# Patient Record
Sex: MALE | Race: WHITE | NOT HISPANIC OR LATINO | Employment: OTHER | URBAN - METROPOLITAN AREA
[De-identification: names, ages, dates, MRNs, and addresses within clinical notes are randomized per-mention and may not be internally consistent; named-entity substitution may affect disease eponyms.]

---

## 2017-01-03 ENCOUNTER — INFUSION (OUTPATIENT)
Dept: INFUSION THERAPY | Facility: HOSPITAL | Age: 72
End: 2017-01-03
Attending: INTERNAL MEDICINE
Payer: MEDICARE

## 2017-01-03 VITALS — DIASTOLIC BLOOD PRESSURE: 98 MMHG | HEART RATE: 84 BPM | RESPIRATION RATE: 18 BRPM | SYSTOLIC BLOOD PRESSURE: 135 MMHG

## 2017-01-03 DIAGNOSIS — D46.9 MDS (MYELODYSPLASTIC SYNDROME): Primary | ICD-10-CM

## 2017-01-03 PROCEDURE — 96401 CHEMO ANTI-NEOPL SQ/IM: CPT

## 2017-01-03 PROCEDURE — 63600175 PHARM REV CODE 636 W HCPCS: Performed by: INTERNAL MEDICINE

## 2017-01-03 RX ORDER — AZACITIDINE 100 MG/1
75 INJECTION, POWDER, LYOPHILIZED, FOR SOLUTION INTRAVENOUS; SUBCUTANEOUS
Status: COMPLETED | OUTPATIENT
Start: 2017-01-03 | End: 2017-01-03

## 2017-01-03 RX ADMIN — AZACITIDINE 160 MG: 100 INJECTION, POWDER, LYOPHILIZED, FOR SOLUTION INTRAVENOUS; SUBCUTANEOUS at 11:01

## 2017-01-03 NOTE — NURSING
MD Bishnu Naik RN        Cc: Julius Barry NP                     I added neulasta for the 3rd     Please get him in     5 doses are fine            Previous Messages       ----- Message -----      From: Bishnu Moon RN      Sent: 12/28/2016   3:59 PM        To: Laura Rader MD     Question 1:  We spoke yesterday about his Gran # 0.2 and you said for him to get neulasta next week when he finishes Vidaza.  I do not see an order and wanted to check to see if you still wanted him to get neulasta, if so please put the order in so we can get auth for the neulasta.     Question 2:  Your orders are for VIdaza for 7 doses, but he was only scheduled for 5 doses.  If you do want him to get 7 doses, I will let the  know to schedule the additional 2 doses. Do you him to ge vidaza 7 doses?     Thanks,  Bishnu

## 2017-01-06 ENCOUNTER — INFUSION (OUTPATIENT)
Dept: INFUSION THERAPY | Facility: HOSPITAL | Age: 72
End: 2017-01-06
Attending: INTERNAL MEDICINE
Payer: MEDICARE

## 2017-01-06 ENCOUNTER — OFFICE VISIT (OUTPATIENT)
Dept: INTERNAL MEDICINE | Facility: CLINIC | Age: 72
End: 2017-01-06
Payer: MEDICARE

## 2017-01-06 VITALS
TEMPERATURE: 98 F | HEIGHT: 71 IN | HEART RATE: 94 BPM | DIASTOLIC BLOOD PRESSURE: 82 MMHG | SYSTOLIC BLOOD PRESSURE: 128 MMHG | WEIGHT: 205.94 LBS | BODY MASS INDEX: 28.83 KG/M2 | RESPIRATION RATE: 18 BRPM

## 2017-01-06 DIAGNOSIS — I35.0 AORTIC VALVE STENOSIS, MILD: Chronic | ICD-10-CM

## 2017-01-06 DIAGNOSIS — I10 HTN (HYPERTENSION), BENIGN: Primary | ICD-10-CM

## 2017-01-06 DIAGNOSIS — Z98.890 H/O CAROTID ENDARTERECTOMY: ICD-10-CM

## 2017-01-06 DIAGNOSIS — I48.20 CHRONIC ATRIAL FIBRILLATION: ICD-10-CM

## 2017-01-06 DIAGNOSIS — F32.A DEPRESSION, UNSPECIFIED DEPRESSION TYPE: ICD-10-CM

## 2017-01-06 DIAGNOSIS — D46.9 MDS (MYELODYSPLASTIC SYNDROME): Primary | ICD-10-CM

## 2017-01-06 DIAGNOSIS — E78.5 DYSLIPIDEMIA: ICD-10-CM

## 2017-01-06 PROCEDURE — 1157F ADVNC CARE PLAN IN RCRD: CPT | Mod: S$GLB,,, | Performed by: INTERNAL MEDICINE

## 2017-01-06 PROCEDURE — 3079F DIAST BP 80-89 MM HG: CPT | Mod: S$GLB,,, | Performed by: INTERNAL MEDICINE

## 2017-01-06 PROCEDURE — 1160F RVW MEDS BY RX/DR IN RCRD: CPT | Mod: S$GLB,,, | Performed by: INTERNAL MEDICINE

## 2017-01-06 PROCEDURE — 1126F AMNT PAIN NOTED NONE PRSNT: CPT | Mod: S$GLB,,, | Performed by: INTERNAL MEDICINE

## 2017-01-06 PROCEDURE — 99999 PR PBB SHADOW E&M-EST. PATIENT-LVL III: CPT | Mod: PBBFAC,,, | Performed by: INTERNAL MEDICINE

## 2017-01-06 PROCEDURE — 1159F MED LIST DOCD IN RCRD: CPT | Mod: S$GLB,,, | Performed by: INTERNAL MEDICINE

## 2017-01-06 PROCEDURE — 63600175 PHARM REV CODE 636 W HCPCS: Performed by: INTERNAL MEDICINE

## 2017-01-06 PROCEDURE — 99499 UNLISTED E&M SERVICE: CPT | Mod: S$PBB,,, | Performed by: INTERNAL MEDICINE

## 2017-01-06 PROCEDURE — 96372 THER/PROPH/DIAG INJ SC/IM: CPT

## 2017-01-06 PROCEDURE — 99214 OFFICE O/P EST MOD 30 MIN: CPT | Mod: S$GLB,,, | Performed by: INTERNAL MEDICINE

## 2017-01-06 PROCEDURE — 3074F SYST BP LT 130 MM HG: CPT | Mod: S$GLB,,, | Performed by: INTERNAL MEDICINE

## 2017-01-06 RX ORDER — PRAVASTATIN SODIUM 20 MG/1
20 TABLET ORAL EVERY OTHER DAY
Qty: 45 TABLET | Refills: 3 | Status: SHIPPED | OUTPATIENT
Start: 2017-01-06 | End: 2017-03-22

## 2017-01-06 RX ADMIN — PEGFILGRASTIM 6 MG: 6 INJECTION SUBCUTANEOUS at 03:01

## 2017-01-06 NOTE — PROGRESS NOTES
This office note has been dictated.  HISTORY OF PRESENT ILLNESS:  This is a 71-year-old gentleman with several   chronic medical conditions including hypertension, dyslipidemia, atrial   fibrillation, aortic stenosis and recently being treated for myelodysplastic   syndrome.  The patient reports overall he is doing generally well.  Currently,   undergoing chemotherapy with Hematology/Oncology.  He is not describing any   chest pain, palpitations or syncope.  He states his blood pressure readings at   home are always normal, though he reports that when he comes in to the office,   particular with the Oncology Department, his readings can be elevated.    CURRENT MEDICATIONS:  All noted and reviewed in the electronic medical record   medication list.    REVIEW OF SYSTEMS:  CONSTITUTIONAL:  No fever, chills or generalized body aches.  CARDIOVASCULAR:  No chest pain, palpitations or syncope.  RESPIRATORY:  No cough or shortness of breath.  GASTROINTESTINAL:  No nausea, vomiting, abdominal pain or diarrhea or change in   bowel habits.    PAST MEDICAL HISTORY, PAST SURGICAL HISTORY, FAMILY AND SOCIAL HISTORY:  All   noted and reviewed in the electronic medical record history sections and an   updated.    PHYSICAL EXAMINATION:  GENERAL:  Pleasant, alert, appropriately groomed male in no acute distress.  VITAL SIGNS:  Blood pressure taken manually by this examiner is 128/82.  Pulse   90s and irregularly irregular.  HEENT:  Normocephalic.  NECK:  Supple, no masses, no thyromegaly.  LUNGS:  Clear to auscultation.  CARDIOVASCULAR:  Regular rate and rhythm.  There is a 1-2/6 systolic murmur.    Carotids are full bilaterally.  No significant peripheral extremity edema.  MENTAL STATUS:  Alert and oriented.  Affect and mood all appropriate.    IMPRESSION:  1.  Hypertension, probably reasonably controlled.  2.  Aortic stenosis.  3.  History of carotid atherosclerotic disease, status post endarterectomy.  4.  Dyslipidemia.  5.   Atrial fibrillation, chronically anticoagulated.  6.  Myelodysplastic syndrome being actively followed by Hematology/Oncology.  7.  Depression, stable.    PLAN:  1.  Update a carotid Doppler study.  2.  Refill pravastatin.  3.  Return to clinic in six months.      PB/IN  dd: 01/06/2017 10:04:00 (CST)  td: 01/07/2017 00:31:10 (CST)  Doc ID   #[id number]  Job ID #[id number]    CC:

## 2017-01-11 ENCOUNTER — OFFICE VISIT (OUTPATIENT)
Dept: HEMATOLOGY/ONCOLOGY | Facility: CLINIC | Age: 72
End: 2017-01-11
Payer: MEDICARE

## 2017-01-11 VITALS
WEIGHT: 202.63 LBS | HEART RATE: 89 BPM | SYSTOLIC BLOOD PRESSURE: 167 MMHG | HEIGHT: 71 IN | TEMPERATURE: 99 F | BODY MASS INDEX: 28.37 KG/M2 | DIASTOLIC BLOOD PRESSURE: 86 MMHG | RESPIRATION RATE: 16 BRPM

## 2017-01-11 DIAGNOSIS — I27.20 PULMONARY HYPERTENSION: ICD-10-CM

## 2017-01-11 DIAGNOSIS — D61.818 OTHER PANCYTOPENIA: ICD-10-CM

## 2017-01-11 DIAGNOSIS — E78.5 DYSLIPIDEMIA: ICD-10-CM

## 2017-01-11 DIAGNOSIS — I10 HTN (HYPERTENSION), BENIGN: ICD-10-CM

## 2017-01-11 DIAGNOSIS — Z98.890 H/O CAROTID ENDARTERECTOMY: ICD-10-CM

## 2017-01-11 DIAGNOSIS — I35.0 AORTIC VALVE STENOSIS, MILD: Chronic | ICD-10-CM

## 2017-01-11 DIAGNOSIS — I48.91 ATRIAL FIBRILLATION WITH RVR: ICD-10-CM

## 2017-01-11 DIAGNOSIS — D46.9 MDS (MYELODYSPLASTIC SYNDROME): Primary | ICD-10-CM

## 2017-01-11 DIAGNOSIS — D61.818 PANCYTOPENIA: ICD-10-CM

## 2017-01-11 PROCEDURE — 3079F DIAST BP 80-89 MM HG: CPT | Mod: S$GLB,,, | Performed by: INTERNAL MEDICINE

## 2017-01-11 PROCEDURE — 1160F RVW MEDS BY RX/DR IN RCRD: CPT | Mod: S$GLB,,, | Performed by: INTERNAL MEDICINE

## 2017-01-11 PROCEDURE — 3077F SYST BP >= 140 MM HG: CPT | Mod: S$GLB,,, | Performed by: INTERNAL MEDICINE

## 2017-01-11 PROCEDURE — 99214 OFFICE O/P EST MOD 30 MIN: CPT | Mod: S$GLB,,, | Performed by: INTERNAL MEDICINE

## 2017-01-11 PROCEDURE — 1159F MED LIST DOCD IN RCRD: CPT | Mod: S$GLB,,, | Performed by: INTERNAL MEDICINE

## 2017-01-11 PROCEDURE — 1157F ADVNC CARE PLAN IN RCRD: CPT | Mod: S$GLB,,, | Performed by: INTERNAL MEDICINE

## 2017-01-11 PROCEDURE — 1125F AMNT PAIN NOTED PAIN PRSNT: CPT | Mod: S$GLB,,, | Performed by: INTERNAL MEDICINE

## 2017-01-11 PROCEDURE — 99999 PR PBB SHADOW E&M-EST. PATIENT-LVL III: CPT | Mod: PBBFAC,,, | Performed by: INTERNAL MEDICINE

## 2017-01-11 RX ORDER — AZACITIDINE 100 MG/1
75 INJECTION, POWDER, LYOPHILIZED, FOR SOLUTION INTRAVENOUS; SUBCUTANEOUS
Status: CANCELLED | OUTPATIENT
Start: 2017-02-03

## 2017-01-11 RX ORDER — AZACITIDINE 100 MG/1
75 INJECTION, POWDER, LYOPHILIZED, FOR SOLUTION INTRAVENOUS; SUBCUTANEOUS
Status: CANCELLED | OUTPATIENT
Start: 2017-01-25

## 2017-01-11 RX ORDER — AZACITIDINE 100 MG/1
75 INJECTION, POWDER, LYOPHILIZED, FOR SOLUTION INTRAVENOUS; SUBCUTANEOUS
Status: CANCELLED | OUTPATIENT
Start: 2017-01-26

## 2017-01-11 RX ORDER — AZACITIDINE 100 MG/1
75 INJECTION, POWDER, LYOPHILIZED, FOR SOLUTION INTRAVENOUS; SUBCUTANEOUS
Status: CANCELLED | OUTPATIENT
Start: 2017-01-27

## 2017-01-11 RX ORDER — AZACITIDINE 100 MG/1
75 INJECTION, POWDER, LYOPHILIZED, FOR SOLUTION INTRAVENOUS; SUBCUTANEOUS
Status: CANCELLED | OUTPATIENT
Start: 2017-01-31

## 2017-01-11 RX ORDER — AZACITIDINE 100 MG/1
75 INJECTION, POWDER, LYOPHILIZED, FOR SOLUTION INTRAVENOUS; SUBCUTANEOUS
Status: CANCELLED | OUTPATIENT
Start: 2017-01-28

## 2017-01-11 RX ORDER — AZACITIDINE 100 MG/1
75 INJECTION, POWDER, LYOPHILIZED, FOR SOLUTION INTRAVENOUS; SUBCUTANEOUS
Status: CANCELLED | OUTPATIENT
Start: 2017-02-04

## 2017-01-11 NOTE — Clinical Note
Continue all meds  Hold coumadin 4 days before Marrow  Get Marrow done when scheduled--Schedule in 2 weeks  Continue Vidaza, Aranesp and Neulasta as scheduled  See me in 4 weeks

## 2017-01-11 NOTE — MR AVS SNAPSHOT
[facility]  [address]  [facility] [address]  Phone: [phone]                  Wil Kwon Jr.   2017 3:00 PM   Office Visit    Description:  Male : 1945   Provider:  Laura Rader MD   Department:  [facility]           Diagnoses this Visit        Comments    MDS (myelodysplastic syndrome)    -  Primary     Other pancytopenia         Pancytopenia         Atrial fibrillation with RVR         HTN (hypertension), benign         Aortic valve stenosis, mild         Pulmonary hypertension         Dyslipidemia         H/O carotid endarterectomy                To Do List           Future Appointments        Provider Department Dept Phone    2017 8:00 AM VASCULAR, [facility] - Vascular Cardiology [phone]    2017 1:10 PM Lore Delgado MD [facility] - Dermatology [phone]      Goals (5 Years of Data)     None      Follow-Up and Disposition     Return in about 4 weeks (around 2017).      [facility] On Call     [facility] On Call Nurse Care Line -  Assistance  Registered nurses in the [facility] On Call Center provide clinical advisement, health education, appointment booking, and other advisory services.  Call for this free service at [phone].             Medications           Message regarding Medications     Verify the changes and/or additions to your medication regime listed below are the same as discussed with your clinician today.  If any of these changes or additions are incorrect, please notify your healthcare provider.             Verify that the below list of medications is an accurate representation of the medications you are currently taking.  If none reported, the list may be blank. If incorrect, please contact your healthcare provider. Carry this list with you in case of emergency.           Current Medications     acetaminophen (TYLENOL) 325 MG tablet Take 325 mg by mouth as needed for Pain.     "citalopram (CELEXA) 40 MG tablet TAKE 1 TABLET BY MOUTH DAILY    diltiazem (CARDIZEM CD) 300 MG 24 hr capsule TAKE ONE CAPSULE BY MOUTH EVERY DAY    losartan (COZAAR) 50 MG tablet Take 50 mg by mouth once daily.    metoprolol succinate (TOPROL-XL) 25 MG 24 hr tablet Take 1 tablet (25 mg total) by mouth once daily.    multivitamin capsule Take 1 capsule by mouth every morning.     nystatin (MYCOSTATIN) ointment Apply topically 3 (three) times daily.    omega-3 fatty acids-vitamin E (FISH OIL) 1,000 mg Cap Take 3 capsules by mouth once daily.     pravastatin (PRAVACHOL) 20 MG tablet Take 1 tablet (20 mg total) by mouth every other day.    warfarin (COUMADIN) 3 MG tablet Take 1 tablet (3 mg total) by mouth Daily.           Clinical Reference Information           Vital Signs - Last Recorded  Most recent update: 1/11/2017  3:40 PM by Shanta Busby MA    BP Pulse Temp Resp Ht Wt    (!) 167/86 89 98.6 °F (37 °C) 16 5' 11" (1.803 m) 91.9 kg (202 lb 9.6 oz)    BMI                28.26 kg/m2          Blood Pressure          Most Recent Value    BP  (!)  167/86      Allergies as of 1/11/2017     Lipitor [Atorvastatin]    Lisinopril      Immunizations Administered on Date of Encounter - 1/11/2017     None      Orders Placed During Today's Visit      Normal Orders This Visit    Bone Marrow Prep and Stain     Bone marrow     Chromosome Analysis, Bone Marrow     Leukemia/Lymphoma Screen     Future Labs/Procedures Expected by Expires    CBC Oncology  As directed 1/11/2018    Comprehensive metabolic panel  As directed 1/11/2018      Instructions    Continue all meds    Hold coumadin 4 days before Marrow    Get Marrow done when scheduled    Continue Vidaza, Aranesp and Neulasta as scheduled    See me in 4 weeks       "

## 2017-01-11 NOTE — PATIENT INSTRUCTIONS
Continue all meds    Hold coumadin 4 days before Marrow    Get Marrow done when scheduled    Continue Vidaza, Aranesp and Neulasta as scheduled    See me in 4 weeks

## 2017-01-12 ENCOUNTER — LAB VISIT (OUTPATIENT)
Dept: LAB | Facility: HOSPITAL | Age: 72
End: 2017-01-12
Attending: INTERNAL MEDICINE
Payer: MEDICARE

## 2017-01-12 ENCOUNTER — TELEPHONE (OUTPATIENT)
Dept: HEMATOLOGY/ONCOLOGY | Facility: CLINIC | Age: 72
End: 2017-01-12

## 2017-01-12 DIAGNOSIS — D46.9 MDS (MYELODYSPLASTIC SYNDROME): ICD-10-CM

## 2017-01-12 DIAGNOSIS — D46.9 MDS (MYELODYSPLASTIC SYNDROME): Primary | ICD-10-CM

## 2017-01-12 LAB
ALBUMIN SERPL BCP-MCNC: 3.7 G/DL
ALP SERPL-CCNC: 135 U/L
ALT SERPL W/O P-5'-P-CCNC: 33 U/L
ANION GAP SERPL CALC-SCNC: 7 MMOL/L
AST SERPL-CCNC: 144 U/L
BILIRUB SERPL-MCNC: 0.5 MG/DL
BUN SERPL-MCNC: 17 MG/DL
CALCIUM SERPL-MCNC: 9.4 MG/DL
CHLORIDE SERPL-SCNC: 105 MMOL/L
CO2 SERPL-SCNC: 26 MMOL/L
CREAT SERPL-MCNC: 1.3 MG/DL
ERYTHROCYTE [DISTWIDTH] IN BLOOD BY AUTOMATED COUNT: 17.8 %
EST. GFR  (AFRICAN AMERICAN): >60 ML/MIN/1.73 M^2
EST. GFR  (NON AFRICAN AMERICAN): 54.9 ML/MIN/1.73 M^2
GLUCOSE SERPL-MCNC: 89 MG/DL
HCT VFR BLD AUTO: 32.7 %
HGB BLD-MCNC: 10.4 G/DL
MCH RBC QN AUTO: 28.4 PG
MCHC RBC AUTO-ENTMCNC: 31.8 %
MCV RBC AUTO: 89 FL
NEUTROPHILS # BLD AUTO: ABNORMAL K/UL
PLATELET # BLD AUTO: 82 K/UL
PMV BLD AUTO: ABNORMAL FL
POTASSIUM SERPL-SCNC: 4.6 MMOL/L
PROT SERPL-MCNC: 8.1 G/DL
RBC # BLD AUTO: 3.66 M/UL
SODIUM SERPL-SCNC: 138 MMOL/L
WBC # BLD AUTO: 40.86 K/UL

## 2017-01-12 PROCEDURE — 85027 COMPLETE CBC AUTOMATED: CPT

## 2017-01-12 PROCEDURE — 36415 COLL VENOUS BLD VENIPUNCTURE: CPT

## 2017-01-12 PROCEDURE — 80053 COMPREHEN METABOLIC PANEL: CPT

## 2017-01-12 NOTE — TELEPHONE ENCOUNTER
----- Message from Amy Rodriguez sent at 1/12/2017  9:32 AM CST -----  Good morning,  Please add more days to patient supportive plan for aranesp.Thanks

## 2017-01-16 ENCOUNTER — CLINICAL SUPPORT (OUTPATIENT)
Dept: CARDIOLOGY | Facility: CLINIC | Age: 72
End: 2017-01-16
Payer: MEDICARE

## 2017-01-16 DIAGNOSIS — Z98.890 H/O CAROTID ENDARTERECTOMY: ICD-10-CM

## 2017-01-16 LAB — INTERNAL CAROTID STENOSIS: NORMAL

## 2017-01-16 PROCEDURE — 93880 EXTRACRANIAL BILAT STUDY: CPT | Mod: S$GLB,,, | Performed by: INTERNAL MEDICINE

## 2017-01-16 RX ORDER — DILTIAZEM HYDROCHLORIDE 300 MG/1
CAPSULE, COATED, EXTENDED RELEASE ORAL
Qty: 90 CAPSULE | Refills: 1 | Status: SHIPPED | OUTPATIENT
Start: 2017-01-16 | End: 2017-07-09 | Stop reason: SDUPTHER

## 2017-01-16 NOTE — PROGRESS NOTES
"PATIENT: Wil Kwon Jr.  MRN: [id number]  DATE: 1/16/2017    Subjective:   MDS    Oncologic History:  Mr. Kwon 71-year-old gentleman with several chronic medical conditions living healthy lifestyle.  He has hypertension and reports home readings are all normal.  He did recently develop angioedema from ACE inhibitor and was switched to an ARB.  His dyslipidemia is treated with pharmacologic therapy.  He has history of carotid atherosclerotic disease status post left carotid endarterectomy and up-to-date with surveillance carotid Doppler study.  He has a history of PAF followed by our electrophysiology cardiology department and is chronically anticoagulated.  He has moderate aortic stenosis. He has DJD of the right knee which has become more symptomatic in recent months.  He was noted to be progressively anemic over the last 1 year.  He has hence been referred to see us.  His hematology workup to date reveals a normal B12 and folate.  His iron stores are normal.  His reticulocyte count is slightly elevated at 4.2.  His white count has remained low and progressively dropping over the last 3 years with monocytosis.  He also has developed mild progressive thrombocytopenia.  He is moderately symptomatic with the fatigue.  He has no history of extrinsic GI bleeding.  He also has no history of previous transfusions.he underwenta marrow. 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 Of 20 metaphases, 2 metaphases were normal and 18 metaphases had a 2;21  translocation. 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 RUNX1 rearrangements are  associated with de alfredo AML and therapeutic-related AML and MDS. Clinical and pathologic correlation is  recommended."  FINAL PATHOLOGIC DIAGNOSIS  CBC DATA 8/24/16:  RBC: 3.45 M/UL, H/H : 9.9/32.1, MCV : " 93 FL, WBC: 3.1 K/UL, Gran 1.2 k/ul %, Platelet: 200 K/UL.  No peripheral blood smear was submitted for evaluation.  BONE MARROW ASPIRATE, BONE MARROW CLOT, AND BONE MARROW CORE BIOPSY WITH:  CELLULARITY= 95%, TRILINEAGE HEMATOPOIETIC ACTIVITY (M/E= 2:1) AND INCREASED IMMATURE  CELLS (9%). SEE COMMENT.  LEFT SHIFTED MATURATION OF GRANULOPOIESIS.  ADEQUATE STORAGE IRON.  ADEQUATE NUMBER OF MEGAKARYOCYTES.  COMMENT: Flow cytometry analysis of bone marrow aspirate shows lymph gate (4.2 %) containing T and B cells.  No B cell clonality or T-cell aberrancy is evident. Blast gate is increased (7.9%) with cells expression of CD13 and  CD34. Immunohistochemical studies were performed on the clot and core biopsy for further architecture evaluation with  adequate positive and negative controls. Scattered mixed predominantly T cells (CD3 positive) and B cells (CD20  positive) are evident. About 6-7 % plasma cells ( positive) and 9% CD34 positive cells are noted. Findings  are nondiagnostic for lymphoma or plasma cell dyscrasia.  Microscopic Examination  BONE MARROW ASPIRATE DIFFERENTIAL:  Blasts----------------------------------------------5%  Promyelocytes---------------------------------2%  Myelocytes--------------------------------------11%  Metamyelocytes------------------------------ 19%  Bands----------------------------------------------5%  PMN Neutrophils------------------------------15%  Eosinophils------------------------------------------2%  Basophils---------------------------------------0%  Monocytes------------------------------------2%  Lymphocytes-------------------------------------6%  Plasma cells------------------------------------------2%  Erythroid precursors--------------------------31%  BONE MARROW ASPIRATE:  Myeloid and erythroid maturation shows M:E ratio at 2:1. No significant dysplasia is evident. Left shifted maturation of  granulopoiesis is noted. Stainable iron is present without increased  ringed sideroblasts. Megakaryocytes are seen.  BONE MARROW CLOT:  Cellularity is 95 % with trilineage hematopoiesis. No abnormal infiltrates are seen. Megakaryocytes are adequate in  number. Stainable iron is present.  BONE MARROW CORE BIOPSY:  Cellularity is 95 %. No abnormal infiltrates are seen. Stainable iron is not identified. Megakaryocytes are adequate in   No significantly increased reticular fibrosis is evident by special stain (reticulin) with adequate positive and  negative controls.    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 I will R/O CMPD as the primary disease leading to MDS. He does not meet criteria for AML.  I will plan to start him on HMA,s and monitor for response. Based on IPSS score we will evaluate for transplant. Cem DW him and wife.   He was also started on Aranesp.    He has started Vidaza and currently on his 3rd cycle. He is also receiving Aranesp.    Interval History: Mr. Kwon returns for follow up.  He has some bruising at the site of his Vidaza injection.  Otherwise he has had no external bleeding.  He has not had any transfusions.  He has not been febrile.  He has not been admitted to the hospital and has been doing well.    Past Medical History   Diagnosis Date    Aortic valve stenosis, mild      secondary to bicuspid aortic alve    Atrial fibrillation     Carotid artery disease      Left CEA 4/9/13    Depression     DJD (degenerative joint disease)     H/O echocardiogram 04/13/2016     EF 55-60%. Diastolic dysfunction. PASP 39. mod AS with JEFF 1.18    History of psychiatric hospitalization      [facility] 2014, [facility] 1993    Hx of psychiatric care     Hyperlipidemia     Hypertension     MDS (myelodysplastic syndrome)     Pancytopenia     Psychiatric problem     Therapy      [facility]        Past Surgical History   Procedure Laterality Date    Knee surgery       Right x2    Neck fusion      Inguinal hernia       Left     Tonsillectomy      Carotid endarterectomy Left     Bone marrow biopsy  08/29/2016       Family History   Problem Relation Age of Onset    Hyperlipidemia Brother         Social History:  reports that he has never smoked. He has never used smokeless tobacco. He reports that he does not drink alcohol or use illicit drugs.    Allergies:  Review of patient's allergies indicates:   Allergen Reactions    Lipitor [atorvastatin]      Muscle pain    Lisinopril Edema     Cheek swelling       Medications:  Current Outpatient Prescriptions   Medication Sig Dispense Refill    acetaminophen (TYLENOL) 325 MG tablet Take 325 mg by mouth as needed for Pain.      citalopram (CELEXA) 40 MG tablet TAKE 1 TABLET BY MOUTH DAILY (Patient taking differently: TAKE 1 TABLET BY MOUTH DAILY (am)) 90 tablet 0    diltiaZEM (CARDIZEM CD) 300 MG 24 hr capsule TAKE ONE CAPSULE BY MOUTH EVERY DAY 90 capsule 1    losartan (COZAAR) 50 MG tablet Take 50 mg by mouth once daily.  3    metoprolol succinate (TOPROL-XL) 25 MG 24 hr tablet Take 1 tablet (25 mg total) by mouth once daily. 90 tablet 3    multivitamin capsule Take 1 capsule by mouth every morning.       nystatin (MYCOSTATIN) ointment Apply topically 3 (three) times daily. 1 Tube 1    omega-3 fatty acids-vitamin E (FISH OIL) 1,000 mg Cap Take 3 capsules by mouth once daily.       pravastatin (PRAVACHOL) 20 MG tablet Take 1 tablet (20 mg total) by mouth every other day. 45 tablet 3    warfarin (COUMADIN) 3 MG tablet Take 1 tablet (3 mg total) by mouth Daily. 30 tablet 11     No current facility-administered medications for this visit.          Review of Systems   HENT: Negative.    Eyes: Negative.    Respiratory: Negative.    Cardiovascular: Negative.    Gastrointestinal: Negative.    Endocrine: Negative.    Genitourinary: Negative.    Musculoskeletal: Negative.    Skin: Negative.    Neurological: Negative.    Hematological: Negative.    Psychiatric/Behavioral: Negative.        ECOG  "Performance Status: 0  Objective:      Vitals:   Vitals:    01/11/17 1539   BP: (!) 167/86   Pulse: 89   Resp: 16   Temp: 98.6 °F (37 °C)   Weight: 91.9 kg (202 lb 9.6 oz)   Height: 5' 11" (1.803 m)     BMI: Body mass index is 28.26 kg/(m^2).      Physical Exam   Constitutional: he is oriented to person, place, and time. He appears well-developed and well-nourished.   HENT: NC AT   Head: Normocephalic.   Right Ear: External ear normal.   Left Ear: External ear normal.   Nose: Nose normal.   Mouth/Throat: Oropharynx is clear and moist.   Eyes: Conjunctivae and EOM are normal. Pupils are equal, round, and reactive to light.   Neck: Normal range of motion. Neck supple.   Cardiovascular: Normal rate, regular rhythm, normal heart sounds and intact distal pulses.    Pulmonary/Chest: Effort normal and breath sounds normal.   Abdominal: Soft. Bowel sounds are normal.   Musculoskeletal: Normal range of motion.   Neurological: he is alert and oriented to person, place, and time. He has normal reflexes.   Skin: Skin is warm and dry.   Psychiatric: he has a normal mood and affect. His behavior is normal. Judgment and thought content normal.       Laboratory Data:  No visits with results within 2 Week(s) from this visit.  Latest known visit with results is:    Infusion on 12/21/2016   Component Date Value Ref Range Status    WBC 12/21/2016 2.21* 3.90 - 12.70 K/uL Final    RBC 12/21/2016 3.65* 4.60 - 6.20 M/uL Final    Hemoglobin 12/21/2016 10.3* 14.0 - 18.0 g/dL Final    Hematocrit 12/21/2016 32.5* 40.0 - 54.0 % Final    MCV 12/21/2016 89  82 - 98 fL Final    MCH 12/21/2016 28.2  27.0 - 31.0 pg Final    MCHC 12/21/2016 31.7* 32.0 - 36.0 % Final    RDW 12/21/2016 17.3* 11.5 - 14.5 % Final    Platelets 12/21/2016 114* 150 - 350 K/uL Final    MPV 12/21/2016 SEE COMMENT  9.2 - 12.9 fL Final    Result not available.    Gran # 12/21/2016 0.2* 1.8 - 7.7 K/uL Final    Lymph # 12/21/2016 1.9  1.0 - 4.8 K/uL Final    Mono # " 12/21/2016 0.0* 0.3 - 1.0 K/uL Final    Eos # 12/21/2016 0.0  0.0 - 0.5 K/uL Final    Baso # 12/21/2016 0.00  0.00 - 0.20 K/uL Final    Gran% 12/21/2016 10.8* 38.0 - 73.0 % Final    Lymph% 12/21/2016 87.3* 18.0 - 48.0 % Final    Mono% 12/21/2016 1.4* 4.0 - 15.0 % Final    Eosinophil% 12/21/2016 0.5  0.0 - 8.0 % Final    Basophil% 12/21/2016 0.0  0.0 - 1.9 % Final    Platelet Estimate 12/21/2016 Decreased*  Final    Aniso 12/21/2016 Slight   Final    Poik 12/21/2016 Slight   Final    Poly 12/21/2016 Occasional   Final    Hypo 12/21/2016 Occasional   Final    Ovalocytes 12/21/2016 Occasional   Final    Differential Method 12/21/2016 Automated   Final       Assessment/Plan:     1. MDS (myelodysplastic syndrome)    2. Other pancytopenia    3. Pancytopenia    4. Atrial fibrillation with RVR    5. HTN (hypertension), benign    6. Aortic valve stenosis, mild    7. Pulmonary hypertension- April 2016- The estimated PA systolic pressure is 39 mmHg    8. Dyslipidemia    9. H/O carotid endarterectomy      He is tolerating therapy without any major problems except for some bruising.  If this persists we may consider changing his Vidaza to IV as he has a Port-A-Cath I plan to restage him with a bone marrow biopsy after this cycle of chemotherapy.  I discussed allogeneic transplantation with him and his wife again.  His wife becomes emotional and concerned about this.  Hence I have told them that they need to think about this at home in and discuss this with me further when they return to see me next visit.    I will monitor his pancytopenia with intermittent CBCs.    His atrial fibrillation, hypertension, aortic stenosis, pulmonary hypertension and hyperlipidemia are stable.    He will stop his Plavix for a few days before his bone marrow biopsy and then restart.    Med and Order  Orders Placed This Encounter    Bone marrow    CBC Oncology    Comprehensive metabolic panel    Bone Marrow Prep and Stain     Chromosome Analysis, Bone Marrow    Leukemia/Lymphoma Screen       Follow Up  Return in about 4 weeks (around 2/8/2017).

## 2017-01-17 ENCOUNTER — INFUSION (OUTPATIENT)
Dept: INFUSION THERAPY | Facility: HOSPITAL | Age: 72
End: 2017-01-17
Attending: INTERNAL MEDICINE
Payer: MEDICARE

## 2017-01-17 VITALS — SYSTOLIC BLOOD PRESSURE: 132 MMHG | RESPIRATION RATE: 18 BRPM | HEART RATE: 96 BPM | DIASTOLIC BLOOD PRESSURE: 72 MMHG

## 2017-01-17 DIAGNOSIS — D46.9 MDS (MYELODYSPLASTIC SYNDROME): Primary | ICD-10-CM

## 2017-01-17 LAB
ALBUMIN SERPL BCP-MCNC: 3.5 G/DL
ALP SERPL-CCNC: 97 U/L
ALT SERPL W/O P-5'-P-CCNC: 19 U/L
ANION GAP SERPL CALC-SCNC: 6 MMOL/L
ANISOCYTOSIS BLD QL SMEAR: SLIGHT
AST SERPL-CCNC: 34 U/L
BASOPHILS NFR BLD: 1 %
BILIRUB SERPL-MCNC: 0.6 MG/DL
BUN SERPL-MCNC: 29 MG/DL
CALCIUM SERPL-MCNC: 8.7 MG/DL
CHLORIDE SERPL-SCNC: 106 MMOL/L
CO2 SERPL-SCNC: 24 MMOL/L
CREAT SERPL-MCNC: 1.3 MG/DL
DIFFERENTIAL METHOD: ABNORMAL
EOSINOPHIL NFR BLD: 0 %
ERYTHROCYTE [DISTWIDTH] IN BLOOD BY AUTOMATED COUNT: 17.2 %
EST. GFR  (AFRICAN AMERICAN): >60 ML/MIN/1.73 M^2
EST. GFR  (NON AFRICAN AMERICAN): 54.9 ML/MIN/1.73 M^2
GLUCOSE SERPL-MCNC: 111 MG/DL
HCT VFR BLD AUTO: 28.1 %
HGB BLD-MCNC: 9.1 G/DL
HYPOCHROMIA BLD QL SMEAR: ABNORMAL
LYMPHOCYTES NFR BLD: 29 %
MCH RBC QN AUTO: 28.7 PG
MCHC RBC AUTO-ENTMCNC: 32.4 %
MCV RBC AUTO: 89 FL
METAMYELOCYTES NFR BLD MANUAL: 1 %
MONOCYTES NFR BLD: 5 %
NEUTROPHILS NFR BLD: 63 %
NEUTS BAND NFR BLD MANUAL: 1 %
NRBC BLD-RTO: ABNORMAL /100 WBC
OVALOCYTES BLD QL SMEAR: ABNORMAL
PLATELET # BLD AUTO: 30 K/UL
PLATELET BLD QL SMEAR: ABNORMAL
PMV BLD AUTO: ABNORMAL FL
POIKILOCYTOSIS BLD QL SMEAR: SLIGHT
POLYCHROMASIA BLD QL SMEAR: ABNORMAL
POTASSIUM SERPL-SCNC: 4.5 MMOL/L
PROT SERPL-MCNC: 7.4 G/DL
RBC # BLD AUTO: 3.17 M/UL
SODIUM SERPL-SCNC: 136 MMOL/L
WBC # BLD AUTO: 6.72 K/UL

## 2017-01-17 PROCEDURE — 80053 COMPREHEN METABOLIC PANEL: CPT

## 2017-01-17 PROCEDURE — 36591 DRAW BLOOD OFF VENOUS DEVICE: CPT

## 2017-01-17 PROCEDURE — 81373 HLA I TYPING 1 LOCUS LR: CPT | Mod: PO

## 2017-01-17 PROCEDURE — 81376 HLA II TYPING 1 LOCUS LR: CPT | Mod: 91,PO

## 2017-01-17 PROCEDURE — 96372 THER/PROPH/DIAG INJ SC/IM: CPT

## 2017-01-17 PROCEDURE — 25000003 PHARM REV CODE 250: Performed by: INTERNAL MEDICINE

## 2017-01-17 PROCEDURE — 63600175 PHARM REV CODE 636 W HCPCS: Performed by: INTERNAL MEDICINE

## 2017-01-17 PROCEDURE — 85027 COMPLETE CBC AUTOMATED: CPT

## 2017-01-17 PROCEDURE — 81376 HLA II TYPING 1 LOCUS LR: CPT | Mod: PO

## 2017-01-17 PROCEDURE — 85007 BL SMEAR W/DIFF WBC COUNT: CPT

## 2017-01-17 PROCEDURE — 81373 HLA I TYPING 1 LOCUS LR: CPT | Mod: 91,PO

## 2017-01-17 RX ORDER — SODIUM CHLORIDE 0.9 % (FLUSH) 0.9 %
10 SYRINGE (ML) INJECTION
Status: DISCONTINUED | OUTPATIENT
Start: 2017-01-17 | End: 2017-01-17 | Stop reason: HOSPADM

## 2017-01-17 RX ORDER — SODIUM CHLORIDE 0.9 % (FLUSH) 0.9 %
10 SYRINGE (ML) INJECTION
Status: CANCELLED
Start: 2017-01-17

## 2017-01-17 RX ORDER — HEPARIN 100 UNIT/ML
500 SYRINGE INTRAVENOUS
Status: CANCELLED
Start: 2017-01-17

## 2017-01-17 RX ORDER — HEPARIN 100 UNIT/ML
500 SYRINGE INTRAVENOUS
Status: DISCONTINUED | OUTPATIENT
Start: 2017-01-17 | End: 2017-01-17 | Stop reason: HOSPADM

## 2017-01-17 RX ADMIN — SODIUM CHLORIDE, PRESERVATIVE FREE 10 ML: 5 INJECTION INTRAVENOUS at 12:01

## 2017-01-17 RX ADMIN — HEPARIN 500 UNITS: 100 SYRINGE at 12:01

## 2017-01-17 RX ADMIN — DARBEPOETIN ALFA 200 MCG: 200 INJECTION, SOLUTION INTRAVENOUS; SUBCUTANEOUS at 02:01

## 2017-01-17 NOTE — NURSING
Pt arrived for labs from [facility].  Port flushes easily with good blood return, labs sent.  Port deaccesed.  Pt now waiting on lab results.

## 2017-01-20 LAB — HLATY INTERPRETATION: NORMAL

## 2017-01-24 ENCOUNTER — INFUSION (OUTPATIENT)
Dept: INFUSION THERAPY | Facility: HOSPITAL | Age: 72
End: 2017-01-24
Attending: INTERNAL MEDICINE
Payer: MEDICARE

## 2017-01-24 DIAGNOSIS — D46.9 MDS (MYELODYSPLASTIC SYNDROME): Primary | ICD-10-CM

## 2017-01-24 LAB
ALBUMIN SERPL BCP-MCNC: 3.8 G/DL
ALP SERPL-CCNC: 82 U/L
ALT SERPL W/O P-5'-P-CCNC: 16 U/L
ANION GAP SERPL CALC-SCNC: 6 MMOL/L
AST SERPL-CCNC: 26 U/L
BILIRUB SERPL-MCNC: 0.7 MG/DL
BUN SERPL-MCNC: 21 MG/DL
CALCIUM SERPL-MCNC: 9.2 MG/DL
CHLORIDE SERPL-SCNC: 105 MMOL/L
CO2 SERPL-SCNC: 26 MMOL/L
CREAT SERPL-MCNC: 1.2 MG/DL
ERYTHROCYTE [DISTWIDTH] IN BLOOD BY AUTOMATED COUNT: 20 %
EST. GFR  (AFRICAN AMERICAN): >60 ML/MIN/1.73 M^2
EST. GFR  (NON AFRICAN AMERICAN): >60 ML/MIN/1.73 M^2
GLUCOSE SERPL-MCNC: 74 MG/DL
HCT VFR BLD AUTO: 26.7 %
HGB BLD-MCNC: 8.5 G/DL
MCH RBC QN AUTO: 28.4 PG
MCHC RBC AUTO-ENTMCNC: 31.8 %
MCV RBC AUTO: 89 FL
NEUTROPHILS # BLD AUTO: 0.5 K/UL
PLATELET # BLD AUTO: 98 K/UL
PMV BLD AUTO: ABNORMAL FL
POTASSIUM SERPL-SCNC: 4.1 MMOL/L
PROT SERPL-MCNC: 7.7 G/DL
RBC # BLD AUTO: 2.99 M/UL
SODIUM SERPL-SCNC: 137 MMOL/L
WBC # BLD AUTO: 2.85 K/UL

## 2017-01-24 PROCEDURE — 36591 DRAW BLOOD OFF VENOUS DEVICE: CPT

## 2017-01-24 PROCEDURE — 85027 COMPLETE CBC AUTOMATED: CPT

## 2017-01-24 PROCEDURE — 81380 HLA I TYPING 1 LOCUS HR: CPT | Mod: PO

## 2017-01-24 PROCEDURE — 81382 HLA II TYPING 1 LOC HR: CPT | Mod: 91,PO

## 2017-01-24 PROCEDURE — 81380 HLA I TYPING 1 LOCUS HR: CPT | Mod: 91,PO

## 2017-01-24 PROCEDURE — 81382 HLA II TYPING 1 LOC HR: CPT | Mod: 59,PO

## 2017-01-24 PROCEDURE — 25000003 PHARM REV CODE 250: Performed by: INTERNAL MEDICINE

## 2017-01-24 PROCEDURE — 80053 COMPREHEN METABOLIC PANEL: CPT

## 2017-01-24 RX ORDER — HEPARIN 100 UNIT/ML
500 SYRINGE INTRAVENOUS
Status: DISCONTINUED | OUTPATIENT
Start: 2017-01-24 | End: 2017-01-24 | Stop reason: HOSPADM

## 2017-01-24 RX ORDER — HEPARIN 100 UNIT/ML
500 SYRINGE INTRAVENOUS
Status: CANCELLED
Start: 2017-01-24

## 2017-01-24 RX ORDER — SODIUM CHLORIDE 0.9 % (FLUSH) 0.9 %
10 SYRINGE (ML) INJECTION
Status: CANCELLED
Start: 2017-01-24

## 2017-01-24 RX ORDER — SODIUM CHLORIDE 0.9 % (FLUSH) 0.9 %
10 SYRINGE (ML) INJECTION
Status: DISCONTINUED | OUTPATIENT
Start: 2017-01-24 | End: 2017-01-24 | Stop reason: HOSPADM

## 2017-01-24 RX ADMIN — SODIUM CHLORIDE, PRESERVATIVE FREE 10 ML: 5 INJECTION INTRAVENOUS at 12:01

## 2017-01-24 RX ADMIN — HEPARIN 500 UNITS: 100 SYRINGE at 12:01

## 2017-01-24 NOTE — NURSING
Patient here for blood draw from right chest port-port accesses easily with good blood return-blood to lab-line flushed-needle removed-patient tolerated well.

## 2017-01-25 ENCOUNTER — INFUSION (OUTPATIENT)
Dept: INFUSION THERAPY | Facility: HOSPITAL | Age: 72
End: 2017-01-25
Attending: INTERNAL MEDICINE
Payer: MEDICARE

## 2017-01-25 VITALS — HEART RATE: 82 BPM | DIASTOLIC BLOOD PRESSURE: 67 MMHG | SYSTOLIC BLOOD PRESSURE: 150 MMHG

## 2017-01-25 DIAGNOSIS — D46.9 MDS (MYELODYSPLASTIC SYNDROME): Primary | ICD-10-CM

## 2017-01-25 PROCEDURE — 96401 CHEMO ANTI-NEOPL SQ/IM: CPT

## 2017-01-25 PROCEDURE — 63600175 PHARM REV CODE 636 W HCPCS: Performed by: INTERNAL MEDICINE

## 2017-01-25 RX ORDER — AZACITIDINE 100 MG/1
75 INJECTION, POWDER, LYOPHILIZED, FOR SOLUTION INTRAVENOUS; SUBCUTANEOUS
Status: COMPLETED | OUTPATIENT
Start: 2017-01-25 | End: 2017-01-25

## 2017-01-25 RX ADMIN — AZACITIDINE 160 MG: 100 INJECTION, POWDER, LYOPHILIZED, FOR SOLUTION INTRAVENOUS; SUBCUTANEOUS at 10:01

## 2017-01-25 NOTE — MR AVS SNAPSHOT
Patient Information     Patient Name Sex Wil Pierce Jr. Male 1945      Visit Information        Provider Department Dep Phone Center    2017 10:00 AM INJECTION, [facility] [phone] Maurilio Montemayor      Patient Instructions     None      Your Current Medications Are     acetaminophen (TYLENOL) 325 MG tablet    citalopram (CELEXA) 40 MG tablet    diltiaZEM (CARDIZEM CD) 300 MG 24 hr capsule    losartan (COZAAR) 50 MG tablet    metoprolol succinate (TOPROL-XL) 25 MG 24 hr tablet    multivitamin capsule    nystatin (MYCOSTATIN) ointment    omega-3 fatty acids-vitamin E (FISH OIL) 1,000 mg Cap    pravastatin (PRAVACHOL) 20 MG tablet    warfarin (COUMADIN) 3 MG tablet      Facility-Administered Medications     azacitidine injection 160 mg    heparin, porcine (PF) 100 unit/mL injection flush 500 Units (Discontinued)    sodium chloride 0.9% flush 10 mL (Discontinued)      Appointments for Next Year     2017 10:00 AM INJECTION (15 min.) [facility] INJECTION, [facility]    Arrive at check-in approximately 15 minutes before your scheduled appointment time. Bring all outside medical records and imaging, along with a list of your current medications and insurance card.    [facility], 5th Floor    2017 10:00 AM INJECTION (15 min.) [facility] INJECTION, [facility]    Arrive at check-in approximately 15 minutes before your scheduled appointment time. Bring all outside medical records and imaging, along with a list of your current medications and insurance card.    [facility], 5th Floor    2017 10:00 AM INJECTION (15 min.) [facility] INJECTION, [facility]    Arrive at check-in approximately 15 minutes before your scheduled appointment time. Bring all outside medical records and imaging, along with a list of your current medications and insurance card.    [facility], 5th Floor    1/31/2017 10:00 AM INJECTION (15 min.) [facility], [facility]    Arrive at check-in approximately 15 minutes before your scheduled appointment time. Bring all outside medical records and imaging, along with a list of your current medications and insurance card.    [facility], 5th Floor    2/1/2017 10:00 AM INJECTION (15 min.) [facility] IN[facility]CTION, [facility]    Arrive at check-in approximately 15 minutes before your scheduled appointment time. Bring all outside medical records and imaging, along with a list of your current medications and insurance card.    [facility], [facility] Floor    2/2/2017 10:00 AM INJECTION (15 min.) [facility] IN[facility], [facility]    Arrive at check-in approximately 15 minutes before your scheduled appointment time. Bring all outside medical records and imaging, along with a list of your current medications and insurance card.    [facility], [facility] Floor    2/8/2017  9:00 AM ESTABLISHED PATIENT (15 min.) [facility] Laura Rader MD    Arrive at check-in approximately 15 minutes before your scheduled appointment time. Bring all outside medical records and imaging, along with a list of your current medications and insurance card.    [facility]         Default Flowsheet Data (last 24 hours)      Amb Complex Vitals [place]        01/25/17 1046 01/25/17 1037 01/24/17 1456          Measurements    BP  (!)  150/67       Pulse  82       Pain Assessment    Pain Score Zero  Zero              Allergies     Lipitor [Atorvastatin]     Muscle pain    Lisinopril Edema    Cheek swelling      Medications You Received from 01/24/2017 1047 to 01/25/2017 1047        Date/Time Order Dose Route Action     01/25/2017 1039 azacitidine injection 160 mg 160 mg Subcutaneous Given      Current Discharge Medication List     Cannot display discharge medications since this is  not an admission.

## 2017-01-25 NOTE — NURSING
Patient here for injections-vidaza given in 3 divided doses-patient without complaints-tolerated injections well.

## 2017-01-26 ENCOUNTER — INFUSION (OUTPATIENT)
Dept: INFUSION THERAPY | Facility: HOSPITAL | Age: 72
End: 2017-01-26
Attending: INTERNAL MEDICINE
Payer: MEDICARE

## 2017-01-26 VITALS — DIASTOLIC BLOOD PRESSURE: 65 MMHG | SYSTOLIC BLOOD PRESSURE: 132 MMHG | HEART RATE: 107 BPM | RESPIRATION RATE: 18 BRPM

## 2017-01-26 DIAGNOSIS — D46.9 MDS (MYELODYSPLASTIC SYNDROME): Primary | ICD-10-CM

## 2017-01-26 LAB
HLA DRB4 1: NORMAL
HLA-A 1 SERO. EQUIV: 1
HLA-A 1: NORMAL
HLA-A 2 SERO. EQUIV: NORMAL
HLA-A 2: NORMAL
HLA-B 1 SERO. EQUIV: 8
HLA-B 1: NORMAL
HLA-B 2 SERO. EQUIV: 51
HLA-B 2: NORMAL
HLA-BW 1 SERO. EQUIV: 6
HLA-BW 2 SERO. EQUIV: 4
HLA-C 1: NORMAL
HLA-C 2: NORMAL
HLA-CW 1 SERO. EQUIV: 7
HLA-CW 2 SERO. EQUIV: 15
HLA-DQ 1 SERO. EQUIV: 2
HLA-DQ 2 SERO. EQUIV: 7
HLA-DQB1 1: NORMAL
HLA-DQB1 2: NORMAL
HLA-DRB1 1 SERO. EQUIV: 17
HLA-DRB1 1: NORMAL
HLA-DRB1 2 SERO. EQUIV: 4
HLA-DRB1 2: NORMAL
HLA-DRB3 1: NORMAL
HLA-DRB3 2: NORMAL
HLA-DRB345 1 SERO. EQUIV: 52
HLA-DRB345 2 SERO. EQUIV: 53
HLA-DRB4 2: NORMAL
HLA-DRB5 1: NORMAL
HLA-DRB5 2: NORMAL
SSDQB TESTING DATE: NORMAL
SSDRB TESTING DATE: NORMAL
SSOA TESTING DATE: NORMAL
SSOB TESTING DATE: NORMAL
SSOC TESTING DATE: NORMAL
SSODR TESTING DATE: NORMAL
TYSSO TESTING DATE: NORMAL

## 2017-01-26 PROCEDURE — 96401 CHEMO ANTI-NEOPL SQ/IM: CPT

## 2017-01-26 PROCEDURE — 63600175 PHARM REV CODE 636 W HCPCS: Performed by: INTERNAL MEDICINE

## 2017-01-26 RX ORDER — AZACITIDINE 100 MG/1
75 INJECTION, POWDER, LYOPHILIZED, FOR SOLUTION INTRAVENOUS; SUBCUTANEOUS
Status: COMPLETED | OUTPATIENT
Start: 2017-01-26 | End: 2017-01-26

## 2017-01-26 RX ADMIN — AZACITIDINE 160 MG: 100 INJECTION, POWDER, LYOPHILIZED, FOR SOLUTION INTRAVENOUS; SUBCUTANEOUS at 11:01

## 2017-01-27 ENCOUNTER — INFUSION (OUTPATIENT)
Dept: INFUSION THERAPY | Facility: HOSPITAL | Age: 72
End: 2017-01-27
Attending: INTERNAL MEDICINE
Payer: MEDICARE

## 2017-01-27 VITALS
RESPIRATION RATE: 18 BRPM | HEART RATE: 100 BPM | DIASTOLIC BLOOD PRESSURE: 78 MMHG | SYSTOLIC BLOOD PRESSURE: 138 MMHG | TEMPERATURE: 98 F

## 2017-01-27 DIAGNOSIS — D46.9 MDS (MYELODYSPLASTIC SYNDROME): Primary | ICD-10-CM

## 2017-01-27 PROCEDURE — 96401 CHEMO ANTI-NEOPL SQ/IM: CPT

## 2017-01-27 PROCEDURE — 63600175 PHARM REV CODE 636 W HCPCS: Performed by: INTERNAL MEDICINE

## 2017-01-27 RX ORDER — AZACITIDINE 100 MG/1
75 INJECTION, POWDER, LYOPHILIZED, FOR SOLUTION INTRAVENOUS; SUBCUTANEOUS
Status: COMPLETED | OUTPATIENT
Start: 2017-01-27 | End: 2017-01-27

## 2017-01-27 RX ADMIN — AZACITIDINE 160 MG: 100 INJECTION, POWDER, LYOPHILIZED, FOR SOLUTION INTRAVENOUS; SUBCUTANEOUS at 12:01

## 2017-01-27 NOTE — NURSING
Pt arrived for Vidaza D3.  Pt tolerated injections x3 to abd.  Discharged to home and will RTC on Monday.

## 2017-01-30 ENCOUNTER — INFUSION (OUTPATIENT)
Dept: INFUSION THERAPY | Facility: HOSPITAL | Age: 72
End: 2017-01-30
Attending: INTERNAL MEDICINE
Payer: MEDICARE

## 2017-01-30 VITALS — HEART RATE: 101 BPM | DIASTOLIC BLOOD PRESSURE: 80 MMHG | SYSTOLIC BLOOD PRESSURE: 139 MMHG | RESPIRATION RATE: 18 BRPM

## 2017-01-30 DIAGNOSIS — D46.9 MDS (MYELODYSPLASTIC SYNDROME): Primary | ICD-10-CM

## 2017-01-30 PROCEDURE — 96401 CHEMO ANTI-NEOPL SQ/IM: CPT

## 2017-01-30 PROCEDURE — 63600175 PHARM REV CODE 636 W HCPCS: Performed by: INTERNAL MEDICINE

## 2017-01-30 RX ORDER — AZACITIDINE 100 MG/1
75 INJECTION, POWDER, LYOPHILIZED, FOR SOLUTION INTRAVENOUS; SUBCUTANEOUS
Status: COMPLETED | OUTPATIENT
Start: 2017-01-30 | End: 2017-01-30

## 2017-01-30 RX ADMIN — AZACITIDINE 160 MG: 100 INJECTION, POWDER, LYOPHILIZED, FOR SOLUTION INTRAVENOUS; SUBCUTANEOUS at 10:01

## 2017-01-30 NOTE — NURSING
1046 -- Patient received Vidaza in ABD.  Tolerated well.  RTC tomorrow.  Ambulated off unit with steady gait.

## 2017-01-31 ENCOUNTER — INFUSION (OUTPATIENT)
Dept: INFUSION THERAPY | Facility: HOSPITAL | Age: 72
End: 2017-01-31
Attending: INTERNAL MEDICINE
Payer: MEDICARE

## 2017-01-31 VITALS
DIASTOLIC BLOOD PRESSURE: 65 MMHG | RESPIRATION RATE: 18 BRPM | WEIGHT: 202 LBS | HEART RATE: 72 BPM | SYSTOLIC BLOOD PRESSURE: 143 MMHG | BODY MASS INDEX: 28.17 KG/M2

## 2017-01-31 DIAGNOSIS — D46.9 MDS (MYELODYSPLASTIC SYNDROME): Primary | ICD-10-CM

## 2017-01-31 PROCEDURE — 96401 CHEMO ANTI-NEOPL SQ/IM: CPT

## 2017-01-31 PROCEDURE — 63600175 PHARM REV CODE 636 W HCPCS: Performed by: INTERNAL MEDICINE

## 2017-01-31 PROCEDURE — 96372 THER/PROPH/DIAG INJ SC/IM: CPT

## 2017-01-31 RX ORDER — AZACITIDINE 100 MG/1
75 INJECTION, POWDER, LYOPHILIZED, FOR SOLUTION INTRAVENOUS; SUBCUTANEOUS
Status: COMPLETED | OUTPATIENT
Start: 2017-01-31 | End: 2017-01-31

## 2017-01-31 RX ORDER — HEPARIN 100 UNIT/ML
500 SYRINGE INTRAVENOUS
Status: CANCELLED
Start: 2017-01-31

## 2017-01-31 RX ORDER — SODIUM CHLORIDE 0.9 % (FLUSH) 0.9 %
10 SYRINGE (ML) INJECTION
Status: CANCELLED
Start: 2017-01-31

## 2017-01-31 RX ADMIN — AZACITIDINE 160 MG: 100 INJECTION, POWDER, LYOPHILIZED, FOR SOLUTION INTRAVENOUS; SUBCUTANEOUS at 03:01

## 2017-01-31 RX ADMIN — DARBEPOETIN ALFA 200 MCG: 200 INJECTION, SOLUTION INTRAVENOUS; SUBCUTANEOUS at 03:01

## 2017-02-01 ENCOUNTER — INFUSION (OUTPATIENT)
Dept: INFUSION THERAPY | Facility: HOSPITAL | Age: 72
End: 2017-02-01
Attending: INTERNAL MEDICINE
Payer: MEDICARE

## 2017-02-01 VITALS — RESPIRATION RATE: 18 BRPM | SYSTOLIC BLOOD PRESSURE: 151 MMHG | HEART RATE: 78 BPM | DIASTOLIC BLOOD PRESSURE: 69 MMHG

## 2017-02-01 DIAGNOSIS — D46.9 MDS (MYELODYSPLASTIC SYNDROME): Primary | ICD-10-CM

## 2017-02-01 PROCEDURE — 63600175 PHARM REV CODE 636 W HCPCS: Performed by: INTERNAL MEDICINE

## 2017-02-01 PROCEDURE — 96401 CHEMO ANTI-NEOPL SQ/IM: CPT

## 2017-02-01 RX ORDER — AZACITIDINE 100 MG/1
75 INJECTION, POWDER, LYOPHILIZED, FOR SOLUTION INTRAVENOUS; SUBCUTANEOUS
Status: COMPLETED | OUTPATIENT
Start: 2017-02-01 | End: 2017-02-01

## 2017-02-01 RX ADMIN — AZACITIDINE 160 MG: 100 INJECTION, POWDER, LYOPHILIZED, FOR SOLUTION INTRAVENOUS; SUBCUTANEOUS at 02:02

## 2017-02-02 ENCOUNTER — INFUSION (OUTPATIENT)
Dept: INFUSION THERAPY | Facility: HOSPITAL | Age: 72
End: 2017-02-02
Attending: INTERNAL MEDICINE
Payer: MEDICARE

## 2017-02-02 ENCOUNTER — ANESTHESIA (OUTPATIENT)
Dept: SURGERY | Facility: HOSPITAL | Age: 72
End: 2017-02-02
Payer: MEDICARE

## 2017-02-02 ENCOUNTER — ANESTHESIA EVENT (OUTPATIENT)
Dept: SURGERY | Facility: HOSPITAL | Age: 72
End: 2017-02-02
Payer: MEDICARE

## 2017-02-02 ENCOUNTER — HOSPITAL ENCOUNTER (OUTPATIENT)
Facility: HOSPITAL | Age: 72
Discharge: HOME | End: 2017-02-02
Attending: INTERNAL MEDICINE | Admitting: INTERNAL MEDICINE
Payer: MEDICARE

## 2017-02-02 VITALS
WEIGHT: 190 LBS | HEART RATE: 103 BPM | DIASTOLIC BLOOD PRESSURE: 59 MMHG | OXYGEN SATURATION: 99 % | TEMPERATURE: 98 F | HEIGHT: 71 IN | BODY MASS INDEX: 26.6 KG/M2 | SYSTOLIC BLOOD PRESSURE: 135 MMHG | RESPIRATION RATE: 17 BRPM

## 2017-02-02 DIAGNOSIS — D46.9 MDS (MYELODYSPLASTIC SYNDROME): Primary | ICD-10-CM

## 2017-02-02 PROCEDURE — 88342 IMHCHEM/IMCYTCHM 1ST ANTB: CPT | Mod: 26,59,, | Performed by: PATHOLOGY

## 2017-02-02 PROCEDURE — 88185 FLOWCYTOMETRY/TC ADD-ON: CPT | Mod: 59 | Performed by: PATHOLOGY

## 2017-02-02 PROCEDURE — 88313 SPECIAL STAINS GROUP 2: CPT | Mod: 26,,, | Performed by: PATHOLOGY

## 2017-02-02 PROCEDURE — 36000704 HC OR TIME LEV I 1ST 15 MIN: Performed by: INTERNAL MEDICINE

## 2017-02-02 PROCEDURE — 63600175 PHARM REV CODE 636 W HCPCS: Performed by: INTERNAL MEDICINE

## 2017-02-02 PROCEDURE — 38221 DX BONE MARROW BIOPSIES: CPT | Mod: LT,,, | Performed by: NURSE PRACTITIONER

## 2017-02-02 PROCEDURE — 63600175 PHARM REV CODE 636 W HCPCS: Performed by: NURSE ANESTHETIST, CERTIFIED REGISTERED

## 2017-02-02 PROCEDURE — 88264 CHROMOSOME ANALYSIS 20-25: CPT

## 2017-02-02 PROCEDURE — 37000008 HC ANESTHESIA 1ST 15 MINUTES: Performed by: INTERNAL MEDICINE

## 2017-02-02 PROCEDURE — 96401 CHEMO ANTI-NEOPL SQ/IM: CPT

## 2017-02-02 PROCEDURE — 88305 TISSUE EXAM BY PATHOLOGIST: CPT | Mod: 26,,, | Performed by: PATHOLOGY

## 2017-02-02 PROCEDURE — 25000003 PHARM REV CODE 250: Performed by: ANESTHESIOLOGY

## 2017-02-02 PROCEDURE — 88237 TISSUE CULTURE BONE MARROW: CPT

## 2017-02-02 PROCEDURE — 88189 FLOWCYTOMETRY/READ 16 & >: CPT | Mod: ,,, | Performed by: PATHOLOGY

## 2017-02-02 PROCEDURE — 88305 TISSUE EXAM BY PATHOLOGIST: CPT | Performed by: PATHOLOGY

## 2017-02-02 PROCEDURE — 85097 BONE MARROW INTERPRETATION: CPT | Mod: ,,, | Performed by: PATHOLOGY

## 2017-02-02 PROCEDURE — 88184 FLOWCYTOMETRY/ TC 1 MARKER: CPT | Performed by: PATHOLOGY

## 2017-02-02 PROCEDURE — D9220A PRA ANESTHESIA: Mod: ANES,,, | Performed by: ANESTHESIOLOGY

## 2017-02-02 PROCEDURE — 71000044 HC DOSC ROUTINE RECOVERY FIRST HOUR: Performed by: INTERNAL MEDICINE

## 2017-02-02 PROCEDURE — D9220A PRA ANESTHESIA: Mod: CRNA,,, | Performed by: NURSE ANESTHETIST, CERTIFIED REGISTERED

## 2017-02-02 PROCEDURE — 25000003 PHARM REV CODE 250: Performed by: NURSE ANESTHETIST, CERTIFIED REGISTERED

## 2017-02-02 PROCEDURE — 71000015 HC POSTOP RECOV 1ST HR: Performed by: INTERNAL MEDICINE

## 2017-02-02 PROCEDURE — 88313 SPECIAL STAINS GROUP 2: CPT

## 2017-02-02 PROCEDURE — 88311 DECALCIFY TISSUE: CPT | Mod: 26,,, | Performed by: PATHOLOGY

## 2017-02-02 PROCEDURE — 88360 TUMOR IMMUNOHISTOCHEM/MANUAL: CPT | Mod: 26,,, | Performed by: PATHOLOGY

## 2017-02-02 RX ORDER — LIDOCAINE HYDROCHLORIDE 10 MG/ML
1 INJECTION, SOLUTION EPIDURAL; INFILTRATION; INTRACAUDAL; PERINEURAL ONCE
Status: COMPLETED | OUTPATIENT
Start: 2017-02-02 | End: 2017-02-02

## 2017-02-02 RX ORDER — PROPOFOL 10 MG/ML
VIAL (ML) INTRAVENOUS
Status: DISCONTINUED | OUTPATIENT
Start: 2017-02-02 | End: 2017-02-02

## 2017-02-02 RX ORDER — AZACITIDINE 100 MG/1
75 INJECTION, POWDER, LYOPHILIZED, FOR SOLUTION INTRAVENOUS; SUBCUTANEOUS
Status: COMPLETED | OUTPATIENT
Start: 2017-02-02 | End: 2017-02-02

## 2017-02-02 RX ORDER — LIDOCAINE HCL/PF 100 MG/5ML
SYRINGE (ML) INTRAVENOUS
Status: DISCONTINUED | OUTPATIENT
Start: 2017-02-02 | End: 2017-02-02

## 2017-02-02 RX ORDER — PROPOFOL 10 MG/ML
VIAL (ML) INTRAVENOUS CONTINUOUS PRN
Status: DISCONTINUED | OUTPATIENT
Start: 2017-02-02 | End: 2017-02-02

## 2017-02-02 RX ORDER — SODIUM CHLORIDE 9 MG/ML
INJECTION, SOLUTION INTRAVENOUS CONTINUOUS
Status: DISCONTINUED | OUTPATIENT
Start: 2017-02-02 | End: 2017-02-02 | Stop reason: HOSPADM

## 2017-02-02 RX ADMIN — LIDOCAINE HYDROCHLORIDE 50 MG: 20 INJECTION, SOLUTION INTRAVENOUS at 10:02

## 2017-02-02 RX ADMIN — SODIUM CHLORIDE: 0.9 INJECTION, SOLUTION INTRAVENOUS at 09:02

## 2017-02-02 RX ADMIN — AZACITIDINE 160 MG: 100 INJECTION, POWDER, LYOPHILIZED, FOR SOLUTION INTRAVENOUS; SUBCUTANEOUS at 12:02

## 2017-02-02 RX ADMIN — PROPOFOL 60 MG: 10 INJECTION, EMULSION INTRAVENOUS at 10:02

## 2017-02-02 RX ADMIN — LIDOCAINE HYDROCHLORIDE 0.2 MG: 10 INJECTION, SOLUTION EPIDURAL; INFILTRATION; INTRACAUDAL; PERINEURAL at 09:02

## 2017-02-02 RX ADMIN — PROPOFOL 150 MCG/KG/MIN: 10 INJECTION, EMULSION INTRAVENOUS at 10:02

## 2017-02-02 NOTE — H&P (VIEW-ONLY)
"PATIENT: Wil Kwon Jr.  MRN: [id number]  DATE: 1/16/2017    Subjective:   MDS    Oncologic History:  Mr. Kwon 71-year-old gentleman with several chronic medical conditions living healthy lifestyle.  He has hypertension and reports home readings are all normal.  He did recently develop angioedema from ACE inhibitor and was switched to an ARB.  His dyslipidemia is treated with pharmacologic therapy.  He has history of carotid atherosclerotic disease status post left carotid endarterectomy and up-to-date with surveillance carotid Doppler study.  He has a history of PAF followed by our electrophysiology cardiology department and is chronically anticoagulated.  He has moderate aortic stenosis. He has DJD of the right knee which has become more symptomatic in recent months.  He was noted to be progressively anemic over the last 1 year.  He has hence been referred to see us.  His hematology workup to date reveals a normal B12 and folate.  His iron stores are normal.  His reticulocyte count is slightly elevated at 4.2.  His white count has remained low and progressively dropping over the last 3 years with monocytosis.  He also has developed mild progressive thrombocytopenia.  He is moderately symptomatic with the fatigue.  He has no history of extrinsic GI bleeding.  He also has no history of previous transfusions.he underwenta marrow. 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 Of 20 metaphases, 2 metaphases were normal and 18 metaphases had a 2;21  translocation. 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 RUNX1 rearrangements are  associated with de alfredo AML and therapeutic-related AML and MDS. Clinical and pathologic correlation is  recommended."  FINAL PATHOLOGIC DIAGNOSIS  CBC DATA 8/24/16:  RBC: 3.45 M/UL, H/H : 9.9/32.1, MCV : " 93 FL, WBC: 3.1 K/UL, Gran 1.2 k/ul %, Platelet: 200 K/UL.  No peripheral blood smear was submitted for evaluation.  BONE MARROW ASPIRATE, BONE MARROW CLOT, AND BONE MARROW CORE BIOPSY WITH:  CELLULARITY= 95%, TRILINEAGE HEMATOPOIETIC ACTIVITY (M/E= 2:1) AND INCREASED IMMATURE  CELLS (9%). SEE COMMENT.  LEFT SHIFTED MATURATION OF GRANULOPOIESIS.  ADEQUATE STORAGE IRON.  ADEQUATE NUMBER OF MEGAKARYOCYTES.  COMMENT: Flow cytometry analysis of bone marrow aspirate shows lymph gate (4.2 %) containing T and B cells.  No B cell clonality or T-cell aberrancy is evident. Blast gate is increased (7.9%) with cells expression of CD13 and  CD34. Immunohistochemical studies were performed on the clot and core biopsy for further architecture evaluation with  adequate positive and negative controls. Scattered mixed predominantly T cells (CD3 positive) and B cells (CD20  positive) are evident. About 6-7 % plasma cells ( positive) and 9% CD34 positive cells are noted. Findings  are nondiagnostic for lymphoma or plasma cell dyscrasia.  Microscopic Examination  BONE MARROW ASPIRATE DIFFERENTIAL:  Blasts----------------------------------------------5%  Promyelocytes---------------------------------2%  Myelocytes--------------------------------------11%  Metamyelocytes------------------------------ 19%  Bands----------------------------------------------5%  PMN Neutrophils------------------------------15%  Eosinophils------------------------------------------2%  Basophils---------------------------------------0%  Monocytes------------------------------------2%  Lymphocytes-------------------------------------6%  Plasma cells------------------------------------------2%  Erythroid precursors--------------------------31%  BONE MARROW ASPIRATE:  Myeloid and erythroid maturation shows M:E ratio at 2:1. No significant dysplasia is evident. Left shifted maturation of  granulopoiesis is noted. Stainable iron is present without increased  ringed sideroblasts. Megakaryocytes are seen.  BONE MARROW CLOT:  Cellularity is 95 % with trilineage hematopoiesis. No abnormal infiltrates are seen. Megakaryocytes are adequate in  number. Stainable iron is present.  BONE MARROW CORE BIOPSY:  Cellularity is 95 %. No abnormal infiltrates are seen. Stainable iron is not identified. Megakaryocytes are adequate in   No significantly increased reticular fibrosis is evident by special stain (reticulin) with adequate positive and  negative controls.    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 I will R/O CMPD as the primary disease leading to MDS. He does not meet criteria for AML.  I will plan to start him on HMA,s and monitor for response. Based on IPSS score we will evaluate for transplant. Cem DW him and wife.   He was also started on Aranesp.    He has started Vidaza and currently on his 3rd cycle. He is also receiving Aranesp.    Interval History: Mr. Kwon returns for follow up.  He has some bruising at the site of his Vidaza injection.  Otherwise he has had no external bleeding.  He has not had any transfusions.  He has not been febrile.  He has not been admitted to the hospital and has been doing well.    Past Medical History   Diagnosis Date    Aortic valve stenosis, mild      secondary to bicuspid aortic alve    Atrial fibrillation     Carotid artery disease      Left CEA 4/9/13    Depression     DJD (degenerative joint disease)     H/O echocardiogram 04/13/2016     EF 55-60%. Diastolic dysfunction. PASP 39. mod AS with JEFF 1.18    History of psychiatric hospitalization      [facility] access hospital [facility], [facility]    Hx of psychiatric care     Hyperlipidemia     Hypertension     MDS (myelodysplastic syndrome)     Pancytopenia     Psychiatric problem     Therapy      [facility]        Past Surgical History   Procedure Laterality Date    Knee surgery       Right x2    Neck fusion      Inguinal hernia       Left     Tonsillectomy      Carotid endarterectomy Left     Bone marrow biopsy  08/29/2016       Family History   Problem Relation Age of Onset    Hyperlipidemia Brother         Social History:  reports that he has never smoked. He has never used smokeless tobacco. He reports that he does not drink alcohol or use illicit drugs.    Allergies:  Review of patient's allergies indicates:   Allergen Reactions    Lipitor [atorvastatin]      Muscle pain    Lisinopril Edema     Cheek swelling       Medications:  Current Outpatient Prescriptions   Medication Sig Dispense Refill    acetaminophen (TYLENOL) 325 MG tablet Take 325 mg by mouth as needed for Pain.      citalopram (CELEXA) 40 MG tablet TAKE 1 TABLET BY MOUTH DAILY (Patient taking differently: TAKE 1 TABLET BY MOUTH DAILY (am)) 90 tablet 0    diltiaZEM (CARDIZEM CD) 300 MG 24 hr capsule TAKE ONE CAPSULE BY MOUTH EVERY DAY 90 capsule 1    losartan (COZAAR) 50 MG tablet Take 50 mg by mouth once daily.  3    metoprolol succinate (TOPROL-XL) 25 MG 24 hr tablet Take 1 tablet (25 mg total) by mouth once daily. 90 tablet 3    multivitamin capsule Take 1 capsule by mouth every morning.       nystatin (MYCOSTATIN) ointment Apply topically 3 (three) times daily. 1 Tube 1    omega-3 fatty acids-vitamin E (FISH OIL) 1,000 mg Cap Take 3 capsules by mouth once daily.       pravastatin (PRAVACHOL) 20 MG tablet Take 1 tablet (20 mg total) by mouth every other day. 45 tablet 3    warfarin (COUMADIN) 3 MG tablet Take 1 tablet (3 mg total) by mouth Daily. 30 tablet 11     No current facility-administered medications for this visit.          Review of Systems   HENT: Negative.    Eyes: Negative.    Respiratory: Negative.    Cardiovascular: Negative.    Gastrointestinal: Negative.    Endocrine: Negative.    Genitourinary: Negative.    Musculoskeletal: Negative.    Skin: Negative.    Neurological: Negative.    Hematological: Negative.    Psychiatric/Behavioral: Negative.        ECOG  "Performance Status: 0  Objective:      Vitals:   Vitals:    01/11/17 1539   BP: (!) 167/86   Pulse: 89   Resp: 16   Temp: 98.6 °F (37 °C)   Weight: 91.9 kg (202 lb 9.6 oz)   Height: 5' 11" (1.803 m)     BMI: Body mass index is 28.26 kg/(m^2).      Physical Exam   Constitutional: he is oriented to person, place, and time. He appears well-developed and well-nourished.   HENT: NC AT   Head: Normocephalic.   Right Ear: External ear normal.   Left Ear: External ear normal.   Nose: Nose normal.   Mouth/Throat: Oropharynx is clear and moist.   Eyes: Conjunctivae and EOM are normal. Pupils are equal, round, and reactive to light.   Neck: Normal range of motion. Neck supple.   Cardiovascular: Normal rate, regular rhythm, normal heart sounds and intact distal pulses.    Pulmonary/Chest: Effort normal and breath sounds normal.   Abdominal: Soft. Bowel sounds are normal.   Musculoskeletal: Normal range of motion.   Neurological: he is alert and oriented to person, place, and time. He has normal reflexes.   Skin: Skin is warm and dry.   Psychiatric: he has a normal mood and affect. His behavior is normal. Judgment and thought content normal.       Laboratory Data:  No visits with results within 2 Week(s) from this visit.  Latest known visit with results is:    Infusion on 12/21/2016   Component Date Value Ref Range Status    WBC 12/21/2016 2.21* 3.90 - 12.70 K/uL Final    RBC 12/21/2016 3.65* 4.60 - 6.20 M/uL Final    Hemoglobin 12/21/2016 10.3* 14.0 - 18.0 g/dL Final    Hematocrit 12/21/2016 32.5* 40.0 - 54.0 % Final    MCV 12/21/2016 89  82 - 98 fL Final    MCH 12/21/2016 28.2  27.0 - 31.0 pg Final    MCHC 12/21/2016 31.7* 32.0 - 36.0 % Final    RDW 12/21/2016 17.3* 11.5 - 14.5 % Final    Platelets 12/21/2016 114* 150 - 350 K/uL Final    MPV 12/21/2016 SEE COMMENT  9.2 - 12.9 fL Final    Result not available.    Gran # 12/21/2016 0.2* 1.8 - 7.7 K/uL Final    Lymph # 12/21/2016 1.9  1.0 - 4.8 K/uL Final    Mono # " 12/21/2016 0.0* 0.3 - 1.0 K/uL Final    Eos # 12/21/2016 0.0  0.0 - 0.5 K/uL Final    Baso # 12/21/2016 0.00  0.00 - 0.20 K/uL Final    Gran% 12/21/2016 10.8* 38.0 - 73.0 % Final    Lymph% 12/21/2016 87.3* 18.0 - 48.0 % Final    Mono% 12/21/2016 1.4* 4.0 - 15.0 % Final    Eosinophil% 12/21/2016 0.5  0.0 - 8.0 % Final    Basophil% 12/21/2016 0.0  0.0 - 1.9 % Final    Platelet Estimate 12/21/2016 Decreased*  Final    Aniso 12/21/2016 Slight   Final    Poik 12/21/2016 Slight   Final    Poly 12/21/2016 Occasional   Final    Hypo 12/21/2016 Occasional   Final    Ovalocytes 12/21/2016 Occasional   Final    Differential Method 12/21/2016 Automated   Final       Assessment/Plan:     1. MDS (myelodysplastic syndrome)    2. Other pancytopenia    3. Pancytopenia    4. Atrial fibrillation with RVR    5. HTN (hypertension), benign    6. Aortic valve stenosis, mild    7. Pulmonary hypertension- April 2016- The estimated PA systolic pressure is 39 mmHg    8. Dyslipidemia    9. H/O carotid endarterectomy      He is tolerating therapy without any major problems except for some bruising.  If this persists we may consider changing his Vidaza to IV as he has a Port-A-Cath I plan to restage him with a bone marrow biopsy after this cycle of chemotherapy.  I discussed allogeneic transplantation with him and his wife again.  His wife becomes emotional and concerned about this.  Hence I have told them that they need to think about this at home in and discuss this with me further when they return to see me next visit.    I will monitor his pancytopenia with intermittent CBCs.    His atrial fibrillation, hypertension, aortic stenosis, pulmonary hypertension and hyperlipidemia are stable.    He will stop his Plavix for a few days before his bone marrow biopsy and then restart.    Med and Order  Orders Placed This Encounter    Bone marrow    CBC Oncology    Comprehensive metabolic panel    Bone Marrow Prep and Stain     Chromosome Analysis, Bone Marrow    Leukemia/Lymphoma Screen       Follow Up  Return in about 4 weeks (around 2/8/2017).

## 2017-02-02 NOTE — TRANSFER OF CARE
"Anesthesia Transfer of Care Note    Patient: Wil Kwon Jr.    Procedure(s) Performed: Procedure(s) (LRB):  BIOPSY-BONE MARROW (N/A)    Patient location: [facility]    Anesthesia Type: general    Transport from OR: Transported from OR on 6-10 L/min O2 by face mask with adequate spontaneous ventilation    Post pain: adequate analgesia    Post assessment: no apparent anesthetic complications and tolerated procedure well    Post vital signs: stable    Level of consciousness: awake, alert and oriented    Nausea/Vomiting: no nausea/vomiting    Complications: none          Last vitals:   Visit Vitals    /56    Pulse 98    Temp 36.9 °C (98.4 °F) (Oral)    Resp 16    Ht 5' 11" (1.803 m)    Wt 86.2 kg (190 lb)    SpO2 100%    BMI 26.5 kg/m2     "

## 2017-02-02 NOTE — ANESTHESIA POSTPROCEDURE EVALUATION
"Anesthesia Post Evaluation    Patient: Wil Kwon Jr.    Procedure(s) Performed: Procedure(s) (LRB):  BIOPSY-BONE MARROW (N/A)    Final Anesthesia Type: general  Patient location during evaluation: PACU  Patient participation: Yes- Able to Participate  Level of consciousness: awake and alert  Post-procedure vital signs: reviewed and stable  Pain management: adequate  Airway patency: patent  PONV status at discharge: No PONV  Anesthetic complications: no      Cardiovascular status: blood pressure returned to baseline  Respiratory status: unassisted  Hydration status: euvolemic  Follow-up not needed.        Visit Vitals    BP (!) 113/55 (BP Location: Left arm, Patient Position: Lying, BP Method: Automatic)    Pulse 102    Temp 36.5 °C (97.7 °F) (Axillary)    Resp 16    Ht 5' 11" (1.803 m)    Wt 86.2 kg (190 lb)    SpO2 100%    BMI 26.5 kg/m2       Pain/Pablo Score: Pain Assessment Performed: Yes (2/2/2017 10:26 AM)  Presence of Pain: denies (2/2/2017 10:26 AM)  Pablo Score: 10 (2/2/2017 10:26 AM)      "

## 2017-02-02 NOTE — PLAN OF CARE
Pt awake, alert, oriented, tolerating oral intake, denies pain/nausea at this time. No signs/symptoms of distress noted. Discharge instructions reviewed with patient and wife, with good verbal feedback received. Extra dressing materials given to pt per NP, explained to patient and wife. No further questions/concerns verbalized at this time.

## 2017-02-02 NOTE — IP AVS SNAPSHOT
[facility]  [address]  [facility] [address]  Phone: [phone]           Patient Discharge Instructions     Our goal is to set you up for success. This packet includes information on your condition, medications, and your home care. It will help you to care for yourself so you don't get sicker and need to go back to the hospital.     Please ask your nurse if you have any questions.        There are many details to remember when preparing to leave the hospital. Here is what you will need to do:    1. Take your medicine. If you are prescribed medications, review your Medication List in the following pages. You may have new medications to  at the pharmacy and others that you'll need to stop taking. Review the instructions for how and when to take your medications. Talk with your doctor or nurses if you are unsure of what to do.     2. Go to your follow-up appointments. Specific follow-up information is listed in the following pages. Your may be contacted by a transition nurse or clinical provider about future appointments. Be sure we have all of the phone numbers to reach you, if needed. Please contact your provider's office if you are unable to make an appointment.     3. Watch for warning signs. Your doctor or nurse will give you detailed warning signs to watch for and when to call for assistance. These instructions may also include educational information about your condition. If you experience any of warning signs to your health, call your doctor.               [facility]  Unless otherwise directed by your provider, please contact [facility], our nurse care line that is available for 24/7 assistance.     [phone] (toll-free)    Registered nurses in the [facility] provide clinical advisement, health education, appointment booking, and other advisory services.                    ** Verify the list of medication(s) below is accurate and up  to date. Carry this with you in case of emergency. If your medications have changed, please notify your healthcare provider.             Medication List      ASK your doctor about these medications        Additional Info                      acetaminophen 325 MG tablet   Commonly known as:  TYLENOL   Refills:  0   Dose:  325 mg    Instructions:  Take 325 mg by mouth as needed for Pain.     Begin Date    AM    Noon    PM    Bedtime       citalopram 40 MG tablet   Commonly known as:  CELEXA   Quantity:  90 tablet   Refills:  0    Instructions:  TAKE 1 TABLET BY MOUTH DAILY     Begin Date    AM    Noon    PM    Bedtime       diltiaZEM 300 MG 24 hr capsule   Commonly known as:  CARDIZEM CD   Quantity:  90 capsule   Refills:  1    Instructions:  TAKE ONE CAPSULE BY MOUTH EVERY DAY     Begin Date    AM    Noon    PM    Bedtime       FISH OIL 1,000 mg Cap   Refills:  0   Dose:  3 capsule   Generic drug:  omega-3 fatty acids-vitamin E    Instructions:  Take 3 capsules by mouth once daily.     Begin Date    AM    Noon    PM    Bedtime       losartan 50 MG tablet   Commonly known as:  COZAAR   Refills:  3   Dose:  50 mg    Instructions:  Take 50 mg by mouth once daily.     Begin Date    AM    Noon    PM    Bedtime       metoprolol succinate 25 MG 24 hr tablet   Commonly known as:  TOPROL-XL   Quantity:  90 tablet   Refills:  3   Dose:  25 mg   Comments:  **Patient requests 90 days supply**    Instructions:  Take 1 tablet (25 mg total) by mouth once daily.     Begin Date    AM    Noon    PM    Bedtime       multivitamin capsule   Refills:  0   Dose:  1 capsule    Instructions:  Take 1 capsule by mouth every morning.     Begin Date    AM    Noon    PM    Bedtime       nystatin ointment   Commonly known as:  MYCOSTATIN   Quantity:  1 Tube   Refills:  1    Instructions:  Apply topically 3 (three) times daily.     Begin Date    AM    Noon    PM    Bedtime       pravastatin 20 MG tablet   Commonly known as:  PRAVACHOL   Quantity:   45 tablet   Refills:  3   Dose:  20 mg    Instructions:  Take 1 tablet (20 mg total) by mouth every other day.     Begin Date    AM    Noon    PM    Bedtime       warfarin 3 MG tablet   Commonly known as:  COUMADIN   Quantity:  30 tablet   Refills:  11   Dose:  3 mg    Instructions:  Take 1 tablet (3 mg total) by mouth Daily.     Begin Date    AM    Noon    PM    Bedtime                  Please bring to all follow up appointments:    1. A copy of your discharge instructions.  2. All medicines you are currently taking in their original bottles.  3. Identification and insurance card.    Please arrive 15 minutes ahead of scheduled appointment time.    Please call 24 hours in advance if you must reschedule your appointment and/or time.        Your Scheduled Appointments     Feb 03, 2017 10:00 AM CST   Injection with INJECTION, [facility] INFUSION   [facility]    [address] [facility] [address]   [phone]            Feb 08, 2017  9:00 AM CST   Established Patient Visit with MD Maurilio Naik-Bone Marrow Transplant ([facility])    [address]  [place] [address]   [phone]                Discharge Instructions     Future Orders    Activity as tolerated     Call MD for:  difficulty breathing or increased cough     Call MD for:  increased confusion or weakness     Call MD for:  persistent dizziness, light-headedness, or visual disturbances     Call MD for:  persistent nausea and vomiting or diarrhea     Call MD for:  redness, tenderness, or signs of infection (pain, swelling, redness, odor or green/yellow discharge around incision site)     Call MD for:  severe persistent headache     Call MD for:  severe uncontrolled pain     Call MD for:  temperature >100.4     Call MD for:  worsening rash     Diet general     Questions:    Total calories:      Fat restriction, if any:      Protein restriction, if any:      Na restriction, if any:    "   Fluid restriction:      Additional restrictions:          Discharge Instructions       Discharge instructions for having a Bone Marrow Aspiration / Biopsy    Keep Bandage in place for 24 hours.  - Do not shower or take a tub bath during this time. (you may sponge bathe).  - Call the nurse or physician for excessive bleeding or pain at biopsy site.  - You may take Tylenol as needed for pain.    You have received medication to sedate you.  - Do not drive a car or operate heavy machinery for the rest of the day.  - You may resume other activities as tolerated.    You can call [phone] for any problems during the hours of 8:00 AM-5:00PM.    For an emergency after 5:00 PM you can call [phone] and have the  page the Hematologist / Oncologist on call.       Admission Information     Date & Time Provider Department CSN    2/2/2017  8:35 AM Laura Rader MD [facility] [id number]      Care Providers     Provider Role Specialty Primary office phone    Laura Rader MD Attending Provider Hematology and Oncology [phone]    Laura Rader MD Surgeon  Hematology and Oncology [phone]      Your Vitals Were     BP Pulse Temp Resp Height Weight    113/55 (BP Location: Left arm, Patient Position: Lying, BP Method: Automatic) 102 97.7 °F (36.5 °C) (Axillary) 16 5' 11" (1.803 m) 86.2 kg (190 lb)    SpO2 BMI             100% 26.5 kg/m2         Recent Lab Values     No lab values to display.      Pending Labs     Order Current Status    Tissue Specimen to Pathology, Bone Marrow Aspiration/Biopsy Procedure Collected (02/02/17 0902)    Bone Marrow Prep and Stain In process    Chromosome Analysis, Bone Marrow In process    Iron Stain, Bone Marrow In process    Leukemia/Lymphoma Screen - Bone Marrow Left Posterior Iliac Crest In process      Allergies as of 2/2/2017        Reactions    Lipitor [Atorvastatin]     Muscle pain    Lisinopril Edema    Cheek swelling      Advance Directives     An " advance directive is a document which, in the event you are no longer able to make decisions for yourself, tells your healthcare team what kind of treatment you do or do not want to receive, or who you would like to make those decisions for you.  If you do not currently have an advance directive, [facility] encourages you to create one.  For more information call:  [phone]), [phone] ([phone]),  or log on to [email or web address].        Language Assistance Services     ATTENTION: Language assistance services are available, free of charge. Please call [phone].      ATENCIÓN: Si habla español, tiene a agnnon disposición servicios gratuitos de asistencia lingüística. Llame al [phone].     CHÚ Ý: N?u b?n nói Ti?ng Vi?t, có các d?ch v? h? tr? ngôn ng? mi?n phí dành cho b?n. G?i s? [phone].        Stroke Education              Coumadin Discharge Instructions                          [facility] complies with applicable Federal civil rights laws and does not discriminate on the basis of race, color, national origin, age, disability, or sex.

## 2017-02-02 NOTE — NURSING
Pt arrived for vidaza #7.  Pt tolerated injection x3 to left side of abd.  Discharged to home with wife and will RTC tomorrow for neulasta.

## 2017-02-02 NOTE — DISCHARGE SUMMARY
[facility]  Discharge Summary  Hematology/Oncology      Admit Date: 2/2/2017    Discharge Date and Time: 2/2/2017 8:54 AM    Discharge Attending Physician: Laura Rader MD     Discharge Provider: Renetta Maldonado    Reason for Admission: BMBx    Procedures Performed: Procedure(s) (LRB):  BIOPSY-BONE MARROW (N/A)    Hospital Course (synopsis of major diagnoses, care, treatment, and services provided during the course of the hospital stay): Patient admitted to pre op today for a bone marrow biopsy. Confirmed that consent was done for a bone marrow biopsy. Patient was sedated per anesthesia and a bone marrow biopsy and aspiration was performed in the OR. Patient was then transferred to post op and discharged home when appropriate per anesthesia.      Final Diagnoses:    Principal Problem: MDS   Secondary Diagnoses: There are no hospital problems to display for this patient.      Discharged Condition: stable    Disposition: Home or Self Care    Follow Up/Ptient Instructions:     Medications:  Reconciled Home Medications:   Current Discharge Medication List      CONTINUE these medications which have NOT CHANGED    Details   acetaminophen (TYLENOL) 325 MG tablet Take 325 mg by mouth as needed for Pain.      citalopram (CELEXA) 40 MG tablet TAKE 1 TABLET BY MOUTH DAILY  Qty: 90 tablet, Refills: 0      diltiaZEM (CARDIZEM CD) 300 MG 24 hr capsule TAKE ONE CAPSULE BY MOUTH EVERY DAY  Qty: 90 capsule, Refills: 1      losartan (COZAAR) 50 MG tablet Take 50 mg by mouth once daily.  Refills: 3      metoprolol succinate (TOPROL-XL) 25 MG 24 hr tablet Take 1 tablet (25 mg total) by mouth once daily.  Qty: 90 tablet, Refills: 3    Comments: **Patient requests 90 days supply**      multivitamin capsule Take 1 capsule by mouth every morning.       nystatin (MYCOSTATIN) ointment Apply topically 3 (three) times daily.  Qty: 1 Tube, Refills: 1    Associated Diagnoses: Tinea      omega-3 fatty acids-vitamin E (FISH  OIL) 1,000 mg Cap Take 3 capsules by mouth once daily.       pravastatin (PRAVACHOL) 20 MG tablet Take 1 tablet (20 mg total) by mouth every other day.  Qty: 45 tablet, Refills: 3    Associated Diagnoses: Dyslipidemia; H/O carotid endarterectomy; HTN (hypertension), benign; Aortic valve stenosis, mild      warfarin (COUMADIN) 3 MG tablet Take 1 tablet (3 mg total) by mouth Daily.  Qty: 30 tablet, Refills: 11    Associated Diagnoses: MDS (myelodysplastic syndrome); Atrial fibrillation with RVR; Bilateral carotid artery stenosis; HTN (hypertension), benign; Pulmonary hypertension; Other depression; Dyslipidemia               Discharge Procedure Orders  Diet general     Activity as tolerated     Remove dressing in 24 hours     Call MD for:  increased confusion or weakness     Call MD for:  persistent dizziness, light-headedness, or visual disturbances     Call MD for:  worsening rash     Call MD for:  severe persistent headache     Call MD for:  difficulty breathing or increased cough     Call MD for:  redness, tenderness, or signs of infection (pain, swelling, redness, odor or green/yellow discharge around incision site)     Call MD for:  severe uncontrolled pain     Call MD for:  persistent nausea and vomiting or diarrhea     Call MD for:  temperature >100.4         Follow up with Dr. Rader in BMT Clinic.

## 2017-02-02 NOTE — PROCEDURES
PROCEDURE NOTE:  Bone Marrow Biopsy  2/2/2017  Indication: MDS  Consent: Informed consent was obtained from patient.  Timeout: Done and documented.  Site: Left posterior illiac crest.  Prep: Betadine.  Needle used: 11 gauge Jamshidi needle.  Anesthetic: 1% lidocaine 5 cc.  Biopsy: The biopsy needle was introduced into the marrow cavity and an aspirate was obtained without complications. Core biopsy obtained and sent for routine histologic examination and cytogenetics.  Complications: None.  Disposition: The patient was discharged home when appropriate per anesthesia.    Minimal blood loss  E Joel NP

## 2017-02-02 NOTE — ANESTHESIA PREPROCEDURE EVALUATION
02/02/2017  Wil Kwon Jr. is a 71 y.o., male.    Patient Active Problem List   Diagnosis    HTN (hypertension), benign    Dyslipidemia    Occlusion and stenosis of carotid artery without mention of cerebral infarction    Aortic valve stenosis, mild    Carotid stenosis    H/O carotid endarterectomy    History of atrial fibrillation    Paroxysmal atrial fibrillation    Atrial fibrillation with RVR    Anemia, chronic disease    Thrombocytopenia    Aortic stenosis, moderate    Hyponatremia    Diastolic dysfunction    Depression    Cardiac murmur    Bilateral carotid bruits    Edema    Pulmonary hypertension- April 2016- The estimated PA systolic pressure is 39 mmHg    Pancytopenia    Anemia    MDS (myelodysplastic syndrome)    Rash    Arthralgia       [facility] Anesthesia Evaluation    I have reviewed the Patient Summary Reports.    I have reviewed the Nursing Notes.   I have reviewed the Medications.     Review of Systems  Anesthesia Hx:  No problems with previous Anesthesia  History of prior surgery of interest to airway management or planning: Denies Family Hx of Anesthesia complications.    Social:  No Alcohol Use, Non-Smoker    Hematology/Oncology:        Hematology Comments: Myelodysplastic sydrome   Cardiovascular:   Hypertension Dysrhythmias atrial fibrillation Aortic stenosis, carotid stenosis s/p left CEA   Pulmonary:  Pulmonary Normal    Renal/:  Renal/ Normal     Psych:   depression          Physical Exam  General:  Well nourished    Airway/Jaw/Neck:  Airway Findings: Mouth Opening: Normal Tongue: Normal  General Airway Assessment: Adult  Mallampati: I  TM Distance: Normal, at least 6 cm      Dental:  Dental Findings: In tact   Chest/Lungs:  Chest/Lungs Findings: Clear to auscultation, Normal Respiratory Rate     Heart/Vascular:  Heart Findings: Rate: Normal  Rhythm:  Irregularly Irregular        Mental Status:  Mental Status Findings:  Cooperative, Alert and Oriented         Anesthesia Plan  Type of Anesthesia, risks & benefits discussed:  Anesthesia Type:  general  Patient's Preference:   Intra-op Monitoring Plan:   Intra-op Monitoring Plan Comments:   Post Op Pain Control Plan:   Post Op Pain Control Plan Comments:   Induction:   IV  Beta Blocker:  Patient is not currently on a Beta-Blocker (No further documentation required).       Informed Consent: Patient understands risks and agrees with Anesthesia plan.  Questions answered. Anesthesia consent signed with patient.  ASA Score: 3     Day of Surgery Review of History & Physical:    H&P update referred to the surgeon.         Ready For Surgery From Anesthesia Perspective.

## 2017-02-02 NOTE — DISCHARGE INSTRUCTIONS
Discharge instructions for having a Bone Marrow Aspiration / Biopsy    Keep Bandage in place for 24 hours.  - Do not shower or take a tub bath during this time. (you may sponge bathe).  - Call the nurse or physician for excessive bleeding or pain at biopsy site.  - You may take Tylenol as needed for pain.    You have received medication to sedate you.  - Do not drive a car or operate heavy machinery for the rest of the day.  - You may resume other activities as tolerated.    You can call [phone] for any problems during the hours of 8:00 AM-5:00PM.    For an emergency after 5:00 PM you can call [phone] and have the  page the Hematologist / Oncologist on call.

## 2017-02-02 NOTE — ANESTHESIA RELEASE NOTE
"Anesthesia Release from PACU Note    Patient: Wil Kwon Jr.    Procedure(s) Performed: Procedure(s) (LRB):  BIOPSY-BONE MARROW (N/A)    Anesthesia type: general    Post pain: Adequate analgesia    Post assessment: no apparent anesthetic complications, tolerated procedure well and no evidence of recall    Last Vitals:   Visit Vitals    BP (!) 113/55 (BP Location: Left arm, Patient Position: Lying, BP Method: Automatic)    Pulse 102    Temp 36.5 °C (97.7 °F) (Axillary)    Resp 16    Ht 5' 11" (1.803 m)    Wt 86.2 kg (190 lb)    SpO2 100%    BMI 26.5 kg/m2       Post vital signs: stable    Level of consciousness: awake, alert  and oriented    Nausea/Vomiting: no nausea/no vomiting    Complications: none    Airway Patency: patent    Respiratory: unassisted    Cardiovascular: stable and blood pressure at baseline    Hydration: euvolemic  "

## 2017-02-02 NOTE — INTERVAL H&P NOTE
The patient has been examined and the H&P has been reviewed:    I concur with the findings and no changes have occurred since H&P was written. Okay to proceed with BMBx    Anesthesia/Surgery risks, benefits and alternative options discussed and understood by patient/family.          There are no hospital problems to display for this patient.

## 2017-02-03 ENCOUNTER — INFUSION (OUTPATIENT)
Dept: INFUSION THERAPY | Facility: HOSPITAL | Age: 72
End: 2017-02-03
Attending: INTERNAL MEDICINE
Payer: MEDICARE

## 2017-02-03 DIAGNOSIS — D46.9 MDS (MYELODYSPLASTIC SYNDROME): Primary | ICD-10-CM

## 2017-02-03 LAB
BODY SITE - BONE MARROW: NORMAL
BONE MARROW IRON STAIN COMMENT: NORMAL
BONE MARROW WRIGHT STAIN COMMENT: NORMAL
CLINICAL DIAGNOSIS - BONE MARROW: NORMAL
FLOW CYTOMETRY ANTIBODIES ANALYZED - BONE MARROW: NORMAL
FLOW CYTOMETRY COMMENT - BONE MARROW: NORMAL
FLOW CYTOMETRY INTERPRETATION - BONE MARROW: NORMAL

## 2017-02-03 PROCEDURE — 63600175 PHARM REV CODE 636 W HCPCS: Performed by: INTERNAL MEDICINE

## 2017-02-03 PROCEDURE — 96372 THER/PROPH/DIAG INJ SC/IM: CPT

## 2017-02-03 RX ADMIN — PEGFILGRASTIM 6 MG: 6 INJECTION SUBCUTANEOUS at 02:02

## 2017-02-05 ENCOUNTER — PATIENT MESSAGE (OUTPATIENT)
Dept: HEMATOLOGY/ONCOLOGY | Facility: CLINIC | Age: 72
End: 2017-02-05

## 2017-02-06 LAB
HLA HI RES SEQUNCE BASED TYPING TEST DATE: NORMAL
HLA-A1 HI RES: NORMAL
HLA-A2 HI RES: NORMAL
HLA-B1 HI RES: NORMAL
HLA-B2 HI RES: NORMAL
HLA-C1 HI RES: NORMAL
HLA-C2 HI RES: NORMAL
HLA-DQB1 1 HI RES: NORMAL
HLA-DQB1 2 HI RES: NORMAL
HLA-DRB1 1 HI RES: NORMAL
HLA-DRB1 2 HI RES: NORMAL
HLA-DRB3 1 HI RES: NORMAL
HLA-DRB3 2 HI RES: NORMAL
HLA-DRB4 1 HI RES: NORMAL
HLA-DRB4 2 HI RES: NORMAL
HLA-DRB5 1 HI RES: NORMAL
HLA-DRB5 2 HI RES: NORMAL

## 2017-02-08 ENCOUNTER — LAB VISIT (OUTPATIENT)
Dept: LAB | Facility: HOSPITAL | Age: 72
End: 2017-02-08
Attending: INTERNAL MEDICINE
Payer: MEDICARE

## 2017-02-08 ENCOUNTER — OFFICE VISIT (OUTPATIENT)
Dept: HEMATOLOGY/ONCOLOGY | Facility: CLINIC | Age: 72
End: 2017-02-08
Payer: MEDICARE

## 2017-02-08 VITALS — HEART RATE: 105 BPM | SYSTOLIC BLOOD PRESSURE: 159 MMHG | TEMPERATURE: 98 F | DIASTOLIC BLOOD PRESSURE: 73 MMHG

## 2017-02-08 DIAGNOSIS — I10 HTN (HYPERTENSION), BENIGN: ICD-10-CM

## 2017-02-08 DIAGNOSIS — D46.9 MDS (MYELODYSPLASTIC SYNDROME): Primary | ICD-10-CM

## 2017-02-08 DIAGNOSIS — I35.0 AORTIC VALVE STENOSIS, MILD: Chronic | ICD-10-CM

## 2017-02-08 DIAGNOSIS — D64.9 ANEMIA, UNSPECIFIED TYPE: ICD-10-CM

## 2017-02-08 DIAGNOSIS — E78.5 DYSLIPIDEMIA: ICD-10-CM

## 2017-02-08 DIAGNOSIS — D46.9 MDS (MYELODYSPLASTIC SYNDROME): ICD-10-CM

## 2017-02-08 DIAGNOSIS — I65.29 OCCLUSION AND STENOSIS OF CAROTID ARTERY WITHOUT MENTION OF CEREBRAL INFARCTION: ICD-10-CM

## 2017-02-08 DIAGNOSIS — I48.0 PAROXYSMAL ATRIAL FIBRILLATION: ICD-10-CM

## 2017-02-08 LAB
ALBUMIN SERPL BCP-MCNC: 3.6 G/DL
ALP SERPL-CCNC: 139 U/L
ALT SERPL W/O P-5'-P-CCNC: 25 U/L
ANION GAP SERPL CALC-SCNC: 8 MMOL/L
AST SERPL-CCNC: 96 U/L
BILIRUB SERPL-MCNC: 0.6 MG/DL
BUN SERPL-MCNC: 12 MG/DL
CALCIUM SERPL-MCNC: 9.2 MG/DL
CHLORIDE SERPL-SCNC: 105 MMOL/L
CO2 SERPL-SCNC: 25 MMOL/L
CREAT SERPL-MCNC: 1.2 MG/DL
ERYTHROCYTE [DISTWIDTH] IN BLOOD BY AUTOMATED COUNT: 20.4 %
EST. GFR  (AFRICAN AMERICAN): >60 ML/MIN/1.73 M^2
EST. GFR  (NON AFRICAN AMERICAN): >60 ML/MIN/1.73 M^2
GLUCOSE SERPL-MCNC: 98 MG/DL
HCT VFR BLD AUTO: 34.2 %
HGB BLD-MCNC: 10.3 G/DL
MCH RBC QN AUTO: 28.5 PG
MCHC RBC AUTO-ENTMCNC: 30.1 %
MCV RBC AUTO: 95 FL
NEUTROPHILS # BLD AUTO: ABNORMAL K/UL
PLATELET # BLD AUTO: 195 K/UL
PMV BLD AUTO: 12.1 FL
POTASSIUM SERPL-SCNC: 4.1 MMOL/L
PROT SERPL-MCNC: 7.8 G/DL
RBC # BLD AUTO: 3.62 M/UL
SODIUM SERPL-SCNC: 138 MMOL/L
WBC # BLD AUTO: 40.77 K/UL

## 2017-02-08 PROCEDURE — 99215 OFFICE O/P EST HI 40 MIN: CPT | Mod: S$GLB,,, | Performed by: INTERNAL MEDICINE

## 2017-02-08 PROCEDURE — 1125F AMNT PAIN NOTED PAIN PRSNT: CPT | Mod: S$GLB,,, | Performed by: INTERNAL MEDICINE

## 2017-02-08 PROCEDURE — 85027 COMPLETE CBC AUTOMATED: CPT

## 2017-02-08 PROCEDURE — 99999 PR PBB SHADOW E&M-EST. PATIENT-LVL III: CPT | Mod: PBBFAC,,, | Performed by: INTERNAL MEDICINE

## 2017-02-08 PROCEDURE — 1159F MED LIST DOCD IN RCRD: CPT | Mod: S$GLB,,, | Performed by: INTERNAL MEDICINE

## 2017-02-08 PROCEDURE — 36415 COLL VENOUS BLD VENIPUNCTURE: CPT

## 2017-02-08 PROCEDURE — 1160F RVW MEDS BY RX/DR IN RCRD: CPT | Mod: S$GLB,,, | Performed by: INTERNAL MEDICINE

## 2017-02-08 PROCEDURE — 3078F DIAST BP <80 MM HG: CPT | Mod: S$GLB,,, | Performed by: INTERNAL MEDICINE

## 2017-02-08 PROCEDURE — 80053 COMPREHEN METABOLIC PANEL: CPT

## 2017-02-08 PROCEDURE — 1157F ADVNC CARE PLAN IN RCRD: CPT | Mod: S$GLB,,, | Performed by: INTERNAL MEDICINE

## 2017-02-08 PROCEDURE — 3077F SYST BP >= 140 MM HG: CPT | Mod: S$GLB,,, | Performed by: INTERNAL MEDICINE

## 2017-02-08 RX ORDER — AZACITIDINE 100 MG/1
75 INJECTION, POWDER, LYOPHILIZED, FOR SOLUTION INTRAVENOUS; SUBCUTANEOUS
Status: CANCELLED | OUTPATIENT
Start: 2017-03-02

## 2017-02-08 RX ORDER — AZACITIDINE 100 MG/1
75 INJECTION, POWDER, LYOPHILIZED, FOR SOLUTION INTRAVENOUS; SUBCUTANEOUS
Status: CANCELLED | OUTPATIENT
Start: 2017-04-04

## 2017-02-08 RX ORDER — AZACITIDINE 100 MG/1
75 INJECTION, POWDER, LYOPHILIZED, FOR SOLUTION INTRAVENOUS; SUBCUTANEOUS
Status: CANCELLED | OUTPATIENT
Start: 2017-03-30

## 2017-02-08 RX ORDER — AZACITIDINE 100 MG/1
75 INJECTION, POWDER, LYOPHILIZED, FOR SOLUTION INTRAVENOUS; SUBCUTANEOUS
Status: CANCELLED | OUTPATIENT
Start: 2017-02-25

## 2017-02-08 RX ORDER — AZACITIDINE 100 MG/1
75 INJECTION, POWDER, LYOPHILIZED, FOR SOLUTION INTRAVENOUS; SUBCUTANEOUS
Status: CANCELLED | OUTPATIENT
Start: 2017-03-29

## 2017-02-08 RX ORDER — AZACITIDINE 100 MG/1
75 INJECTION, POWDER, LYOPHILIZED, FOR SOLUTION INTRAVENOUS; SUBCUTANEOUS
Status: CANCELLED | OUTPATIENT
Start: 2017-02-24

## 2017-02-08 RX ORDER — AZACITIDINE 100 MG/1
75 INJECTION, POWDER, LYOPHILIZED, FOR SOLUTION INTRAVENOUS; SUBCUTANEOUS
Status: CANCELLED | OUTPATIENT
Start: 2017-03-05

## 2017-02-08 RX ORDER — AZACITIDINE 100 MG/1
75 INJECTION, POWDER, LYOPHILIZED, FOR SOLUTION INTRAVENOUS; SUBCUTANEOUS
Status: CANCELLED | OUTPATIENT
Start: 2017-04-08

## 2017-02-08 RX ORDER — AZACITIDINE 100 MG/1
75 INJECTION, POWDER, LYOPHILIZED, FOR SOLUTION INTRAVENOUS; SUBCUTANEOUS
Status: CANCELLED | OUTPATIENT
Start: 2017-04-07

## 2017-02-08 RX ORDER — AZACITIDINE 100 MG/1
75 INJECTION, POWDER, LYOPHILIZED, FOR SOLUTION INTRAVENOUS; SUBCUTANEOUS
Status: CANCELLED | OUTPATIENT
Start: 2017-03-09

## 2017-02-08 RX ORDER — AZACITIDINE 100 MG/1
75 INJECTION, POWDER, LYOPHILIZED, FOR SOLUTION INTRAVENOUS; SUBCUTANEOUS
Status: CANCELLED | OUTPATIENT
Start: 2017-04-01

## 2017-02-08 RX ORDER — AZACITIDINE 100 MG/1
75 INJECTION, POWDER, LYOPHILIZED, FOR SOLUTION INTRAVENOUS; SUBCUTANEOUS
Status: CANCELLED | OUTPATIENT
Start: 2017-03-08

## 2017-02-08 RX ORDER — AZACITIDINE 100 MG/1
75 INJECTION, POWDER, LYOPHILIZED, FOR SOLUTION INTRAVENOUS; SUBCUTANEOUS
Status: CANCELLED | OUTPATIENT
Start: 2017-02-28

## 2017-02-08 RX ORDER — AZACITIDINE 100 MG/1
75 INJECTION, POWDER, LYOPHILIZED, FOR SOLUTION INTRAVENOUS; SUBCUTANEOUS
Status: CANCELLED | OUTPATIENT
Start: 2017-03-31

## 2017-02-08 NOTE — PATIENT INSTRUCTIONS
Continue all meds    Continue aranesp    Continue Vidaza    Labs in 2 weeks    See Dr Chino, when scheduled for a BMT eval.    See me in 4 weeks.

## 2017-02-08 NOTE — Clinical Note
Continue all meds  Continue aranesp  Continue Vidaza  See Dr Chino, when scheduled for a BMT eval.  See me in 4 weeks.

## 2017-02-08 NOTE — MR AVS SNAPSHOT
[facility]  [address]  [facility] [address]  Phone: [phone]                  Wil Kwon Jr.   2017 9:00 AM   Office Visit    Description:  Male : 1945   Provider:  Laura Rader MD   Department:  [facility]           Diagnoses this Visit        Comments    MDS (myelodysplastic syndrome)    -  Primary     Anemia, unspecified type         HTN (hypertension), benign         Occlusion and stenosis of carotid artery without mention of cerebral infarction         Aortic valve stenosis, mild         Paroxysmal atrial fibrillation         Dyslipidemia                To Do List           Future Appointments        Provider Department Dept Phone    2017 9:00 AM Laura Rader MD [facility] [phone]      Goals (5 Years of Data)     None      Follow-Up and Disposition     Return in about 4 weeks (around 3/8/2017).      [facility]     [facility] - 24/7 Assistance  Registered nurses in the [facility] provide clinical advisement, health education, appointment booking, and other advisory services.  Call for this free service at [phone].             Medications           Message regarding Medications     Verify the changes and/or additions to your medication regime listed below are the same as discussed with your clinician today.  If any of these changes or additions are incorrect, please notify your healthcare provider.             Verify that the below list of medications is an accurate representation of the medications you are currently taking.  If none reported, the list may be blank. If incorrect, please contact your healthcare provider. Carry this list with you in case of emergency.           Current Medications     acetaminophen (TYLENOL) 325 MG tablet Take 325 mg by mouth as needed for Pain.    citalopram (CELEXA) 40 MG tablet TAKE 1 TABLET BY MOUTH DAILY    diltiaZEM  (CARDIZEM CD) 300 MG 24 hr capsule TAKE ONE CAPSULE BY MOUTH EVERY DAY    losartan (COZAAR) 50 MG tablet Take 50 mg by mouth once daily.    metoprolol succinate (TOPROL-XL) 25 MG 24 hr tablet Take 1 tablet (25 mg total) by mouth once daily.    multivitamin capsule Take 1 capsule by mouth every morning.     nystatin (MYCOSTATIN) ointment Apply topically 3 (three) times daily.    omega-3 fatty acids-vitamin E (FISH OIL) 1,000 mg Cap Take 3 capsules by mouth once daily.     pravastatin (PRAVACHOL) 20 MG tablet Take 1 tablet (20 mg total) by mouth every other day.    warfarin (COUMADIN) 3 MG tablet Take 1 tablet (3 mg total) by mouth Daily.           Clinical Reference Information           Allergies as of 2/8/2017     Lipitor [Atorvastatin]    Lisinopril      Immunizations Administered on Date of Encounter - 2/8/2017     None      Instructions    Continue all meds    Continue aranesp    Continue Vidaza    Labs in 2 weeks    See Dr Chino, when scheduled for a BMT eval.    See me in 4 weeks.       Language Assistance Services     ATTENTION: Language assistance services are available, free of charge. Please call [phone].      ATENCIÓN: Si habla español, tiene a gannon disposición servicios gratuitos de asistencia lingüística. Llame al [phone].     CARLOS Ý: N?u b?n nói Ti?ng Vi?t, có các d?ch v? h? tr? ngôn ng? mi?n phí dành cho b?n. G?i s? [phone].         [facility] complies with applicable Federal civil rights laws and does not discriminate on the basis of race, color, national origin, age, disability, or sex.

## 2017-02-08 NOTE — Clinical Note
Continue all meds  Continue aranesp  Continue Vidaza  Labs in 2 weeks  See Dr Chino, when scheduled for a BMT eval.  See me in 4 weeks

## 2017-02-10 LAB
CHROM BANDING METHOD: NORMAL
CHROMOSOME ANALYSIS BM ADDITIONAL INFORMATION: NORMAL
CHROMOSOME ANALYSIS BM RELEASED BY: NORMAL
CHROMOSOME ANALYSIS BM RESULT SUMMARY: NORMAL
CLINICAL CYTOGENETICIST REVIEW: NORMAL
KARYOTYP MAR: NORMAL
REASON FOR REFERRAL (NARRATIVE): NORMAL
REF LAB TEST METHOD: NORMAL
SPECIMEN SOURCE: NORMAL
SPECIMEN: NORMAL

## 2017-02-12 NOTE — PROGRESS NOTES
"PATIENT: Wil Kwon Jr.  MRN: [id number]  DATE: 2/12/2017    Subjective:   MDS    Oncologic History:  Mr. Kwon 71-year-old gentleman with several chronic medical conditions living healthy lifestyle.  He has hypertension and reports home readings are all normal.  He did recently develop angioedema from ACE inhibitor and was switched to an ARB.  His dyslipidemia is treated with pharmacologic therapy.  He has history of carotid atherosclerotic disease status post left carotid endarterectomy and up-to-date with surveillance carotid Doppler study.  He has a history of PAF followed by our electrophysiology cardiology department and is chronically anticoagulated.  He has moderate aortic stenosis. He has DJD of the right knee which has become more symptomatic in recent months.  He was noted to be progressively anemic over the last 1 year.  He has hence been referred to see us.  His hematology workup to date reveals a normal B12 and folate.  His iron stores are normal.  His reticulocyte count is slightly elevated at 4.2.  His white count has remained low and progressively dropping over the last 3 years with monocytosis.  He also has developed mild progressive thrombocytopenia.  He is moderately symptomatic with the fatigue.  He has no history of extrinsic GI bleeding.  He also has no history of previous transfusions.he underwenta marrow. 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 Of 20 metaphases, 2 metaphases were normal and 18 metaphases had a 2;21  translocation. 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 RUNX1 rearrangements are  associated with de alfredo AML and therapeutic-related AML and MDS. Clinical and pathologic correlation is  recommended."  FINAL PATHOLOGIC DIAGNOSIS  CBC DATA 8/24/16:  RBC: 3.45 M/UL, H/H : 9.9/32.1, MCV : " 93 FL, WBC: 3.1 K/UL, Gran 1.2 k/ul %, Platelet: 200 K/UL.  No peripheral blood smear was submitted for evaluation.  BONE MARROW ASPIRATE, BONE MARROW CLOT, AND BONE MARROW CORE BIOPSY WITH:  CELLULARITY= 95%, TRILINEAGE HEMATOPOIETIC ACTIVITY (M/E= 2:1) AND INCREASED IMMATURE  CELLS (9%). SEE COMMENT.  LEFT SHIFTED MATURATION OF GRANULOPOIESIS.  ADEQUATE STORAGE IRON.  ADEQUATE NUMBER OF MEGAKARYOCYTES.  COMMENT: Flow cytometry analysis of bone marrow aspirate shows lymph gate (4.2 %) containing T and B cells.  No B cell clonality or T-cell aberrancy is evident. Blast gate is increased (7.9%) with cells expression of CD13 and  CD34. Immunohistochemical studies were performed on the clot and core biopsy for further architecture evaluation with  adequate positive and negative controls. Scattered mixed predominantly T cells (CD3 positive) and B cells (CD20  positive) are evident. About 6-7 % plasma cells ( positive) and 9% CD34 positive cells are noted. Findings  are nondiagnostic for lymphoma or plasma cell dyscrasia.  Microscopic Examination  BONE MARROW ASPIRATE DIFFERENTIAL:  Blasts----------------------------------------------5%  Promyelocytes---------------------------------2%  Myelocytes--------------------------------------11%  Metamyelocytes------------------------------ 19%  Bands----------------------------------------------5%  PMN Neutrophils------------------------------15%  Eosinophils------------------------------------------2%  Basophils---------------------------------------0%  Monocytes------------------------------------2%  Lymphocytes-------------------------------------6%  Plasma cells------------------------------------------2%  Erythroid precursors--------------------------31%  BONE MARROW ASPIRATE:  Myeloid and erythroid maturation shows M:E ratio at 2:1. No significant dysplasia is evident. Left shifted maturation of  granulopoiesis is noted. Stainable iron is present without increased  ringed sideroblasts. Megakaryocytes are seen.  BONE MARROW CLOT:  Cellularity is 95 % with trilineage hematopoiesis. No abnormal infiltrates are seen. Megakaryocytes are adequate in  number. Stainable iron is present.  BONE MARROW CORE BIOPSY:  Cellularity is 95 %. No abnormal infiltrates are seen. Stainable iron is not identified. Megakaryocytes are adequate in   No significantly increased reticular fibrosis is evident by special stain (reticulin) with adequate positive and  negative controls.    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 He did not meet criteria for AML.  I started him on HMA,s and monitor for response. I discussed an Allo BMT with him also.He was also started on Aranesp.    He has started Vidaza and currently post  3rd cycle. He is also receiving Aranesp. I repeated a marrow on him and he is here post marrow for results and plan.    His repeat marrow revealed    BONE MARROW ASPIRATE, BONE MARROW CLOT, AND BONE MARROW CORE BIOPSY WITH:  CELLULARITY= %, TRILINEAGE HEMATOPOIETIC ACTIVITY (M/E= 1.4:1).  CONSISTENT WITH REFRACTORY ANEMIA WITH EXCESS BLASTS -1. SEE COMMENT.  DECREASED STORAGE IRON.  INCREASED NUMBER OF MEGAKARYOCYTES WITH DYSPLASTIC MORPHOLOGY.  COMMENT: Flow cytometry analysis of bone marrow aspirate shows lymph gate (15.9 %) containing T and B cells.  No B cell clonality or T-cell aberrancy is evident. Blast gate is slightly increased (7.1%).  Immunohistochemical studies were performed on the clot and core biopsy for further architecture evaluation with  adequate positive and negative controls. About 6-7% CD34 positive cells are noted. CD61 highlights increased in  number of megakaryocytes with dysplastic morphology.  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 Correlate clinically and with a cytogenetics  report.  Microscopic Examination  BONE MARROW ASPIRATE DIFFERENTIAL:  Blasts---------------------------------------------- 7  %  Promyelocytes---------------------------------1%  Myelocytes--------------------------------------10%  Metamyelocytes------------------------------ 8%  Bands----------------------------------------------9%  PMN Neutrophils------------------------------15%  Eosinophils------------------------------------------1%  Basophils---------------------------------------1%  Monocytes------------------------------------1%  Lymphocytes-------------------------------------9%  Plasma cells------------------------------------------1%  Erythroid precursors--------------------------37%  BONE MARROW ASPIRATE:  Myeloid and erythroid maturation shows M:E ratio at 1.4:1. Dysgranulopoiesis and occasional dyserythropoiesis is  evident. Stainable iron is decreased without increased ringed sideroblasts. Megakaryocytes are seen.  BONE MARROW CLOT:  Cellularity is  % with trilineage hematopoiesis. No abnormal infiltrates are seen. Megakaryocytes are increased  in number with dysplastic morphology. Stainable iron is decreased.  BONE MARROW CORE BIOPSY:  Cellularity is  %. No abnormal infiltrates are seen. Stainable iron is not identified. Megakaryocytes are  increased in number with dysplastic morphology.    His cytogenetics is not available to me at this point.      Interval History: Mr. Kwon returns for follow up. He has had no issues since his last visit other than a L Leg muscle sprain when he was chopping wood.    Past Medical History   Diagnosis Date    Aortic valve stenosis, mild      secondary to bicuspid aortic alve    Atrial fibrillation     Carotid artery disease      Left CEA 4/9/13    Depression     DJD (degenerative joint disease)     H/O echocardiogram 04/13/2016     EF 55-60%. Diastolic dysfunction. PASP 39. mod AS with JEFF 1.18    History of psychiatric hospitalization      [facility] care 2014, [facility]    Hx of psychiatric care     Hyperlipidemia     Hypertension     MDS  (myelodysplastic syndrome)     Pancytopenia     Psychiatric problem     Therapy      [facility]        Past Surgical History   Procedure Laterality Date    Knee surgery       Right x2    Neck fusion      Inguinal hernia       Left    Tonsillectomy      Carotid endarterectomy Left     Bone marrow biopsy  08/29/2016       Family History   Problem Relation Age of Onset    Hyperlipidemia Brother         Social History:  reports that he has never smoked. He has never used smokeless tobacco. He reports that he does not drink alcohol or use illicit drugs.    Allergies:  Review of patient's allergies indicates:   Allergen Reactions    Lipitor [atorvastatin]      Muscle pain    Lisinopril Edema     Cheek swelling       Medications:  Current Outpatient Prescriptions   Medication Sig Dispense Refill    acetaminophen (TYLENOL) 325 MG tablet Take 325 mg by mouth as needed for Pain.      citalopram (CELEXA) 40 MG tablet TAKE 1 TABLET BY MOUTH DAILY (Patient taking differently: TAKE 1 TABLET BY MOUTH DAILY (am)) 90 tablet 0    diltiaZEM (CARDIZEM CD) 300 MG 24 hr capsule TAKE ONE CAPSULE BY MOUTH EVERY DAY 90 capsule 1    losartan (COZAAR) 50 MG tablet Take 50 mg by mouth once daily.  3    metoprolol succinate (TOPROL-XL) 25 MG 24 hr tablet Take 1 tablet (25 mg total) by mouth once daily. 90 tablet 3    multivitamin capsule Take 1 capsule by mouth every morning.       nystatin (MYCOSTATIN) ointment Apply topically 3 (three) times daily. 1 Tube 1    omega-3 fatty acids-vitamin E (FISH OIL) 1,000 mg Cap Take 3 capsules by mouth once daily.       pravastatin (PRAVACHOL) 20 MG tablet Take 1 tablet (20 mg total) by mouth every other day. 45 tablet 3    warfarin (COUMADIN) 3 MG tablet Take 1 tablet (3 mg total) by mouth Daily. 30 tablet 11     No current facility-administered medications for this visit.          Review of Systems   HENT: Negative.    Eyes: Negative.    Respiratory: Negative.     Cardiovascular: Negative.    Gastrointestinal: Negative.    Endocrine: Negative.    Genitourinary: Negative.    Musculoskeletal: Negative.    Skin: Negative.    Neurological: Negative.    Hematological: Negative.    Psychiatric/Behavioral: Negative.        ECOG Performance Status: 0  Objective:      Vitals:   Vitals:    02/08/17 0917   BP: (!) 159/73   BP Location: Right arm   Patient Position: Sitting   BP Method: Automatic   Pulse: 105   Temp: 98.1 °F (36.7 °C)   TempSrc: Oral     BMI: There is no height or weight on file to calculate BMI.      Physical Exam   Constitutional: he is oriented to person, place, and time. He appears well-developed and well-nourished.   HENT: NC AT   Head: Normocephalic.   Right Ear: External ear normal.   Left Ear: External ear normal.   Nose: Nose normal.   Mouth/Throat: Oropharynx is clear and moist.   Eyes: Conjunctivae and EOM are normal. Pupils are equal, round, and reactive to light.   Neck: Normal range of motion. Neck supple.   Cardiovascular: Normal rate, regular rhythm, normal heart sounds and intact distal pulses.    Pulmonary/Chest: Effort normal and breath sounds normal.   Abdominal: Soft. Bowel sounds are normal.   Musculoskeletal: Normal range of motion.   Neurological: he is alert and oriented to person, place, and time. He has normal reflexes.   Skin: Skin is warm and dry.   Psychiatric: he has a normal mood and affect. His behavior is normal. Judgment and thought content normal.       Laboratory Data:  Lab Visit on 02/08/2017   Component Date Value Ref Range Status    Sodium 02/08/2017 138  136 - 145 mmol/L Final    Potassium 02/08/2017 4.1  3.5 - 5.1 mmol/L Final    Chloride 02/08/2017 105  95 - 110 mmol/L Final    CO2 02/08/2017 25  23 - 29 mmol/L Final    Glucose 02/08/2017 98  70 - 110 mg/dL Final    BUN, Bld 02/08/2017 12  8 - 23 mg/dL Final    Creatinine 02/08/2017 1.2  0.5 - 1.4 mg/dL Final    Calcium 02/08/2017 9.2  8.7 - 10.5 mg/dL Final    Total  Protein 02/08/2017 7.8  6.0 - 8.4 g/dL Final    Albumin 02/08/2017 3.6  3.5 - 5.2 g/dL Final    Total Bilirubin 02/08/2017 0.6  0.1 - 1.0 mg/dL Final    Comment: For infants and newborns, interpretation of results should be based  on gestational age, weight and in agreement with clinical  observations.  Premature Infant recommended reference ranges:  Up to 24 hours.............<8.0 mg/dL  Up to 48 hours............<12.0 mg/dL  3-5 days..................<15.0 mg/dL  6-29 days.................<15.0 mg/dL      Alkaline Phosphatase 02/08/2017 139* 55 - 135 U/L Final    AST 02/08/2017 96* 10 - 40 U/L Final    ALT 02/08/2017 25  10 - 44 U/L Final    Anion Gap 02/08/2017 8  8 - 16 mmol/L Final    eGFR if African American 02/08/2017 >60.0  >60 mL/min/1.73 m^2 Final    eGFR if non African American 02/08/2017 >60.0  >60 mL/min/1.73 m^2 Final    Comment: Calculation used to obtain the estimated glomerular filtration  rate (eGFR) is the CKD-EPI equation. Since race is unknown   in our information system, the eGFR values for   -American and Non--American patients are given   for each creatinine result.      WBC 02/08/2017 40.77* 3.90 - 12.70 K/uL Final    RBC 02/08/2017 3.62* 4.60 - 6.20 M/uL Final    Hemoglobin 02/08/2017 10.3* 14.0 - 18.0 g/dL Final    Hematocrit 02/08/2017 34.2* 40.0 - 54.0 % Final    MCV 02/08/2017 95  82 - 98 fL Final    MCH 02/08/2017 28.5  27.0 - 31.0 pg Final    MCHC 02/08/2017 30.1* 32.0 - 36.0 % Final    RDW 02/08/2017 20.4* 11.5 - 14.5 % Final    Platelets 02/08/2017 195  150 - 350 K/uL Final    MPV 02/08/2017 12.1  9.2 - 12.9 fL Final    Gran # 02/08/2017 SEE COMMENT  1.8 - 7.7 K/uL Final    Comment: ANC result not available. Please add on a manual differential   for more information.     Admission on 02/02/2017, Discharged on 02/02/2017   Component Date Value Ref Range Status    Bone Marrow Iron Stain Comment 02/02/2017 See Anatomic Pathology bone marrow report.    Final    Bone Marrow Daugherty Stain Comment 02/02/2017 See Anatomic Pathology bone marrow report.   Final    Clinical Diagnosis - Bone Marrow 02/02/2017 MDS   Final    Body Site - Bone Marrow 02/02/2017 LPI   Final    Bone Marrow Interpretation 02/02/2017 Slightly increased blast gate is detected in this sample. See comment.   Final    Interpreted by: Cher Bishop MD, Signed on 02/03/2017 at 10:13    Bone Marrow Antibodies Analyzed 02/02/2017 All analyzed: CD2, CD3, CD4, CD5, CD7, CD8, CD10, CD13, CD19, CD20, CD34, , KAPPA, LAMBDA,CD45 and 7AAD.   Final    Bone Marrow Comment 02/02/2017    Final                    Value:Flow cytometric analysis of  bone marrow shows populations of polyclonal B lymphocytes and T lymphocytes that are immunophenotypically unremarkable.  The blast gate is slightly increased with total of 4.9% CD34 positive cells. Correlate with marrow   morphology. Flow differential:  Lymphocytes 15.9%, Monocytes 1.0%, Granulocytes  59.2%, Blast  7.1%, Debris/nRBC 16.5%,  Viability 98.6%.      Comment: This test was developed and its performance characteristics   determined by [facility].    It has not been cleared or approved by the U.S. Food and Drug   Administration. The FDA has determined that such clearance or   approval is not necessary.  This test is used for clinical purposes.    It should not be regarded as investigational or for research.  This   laboratory is certified under CLIA-88 as qualified to perform high   complexity clinical laboratory testing.      Chromosome analysis BM Result Summ* 02/02/2017 Abnormal   Final    Interpretation 02/02/2017 SEE BELOW   Final    Comment: The result is abnormal. Of 20 metaphases, 1 metaphase was  normal and 19 metaphases had a 2;21 translocation. This  result is consistent with persistence or recurrence of this  patient's abnormal clone.      Results 02/02/2017 46,XY,t(2;21)(p23;q22)[19]/46,XY[1]   Final     Reason for Referral 02/02/2017 MDS   Final    Specimen 02/02/2017 Bone Marrow   Final    Source 02/02/2017 Test Not Performed   Final    Method 02/02/2017 Culture without mitogens   Final    Banding Methods, BM Chromosome 02/02/2017 SEE BELOW   Final    Comment: Band Resolution:  475  ----------------------------------------------------------   Stain Name      Cells Analyzed   Cells       Karyograms      Counted     Prepared        GTL/QFQ         1                0           1               GTL             19               0           3               Total           20               0           4               ----------------------------------------------------------   Key to Stain Name: GTL=G-banding; QFQ=Q-banding;  DAPI=DAPI-staining; CBL=C-banding; AGNOR=Silver-staining;  NON=Non-banded  The sum of Cells Analyzed and Cells Counted equals the  total cells examined.      Chromosome analysis BM Additional * 02/02/2017 SEE BELOW   Final    Comment: Previous Studies  DATE        SPECIMEN   RESULT  08/29/2016  Marrow     t(2;21) in 18/20 metaphases  08/29/2016  Marrow     FISH= +21q22 (67.2% of nuclei)  -----------------------------------------------------------      Chromosome analysis BM Released By 02/02/2017 Adrianna Trivedi D.O.   Final    Comment: Test Performed by:  [address] [address]  : Mark Trevino II, M.D., Ph.D.         Assessment/Plan:     1. MDS (myelodysplastic syndrome)    2. Anemia, unspecified type    3. HTN (hypertension), benign    4. Occlusion and stenosis of carotid artery without mention of cerebral infarction    5. Aortic valve stenosis, mild    6. Paroxysmal atrial fibrillation    7. Dyslipidemia      His disease appears stable by Blast count. I will await cytogenetics. I will refer him for a BMT eval to Dr Chino. His wife is anxious but agreeable about this. He will continue his  Timmy in the interim.    His A Fib, AS, HTN and dyslipidemia are stable and are being managed by other MD,s.    I have not changed any meds.    Med and Order  Orders Placed This Encounter    CBC Oncology    Comprehensive metabolic panel       Follow Up  Return in about 4 weeks (around 3/8/2017).

## 2017-02-14 ENCOUNTER — OFFICE VISIT (OUTPATIENT)
Dept: HEMATOLOGY/ONCOLOGY | Facility: CLINIC | Age: 72
End: 2017-02-14
Payer: MEDICARE

## 2017-02-14 VITALS — BODY MASS INDEX: 27.4 KG/M2 | TEMPERATURE: 99 F | WEIGHT: 196.44 LBS

## 2017-02-14 DIAGNOSIS — D46.9 MDS (MYELODYSPLASTIC SYNDROME): Primary | ICD-10-CM

## 2017-02-14 PROCEDURE — 99999 PR PBB SHADOW E&M-EST. PATIENT-LVL II: CPT | Mod: PBBFAC,,, | Performed by: INTERNAL MEDICINE

## 2017-02-14 PROCEDURE — 99215 OFFICE O/P EST HI 40 MIN: CPT | Mod: S$GLB,,, | Performed by: INTERNAL MEDICINE

## 2017-02-14 PROCEDURE — 1159F MED LIST DOCD IN RCRD: CPT | Mod: S$GLB,,, | Performed by: INTERNAL MEDICINE

## 2017-02-14 PROCEDURE — 99499 UNLISTED E&M SERVICE: CPT | Mod: S$PBB,,, | Performed by: INTERNAL MEDICINE

## 2017-02-14 PROCEDURE — 1160F RVW MEDS BY RX/DR IN RCRD: CPT | Mod: S$GLB,,, | Performed by: INTERNAL MEDICINE

## 2017-02-14 PROCEDURE — 1157F ADVNC CARE PLAN IN RCRD: CPT | Mod: S$GLB,,, | Performed by: INTERNAL MEDICINE

## 2017-02-14 NOTE — PROGRESS NOTES
Subjective:       Patient ID: Wil Kwon Jr. is a 71 y.o. male.    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 He has a translocation 2:21, 7% blasts, anemia, thrombocytopenia, and leukopenia.  He has received 4 cycles of Vidaza therapy with stable disease. He also received aranesp and neulasta support.    Wil's most significant past medical history is cardiovascular including atrial fibrillation. He is anticoagulated with coumadin. He had bleeding in the left upper extremity on Xarelto. He is also having bleeding on coumadin with severe epistaxis and gum bleeding while brushing his teeth and is holding therapy at present. Platelet count was normal 6 days ago but does decreased with HMA therapy. He has prior carotid endarterectomies. He reports possible bicuspid valve vs valvular stiffening. [facility] April last year had an EF of 55% and evidence of aortic stenosis.     The patient has a history of depression that has required hospitalization before. There was no evidence of altered mood during interview today.    HPI  Review of Systems   Constitutional: Negative.    HENT: Negative.    Eyes: Negative.    Respiratory: Negative.    Cardiovascular: Negative.    Gastrointestinal: Negative.    Endocrine: Negative.    Genitourinary: Negative.    Musculoskeletal: Negative.    Skin:        Subcutaneous nodules at sites of vidaza injections   Allergic/Immunologic: Negative for environmental allergies, food allergies and immunocompromised state.   Neurological: Negative.    Hematological: Negative for adenopathy. Does not bruise/bleed easily.   Psychiatric/Behavioral: Negative.        Objective:      Physical Exam   Constitutional: He is oriented to person, place, and time. He appears well-developed and well-nourished.   HENT:   Head: Normocephalic and atraumatic.   Eyes: Conjunctivae  are normal. No scleral icterus.   Neck: Normal range of motion. Neck supple.   Cardiovascular: Normal rate and intact distal pulses.    Pulmonary/Chest: Effort normal. No respiratory distress.   Abdominal: He exhibits no distension.   Musculoskeletal: Normal range of motion.   Neurological: He is alert and oriented to person, place, and time. No cranial nerve deficit.   Skin: Skin is warm and dry.   Psychiatric: He has a normal mood and affect. His behavior is normal.   Nursing note and vitals reviewed.      Assessment:       1. MDS (myelodysplastic syndrome)        Plan:       Discussed role of transplant in MDS. Discussed patient risk category of MDS at Intermediate 2 which makes him a candidate for allogeneic transplant as potential curative therapy. The patient is below the age cut-off of 75. He does have cardiac risk factors that will require cardiac clearance prior to considering transplant. We discussed a risk of heart failure at about 15% due to chemotherapy toxicity. We discussed a risk of death due to transplant mortality at 50% in 2 years in the average healthy patient.   We discussed the chance of curing MDS with transplant at about 50%. We discussed the life-long risk of GVHD after allogeneic transplant. The patient may require a matched unrelated donor.     After our discussion plan we will start with cardiac clearance. Psychology clearance will also be required.  Support for his caregiver will be needed if we are able to consider transplant.    At this time he will continue with Vidaza therapy.    Visit 40 minutes. >50% counseling.

## 2017-02-15 DIAGNOSIS — D46.9 MDS (MYELODYSPLASTIC SYNDROME): Primary | ICD-10-CM

## 2017-02-15 DIAGNOSIS — Z01.818 PRE-OP EXAM: Primary | ICD-10-CM

## 2017-02-22 ENCOUNTER — INFUSION (OUTPATIENT)
Dept: INFUSION THERAPY | Facility: HOSPITAL | Age: 72
End: 2017-02-22
Attending: INTERNAL MEDICINE
Payer: MEDICARE

## 2017-02-22 VITALS — SYSTOLIC BLOOD PRESSURE: 135 MMHG | DIASTOLIC BLOOD PRESSURE: 68 MMHG | HEART RATE: 110 BPM

## 2017-02-22 DIAGNOSIS — D46.9 MDS (MYELODYSPLASTIC SYNDROME): Primary | ICD-10-CM

## 2017-02-22 PROCEDURE — 63600175 PHARM REV CODE 636 W HCPCS: Performed by: INTERNAL MEDICINE

## 2017-02-22 PROCEDURE — 96372 THER/PROPH/DIAG INJ SC/IM: CPT

## 2017-02-22 RX ADMIN — DARBEPOETIN ALFA 200 MCG: 200 INJECTION, SOLUTION INTRAVENOUS; SUBCUTANEOUS at 11:02

## 2017-02-23 ENCOUNTER — OFFICE VISIT (OUTPATIENT)
Dept: CARDIOLOGY | Facility: CLINIC | Age: 72
End: 2017-02-23
Payer: MEDICARE

## 2017-02-23 ENCOUNTER — TELEPHONE (OUTPATIENT)
Dept: CARDIOLOGY | Facility: CLINIC | Age: 72
End: 2017-02-23

## 2017-02-23 ENCOUNTER — HOSPITAL ENCOUNTER (OUTPATIENT)
Dept: CARDIOLOGY | Facility: CLINIC | Age: 72
Discharge: HOME | End: 2017-02-23
Payer: MEDICARE

## 2017-02-23 VITALS
HEIGHT: 71 IN | SYSTOLIC BLOOD PRESSURE: 138 MMHG | WEIGHT: 199.06 LBS | HEART RATE: 82 BPM | BODY MASS INDEX: 27.87 KG/M2 | DIASTOLIC BLOOD PRESSURE: 78 MMHG

## 2017-02-23 DIAGNOSIS — Z01.818 PRE-OP EVALUATION: ICD-10-CM

## 2017-02-23 DIAGNOSIS — I48.0 PAROXYSMAL ATRIAL FIBRILLATION WITH RAPID VENTRICULAR RESPONSE: ICD-10-CM

## 2017-02-23 DIAGNOSIS — I15.9 SECONDARY HYPERTENSION: Primary | ICD-10-CM

## 2017-02-23 DIAGNOSIS — I15.9 SECONDARY HYPERTENSION: ICD-10-CM

## 2017-02-23 DIAGNOSIS — I35.0 AORTIC STENOSIS, MODERATE: Primary | ICD-10-CM

## 2017-02-23 PROCEDURE — 93000 ELECTROCARDIOGRAM COMPLETE: CPT | Mod: S$GLB,,, | Performed by: INTERNAL MEDICINE

## 2017-02-23 PROCEDURE — 99999 PR PBB SHADOW E&M-EST. PATIENT-LVL III: CPT | Mod: PBBFAC,,,

## 2017-02-23 PROCEDURE — 1126F AMNT PAIN NOTED NONE PRSNT: CPT | Mod: S$GLB,,, | Performed by: INTERNAL MEDICINE

## 2017-02-23 PROCEDURE — 3075F SYST BP GE 130 - 139MM HG: CPT | Mod: S$GLB,,, | Performed by: INTERNAL MEDICINE

## 2017-02-23 PROCEDURE — 1159F MED LIST DOCD IN RCRD: CPT | Mod: S$GLB,,, | Performed by: INTERNAL MEDICINE

## 2017-02-23 PROCEDURE — 99214 OFFICE O/P EST MOD 30 MIN: CPT | Mod: S$GLB,,, | Performed by: HOSPITALIST

## 2017-02-23 PROCEDURE — 1160F RVW MEDS BY RX/DR IN RCRD: CPT | Mod: S$GLB,,, | Performed by: INTERNAL MEDICINE

## 2017-02-23 PROCEDURE — 1157F ADVNC CARE PLAN IN RCRD: CPT | Mod: S$GLB,,, | Performed by: INTERNAL MEDICINE

## 2017-02-23 PROCEDURE — 3078F DIAST BP <80 MM HG: CPT | Mod: S$GLB,,, | Performed by: INTERNAL MEDICINE

## 2017-02-23 NOTE — PATIENT INSTRUCTIONS
You will have another ultrasound of your heart (ECHO) that will be scheduled soon.   If your heart rate is consistently high at your oncology office visits, we will recommend to your oncology doctor to increase beta blocker dose.   We will call you with the results of the ultrasound.

## 2017-02-23 NOTE — MR AVS SNAPSHOT
[facility]  [address]  [facility] [address]  Phone: [phone]                  Wil Kwon Jr.   2017 8:40 AM   Office Visit    Description:  Male : 1945   Provider:  CARDIO, RESIDENT   Department:  [facility]           Reason for Visit     Pre-op Exam           Diagnoses this Visit        Comments    Aortic stenosis, moderate    -  Primary     Pre-op evaluation         Paroxysmal atrial fibrillation with rapid ventricular response                To Do List           Future Appointments        Provider Department Dept Phone    2017 10:15 AM INJECTION, [facility] [phone]    2017 10:15 AM INJECTION, [facility] [phone]    3/1/2017 10:00 AM INJECTION, [facility] [phone]    3/2/2017 10:00 AM INJECTION, [facility] [phone]    3/8/2017 10:15 AM Laura Rader MD [facility] [phone]      Goals (5 Years of Data)     None      Follow-Up and Disposition     Return if symptoms worsen or fail to improve.      [facility]     [facility] -  Assistance  Registered nurses in the [facility] provide clinical advisement, health education, appointment booking, and other advisory services.  Call for this free service at [phone].             Medications           Message regarding Medications     Verify the changes and/or additions to your medication regime listed below are the same as discussed with your clinician today.  If any of these changes or additions are incorrect, please notify your healthcare provider.             Verify that the below list of medications is an accurate representation of the medications you are currently taking.  If none reported, the list may be blank. If incorrect, please contact your healthcare provider. Carry this  "list with you in case of emergency.           Current Medications     acetaminophen (TYLENOL) 325 MG tablet Take 325 mg by mouth as needed for Pain.    citalopram (CELEXA) 40 MG tablet TAKE 1 TABLET BY MOUTH DAILY    diltiaZEM (CARDIZEM CD) 300 MG 24 hr capsule TAKE ONE CAPSULE BY MOUTH EVERY DAY    losartan (COZAAR) 50 MG tablet Take 50 mg by mouth once daily.    metoprolol succinate (TOPROL-XL) 25 MG 24 hr tablet Take 1 tablet (25 mg total) by mouth once daily.    multivitamin capsule Take 1 capsule by mouth every morning.     omega-3 fatty acids-vitamin E (FISH OIL) 1,000 mg Cap Take 3 capsules by mouth once daily.     pravastatin (PRAVACHOL) 20 MG tablet Take 1 tablet (20 mg total) by mouth every other day.    warfarin (COUMADIN) 3 MG tablet Take 1 tablet (3 mg total) by mouth Daily.    nystatin (MYCOSTATIN) ointment Apply topically 3 (three) times daily.           Clinical Reference Information           Your Vitals Were     BP Pulse Height Weight BMI    138/78 82 5' 11" (1.803 m) 90.3 kg (199 lb 1.2 oz) 27.77 kg/m2      Blood Pressure          Most Recent Value    Right Arm BP - Sitting  140/72    Left Arm BP - Sitting  138/78    BP  138/78      Allergies as of 2/23/2017     Lipitor [Atorvastatin]    Lisinopril      Immunizations Administered on Date of Encounter - 2/23/2017     None      Orders Placed During Today's Visit     Future Labs/Procedures Expected by Expires    2D Echo w/ Color Flow Doppler  As directed 2/23/2018      Instructions    You will have another ultrasound of your heart (ECHO) that will be scheduled soon.   If your heart rate is consistently high at your oncology office visits, we will recommend to your oncology doctor to increase beta blocker dose.   We will call you with the results of the ultrasound.        Language Assistance Services     ATTENTION: Language assistance services are available, free of charge. Please call [phone].      ATENCIÓN: horace Horton " disposición servicios gratuitos de asistencia lingüística. Verenice aguillon [phone].     CARLOS Ý: N?u b?n nói Ti?ng Vi?t, có các d?ch v? h? tr? ngôn ng? mi?n phí dành cho b?n. G?i s? [phone].         [facility] complies with applicable Federal civil rights laws and does not discriminate on the basis of race, color, national origin, age, disability, or sex.

## 2017-02-23 NOTE — PROGRESS NOTES
Cardiology Clinic Note  Reason for Visit: Pre-op Exam    HPI:   Mr. Wil Kwon Jr. is a 72 y.o. gentleman with history of myelodysplastic syndrome on azacitidine, hx of pAF on coumadin, left carotid endarterectomy and HTN who presents to the clinic today for pre-op evaluation in anticipation of bone marrow transplant for MDS.     He has occasional palpitations that occur with rigorous exercise, and he attributes it to onset of his paroxysmal atrial fibrillation. He takes all his medications and feels that his rate is well controlled most of the time. He is able to regularly exercise, including aerobic and strength training for > 30 minutes at a time. He denies any exertional angina or dyspnea, PND, orthopnea, leg swelling, malaise, angina at rest, dizziness or syncope.     ROS:    Constitution: negative for - fever, chills, weight loss or weight gain  HENT: negative for - sore throat, rhinorrhea, or headache  Eyes: negative for - blurred or double vision  Cardiovascular: no chest pain or dyspnea on exertion  Pulmonary: no cough, shortness of breath, or wheezing  Gastrointestinal: negative for - abdominal pain, nausea, vomiting, or diarrhea  : negative for - dysuria  Neurological: negative for - focal weakness or sensory changes  PMH:     Past Medical History   Diagnosis Date    Aortic valve stenosis, mild      secondary to bicuspid aortic alve    Atrial fibrillation     Carotid artery disease      Left CEA 4/9/13    Depression     DJD (degenerative joint disease)     H/O echocardiogram 04/13/2016     EF 55-60%. Diastolic dysfunction. PASP 39. mod AS with JEFF 1.18    History of psychiatric hospitalization      [facility] 2014, [facility] 1993    Hx of psychiatric care     Hyperlipidemia     Hypertension     MDS (myelodysplastic syndrome)     Pancytopenia     Psychiatric problem     Therapy      [facility]      Past Surgical History   Procedure Laterality Date     Knee surgery       Right x2    Neck fusion      Inguinal hernia       Left    Tonsillectomy      Carotid endarterectomy Left     Bone marrow biopsy  08/29/2016     Allergies:     Review of patient's allergies indicates:   Allergen Reactions    Lipitor [atorvastatin]      Muscle pain    Lisinopril Edema     Cheek swelling     Medications:     Current Outpatient Prescriptions on File Prior to Visit   Medication Sig Dispense Refill    acetaminophen (TYLENOL) 325 MG tablet Take 325 mg by mouth as needed for Pain.      citalopram (CELEXA) 40 MG tablet TAKE 1 TABLET BY MOUTH DAILY (Patient taking differently: TAKE 1 TABLET BY MOUTH DAILY (am)) 90 tablet 0    diltiaZEM (CARDIZEM CD) 300 MG 24 hr capsule TAKE ONE CAPSULE BY MOUTH EVERY DAY 90 capsule 1    losartan (COZAAR) 50 MG tablet Take 50 mg by mouth once daily.  3    metoprolol succinate (TOPROL-XL) 25 MG 24 hr tablet Take 1 tablet (25 mg total) by mouth once daily. 90 tablet 3    multivitamin capsule Take 1 capsule by mouth every morning.       omega-3 fatty acids-vitamin E (FISH OIL) 1,000 mg Cap Take 3 capsules by mouth once daily.       pravastatin (PRAVACHOL) 20 MG tablet Take 1 tablet (20 mg total) by mouth every other day. 45 tablet 3    warfarin (COUMADIN) 3 MG tablet Take 1 tablet (3 mg total) by mouth Daily. 30 tablet 11    nystatin (MYCOSTATIN) ointment Apply topically 3 (three) times daily. 1 Tube 1     Current Facility-Administered Medications on File Prior to Visit   Medication Dose Route Frequency Provider Last Rate Last Dose    [COMPLETED] darbepoetin magalie-polysorbate injection 200 mcg  200 mcg Subcutaneous 1 time in Clinic/HOD Laura Rader MD   200 mcg at 02/22/17 1115     Social History:     Social History   Substance Use Topics    Smoking status: Never Smoker    Smokeless tobacco: Never Used    Alcohol use No     Family History:     Family History   Problem Relation Age of Onset    Hyperlipidemia Brother      Physical Exam:  "    Visit Vitals    /78    Pulse 82    Ht 5' 11" (1.803 m)    Wt 90.3 kg (199 lb 1.2 oz)    BMI 27.77 kg/m2        Constitutional: NAD, conversant  HEENT: Sclera anicteric, PERRLA, EOMI  Neck: No JVD, right carotid bruit   CV: Irregularly irregular, Tachy, 3/6 holosystolic murmur heard best at apex, normal S1/S2  Pulm: CTAB, no wheezes, rales, or ronchi  GI: Abdomen soft, NTND, +BS  Extremities: No LE edema, warm and well perfused  Skin: No ecchymosis, erythema, or ulcers  Psych: AOx3, appropriate affect  Neuro: CNII-XII intact, no focal deficits    Labs:     Lab Results   Component Value Date     02/22/2017    K 4.0 02/22/2017     02/22/2017    CO2 25 02/22/2017    BUN 21 02/22/2017    CREATININE 1.2 02/22/2017    ANIONGAP 7 (L) 02/22/2017     No results found for: HGBA1C  No results found for: BNP, BNPTRIAGEBLO Lab Results   Component Value Date    WBC 2.03 (L) 02/22/2017    HGB 9.5 (L) 02/22/2017    HCT 30.1 (L) 02/22/2017    HCT 28 (L) 07/07/2016    PLT 47 (L) 02/22/2017    GRAN 0.2 (L) 02/22/2017    GRAN 63.0 01/17/2017     Lab Results   Component Value Date    CHOL 110 (L) 07/28/2016    HDL 35 (L) 07/28/2016    LDLCALC 64.6 07/28/2016    TRIG 52 07/28/2016          Imaging:     EF   Date Value Ref Range Status   04/13/2016 55 55 - 65    05/12/2015 60 55 - 65    07/07/2014 60       ECHO:     EKG: Atrial fibrillation with RVR, occasional PVCs, non specific T wave abnormalities  Assessment:     73 yo man with paroxysmal A-fib now with RVR despite rate control and hx of MDS here for evaluation of cardiac risk in anticipation of bone marrow transplant.     Plan:   There are no diagnoses linked to this encounter.      Revised Cardiac Risk Index   High risk surgery (intraperitoneal, intrathoracic, or suprainguinal vascular): No  History of ischemic heart disease: No  History of congestive heart failure: No  History of stroke: No  Insulin dependent diabetes: No  Creatinine > 2: No  0 points, " class I risk, 0.4% risk of cardiac event    Recommendations:   1. Repeat ECHO before initiating BMT   2. Continue current rate control strategy with diltiazem 300 mg and metoprolol succinate 25 mg.   3. If at subsequent office visits, Mr. Kwon's heart rate remains consistently elevated > 100 bpm, increase current dose of metoprolol succinate to 50 mg q 24 H.   4. CHADS-Vasc 2. Per ACC 2017 guidelines for perioperative anticoagulation, there is no need to bridge with heparin if his coumadin is held (given his score of 2).    2014 ACC/AHA Perioperative Guidelines  Known or risk factors for CAD: No  High risk of MACE: No  Functional capacity < 4 METs: No, proceed without further testing     Discussed with Dr. Pace. RTC as needed.     Signed:  Discussed with attending Dr. Pace. Staff recommendations to follow.     Sally Reno MD   PGY1 Internal Medicine   [facility]   Pager [phone]

## 2017-02-24 ENCOUNTER — INFUSION (OUTPATIENT)
Dept: INFUSION THERAPY | Facility: HOSPITAL | Age: 72
End: 2017-02-24
Attending: INTERNAL MEDICINE
Payer: MEDICARE

## 2017-02-24 DIAGNOSIS — D46.9 MDS (MYELODYSPLASTIC SYNDROME): Primary | ICD-10-CM

## 2017-02-24 PROCEDURE — 96401 CHEMO ANTI-NEOPL SQ/IM: CPT

## 2017-02-24 PROCEDURE — 63600175 PHARM REV CODE 636 W HCPCS: Performed by: INTERNAL MEDICINE

## 2017-02-24 RX ORDER — AZACITIDINE 100 MG/1
75 INJECTION, POWDER, LYOPHILIZED, FOR SOLUTION INTRAVENOUS; SUBCUTANEOUS
Status: COMPLETED | OUTPATIENT
Start: 2017-02-24 | End: 2017-02-24

## 2017-02-24 RX ADMIN — AZACITIDINE 160 MG: 100 INJECTION, POWDER, LYOPHILIZED, FOR SOLUTION INTRAVENOUS; SUBCUTANEOUS at 11:02

## 2017-02-24 NOTE — PROGRESS NOTES
I have personally seen and examined the patient. All labs and studies were reviewed. I agree with the fellows findings and plan as above. His heart rate was initially elevated today. If he continues to run > 100, recommend increasing his metoprolol. His overall LVEF has been normal and he is on cardioprotective medications losartan, metoprolol and pravastatin. He has no cardiac contraindication for bone marrow transplant.

## 2017-02-27 ENCOUNTER — INFUSION (OUTPATIENT)
Dept: INFUSION THERAPY | Facility: HOSPITAL | Age: 72
End: 2017-02-27
Attending: INTERNAL MEDICINE
Payer: MEDICARE

## 2017-02-27 VITALS
SYSTOLIC BLOOD PRESSURE: 132 MMHG | RESPIRATION RATE: 18 BRPM | DIASTOLIC BLOOD PRESSURE: 79 MMHG | TEMPERATURE: 98 F | HEART RATE: 60 BPM

## 2017-02-27 DIAGNOSIS — D46.9 MDS (MYELODYSPLASTIC SYNDROME): Primary | ICD-10-CM

## 2017-02-27 PROCEDURE — 63600175 PHARM REV CODE 636 W HCPCS: Performed by: INTERNAL MEDICINE

## 2017-02-27 PROCEDURE — 96401 CHEMO ANTI-NEOPL SQ/IM: CPT

## 2017-02-27 RX ORDER — AZACITIDINE 100 MG/1
75 INJECTION, POWDER, LYOPHILIZED, FOR SOLUTION INTRAVENOUS; SUBCUTANEOUS
Status: COMPLETED | OUTPATIENT
Start: 2017-02-27 | End: 2017-02-27

## 2017-02-27 RX ADMIN — AZACITIDINE 160 MG: 100 INJECTION, POWDER, LYOPHILIZED, FOR SOLUTION INTRAVENOUS; SUBCUTANEOUS at 12:02

## 2017-02-27 NOTE — NURSING
Pt arrived for vidaza D2.  Pt tolerated injections x 3 to right abd.  Discharged to home with appt calendar.

## 2017-03-01 ENCOUNTER — CLINICAL SUPPORT (OUTPATIENT)
Dept: CARDIOLOGY | Facility: CLINIC | Age: 72
End: 2017-03-01
Payer: MEDICARE

## 2017-03-01 ENCOUNTER — INFUSION (OUTPATIENT)
Dept: INFUSION THERAPY | Facility: HOSPITAL | Age: 72
End: 2017-03-01
Attending: INTERNAL MEDICINE
Payer: MEDICARE

## 2017-03-01 VITALS
TEMPERATURE: 98 F | HEART RATE: 96 BPM | RESPIRATION RATE: 18 BRPM | DIASTOLIC BLOOD PRESSURE: 60 MMHG | SYSTOLIC BLOOD PRESSURE: 136 MMHG

## 2017-03-01 DIAGNOSIS — Z01.818 PRE-OP EVALUATION: ICD-10-CM

## 2017-03-01 DIAGNOSIS — I35.0 AORTIC STENOSIS, MODERATE: ICD-10-CM

## 2017-03-01 DIAGNOSIS — I48.0 PAROXYSMAL ATRIAL FIBRILLATION WITH RAPID VENTRICULAR RESPONSE: ICD-10-CM

## 2017-03-01 DIAGNOSIS — D46.9 MDS (MYELODYSPLASTIC SYNDROME): Primary | ICD-10-CM

## 2017-03-01 LAB
AORTIC VALVE STENOSIS: ABNORMAL
DIASTOLIC DYSFUNCTION: YES
ESTIMATED PA SYSTOLIC PRESSURE: 46.44
MITRAL VALVE MOBILITY: NORMAL
MITRAL VALVE REGURGITATION: ABNORMAL
RETIRED EF AND QEF - SEE NOTES: 55 (ref 55–65)

## 2017-03-01 PROCEDURE — 93306 TTE W/DOPPLER COMPLETE: CPT | Mod: S$GLB,,, | Performed by: INTERNAL MEDICINE

## 2017-03-01 PROCEDURE — 96401 CHEMO ANTI-NEOPL SQ/IM: CPT

## 2017-03-01 PROCEDURE — 0399T 2D ECHO WITH COLOR FLOW DOPPLER: CPT | Mod: S$GLB,,, | Performed by: INTERNAL MEDICINE

## 2017-03-01 PROCEDURE — 63600175 PHARM REV CODE 636 W HCPCS: Performed by: INTERNAL MEDICINE

## 2017-03-01 RX ORDER — AZACITIDINE 100 MG/1
75 INJECTION, POWDER, LYOPHILIZED, FOR SOLUTION INTRAVENOUS; SUBCUTANEOUS
Status: COMPLETED | OUTPATIENT
Start: 2017-03-01 | End: 2017-03-01

## 2017-03-01 RX ADMIN — AZACITIDINE 160 MG: 100 INJECTION, POWDER, LYOPHILIZED, FOR SOLUTION INTRAVENOUS; SUBCUTANEOUS at 12:03

## 2017-03-01 NOTE — NURSING
Pt arrived for Vidaza #3/7.  Pt tolerated injections X3 to left side of abd.  Discharged with appt calendar.  Pt states his wife is in the hospital and he is staying with Her.

## 2017-03-02 ENCOUNTER — INFUSION (OUTPATIENT)
Dept: INFUSION THERAPY | Facility: HOSPITAL | Age: 72
End: 2017-03-02
Attending: INTERNAL MEDICINE
Payer: MEDICARE

## 2017-03-02 VITALS
HEART RATE: 81 BPM | SYSTOLIC BLOOD PRESSURE: 144 MMHG | DIASTOLIC BLOOD PRESSURE: 65 MMHG | TEMPERATURE: 100 F | RESPIRATION RATE: 18 BRPM

## 2017-03-02 DIAGNOSIS — D46.9 MDS (MYELODYSPLASTIC SYNDROME): Primary | ICD-10-CM

## 2017-03-02 PROCEDURE — 63600175 PHARM REV CODE 636 W HCPCS: Performed by: INTERNAL MEDICINE

## 2017-03-02 PROCEDURE — 96401 CHEMO ANTI-NEOPL SQ/IM: CPT

## 2017-03-02 RX ORDER — AZACITIDINE 100 MG/1
75 INJECTION, POWDER, LYOPHILIZED, FOR SOLUTION INTRAVENOUS; SUBCUTANEOUS
Status: COMPLETED | OUTPATIENT
Start: 2017-03-02 | End: 2017-03-02

## 2017-03-02 RX ADMIN — AZACITIDINE 160 MG: 100 INJECTION, POWDER, LYOPHILIZED, FOR SOLUTION INTRAVENOUS; SUBCUTANEOUS at 01:03

## 2017-03-02 NOTE — NURSING
Pt arrived for Vidaza D4.  Pt tolerated injections x3 to right abd.  Discharged to home and will RTC tomorrow.

## 2017-03-03 ENCOUNTER — DOCUMENTATION ONLY (OUTPATIENT)
Dept: HEMATOLOGY/ONCOLOGY | Facility: CLINIC | Age: 72
End: 2017-03-03

## 2017-03-03 ENCOUNTER — INFUSION (OUTPATIENT)
Dept: INFUSION THERAPY | Facility: HOSPITAL | Age: 72
End: 2017-03-03
Attending: INTERNAL MEDICINE
Payer: MEDICARE

## 2017-03-03 VITALS
HEART RATE: 98 BPM | RESPIRATION RATE: 18 BRPM | DIASTOLIC BLOOD PRESSURE: 57 MMHG | SYSTOLIC BLOOD PRESSURE: 129 MMHG | TEMPERATURE: 99 F

## 2017-03-03 DIAGNOSIS — D46.9 MDS (MYELODYSPLASTIC SYNDROME): Primary | ICD-10-CM

## 2017-03-03 DIAGNOSIS — R11.0 NAUSEA: Primary | ICD-10-CM

## 2017-03-03 PROCEDURE — 96401 CHEMO ANTI-NEOPL SQ/IM: CPT

## 2017-03-03 PROCEDURE — 63600175 PHARM REV CODE 636 W HCPCS: Performed by: INTERNAL MEDICINE

## 2017-03-03 PROCEDURE — 25000003 PHARM REV CODE 250: Performed by: INTERNAL MEDICINE

## 2017-03-03 RX ORDER — ONDANSETRON HYDROCHLORIDE 8 MG/1
8 TABLET, FILM COATED ORAL EVERY 8 HOURS PRN
Qty: 30 TABLET | Refills: 2 | Status: SHIPPED | OUTPATIENT
Start: 2017-03-03 | End: 2017-06-22

## 2017-03-03 RX ORDER — AZACITIDINE 100 MG/1
75 INJECTION, POWDER, LYOPHILIZED, FOR SOLUTION INTRAVENOUS; SUBCUTANEOUS
Status: COMPLETED | OUTPATIENT
Start: 2017-03-03 | End: 2017-03-03

## 2017-03-03 RX ORDER — ONDANSETRON 4 MG/1
4 TABLET, ORALLY DISINTEGRATING ORAL
Status: COMPLETED | OUTPATIENT
Start: 2017-03-03 | End: 2017-03-03

## 2017-03-03 RX ADMIN — ONDANSETRON 4 MG: 4 TABLET, ORALLY DISINTEGRATING ORAL at 01:03

## 2017-03-03 RX ADMIN — AZACITIDINE 160 MG: 100 INJECTION, POWDER, LYOPHILIZED, FOR SOLUTION INTRAVENOUS; SUBCUTANEOUS at 01:03

## 2017-03-03 NOTE — NURSING
Pt arrived for vidaza D5.  Pt states having nausea and did have dry heaves last night.  Does not have any nausea medications.  Message sent to Dr. Dior GARCIA, after several calls.  Dr. Chino ok'd to give pt a zofran here and Ana Lilia Mayo NP is calling in zofran to his pharmacy.  Information on zofran explained to pt, states understanding.  Zofran SL given and tolerated.  Pt tolerated injections x3 to left side of abd.  Pt's states his wife is still in the hospital recovering from bowel obstruction surgery and he states he is exhausted.  Pt does state some family members are coming to help.  Instructed pt to go home and rest, states he will.  Discharged to home.

## 2017-03-03 NOTE — PROGRESS NOTES
Patient here for treatment. Chemo nurse states patient c/o mild nausea and has no oral antiemetic prescribed.   Prescription for Zofran sent to pharmacy.   JOE

## 2017-03-06 ENCOUNTER — HOSPITAL ENCOUNTER (INPATIENT)
Facility: HOSPITAL | Age: 72
LOS: 12 days | Discharge: HOME HEALTH | DRG: 871 | End: 2017-03-18
Attending: EMERGENCY MEDICINE | Admitting: INTERNAL MEDICINE
Payer: MEDICARE

## 2017-03-06 ENCOUNTER — TELEPHONE (OUTPATIENT)
Dept: HEMATOLOGY/ONCOLOGY | Facility: CLINIC | Age: 72
End: 2017-03-06

## 2017-03-06 DIAGNOSIS — A41.9 SEPSIS: ICD-10-CM

## 2017-03-06 DIAGNOSIS — R05.9 COUGH: ICD-10-CM

## 2017-03-06 DIAGNOSIS — J86.9 EMPYEMA OF RIGHT PLEURAL SPACE: ICD-10-CM

## 2017-03-06 DIAGNOSIS — J18.9 HCAP (HEALTHCARE-ASSOCIATED PNEUMONIA): ICD-10-CM

## 2017-03-06 DIAGNOSIS — I10 HTN (HYPERTENSION), BENIGN: Primary | ICD-10-CM

## 2017-03-06 DIAGNOSIS — I48.0 PAROXYSMAL ATRIAL FIBRILLATION: ICD-10-CM

## 2017-03-06 DIAGNOSIS — E78.5 DYSLIPIDEMIA: ICD-10-CM

## 2017-03-06 DIAGNOSIS — I35.0 AORTIC STENOSIS, MODERATE: ICD-10-CM

## 2017-03-06 DIAGNOSIS — I51.89 DIASTOLIC DYSFUNCTION: ICD-10-CM

## 2017-03-06 DIAGNOSIS — I27.20 PULMONARY HYPERTENSION: ICD-10-CM

## 2017-03-06 DIAGNOSIS — J96.01 ACUTE HYPOXEMIC RESPIRATORY FAILURE: ICD-10-CM

## 2017-03-06 PROBLEM — N17.9 AKI (ACUTE KIDNEY INJURY): Status: ACTIVE | Noted: 2017-03-06

## 2017-03-06 PROBLEM — D72.829 LEUKOCYTOSIS: Status: ACTIVE | Noted: 2017-03-06

## 2017-03-06 LAB
ALBUMIN FLD-MCNC: 1.3 G/DL
ALBUMIN SERPL BCP-MCNC: 2.8 G/DL
ALLENS TEST: ABNORMAL
ALP SERPL-CCNC: 79 U/L
ALT SERPL W/O P-5'-P-CCNC: 24 U/L
ANION GAP SERPL CALC-SCNC: 10 MMOL/L
ANION GAP SERPL CALC-SCNC: 13 MMOL/L
ANION GAP SERPL CALC-SCNC: 9 MMOL/L
ANISOCYTOSIS BLD QL SMEAR: ABNORMAL
ANISOCYTOSIS BLD QL SMEAR: SLIGHT
APPEARANCE FLD: CLEAR
AST SERPL-CCNC: 45 U/L
BACTERIA #/AREA URNS AUTO: ABNORMAL /HPF
BASOPHILS # BLD AUTO: ABNORMAL K/UL
BASOPHILS # BLD AUTO: ABNORMAL K/UL
BASOPHILS NFR BLD: 0 %
BASOPHILS NFR BLD: 0 %
BILIRUB SERPL-MCNC: 1.3 MG/DL
BILIRUB UR QL STRIP: NEGATIVE
BILIRUB UR QL STRIP: NEGATIVE
BODY FLD TYPE: NORMAL
BODY FLUID SOURCE, LDH: NORMAL
BUN SERPL-MCNC: 37 MG/DL
BUN SERPL-MCNC: 40 MG/DL
BUN SERPL-MCNC: 40 MG/DL
BURR CELLS BLD QL SMEAR: ABNORMAL
CALCIUM SERPL-MCNC: 7.8 MG/DL
CALCIUM SERPL-MCNC: 8 MG/DL
CALCIUM SERPL-MCNC: 9 MG/DL
CHLORIDE SERPL-SCNC: 89 MMOL/L
CHLORIDE SERPL-SCNC: 96 MMOL/L
CHLORIDE SERPL-SCNC: 97 MMOL/L
CLARITY UR REFRACT.AUTO: ABNORMAL
CLARITY UR REFRACT.AUTO: ABNORMAL
CO2 SERPL-SCNC: 17 MMOL/L
CO2 SERPL-SCNC: 18 MMOL/L
CO2 SERPL-SCNC: 20 MMOL/L
COLOR FLD: YELLOW
COLOR UR AUTO: YELLOW
COLOR UR AUTO: YELLOW
CREAT SERPL-MCNC: 1.8 MG/DL
CREAT SERPL-MCNC: 2 MG/DL
CREAT SERPL-MCNC: 2.2 MG/DL
CREAT UR-MCNC: 197 MG/DL
DACRYOCYTES BLD QL SMEAR: ABNORMAL
DELSYS: ABNORMAL
DIFFERENTIAL METHOD: ABNORMAL
DIFFERENTIAL METHOD: ABNORMAL
EOSINOPHIL # BLD AUTO: ABNORMAL K/UL
EOSINOPHIL # BLD AUTO: ABNORMAL K/UL
EOSINOPHIL NFR BLD: 0 %
EOSINOPHIL NFR BLD: 0 %
ERYTHROCYTE [DISTWIDTH] IN BLOOD BY AUTOMATED COUNT: 19.3 %
ERYTHROCYTE [DISTWIDTH] IN BLOOD BY AUTOMATED COUNT: 19.8 %
EST. GFR  (AFRICAN AMERICAN): 33.4 ML/MIN/1.73 M^2
EST. GFR  (AFRICAN AMERICAN): 37.4 ML/MIN/1.73 M^2
EST. GFR  (AFRICAN AMERICAN): 42.5 ML/MIN/1.73 M^2
EST. GFR  (NON AFRICAN AMERICAN): 28.9 ML/MIN/1.73 M^2
EST. GFR  (NON AFRICAN AMERICAN): 32.4 ML/MIN/1.73 M^2
EST. GFR  (NON AFRICAN AMERICAN): 36.8 ML/MIN/1.73 M^2
FIO2: 45
FLOW: 6
FLUAV AG SPEC QL IA: NEGATIVE
FLUBV AG SPEC QL IA: NEGATIVE
GIANT PLATELETS BLD QL SMEAR: PRESENT
GLUCOSE FLD-MCNC: <5 MG/DL
GLUCOSE SERPL-MCNC: 114 MG/DL
GLUCOSE SERPL-MCNC: 128 MG/DL
GLUCOSE SERPL-MCNC: 146 MG/DL
GLUCOSE UR QL STRIP: NEGATIVE
GLUCOSE UR QL STRIP: NEGATIVE
GRAN CASTS UR QL COMP ASSIST: 36 /LPF
HCO3 UR-SCNC: 16.4 MMOL/L (ref 24–28)
HCT VFR BLD AUTO: 32.8 %
HCT VFR BLD AUTO: 33.4 %
HGB BLD-MCNC: 10.2 G/DL
HGB BLD-MCNC: 10.2 G/DL
HGB UR QL STRIP: ABNORMAL
HGB UR QL STRIP: ABNORMAL
HYALINE CASTS UR QL AUTO: 23 /LPF
HYPOCHROMIA BLD QL SMEAR: ABNORMAL
HYPOCHROMIA BLD QL SMEAR: ABNORMAL
INR PPP: 1.6
KETONES UR QL STRIP: NEGATIVE
KETONES UR QL STRIP: NEGATIVE
LACTATE SERPL-SCNC: 2.1 MMOL/L
LACTATE SERPL-SCNC: 2.2 MMOL/L
LACTATE SERPL-SCNC: 4.2 MMOL/L
LDH FLD L TO P-CCNC: 4768 U/L
LEUKOCYTE ESTERASE UR QL STRIP: NEGATIVE
LEUKOCYTE ESTERASE UR QL STRIP: NEGATIVE
LYMPHOCYTES # BLD AUTO: ABNORMAL K/UL
LYMPHOCYTES # BLD AUTO: ABNORMAL K/UL
LYMPHOCYTES NFR BLD: 4 %
LYMPHOCYTES NFR BLD: 5 %
LYMPHOCYTES NFR FLD MANUAL: 2 %
MAGNESIUM SERPL-MCNC: 2 MG/DL
MCH RBC QN AUTO: 27.6 PG
MCH RBC QN AUTO: 28 PG
MCHC RBC AUTO-ENTMCNC: 30.5 %
MCHC RBC AUTO-ENTMCNC: 31.1 %
MCV RBC AUTO: 90 FL
MCV RBC AUTO: 91 FL
MICROSCOPIC COMMENT: ABNORMAL
MODE: ABNORMAL
MONOCYTES # BLD AUTO: ABNORMAL K/UL
MONOCYTES # BLD AUTO: ABNORMAL K/UL
MONOCYTES NFR BLD: 0.5 %
MONOCYTES NFR BLD: 3 %
MONOS+MACROS NFR FLD MANUAL: 2 %
MYELOCYTES NFR BLD MANUAL: 0.5 %
NEUTROPHILS NFR BLD: 92 %
NEUTROPHILS NFR BLD: 94.5 %
NEUTROPHILS NFR FLD MANUAL: 96 %
NEUTS BAND NFR BLD MANUAL: 0.5 %
NITRITE UR QL STRIP: NEGATIVE
NITRITE UR QL STRIP: NEGATIVE
NRBC BLD-RTO: ABNORMAL /100 WBC
OSMOLALITY SERPL: 267 MOSM/KG
OSMOLALITY UR: 624 MOSM/KG
OVALOCYTES BLD QL SMEAR: ABNORMAL
OVALOCYTES BLD QL SMEAR: ABNORMAL
PCO2 BLDA: 25.6 MMHG (ref 35–45)
PH SMN: 7.42 [PH] (ref 7.35–7.45)
PH UR STRIP: 6 [PH] (ref 5–8)
PH UR STRIP: 6 [PH] (ref 5–8)
PHOSPHATE SERPL-MCNC: 3 MG/DL
PLATELET # BLD AUTO: 325 K/UL
PLATELET # BLD AUTO: 372 K/UL
PLATELET BLD QL SMEAR: ABNORMAL
PMV BLD AUTO: ABNORMAL FL
PMV BLD AUTO: ABNORMAL FL
PO2 BLDA: 67 MMHG (ref 80–100)
POC BE: -8 MMOL/L
POC SATURATED O2: 94 % (ref 95–100)
POC TCO2: 17 MMOL/L (ref 23–27)
POIKILOCYTOSIS BLD QL SMEAR: SLIGHT
POIKILOCYTOSIS BLD QL SMEAR: SLIGHT
POLYCHROMASIA BLD QL SMEAR: ABNORMAL
POLYCHROMASIA BLD QL SMEAR: ABNORMAL
POTASSIUM SERPL-SCNC: 4.6 MMOL/L
POTASSIUM SERPL-SCNC: 4.7 MMOL/L
POTASSIUM SERPL-SCNC: 4.8 MMOL/L
PROT SERPL-MCNC: 7.8 G/DL
PROT UR QL STRIP: ABNORMAL
PROT UR QL STRIP: ABNORMAL
PROTHROMBIN TIME: 16.2 SEC
RBC # BLD AUTO: 3.64 M/UL
RBC # BLD AUTO: 3.69 M/UL
RBC #/AREA URNS AUTO: 1 /HPF (ref 0–4)
SAMPLE: ABNORMAL
SITE: ABNORMAL
SODIUM SERPL-SCNC: 122 MMOL/L
SODIUM SERPL-SCNC: 122 MMOL/L
SODIUM SERPL-SCNC: 125 MMOL/L
SODIUM UR-SCNC: <20 MMOL/L
SP GR UR STRIP: 1.02 (ref 1–1.03)
SP GR UR STRIP: 1.02 (ref 1–1.03)
SP02: 93
SPECIMEN SOURCE: NORMAL
SQUAMOUS #/AREA URNS AUTO: 4 /HPF
URN SPEC COLLECT METH UR: ABNORMAL
URN SPEC COLLECT METH UR: ABNORMAL
UROBILINOGEN UR STRIP-ACNC: 1 EU/DL
UROBILINOGEN UR STRIP-ACNC: 1 EU/DL
VANCOMYCIN SERPL-MCNC: 6.4 UG/ML
WBC # BLD AUTO: 41.89 K/UL
WBC # BLD AUTO: 44.54 K/UL
WBC # FLD: 688 /CU MM
WBC #/AREA URNS AUTO: 6 /HPF (ref 0–5)

## 2017-03-06 PROCEDURE — 96365 THER/PROPH/DIAG IV INF INIT: CPT

## 2017-03-06 PROCEDURE — 99291 CRITICAL CARE FIRST HOUR: CPT | Mod: 25

## 2017-03-06 PROCEDURE — 99900035 HC TECH TIME PER 15 MIN (STAT)

## 2017-03-06 PROCEDURE — 87205 SMEAR GRAM STAIN: CPT | Mod: 91

## 2017-03-06 PROCEDURE — 87088 URINE BACTERIA CULTURE: CPT

## 2017-03-06 PROCEDURE — 25000003 PHARM REV CODE 250: Performed by: STUDENT IN AN ORGANIZED HEALTH CARE EDUCATION/TRAINING PROGRAM

## 2017-03-06 PROCEDURE — 87040 BLOOD CULTURE FOR BACTERIA: CPT

## 2017-03-06 PROCEDURE — 82042 OTHER SOURCE ALBUMIN QUAN EA: CPT

## 2017-03-06 PROCEDURE — 20000000 HC ICU ROOM

## 2017-03-06 PROCEDURE — 96361 HYDRATE IV INFUSION ADD-ON: CPT | Mod: 59

## 2017-03-06 PROCEDURE — 83930 ASSAY OF BLOOD OSMOLALITY: CPT

## 2017-03-06 PROCEDURE — 80048 BASIC METABOLIC PNL TOTAL CA: CPT | Mod: 91

## 2017-03-06 PROCEDURE — 80053 COMPREHEN METABOLIC PANEL: CPT

## 2017-03-06 PROCEDURE — 87186 SC STD MICRODIL/AGAR DIL: CPT

## 2017-03-06 PROCEDURE — 63600175 PHARM REV CODE 636 W HCPCS

## 2017-03-06 PROCEDURE — 99291 CRITICAL CARE FIRST HOUR: CPT | Mod: ,,, | Performed by: EMERGENCY MEDICINE

## 2017-03-06 PROCEDURE — 93005 ELECTROCARDIOGRAM TRACING: CPT

## 2017-03-06 PROCEDURE — 25000003 PHARM REV CODE 250: Performed by: PHYSICIAN ASSISTANT

## 2017-03-06 PROCEDURE — 89051 BODY FLUID CELL COUNT: CPT

## 2017-03-06 PROCEDURE — 83935 ASSAY OF URINE OSMOLALITY: CPT

## 2017-03-06 PROCEDURE — 96375 TX/PRO/DX INJ NEW DRUG ADDON: CPT

## 2017-03-06 PROCEDURE — 25000003 PHARM REV CODE 250: Performed by: HOSPITALIST

## 2017-03-06 PROCEDURE — 83605 ASSAY OF LACTIC ACID: CPT

## 2017-03-06 PROCEDURE — 80074 ACUTE HEPATITIS PANEL: CPT

## 2017-03-06 PROCEDURE — 87070 CULTURE OTHR SPECIMN AEROBIC: CPT | Mod: 59

## 2017-03-06 PROCEDURE — 88112 CYTOPATH CELL ENHANCE TECH: CPT | Mod: 26,,, | Performed by: PATHOLOGY

## 2017-03-06 PROCEDURE — 27000221 HC OXYGEN, UP TO 24 HOURS

## 2017-03-06 PROCEDURE — 87070 CULTURE OTHR SPECIMN AEROBIC: CPT

## 2017-03-06 PROCEDURE — 82945 GLUCOSE OTHER FLUID: CPT

## 2017-03-06 PROCEDURE — 82803 BLOOD GASES ANY COMBINATION: CPT

## 2017-03-06 PROCEDURE — 86703 HIV-1/HIV-2 1 RESULT ANTBDY: CPT

## 2017-03-06 PROCEDURE — 88305 TISSUE EXAM BY PATHOLOGIST: CPT | Mod: 26,,, | Performed by: PATHOLOGY

## 2017-03-06 PROCEDURE — 83735 ASSAY OF MAGNESIUM: CPT

## 2017-03-06 PROCEDURE — 96367 TX/PROPH/DG ADDL SEQ IV INF: CPT

## 2017-03-06 PROCEDURE — 82570 ASSAY OF URINE CREATININE: CPT

## 2017-03-06 PROCEDURE — 83615 LACTATE (LD) (LDH) ENZYME: CPT

## 2017-03-06 PROCEDURE — 85610 PROTHROMBIN TIME: CPT

## 2017-03-06 PROCEDURE — 36600 WITHDRAWAL OF ARTERIAL BLOOD: CPT

## 2017-03-06 PROCEDURE — 94660 CPAP INITIATION&MGMT: CPT

## 2017-03-06 PROCEDURE — 93010 ELECTROCARDIOGRAM REPORT: CPT | Mod: ,,, | Performed by: INTERNAL MEDICINE

## 2017-03-06 PROCEDURE — 80202 ASSAY OF VANCOMYCIN: CPT

## 2017-03-06 PROCEDURE — 0W993ZZ DRAINAGE OF RIGHT PLEURAL CAVITY, PERCUTANEOUS APPROACH: ICD-10-PCS | Performed by: INTERNAL MEDICINE

## 2017-03-06 PROCEDURE — 96366 THER/PROPH/DIAG IV INF ADDON: CPT

## 2017-03-06 PROCEDURE — 88305 TISSUE EXAM BY PATHOLOGIST: CPT | Performed by: PATHOLOGY

## 2017-03-06 PROCEDURE — 99291 CRITICAL CARE FIRST HOUR: CPT | Mod: GC,,, | Performed by: INTERNAL MEDICINE

## 2017-03-06 PROCEDURE — 81001 URINALYSIS AUTO W/SCOPE: CPT

## 2017-03-06 PROCEDURE — 63600175 PHARM REV CODE 636 W HCPCS: Performed by: EMERGENCY MEDICINE

## 2017-03-06 PROCEDURE — 87077 CULTURE AEROBIC IDENTIFY: CPT | Mod: 59

## 2017-03-06 PROCEDURE — 27000190 HC CPAP FULL FACE MASK W/VALVE

## 2017-03-06 PROCEDURE — 87449 NOS EACH ORGANISM AG IA: CPT

## 2017-03-06 PROCEDURE — 85007 BL SMEAR W/DIFF WBC COUNT: CPT | Mod: 91

## 2017-03-06 PROCEDURE — 84300 ASSAY OF URINE SODIUM: CPT

## 2017-03-06 PROCEDURE — 85027 COMPLETE CBC AUTOMATED: CPT | Mod: 91

## 2017-03-06 PROCEDURE — 87400 INFLUENZA A/B EACH AG IA: CPT | Mod: 59

## 2017-03-06 PROCEDURE — 63600175 PHARM REV CODE 636 W HCPCS: Performed by: STUDENT IN AN ORGANIZED HEALTH CARE EDUCATION/TRAINING PROGRAM

## 2017-03-06 PROCEDURE — 63600175 PHARM REV CODE 636 W HCPCS: Performed by: HOSPITALIST

## 2017-03-06 PROCEDURE — 83605 ASSAY OF LACTIC ACID: CPT | Mod: 91

## 2017-03-06 PROCEDURE — 87086 URINE CULTURE/COLONY COUNT: CPT

## 2017-03-06 PROCEDURE — 84100 ASSAY OF PHOSPHORUS: CPT

## 2017-03-06 PROCEDURE — 25000003 PHARM REV CODE 250: Performed by: EMERGENCY MEDICINE

## 2017-03-06 RX ORDER — CITALOPRAM 40 MG/1
40 TABLET, FILM COATED ORAL DAILY
Status: DISCONTINUED | OUTPATIENT
Start: 2017-03-07 | End: 2017-03-18 | Stop reason: HOSPADM

## 2017-03-06 RX ORDER — SODIUM CHLORIDE 0.9 % (FLUSH) 0.9 %
3 SYRINGE (ML) INJECTION EVERY 8 HOURS
Status: DISCONTINUED | OUTPATIENT
Start: 2017-03-06 | End: 2017-03-18 | Stop reason: HOSPADM

## 2017-03-06 RX ORDER — LIDOCAINE HYDROCHLORIDE 10 MG/ML
1 INJECTION INFILTRATION; PERINEURAL ONCE
Status: DISCONTINUED | OUTPATIENT
Start: 2017-03-06 | End: 2017-03-11

## 2017-03-06 RX ORDER — FENTANYL CITRATE 50 UG/ML
25 INJECTION, SOLUTION INTRAMUSCULAR; INTRAVENOUS EVERY 6 HOURS PRN
Status: DISCONTINUED | OUTPATIENT
Start: 2017-03-06 | End: 2017-03-07

## 2017-03-06 RX ORDER — DILTIAZEM HYDROCHLORIDE 5 MG/ML
10 INJECTION INTRAVENOUS
Status: COMPLETED | OUTPATIENT
Start: 2017-03-06 | End: 2017-03-06

## 2017-03-06 RX ORDER — BENZONATATE 100 MG/1
100 CAPSULE ORAL
Status: COMPLETED | OUTPATIENT
Start: 2017-03-06 | End: 2017-03-06

## 2017-03-06 RX ORDER — FENTANYL CITRATE 50 UG/ML
25 INJECTION, SOLUTION INTRAMUSCULAR; INTRAVENOUS ONCE AS NEEDED
Status: COMPLETED | OUTPATIENT
Start: 2017-03-07 | End: 2017-03-06

## 2017-03-06 RX ORDER — ACETAMINOPHEN 325 MG/1
650 TABLET ORAL
Status: COMPLETED | OUTPATIENT
Start: 2017-03-06 | End: 2017-03-06

## 2017-03-06 RX ORDER — CEFEPIME HYDROCHLORIDE 2 G/50ML
2 INJECTION, SOLUTION INTRAVENOUS
Status: COMPLETED | OUTPATIENT
Start: 2017-03-06 | End: 2017-03-06

## 2017-03-06 RX ORDER — SODIUM CHLORIDE 9 MG/ML
INJECTION, SOLUTION INTRAVENOUS CONTINUOUS
Status: DISCONTINUED | OUTPATIENT
Start: 2017-03-06 | End: 2017-03-06

## 2017-03-06 RX ADMIN — SODIUM CHLORIDE 1000 ML: 0.9 INJECTION, SOLUTION INTRAVENOUS at 11:03

## 2017-03-06 RX ADMIN — PIPERACILLIN AND TAZOBACTAM 4.5 G: 4; .5 INJECTION, POWDER, LYOPHILIZED, FOR SOLUTION INTRAVENOUS; PARENTERAL at 09:03

## 2017-03-06 RX ADMIN — CEFEPIME HYDROCHLORIDE 2 G: 2 INJECTION, SOLUTION INTRAVENOUS at 12:03

## 2017-03-06 RX ADMIN — BENZONATATE 100 MG: 100 CAPSULE ORAL at 11:03

## 2017-03-06 RX ADMIN — SODIUM CHLORIDE 1000 ML: 0.9 INJECTION, SOLUTION INTRAVENOUS at 01:03

## 2017-03-06 RX ADMIN — DILTIAZEM HYDROCHLORIDE 10 MG: 5 INJECTION INTRAVENOUS at 12:03

## 2017-03-06 RX ADMIN — PIPERACILLIN AND TAZOBACTAM 4.5 G: 4; .5 INJECTION, POWDER, LYOPHILIZED, FOR SOLUTION INTRAVENOUS; PARENTERAL at 02:03

## 2017-03-06 RX ADMIN — SODIUM CHLORIDE 1000 ML: 0.9 INJECTION, SOLUTION INTRAVENOUS at 04:03

## 2017-03-06 RX ADMIN — Medication 1000 MG: at 11:03

## 2017-03-06 RX ADMIN — Medication 3 ML: at 09:03

## 2017-03-06 RX ADMIN — Medication 3 ML: at 02:03

## 2017-03-06 RX ADMIN — FENTANYL CITRATE 25 MCG: 50 INJECTION INTRAMUSCULAR; INTRAVENOUS at 10:03

## 2017-03-06 RX ADMIN — FENTANYL CITRATE 25 MCG: 50 INJECTION INTRAMUSCULAR; INTRAVENOUS at 11:03

## 2017-03-06 RX ADMIN — ACETAMINOPHEN 650 MG: 325 TABLET ORAL at 11:03

## 2017-03-06 NOTE — ED NOTES
Patient returned from CT. Patient placed on cardiac monitor, continuous pulse ox, cycling blood pressures. Side rails up x2, call light within reach, bed in low position with brakes engaged.

## 2017-03-06 NOTE — TELEPHONE ENCOUNTER
Returned call and spoke with patient.  States he had nausea with emesis approximately 3 days ago and he feels he may have cracked a rib when he was vomiting because he has had severe pain ever since.  Pain is located on right side near ribs toward the back, he states pain is at 12/10,  He also states he is experiencing shortness of breath .  I asked if these symptoms are new for him and he replied yes, he is still having nausea with  dry heaves.  Recommended he come to ED for further evaluation due to worsening pain and increased shortness of breath.  Informed him I would cancel his Vidaza for today and inform Dr Rader of his symptoms.  Patient was agreeable .  Patient will go to [facility].    Message routed to Dr Rader.

## 2017-03-06 NOTE — ASSESSMENT & PLAN NOTE
- CXR concerning for right lower lob PNA  - Given history of recent hospitalization/procedure concern for HCAP   - Given 2.5L NaCl per sepsis protocol   - Will cover broadly, Zosyn and Vanc

## 2017-03-06 NOTE — PROGRESS NOTES
Patient admitted to unit from ED and was placed on CPAP 10/50%.  Patient tolerating well. Will continue to monitor.

## 2017-03-06 NOTE — TREATMENT PLAN
Bedside ultrasound     Ultrasound performed at bedside of pleural space. Good pocket not identified on ultrasound and will need a CT guided pigtail drain. IR will be consulted for placement in am.

## 2017-03-06 NOTE — ED TRIAGE NOTES
"Pt states he has had 6 sessions of chemo, the last being Friday. Pt states he began with fever today of 100.3, denies N/V since Friday. Pt reports pain to lungs and back and states "I probably pulled a muscle back there". Pt reports productive cough with brown mucus and states "my urine is very dark". Pt reports dizziness and weakness in the morning.   "

## 2017-03-06 NOTE — ASSESSMENT & PLAN NOTE
- Creatine 2.2 from baseline of 1.2  - Likely prerenal given presentation  - Given IV hydration, will continue to monitor

## 2017-03-06 NOTE — ED NOTES
LOC: The patient is awake, alert and aware of environment with an appropriate affect, the patient is oriented x 3 and speaking appropriately.  APPEARANCE: Patient resting comfortably and in no acute distress, patient is clean and well groomed  SKIN: The skin is warm and dry, color consistent with ethnicity, patient has normal skin turgor and moist mucus membranes, skin intact, no breakdown or brusing noted.  MUSCULOSKELETAL: Patient moving all extremities well, no obvious swelling or deformities noted.   RESPIRATORY: Pt has coarse breath sounds to anterior upper lobes with expiratory wheezes to middle lobes and diminished breath sounds to lower.   CARDIAC: Patient has no peripheral edema noted, capillary refill < 3 seconds. No complaints of chest pain   ABDOMEN: Soft and non tender to palpation, no distention noted. Bowel sounds present x 4. Reports N/V on Friday post chemo which has resolved.   NEUROLOGIC: PERRL, *3mm bilaterally, eyes open spontaneously, behavior appropriate to situation, follows commands, facial expression symmetrical, bilateral hand grasp equal and even, purposeful motor response noted, normal sensation in all extremities when touched with a finger.

## 2017-03-06 NOTE — SUBJECTIVE & OBJECTIVE
Past Medical History:   Diagnosis Date    Aortic valve stenosis, mild     secondary to bicuspid aortic alve    Atrial fibrillation     Carotid artery disease     Left CEA 4/9/13    Depression     DJD (degenerative joint disease)     H/O echocardiogram 04/13/2016    EF 55-60%. Diastolic dysfunction. PASP 39. mod AS with JEFF 1.18    History of psychiatric hospitalization     [facility] 2014, [facility] 1993    Hx of psychiatric care     Hyperlipidemia     Hypertension     MDS (myelodysplastic syndrome)     Pancytopenia     Psychiatric problem     Therapy     [facility]        Past Surgical History:   Procedure Laterality Date    BONE MARROW BIOPSY  08/29/2016    CAROTID ENDARTERECTOMY Left     Inguinal hernia      Left    KNEE SURGERY      Right x2    neck fusion      TONSILLECTOMY         Review of patient's allergies indicates:   Allergen Reactions    Lipitor [atorvastatin]      Muscle pain    Lisinopril Edema     Cheek swelling       Family History     Problem Relation (Age of Onset)    Hyperlipidemia Brother        Social History Main Topics    Smoking status: Never Smoker    Smokeless tobacco: Never Used    Alcohol use No    Drug use: No    Sexual activity: Yes     Partners: Female      Review of Systems   Constitutional: Positive for chills, fatigue and fever.   HENT: Negative for congestion, hearing loss and sinus pressure.    Eyes: Negative for pain and visual disturbance.   Respiratory: Positive for cough, chest tightness and shortness of breath.    Cardiovascular: Positive for chest pain (Right sided, radiating to back, stabbing ). Negative for palpitations.   Gastrointestinal: Positive for nausea and vomiting. Negative for abdominal pain, blood in stool, constipation and diarrhea.   Genitourinary: Negative for dysuria, frequency and urgency.   Musculoskeletal: Positive for arthralgias and back pain (Right back ). Negative for myalgias.   Skin: Negative for  color change and rash.   Neurological: Positive for light-headedness. Negative for dizziness, seizures, syncope and headaches.   Hematological: Negative for adenopathy. Does not bruise/bleed easily.   Psychiatric/Behavioral: Negative for confusion, self-injury and sleep disturbance. The patient is not nervous/anxious.      Objective:     Vital Signs (Most Recent):  Temp: 98.9 °F (37.2 °C) (03/06/17 1318)  Pulse: (!) 118 (03/06/17 1423)  Resp: (!) 26 (03/06/17 1423)  BP: (!) 109/57 (03/06/17 1423)  SpO2: 99 % (03/06/17 1423) Vital Signs (24h Range):  Temp:  [98.8 °F (37.1 °C)-100.3 °F (37.9 °C)] 98.9 °F (37.2 °C)  Pulse:  [118-145] 118  Resp:  [24-37] 26  SpO2:  [91 %-99 %] 99 %  BP: (109-163)/(57-84) 109/57   Weight: 86.2 kg (190 lb)  Body mass index is 26.5 kg/(m^2).      Intake/Output Summary (Last 24 hours) at 03/06/17 1449  Last data filed at 03/06/17 1330   Gross per 24 hour   Intake                0 ml   Output               90 ml   Net              -90 ml       Physical Exam   Constitutional: He is oriented to person, place, and time. He appears well-developed and well-nourished. He has a sickly appearance.   HENT:   Head: Normocephalic and atraumatic.   Right Ear: External ear normal.   Left Ear: External ear normal.   Nose: Nose normal.   Mouth/Throat: Oropharynx is clear and moist.   Eyes: Conjunctivae and EOM are normal. Pupils are equal, round, and reactive to light.   Neck: Normal range of motion. Neck supple.   Cardiovascular: Intact distal pulses.  An irregularly irregular rhythm present.   Murmur heard.  Pulmonary/Chest: Accessory muscle usage present. Tachypnea noted. He has decreased breath sounds in the right middle field and the right lower field. He has no wheezes. He has rhonchi.   Abdominal: Bowel sounds are normal. He exhibits distension (Mild ). There is no tenderness. There is no guarding.   Musculoskeletal: Normal range of motion. He exhibits no edema.   Neurological: He is alert and  oriented to person, place, and time.   Skin: Skin is warm. He is diaphoretic. No erythema.   Psychiatric: He has a normal mood and affect. His behavior is normal. Judgment and thought content normal.   Nursing note and vitals reviewed.      Vents:     Lines/Drains/Airways     Central Venous Catheter Line                 Port A Cath Single Lumen 10/14/16 1313 other (see comments) 143 days          Peripheral Intravenous Line                 Peripheral IV - Single Lumen 03/06/17 1116 Right Antecubital less than 1 day         Peripheral IV - Single Lumen 03/06/17 1145 Left Wrist less than 1 day         Peripheral IV - Single Lumen 03/06/17 1230 Right Wrist less than 1 day              Significant Labs:    CBC/Anemia Profile:    Recent Labs  Lab 03/06/17  1117   WBC 41.89*   HGB 10.2*   HCT 32.8*      MCV 90   RDW 19.3*        Chemistries:    Recent Labs  Lab 03/06/17  1117   *   K 4.7   CL 89*   CO2 20*   BUN 40*   CREATININE 2.2*   CALCIUM 9.0   ALBUMIN 2.8*   PROT 7.8   BILITOT 1.3*   ALKPHOS 79   ALT 24   AST 45*   MG 2.0   PHOS 3.0       Lactic Acid:   Recent Labs  Lab 03/06/17  1117   LACTATE 4.2*       Significant Imaging: CXR: I have reviewed all pertinent results/findings within the past 24 hours and my personal findings are:  Right lower/mid lobe consolidation concerning for PNA

## 2017-03-06 NOTE — CONSULTS
Please see Critical Care H&P from 3/6/17    Joann Washington MD  Internal Medicine PGY-2  Pager [phone]

## 2017-03-06 NOTE — ASSESSMENT & PLAN NOTE
- home diltiazem, toprol xl and coumadin   - Will continue diltiazem and hold toprol xl   - Diltiazem 5 PRN IV for RVR

## 2017-03-06 NOTE — H&P
[facility]  Critical Care Medicine  History & Physical    Patient Name: Wil Kwon Jr.  MRN: [id number]  Admission Date: 3/6/2017  Hospital Length of Stay: 0 days  Code Status: Full Code  Attending Physician: Kishor Medrano MD     Primary Care Provider: LAILA Serra MD   Principal Problem: Acute hypoxemic respiratory failure    Subjective:     HPI:  72 year old male with history of MDS (s/p Chemo x 5 most recent 3/03/2017), HTN, Afib (coumadin, lopressor), hyperlipidemia, and AS presents to the ED with fever and SOB.  Patient reports he was in his usual state of health until Friday shortly after receiving chemotherapy (Vidaza) when he started to experience SOB.  This SOB progressively worsened and patient developed a productive cough with brownish green foul tasting sputum.  In addition he noticed a progressive right sided chest wall pain that radiated towards his back that is currently a 10/10.  His pain is temporarily relived by cough.  Patient brother notes patient appeared feverish starting Sunday.  Patient denies sick contacts and report he has received the flu shot this year.  He reports some nausea and vomiting beginning yesterday, no abdominal pain and normal BMs.  Currently denies HA, or palpitations              Hospital/ICU Course:  3/06: Admitted to ICU and started on broad spectrum abx for suspected HCAP, Started on CPAP      Past Medical History:   Diagnosis Date    Aortic valve stenosis, mild     secondary to bicuspid aortic alve    Atrial fibrillation     Carotid artery disease     Left CEA 4/9/13    Depression     DJD (degenerative joint disease)     H/O echocardiogram 04/13/2016    EF 55-60%. Diastolic dysfunction. PASP 39. mod AS with JEFF 1.18    History of psychiatric hospitalization     [facility] 2014, [facility] 1993    Hx of psychiatric care     Hyperlipidemia     Hypertension     MDS (myelodysplastic syndrome)     Pancytopenia      Psychiatric problem     Therapy     [facility]        Past Surgical History:   Procedure Laterality Date    BONE MARROW BIOPSY  08/29/2016    CAROTID ENDARTERECTOMY Left     Inguinal hernia      Left    KNEE SURGERY      Right x2    neck fusion      TONSILLECTOMY         Review of patient's allergies indicates:   Allergen Reactions    Lipitor [atorvastatin]      Muscle pain    Lisinopril Edema     Cheek swelling       Family History     Problem Relation (Age of Onset)    Hyperlipidemia Brother        Social History Main Topics    Smoking status: Never Smoker    Smokeless tobacco: Never Used    Alcohol use No    Drug use: No    Sexual activity: Yes     Partners: Female      Review of Systems   Constitutional: Positive for chills, fatigue and fever.   HENT: Negative for congestion, hearing loss and sinus pressure.    Eyes: Negative for pain and visual disturbance.   Respiratory: Positive for cough, chest tightness and shortness of breath.    Cardiovascular: Positive for chest pain (Right sided, radiating to back, stabbing ). Negative for palpitations.   Gastrointestinal: Positive for nausea and vomiting. Negative for abdominal pain, blood in stool, constipation and diarrhea.   Genitourinary: Negative for dysuria, frequency and urgency.   Musculoskeletal: Positive for arthralgias and back pain (Right back ). Negative for myalgias.   Skin: Negative for color change and rash.   Neurological: Positive for light-headedness. Negative for dizziness, seizures, syncope and headaches.   Hematological: Negative for adenopathy. Does not bruise/bleed easily.   Psychiatric/Behavioral: Negative for confusion, self-injury and sleep disturbance. The patient is not nervous/anxious.      Objective:     Vital Signs (Most Recent):  Temp: 98.9 °F (37.2 °C) (03/06/17 1318)  Pulse: (!) 118 (03/06/17 1423)  Resp: (!) 26 (03/06/17 1423)  BP: (!) 109/57 (03/06/17 1423)  SpO2: 99 % (03/06/17 1423) Vital Signs (24h  Range):  Temp:  [98.8 °F (37.1 °C)-100.3 °F (37.9 °C)] 98.9 °F (37.2 °C)  Pulse:  [118-145] 118  Resp:  [24-37] 26  SpO2:  [91 %-99 %] 99 %  BP: (109-163)/(57-84) 109/57   Weight: 86.2 kg (190 lb)  Body mass index is 26.5 kg/(m^2).      Intake/Output Summary (Last 24 hours) at 03/06/17 1449  Last data filed at 03/06/17 1330   Gross per 24 hour   Intake                0 ml   Output               90 ml   Net              -90 ml       Physical Exam   Constitutional: He is oriented to person, place, and time. He appears well-developed and well-nourished. He has a sickly appearance.   HENT:   Head: Normocephalic and atraumatic.   Right Ear: External ear normal.   Left Ear: External ear normal.   Nose: Nose normal.   Mouth/Throat: Oropharynx is clear and moist.   Eyes: Conjunctivae and EOM are normal. Pupils are equal, round, and reactive to light.   Neck: Normal range of motion. Neck supple.   Cardiovascular: Intact distal pulses.  An irregularly irregular rhythm present.   Murmur heard.  Pulmonary/Chest: Accessory muscle usage present. Tachypnea noted. He has decreased breath sounds in the right middle field and the right lower field. He has no wheezes. He has rhonchi.   Abdominal: Bowel sounds are normal. He exhibits distension (Mild ). There is no tenderness. There is no guarding.   Musculoskeletal: Normal range of motion. He exhibits no edema.   Neurological: He is alert and oriented to person, place, and time.   Skin: Skin is warm. He is diaphoretic. No erythema.   Psychiatric: He has a normal mood and affect. His behavior is normal. Judgment and thought content normal.   Nursing note and vitals reviewed.      Vents:     Lines/Drains/Airways     Central Venous Catheter Line                 Port A Cath Single Lumen 10/14/16 1313 other (see comments) 143 days          Peripheral Intravenous Line                 Peripheral IV - Single Lumen 03/06/17 1116 Right Antecubital less than 1 day         Peripheral IV - Single  Lumen 03/06/17 1145 Left Wrist less than 1 day         Peripheral IV - Single Lumen 03/06/17 1230 Right Wrist less than 1 day              Significant Labs:    CBC/Anemia Profile:    Recent Labs  Lab 03/06/17  1117   WBC 41.89*   HGB 10.2*   HCT 32.8*      MCV 90   RDW 19.3*        Chemistries:    Recent Labs  Lab 03/06/17  1117   *   K 4.7   CL 89*   CO2 20*   BUN 40*   CREATININE 2.2*   CALCIUM 9.0   ALBUMIN 2.8*   PROT 7.8   BILITOT 1.3*   ALKPHOS 79   ALT 24   AST 45*   MG 2.0   PHOS 3.0       Lactic Acid:   Recent Labs  Lab 03/06/17  1117   LACTATE 4.2*       Significant Imaging: CXR: I have reviewed all pertinent results/findings within the past 24 hours and my personal findings are:  Right lower/mid lobe consolidation concerning for PNA     Assessment/Plan:     Neuro  Depression  - Celexa 40 will continue     Pulmonary  Acute hypoxemic respiratory failure  - Likely 2/2 to suspected HCAP given CXR findings.    - tachypneic on exam  - Arterial Blood Gas result:  pO2 67; pCO2 25.6; pH 7.415;  HCO3 16.4, %O2 Sat 94 on 6 L Nc  - Will start CPAP and continue monitor, repeat ABG in 4 hours .      HCAP (healthcare-associated pneumonia)  - CXR concerning for right lower lob PNA  - Given history of recent hospitalization/procedure concern for HCAP   - Given 2.5L NaCl per sepsis protocol   - Will cover broadly, Zosyn and Vanc     Cardiac  Paroxysmal atrial fibrillation  - home diltiazem, toprol xl and coumadin   - Will continue diltiazem and hold toprol xl        HTN (hypertension), benign  - home toprol xl and losartan   - Will hold     Renal  BERNICE (acute kidney injury)  - Creatine 2.2 from baseline of 1.2  - Likely prerenal given presentation  - Given IV hydration, will continue to monitor     ID  Sepsis  - Elevated WBC, tachypnea, tachycardic and suspected HCAP  - Please see HCAP for plan     Hem/Onc  MDS (myelodysplastic syndrome)  - Hx sicne 2013?  - Multiple chemo visits Vidaza     Anemia, chronic  disease  -hx of MDS with chemo therapy  - Will continue to monitor and transfuse below 7     Leukocytosis  - significant leukocytosis, given neulasta and aranesp   - Continue to monitor     Fluids/Electrolytes/Nutrition/GI  Hyponatremia  - New lab finding  - Will obtain urine lytes, osmolality, and serum osm   - Given CXR findings will test for legionella       Dyslipidemia  - Pravastatin, will hold       Critical Care Medicine Daily Checklist:    A: Awake: RASS Goal/Actual Goal:  0  Actual:  0   B: Spontaneous Breathing Trial Performed?  n/a   C: SAT & SBT Coordinated?  No                      D: Delirium: CAM-ICU  N/A   E: Early Mobility Performed? Yes   F: Feeding Goal:    Status:     Current Diet Order   Procedures    Diet NPO     Except for Medication      AS: Analgesia/Sedation n/a   T: Thromboembolic Prophylaxis Holding 2/2 possible thoracentesis     H: HOB > 300 Yes   U: Stress Ulcer Prophylaxis (if needed) None    G: Glucose Control None    B: Bowel Function     I: Indwelling Catheter (Lines & [place]) Necessity Port Cath R IJ   D: De-escalation of Antimicrobials/Pharmacotherapies No    Plan for the day/ETD CT and Abx     Code Status:  Family/Goals of Care: Full Code  N/A at time      Dispo: ICU care for close monitoring and CPAP, concern for sepsis and treating with broad spectrum abx, awaiting CT chest.   Case to be discussed with fellow and staff  Staff attestation to follow     Roddy Garner MD  Critical Care Medicine  [facility]

## 2017-03-06 NOTE — PROGRESS NOTES
"Pt arrived to room via stretcher with RUBY Winston and transferred self to bed. Monitoring applied, CPAP applied per RT, VSS, complaints of pain to "my lungs from coughing". Critical care notified that patient has arrived to room and that patient is in pain. NPO status explained. Brother at bedside, call light within reach, bed low and locked, and oriented to room. Will continue to monitor.   "

## 2017-03-06 NOTE — TELEPHONE ENCOUNTER
----- Message from Jessica Dexter sent at 3/6/2017  8:40 AM CST -----  Contact: Pt  Pt states he is extremely sick with a lot of pain and nausea hedoesn't think he should do chemo today, would like to know if lab orders can be sent in    Pt contact number [phone]  Thanks

## 2017-03-06 NOTE — ED PROVIDER NOTES
Encounter Date: 3/6/2017       History     Chief Complaint   Patient presents with    Fever Post Chemo     last chemo friday,  sob , fever     Review of patient's allergies indicates:   Allergen Reactions    Lipitor [atorvastatin]      Muscle pain    Lisinopril Edema     Cheek swelling     HPI Comments: 73 y/o WM with PMHx of HTN, hyperlipidemia, Afib anticoagulated with coumadin, CAD, myelodysplastic syndrome presents to the ED c/o fever post chemo.  Last chemo infusion on 3/3/17. Was supposed to have chemo again today and tomorrow.  He started feeling bad yesterday and is reporting a cough productive of brown sputum, SOB, chest pain with cough, R mid back pain, n/v, dark malodorous urine, and lightheadedness. He had a flu vaccine this year. He denies chills, headache, leg swelling, orthopnea, diarrhea, constipation. He denies tobacco, alcohol, or drug use.     The history is provided by the patient.     Past Medical History:   Diagnosis Date    Aortic valve stenosis, mild     secondary to bicuspid aortic alve    Atrial fibrillation     Carotid artery disease     Left CEA 4/9/13    Depression     DJD (degenerative joint disease)     H/O echocardiogram 04/13/2016    EF 55-60%. Diastolic dysfunction. PASP 39. mod AS with JEFF 1.18    History of psychiatric hospitalization     [facility] 2014, [facility] 1993    Hx of psychiatric care     Hyperlipidemia     Hypertension     MDS (myelodysplastic syndrome)     Pancytopenia     Psychiatric problem     Therapy     [facility]      Past Surgical History:   Procedure Laterality Date    BONE MARROW BIOPSY  08/29/2016    CAROTID ENDARTERECTOMY Left     Inguinal hernia      Left    KNEE SURGERY      Right x2    neck fusion      TONSILLECTOMY       Family History   Problem Relation Age of Onset    Hyperlipidemia Brother      Social History   Substance Use Topics    Smoking status: Never Smoker    Smokeless tobacco: Never Used     Alcohol use No     Review of Systems   Constitutional: Positive for chills, fatigue and fever.   HENT: Positive for congestion. Negative for ear pain, postnasal drip, rhinorrhea and sore throat.    Eyes: Negative for photophobia and visual disturbance.   Respiratory: Positive for cough and shortness of breath.    Cardiovascular: Positive for chest pain (with cough). Negative for palpitations and leg swelling.   Gastrointestinal: Positive for diarrhea, nausea and vomiting. Negative for abdominal pain and constipation.   Genitourinary: Negative for dysuria, frequency, hematuria and urgency.        +dark malodorous urine   Musculoskeletal: Positive for back pain (R mid back). Negative for neck pain and neck stiffness.   Skin: Negative for rash and wound.   Neurological: Positive for light-headedness. Negative for dizziness, syncope, weakness, numbness and headaches.   Psychiatric/Behavioral: Negative for confusion.       Physical Exam   Initial Vitals   BP Pulse Resp Temp SpO2   03/06/17 1038 03/06/17 1038 03/06/17 1038 03/06/17 1038 03/06/17 1038   163/84 130 32 100.3 °F (37.9 °C) 96 %     Physical Exam    Nursing note and vitals reviewed.  Constitutional: He appears well-developed and well-nourished. He is not diaphoretic. He appears ill. He appears distressed.   HENT:   Head: Normocephalic and atraumatic.   Mouth/Throat: Oropharynx is clear and moist and mucous membranes are normal.   Neck: Normal range of motion. Neck supple.   Cardiovascular: An irregularly irregular rhythm present. Tachycardia present.  Exam reveals no gallop and no friction rub.    No murmur heard.  No LE edema   Pulmonary/Chest: He has wheezes (lower lungs L>R). He has no rhonchi. He has no rales. He exhibits no tenderness.   Abdominal: Soft. Bowel sounds are normal. There is no tenderness. There is no rebound and no guarding.   Musculoskeletal: Normal range of motion.   Neurological: He is alert and oriented to person, place, and time.   Skin:  Skin is warm and dry. No rash noted. No erythema.   Psychiatric: He has a normal mood and affect.         ED Course   Critical Care  Date/Time: 3/6/2017 11:47 AM  Performed by: MECHELLE APONTE.  Authorized by: MECHELLE APONTE   Total critical care time (exclusive of procedural time) : 45 minutes  Critical care time was exclusive of teaching time and separately billable procedures and treating other patients.  Critical care was necessary to treat or prevent imminent or life-threatening deterioration of the following conditions: sepsis.  Critical care was time spent personally by me on the following activities: discussions with consultants, obtaining history from patient or surrogate, ordering and review of laboratory studies, review of old charts, pulse oximetry, re-evaluation of patient's condition, ordering and review of radiographic studies, ordering and performing treatments and interventions, examination of patient and development of treatment plan with patient or surrogate.        Labs Reviewed   CBC W/ AUTO DIFFERENTIAL - Abnormal; Notable for the following:        Result Value    WBC 41.89 (*)     RBC 3.64 (*)     Hemoglobin 10.2 (*)     Hematocrit 32.8 (*)     MCHC 31.1 (*)     RDW 19.3 (*)     Gran% 92.0 (*)     Lymph% 5.0 (*)     Mono% 3.0 (*)     All other components within normal limits   COMPREHENSIVE METABOLIC PANEL - Abnormal; Notable for the following:     Sodium 122 (*)     Chloride 89 (*)     CO2 20 (*)     Glucose 146 (*)     BUN, Bld 40 (*)     Creatinine 2.2 (*)     Albumin 2.8 (*)     Total Bilirubin 1.3 (*)     AST 45 (*)     eGFR if  33.4 (*)     eGFR if non  28.9 (*)     All other components within normal limits    Narrative:     ADD ON PER DR APONTE, PT, ORDER #[id number]   12:51  03/06/2017    URINALYSIS - Abnormal; Notable for the following:     Appearance, UA Hazy (*)     Protein, UA 2+ (*)     Occult Blood UA 2+ (*)     All other components within  normal limits    Narrative:     If not done in the last 24 hours   LACTIC ACID, PLASMA - Abnormal; Notable for the following:     Lactate (Lactic Acid) 4.2 (*)     All other components within normal limits   PROTIME-INR - Abnormal; Notable for the following:     Prothrombin Time 16.2 (*)     INR 1.6 (*)     All other components within normal limits    Narrative:     ADD ON PER DR APONTE PT, ORDER #[id number]   12:51  03/06/2017    URINALYSIS - Abnormal; Notable for the following:     Appearance, UA Hazy (*)     Protein, UA 2+ (*)     Occult Blood UA 2+ (*)     All other components within normal limits    Narrative:     If not done in the last 24 hours   SODIUM, URINE, RANDOM - Abnormal; Notable for the following:     Sodium Urine Random <20 (*)     All other components within normal limits    Narrative:     If not done in the last 24 hours   URINALYSIS MICROSCOPIC - Abnormal; Notable for the following:     WBC, UA 6 (*)     Bacteria, UA Many (*)     Hyaline Casts, UA 23 (*)     Granular Casts, UA 36 (*)     All other components within normal limits    Narrative:     If not done in the last 24 hours   OSMOLALITY - Abnormal; Notable for the following:     Osmolality 267 (*)     All other components within normal limits    Narrative:     ADD ON PER DR APONTE PT, ORDER #[id number]   12:51  03/06/2017   ADD ON HARRY LOVELACE, ORDER #[id number]   14:23  03/06/2017 USED CHEM GRN TOP TUBE   ISTAT PROCEDURE - Abnormal; Notable for the following:     POC PCO2 25.6 (*)     POC PO2 67 (*)     POC HCO3 16.4 (*)     POC SATURATED O2 94 (*)     POC TCO2 17 (*)     All other components within normal limits   CULTURE, BLOOD   CULTURE, BLOOD   CULTURE, RESPIRATORY    Narrative:     If not done in the last 24 hours   CULTURE, URINE    Narrative:     If not done in the last 24 hours   MAGNESIUM    Narrative:     ADD ON PER DR APONTE PT, ORDER #[id number]   12:51  03/06/2017    PHOSPHORUS    Narrative:     ADD ON PER DR APONTE  PT, ORDER #[id number]   12:51  03/06/2017    INFLUENZA A AND B ANTIGEN   PROTIME-INR   CREATININE, URINE, RANDOM    Narrative:     If not done in the last 24 hours   OSMOLALITY   OSMOLALITY, URINE RANDOM    Narrative:     If not done in the last 24 hours   LEGIONELLA ANTIGEN, URINE RANDOM   LACTIC ACID, PLASMA   BASIC METABOLIC PANEL   VANCOMYCIN, RANDOM        Imaging Results         CT Chest Without Contrast (Final result) Result time:  03/06/17 16:21:09    Final result by Didier Medina MD (03/06/17 16:21:09)    Impression:        Diffuse consolidation throughout the right middle and lower lobes involving the central paramediastinal and hilar regions as described above with differential considerations to primarily include an infectious etiology given loculated/heterogenous appearing right pleural effusion.  Alternatively, an underlying neoplastic process cannot be entirely excluded noting this process is relatively rapid in course as it was not seen on the prior CT from 8/29/2016.  Consider consultation with pulmonary medicine and close followup.    Trace pericardial effusion.  ______________________________________     Electronically signed by resident: DIDIER MEDINA MD  Date:     03/06/17  Time:    15:47            As the supervising and teaching physician, I personally reviewed the images and resident's interpretation and I agree with the findings.            Electronically signed by: JOCELYN DE SANTIAGO MD  Date:     03/06/17  Time:    16:21     Narrative:    Technique:   5 mm axial images were acquired using helical CT technique from the lung apices through costophrenic angles.  No intravenous contrast was administered.     Comparison: Radiograph 3/6/2017 and CT 8/29/2016.     Findings:     The soft tissue structures of the base of the neck are unremarkable.  There is a right-sided chest port catheter with its tip terminating within the superior cavoatrial junction.  The heart is normal in size demonstrating trace  pericardial fluid.  The thoracic aorta demonstrates no aneurysmal dilatation.  There is mild calcification of the aortic annulus, coronary arteries, and thoracic aorta.    There are scattered axillary and mediastinal lymph nodes without evidence of lymphadenopathy.    The trachea and proximal airways of the left lung are patent with normal expansion and aeration of the left lung.  There is severe narrowing of the segmental bronchi to the right middle and lower lobes with associated diffuse consolidation involving the right paramediastinal and hilar regions.  There is additional diffuse patchy consolidation involving the middle and throughout the right lower lobes.  Although nonspecific, lack of air bronchograms in these regions at least raises the possibility of an underlying mass noting there was no consolidation seen within the visualized right middle and lower lobes on the CT from 8/29/2016.      There is additional moderate volume right pleural effusion along the posterior and right lateral and anterior chest wall suggesting partial loculation.  Internal density is somewhat heterogenous suggesting blood or inflammatory products.  This is predominantly seen posteriorly within the more dependent regions.  No significant pleural thickening.  No pneumothorax.    Limited views of the upper abdomen demonstrate a focal hypodensity within the left hepatic lobe most compatible with a cyst.    The osseous structures demonstrate no acute process.            X-Ray Chest PA And Lateral (Final result) Result time:  03/06/17 12:39:45    Final result by Pete Maki DO (03/06/17 12:39:45)    Impression:      See above      Electronically signed by: PETE MAKI DO  Date:     03/06/17  Time:    12:39     Narrative:    PA and lateral views of the chest    Comparison: None    Results:    Right sided Port-A-Cath with tip projected over the right atrial/SVC junction. There is moderate right basilar opacity with blunting of both  findings are concerning for effusion with atelectasis, however superimposed air space filling process not excluded. No pneumothorax. Atherosclerotic aorta.. Degenerative change in the shoulder joints                 Medical Decision Making:   History:   Old Medical Records: I decided to obtain old medical records.  Old Records Summarized: records from clinic visits.  Clinical Tests:   Lab Tests: Ordered and Reviewed       <> Summary of Lab: Followed by heme/onc - Dr Rader and Dr Chino  Radiological Study: Ordered and Reviewed  Medical Tests: Ordered and Reviewed       APC / Resident Notes:   71 y/o WM with PMHx of HTN, hyperlipidemia, Afib anticoagulated with coumadin, CAD, myelodysplastic syndrome presents to the ED c/o fever post chemo. Tachycardic at 130. Temp 100.3F.  Patient distressed and ill appearing.  Patient placed on oxygen via nasal cannula. Irregularly irregular rhythm. Wheezing noted to bilateral lower lungs. Abdomen soft and nontender. No LE edema. DDx includes but is not limited to pneumonia, sepsis, UTI, viral syndrome, influenza. Will get labs, CXR, start IV abx and fluids.     CBC shows elevated WBC at 41.89. CMP shows acute kidney injury with Cr 2.2 and BUN 40. Glucose 146. Lactic acid elevated at 4.2. Mag and phos WNL at 2.0 and 3.0. PT/INR 16.2/1.6. Influenza negative. UA with no infection. Blood culture x 2 pending.    CXR shows moderate R basilar opacity with blunting concerning for effusion with atelectasis.     He is in rapid Afib and distressed. Placed on oxygen via nasal cannula.  ICU was consulted and will admit to their service.          Attending Attestation:     Physician Attestation Statement for NP/PA:   I have conducted a face to face encounter with this patient in addition to the NP/PA, due to Medical Complexity    Other NP/PA Attestation Additions:    History of Present Illness: 73 yo m, MDS, neutropenic at baseline, on chemo, here with cough/sob/fever, ill-appearing on exam, CXR  c/w large R sided PNA.  Pt also in rapid a fib. Plan for labs, cultures, IVF, empiric broad spectrum abx given ill appearance.  Likely ICU consult                   ED Course     1:57 PM  HR now 120, pt reports feeling slightly better though still very tachypneic.  2nd L NS infusing.  BIPAP ordered by ICU team (who have accepted pt).  Will continue to monitor closely    Clinical Impression:   Diagnoses of Cough and Sepsis were pertinent to this visit.    Disposition:   Disposition: Admitted  Condition: Serious  ICU       Ivonne Palacios PA-C  03/06/17 [id number]

## 2017-03-06 NOTE — ASSESSMENT & PLAN NOTE
- New lab finding  - Will obtain urine lytes, osmolality, and serum osm   - Given CXR findings will test for legionella

## 2017-03-06 NOTE — IP AVS SNAPSHOT
[facility]  [address]  [facility] [address]  Phone: [phone]           Patient Discharge Instructions     Our goal is to set you up for success. This packet includes information on your condition, medications, and your home care. It will help you to care for yourself so you don't get sicker and need to go back to the hospital.     Please ask your nurse if you have any questions.        There are many details to remember when preparing to leave the hospital. Here is what you will need to do:    1. Take your medicine. If you are prescribed medications, review your Medication List in the following pages. You may have new medications to  at the pharmacy and others that you'll need to stop taking. Review the instructions for how and when to take your medications. Talk with your doctor or nurses if you are unsure of what to do.     2. Go to your follow-up appointments. Specific follow-up information is listed in the following pages. Your may be contacted by a transition nurse or clinical provider about future appointments. Be sure we have all of the phone numbers to reach you, if needed. Please contact your provider's office if you are unable to make an appointment.     3. Watch for warning signs. Your doctor or nurse will give you detailed warning signs to watch for and when to call for assistance. These instructions may also include educational information about your condition. If you experience any of warning signs to your health, call your doctor.               [facility]  Unless otherwise directed by your provider, please contact [facility], our nurse care line that is available for 24/7 assistance.     [phone] (toll-free)    Registered nurses in the [facility] provide clinical advisement, health education, appointment booking, and other advisory services.                    ** Verify the list of medication(s) below is accurate and up  to date. Carry this with you in case of emergency. If your medications have changed, please notify your healthcare provider.             Medication List      START taking these medications        Additional Info                      amoxicillin-clavulanate 875-125mg 875-125 mg per tablet   Commonly known as:  AUGMENTIN   Quantity:  28 tablet   Refills:  0   Dose:  1 tablet    Instructions:  Take 1 tablet by mouth 2 (two) times daily.     Begin Date    AM    Noon    PM    Bedtime       benzonatate 100 MG capsule   Commonly known as:  TESSALON   Quantity:  30 capsule   Refills:  1   Dose:  100 mg    Last time this was given:  100 mg on 3/6/2017 11:55 AM   Instructions:  Take 1 capsule (100 mg total) by mouth 3 (three) times daily as needed for Cough.     Begin Date    AM    Noon    PM    Bedtime       hydrocodone-acetaminophen 5-325mg 5-325 mg per tablet   Commonly known as:  NORCO   Quantity:  30 tablet   Refills:  0   Dose:  1 tablet    Last time this was given:  1 tablet on 3/18/2017  4:22 AM   Instructions:  Take 1 tablet by mouth every 6 (six) hours as needed.     Begin Date    AM    Noon    PM    Bedtime         CHANGE how you take these medications        Additional Info                      citalopram 40 MG tablet   Commonly known as:  CELEXA   Quantity:  90 tablet   Refills:  0   What changed:  See the new instructions.    Last time this was given:  40 mg on 3/18/2017  8:21 AM   Instructions:  TAKE 1 TABLET BY MOUTH DAILY     Begin Date    AM    Noon    PM    Bedtime         CONTINUE taking these medications        Additional Info                      acetaminophen 325 MG tablet   Commonly known as:  TYLENOL   Refills:  0   Dose:  325 mg    Last time this was given:  650 mg on 3/6/2017 11:55 AM   Instructions:  Take 325 mg by mouth as needed for Pain.     Begin Date    AM    Noon    PM    Bedtime       diltiaZEM 300 MG 24 hr capsule   Commonly known as:  CARDIZEM CD   Quantity:  90 capsule   Refills:  1     Last time this was given:  360 mg on 3/18/2017  8:21 AM   Instructions:  TAKE ONE CAPSULE BY MOUTH EVERY DAY     Begin Date    AM    Noon    PM    Bedtime       FISH OIL 1,000 mg Cap   Refills:  0   Dose:  3 capsule   Generic drug:  omega-3 fatty acids-vitamin E    Instructions:  Take 3 capsules by mouth once daily.     Begin Date    AM    Noon    PM    Bedtime       metoprolol succinate 25 MG 24 hr tablet   Commonly known as:  TOPROL-XL   Quantity:  90 tablet   Refills:  3   Dose:  25 mg   Comments:  **Patient requests 90 days supply**    Instructions:  Take 1 tablet (25 mg total) by mouth once daily.     Begin Date    AM    Noon    PM    Bedtime       multivitamin capsule   Refills:  0   Dose:  1 capsule    Instructions:  Take 1 capsule by mouth every morning.     Begin Date    AM    Noon    PM    Bedtime       nystatin ointment   Commonly known as:  MYCOSTATIN   Quantity:  1 Tube   Refills:  1    Instructions:  Apply topically 3 (three) times daily.     Begin Date    AM    Noon    PM    Bedtime       ondansetron 8 MG tablet   Commonly known as:  ZOFRAN   Quantity:  30 tablet   Refills:  2   Dose:  8 mg    Instructions:  Take 1 tablet (8 mg total) by mouth every 8 (eight) hours as needed for Nausea.     Begin Date    AM    Noon    PM    Bedtime       pravastatin 20 MG tablet   Commonly known as:  PRAVACHOL   Quantity:  45 tablet   Refills:  3   Dose:  20 mg    Instructions:  Take 1 tablet (20 mg total) by mouth every other day.     Begin Date    AM    Noon    PM    Bedtime       warfarin 3 MG tablet   Commonly known as:  COUMADIN   Quantity:  30 tablet   Refills:  11   Dose:  3 mg    Instructions:  Take 1 tablet (3 mg total) by mouth Daily.     Begin Date    AM    Noon    PM    Bedtime         STOP taking these medications     losartan 50 MG tablet   Commonly known as:  COZAAR            Where to Get Your Medications      These medications were sent to [id number] - [place], [place] - [address]   [address] [address]     Phone:  [phone]     amoxicillin-clavulanate 875-125mg 875-125 mg per tablet    benzonatate 100 MG capsule         You can get these medications from any pharmacy     Bring a paper prescription for each of these medications     hydrocodone-acetaminophen 5-325mg 5-325 mg per tablet                  Please bring to all follow up appointments:    1. A copy of your discharge instructions.  2. All medicines you are currently taking in their original bottles.  3. Identification and insurance card.    Please arrive 15 minutes ahead of scheduled appointment time.    Please call 24 hours in advance if you must reschedule your appointment and/or time.        Your Scheduled Appointments     Mar 22, 2017  2:15 PM CDT   Injection with INJECTION, [facility] INF[facility]ION   [facility] ([facility])    [address] [address] [phone]            Mar 22, 2017  3:15 PM CDT   Established Patient Visit with Laura Rader MD   [facility] ([facility])    [facility]  [place] [address] [phone]              Follow-up Information     Follow up with INJECTION, [facility] INFUSION On 3/22/2017.    Why:  Labs- 2:15pm        Follow up with Laura Rader MD On 3/22/2017.    Specialty:  Hematology and Oncology    Why:  follow up- 3:15pm    Contact information:    [facility] [address] [phone]        Referrals     Future Orders    Ambulatory Referral to Cardiology         Discharge Instructions     Future Orders    X-Ray Chest PA And Lateral     Questions:    Reason for Exam:  Evaluate empyema s/p chest tube and abx    May the Radiologist modify the order per protocol to meet the clinical needs of the patient?:  Yes    HOSPITAL BED FOR HOME USE     Questions:    Type:  Semi-electric    Length of need (1-99 months):  99    Does patient have medical equipment at home?:  cane, straight  "Comment - built in shower chair / built in shower chair    other (see comments)    Height:  5' 11" (1.803 m)    Weight:  86.2 kg (190 lb)    Accessories:      Please check all that apply:  Patient requires the head of bed to be elevated more than 30 degrees most of the time due to congestive heart failure, chronic pulmonary disease, or aspiration.  Pillows and wedges have been considered and ruled out.    Vendor:  [facility] Comment - [facility] to deliver    Expected Date of Delivery:  3/17/2017    Expected Time of Delivery:          Primary Diagnosis     Your primary diagnosis was:  Healthcare-Associated Pneumonia      Admission Information     Date & Time Provider Department CSN    3/6/2017 10:49 AM Roddy Driscoll MD [facility] [id number]      Care Providers     Provider Role Specialty Primary office phone    Roddy Driscoll MD Attending Provider Hematology and Oncology [phone]    Roddy Driscoll MD Team Attending  Hematology and Oncology [phone]      Important Medicare Message          Most Recent Value    Important Message from Medicare Regarding Discharge Appeal Rights  Signed/date by patient/caregiver, Given to patient/caregiver, Explained to patient/caregiver yes 03/17/2017 1020      Your Vitals Were     BP Pulse Temp Resp Height Weight    114/57 (BP Location: Left arm, Patient Position: Lying, BP Method: Automatic) 98 98 °F (36.7 °C) (Oral) 18 5' 11" (1.803 m) 86.2 kg (190 lb)    SpO2 BMI             99% 26.5 kg/m2         Recent Lab Values     No lab values to display.      Pending Labs     Order Current Status    Glucose, Body Fluid (Reference Lab or [facility]) [facility]; Pleural Fluid, Right Preliminary result    LDH, Body Fluid (Reference Lab or [facility]) [facility]; Pleural Fluid, Right Preliminary result    Prepare Fresh Frozen Plasma 1 Unit Preliminary result    Prepare Fresh Frozen Plasma 1 Unit Preliminary result    Prepare Fresh Frozen Plasma 2 Units " Preliminary result    Protein, Body Fluid (Reference Lab or [facility]) [facility]; Pleural Fluid, Right Preliminary result      Allergies as of 3/18/2017        Reactions    Lipitor [Atorvastatin]     Muscle pain    Lisinopril Edema    Cheek swelling      Advance Directives     An advance directive is a document which, in the event you are no longer able to make decisions for yourself, tells your healthcare team what kind of treatment you do or do not want to receive, or who you would like to make those decisions for you.  If you do not currently have an advance directive, [facility] encourages you to create one.  For more information call:  [phone]), [phone] ([phone]),  or log on to [email or web address].        Language Assistance Services     ATTENTION: Language assistance services are available, free of charge. Please call [phone].      ATENCIÓN: Si habla español, tiene a gannon disposición servicios gratuitos de asistencia lingüística. Llame al [phone].     [facility] Ý: N?u b?n nói Ti?ng Vi?t, có các d?ch v? h? tr? ngôn ng? mi?n phí dành cho b?n. G?i s? [phone].        Blood Transfusion Reaction Signs and Symptoms     The blood you have received has been matched for you as carefully as possible. Most patients who receive a blood transfusion do not experience problems. However, there can be a delayed reaction that happens a few weeks after your blood transfusion. Contact your physician immediately if you experience any NEW SYMPTOMS listed below:     Fever greater than 100.4 degrees    Chills   Yellow color to your skin or eyes(Jaundice)   Back pain, chest pain, or pain at the infusion site   Weakness (more than usual)   Discomfort or uneasiness more than usual (Malaise)   Nausea or vomiting   Shortness of breath, wheezing, or coughing   Higher or lower blood pressure than normal   Skin rash, itching, skin redness, or localized swelling (example: hands or  feet)   Urinating less than normal   Urine appears reddish or orange and is darker than normal      Remember that some these signs may already exist for you--such as having chronic back pain or high blood pressure. You only need to look for and report to your doctor any new occurrences since your blood transfusion that are of concern.        Stroke Education              Pneumonmia Discharge Instructions                Coumadin Discharge Instructions                          [facility] complies with applicable Federal civil rights laws and does not discriminate on the basis of race, color, national origin, age, disability, or sex.

## 2017-03-07 LAB
ABO + RH BLD: NORMAL
ALBUMIN SERPL BCP-MCNC: 2.1 G/DL
ALLENS TEST: ABNORMAL
ALLENS TEST: ABNORMAL
ALP SERPL-CCNC: 58 U/L
ALT SERPL W/O P-5'-P-CCNC: 21 U/L
ANION GAP SERPL CALC-SCNC: 10 MMOL/L
ANION GAP SERPL CALC-SCNC: 10 MMOL/L
ANISOCYTOSIS BLD QL SMEAR: SLIGHT
APPEARANCE FLD: NORMAL
AST SERPL-CCNC: 40 U/L
BASO STIPL BLD QL SMEAR: ABNORMAL
BASOPHILS # BLD AUTO: ABNORMAL K/UL
BASOPHILS NFR BLD: 0 %
BILIRUB SERPL-MCNC: 1.2 MG/DL
BLD GP AB SCN CELLS X3 SERPL QL: NORMAL
BLD PROD TYP BPU: NORMAL
BLOOD UNIT EXPIRATION DATE: NORMAL
BLOOD UNIT TYPE CODE: 5100
BLOOD UNIT TYPE CODE: 5100
BLOOD UNIT TYPE CODE: 9500
BLOOD UNIT TYPE CODE: 9500
BLOOD UNIT TYPE: NORMAL
BODY FLD TYPE: NORMAL
BODY FLUID SOURCE, LDH: NORMAL
BUN SERPL-MCNC: 37 MG/DL
BUN SERPL-MCNC: 38 MG/DL
BURR CELLS BLD QL SMEAR: ABNORMAL
CALCIUM SERPL-MCNC: 7.9 MG/DL
CALCIUM SERPL-MCNC: 8.1 MG/DL
CHLORIDE SERPL-SCNC: 96 MMOL/L
CHLORIDE SERPL-SCNC: 97 MMOL/L
CO2 SERPL-SCNC: 18 MMOL/L
CO2 SERPL-SCNC: 19 MMOL/L
CODING SYSTEM: NORMAL
COLOR FLD: YELLOW
CREAT SERPL-MCNC: 1.8 MG/DL
CREAT SERPL-MCNC: 1.9 MG/DL
DELSYS: ABNORMAL
DELSYS: ABNORMAL
DIFFERENTIAL METHOD: ABNORMAL
DISPENSE STATUS: NORMAL
EOSINOPHIL # BLD AUTO: ABNORMAL K/UL
EOSINOPHIL NFR BLD: 0 %
EP: 10
EP: 10
ERYTHROCYTE [DISTWIDTH] IN BLOOD BY AUTOMATED COUNT: 19.8 %
ERYTHROCYTE [SEDIMENTATION RATE] IN BLOOD BY WESTERGREN METHOD: 14 MM/H
ERYTHROCYTE [SEDIMENTATION RATE] IN BLOOD BY WESTERGREN METHOD: 14 MM/H
EST. GFR  (AFRICAN AMERICAN): 39.8 ML/MIN/1.73 M^2
EST. GFR  (AFRICAN AMERICAN): 42.5 ML/MIN/1.73 M^2
EST. GFR  (NON AFRICAN AMERICAN): 34.5 ML/MIN/1.73 M^2
EST. GFR  (NON AFRICAN AMERICAN): 36.8 ML/MIN/1.73 M^2
FIO2: 50
FIO2: 50
GIANT PLATELETS BLD QL SMEAR: PRESENT
GLUCOSE FLD-MCNC: <5 MG/DL
GLUCOSE SERPL-MCNC: 118 MG/DL
GLUCOSE SERPL-MCNC: 120 MG/DL
HAV IGM SERPL QL IA: NEGATIVE
HBV CORE IGM SERPL QL IA: NEGATIVE
HBV SURFACE AG SERPL QL IA: NEGATIVE
HCO3 UR-SCNC: 17.9 MMOL/L (ref 24–28)
HCO3 UR-SCNC: 19.1 MMOL/L (ref 24–28)
HCT VFR BLD AUTO: 31.4 %
HCV AB SERPL QL IA: NEGATIVE
HGB BLD-MCNC: 9.7 G/DL
HIV 1+2 AB+HIV1 P24 AG SERPL QL IA: NEGATIVE
INR PPP: 2.2
INR PPP: 2.3
INR PPP: 2.6
INR PPP: 2.6
IP: 14
IP: 18
LDH FLD L TO P-CCNC: 3537 U/L
LDH SERPL L TO P-CCNC: 570 U/L
LYMPHOCYTES # BLD AUTO: ABNORMAL K/UL
LYMPHOCYTES NFR BLD: 6 %
MAGNESIUM SERPL-MCNC: 1.8 MG/DL
MCH RBC QN AUTO: 28 PG
MCHC RBC AUTO-ENTMCNC: 30.9 %
MCV RBC AUTO: 91 FL
METAMYELOCYTES NFR BLD MANUAL: 0.5 %
MIN VOL: 21.9
MODE: ABNORMAL
MODE: ABNORMAL
MONOCYTES # BLD AUTO: ABNORMAL K/UL
MONOCYTES NFR BLD: 1 %
MONOS+MACROS NFR FLD MANUAL: 2 %
MYELOCYTES NFR BLD MANUAL: 0.5 %
NEUTROPHILS NFR BLD: 91 %
NEUTROPHILS NFR FLD MANUAL: 98 %
NEUTS BAND NFR BLD MANUAL: 1 %
NRBC BLD-RTO: ABNORMAL /100 WBC
NUM UNITS TRANS FFP: NORMAL
OVALOCYTES BLD QL SMEAR: ABNORMAL
PCO2 BLDA: 29.8 MMHG (ref 35–45)
PCO2 BLDA: 32.7 MMHG (ref 35–45)
PH SMN: 7.35 [PH] (ref 7.35–7.45)
PH SMN: 7.42 [PH] (ref 7.35–7.45)
PHOSPHATE SERPL-MCNC: 3.6 MG/DL
PLATELET # BLD AUTO: 335 K/UL
PLATELET BLD QL SMEAR: ABNORMAL
PMV BLD AUTO: ABNORMAL FL
PO2 BLDA: 128 MMHG (ref 80–100)
PO2 BLDA: 95 MMHG (ref 80–100)
POC BE: -5 MMOL/L
POC BE: -8 MMOL/L
POC SATURATED O2: 97 % (ref 95–100)
POC SATURATED O2: 99 % (ref 95–100)
POC TCO2: 19 MMOL/L (ref 23–27)
POC TCO2: 20 MMOL/L (ref 23–27)
POIKILOCYTOSIS BLD QL SMEAR: SLIGHT
POLYCHROMASIA BLD QL SMEAR: ABNORMAL
POTASSIUM SERPL-SCNC: 4.8 MMOL/L
POTASSIUM SERPL-SCNC: 4.9 MMOL/L
PROT FLD-MCNC: 4.3 G/DL
PROT SERPL-MCNC: 6.1 G/DL
PROTHROMBIN TIME: 22.1 SEC
PROTHROMBIN TIME: 22.9 SEC
PROTHROMBIN TIME: 26.1 SEC
PROTHROMBIN TIME: 26.3 SEC
RBC # BLD AUTO: 3.46 M/UL
SAMPLE: ABNORMAL
SAMPLE: ABNORMAL
SITE: ABNORMAL
SITE: ABNORMAL
SODIUM SERPL-SCNC: 125 MMOL/L
SODIUM SERPL-SCNC: 125 MMOL/L
SP02: 99
SP02: 99
SPECIMEN SOURCE: NORMAL
SPONT RATE: 24
SPONT RATE: 25
WBC # BLD AUTO: 40.38 K/UL
WBC # FLD: NORMAL /CU MM

## 2017-03-07 PROCEDURE — 94761 N-INVAS EAR/PLS OXIMETRY MLT: CPT

## 2017-03-07 PROCEDURE — 85007 BL SMEAR W/DIFF WBC COUNT: CPT

## 2017-03-07 PROCEDURE — P9017 PLASMA 1 DONOR FRZ W/IN 8 HR: HCPCS

## 2017-03-07 PROCEDURE — 87070 CULTURE OTHR SPECIMN AEROBIC: CPT

## 2017-03-07 PROCEDURE — 63600175 PHARM REV CODE 636 W HCPCS: Performed by: STUDENT IN AN ORGANIZED HEALTH CARE EDUCATION/TRAINING PROGRAM

## 2017-03-07 PROCEDURE — 99900035 HC TECH TIME PER 15 MIN (STAT)

## 2017-03-07 PROCEDURE — 88305 TISSUE EXAM BY PATHOLOGIST: CPT | Performed by: PATHOLOGY

## 2017-03-07 PROCEDURE — 80053 COMPREHEN METABOLIC PANEL: CPT

## 2017-03-07 PROCEDURE — 36430 TRANSFUSION BLD/BLD COMPNT: CPT

## 2017-03-07 PROCEDURE — 84100 ASSAY OF PHOSPHORUS: CPT

## 2017-03-07 PROCEDURE — 63600175 PHARM REV CODE 636 W HCPCS: Performed by: INTERNAL MEDICINE

## 2017-03-07 PROCEDURE — 85610 PROTHROMBIN TIME: CPT | Mod: 91

## 2017-03-07 PROCEDURE — 88112 CYTOPATH CELL ENHANCE TECH: CPT | Mod: 26,,, | Performed by: PATHOLOGY

## 2017-03-07 PROCEDURE — 85610 PROTHROMBIN TIME: CPT

## 2017-03-07 PROCEDURE — 63600175 PHARM REV CODE 636 W HCPCS: Performed by: RADIOLOGY

## 2017-03-07 PROCEDURE — 87070 CULTURE OTHR SPECIMN AEROBIC: CPT | Mod: 59

## 2017-03-07 PROCEDURE — 20000000 HC ICU ROOM

## 2017-03-07 PROCEDURE — 86900 BLOOD TYPING SEROLOGIC ABO: CPT

## 2017-03-07 PROCEDURE — 82803 BLOOD GASES ANY COMBINATION: CPT

## 2017-03-07 PROCEDURE — 94640 AIRWAY INHALATION TREATMENT: CPT

## 2017-03-07 PROCEDURE — 94660 CPAP INITIATION&MGMT: CPT

## 2017-03-07 PROCEDURE — 86850 RBC ANTIBODY SCREEN: CPT

## 2017-03-07 PROCEDURE — 87040 BLOOD CULTURE FOR BACTERIA: CPT

## 2017-03-07 PROCEDURE — 25000003 PHARM REV CODE 250: Performed by: HOSPITALIST

## 2017-03-07 PROCEDURE — 82945 GLUCOSE OTHER FLUID: CPT

## 2017-03-07 PROCEDURE — 83615 LACTATE (LD) (LDH) ENZYME: CPT | Mod: 91

## 2017-03-07 PROCEDURE — 88305 TISSUE EXAM BY PATHOLOGIST: CPT | Mod: 26,,, | Performed by: PATHOLOGY

## 2017-03-07 PROCEDURE — 27000221 HC OXYGEN, UP TO 24 HOURS

## 2017-03-07 PROCEDURE — 25000003 PHARM REV CODE 250: Performed by: STUDENT IN AN ORGANIZED HEALTH CARE EDUCATION/TRAINING PROGRAM

## 2017-03-07 PROCEDURE — 85027 COMPLETE CBC AUTOMATED: CPT

## 2017-03-07 PROCEDURE — 80048 BASIC METABOLIC PNL TOTAL CA: CPT

## 2017-03-07 PROCEDURE — 83615 LACTATE (LD) (LDH) ENZYME: CPT

## 2017-03-07 PROCEDURE — 25000242 PHARM REV CODE 250 ALT 637 W/ HCPCS: Performed by: STUDENT IN AN ORGANIZED HEALTH CARE EDUCATION/TRAINING PROGRAM

## 2017-03-07 PROCEDURE — 0W9930Z DRAINAGE OF RIGHT PLEURAL CAVITY WITH DRAINAGE DEVICE, PERCUTANEOUS APPROACH: ICD-10-PCS | Performed by: INTERNAL MEDICINE

## 2017-03-07 PROCEDURE — 87186 SC STD MICRODIL/AGAR DIL: CPT

## 2017-03-07 PROCEDURE — 93010 ELECTROCARDIOGRAM REPORT: CPT | Mod: ,,, | Performed by: INTERNAL MEDICINE

## 2017-03-07 PROCEDURE — 25000003 PHARM REV CODE 250: Performed by: INTERNAL MEDICINE

## 2017-03-07 PROCEDURE — 84157 ASSAY OF PROTEIN OTHER: CPT

## 2017-03-07 PROCEDURE — 87205 SMEAR GRAM STAIN: CPT

## 2017-03-07 PROCEDURE — 99233 SBSQ HOSP IP/OBS HIGH 50: CPT | Mod: GC,,, | Performed by: INTERNAL MEDICINE

## 2017-03-07 PROCEDURE — 89051 BODY FLUID CELL COUNT: CPT

## 2017-03-07 PROCEDURE — 36600 WITHDRAWAL OF ARTERIAL BLOOD: CPT

## 2017-03-07 PROCEDURE — 83735 ASSAY OF MAGNESIUM: CPT

## 2017-03-07 RX ORDER — DEXMEDETOMIDINE HYDROCHLORIDE 4 UG/ML
0.2 INJECTION, SOLUTION INTRAVENOUS CONTINUOUS
Status: DISCONTINUED | OUTPATIENT
Start: 2017-03-07 | End: 2017-03-09

## 2017-03-07 RX ORDER — PHYTONADIONE 5 MG/1
5 TABLET ORAL ONCE
Status: DISCONTINUED | OUTPATIENT
Start: 2017-03-07 | End: 2017-03-07

## 2017-03-07 RX ORDER — DIGOXIN 0.25 MG/ML
500 INJECTION INTRAMUSCULAR; INTRAVENOUS ONCE
Status: COMPLETED | OUTPATIENT
Start: 2017-03-07 | End: 2017-03-07

## 2017-03-07 RX ORDER — DIGOXIN 0.25 MG/ML
250 INJECTION INTRAMUSCULAR; INTRAVENOUS ONCE
Status: DISCONTINUED | OUTPATIENT
Start: 2017-03-07 | End: 2017-03-07

## 2017-03-07 RX ORDER — MORPHINE SULFATE 2 MG/ML
4 INJECTION, SOLUTION INTRAMUSCULAR; INTRAVENOUS ONCE AS NEEDED
Status: DISCONTINUED | OUTPATIENT
Start: 2017-03-07 | End: 2017-03-07

## 2017-03-07 RX ORDER — MORPHINE SULFATE 2 MG/ML
4 INJECTION, SOLUTION INTRAMUSCULAR; INTRAVENOUS EVERY 4 HOURS PRN
Status: DISCONTINUED | OUTPATIENT
Start: 2017-03-07 | End: 2017-03-08

## 2017-03-07 RX ORDER — HYDROCODONE BITARTRATE AND ACETAMINOPHEN 500; 5 MG/1; MG/1
TABLET ORAL
Status: DISCONTINUED | OUTPATIENT
Start: 2017-03-07 | End: 2017-03-08

## 2017-03-07 RX ORDER — MORPHINE SULFATE 2 MG/ML
4 INJECTION, SOLUTION INTRAMUSCULAR; INTRAVENOUS ONCE AS NEEDED
Status: COMPLETED | OUTPATIENT
Start: 2017-03-07 | End: 2017-03-07

## 2017-03-07 RX ORDER — DILTIAZEM HYDROCHLORIDE 300 MG/1
300 CAPSULE, COATED, EXTENDED RELEASE ORAL DAILY
Status: DISCONTINUED | OUTPATIENT
Start: 2017-03-07 | End: 2017-03-07

## 2017-03-07 RX ORDER — MAGNESIUM SULFATE HEPTAHYDRATE 40 MG/ML
2 INJECTION, SOLUTION INTRAVENOUS ONCE
Status: COMPLETED | OUTPATIENT
Start: 2017-03-07 | End: 2017-03-07

## 2017-03-07 RX ORDER — MORPHINE SULFATE 2 MG/ML
3 INJECTION, SOLUTION INTRAMUSCULAR; INTRAVENOUS EVERY 6 HOURS PRN
Status: DISCONTINUED | OUTPATIENT
Start: 2017-03-07 | End: 2017-03-07

## 2017-03-07 RX ORDER — MORPHINE SULFATE 2 MG/ML
3 INJECTION, SOLUTION INTRAMUSCULAR; INTRAVENOUS ONCE
Status: COMPLETED | OUTPATIENT
Start: 2017-03-07 | End: 2017-03-07

## 2017-03-07 RX ORDER — DIGOXIN 0.25 MG/ML
125 INJECTION INTRAMUSCULAR; INTRAVENOUS DAILY
Status: DISCONTINUED | OUTPATIENT
Start: 2017-03-08 | End: 2017-03-08

## 2017-03-07 RX ORDER — NOREPINEPHRINE BITARTRATE/D5W 4MG/250ML
0.05 PLASTIC BAG, INJECTION (ML) INTRAVENOUS CONTINUOUS
Status: DISCONTINUED | OUTPATIENT
Start: 2017-03-07 | End: 2017-03-08

## 2017-03-07 RX ORDER — IPRATROPIUM BROMIDE AND ALBUTEROL SULFATE 2.5; .5 MG/3ML; MG/3ML
3 SOLUTION RESPIRATORY (INHALATION) EVERY 6 HOURS PRN
Status: DISCONTINUED | OUTPATIENT
Start: 2017-03-07 | End: 2017-03-18 | Stop reason: HOSPADM

## 2017-03-07 RX ORDER — DIGOXIN 0.25 MG/ML
250 INJECTION INTRAMUSCULAR; INTRAVENOUS ONCE
Status: COMPLETED | OUTPATIENT
Start: 2017-03-08 | End: 2017-03-08

## 2017-03-07 RX ORDER — METOPROLOL SUCCINATE 25 MG/1
25 TABLET, EXTENDED RELEASE ORAL DAILY
Status: DISCONTINUED | OUTPATIENT
Start: 2017-03-07 | End: 2017-03-07

## 2017-03-07 RX ORDER — MORPHINE SULFATE 2 MG/ML
INJECTION, SOLUTION INTRAMUSCULAR; INTRAVENOUS
Status: DISPENSED
Start: 2017-03-07 | End: 2017-03-07

## 2017-03-07 RX ORDER — FENTANYL CITRATE 50 UG/ML
25 INJECTION, SOLUTION INTRAMUSCULAR; INTRAVENOUS CONTINUOUS PRN
Status: COMPLETED | OUTPATIENT
Start: 2017-03-07 | End: 2017-03-07

## 2017-03-07 RX ADMIN — AMIODARONE HYDROCHLORIDE 150 MG: 1.5 INJECTION, SOLUTION INTRAVENOUS at 06:03

## 2017-03-07 RX ADMIN — SODIUM CHLORIDE 1000 ML: 0.9 INJECTION, SOLUTION INTRAVENOUS at 10:03

## 2017-03-07 RX ADMIN — CITALOPRAM HYDROBROMIDE 40 MG: 40 TABLET ORAL at 09:03

## 2017-03-07 RX ADMIN — MORPHINE SULFATE 4 MG: 2 INJECTION, SOLUTION INTRAMUSCULAR; INTRAVENOUS at 10:03

## 2017-03-07 RX ADMIN — Medication 3 ML: at 02:03

## 2017-03-07 RX ADMIN — IPRATROPIUM BROMIDE AND ALBUTEROL SULFATE 3 ML: .5; 3 SOLUTION RESPIRATORY (INHALATION) at 01:03

## 2017-03-07 RX ADMIN — Medication 0.04 MCG/KG/MIN: at 06:03

## 2017-03-07 RX ADMIN — MORPHINE SULFATE 3 MG: 2 INJECTION, SOLUTION INTRAMUSCULAR; INTRAVENOUS at 05:03

## 2017-03-07 RX ADMIN — MORPHINE SULFATE 4 MG: 2 INJECTION, SOLUTION INTRAMUSCULAR; INTRAVENOUS at 02:03

## 2017-03-07 RX ADMIN — VANCOMYCIN HYDROCHLORIDE 1250 MG: 1 INJECTION, POWDER, LYOPHILIZED, FOR SOLUTION INTRAVENOUS at 09:03

## 2017-03-07 RX ADMIN — PIPERACILLIN AND TAZOBACTAM 4.5 G: 4; .5 INJECTION, POWDER, LYOPHILIZED, FOR SOLUTION INTRAVENOUS; PARENTERAL at 10:03

## 2017-03-07 RX ADMIN — Medication 3 ML: at 10:03

## 2017-03-07 RX ADMIN — Medication 3 ML: at 06:03

## 2017-03-07 RX ADMIN — DEXMEDETOMIDINE HYDROCHLORIDE 0.2 MCG/KG/HR: 4 INJECTION, SOLUTION INTRAVENOUS at 08:03

## 2017-03-07 RX ADMIN — MAGNESIUM SULFATE IN WATER 2 G: 40 INJECTION, SOLUTION INTRAVENOUS at 08:03

## 2017-03-07 RX ADMIN — FENTANYL CITRATE 25 MCG: 50 INJECTION, SOLUTION INTRAMUSCULAR; INTRAVENOUS at 04:03

## 2017-03-07 RX ADMIN — PIPERACILLIN AND TAZOBACTAM 4.5 G: 4; .5 INJECTION, POWDER, LYOPHILIZED, FOR SOLUTION INTRAVENOUS; PARENTERAL at 05:03

## 2017-03-07 RX ADMIN — MORPHINE SULFATE 3 MG: 2 INJECTION, SOLUTION INTRAMUSCULAR; INTRAVENOUS at 07:03

## 2017-03-07 RX ADMIN — PIPERACILLIN AND TAZOBACTAM 4.5 G: 4; .5 INJECTION, POWDER, LYOPHILIZED, FOR SOLUTION INTRAVENOUS; PARENTERAL at 02:03

## 2017-03-07 RX ADMIN — AMIODARONE HYDROCHLORIDE 1 MG/MIN: 1.8 INJECTION, SOLUTION INTRAVENOUS at 06:03

## 2017-03-07 RX ADMIN — DIGOXIN 500 MCG: 0.25 INJECTION INTRAMUSCULAR; INTRAVENOUS at 08:03

## 2017-03-07 NOTE — PROGRESS NOTES
Pleural drain placement completed. Patient tolerated procedure well. Chest tube drainage system applied. Bedside RN updated and resumed care of patient.

## 2017-03-07 NOTE — SUBJECTIVE & OBJECTIVE
Interval History/Significant Events: NAEON.  Tolerating Bipap well. Anxious this AM and started on precedex.  S/p pigtail placement with IR, tolerated procedure well.      Review of Systems   Constitutional: Positive for fatigue.   Eyes: Negative for pain and visual disturbance.   Respiratory: Positive for cough, chest tightness and shortness of breath.    Cardiovascular: Positive for chest pain (Right sided, radiating to back, stabbing ). Negative for palpitations.   Gastrointestinal: Positive for vomiting. Negative for constipation and diarrhea.   Genitourinary: Negative for dysuria.   Musculoskeletal: Positive for back pain (Right back ).   Skin: Negative for rash.     Objective:     Vital Signs (Most Recent):  Temp: 97.9 °F (36.6 °C) (03/07/17 1600)  Pulse: (!) 146 (03/07/17 1710)  Resp: (!) 24 (03/07/17 1710)  BP: (!) 113/51 (03/07/17 1710)  SpO2: 98 % (03/07/17 1710) Vital Signs (24h Range):  Temp:  [96.8 °F (36 °C)-98.2 °F (36.8 °C)] 97.9 °F (36.6 °C)  Pulse:  [111-148] 146  Resp:  [22-42] 24  SpO2:  [96 %-100 %] 98 %  BP: ()/(41-84) 113/51   Weight: 86.2 kg (190 lb)  Body mass index is 26.5 kg/(m^2).      Intake/Output Summary (Last 24 hours) at 03/07/17 1745  Last data filed at 03/07/17 1600   Gross per 24 hour   Intake           972.75 ml   Output              825 ml   Net           147.75 ml       Physical Exam   Constitutional: He is oriented to person, place, and time. He appears well-developed and well-nourished. He has a sickly appearance.   HENT:   Head: Normocephalic and atraumatic.   Right Ear: External ear normal.   Left Ear: External ear normal.   Nose: Nose normal.   Mouth/Throat: Oropharynx is clear and moist.   Eyes: Conjunctivae and EOM are normal. Pupils are equal, round, and reactive to light.   Neck: Normal range of motion. Neck supple.   Cardiovascular: Intact distal pulses.  An irregularly irregular rhythm present.   Murmur heard.  Pulmonary/Chest: Accessory muscle usage present.  Tachypnea noted. He has decreased breath sounds in the right middle field and the right lower field. He has no wheezes. He has rhonchi.   Abdominal: Bowel sounds are normal. He exhibits distension (Mild ). There is no tenderness. There is no guarding.   Musculoskeletal: Normal range of motion. He exhibits no edema.   Neurological: He is alert and oriented to person, place, and time.   Skin: Skin is warm. He is diaphoretic. No erythema.   Psychiatric: He has a normal mood and affect. His behavior is normal. Judgment and thought content normal.   Nursing note and vitals reviewed.      Vents:  Oxygen Concentration (%): 35 (03/07/17 1647)  Lines/Drains/Airways     Drain                 Chest Tube 03/07/17 1700 1 Right Pleural 8 Fr. less than 1 day          Peripheral Intravenous Line                 Peripheral IV - Single Lumen 03/06/17 1116 Right Antecubital 1 day         Peripheral IV - Single Lumen 03/06/17 1230 Right Wrist 1 day              Significant Labs:    CBC/Anemia Profile:    Recent Labs  Lab 03/06/17  1117 03/06/17  2150 03/07/17  0415   WBC 41.89* 44.54* 40.38*   HGB 10.2* 10.2* 9.7*   HCT 32.8* 33.4* 31.4*    372* 335   MCV 90 91 91   RDW 19.3* 19.8* 19.8*        Chemistries:    Recent Labs  Lab 03/06/17  1117 03/06/17 2150 03/07/17  0045 03/07/17  0415   *  < > 125* 125* 125*   K 4.7  < > 4.8 4.8 4.9   CL 89*  < > 97 97 96   CO2 20*  < > 18* 18* 19*   BUN 40*  < > 37* 37* 38*   CREATININE 2.2*  < > 1.8* 1.8* 1.9*   CALCIUM 9.0  < > 8.0* 8.1* 7.9*   ALBUMIN 2.8*  --   --   --  2.1*   PROT 7.8  --   --   --  6.1   BILITOT 1.3*  --   --   --  1.2*   ALKPHOS 79  --   --   --  58   ALT 24  --   --   --  21   AST 45*  --   --   --  40   MG 2.0  --   --   --  1.8   PHOS 3.0  --   --   --  3.6   < > = values in this interval not displayed.    Lactic Acid:   Recent Labs  Lab 03/06/17  1117 03/06/17  1606 03/06/17  2150   LACTATE 4.2* 2.2 2.1       Significant Imaging:  I have reviewed and  interpreted all pertinent imaging results/findings within the past 24 hours.

## 2017-03-07 NOTE — PROCEDURES
[facility]  Thoracentesis  Procedure Note    SUMMARY     Date of Procedure: 3/6/2017    Procedure: Thoracentesis with attempted pigtail placement    Provider: Serene Estrada MD    Attending provider: Dr. Medrano    Indications: Loculated effusion    Pre-Operative Diagnosis: as above    Post-Operative Diagnosis: as above    Anesthesia: 1% local anesthesia    Description of the Findings of the Procedure:    Consent: Informed consent was obtained. Risks of the procedure were discussed including: infection, bleeding, pain, pneumothorax.    Under sterile conditions the patient was positioned. Betadine solution and sterile drapes were utilized.  1% lidocaine was used to anesthetize the 6-7th rib space. Fluid was obtained without any difficulties and minimal blood loss. However, difficulty passing the guidewire. Therefore only small amount of fluid collected for analysis.   15 ml of yellowish pleural fluid was obtained.     Plan:    A follow up chest x-ray was ordered.  Tylenol 650 mg. for pain.       Complications: none    Estimated Blood Loss (EBL): 0           Drains: none    Condition: Stable    Disposition: Cont ICU care    Serene Estrada MD  PCCM Fellow

## 2017-03-07 NOTE — PLAN OF CARE
P Mark Serra MD  [address] / [place] [address]    [id number] [place] - [address]  [address]  [place] [address]  Phone: [phone] Fax: [phone]    Payor: [employer] MANAGED MEDICARE / Plan: [employer] CHOICES 65 / Product Type: Medicare Advantage /     Future Appointments  Date Time Provider Department Center   3/8/2017 10:15 AM Laura Rader MD [facility] [place] [facility]   3/22/2017 11:15 AM INJECTION, [facility] [facility]     Extended Emergency Contact Information  Primary Emergency Contact: CherAgatha  Address: [address]           [place] [address] [facility]  Home Phone: [phone]  Mobile Phone: [phone]  Relation: Spouse  Secondary Emergency Contact: GeorgedoriRafy   [place]  Mobile Phone: [phone]  Relation: Brother     03/07/17 1346   Discharge Assessment   Assessment Type Discharge Planning Assessment   Confirmed/corrected address and phone number on facesheet? Yes   Assessment information obtained from? Patient;Medical Record   Expected Length of Stay (days) 4   Communicated expected length of stay with patient/caregiver no   Prior to hospitilization cognitive status: Alert/Oriented   Prior to hospitalization functional status: Independent   Current cognitive status: Alert/Oriented   Current Functional Status: Assistive Equipment;Needs Assistance   Arrived From home or self-care   Lives With spouse   Able to Return to Prior Arrangements yes   Is patient able to care for self after discharge? Yes   How many people do you have in your home that can help with your care after discharge? 1   Who are your caregiver(s) and their phone number(s)? Agatha Kwon (wife) [phone] or [phone]    Patient's perception of discharge disposition home or selfcare   Readmission Within The Last 30 Days no previous admission in last 30 days   Patient  currently being followed by outpatient case management? No   Patient currently receives home health services? No   Does the patient currently use HME? No   Patient currently receives private duty nursing? N/A   Patient currently receives any other outside agency services? No   Equipment Currently Used at Home none   Do you have any problems affording any of your prescribed medications? No   Is the patient taking medications as prescribed? yes   Do you have any financial concerns preventing you from receiving the healthcare you need? No   Does the patient have transportation to healthcare appointments? Yes   Transportation Available family or friend will provide   On Dialysis? No   Does the patient receive services at the Coumadin Clinic? No   Are there any open cases? No   Discharge Plan A Home Health   Discharge Plan B Home with family   Patient/Family In Agreement With Plan yes   Maricruz Millan RN, BSN  Case Management  [facility]  Ext. [phone]

## 2017-03-07 NOTE — ASSESSMENT & PLAN NOTE
- Creatine 2.2 from baseline of 1.2  - Likely prerenal given presentation, FeNA   - Given IV hydration, will continue to monitor

## 2017-03-07 NOTE — ASSESSMENT & PLAN NOTE
- CXR concerning for right lower lob PNA  - Given history of recent hospitalization/procedure concern for HCAP   - Will continue to cover broadly, Zosyn and Vanc   - S/p Pigtail placement by IR 3/7

## 2017-03-07 NOTE — PROCEDURES
"Radiology Post-Procedure Note    Pre Op Diagnosis: right pleural effusion  Post Op Diagnosis: Same    Procedure: Thoracentesis with placement of drain    Procedure performed by: Geovany Browning MD    Written Informed Consent Obtained: Yes  Specimen Removed: YES 20 cc  Estimated Blood Loss: Minimal    Findings:   Successful thoracentesis with placement of pigtail catheter into right pleural effusion    Patient tolerated procedure well.    Arnoldo Olson MD ([facility])  Radiology PGY-3  [phone]      "

## 2017-03-07 NOTE — PROGRESS NOTES
Right thoracentesis with chest tube placed by [facility]. Patient tolerated procedure well. VSS. Heart rate remains 140-150 BPM, a-fib with RVR. Patient denies associated s/sx. Breathing status remains the same on Bipap. Chest tube draining serous fluid. Placed to water seal. Will continue to monitor.

## 2017-03-07 NOTE — ASSESSMENT & PLAN NOTE
- home diltiazem, toprol xl and coumadin   - Will continue diltiazem, coumadin and hold toprol xl   - Will start amio drip and continue to monitor

## 2017-03-07 NOTE — CONSULTS
"IR consult    Reason: thoracentesis for loculated right pleural effusion.    ICU team was called about consult. The patients INR was 2.6 this AM. The decision was made to give FFP and re-check labs. If INR below 2.0, IR will attempt to thoracentesis with placement of pigtail catheter.    Arnoldo Olson MD ([facility])  Radiology PGY-3  [phone]    "

## 2017-03-07 NOTE — PROGRESS NOTES
Patient's Blood pressure ~70/40(55). CCS notified. Orders received for fluid bolus, hold Toprol. Patient's mental status WNL, patient denies any s/sx associated with hypotension. Orders carried out. Will continue to monitor patient.

## 2017-03-07 NOTE — ASSESSMENT & PLAN NOTE
- Likely 2/2 to suspected HCAP given CXR findings.    - Currently on Bipap   - Will watch closely for possible intubation

## 2017-03-07 NOTE — H&P
Inpatient Radiology Pre-procedure Note    History of Present Illness:  Wil Kwon Jr. is a 72 y.o. male who presents for thoracentesis with drain placement.    Admission H&P reviewed.  Past Medical History:   Diagnosis Date    Aortic valve stenosis, mild     secondary to bicuspid aortic alve    Atrial fibrillation     Carotid artery disease     Left CEA 4/9/13    Depression     DJD (degenerative joint disease)     H/O echocardiogram 04/13/2016    EF 55-60%. Diastolic dysfunction. PASP 39. mod AS with JEFF 1.18    History of psychiatric hospitalization     [facility] 2014, [facility] 1993    Hx of psychiatric care     Hyperlipidemia     Hypertension     MDS (myelodysplastic syndrome)     Pancytopenia     Psychiatric problem     Therapy     [facility]      Past Surgical History:   Procedure Laterality Date    BONE MARROW BIOPSY  08/29/2016    CAROTID ENDARTERECTOMY Left     Inguinal hernia      Left    KNEE SURGERY      Right x2    neck fusion      TONSILLECTOMY         Review of Systems:   As documented in primary team H&P    Home Meds:   Prior to Admission medications    Medication Sig Start Date End Date Taking? Authorizing Provider   acetaminophen (TYLENOL) 325 MG tablet Take 325 mg by mouth as needed for Pain.    Historical Provider, MD   citalopram (CELEXA) 40 MG tablet TAKE 1 TABLET BY MOUTH DAILY  Patient taking differently: TAKE 1 TABLET BY MOUTH DAILY (am) 9/14/14   Margarita Cuevas MD   diltiaZEM (CARDIZEM CD) 300 MG 24 hr capsule TAKE ONE CAPSULE BY MOUTH EVERY DAY 1/16/17   SAMMY Serra MD   losartan (COZAAR) 50 MG tablet Take 50 mg by mouth once daily. 10/21/16   Historical Provider, MD   metoprolol succinate (TOPROL-XL) 25 MG 24 hr tablet Take 1 tablet (25 mg total) by mouth once daily. 10/11/16   SAMMY Serra MD   multivitamin capsule Take 1 capsule by mouth every morning.     Historical Provider, MD   nystatin (MYCOSTATIN) ointment Apply  topically 3 (three) times daily. 12/14/16 1/6/17  Julius Barry NP   omega-3 fatty acids-vitamin E (FISH OIL) 1,000 mg Cap Take 3 capsules by mouth once daily.     Historical Provider, MD   ondansetron (ZOFRAN) 8 MG tablet Take 1 tablet (8 mg total) by mouth every 8 (eight) hours as needed for Nausea. 3/3/17 3/3/18  Julius Barry NP   pravastatin (PRAVACHOL) 20 MG tablet Take 1 tablet (20 mg total) by mouth every other day. 1/6/17   SAMMY Serra MD   warfarin (COUMADIN) 3 MG tablet Take 1 tablet (3 mg total) by mouth Daily. 11/16/16 11/16/17  Laura Rader MD     Scheduled Meds:    citalopram  40 mg Oral Daily    lidocaine HCL 10 mg/ml (1%)  1 mL Other Once    morphine        piperacillin-tazobactam 4.5 g in dextrose 5 % 100 mL IVPB (ready to mix system)  4.5 g Intravenous Q8H    sodium chloride 0.9%  3 mL Intravenous Q8H    vancomycin (VANCOCIN) IVPB  15 mg/kg Intravenous Q24H     Continuous Infusions:    dexmedetomidine (PRECEDEX) infusion 0.2 mcg/kg/hr (03/07/17 0830)    norepinephrine bitartrate-D5W Stopped (03/07/17 1130)     PRN Meds:sodium chloride, sodium chloride, albuterol-ipratropium 2.5mg-0.5mg/3mL  Anticoagulants/Antiplatelets: no anticoagulation    Allergies:   Review of patient's allergies indicates:   Allergen Reactions    Lipitor [atorvastatin]      Muscle pain    Lisinopril Edema     Cheek swelling     Sedation Hx: have not been any systemic reactions    Labs:    Recent Labs  Lab 03/07/17  1454   INR 2.2*       Recent Labs  Lab 03/07/17  0415   WBC 40.38*   HGB 9.7*   HCT 31.4*   MCV 91         Recent Labs  Lab 03/07/17  0415   *   *   K 4.9   CL 96   CO2 19*   BUN 38*   CREATININE 1.9*   CALCIUM 7.9*   MG 1.8   ALT 21   AST 40   ALBUMIN 2.1*   BILITOT 1.2*         Vitals:  Temp: 97.9 °F (36.6 °C) (03/07/17 1600)  Pulse: (!) 136 (03/07/17 1600)  Resp: (!) 26 (03/07/17 1600)  BP: 96/69 (03/07/17 1600)  SpO2: 99 % (03/07/17 1600)     Physical Exam:  ASA:  "3  Mallampati: 2    General: no acute distress  Mental Status: alert and oriented to person, place and time  HEENT: normocephalic, atraumatic  Chest: labored breathing  Heart: regular heart rate  Abdomen: nondistended  Extremity: moves all extremities    Plan: thoracentesis with placement of pigtail catheter  Sedation Plan: local    Arnoldo Olson MD ([place])  Radiology PGY-3  [phone]      "

## 2017-03-07 NOTE — CONSULTS
Consult Note  General Surgery    Consult Requested By: MICU  Reason for Consult: VATS    SUBJECTIVE:     History of Present Illness:  Patient is a 72 y.o. male with h/o myelodysplastic syndrome, most recent chemo 3/3/17, ho/ HTN, Afib (on Coumadin), and aortic stenosis, who presented to ED with fever and SOB.  This had progressed with a productive cough and had chest wall pain.  Noted fevers on Sunday.  He was started on broad spec abx and placed on CPAP.  CT chest demonstrated large right sided loculated effusion.  Thoracentesis obtained sample of fluid but unable to place pigtail.  IR and Thoracic surgery consulted for drainage of suspected empyema.    Scheduled Meds:   citalopram  40 mg Oral Daily    lidocaine HCL 10 mg/ml (1%)  1 mL Other Once    magnesium sulfate IVPB  2 g Intravenous Once    morphine        piperacillin-tazobactam 4.5 g in dextrose 5 % 100 mL IVPB (ready to mix system)  4.5 g Intravenous Q8H    sodium chloride 0.9%  3 mL Intravenous Q8H    vancomycin (VANCOCIN) IVPB  15 mg/kg Intravenous Q24H     Continuous Infusions:   dexmedetomidine (PRECEDEX) infusion 0.2 mcg/kg/hr (03/07/17 0830)     PRN Meds:sodium chloride, albuterol-ipratropium 2.5mg-0.5mg/3mL    Review of patient's allergies indicates:   Allergen Reactions    Lipitor [atorvastatin]      Muscle pain    Lisinopril Edema     Cheek swelling       Past Medical History:   Diagnosis Date    Aortic valve stenosis, mild     secondary to bicuspid aortic alve    Atrial fibrillation     Carotid artery disease     Left CEA 4/9/13    Depression     DJD (degenerative joint disease)     H/O echocardiogram 04/13/2016    EF 55-60%. Diastolic dysfunction. PASP 39. mod AS with JEFF 1.18    History of psychiatric hospitalization     Replaced by [facility] 2014, [facility] 1993    Hx of psychiatric care     Hyperlipidemia     Hypertension     MDS (myelodysplastic syndrome)     Pancytopenia     Psychiatric problem     Therapy     [facility]      Past Surgical History:   Procedure Laterality Date    BONE MARROW BIOPSY  08/29/2016    CAROTID ENDARTERECTOMY Left     Inguinal hernia      Left    KNEE SURGERY      Right x2    neck fusion      TONSILLECTOMY       Family History   Problem Relation Age of Onset    Hyperlipidemia Brother      Social History   Substance Use Topics    Smoking status: Never Smoker    Smokeless tobacco: Never Used    Alcohol use No        Review of Systems:  Constitutional: positive for chills and fevers  Eyes: no visual changes  Respiratory: positive for cough and SOB  Cardiovascular: positive for chest pain  Gastrointestinal: positive for nausea  Genitourinary: no hematuria or dysuria  Musculoskeletal: no arthralgias or myalgias  Neurological: no seizures or tremors    OBJECTIVE:     Vital Signs (Most Recent)  Temp: 98.2 °F (36.8 °C) (03/07/17 0700)  Pulse: (!) 147 (03/07/17 0700)  Resp: (!) 42 (03/07/17 0700)  BP: (!) 177/74 (03/07/17 0700)  SpO2: 100 % (03/07/17 0700)    Vital Signs Range (Last 24H):  Temp:  [97.8 °F (36.6 °C)-100.3 °F (37.9 °C)]   Pulse:  [102-147]   Resp:  [24-42]   BP: ()/(44-84)   SpO2:  [91 %-100 %]     Physical Exam:  General: well developed, well nourished, no distress  Head: normocephalic, atraumatic  Eyes:  conjunctivae/corneas clear.  Lungs:  decreased bs to right hemithorax especially at base; tachypneic  Heart: irregularly irregular rhythm  Abdomen: soft, non-tender non-distented; bowel sounds normal; no masses,  no organomegaly  Extremities: no cyanosis or edema, or clubbing  Skin: Skin color, texture, turgor normal. No rashes or lesions    Laboratory:  CBC:   Recent Labs  Lab 03/07/17 0415   WBC 40.38*   RBC 3.46*   HGB 9.7*   HCT 31.4*        CMP:   Recent Labs  Lab 03/07/17 0415   *   CALCIUM 7.9*   ALBUMIN 2.1*   PROT 6.1   *   K 4.9   CO2 19*   CL 96   BUN 38*   CREATININE 1.9*   ALKPHOS 58   ALT 21   AST 40   BILITOT 1.2*      Coagulation:   Recent Labs  Lab 03/07/17  0758   INR 2.6*     ABGs:   Recent Labs  Lab 03/07/17  0901   PH 7.416   PCO2 29.8*   HCO3 19.1*   POCSATURATED 99   BE -5       Diagnostic Results:  X-Ray: Reviewed  CT: Reviewed - 2 moderate size loculated effusions in right hemithorax    Echo:  Moderate AS and MR with preserved EF    ASSESSMENT/PLAN:     73 y/o male with h/o MDS, AS, and MR, with right empyema.    Continue management per primary.  Given moderate AS and MR, agree with IR catheter placement in both fluid collections - recommend placing largest pigtails available.  This will allow us to manage with tPA.    Recommend correcting coagulopathy.   If no clinical improvement after pigtails placed, will discuss need for VATS / decortication, though patient will be at risk given cardiac status.  Cont broad spectrum abx.  Will continue to follow.    Discussed with Dr. Pfeiffer.     Alesia Bah MD  General Surgery, PGY-V  [phone]

## 2017-03-08 LAB
ALBUMIN SERPL BCP-MCNC: 2.1 G/DL
ALP SERPL-CCNC: 53 U/L
ALT SERPL W/O P-5'-P-CCNC: 20 U/L
ANION GAP SERPL CALC-SCNC: 8 MMOL/L
ANISOCYTOSIS BLD QL SMEAR: SLIGHT
AST SERPL-CCNC: 33 U/L
BASOPHILS # BLD AUTO: ABNORMAL K/UL
BASOPHILS NFR BLD: 0 %
BILIRUB SERPL-MCNC: 1.4 MG/DL
BUN SERPL-MCNC: 49 MG/DL
CALCIUM SERPL-MCNC: 8 MG/DL
CHLORIDE SERPL-SCNC: 99 MMOL/L
CO2 SERPL-SCNC: 20 MMOL/L
CREAT SERPL-MCNC: 1.9 MG/DL
DIFFERENTIAL METHOD: ABNORMAL
EOSINOPHIL # BLD AUTO: ABNORMAL K/UL
EOSINOPHIL NFR BLD: 0 %
ERYTHROCYTE [DISTWIDTH] IN BLOOD BY AUTOMATED COUNT: 19.5 %
EST. GFR  (AFRICAN AMERICAN): 39.8 ML/MIN/1.73 M^2
EST. GFR  (NON AFRICAN AMERICAN): 34.5 ML/MIN/1.73 M^2
GIANT PLATELETS BLD QL SMEAR: PRESENT
GLUCOSE SERPL-MCNC: 125 MG/DL
HCT VFR BLD AUTO: 30.7 %
HGB BLD-MCNC: 9.9 G/DL
HYPOCHROMIA BLD QL SMEAR: ABNORMAL
INR PPP: 2.6
L PNEUMO AG UR QL IA: NOT DETECTED
LACTATE SERPL-SCNC: 1 MMOL/L
LYMPHOCYTES # BLD AUTO: ABNORMAL K/UL
LYMPHOCYTES NFR BLD: 8 %
MAGNESIUM SERPL-MCNC: 2.4 MG/DL
MCH RBC QN AUTO: 28 PG
MCHC RBC AUTO-ENTMCNC: 32.2 %
MCV RBC AUTO: 87 FL
MONOCYTES # BLD AUTO: ABNORMAL K/UL
MONOCYTES NFR BLD: 2 %
MYELOCYTES NFR BLD MANUAL: 1 %
NEUTROPHILS NFR BLD: 87 %
NEUTS BAND NFR BLD MANUAL: 2 %
NRBC BLD-RTO: ABNORMAL /100 WBC
OVALOCYTES BLD QL SMEAR: ABNORMAL
PHOSPHATE SERPL-MCNC: 4.6 MG/DL
PLATELET # BLD AUTO: 344 K/UL
PMV BLD AUTO: ABNORMAL FL
POIKILOCYTOSIS BLD QL SMEAR: SLIGHT
POLYCHROMASIA BLD QL SMEAR: ABNORMAL
POTASSIUM SERPL-SCNC: 4.3 MMOL/L
PROT SERPL-MCNC: 6.4 G/DL
PROTHROMBIN TIME: 26 SEC
RBC # BLD AUTO: 3.54 M/UL
SODIUM SERPL-SCNC: 127 MMOL/L
WBC # BLD AUTO: 36.76 K/UL

## 2017-03-08 PROCEDURE — 25000003 PHARM REV CODE 250: Performed by: INTERNAL MEDICINE

## 2017-03-08 PROCEDURE — 99233 SBSQ HOSP IP/OBS HIGH 50: CPT | Mod: GC,,, | Performed by: INTERNAL MEDICINE

## 2017-03-08 PROCEDURE — 63600175 PHARM REV CODE 636 W HCPCS: Performed by: INTERNAL MEDICINE

## 2017-03-08 PROCEDURE — 85610 PROTHROMBIN TIME: CPT

## 2017-03-08 PROCEDURE — 25000003 PHARM REV CODE 250: Performed by: HOSPITALIST

## 2017-03-08 PROCEDURE — 85027 COMPLETE CBC AUTOMATED: CPT

## 2017-03-08 PROCEDURE — 83605 ASSAY OF LACTIC ACID: CPT

## 2017-03-08 PROCEDURE — 80053 COMPREHEN METABOLIC PANEL: CPT

## 2017-03-08 PROCEDURE — 63600175 PHARM REV CODE 636 W HCPCS: Performed by: STUDENT IN AN ORGANIZED HEALTH CARE EDUCATION/TRAINING PROGRAM

## 2017-03-08 PROCEDURE — 83735 ASSAY OF MAGNESIUM: CPT

## 2017-03-08 PROCEDURE — 63600175 PHARM REV CODE 636 W HCPCS: Performed by: HOSPITALIST

## 2017-03-08 PROCEDURE — 25000003 PHARM REV CODE 250: Performed by: STUDENT IN AN ORGANIZED HEALTH CARE EDUCATION/TRAINING PROGRAM

## 2017-03-08 PROCEDURE — 27000221 HC OXYGEN, UP TO 24 HOURS

## 2017-03-08 PROCEDURE — 84100 ASSAY OF PHOSPHORUS: CPT

## 2017-03-08 PROCEDURE — 94660 CPAP INITIATION&MGMT: CPT

## 2017-03-08 PROCEDURE — 20000000 HC ICU ROOM

## 2017-03-08 PROCEDURE — 85007 BL SMEAR W/DIFF WBC COUNT: CPT

## 2017-03-08 RX ORDER — METOPROLOL TARTRATE 25 MG/1
12.5 TABLET ORAL 2 TIMES DAILY
Status: DISCONTINUED | OUTPATIENT
Start: 2017-03-08 | End: 2017-03-09

## 2017-03-08 RX ORDER — HYDROCODONE BITARTRATE AND ACETAMINOPHEN 5; 325 MG/1; MG/1
1 TABLET ORAL EVERY 6 HOURS PRN
Status: DISCONTINUED | OUTPATIENT
Start: 2017-03-08 | End: 2017-03-18 | Stop reason: HOSPADM

## 2017-03-08 RX ORDER — MORPHINE SULFATE 2 MG/ML
2 INJECTION, SOLUTION INTRAMUSCULAR; INTRAVENOUS
Status: DISCONTINUED | OUTPATIENT
Start: 2017-03-08 | End: 2017-03-18 | Stop reason: HOSPADM

## 2017-03-08 RX ORDER — MORPHINE SULFATE 2 MG/ML
4 INJECTION, SOLUTION INTRAMUSCULAR; INTRAVENOUS ONCE
Status: DISCONTINUED | OUTPATIENT
Start: 2017-03-08 | End: 2017-03-08

## 2017-03-08 RX ORDER — METOPROLOL TARTRATE 1 MG/ML
5 INJECTION, SOLUTION INTRAVENOUS ONCE
Status: COMPLETED | OUTPATIENT
Start: 2017-03-08 | End: 2017-03-08

## 2017-03-08 RX ORDER — MORPHINE SULFATE 2 MG/ML
2 INJECTION, SOLUTION INTRAMUSCULAR; INTRAVENOUS EVERY 4 HOURS PRN
Status: DISCONTINUED | OUTPATIENT
Start: 2017-03-08 | End: 2017-03-08

## 2017-03-08 RX ORDER — DIGOXIN 125 MCG
0.12 TABLET ORAL EVERY OTHER DAY
Status: DISCONTINUED | OUTPATIENT
Start: 2017-03-08 | End: 2017-03-09

## 2017-03-08 RX ORDER — MORPHINE SULFATE 2 MG/ML
4 INJECTION, SOLUTION INTRAMUSCULAR; INTRAVENOUS ONCE AS NEEDED
Status: COMPLETED | OUTPATIENT
Start: 2017-03-08 | End: 2017-03-08

## 2017-03-08 RX ORDER — AMOXICILLIN 250 MG
1 CAPSULE ORAL 2 TIMES DAILY
Status: DISCONTINUED | OUTPATIENT
Start: 2017-03-08 | End: 2017-03-18 | Stop reason: HOSPADM

## 2017-03-08 RX ADMIN — Medication 3 ML: at 06:03

## 2017-03-08 RX ADMIN — PIPERACILLIN SODIUM AND TAZOBACTAM SODIUM 4.5 G: 4; .5 INJECTION, POWDER, FOR SOLUTION INTRAVENOUS at 11:03

## 2017-03-08 RX ADMIN — MORPHINE SULFATE 2 MG: 2 INJECTION, SOLUTION INTRAMUSCULAR; INTRAVENOUS at 12:03

## 2017-03-08 RX ADMIN — METOPROLOL TARTRATE 5 MG: 5 INJECTION INTRAVENOUS at 07:03

## 2017-03-08 RX ADMIN — DORNASE ALFA: 1 SOLUTION RESPIRATORY (INHALATION) at 10:03

## 2017-03-08 RX ADMIN — MORPHINE SULFATE 2 MG: 2 INJECTION, SOLUTION INTRAMUSCULAR; INTRAVENOUS at 08:03

## 2017-03-08 RX ADMIN — HYDROCODONE BITARTRATE AND ACETAMINOPHEN 1 TABLET: 5; 325 TABLET ORAL at 10:03

## 2017-03-08 RX ADMIN — MORPHINE SULFATE 4 MG: 2 INJECTION, SOLUTION INTRAMUSCULAR; INTRAVENOUS at 02:03

## 2017-03-08 RX ADMIN — ALTEPLASE: 2.2 INJECTION, POWDER, LYOPHILIZED, FOR SOLUTION INTRAVENOUS at 06:03

## 2017-03-08 RX ADMIN — MORPHINE SULFATE 4 MG: 2 INJECTION, SOLUTION INTRAMUSCULAR; INTRAVENOUS at 04:03

## 2017-03-08 RX ADMIN — Medication 0.04 MCG/KG/MIN: at 06:03

## 2017-03-08 RX ADMIN — CITALOPRAM HYDROBROMIDE 40 MG: 40 TABLET ORAL at 08:03

## 2017-03-08 RX ADMIN — MORPHINE SULFATE 2 MG: 2 INJECTION, SOLUTION INTRAMUSCULAR; INTRAVENOUS at 05:03

## 2017-03-08 RX ADMIN — VANCOMYCIN HYDROCHLORIDE 1250 MG: 1 INJECTION, POWDER, LYOPHILIZED, FOR SOLUTION INTRAVENOUS at 08:03

## 2017-03-08 RX ADMIN — MORPHINE SULFATE 4 MG: 2 INJECTION, SOLUTION INTRAMUSCULAR; INTRAVENOUS at 06:03

## 2017-03-08 RX ADMIN — DIGOXIN 250 MCG: 0.25 INJECTION INTRAMUSCULAR; INTRAVENOUS at 04:03

## 2017-03-08 RX ADMIN — PIPERACILLIN AND TAZOBACTAM 4.5 G: 4; .5 INJECTION, POWDER, LYOPHILIZED, FOR SOLUTION INTRAVENOUS; PARENTERAL at 06:03

## 2017-03-08 RX ADMIN — CEFTRIAXONE 2 G: 2 INJECTION, SOLUTION INTRAVENOUS at 12:03

## 2017-03-08 RX ADMIN — PIPERACILLIN SODIUM AND TAZOBACTAM SODIUM 4.5 G: 4; .5 INJECTION, POWDER, FOR SOLUTION INTRAVENOUS at 04:03

## 2017-03-08 RX ADMIN — MORPHINE SULFATE 2 MG: 2 INJECTION, SOLUTION INTRAMUSCULAR; INTRAVENOUS at 03:03

## 2017-03-08 RX ADMIN — DIGOXIN 0.12 MG: 125 TABLET ORAL at 10:03

## 2017-03-08 RX ADMIN — MORPHINE SULFATE 2 MG: 2 INJECTION, SOLUTION INTRAMUSCULAR; INTRAVENOUS at 11:03

## 2017-03-08 RX ADMIN — Medication 12.5 MG: at 09:03

## 2017-03-08 RX ADMIN — Medication 12.5 MG: at 10:03

## 2017-03-08 RX ADMIN — Medication 3 ML: at 09:03

## 2017-03-08 NOTE — PROGRESS NOTES
This note serves in addendum to previous note from resident.    Mr. Moreau noted to have moderate aortic stenosis with normal LVEF and apical hypokinesis on most recent echo.  Suspicion that he has active ischemia with hypokinesis is low - more likely a product of tachycardia and acute illness.  As mentioned no further ischemic work up indicated prior to surgery given emergent nature of surgery for life-threatening pneumonia.    In regards to rate control - he needs more aggressive rate control prior to surgery to ensure hemodynamically stability during surgery. Would aim for -120 given acute illness.  On metoprolol and diltiazem at home but he has shown hypotension to both of these agents - again likely related to infection.  He was noted to have subtherapeutic INR yesterday - so there is potential risk of left atrial clot, and risk of systemic embolization in he has cardioversion in the setting of amiodarone.  Thus would recommend digoxin as first line agent and amiodarone only if non-responsive to digoxin recognizing embolic risk.    -Digoxin 500mcg x 1, then 250mcg 6-8 hours later  -Lower loading dose given renal function and acute illness 125 QOD  -Levels in 24-48 hours    Discussed with Dr. Delcid.    Dayron Diaz MD

## 2017-03-08 NOTE — PROGRESS NOTES
Progress Note  Thoracic Surgery    Admit Date: 3/6/2017  Attending: Kentrell  S/P: * No surgery found *    Post-operative Day:      Hospital Day: 3    SUBJECTIVE:     NAEON. Pain improved.  SOB improving though still on bipap.  Underwent IR drainage with catheter placement.  No other issues.    OBJECTIVE:     Vital Signs (Most Recent)  Temp: 99.2 °F (37.3 °C) (03/08/17 1100)  Pulse: (!) 138 (03/08/17 1100)  Resp: (!) 22 (03/08/17 1100)  BP: (!) 133/58 (03/08/17 1100)  SpO2: 95 % (03/08/17 1100)    Vital Signs Range (Last 24H):  Temp:  [97 °F (36.1 °C)-99.3 °F (37.4 °C)]   Pulse:  []   Resp:  [18-38]   BP: ()/(50-84)   SpO2:  [94 %-100 %]     I & O (Last 24H):  Intake/Output Summary (Last 24 hours) at 03/08/17 1159  Last data filed at 03/08/17 1000   Gross per 24 hour   Intake          1533.78 ml   Output             2240 ml   Net          -706.22 ml     Physical Exam:  General: well developed, well nourished, no distress  Lungs:  clear to auscultation bilaterally and BIPAP; chest tube in place with serous fluid  Heart: irregularly irregular rhythm and tachycardic  Abdomen: soft, non-tender non-distented; bowel sounds normal; no masses,  no organomegaly    Wound/Incision:  n/a    Laboratory:  CBC:   Recent Labs  Lab 03/08/17  0354   WBC 36.76*   RBC 3.54*   HGB 9.9*   HCT 30.7*      MCV 87   MCH 28.0   MCHC 32.2     CMP:   Recent Labs  Lab 03/08/17  0354   *   CALCIUM 8.0*   ALBUMIN 2.1*   PROT 6.4   *   K 4.3   CO2 20*   CL 99   BUN 49*   CREATININE 1.9*   ALKPHOS 53*   ALT 20   AST 33   BILITOT 1.4*     ABGs:   Recent Labs  Lab 03/07/17  0901   PH 7.416   PCO2 29.8*   HCO3 19.1*   POCSATURATED 99   BE -5     Labs within the past 24 hours have been reviewed.    ASSESSMENT/PLAN:     Assessment: 73 y/o male with MDS and right empyema, s/p IR drainage.    Plan:   Continue management per primary.  Chest tube to suction.  Will plan for tPA tomorrow.  Cont IV abx  Recommend rate  controlling Afib - pt persistently in 140s.  Will cont to follow.    Alesia Bah MD  General Surgery, PGY-V  [phone]

## 2017-03-08 NOTE — PLAN OF CARE
Problem: Fall Risk (Adult)  Goal: Identify Related Risk Factors and Signs and Symptoms  Related risk factors and signs and symptoms are identified upon initiation of Human Response Clinical Practice Guideline (CPG)   Outcome: Ongoing (interventions implemented as appropriate)  No falls this shift.  Patient did not get OOB.      Problem: Patient Care Overview  Goal: Plan of Care Review  Outcome: Ongoing (interventions implemented as appropriate)  Alert and oriented.  A fib - heart rate varied from 80s - 160s.  Digoxin given per eMAR for HR control.  T max 99.1 axillary.  Bipap all shift - FiO2 35%.  Right chest tube put out 140cc of serous fluid.  Patient complains of intense pain @ CT site requiring frequent morphine.  Brother @ bedside all shift assisting with the urinal and patient's needs.  Patient voided twice in the urinal - manjeet urine with a foul smell.  Levo stopped this AM 2/2 elevated BP.  No acute events overnight.

## 2017-03-08 NOTE — PROGRESS NOTES
[facility]  Cardiology  Progress Note    Patient Name: Wil Kwon Jr.  MRN: [id number]  Admission Date: 3/6/2017  Hospital Length of Stay: 2 days  Code Status: Full Code   Attending Physician: Kishor Medrano*   Primary Care Physician: LAILA Serra MD  Expected Discharge Date:   Principal Problem:Acute hypoxemic respiratory failure    Subjective:     Hospital Course: 72 years old male with Hx of myelodysplastic syndrome ( blast 7%), s/p Vidaza last week, HTN, chronic Afib on warfarin, HLP, HFpEF with asymptomatic moderate AS ( JEFF 1.18, peak velocity 3.64 ) on ECHO 4/2016. Admitted to the hospital 3/6 for progressive SOB, productive cough, right side chest pain. Found to have RLL/RML consolidation with right pleural effusion. Elevated RR, HR with BERNICE and worsening BP requiring levo. Started on broad spectrum ABX, CT chest show loculated effusion. pleural tap 3/6 show  with Glu <5.unable to place pigtail, Pending Cxs. CTS invloved for complicated PNA for possible VATS. Consulted for priop evaluation. Patient denied any Hx of CAD, chest pain before. Also Denied Hx of CVA, DM. Known asymptomatic moderate AS, chronic Afib on warfarin. Since admission patient was Afib RVR. Currently patient describe right chest pain correlate with pleuritic. Quit active before admission, up and down stairs with no SOB or chest pain, active physically, attending [employer] for aerobic. Never Hx of HF or syncope.        Interval History: had chest tube yesterday, oxygen req improved, off pressors, -130 on Digoxin.     ROS   Constitution: Positive for weakness. Negative for fever and weight gain.   Cardiovascular: Negative for chest pain, claudication, cyanosis.  Respiratory: Negative for hemoptysis and wheezing.   Gastrointestinal: Negative for jaundice and melena.       Objective:     Vital Signs (Most Recent):  Temp: 99.2 °F (37.3 °C) (03/08/17 1100)  Pulse: (!) 125 (03/08/17 1400)  Resp: (!)  22 (03/08/17 1400)  BP: 113/61 (03/08/17 1300)  SpO2: 98 % (03/08/17 1400) Vital Signs (24h Range):  Temp:  [97.9 °F (36.6 °C)-99.3 °F (37.4 °C)] 99.2 °F (37.3 °C)  Pulse:  [] 125  Resp:  [17-27] 22  SpO2:  [94 %-100 %] 98 %  BP: ()/(50-76) 113/61     Weight: 86.2 kg (190 lb)  Body mass index is 26.5 kg/(m^2).    SpO2: 98 %  O2 Device (Oxygen Therapy): nasal cannula      Intake/Output Summary (Last 24 hours) at 03/08/17 1446  Last data filed at 03/08/17 1400   Gross per 24 hour   Intake          1734.03 ml   Output             2240 ml   Net          -505.97 ml       Lines/Drains/Airways     Central Venous Catheter Line                 Port A Cath Single Lumen 03/07/17 1939 right subclavian less than 1 day          Drain                 Chest Tube 03/07/17 1700 1 Right Pleural 8 Fr. less than 1 day          Peripheral Intravenous Line                 Peripheral IV - Single Lumen 03/06/17 1116 Right Antecubital 2 days         Peripheral IV - Single Lumen 03/06/17 1230 Right Wrist 2 days                Physical Exam  Constitutional: He is oriented to person, place, and time. He appears well-developed. He is active. Non-toxic appearance. He does not have a sickly appearance. He appears ill. Nasal canula in place.   Eyes: Pupils are equal, round, and reactive to light. No scleral icterus.   Neck: Neck supple.   Cardiovascular: Intact distal pulses. An irregularly irregular rhythm present. Tachycardia present.   No murmur heard.  Pulmonary/Chest: No accessory muscle usage or stridor. Tachypnea noted. He has rales in the right lower field. Chest tube noted in the right hemothorax.   Neurological: He is alert and oriented to person, place, and time.          Significant Labs:   Blood Culture:   Recent Labs  Lab 03/07/17  0758   LABBLOO No Growth to date  No Growth to date   , BMP:   Recent Labs  Lab 03/07/17  0045 03/07/17  0415 03/08/17  0354   * 118* 125*   * 125* 127*   K 4.8 4.9 4.3   CL 97 96  99   CO2 18* 19* 20*   BUN 37* 38* 49*   CREATININE 1.8* 1.9* 1.9*   CALCIUM 8.1* 7.9* 8.0*   MG  --  1.8 2.4   , CMP   Recent Labs  Lab 03/07/17  0045 03/07/17  0415 03/08/17  0354   * 125* 127*   K 4.8 4.9 4.3   CL 97 96 99   CO2 18* 19* 20*   * 118* 125*   BUN 37* 38* 49*   CREATININE 1.8* 1.9* 1.9*   CALCIUM 8.1* 7.9* 8.0*   PROT  --  6.1 6.4   ALBUMIN  --  2.1* 2.1*   BILITOT  --  1.2* 1.4*   ALKPHOS  --  58 53*   AST  --  40 33   ALT  --  21 20   ANIONGAP 10 10 8   ESTGFRAFRICA 42.5* 39.8* 39.8*   EGFRNONAA 36.8* 34.5* 34.5*   , CBC   Recent Labs  Lab 03/06/17  2150 03/07/17  0415 03/08/17  0354   WBC 44.54* 40.38* 36.76*   HGB 10.2* 9.7* 9.9*   HCT 33.4* 31.4* 30.7*   * 335 344   , INR   Recent Labs  Lab 03/07/17  1113 03/07/17  1454 03/08/17  0354   INR 2.3* 2.2* 2.6*       Assessment and Plan:       72 years old male with Hx of asymptomatic moderate aortic stenosis JEFF 1.2 and peak velocity 3 with gradient 24, stage B with MET 4-10 at least. Revised Cardiac Risk Index for Pre-Operative Risk score is 0.4% in urgent intermediate risk surgery.         We recommend:       Afib with RVR with sever left atrial enlargement:   CHADS2 score 1, CHADS VASC score 3 ( HTN,PVD and age )  -- likely 2/2 sepsis. No signs of ACS.   -- low BP, now off pressors. Need Source control and for rate control, restart home BB then CC when BP allow.   -- keep off warfarin, restart when risk of bleed is low.         Asymptomatic moderate Aortic stenosis:  -- low risk with 0.4% risk of major cardiac events.   -- No need for Invasive cardiac investigation.   -- Cautious with volume overload or hypovolemia.         Saff note to follow     Thank you for your consult. I will follow-up with patient. Please contact us if you have any additional questions.     DUC Samaniego  Cardiology   [facility]          Active Diagnoses:    Diagnosis Date Noted POA    PRINCIPAL PROBLEM:  Acute hypoxemic  respiratory failure [J96.01] 03/06/2017 Unknown    Sepsis [A41.9] 03/06/2017 Yes    HCAP (healthcare-associated pneumonia) [J18.9] 03/06/2017 Unknown    Leukocytosis [D72.829] 03/06/2017 Unknown    BERNICE (acute kidney injury) [N17.9] 03/06/2017 Unknown    MDS (myelodysplastic syndrome) [D46.9] 09/07/2016 Yes    Depression [F32.9] 08/17/2016 Yes    Hyponatremia [E87.1] 04/22/2016 Yes    Anemia, chronic disease [D63.8] 04/01/2016 Yes    Paroxysmal atrial fibrillation [I48.0] 01/13/2016 Yes    HTN (hypertension), benign [I10] 10/17/2012 Yes    Dyslipidemia [E78.5] 10/17/2012 Yes      Problems Resolved During this Admission:    Diagnosis Date Noted Date Resolved POA       VTE Risk Mitigation     None          DUC Samaniego  Cardiology  [facility]

## 2017-03-08 NOTE — PLAN OF CARE
Problem: Patient Care Overview  Goal: Plan of Care Review  Outcome: Ongoing (interventions implemented as appropriate)  No acute events during the day. Pt maintaining on 2L NC, SATS> 95% . Pt VSS. HR~120s, SBP~100s. Pt neuro intact, follows commands. Pt on cardiac diet tolerating well, Pt UOA, pt remains afebrile and denies pain. Pt scheduled to have TPA instilled in his chest tube by MD Major this pm. POC reviewed with pt and family, all questions and concerns answered and addressed. WCTM closely for acute changes.

## 2017-03-08 NOTE — PROGRESS NOTES
Patient growing strep intermedium from plural fluid as well as klebsiella oxytoca from his resp cx. Patient was de-escalated from vanc and zosyn today to IV ceftriaxone. Given the new cultures, will place him back to zosyn and await aspeciation of klebsiella.     Joann Washington MD  Internal Medicine PGY-2  Pager [phone]

## 2017-03-08 NOTE — ASSESSMENT & PLAN NOTE
- Likely prerenal given presentation, FeNA   - Currently stable   - Given IV hydration, will continue to monitor and advance diet

## 2017-03-08 NOTE — PLAN OF CARE
Problem: Patient Care Overview  Goal: Individualization & Mutuality  Outcome: Ongoing (interventions implemented as appropriate)  Pt prefers brother at bedside and room cold

## 2017-03-08 NOTE — ASSESSMENT & PLAN NOTE
- Resolving   - Elevated WBC, tachypnea, tachycardic and suspected HCAP on admission   - Please see HCAP for plan

## 2017-03-08 NOTE — PROGRESS NOTES
[facility]  Critical Care Medicine  Progress Note    Patient Name: Wil Kwon Jr.  MRN: [id number]  Admission Date: 3/6/2017  Hospital Length of Stay: 2 days  Code Status: Full Code  Attending Provider: Kishor Medrano*  Primary Care Provider: LAILA Serra MD   Principal Problem: Acute hypoxemic respiratory failure    Subjective:     HPI:  72 year old male with history of MDS (s/p Chemo x 5 most recent 3/03/2017), HTN, Afib (coumadin, lopressor), hyperlipidemia, and AS presents to the ED with fever and SOB.  Patient reports he was in his usual state of health until Friday shortly after receiving chemotherapy (Vidaza) when he started to experience SOB.  This SOB progressively worsened and patient developed a productive cough with brownish green foul tasting sputum.  In addition he noticed a progressive right sided chest wall pain that radiated towards his back that is currently a 10/10.  His pain is temporarily relived by cough.  Patient brother notes patient appeared feverish starting Sunday.  Patient denies sick contacts and report he has received the flu shot this year.  He reports some nausea and vomiting beginning yesterday, no abdominal pain and normal BMs.  Currently denies HA, or palpitations              Hospital/ICU Course:  3/06: Admitted to ICU and started on broad spectrum abx for suspected HCAP, Started on CPAP     Interval History/Significant Events: NAEON.  Off of Bipap and stable on 4L nc.  Reports minimal improvement in right sided chest pain.  Denies BM since admission and reports he is hungry.  Currently denies any new complaints.  Chest tube output 1L      Review of Systems   Constitutional: Positive for fatigue.   Respiratory: Positive for cough and shortness of breath. Negative for chest tightness.    Cardiovascular: Positive for chest pain (Right sided, radiating to back, stabbing ). Negative for palpitations.   Gastrointestinal: Negative for constipation,  diarrhea and vomiting.   Genitourinary: Negative for dysuria.   Musculoskeletal: Positive for back pain (Right back ).     Objective:     Vital Signs (Most Recent):  Temp: 99.2 °F (37.3 °C) (03/08/17 1100)  Pulse: (!) 125 (03/08/17 1400)  Resp: (!) 22 (03/08/17 1400)  BP: 113/61 (03/08/17 1300)  SpO2: 98 % (03/08/17 1400) Vital Signs (24h Range):  Temp:  [97.9 °F (36.6 °C)-99.3 °F (37.4 °C)] 99.2 °F (37.3 °C)  Pulse:  [] 125  Resp:  [17-27] 22  SpO2:  [94 %-100 %] 98 %  BP: ()/(50-76) 113/61   Weight: 86.2 kg (190 lb)  Body mass index is 26.5 kg/(m^2).      Intake/Output Summary (Last 24 hours) at 03/08/17 1459  Last data filed at 03/08/17 1400   Gross per 24 hour   Intake          1734.03 ml   Output             2240 ml   Net          -505.97 ml       Physical Exam   Constitutional: He is oriented to person, place, and time. He appears well-developed and well-nourished. He has a sickly appearance.   HENT:   Head: Normocephalic and atraumatic.   Right Ear: External ear normal.   Left Ear: External ear normal.   Nose: Nose normal.   Mouth/Throat: Oropharynx is clear and moist.   Eyes: Conjunctivae and EOM are normal. Pupils are equal, round, and reactive to light.   Neck: Normal range of motion. Neck supple.   Cardiovascular: Intact distal pulses.  An irregularly irregular rhythm present.   Murmur heard.  Pulmonary/Chest: Accessory muscle usage present. Tachypnea noted. He has decreased breath sounds in the right middle field and the right lower field. He has no wheezes. He has rhonchi.   Chest tube on right, clear and dry    Abdominal: Bowel sounds are normal. He exhibits distension (Mild ). There is no tenderness. There is no guarding.   Musculoskeletal: Normal range of motion. He exhibits no edema.   Neurological: He is alert and oriented to person, place, and time.   Skin: Skin is warm. He is diaphoretic. No erythema.   Psychiatric: He has a normal mood and affect. His behavior is normal. Judgment and  thought content normal.   Nursing note and vitals reviewed.      Vents:  Oxygen Concentration (%): 35 (03/08/17 0701)  Lines/Drains/Airways     Drain                 Chest Tube 03/07/17 1700 1 Right Pleural 8 Fr. less than 1 day          Peripheral Intravenous Line                 Peripheral IV - Single Lumen 03/06/17 1116 Right Antecubital 1 day         Peripheral IV - Single Lumen 03/06/17 1230 Right Wrist 1 day              Significant Labs:    CBC/Anemia Profile:    Recent Labs  Lab 03/06/17  2150 03/07/17  0415 03/08/17  0354   WBC 44.54* 40.38* 36.76*   HGB 10.2* 9.7* 9.9*   HCT 33.4* 31.4* 30.7*   * 335 344   MCV 91 91 87   RDW 19.8* 19.8* 19.5*        Chemistries:    Recent Labs  Lab 03/07/17  0045 03/07/17  0415 03/08/17  0354   * 125* 127*   K 4.8 4.9 4.3   CL 97 96 99   CO2 18* 19* 20*   BUN 37* 38* 49*   CREATININE 1.8* 1.9* 1.9*   CALCIUM 8.1* 7.9* 8.0*   ALBUMIN  --  2.1* 2.1*   PROT  --  6.1 6.4   BILITOT  --  1.2* 1.4*   ALKPHOS  --  58 53*   ALT  --  21 20   AST  --  40 33   MG  --  1.8 2.4   PHOS  --  3.6 4.6*       Lactic Acid:     Recent Labs  Lab 03/06/17  1606 03/06/17 2150 03/08/17  0817   LACTATE 2.2 2.1 1.0       Significant Imaging:  I have reviewed and interpreted all pertinent imaging results/findings within the past 24 hours.    Assessment/Plan:     Pulmonary  * Acute hypoxemic respiratory failure  - Resolving, on 4L NC   - Likely 2/2 to suspected HCAP given CXR findings.    - Currently on Bipap   - Will watch closely for possible intubation       HCAP (healthcare-associated pneumonia)  - CXR concerning for right lower lob PNA  - Given history of recent hospitalization/procedure concern for HCAP    - S/p Pigtail placement by IR 3/7  - Cultured came back positive for strep viridans, will deescalate to ceftriaxone     Cardiac  Paroxysmal atrial fibrillation  - home diltiazem, toprol xl and coumadin   - Will continue diltiazem, coumadin and hold toprol xl   - Started on  digitoxin per cards recs, will follow up rate continues to be elevated in the 130s   - Will start lopressor 12.5 BID        Renal  BERNICE (acute kidney injury)  - Likely prerenal given presentation, FeNA   - Currently stable   - Given IV hydration, will continue to monitor and advance diet    ID  Sepsis  - Resolving   - Elevated WBC, tachypnea, tachycardic and suspected HCAP on admission   - Please see HCAP for plan     Hem/Onc  MDS (myelodysplastic syndrome)  - Hx since 2013 per patient   - Multiple chemo treatments most recent 3/03 Vidaza     Anemia, chronic disease  - hx of MDS with chemo therapy  - Will continue to monitor and transfuse below 7     Leukocytosis  - significant leukocytosis, given neulasta and aranesp   - Continue to monitor, currently downtrending     Fluids/Electrolytes/Nutrition/GI  Hyponatremia  - Stable   - Given CXR findings will test for legionella pending  - Will advance diet and fluid restrict       Critical Care Time: 25 minutes  Critical secondary to Patient has a condition that poses threat to life and bodily function: Severe Respiratory Distress      Critical care was time spent personally by me on the following activities: development of treatment plan with patient or surrogate and bedside caregivers, discussions with consultants, evaluation of patient's response to treatment, examination of patient, ordering and performing treatments and interventions, ordering and review of laboratory studies, ordering and review of radiographic studies, pulse oximetry, re-evaluation of patient's condition. This critical care time did not overlap with that of any other provider or involve time for any procedures.     Dispo: off BiPAP today will follow up with CTS regarding future VATS, continue IV abx     Roddy Garner MD  Critical Care Medicine  [facility]

## 2017-03-08 NOTE — ASSESSMENT & PLAN NOTE
- CXR concerning for right lower lob PNA  - Given history of recent hospitalization/procedure concern for HCAP    - S/p Pigtail placement by IR 3/7  - Cultured came back positive for strep viridans, will deescalate to ceftriaxone

## 2017-03-08 NOTE — PROGRESS NOTES
[facility]  Critical Care Medicine  Progress Note    Patient Name: Wil Kwon Jr.  MRN: [id number]  Admission Date: 3/6/2017  Hospital Length of Stay: 1 days  Code Status: Full Code  Attending Provider: Kishor Medrano*  Primary Care Provider: LAILA Serra MD   Principal Problem: Acute hypoxemic respiratory failure    Subjective:     HPI:  72 year old male with history of MDS (s/p Chemo x 5 most recent 3/03/2017), HTN, Afib (coumadin, lopressor), hyperlipidemia, and AS presents to the ED with fever and SOB.  Patient reports he was in his usual state of health until Friday shortly after receiving chemotherapy (Vidaza) when he started to experience SOB.  This SOB progressively worsened and patient developed a productive cough with brownish green foul tasting sputum.  In addition he noticed a progressive right sided chest wall pain that radiated towards his back that is currently a 10/10.  His pain is temporarily relived by cough.  Patient brother notes patient appeared feverish starting Sunday.  Patient denies sick contacts and report he has received the flu shot this year.  He reports some nausea and vomiting beginning yesterday, no abdominal pain and normal BMs.  Currently denies HA, or palpitations              Hospital/ICU Course:  3/06: Admitted to ICU and started on broad spectrum abx for suspected HCAP, Started on CPAP     Interval History/Significant Events: NAEON.  Tolerating Bipap well. Anxious this AM and started on precedex.  S/p pigtail placement with IR, tolerated procedure well.      Review of Systems   Constitutional: Positive for fatigue.   Eyes: Negative for pain and visual disturbance.   Respiratory: Positive for cough, chest tightness and shortness of breath.    Cardiovascular: Positive for chest pain (Right sided, radiating to back, stabbing ). Negative for palpitations.   Gastrointestinal: Positive for vomiting. Negative for constipation and diarrhea.    Genitourinary: Negative for dysuria.   Musculoskeletal: Positive for back pain (Right back ).   Skin: Negative for rash.     Objective:     Vital Signs (Most Recent):  Temp: 97.9 °F (36.6 °C) (03/07/17 1600)  Pulse: (!) 146 (03/07/17 1710)  Resp: (!) 24 (03/07/17 1710)  BP: (!) 113/51 (03/07/17 1710)  SpO2: 98 % (03/07/17 1710) Vital Signs (24h Range):  Temp:  [96.8 °F (36 °C)-98.2 °F (36.8 °C)] 97.9 °F (36.6 °C)  Pulse:  [111-148] 146  Resp:  [22-42] 24  SpO2:  [96 %-100 %] 98 %  BP: ()/(41-84) 113/51   Weight: 86.2 kg (190 lb)  Body mass index is 26.5 kg/(m^2).      Intake/Output Summary (Last 24 hours) at 03/07/17 1745  Last data filed at 03/07/17 1600   Gross per 24 hour   Intake           972.75 ml   Output              825 ml   Net           147.75 ml       Physical Exam   Constitutional: He is oriented to person, place, and time. He appears well-developed and well-nourished. He has a sickly appearance.   HENT:   Head: Normocephalic and atraumatic.   Right Ear: External ear normal.   Left Ear: External ear normal.   Nose: Nose normal.   Mouth/Throat: Oropharynx is clear and moist.   Eyes: Conjunctivae and EOM are normal. Pupils are equal, round, and reactive to light.   Neck: Normal range of motion. Neck supple.   Cardiovascular: Intact distal pulses.  An irregularly irregular rhythm present.   Murmur heard.  Pulmonary/Chest: Accessory muscle usage present. Tachypnea noted. He has decreased breath sounds in the right middle field and the right lower field. He has no wheezes. He has rhonchi.   Abdominal: Bowel sounds are normal. He exhibits distension (Mild ). There is no tenderness. There is no guarding.   Musculoskeletal: Normal range of motion. He exhibits no edema.   Neurological: He is alert and oriented to person, place, and time.   Skin: Skin is warm. He is diaphoretic. No erythema.   Psychiatric: He has a normal mood and affect. His behavior is normal. Judgment and thought content normal.    Nursing note and vitals reviewed.      Vents:  Oxygen Concentration (%): 35 (03/07/17 1647)  Lines/Drains/Airways     Drain                 Chest Tube 03/07/17 1700 1 Right Pleural 8 Fr. less than 1 day          Peripheral Intravenous Line                 Peripheral IV - Single Lumen 03/06/17 1116 Right Antecubital 1 day         Peripheral IV - Single Lumen 03/06/17 1230 Right Wrist 1 day              Significant Labs:    CBC/Anemia Profile:    Recent Labs  Lab 03/06/17 1117 03/06/17 2150 03/07/17  0415   WBC 41.89* 44.54* 40.38*   HGB 10.2* 10.2* 9.7*   HCT 32.8* 33.4* 31.4*    372* 335   MCV 90 91 91   RDW 19.3* 19.8* 19.8*        Chemistries:    Recent Labs  Lab 03/06/17  1117 03/06/17 2150 03/07/17  0045 03/07/17  0415   *  < > 125* 125* 125*   K 4.7  < > 4.8 4.8 4.9   CL 89*  < > 97 97 96   CO2 20*  < > 18* 18* 19*   BUN 40*  < > 37* 37* 38*   CREATININE 2.2*  < > 1.8* 1.8* 1.9*   CALCIUM 9.0  < > 8.0* 8.1* 7.9*   ALBUMIN 2.8*  --   --   --  2.1*   PROT 7.8  --   --   --  6.1   BILITOT 1.3*  --   --   --  1.2*   ALKPHOS 79  --   --   --  58   ALT 24  --   --   --  21   AST 45*  --   --   --  40   MG 2.0  --   --   --  1.8   PHOS 3.0  --   --   --  3.6   < > = values in this interval not displayed.    Lactic Acid:   Recent Labs  Lab 03/06/17 1117 03/06/17  1606 03/06/17 2150   LACTATE 4.2* 2.2 2.1       Significant Imaging:  I have reviewed and interpreted all pertinent imaging results/findings within the past 24 hours.    Assessment/Plan:     Pulmonary  * Acute hypoxemic respiratory failure  - Likely 2/2 to suspected HCAP given CXR findings.    - Currently on Bipap   - Will watch closely for possible intubation       HCAP (healthcare-associated pneumonia)  - CXR concerning for right lower lob PNA  - Given history of recent hospitalization/procedure concern for HCAP   - Will continue to cover broadly, Zosyn and Vanc   - S/p Pigtail placement by IR 3/7    Cardiac  Paroxysmal atrial  fibrillation  - home diltiazem, toprol xl and coumadin   - Will continue diltiazem, coumadin and hold toprol xl   - Will start amio drip and continue to monitor        HTN (hypertension), benign  - home toprol xl and losartan   - Will hold     Renal  BERNICE (acute kidney injury)  - Creatine 2.2 from baseline of 1.2  - Likely prerenal given presentation, FeNA   - Given IV hydration, will continue to monitor     ID  Sepsis  - Elevated WBC, tachypnea, tachycardic and suspected HCAP  - Please see HCAP for plan     Hem/Onc  MDS (myelodysplastic syndrome)  - Hx since 2013 per patient   - Multiple chemo treatments most recent 3/03 Vidaza     Anemia, chronic disease  - hx of MDS with chemo therapy  - Will continue to monitor and transfuse below 7     Leukocytosis  - significant leukocytosis, given neulasta and aranesp   - Continue to monitor     Fluids/Electrolytes/Nutrition/GI  Hyponatremia  - Stable   - Currently NPO   - Given CXR findings will test for legionella       Critical Care Time: 35 minutes  Critical secondary to Patient has a condition that poses threat to life and bodily function: Acute respiratory failure       Dispo: will watch for possible intubation, continue abx and bipap, will load and start digoxin for rate control      Roddy Garner MD  Critical Care Medicine  [facility]

## 2017-03-08 NOTE — SUBJECTIVE & OBJECTIVE
Interval History/Significant Events: NAEON.  Off of Bipap and stable on 4L nc.  Reports minimal improvement in right sided chest pain.  Denies BM since admission and reports he is hungry.  Currently denies any new complaints.      Review of Systems   Constitutional: Positive for fatigue.   Respiratory: Positive for cough and shortness of breath. Negative for chest tightness.    Cardiovascular: Positive for chest pain (Right sided, radiating to back, stabbing ). Negative for palpitations.   Gastrointestinal: Negative for constipation, diarrhea and vomiting.   Genitourinary: Negative for dysuria.   Musculoskeletal: Positive for back pain (Right back ).     Objective:     Vital Signs (Most Recent):  Temp: 99.2 °F (37.3 °C) (03/08/17 1100)  Pulse: (!) 125 (03/08/17 1400)  Resp: (!) 22 (03/08/17 1400)  BP: 113/61 (03/08/17 1300)  SpO2: 98 % (03/08/17 1400) Vital Signs (24h Range):  Temp:  [97.9 °F (36.6 °C)-99.3 °F (37.4 °C)] 99.2 °F (37.3 °C)  Pulse:  [] 125  Resp:  [17-27] 22  SpO2:  [94 %-100 %] 98 %  BP: ()/(50-76) 113/61   Weight: 86.2 kg (190 lb)  Body mass index is 26.5 kg/(m^2).      Intake/Output Summary (Last 24 hours) at 03/08/17 1459  Last data filed at 03/08/17 1400   Gross per 24 hour   Intake          1734.03 ml   Output             2240 ml   Net          -505.97 ml       Physical Exam   Constitutional: He is oriented to person, place, and time. He appears well-developed and well-nourished. He has a sickly appearance.   HENT:   Head: Normocephalic and atraumatic.   Right Ear: External ear normal.   Left Ear: External ear normal.   Nose: Nose normal.   Mouth/Throat: Oropharynx is clear and moist.   Eyes: Conjunctivae and EOM are normal. Pupils are equal, round, and reactive to light.   Neck: Normal range of motion. Neck supple.   Cardiovascular: Intact distal pulses.  An irregularly irregular rhythm present.   Murmur heard.  Pulmonary/Chest: Accessory muscle usage present. Tachypnea noted. He  has decreased breath sounds in the right middle field and the right lower field. He has no wheezes. He has rhonchi.   Chest tube on right, clear and dry    Abdominal: Bowel sounds are normal. He exhibits distension (Mild ). There is no tenderness. There is no guarding.   Musculoskeletal: Normal range of motion. He exhibits no edema.   Neurological: He is alert and oriented to person, place, and time.   Skin: Skin is warm. He is diaphoretic. No erythema.   Psychiatric: He has a normal mood and affect. His behavior is normal. Judgment and thought content normal.   Nursing note and vitals reviewed.      Vents:  Oxygen Concentration (%): 35 (03/08/17 0701)  Lines/Drains/Airways     Drain                 Chest Tube 03/07/17 1700 1 Right Pleural 8 Fr. less than 1 day          Peripheral Intravenous Line                 Peripheral IV - Single Lumen 03/06/17 1116 Right Antecubital 1 day         Peripheral IV - Single Lumen 03/06/17 1230 Right Wrist 1 day              Significant Labs:    CBC/Anemia Profile:    Recent Labs  Lab 03/06/17  2150 03/07/17  0415 03/08/17  0354   WBC 44.54* 40.38* 36.76*   HGB 10.2* 9.7* 9.9*   HCT 33.4* 31.4* 30.7*   * 335 344   MCV 91 91 87   RDW 19.8* 19.8* 19.5*        Chemistries:    Recent Labs  Lab 03/07/17  0045 03/07/17  0415 03/08/17  0354   * 125* 127*   K 4.8 4.9 4.3   CL 97 96 99   CO2 18* 19* 20*   BUN 37* 38* 49*   CREATININE 1.8* 1.9* 1.9*   CALCIUM 8.1* 7.9* 8.0*   ALBUMIN  --  2.1* 2.1*   PROT  --  6.1 6.4   BILITOT  --  1.2* 1.4*   ALKPHOS  --  58 53*   ALT  --  21 20   AST  --  40 33   MG  --  1.8 2.4   PHOS  --  3.6 4.6*       Lactic Acid:     Recent Labs  Lab 03/06/17  1606 03/06/17  2150 03/08/17  0817   LACTATE 2.2 2.1 1.0       Significant Imaging:  I have reviewed and interpreted all pertinent imaging results/findings within the past 24 hours.

## 2017-03-08 NOTE — ASSESSMENT & PLAN NOTE
- Resolving, on 4L NC   - Likely 2/2 to suspected HCAP given CXR findings.    - Currently on Bipap   - Will watch closely for possible intubation

## 2017-03-08 NOTE — ASSESSMENT & PLAN NOTE
- significant leukocytosis, given neulasta and aranesp   - Continue to monitor, currently downtrending

## 2017-03-08 NOTE — ASSESSMENT & PLAN NOTE
- Stable   - Given CXR findings will test for legionella pending  - Will advance diet and fluid restrict

## 2017-03-09 PROBLEM — J86.9 EMPYEMA OF RIGHT PLEURAL SPACE: Status: ACTIVE | Noted: 2017-03-09

## 2017-03-09 PROBLEM — R05.9 COUGH: Status: ACTIVE | Noted: 2017-03-09

## 2017-03-09 PROBLEM — G47.00 INSOMNIA: Status: ACTIVE | Noted: 2017-03-09

## 2017-03-09 LAB
ALBUMIN SERPL BCP-MCNC: 1.8 G/DL
ALP SERPL-CCNC: 67 U/L
ALT SERPL W/O P-5'-P-CCNC: 18 U/L
ANION GAP SERPL CALC-SCNC: 10 MMOL/L
ANISOCYTOSIS BLD QL SMEAR: ABNORMAL
ANISOCYTOSIS BLD QL SMEAR: SLIGHT
AST SERPL-CCNC: 30 U/L
BACTERIA FLD AEROBE CULT: NORMAL
BACTERIA SPEC AEROBE CULT: NORMAL
BACTERIA SPEC AEROBE CULT: NORMAL
BASO STIPL BLD QL SMEAR: ABNORMAL
BASOPHILS NFR BLD: 0 %
BASOPHILS NFR BLD: 0 %
BILIRUB SERPL-MCNC: 2.3 MG/DL
BUN SERPL-MCNC: 45 MG/DL
BURR CELLS BLD QL SMEAR: ABNORMAL
CALCIUM SERPL-MCNC: 7.8 MG/DL
CHLORIDE SERPL-SCNC: 98 MMOL/L
CO2 SERPL-SCNC: 20 MMOL/L
CREAT SERPL-MCNC: 1.4 MG/DL
DIFFERENTIAL METHOD: ABNORMAL
DIFFERENTIAL METHOD: ABNORMAL
EOSINOPHIL NFR BLD: 0 %
EOSINOPHIL NFR BLD: 0 %
ERYTHROCYTE [DISTWIDTH] IN BLOOD BY AUTOMATED COUNT: 18.3 %
ERYTHROCYTE [DISTWIDTH] IN BLOOD BY AUTOMATED COUNT: 18.4 %
EST. GFR  (AFRICAN AMERICAN): 57.6 ML/MIN/1.73 M^2
EST. GFR  (NON AFRICAN AMERICAN): 49.8 ML/MIN/1.73 M^2
GIANT PLATELETS BLD QL SMEAR: PRESENT
GLUCOSE SERPL-MCNC: 117 MG/DL
GRAM STN SPEC: NORMAL
HCT VFR BLD AUTO: 29.8 %
HCT VFR BLD AUTO: 30.8 %
HGB BLD-MCNC: 9.4 G/DL
HGB BLD-MCNC: 9.6 G/DL
HYPOCHROMIA BLD QL SMEAR: ABNORMAL
INR PPP: 3.8
LYMPHOCYTES NFR BLD: 2 %
LYMPHOCYTES NFR BLD: 5 %
MAGNESIUM SERPL-MCNC: 1.8 MG/DL
MCH RBC QN AUTO: 27.7 PG
MCH RBC QN AUTO: 28.1 PG
MCHC RBC AUTO-ENTMCNC: 31.2 %
MCHC RBC AUTO-ENTMCNC: 31.5 %
MCV RBC AUTO: 89 FL
MCV RBC AUTO: 89 FL
METAMYELOCYTES NFR BLD MANUAL: 1 %
METAMYELOCYTES NFR BLD MANUAL: 1 %
MONOCYTES NFR BLD: 1 %
MONOCYTES NFR BLD: 3 %
MYELOCYTES NFR BLD MANUAL: 1 %
NEUTROPHILS NFR BLD: 85 %
NEUTROPHILS NFR BLD: 88 %
NEUTS BAND NFR BLD MANUAL: 2 %
NEUTS BAND NFR BLD MANUAL: 9 %
NRBC BLD-RTO: ABNORMAL /100 WBC
OVALOCYTES BLD QL SMEAR: ABNORMAL
OVALOCYTES BLD QL SMEAR: ABNORMAL
PHOSPHATE SERPL-MCNC: 4.3 MG/DL
PLATELET # BLD AUTO: 207 K/UL
PLATELET # BLD AUTO: ABNORMAL K/UL
PLATELET BLD QL SMEAR: ABNORMAL
PMV BLD AUTO: ABNORMAL FL
PMV BLD AUTO: ABNORMAL FL
POIKILOCYTOSIS BLD QL SMEAR: SLIGHT
POIKILOCYTOSIS BLD QL SMEAR: SLIGHT
POLYCHROMASIA BLD QL SMEAR: ABNORMAL
POLYCHROMASIA BLD QL SMEAR: ABNORMAL
POTASSIUM SERPL-SCNC: 4.3 MMOL/L
PROMYELOCYTES NFR BLD MANUAL: 2 %
PROT SERPL-MCNC: 5.7 G/DL
PROTHROMBIN TIME: 38 SEC
RBC # BLD AUTO: 3.35 M/UL
RBC # BLD AUTO: 3.46 M/UL
SODIUM SERPL-SCNC: 128 MMOL/L
WBC # BLD AUTO: 31.84 K/UL
WBC # BLD AUTO: 33.67 K/UL

## 2017-03-09 PROCEDURE — 97161 PT EVAL LOW COMPLEX 20 MIN: CPT

## 2017-03-09 PROCEDURE — 25000003 PHARM REV CODE 250: Performed by: INTERNAL MEDICINE

## 2017-03-09 PROCEDURE — 80053 COMPREHEN METABOLIC PANEL: CPT

## 2017-03-09 PROCEDURE — 85610 PROTHROMBIN TIME: CPT

## 2017-03-09 PROCEDURE — 94760 N-INVAS EAR/PLS OXIMETRY 1: CPT

## 2017-03-09 PROCEDURE — 63600175 PHARM REV CODE 636 W HCPCS: Performed by: INTERNAL MEDICINE

## 2017-03-09 PROCEDURE — 84100 ASSAY OF PHOSPHORUS: CPT

## 2017-03-09 PROCEDURE — 83735 ASSAY OF MAGNESIUM: CPT

## 2017-03-09 PROCEDURE — 63600175 PHARM REV CODE 636 W HCPCS: Performed by: STUDENT IN AN ORGANIZED HEALTH CARE EDUCATION/TRAINING PROGRAM

## 2017-03-09 PROCEDURE — 27000221 HC OXYGEN, UP TO 24 HOURS

## 2017-03-09 PROCEDURE — 25000003 PHARM REV CODE 250: Performed by: STUDENT IN AN ORGANIZED HEALTH CARE EDUCATION/TRAINING PROGRAM

## 2017-03-09 PROCEDURE — 99233 SBSQ HOSP IP/OBS HIGH 50: CPT | Mod: GC,,, | Performed by: INTERNAL MEDICINE

## 2017-03-09 PROCEDURE — 63600175 PHARM REV CODE 636 W HCPCS: Performed by: HOSPITALIST

## 2017-03-09 PROCEDURE — 20000000 HC ICU ROOM

## 2017-03-09 PROCEDURE — 85027 COMPLETE CBC AUTOMATED: CPT | Mod: 91

## 2017-03-09 PROCEDURE — 97166 OT EVAL MOD COMPLEX 45 MIN: CPT

## 2017-03-09 PROCEDURE — 25000003 PHARM REV CODE 250: Performed by: HOSPITALIST

## 2017-03-09 PROCEDURE — 94660 CPAP INITIATION&MGMT: CPT

## 2017-03-09 PROCEDURE — 85007 BL SMEAR W/DIFF WBC COUNT: CPT

## 2017-03-09 RX ORDER — METOPROLOL TARTRATE 25 MG/1
50 TABLET, FILM COATED ORAL 4 TIMES DAILY
Status: DISCONTINUED | OUTPATIENT
Start: 2017-03-09 | End: 2017-03-12

## 2017-03-09 RX ORDER — FAMOTIDINE 20 MG/1
20 TABLET, FILM COATED ORAL DAILY
Status: DISCONTINUED | OUTPATIENT
Start: 2017-03-09 | End: 2017-03-18 | Stop reason: HOSPADM

## 2017-03-09 RX ORDER — DILTIAZEM HYDROCHLORIDE 30 MG/1
30 TABLET, FILM COATED ORAL EVERY 6 HOURS
Status: DISCONTINUED | OUTPATIENT
Start: 2017-03-09 | End: 2017-03-11

## 2017-03-09 RX ORDER — METOPROLOL TARTRATE 50 MG/1
50 TABLET ORAL EVERY 8 HOURS
Status: DISCONTINUED | OUTPATIENT
Start: 2017-03-09 | End: 2017-03-09

## 2017-03-09 RX ORDER — METOPROLOL TARTRATE 50 MG/1
50 TABLET ORAL 2 TIMES DAILY
Status: DISCONTINUED | OUTPATIENT
Start: 2017-03-09 | End: 2017-03-09

## 2017-03-09 RX ADMIN — METOPROLOL TARTRATE 50 MG: 50 TABLET ORAL at 08:03

## 2017-03-09 RX ADMIN — DORNASE ALFA: 1 SOLUTION RESPIRATORY (INHALATION) at 06:03

## 2017-03-09 RX ADMIN — MORPHINE SULFATE 2 MG: 2 INJECTION, SOLUTION INTRAMUSCULAR; INTRAVENOUS at 07:03

## 2017-03-09 RX ADMIN — PIPERACILLIN SODIUM AND TAZOBACTAM SODIUM 4.5 G: 4; .5 INJECTION, POWDER, FOR SOLUTION INTRAVENOUS at 08:03

## 2017-03-09 RX ADMIN — DILTIAZEM HYDROCHLORIDE 30 MG: 30 TABLET, FILM COATED ORAL at 11:03

## 2017-03-09 RX ADMIN — Medication 3 ML: at 01:03

## 2017-03-09 RX ADMIN — MORPHINE SULFATE 2 MG: 2 INJECTION, SOLUTION INTRAMUSCULAR; INTRAVENOUS at 05:03

## 2017-03-09 RX ADMIN — MORPHINE SULFATE 2 MG: 2 INJECTION, SOLUTION INTRAMUSCULAR; INTRAVENOUS at 11:03

## 2017-03-09 RX ADMIN — Medication 3 ML: at 10:03

## 2017-03-09 RX ADMIN — HYDROCODONE BITARTRATE AND ACETAMINOPHEN 1 TABLET: 5; 325 TABLET ORAL at 09:03

## 2017-03-09 RX ADMIN — METOPROLOL TARTRATE 50 MG: 50 TABLET, FILM COATED ORAL at 05:03

## 2017-03-09 RX ADMIN — HYDROCODONE BITARTRATE AND ACETAMINOPHEN 1 TABLET: 5; 325 TABLET ORAL at 04:03

## 2017-03-09 RX ADMIN — STANDARDIZED SENNA CONCENTRATE AND DOCUSATE SODIUM 1 TABLET: 8.6; 5 TABLET, FILM COATED ORAL at 10:03

## 2017-03-09 RX ADMIN — CEFTRIAXONE 2 G: 2 INJECTION, SOLUTION INTRAVENOUS at 01:03

## 2017-03-09 RX ADMIN — MORPHINE SULFATE 2 MG: 2 INJECTION, SOLUTION INTRAMUSCULAR; INTRAVENOUS at 02:03

## 2017-03-09 RX ADMIN — FAMOTIDINE 20 MG: 20 TABLET, FILM COATED ORAL at 11:03

## 2017-03-09 RX ADMIN — DILTIAZEM HYDROCHLORIDE 30 MG: 30 TABLET, FILM COATED ORAL at 05:03

## 2017-03-09 RX ADMIN — MORPHINE SULFATE 2 MG: 2 INJECTION, SOLUTION INTRAMUSCULAR; INTRAVENOUS at 08:03

## 2017-03-09 RX ADMIN — STANDARDIZED SENNA CONCENTRATE AND DOCUSATE SODIUM 1 TABLET: 8.6; 5 TABLET, FILM COATED ORAL at 08:03

## 2017-03-09 RX ADMIN — ALTEPLASE: 2.2 INJECTION, POWDER, LYOPHILIZED, FOR SOLUTION INTRAVENOUS at 12:03

## 2017-03-09 RX ADMIN — MORPHINE SULFATE 2 MG: 2 INJECTION, SOLUTION INTRAMUSCULAR; INTRAVENOUS at 10:03

## 2017-03-09 RX ADMIN — CITALOPRAM HYDROBROMIDE 40 MG: 40 TABLET ORAL at 08:03

## 2017-03-09 NOTE — NURSING
CCS in on unit to admin dornase via chest tube. Med was due at 1000, but never send from pharmacy despite Rx msg sent and a response that the med will be tubed when ready. Pharmacy was called also called earlier after request was sent Pharmacy was called again to send med and spoke with Padma. She stated that she will prepare the request now. Awaiting med from pharmacy.

## 2017-03-09 NOTE — PLAN OF CARE
Problem: Physical Therapy Goal  Goal: Physical Therapy Goal  Goals to be met by: 3/19/17     Patient will increase functional independence with mobility by performin. Supine to sit with [place]  2. Sit to stand transfer with Supervision  3. Gait x 100 feet with Modified [place] using Rolling Walker.   4. Ascend/descend 2 stair with bilateral Handrails Supervision.   5. Lower extremity exercise program x15 reps per handout, with assistance as needed  Outcome: Ongoing (interventions implemented as appropriate)  Pt evaluated and goals appropriate.      Zack Don PT, DPT  [phone]

## 2017-03-09 NOTE — PT/OT/SLP EVAL
Occupational Therapy  Evaluation    Wil Kwon Jr.   MRN: [id number]   Admitting Diagnosis: Acute hypoxemic respiratory failure    OT Date of Treatment: 03/09/17   OT Start Time: 0930  OT Stop Time: 1000  OT Total Time (min): 30 min    Billable Minutes:  Evaluation 25    Diagnosis: Acute hypoxemic respiratory failure   Pt with MDS (s/p chemo x 5 with most recent 3/3/17.) Pt was admitted with SOB, progressing right side chest wall pain. Pt underwent paracentesis on 3/3/17    Past Medical History:   Diagnosis Date    Aortic valve stenosis, mild     secondary to bicuspid aortic alve    Atrial fibrillation     Carotid artery disease     Left CEA 4/9/13    Depression     DJD (degenerative joint disease)     H/O echocardiogram 04/13/2016    EF 55-60%. Diastolic dysfunction. PASP 39. mod AS with JEFF 1.18    History of psychiatric hospitalization     [facility] 2014, [facility]    Hx of psychiatric care     Hyperlipidemia     Hypertension     MDS (myelodysplastic syndrome)     Pancytopenia     Psychiatric problem     Therapy     [facility]       Past Surgical History:   Procedure Laterality Date    BONE MARROW BIOPSY  08/29/2016    CAROTID ENDARTERECTOMY Left     Inguinal hernia      Left    KNEE SURGERY      Right x2    neck fusion      TONSILLECTOMY       General Precautions: Standard, fall  Orthopedic Precautions: N/A    Patient History:  Living Environment  Lives With: spouse  Living Arrangements: house  Home Accessibility: stairs within home  Home Layout: Bedroom on 2nd floor  Living Environment Comment: Pt lives with spouse in 2 story home. Pt's bed/bath on second floor. Spouse is recovering from recent surgery and unable to assist pt upon d/c. Pt was independent PTA using a SPC occasionally and going to the gym several timse per week.  Equipment Currently Used at Home: cane, straight, other (see comments) (built in shower chair)    Subjective:  Communicated with  "nsg prior to session.  "Watch that tube!" pt states several times during session.     Pain Rating:  Pt reports pain in right flank near tube insertion area. Pt unable to rate pain.    Objective:   Pt found supine in bed agreeable to activity.     Cognitive Exam:  Oriented to: Person, Place, Time and Situation  Follows Commands/attention: Follows one-step commands  Communication: clear/fluent  Pt with flat affect and appear anxious throughout session. Pt slow to respond at times.     Physical Exam:  Pt is right hand dominant and demo WFL B UE strength/ROM. Pt with intact light touch sensation and intact Gm/FM coordination.     Functional Mobility:  Bed Mobility:  Supine to Sit: Maximum Assistance    Transfers:  Sit <> Stand Assistance: Minimum Assistance  Sit <> Stand Assistive Device: No Assistive Device  Bed <> Chair Technique: Stand Pivot  Bed <> Chair Transfer Assistance: Minimum Assistance      Activities of Daily Living:  Feeding Level of Assistance: Set-up Assistance  UE Dressing Level of Assistance: Set-up Assistance  LE Dressing Level of Assistance: Total assistance  Grooming Position: Seated  Grooming Level of Assistance: Supervision         AM-PAC 6 CLICK ADL  How much help from another person does this patient currently need?  1 = Unable, Total/Dependent Assistance  2 = A lot, Maximum/Moderate Assistance  3 = A little, Minimum/Contact Guard/Supervision  4 = None, Modified [place]/Independent    Putting on and taking off regular lower body clothing? : 1  Bathing (including washing, rinsing, drying)?: 2  Toileting, which includes using toilet, bedpan, or urinal? : 2  Putting on and taking off regular upper body clothing?: 2  Taking care of personal grooming such as brushing teeth?: 3  Eating meals?: 3  Total Score: 13    AM-PAC Raw Score CMS "G-Code Modifier Level of Impairment Assistance   6 % Total / Unable   7 - 9 CM 80 - 100% Maximal Assist   10 - 14 CL 60 - 80% Moderate Assist   15 - 19 CK " 40 - 60% Moderate Assist   20 - 22 CJ 20 - 40% Minimal Assist   23 CI 1-20% SBA / CGA   24 CH 0% Independent/ Mod I       Patient left up in chair with all lines intact, call button in reach and nsg notified    Assessment:  Wil Kwon Jr. is a 72 y.o. male with a medical diagnosis of Acute hypoxemic respiratory failure and tolerated session well with good effort and performance. Pt to benefit from cont OT to address stated goals. Pt may benefit from SNF vs HH upon d/c pending progress and medical plan.   Pt appears anxious with all activity but responds well to increased time and encouragement with activity. .    Rehab identified problem list/impairments: Rehab identified problem list/impairments: weakness, impaired endurance, impaired functional mobilty, gait instability, impaired balance, impaired self care skills    Rehab potential is good.    Activity tolerance: Good    Discharge recommendations: Discharge Facility/Level Of Care Needs: nursing facility, skilled     GOALS:   Occupational Therapy Goals        Problem: Occupational Therapy Goal    Goal Priority Disciplines Outcome Interventions   Occupational Therapy Goal     OT, PT/OT     Description:  Goals to be met by:  7 days 3/16/17     Patient will increase functional independence with ADLs by performing:    UE Dressing with Supervision.  LE Dressing with Supervision.  Grooming while standing with Supervision.  Toileting from toilet with Supervision for hygiene and clothing management.   Stand pivot transfers with Supervision.  Toilet transfer to toilet with Supervision.                PLAN:  Patient to be seen 5 x/week to address the above listed problems via self-care/home management, therapeutic activities, therapeutic exercises  Plan of Care expires:    Plan of Care reviewed with: patient         SUSIE Emanuel  03/09/2017

## 2017-03-09 NOTE — PT/OT/SLP EVAL
Physical Therapy  Evaluation    iWl Kwon Jr.   MRN: [id number]   Admitting Diagnosis: Acute hypoxemic respiratory failure    PT Received On: 03/09/17  PT Start Time: 0930     PT Stop Time: 0954    PT Total Time (min): 24 min     Co eval with OT  Billable Minutes:  Evaluation 24    Diagnosis: Acute hypoxemic respiratory failure      Past Medical History:   Diagnosis Date    Aortic valve stenosis, mild     secondary to bicuspid aortic alve    Atrial fibrillation     Carotid artery disease     Left CEA 4/9/13    Depression     DJD (degenerative joint disease)     H/O echocardiogram 04/13/2016    EF 55-60%. Diastolic dysfunction. PASP 39. mod AS with JEFF 1.18    History of psychiatric hospitalization     [facility] 2014, [facility] 1993    Hx of psychiatric care     Hyperlipidemia     Hypertension     MDS (myelodysplastic syndrome)     Pancytopenia     Psychiatric problem     Therapy     [facility]       Past Surgical History:   Procedure Laterality Date    BONE MARROW BIOPSY  08/29/2016    CAROTID ENDARTERECTOMY Left     Inguinal hernia      Left    KNEE SURGERY      Right x2    neck fusion      TONSILLECTOMY         Referring physician: MD Clifford  Date referred to PT: 3/8/17    General Precautions: Standard, fall  Orthopedic Precautions: N/A   Braces: N/A       Do you have any cultural, spiritual, Confucianism conflicts, given your current situation?: na    Patient History:  Lives With: spouse  Living Arrangements: house  Living Environment Comment: Pt reports living with his spouse in a 2  with 2 RENNY. Pt spouse is not able to assist physically. Pt was active PTA and went to the gym 3-4 x/week.   Equipment Currently Used at Home: none  DME owned (not currently used): single point cane and crutches    Previous Level of Function:  Ambulation Skills: needs device (SPC)  Transfer Skills: independent  ADL Skills: independent  Work/Leisure Activity:  "independent    Subjective:  Communicated with RN prior to session.  "Watch out for this tube." Pt anxious about chest tube throughout therapy session.   Chief Complaint: chest tube   Patient goals: return home and feel better    Pain Ratin/10               Pain Rating Post-Intervention: 0/10    Objective:   Patient found with: telemetry, chest tube, central line, blood pressure cuff, pulse ox (continuous), peripheral IV, oxygen     Cognitive Exam:  Oriented to: Person, Place, Time and Situation    Follows Commands/attention: Follows multistep  commands  Communication: clear/fluent  Safety awareness/insight to disability: intact    Physical Exam:  Postural examination/scapula alignment: Rounded shoulder and Head forward    Skin integrity: Visible skin intact  Edema: None noted in BLE    Sensation:   Intact    Lower Extremity Range of Motion:  Right Lower Extremity: WFL  Left Lower Extremity: WFL    Lower Extremity Strength:  Right Lower Extremity: WFL  Left Lower Extremity: WFL    Gross motor coordination: WFL    Functional Mobility:  Bed Mobility:  Rolling/Turning to Left: Maximum assistance  Rolling/Turning Right: Maximum assistance  Scooting/Bridging: Maximum Assistance  Supine to Sit: Maximum Assistance    Transfers:  Sit <> Stand Assistance: Minimum Assistance  Sit <> Stand Assistive Device: No Assistive Device  Bed <> Chair Technique: Stand Pivot  Bed <> Chair Assistive Device: No Assistive Device    Gait:   Gait Distance: not able to perform at this time     Balance:   Static Sit: FAIR: Maintains without assist, but unable to take any challenges   Dynamic Sit: FAIR: Cannot move trunk without losing balance  Static Stand: FAIR: Maintains without assist but unable to take challenges  Dynamic stand: POOR: N/A    Therapeutic Activities and Exercises:  Patient educated on role of PT and safety with mobility. Patient verbalized and demonstrated understanding of safety precautions with mobility. Pt performed " static sitting x ~8 min with CGA>SBA for trunk stability. Pt performed SPT with min a for support.     AM-PAC 6 CLICK MOBILITY  How much help from another person does this patient currently need?   1 = Unable, Total/Dependent Assistance  2 = A lot, Maximum/Moderate Assistance  3 = A little, Minimum/Contact Guard/Supervision  4 = None, Modified [place]/Independent    Turning over in bed (including adjusting bedclothes, sheets and blankets)?: 2  Sitting down on and standing up from a chair with arms (e.g., wheelchair, bedside commode, etc.): 3  Moving from lying on back to sitting on the side of the bed?: 3  Moving to and from a bed to a chair (including a wheelchair)?: 3  Need to walk in hospital room?: 1  Climbing 3-5 steps with a railing?: 1  Total Score: 13     AM-PAC Raw Score CMS G-Code Modifier Level of Impairment Assistance   6 % Total / Unable   7 - 9 CM 80 - 100% Maximal Assist   10 - 14 CL 60 - 80% Moderate Assist   15 - 19 CK 40 - 60% Moderate Assist   20 - 22 CJ 20 - 40% Minimal Assist   23 CI 1-20% SBA / CGA   24 CH 0% Independent/ Mod I     Patient left up in chair with all lines intact, call button in reach and RN notified.    Assessment:   Wil Kwon Jr. is a 72 y.o. male with a medical diagnosis of Acute hypoxemic respiratory failure and presents with decreased functional mobility due to impaired endurance and weakness throughout.  Patient would benefit from skilled PT in the acute care setting to improve the limitations listed below. Patient would benefit from SNF, pending progress upon discharge.      Rehab identified problem list/impairments: Rehab identified problem list/impairments: weakness, impaired endurance, impaired functional mobilty, gait instability, impaired balance    Rehab potential is good.    Activity tolerance: Fair    Discharge recommendations: Discharge Facility/Level Of Care Needs: nursing facility, skilled (pending progress and medical plan)     Barriers to  discharge: Barriers to Discharge: Decreased caregiver support, Inaccessible home environment    Equipment recommendations: Equipment Needed After Discharge:  (TBD)     GOALS:   Physical Therapy Goals        Problem: Physical Therapy Goal    Goal Priority Disciplines Outcome Goal Variances Interventions   Physical Therapy Goal     PT/OT, PT Ongoing (interventions implemented as appropriate)     Description:  Goals to be met by: 3/19/17     Patient will increase functional independence with mobility by performin. Supine to sit with [place]  2. Sit to stand transfer with Supervision  3. Gait  x 100 feet with Modified [place] using Rolling Walker.   4. Ascend/descend 2 stair with bilateral Handrails Supervision.   5. Lower extremity exercise program x15 reps per handout, with assistance as needed                PLAN:    Patient to be seen 5 x/week to address the above listed problems via gait training, therapeutic activities, therapeutic exercises, neuromuscular re-education  Plan of Care expires: 17  Plan of Care reviewed with: patient          Zack Don, PT  2017

## 2017-03-09 NOTE — ASSESSMENT & PLAN NOTE
- continues to be in atrial fibrillation with RVR in 130s-140s   - on metoprolol 50 mg bid, digoxin 0.125 mg q daily   - warfarin held given tPA administration and recent procedures   - diltiazem not continued as in-pt due to previous hypotension associated with it    - restarting diltiazem 30 mg q 6 H if BP stable

## 2017-03-09 NOTE — ASSESSMENT & PLAN NOTE
- Resolving, on 4L NC titrated down to 2L nc with SpO2 >95% at bedside   - tachypnea improved   - Likely 2/2 to suspected HCAP given CXR findings with respiratory sputum cx +Klebsiella and pleural fluid cx +strep viridans    - de-escalate to ceftriaxone today, discontinue zosyn

## 2017-03-09 NOTE — PLAN OF CARE
Problem: Occupational Therapy Goal  Goal: Occupational Therapy Goal  Goals to be met by: 7 days 3/16/17     Patient will increase functional independence with ADLs by performing:    UE Dressing with Supervision.  LE Dressing with Supervision.  Grooming while standing with Supervision.  Toileting from toilet with Supervision for hygiene and clothing management.   Stand pivot transfers with Supervision.  Toilet transfer to toilet with Supervision.  Goals and POC established today

## 2017-03-09 NOTE — PROGRESS NOTES
Cardiology  Progress Note                                                              Team: [facility] [place] CRITICAL CARE MEDICINE     Patient Name: Wil Kwon Jr.   YOB: 1945  Location: [address] [place] [address] A    Admit Date: 3/6/2017                     LOS: 3    SUBJECTIVE     INTERVAL HISTORY:     Patient s/p pigtail cath for right sided pneumonia. Continue to be in Afib with RVR, off of anticoagulation, with no acute/NEW symptoms of chest discomfort, SOB, syncope etc.     HPI:     72 years old male with Hx of myelodysplastic syndrome ( blast 7%), s/p Vidaza last week, HTN, chronic Afib on warfarin, HLP, HFpEF with asymptomatic moderate AS ( JEFF 1.18, peak velocity 3.64 ) on ECHO 4/2016. Admitted to the hospital 3/6 for progressive SOB, productive cough, right side chest pain. Found to have RLL/RML consolidation with right pleural effusion. Elevated RR, HR with BERNICE and worsening BP requiring levo. Started on broad spectrum ABX, CT chest show loculated effusion. pleural tap 3/6 show  with Glu <5.unable to place pigtail, Pending Cxs. CTS invloved for complicated PNA for possible VATS. Consulted for priop evaluation. Patient denied any Hx of CAD, chest pain before. Also Denied Hx of CVA, DM. Known asymptomatic moderate AS, chronic Afib on warfarin. Since admission patient was Afib RVR. Currently patient describe right chest pain correlate with pleuritic. Quit active before admission, up and down stairs with no SOB or chest pain, active physically, attending [employer] for aerobic. Never Hx of HF or syncope.     REVIEW OF SYSTEMS:  General: No syncope, fatigue, fevers, chills, nausea, or vomiting  HEENT: No HAs; No change in vision or pallor  Cardiac: No angina, palpitations, orthopnea, or PND  Pulmonary: No dyspnea, cough, hemoptysis, or wheezing  GI: No abdominal distention, pain, constipation, or diarrhea  : No dysuria, urgency, frequency, hematuria  Hematologic/Lymphatic: No night sweats, easy bruising, or  "LAD  Extremities: No claudication, arthralgias, or myalgias  Derm: No cyanosis or rash  Neuro: No focal weakness or numbness      MEDICATIONS:  Scheduled:   custom SYRINGE builder (PEDS/NICU)   Intra-Catheter Q12H    cefTRIAXone (ROCEPHIN) IVPB  2 g Intravenous Q24H    citalopram  40 mg Oral Daily    diltiaZEM  30 mg Oral Q6H    custom SYRINGE builder (PEDS/NICU)   Intra-Catheter Q12H    famotidine  20 mg Oral Daily    lidocaine HCL 10 mg/ml (1%)  1 mL Other Once    metoprolol tartrate  50 mg Oral BID    senna-docusate 8.6-50 mg  1 tablet Oral BID    sodium chloride 0.9%  3 mL Intravenous Q8H     Continuous:     PRN:  albuterol-ipratropium 2.5mg-0.5mg/3mL, hydrocodone-acetaminophen 5-325mg, morphine      OBJECTIVE:     VITALS  Most Recent Range (Last 24H)   BP (!) 146/58 (BP Location: Left arm, Patient Position: Lying, BP Method: Automatic)  Pulse (!) 139  Temp 97.9 °F (36.6 °C) (Oral)   Resp (!) 21  Ht 5' 11" (1.803 m)  Wt 86.2 kg (190 lb)  SpO2 100%  BMI 26.5 kg/m2 Temp:  [97.9 °F (36.6 °C)-99.1 °F (37.3 °C)]   Pulse:  [120-148]   Resp:  [17-26]   BP: ()/()   SpO2:  [93 %-100 %]      Intake and Output  BMI     Intake/Output Summary (Last 24 hours) at 03/09/17 1212  Last data filed at 03/09/17 0600   Gross per 24 hour   Intake              680 ml   Output             1950 ml   Net            -1270 ml       Net I/O since admission: Body mass index is 26.5 kg/(m^2).        PHYSICAL EXAM  Constitutional: He is oriented to person, place, and time. He appears well-developed. He is active. Non-toxic appearance. He does not have a sickly appearance. He appears ill. .   Eyes: Pupils are equal, round, and reactive to light. No scleral icterus.   Neck: Neck supple.   Cardiovascular: Intact distal pulses. An irregularly irregular rhythm present. Tachycardia present. AS murmer +   No murmur heard.  Pulmonary/Chest: No accessory muscle usage or stridor. Tachypnea noted. He has rales in the right " lower field.   Abdominal: He exhibits no distension.   Musculoskeletal: He exhibits no edema.   Neurological: He is alert and oriented to person, place, and time.   Skin: Skin is warm. No rash noted.   Psychiatric: He has a normal mood and affect.       LABS  CBC/Anemia Labs Coag Labs     Recent Labs  Lab 03/08/17  0354 03/09/17  0242 03/09/17  0350   WBC 36.76* 31.84* 33.67*   HGB 9.9* 9.4* 9.6*   HCT 30.7* 29.8* 30.8*   MCV 87 89 89    SEE COMMENT 207    Lab Results   Component Value Date    INR 3.8 (H) 03/09/2017    INR 2.6 (H) 03/08/2017    INR 2.2 (H) 03/07/2017        BMP     Recent Labs  Lab 03/07/17  0415 03/08/17  0354 03/09/17  0242   * 125* 117*   * 127* 128*   K 4.9 4.3 4.3   CL 96 99 98   CO2 19* 20* 20*   BUN 38* 49* 45*   CREATININE 1.9* 1.9* 1.4   CALCIUM 7.9* 8.0* 7.8*      Mag & Phos LFTs     Recent Labs  Lab 03/07/17 0415 03/08/17  0354 03/09/17  0242   MG 1.8 2.4 1.8   PHOS 3.6 4.6* 4.3      Recent Labs  Lab 03/07/17  0415 03/08/17  0354 03/09/17  0242   PROT 6.1 6.4 5.7*   BILITOT 1.2* 1.4* 2.3*   ALKPHOS 58 53* 67   AST 40 33 30   ALT 21 20 18             Cardiac Enzymes Ejection Fractions/BNP   No results for input(s): CKTOTAL, CPK, TROPONINI, TROPONINT, TROPTSTAT, CPKMB, MB in the last 168 hours. EF   Date Value Ref Range Status   03/01/2017 55 55 - 65    04/13/2016 55 55 - 65    05/12/2015 60 55 - 65      No results for input(s): BNP in the last 168 hours.     No results found for: HGBA1C      Microbiology Results (last 7 days)     Procedure Component Value Units Date/Time    Blood culture [[id number]] Collected:  03/07/17 0758    Order Status:  Completed Specimen:  Blood from Peripheral, Forearm, Left Updated:  03/09/17 1212     Blood Culture, Routine No Growth to date     Blood Culture, Routine No Growth to date     Blood Culture, Routine No Growth to date    Urine culture [[id number]] Collected:  03/06/17 1333    Order Status:  Completed Specimen:  Urine from Urine,  Clean Catch Updated:  03/09/17 1134     Urine Culture, Routine --     STAPHYLOCOCCUS EPIDERMIDIS  10,000 - 49,999 cfu/ml  Susceptibility pending      Narrative:       If not done in the last 24 hours    Culture, Respiratory with Gram Stain [[id number]] Collected:  03/06/17 2230    Order Status:  Completed Specimen:  Respiratory from Sputum Updated:  03/09/17 1046     Respiratory Culture Normal respiratory iqra     Gram Stain (Respiratory) <10 epithelial cells per low power field.     Gram Stain (Respiratory) Few WBC's     Gram Stain (Respiratory) Rare Gram positive cocci    Culture, Respiratory [[id number]]  (Susceptibility) Collected:  03/06/17 1346    Order Status:  Completed Specimen:  Respiratory from Sputum Updated:  03/09/17 1021     Respiratory Culture --     KLEBSIELLA OXYTOCA  Moderate       Gram Stain (Respiratory) <10 epithelial cells per low power field.     Gram Stain (Respiratory) Few WBC's     Gram Stain (Respiratory) Few Gram positive cocci     Gram Stain (Respiratory) Rare Gram negative rods    Narrative:       If not done in the last 24 hours    Culture, Body Fluid - Bactec [[id number]] Collected:  03/07/17 1726    Order Status:  Completed Specimen:  Body Fluid from Thoracentesis Fluid Updated:  03/08/17 2212     Body Fluid Culture, Sterile No Growth to date     Body Fluid Culture, Sterile No Growth to date    Blood Culture #2 **CANNOT BE ORDERED STAT** [[id number]] Collected:  03/06/17 1145    Order Status:  Completed Specimen:  Blood from Peripheral, Wrist, Left Updated:  03/08/17 1422     Blood Culture, Routine No Growth to date     Blood Culture, Routine No Growth to date     Blood Culture, Routine No Growth to date    Blood Culture #1 **CANNOT BE ORDERED STAT** [[id number]] Collected:  03/06/17 1116    Order Status:  Completed Specimen:  Blood from Peripheral, Antecubital, Right Updated:  03/08/17 1222     Blood Culture, Routine No Growth to date     Blood Culture, Routine No Growth to date      Blood Culture, Routine No Growth to date    Culture, Body Fluid (Aerobic) w/ GS [[id number]] Collected:  03/07/17 0927    Order Status:  Completed Specimen:  Body Fluid from Pleural Updated:  03/08/17 1124     AEROBIC CULTURE - FLUID --     STREPTOCOCCUS INTERMEDIUS  Moderate  Susceptibility testing not routinely performed       Gram Stain Result Moderate WBC's      Few Gram positive cocci    Narrative:       ADD ON PER TIRSO HAWKINS, ORDER #[id number]   07:50  03/07/2017     Culture, Body Fluid - Bactec [[id number]] Collected:  03/06/17 1845    Order Status:  Canceled Specimen:  Body Fluid from Pleural Updated:  03/07/17 0751    Narrative:       ADD ON PER TIRSO HAWKINS, ORDER #[id number]   07:50  03/07/2017   Culture Body Fluid Resin was cancelled on 03/07/2017 at 09:26 by SXD;   Quantity not sufficient for testing.    Culture, Body Fluid - Bactec [[id number]]     Order Status:  Completed Specimen:  Body Fluid from Pleural Fluid     Gram stain (sputum) [[id number]] Collected:  03/06/17 1346    Order Status:  Canceled Specimen:  Sputum from Mouth Updated:  03/06/17 1404    Narrative:       If not done in the last 24 hours  Gram Stain was cancelled on 03/06/2017 at 14:33 by NMJ; Duplicate   order, test included in another profile. see order #[id number]          INVESTIGATION RESULTS    Imaging Results         X-Ray Chest 1 View (Final result) Result time:  03/09/17 08:19:47    Final result by Ludwin Camargo MD (03/09/17 08:19:47)    Impression:        Decreased right pleural effusion since the previous study.  Rest of examination without change.      Electronically signed by: LUDWIN CAMARGO MD  Date:     03/09/17  Time:    08:19     Narrative:    History: Shortness of breath.    Procedure: Chest one view    Findings:    The examination is compared with previous studies the most recent on 3/8/17.    The right pleural effusion has significantly decreased since the previous examination.  There is presence of a pigtail  catheter in the right pleural space.  No pneumothorax noted there left lung is clear.    Increased haziness over the right hemithorax suggestive of layering of right pleural effusion.  There is also pulmonary vascular congestion.    Heart size within normal limits the mediastinum is unremarkable.  There is no tracheal abnormalities.    Position of the right internal jugular port remains without significant change.            X-Ray Chest 1 View (Final result) Result time:  03/08/17 08:40:19    Final result by Wil Saxena III, MD (03/08/17 08:40:19)    Narrative:     One view: Central and mid SVC.  Heart size is normal.  There is right edema/infiltrate and pleural fluid and a right pleural drain.  Left lung is clear.  There is a possible right paratracheal mass.  CT is recommended.      Electronically signed by: WIL SAXENA  Date:     03/08/17  Time:    08:40             X-Ray Chest 1 View (Final result) Result time:  03/07/17 19:41:57    Final result by Jasvir Rollins MD (03/07/17 19:41:57)    Impression:      As above        Electronically signed by: JASVIR ROLLINS MD  Date:     03/07/17  Time:    19:41     Narrative:    Chest AP portable    Indication:Pleural drain placement    Comparison:3/7/2017    Findings: Since the previous exam, right pleural drainage catheter has been placed, with interval reduction in the volume of right pleural effusion however moderate effusion remains.  No pneumothorax or significant detrimental change otherwise in the cardiopulmonary status.            IR Pleural Drainage with Imaging (Final result) Result time:  03/08/17 17:31:58    Procedure changed from IR Thoracentesis with Catheter        Final result by Ree Seo MD (03/08/17 17:31:58)    Impression:     Successful ultrasound guided right pleural drain placement.  ______________________________________     Electronically signed by resident: REE SEO MD  Date:     03/08/17  Time:    10:25            As the  supervising and teaching physician, I personally reviewed the images and resident's interpretation and I agree with the findings.          Electronically signed by: RODNEY VALLE MD  Date:     03/08/17  Time:    17:31     Narrative:    Procedure: Ultrasound-guided right chest tube placement.    History: Pleural effusion and shortness of breath    Radiologist: Viri     Procedure codes:  30208     Findings: Informed consent was obtained prior to the exam after discussion of risk and benefits of the procedure.  No sedation was administered.  Ultrasound demonstrated a moderate collection within the right hemithorax.  The skin was prepped and draped using sterile technique, and 1% lidocaine was used for local anesthesia.  A micropuncture needle was used to access the fluid collection.  There is return of 20 cc of fluid which was serous.  A pigtail catheter was subsequently placed over wire and sutured into place.  The patient tolerated the procedure well.  Resident physician was Arnoldo Olson MD.  Images and report were permanently stored.            X-Ray Chest 1 View (Final result) Result time:  03/07/17 08:51:06    Final result by Connie Horton MD (03/07/17 08:51:06)    Impression:        Stable exam, noting a large right pleural effusion with associated lower lobe consolidation and atelectasis.      Electronically signed by: CONNIE HORTON MD  Date:     03/07/17  Time:    08:51     Narrative:    XR Chest    03/07/17 07:50:00    Accession #: [id number]    CLINICAL INDICATION: 72 year old M with shortness of breath    TECHNIQUE: Single frontal portable chest radiograph.    COMPARISON:  03/06/2017.    FINDINGS:    Central venous catheter in stable position.  The cardiomediastinal silhouette is stable in size and midline. Pulmonary vascularity appears within normal limits.    Large right pleural effusion with associated consolidation/atelectasis, grossly stable from recent exam. Left lung remains  well-aerated and essentially clear.    No pneumothorax. No left-sided pleural effusion.            X-Ray Chest 1 View (Final result) Result time:  03/06/17 19:06:14    Final result by Pete Maki DO (03/06/17 19:06:14)    Impression:        See Above       Electronically signed by: PETE MAKI DO  Date:     03/06/17  Time:    19:06     Narrative:    Single portable frontal view of the chest    Comparison: Chest x-ray and CT 03/06/2017     Results: Moderate to large sized right basilar consolidative opacity concerning for continued effusion and possible superimposed airspace process. Please see recent CT report for further details. There is no large pneumothorax. Right-sided Port-A-Cath stable. Continued atherosclerotic aorta. Clinical correlation and followup recommended.            CT Chest Without Contrast (Final result) Result time:  03/06/17 16:21:09    Final result by Samantha Edgar MD (03/06/17 16:21:09)    Impression:        Diffuse consolidation throughout the right middle and lower lobes involving the central paramediastinal and hilar regions as described above with differential considerations to primarily include an infectious etiology given loculated/heterogenous appearing right pleural effusion.  Alternatively, an underlying neoplastic process cannot be entirely excluded noting this process is relatively rapid in course as it was not seen on the prior CT from 8/29/2016.  Consider consultation with pulmonary medicine and close followup.    Trace pericardial effusion.  ______________________________________     Electronically signed by resident: SAMANTHA EDGAR MD  Date:     03/06/17  Time:    15:47            As the supervising and teaching physician, I personally reviewed the images and resident's interpretation and I agree with the findings.            Electronically signed by: JOCELYN DE SANTIAGO MD  Date:     03/06/17  Time:    16:21     Narrative:    Technique:   5 mm axial images were acquired using  helical CT technique from the lung apices through costophrenic angles.  No intravenous contrast was administered.     Comparison: Radiograph 3/6/2017 and CT 8/29/2016.     Findings:     The soft tissue structures of the base of the neck are unremarkable.  There is a right-sided chest port catheter with its tip terminating within the superior cavoatrial junction.  The heart is normal in size demonstrating trace pericardial fluid.  The thoracic aorta demonstrates no aneurysmal dilatation.  There is mild calcification of the aortic annulus, coronary arteries, and thoracic aorta.    There are scattered axillary and mediastinal lymph nodes without evidence of lymphadenopathy.    The trachea and proximal airways of the left lung are patent with normal expansion and aeration of the left lung.  There is severe narrowing of the segmental bronchi to the right middle and lower lobes with associated diffuse consolidation involving the right paramediastinal and hilar regions.  There is additional diffuse patchy consolidation involving the middle and throughout the right lower lobes.  Although nonspecific, lack of air bronchograms in these regions at least raises the possibility of an underlying mass noting there was no consolidation seen within the visualized right middle and lower lobes on the CT from 8/29/2016.      There is additional moderate volume right pleural effusion along the posterior and right lateral and anterior chest wall suggesting partial loculation.  Internal density is somewhat heterogenous suggesting blood or inflammatory products.  This is predominantly seen posteriorly within the more dependent regions.  No significant pleural thickening.  No pneumothorax.    Limited views of the upper abdomen demonstrate a focal hypodensity within the left hepatic lobe most compatible with a cyst.    The osseous structures demonstrate no acute process.            X-Ray Chest PA And Lateral (Final result) Result time:   03/06/17 12:39:45    Final result by Pete Maki DO (03/06/17 12:39:45)    Impression:      See above      Electronically signed by: PETE MAKI DO  Date:     03/06/17  Time:    12:39     Narrative:    PA and lateral views of the chest    Comparison: None    Results:    Right sided Port-A-Cath with tip projected over the right atrial/SVC junction. There is moderate right basilar opacity with blunting of both findings are concerning for effusion with atelectasis, however superimposed air space filling process not excluded. No pneumothorax. Atherosclerotic aorta.. Degenerative change in the shoulder joints                ASSESSMENT/PLAN:     Current Problems List:  Active Hospital Problems    Diagnosis  POA    *Acute hypoxemic respiratory failure [J96.01]  Yes    Empyema of right pleural space [J86.9]  Yes    Sepsis [A41.9]  Yes    HCAP (healthcare-associated pneumonia) [J18.9]  Yes    Leukocytosis [D72.829]  Yes    BERNICE (acute kidney injury) [N17.9]  Yes    MDS (myelodysplastic syndrome) [D46.9]  Yes    Depression [F32.9]  Yes    Hyponatremia [E87.1]  Yes    Anemia, chronic disease [D63.8]  Yes    Paroxysmal atrial fibrillation [I48.0]  Yes    HTN (hypertension), benign [I10]  Yes    Dyslipidemia [E78.5]  Yes      Resolved Hospital Problems    Diagnosis Date Resolved POA   No resolved problems to display.        ASSESSMENT:   72 years old male with Hx of asymptomatic moderate aortic stenosis JEFF 1.2 and peak velocity 3 with gradient 24, stage B with MET 4-10 at least. Revised Cardiac Risk Index for Pre-Operative Risk score is 0.4% in urgent intermediate risk surgery.     PLAN:    Afib with RVR with severe left atrial enlargement:   - Rate not controlled on lopressor 50 mg BID. Increase to 50 mg q6hr. Continue to closely monitor BP fall.   - Consider re-starting home CCBs  - Continue management of sepsis.   - CHADS2 score 1, CHADS VASC score 3 ( HTN,PVD and age ). Keep off warfarin, restart when risk  of bleed is low.      Asymptomatic moderate Aortic stenosis:  -- low risk with 0.4% risk of major cardiac events.   -- No need for Invasive cardiac investigation.   -- Cautious with volume overload or hypovolemia.     Thank you for your consult. I will follow-up with patient. Please contact us if you have any additional questions.    Cisco Harrington MD  PGY1  Page: [phone]

## 2017-03-09 NOTE — ASSESSMENT & PLAN NOTE
- Stable   - Given CXR findings will test for legionella pending  - diet advanced and fluid restricted

## 2017-03-09 NOTE — NURSING
Called pharmacy again for dornase and was told me will be mixed in the IV room. Still awaiting dornase.

## 2017-03-09 NOTE — NURSING
[facility] is the in pt room now. Nurse innform them that pt is requiring around the clock pain management. Morphine 2mg IVP is given q3H without relief to pt. Pt continues to c/o pain. Pt rec'd hydrocodone-acet this a.m. following morphine and continues to c/o pain. Pt is calm and cooperative, but very anxious at times. Currently pt is up to chair and appears calm and without distress. Nonverbal pain indicators are absent. Will continue to monitor.

## 2017-03-09 NOTE — ASSESSMENT & PLAN NOTE
- CXR concerning for right lower lob PNA  - Given history of recent hospitalization/procedure concern for HCAP    - S/p Pigtail placement by IR 3/7  - pleural fluid cx positive for strep intermedius (subtype of strep viridans,) and respiratory cx +Klebsiella, both sensitive to ceftriaxone    - will deescalate to ceftriaxone

## 2017-03-09 NOTE — NURSING
Called [facility] at [phone] so that an MD can administer TPA and dornase via chest tube. No answer. Will call back with 1 hr. Will continue to monitor.

## 2017-03-09 NOTE — PROGRESS NOTES
[facility]  Critical Care Medicine  Progress Note    Patient Name: Wil Kwon Jr.  MRN: [id number]  Admission Date: 3/6/2017  Hospital Length of Stay: 3 days  Code Status: Full Code  Attending Provider: Kishor Medrano*  Primary Care Provider: LAILA Serra MD   Principal Problem: Acute hypoxemic respiratory failure    Subjective:     HPI:  72 year old male with history of MDS (s/p Chemo x 5 most recent 3/03/2017), HTN, Afib (coumadin, lopressor), hyperlipidemia, and AS presents to the ED with fever and SOB.  Patient reports he was in his usual state of health until Friday shortly after receiving chemotherapy (Vidaza) when he started to experience SOB.  This SOB progressively worsened and patient developed a productive cough with brownish green foul tasting sputum.  In addition he noticed a progressive right sided chest wall pain that radiated towards his back that is currently a 10/10.  His pain is temporarily relived by cough.  Patient brother notes patient appeared feverish starting Sunday.  Patient denies sick contacts and report he has received the flu shot this year.  He reports some nausea and vomiting beginning yesterday, no abdominal pain and normal BMs.  Currently denies HA, or palpitations              Hospital/ICU Course:  3/06: Admitted to ICU and started on broad spectrum abx for suspected HCAP, Started on CPAP   3/9/17: continuing tPA for loculated empyema. Resp cx grew Klebsiella, so started back on Zosyn awaiting sensitivities. Pleural fluid cx NGTD.       Interval History/Significant Events: describes anxiety about health that keeps him up at night. Also has significant pain at this chest tube site but this has been much better controlled on IV morphine.     Review of Systems   Constitutional: Negative for fatigue and fever.   Respiratory: Positive for cough and shortness of breath ( improved from prior). Negative for chest tightness.    Cardiovascular: Positive  for chest pain (persistent right sided pain at site of chest tube, improves with IV pain meds). Negative for palpitations.   Gastrointestinal: Negative for constipation, diarrhea and vomiting.   Genitourinary: Negative for dysuria.   Psychiatric/Behavioral: Positive for sleep disturbance ( insomnia thinking about medical problems). The patient is nervous/anxious.      Objective:     Vital Signs (Most Recent):  Temp: 97.9 °F (36.6 °C) (03/09/17 0700)  Pulse: (!) 139 (03/09/17 0822)  Resp: (!) 21 (03/09/17 0822)  BP: (!) 146/58 (03/09/17 0800)  SpO2: 100 % (03/09/17 0822) Vital Signs (24h Range):  Temp:  [97.9 °F (36.6 °C)-99.2 °F (37.3 °C)] 97.9 °F (36.6 °C)  Pulse:  [120-148] 139  Resp:  [17-26] 21  SpO2:  [93 %-100 %] 100 %  BP: ()/() 146/58   Weight: 86.2 kg (190 lb)  Body mass index is 26.5 kg/(m^2).      Intake/Output Summary (Last 24 hours) at 03/09/17 0955  Last data filed at 03/09/17 0600   Gross per 24 hour   Intake              730 ml   Output             2200 ml   Net            -1470 ml       Physical Exam   Constitutional: He is oriented to person, place, and time. He appears well-developed and well-nourished. He has a sickly appearance. He appears distressed.   HENT:   Head: Normocephalic and atraumatic.   Right Ear: External ear normal.   Left Ear: External ear normal.   Nose: Nose normal.   Mouth/Throat: Oropharynx is clear and moist.   Eyes: Conjunctivae and EOM are normal. Pupils are equal, round, and reactive to light.   Neck: Normal range of motion. Neck supple.   Cardiovascular: Intact distal pulses.  An irregularly irregular rhythm present. Tachycardia present.    No murmur heard.  Pulmonary/Chest: Effort normal. Tachypnea noted. No respiratory distress. He has decreased breath sounds in the right middle field and the right lower field. He has no wheezes. He has rhonchi.   Chest tube on right   Decreased breath sounds on RLL, RML    Abdominal: Bowel sounds are normal. He exhibits no  distension. There is no tenderness. There is no guarding.   Musculoskeletal: He exhibits no edema.   Neurological: He is alert and oriented to person, place, and time.   Skin: Skin is warm. He is not diaphoretic. No erythema.   Psychiatric: His speech is normal and behavior is normal. Judgment and thought content normal. His mood appears anxious. Cognition and memory are normal.   Nursing note and vitals reviewed.      Vents:  Oxygen Concentration (%): 38 (03/09/17 0800)  Lines/Drains/Airways     Central Venous Catheter Line                 Port A Cath Single Lumen 03/07/17 1939 right subclavian 1 day          Drain                 Chest Tube 03/07/17 1700 1 Right Pleural 8 Fr. 1 day          Peripheral Intravenous Line                 Peripheral IV - Single Lumen 03/06/17 1116 Right Antecubital 2 days         Peripheral IV - Single Lumen 03/06/17 1230 Right Wrist 2 days              Significant Labs:    CBC/Anemia Profile:    Recent Labs  Lab 03/08/17 0354 03/09/17  0242 03/09/17  0350   WBC 36.76* 31.84* 33.67*   HGB 9.9* 9.4* 9.6*   HCT 30.7* 29.8* 30.8*    SEE COMMENT 207   MCV 87 89 89   RDW 19.5* 18.4* 18.3*        Chemistries:    Recent Labs  Lab 03/08/17 0354 03/09/17  0242   * 128*   K 4.3 4.3   CL 99 98   CO2 20* 20*   BUN 49* 45*   CREATININE 1.9* 1.4   CALCIUM 8.0* 7.8*   ALBUMIN 2.1* 1.8*   PROT 6.4 5.7*   BILITOT 1.4* 2.3*   ALKPHOS 53* 67   ALT 20 18   AST 33 30   MG 2.4 1.8   PHOS 4.6* 4.3       Blood Culture: No results for input(s): LABBLOO in the last 48 hours.  BMP:   Recent Labs  Lab 03/09/17 0242   *   *   K 4.3   CL 98   CO2 20*   BUN 45*   CREATININE 1.4   CALCIUM 7.8*   MG 1.8     CMP:   Recent Labs  Lab 03/08/17 0354 03/09/17  0242   * 128*   K 4.3 4.3   CL 99 98   CO2 20* 20*   * 117*   BUN 49* 45*   CREATININE 1.9* 1.4   CALCIUM 8.0* 7.8*   PROT 6.4 5.7*   ALBUMIN 2.1* 1.8*   BILITOT 1.4* 2.3*   ALKPHOS 53* 67   AST 33 30   ALT 20 18   ANIONGAP 8  10   EGFRNONAA 34.5* 49.8*     Coagulation:   Recent Labs  Lab 03/09/17  0242   INR 3.8*     Lactic Acid:   Recent Labs  Lab 03/08/17  0817   LACTATE 1.0     Respiratory Culture: No results for input(s): GSRESP, RESPIRATORYC in the last 48 hours.  All pertinent labs within the past 24 hours have been reviewed.    Significant Imaging:  I have reviewed all pertinent imaging results/findings within the past 24 hours.    Assessment/Plan:     71 yo gentleman with hx of MDS on chemotherapy admitted for sepsis 2/2 CAP with loculated empyema necessitating tPA administration, chest tube insertion and oxygen for hypoxia which is now improved. His clinical course is complicated by persistent RVR of his permanent atrial fibrillation despite metoprolol and digoxin (home med diltiazem held due to hypotension) which is attributed to sepsis as well as pain/anxiety.       Neuro  Depression  - Celexa 40 mg will continue     Insomnia  - start trazodone 25 mg po q HS prn insomnia     Pulmonary  * Acute hypoxemic respiratory failure  - Resolving, on 4L NC titrated down to 2L nc with SpO2 >95% at bedside   - tachypnea improved   - Likely 2/2 to suspected HCAP given CXR findings with respiratory sputum cx +Klebsiella and pleural fluid cx +strep viridans    - de-escalate to ceftriaxone today, discontinue zosyn       HCAP (healthcare-associated pneumonia)  - CXR concerning for right lower lob PNA  - Given history of recent hospitalization/procedure concern for HCAP    - S/p Pigtail placement by IR 3/7  - pleural fluid cx positive for strep intermedius (subtype of strep viridans,) and respiratory cx +Klebsiella, both sensitive to ceftriaxone    - will deescalate to ceftriaxone     Empyema of right pleural space  - s/p tPA for loculated empyema   - chest tube in place and with adequate output     Cardiac  Paroxysmal atrial fibrillation  - continues to be in atrial fibrillation with RVR in 130s-140s   - on metoprolol 50 mg bid, digoxin 0.125  mg q daily   - warfarin held given tPA administration and recent procedures   - diltiazem not continued as in-pt due to previous hypotension associated with it    - restarting diltiazem 30 mg q 6 H if BP stable        HTN (hypertension), benign  - holding losartan given recent hypotensive episodes needing pressor support, now off pressors   - continuing metoprolol tartrate 50 mg bid for atrial fib rate control     Renal  BERNICE (acute kidney injury)  - Likely prerenal given presentation, FeNA   - Currently improved with IV hydration and po diet     ID  Sepsis  - Resolving   - Elevated WBC, tachypnea, tachycardic and suspected HCAP on admission   - Please see HCAP for plan     Hem/Onc  Anemia, chronic disease  - hx of MDS with chemo therapy  - Will continue to monitor and transfuse for Hb below 7     MDS (myelodysplastic syndrome)  - Hx since 2013 per patient   - Multiple chemo treatments most recent 3/03 Vidaza     Fluids/Electrolytes/Nutrition/GI  Hyponatremia  - Stable   - Given CXR findings will test for legionella pending  - diet advanced and fluid restricted       Critical Care Medicine Daily Checklist:    A: Awake: RASS Goal/Actual Goal:    Actual: Palacio Agitation Sedation Scale (RASS): Alert and calm   B: Spontaneous Breathing Trial Performed?     C: SAT & SBT Coordinated?  N/a                       D: Delirium: CAM-ICU Overall CAM-ICU: Negative   E: Early Mobility Performed? Yes   F: Feeding Goal:    Status:     Current Diet Order   Procedures    Diet Adult Regular Fluid - 1500mL     Order Specific Question:   Fluid restriction:     Answer:   Fluid - 1500mL      AS: Analgesia/Sedation Morphine IV prn, Norco prn, trazodone for insomnia    T: Thromboembolic Prophylaxis Holding due to INR 3.8 today, tPA administration    H: HOB > 300 Yes   U: Stress Ulcer Prophylaxis (if needed) Famotidine 20 mg daily    G: Glucose Control N/a    B: Bowel Function Stool Occurrence: 0   I: Indwelling Catheter (Lines & [place])  Necessity Chest tube    D: De-escalation of Antimicrobials/Pharmacotherapies Discontinue zosyn, begin ceftriaxone for PNA     Plan for the day/ETD N/a     Code Status:  Family/Goals of Care: Full Code       Critical Care Time: 30 minutes     Critical care was time spent personally by me on the following activities: development of treatment plan with patient or surrogate and bedside caregivers, discussions with consultants, evaluation of patient's response to treatment, examination of patient, ordering and performing treatments and interventions, ordering and review of laboratory studies, ordering and review of radiographic studies, pulse oximetry, re-evaluation of patient's condition. This critical care time did not overlap with that of any other provider or involve time for any procedures.     Sally Reno MD  Critical Care Medicine  [facility]

## 2017-03-09 NOTE — ASSESSMENT & PLAN NOTE
- holding losartan given recent hypotensive episodes needing pressor support, now off pressors   - continuing metoprolol tartrate 50 mg bid for atrial fib rate control

## 2017-03-09 NOTE — SUBJECTIVE & OBJECTIVE
Interval History/Significant Events: describes anxiety about health that keeps him up at night. Also has significant pain at this chest tube site but this has been much better controlled on IV morphine.     Review of Systems   Constitutional: Negative for fatigue and fever.   Respiratory: Positive for cough and shortness of breath ( improved from prior). Negative for chest tightness.    Cardiovascular: Positive for chest pain (persistent right sided pain at site of chest tube, improves with IV pain meds). Negative for palpitations.   Gastrointestinal: Negative for constipation, diarrhea and vomiting.   Genitourinary: Negative for dysuria.   Psychiatric/Behavioral: Positive for sleep disturbance ( insomnia thinking about medical problems). The patient is nervous/anxious.      Objective:     Vital Signs (Most Recent):  Temp: 97.9 °F (36.6 °C) (03/09/17 0700)  Pulse: (!) 139 (03/09/17 0822)  Resp: (!) 21 (03/09/17 0822)  BP: (!) 146/58 (03/09/17 0800)  SpO2: 100 % (03/09/17 0822) Vital Signs (24h Range):  Temp:  [97.9 °F (36.6 °C)-99.2 °F (37.3 °C)] 97.9 °F (36.6 °C)  Pulse:  [120-148] 139  Resp:  [17-26] 21  SpO2:  [93 %-100 %] 100 %  BP: ()/() 146/58   Weight: 86.2 kg (190 lb)  Body mass index is 26.5 kg/(m^2).      Intake/Output Summary (Last 24 hours) at 03/09/17 0955  Last data filed at 03/09/17 0600   Gross per 24 hour   Intake              730 ml   Output             2200 ml   Net            -1470 ml       Physical Exam   Constitutional: He is oriented to person, place, and time. He appears well-developed and well-nourished. He has a sickly appearance. He appears distressed.   HENT:   Head: Normocephalic and atraumatic.   Right Ear: External ear normal.   Left Ear: External ear normal.   Nose: Nose normal.   Mouth/Throat: Oropharynx is clear and moist.   Eyes: Conjunctivae and EOM are normal. Pupils are equal, round, and reactive to light.   Neck: Normal range of motion. Neck supple.    Cardiovascular: Intact distal pulses.  An irregularly irregular rhythm present. Tachycardia present.    No murmur heard.  Pulmonary/Chest: Effort normal. Tachypnea noted. No respiratory distress. He has decreased breath sounds in the right middle field and the right lower field. He has no wheezes. He has rhonchi.   Chest tube on right   Decreased breath sounds on RLL, RML    Abdominal: Bowel sounds are normal. He exhibits no distension. There is no tenderness. There is no guarding.   Musculoskeletal: He exhibits no edema.   Neurological: He is alert and oriented to person, place, and time.   Skin: Skin is warm. He is not diaphoretic. No erythema.   Psychiatric: His speech is normal and behavior is normal. Judgment and thought content normal. His mood appears anxious. Cognition and memory are normal.   Nursing note and vitals reviewed.      Vents:  Oxygen Concentration (%): 38 (03/09/17 0800)  Lines/Drains/Airways     Central Venous Catheter Line                 Port A Cath Single Lumen 03/07/17 1939 right subclavian 1 day          Drain                 Chest Tube 03/07/17 1700 1 Right Pleural 8 Fr. 1 day          Peripheral Intravenous Line                 Peripheral IV - Single Lumen 03/06/17 1116 Right Antecubital 2 days         Peripheral IV - Single Lumen 03/06/17 1230 Right Wrist 2 days              Significant Labs:    CBC/Anemia Profile:    Recent Labs  Lab 03/08/17  0354 03/09/17  0242 03/09/17  0350   WBC 36.76* 31.84* 33.67*   HGB 9.9* 9.4* 9.6*   HCT 30.7* 29.8* 30.8*    SEE COMMENT 207   MCV 87 89 89   RDW 19.5* 18.4* 18.3*        Chemistries:    Recent Labs  Lab 03/08/17  0354 03/09/17  0242   * 128*   K 4.3 4.3   CL 99 98   CO2 20* 20*   BUN 49* 45*   CREATININE 1.9* 1.4   CALCIUM 8.0* 7.8*   ALBUMIN 2.1* 1.8*   PROT 6.4 5.7*   BILITOT 1.4* 2.3*   ALKPHOS 53* 67   ALT 20 18   AST 33 30   MG 2.4 1.8   PHOS 4.6* 4.3       Blood Culture: No results for input(s): LABBLOO in the last 48  hours.  BMP:   Recent Labs  Lab 03/09/17  0242   *   *   K 4.3   CL 98   CO2 20*   BUN 45*   CREATININE 1.4   CALCIUM 7.8*   MG 1.8     CMP:   Recent Labs  Lab 03/08/17  0354 03/09/17  0242   * 128*   K 4.3 4.3   CL 99 98   CO2 20* 20*   * 117*   BUN 49* 45*   CREATININE 1.9* 1.4   CALCIUM 8.0* 7.8*   PROT 6.4 5.7*   ALBUMIN 2.1* 1.8*   BILITOT 1.4* 2.3*   ALKPHOS 53* 67   AST 33 30   ALT 20 18   ANIONGAP 8 10   EGFRNONAA 34.5* 49.8*     Coagulation:   Recent Labs  Lab 03/09/17 0242   INR 3.8*     Lactic Acid:   Recent Labs  Lab 03/08/17  0817   LACTATE 1.0     Respiratory Culture: No results for input(s): GSRESP, RESPIRATORYC in the last 48 hours.  All pertinent labs within the past 24 hours have been reviewed.    Significant Imaging:  I have reviewed all pertinent imaging results/findings within the past 24 hours.

## 2017-03-10 PROBLEM — J96.01 ACUTE HYPOXEMIC RESPIRATORY FAILURE: Status: RESOLVED | Noted: 2017-03-06 | Resolved: 2017-03-10

## 2017-03-10 LAB
ALBUMIN SERPL BCP-MCNC: 1.8 G/DL
ALP SERPL-CCNC: 96 U/L
ALT SERPL W/O P-5'-P-CCNC: 24 U/L
ANION GAP SERPL CALC-SCNC: 8 MMOL/L
ANISOCYTOSIS BLD QL SMEAR: SLIGHT
AST SERPL-CCNC: 44 U/L
BACTERIA UR CULT: NORMAL
BASOPHILS NFR BLD: 0 %
BILIRUB SERPL-MCNC: 2.1 MG/DL
BUN SERPL-MCNC: 39 MG/DL
CALCIUM SERPL-MCNC: 7.7 MG/DL
CHLORIDE SERPL-SCNC: 99 MMOL/L
CO2 SERPL-SCNC: 22 MMOL/L
CREAT SERPL-MCNC: 1.1 MG/DL
DACRYOCYTES BLD QL SMEAR: ABNORMAL
DIFFERENTIAL METHOD: ABNORMAL
EOSINOPHIL NFR BLD: 0 %
ERYTHROCYTE [DISTWIDTH] IN BLOOD BY AUTOMATED COUNT: 18.2 %
EST. GFR  (AFRICAN AMERICAN): >60 ML/MIN/1.73 M^2
EST. GFR  (NON AFRICAN AMERICAN): >60 ML/MIN/1.73 M^2
GLUCOSE SERPL-MCNC: 118 MG/DL
HCT VFR BLD AUTO: 29.8 %
HGB BLD-MCNC: 9.7 G/DL
HYPOCHROMIA BLD QL SMEAR: ABNORMAL
INR PPP: 3
LYMPHOCYTES NFR BLD: 1 %
MAGNESIUM SERPL-MCNC: 1.7 MG/DL
MCH RBC QN AUTO: 28.5 PG
MCHC RBC AUTO-ENTMCNC: 32.6 %
MCV RBC AUTO: 88 FL
MONOCYTES NFR BLD: 0 %
NEUTROPHILS NFR BLD: 97 %
NEUTS BAND NFR BLD MANUAL: 2 %
NRBC BLD-RTO: ABNORMAL /100 WBC
OVALOCYTES BLD QL SMEAR: ABNORMAL
PHOSPHATE SERPL-MCNC: 4.3 MG/DL
PLATELET # BLD AUTO: 110 K/UL
PLATELET BLD QL SMEAR: ABNORMAL
PMV BLD AUTO: ABNORMAL FL
POIKILOCYTOSIS BLD QL SMEAR: SLIGHT
POLYCHROMASIA BLD QL SMEAR: ABNORMAL
POTASSIUM SERPL-SCNC: 4.6 MMOL/L
PROT SERPL-MCNC: 5.9 G/DL
PROTHROMBIN TIME: 30.5 SEC
RBC # BLD AUTO: 3.4 M/UL
SODIUM SERPL-SCNC: 129 MMOL/L
WBC # BLD AUTO: 23.01 K/UL

## 2017-03-10 PROCEDURE — 63600175 PHARM REV CODE 636 W HCPCS: Performed by: INTERNAL MEDICINE

## 2017-03-10 PROCEDURE — 25000003 PHARM REV CODE 250: Performed by: STUDENT IN AN ORGANIZED HEALTH CARE EDUCATION/TRAINING PROGRAM

## 2017-03-10 PROCEDURE — 63600175 PHARM REV CODE 636 W HCPCS: Performed by: HOSPITALIST

## 2017-03-10 PROCEDURE — 25000003 PHARM REV CODE 250: Performed by: INTERNAL MEDICINE

## 2017-03-10 PROCEDURE — 25000003 PHARM REV CODE 250: Performed by: HOSPITALIST

## 2017-03-10 PROCEDURE — 63600175 PHARM REV CODE 636 W HCPCS: Performed by: STUDENT IN AN ORGANIZED HEALTH CARE EDUCATION/TRAINING PROGRAM

## 2017-03-10 PROCEDURE — 85610 PROTHROMBIN TIME: CPT

## 2017-03-10 PROCEDURE — 83735 ASSAY OF MAGNESIUM: CPT

## 2017-03-10 PROCEDURE — 85027 COMPLETE CBC AUTOMATED: CPT

## 2017-03-10 PROCEDURE — 80053 COMPREHEN METABOLIC PANEL: CPT

## 2017-03-10 PROCEDURE — 27000221 HC OXYGEN, UP TO 24 HOURS

## 2017-03-10 PROCEDURE — 11000001 HC ACUTE MED/SURG PRIVATE ROOM

## 2017-03-10 PROCEDURE — 99233 SBSQ HOSP IP/OBS HIGH 50: CPT | Mod: GC,,, | Performed by: INTERNAL MEDICINE

## 2017-03-10 PROCEDURE — 99232 SBSQ HOSP IP/OBS MODERATE 35: CPT | Mod: ,,, | Performed by: INTERNAL MEDICINE

## 2017-03-10 PROCEDURE — 84100 ASSAY OF PHOSPHORUS: CPT

## 2017-03-10 PROCEDURE — 85007 BL SMEAR W/DIFF WBC COUNT: CPT

## 2017-03-10 RX ORDER — MAGNESIUM SULFATE HEPTAHYDRATE 40 MG/ML
2 INJECTION, SOLUTION INTRAVENOUS ONCE
Status: COMPLETED | OUTPATIENT
Start: 2017-03-10 | End: 2017-03-10

## 2017-03-10 RX ADMIN — DORNASE ALFA: 1 SOLUTION RESPIRATORY (INHALATION) at 11:03

## 2017-03-10 RX ADMIN — STANDARDIZED SENNA CONCENTRATE AND DOCUSATE SODIUM 1 TABLET: 8.6; 5 TABLET, FILM COATED ORAL at 09:03

## 2017-03-10 RX ADMIN — DILTIAZEM HYDROCHLORIDE 30 MG: 30 TABLET, FILM COATED ORAL at 01:03

## 2017-03-10 RX ADMIN — DILTIAZEM HYDROCHLORIDE 30 MG: 30 TABLET, FILM COATED ORAL at 11:03

## 2017-03-10 RX ADMIN — MAGNESIUM SULFATE IN WATER 2 G: 40 INJECTION, SOLUTION INTRAVENOUS at 06:03

## 2017-03-10 RX ADMIN — MORPHINE SULFATE 2 MG: 2 INJECTION, SOLUTION INTRAMUSCULAR; INTRAVENOUS at 03:03

## 2017-03-10 RX ADMIN — DILTIAZEM HYDROCHLORIDE 30 MG: 30 TABLET, FILM COATED ORAL at 05:03

## 2017-03-10 RX ADMIN — Medication 3 ML: at 01:03

## 2017-03-10 RX ADMIN — ALTEPLASE: 2.2 INJECTION, POWDER, LYOPHILIZED, FOR SOLUTION INTRAVENOUS at 05:03

## 2017-03-10 RX ADMIN — METOPROLOL TARTRATE 50 MG: 50 TABLET, FILM COATED ORAL at 11:03

## 2017-03-10 RX ADMIN — Medication 3 ML: at 09:03

## 2017-03-10 RX ADMIN — STANDARDIZED SENNA CONCENTRATE AND DOCUSATE SODIUM 1 TABLET: 8.6; 5 TABLET, FILM COATED ORAL at 08:03

## 2017-03-10 RX ADMIN — Medication 3 ML: at 06:03

## 2017-03-10 RX ADMIN — CEFTRIAXONE 2 G: 2 INJECTION, SOLUTION INTRAVENOUS at 01:03

## 2017-03-10 RX ADMIN — ALTEPLASE: 2.2 INJECTION, POWDER, LYOPHILIZED, FOR SOLUTION INTRAVENOUS at 02:03

## 2017-03-10 RX ADMIN — METOPROLOL TARTRATE 50 MG: 50 TABLET, FILM COATED ORAL at 01:03

## 2017-03-10 RX ADMIN — HYDROCODONE BITARTRATE AND ACETAMINOPHEN 1 TABLET: 5; 325 TABLET ORAL at 07:03

## 2017-03-10 RX ADMIN — CITALOPRAM HYDROBROMIDE 40 MG: 40 TABLET ORAL at 08:03

## 2017-03-10 RX ADMIN — HYDROCODONE BITARTRATE AND ACETAMINOPHEN 1 TABLET: 5; 325 TABLET ORAL at 03:03

## 2017-03-10 RX ADMIN — METOPROLOL TARTRATE 50 MG: 50 TABLET, FILM COATED ORAL at 06:03

## 2017-03-10 RX ADMIN — HYDROCODONE BITARTRATE AND ACETAMINOPHEN 1 TABLET: 5; 325 TABLET ORAL at 05:03

## 2017-03-10 RX ADMIN — DORNASE ALFA: 1 SOLUTION RESPIRATORY (INHALATION) at 06:03

## 2017-03-10 RX ADMIN — METOPROLOL TARTRATE 50 MG: 50 TABLET, FILM COATED ORAL at 05:03

## 2017-03-10 RX ADMIN — HYDROCODONE BITARTRATE AND ACETAMINOPHEN 1 TABLET: 5; 325 TABLET ORAL at 11:03

## 2017-03-10 RX ADMIN — FAMOTIDINE 20 MG: 20 TABLET, FILM COATED ORAL at 08:03

## 2017-03-10 RX ADMIN — DILTIAZEM HYDROCHLORIDE 30 MG: 30 TABLET, FILM COATED ORAL at 06:03

## 2017-03-10 NOTE — ASSESSMENT & PLAN NOTE
- Stable   - Given CXR findings will test for legionella pending  - diet advanced and fluid restricted     BMP  Lab Results   Component Value Date     (L) 03/10/2017    K 4.6 03/10/2017    CL 99 03/10/2017    CO2 22 (L) 03/10/2017    BUN 39 (H) 03/10/2017    CREATININE 1.1 03/10/2017    CALCIUM 7.7 (L) 03/10/2017    ANIONGAP 8 03/10/2017    ESTGFRAFRICA >60.0 03/10/2017    EGFRNONAA >60.0 03/10/2017

## 2017-03-10 NOTE — PLAN OF CARE
Problem: Patient Care Overview  Goal: Plan of Care Review  Outcome: Ongoing (interventions implemented as appropriate)  No acute events during the day. Pt maintaining on RA, SATS> 95% . Pt VSS. HR~80-100ss, SBP~100s. Pt neuro intact, follows commands. Pt on cardiac diet tolerating well, Pt UOA, pt remains afebrile and denies pain. Pt scheduled to have TPA instilled in his chest tube by MD Major this pm. POC reviewed with pt and family, all questions and concerns answered and addressed. WCTM closely for acute changes. Orders to transfer pt when bed is available

## 2017-03-10 NOTE — PLAN OF CARE
Case dicussed in IDT rounds.  Therapy is recommending SNF but CCS MD does not feel it is appropriate to initiate a d/c plan, as the pt's plan of care is still not clear.  CCS MD is awtg more medical info and will advise SW to initiate a D/C plan, when it is appropriate.    SW will continue to follow.    Pascale Muse LCSW     [phone]  Critical Care (MICU)

## 2017-03-10 NOTE — PROGRESS NOTES
[facility]  Critical Care Medicine  Progress Note    Patient Name: Wil Kwon Jr.  MRN: [id number]  Admission Date: 3/6/2017  Hospital Length of Stay: 4 days  Code Status: Full Code  Attending Provider: Kishor Medrano*  Primary Care Provider: LAILA Serra MD   Principal Problem: Acute hypoxemic respiratory failure    Subjective:     HPI:  72 year old male with history of MDS (s/p Chemo x 5 most recent 3/03/2017), HTN, Afib (coumadin, lopressor), hyperlipidemia, and AS presents to the ED with fever and SOB.  Patient reports he was in his usual state of health until Friday shortly after receiving chemotherapy (Vidaza) when he started to experience SOB.  This SOB progressively worsened and patient developed a productive cough with brownish green foul tasting sputum.  In addition he noticed a progressive right sided chest wall pain that radiated towards his back that is currently a 10/10.  His pain is temporarily relived by cough.  Patient brother notes patient appeared feverish starting Sunday.  Patient denies sick contacts and report he has received the flu shot this year.  He reports some nausea and vomiting beginning yesterday, no abdominal pain and normal BMs.  Currently denies HA, or palpitations              Hospital/ICU Course:  3/06: Admitted to ICU and started on broad spectrum abx for suspected HCAP, Started on CPAP   3/9/17: continuing tPA for loculated empyema. Resp cx grew Klebsiella, so started back on Zosyn awaiting sensitivities. Pleural fluid cx NGTD.       Interval History/Significant Events: Improved anxiety, able to sleep well overnight. Appetite improved and pt wants to actually eat breakfast today which is a change for him since this admission. Feeling much better.     Review of Systems   Constitutional: Negative for chills, fatigue and fever.   HENT: Negative for mouth sores and trouble swallowing.    Respiratory: Negative for cough, shortness of breath and  wheezing.    Cardiovascular: Positive for chest pain ( right sided chest wall pain at site of chest tube). Negative for palpitations.   Gastrointestinal: Negative for abdominal pain.   Neurological: Negative for dizziness, weakness and light-headedness.   Psychiatric/Behavioral: Negative for agitation and confusion. The patient is nervous/anxious ( significantly improved).      Objective:     Vital Signs (Most Recent):  Temp: 98.6 °F (37 °C) (03/10/17 1500)  Pulse: 101 (03/10/17 1600)  Resp: (!) 26 (03/10/17 1600)  BP: (!) 151/89 (03/10/17 1600)  SpO2: 95 % (03/10/17 1600) Vital Signs (24h Range):  Temp:  [97.8 °F (36.6 °C)-98.6 °F (37 °C)] 98.6 °F (37 °C)  Pulse:  [] 101  Resp:  [21-39] 26  SpO2:  [83 %-100 %] 95 %  BP: ()/(50-89) 151/89   Weight: 86.2 kg (190 lb)  Body mass index is 26.5 kg/(m^2).      Intake/Output Summary (Last 24 hours) at 03/10/17 1646  Last data filed at 03/10/17 1500   Gross per 24 hour   Intake              220 ml   Output             2505 ml   Net            -2285 ml       Physical Exam   Constitutional: He is oriented to person, place, and time. He appears well-developed and well-nourished. He has a sickly appearance. No distress.   HENT:   Head: Normocephalic and atraumatic.   Eyes: Conjunctivae and EOM are normal. Pupils are equal, round, and reactive to light.   Neck: Normal range of motion.   Cardiovascular: Intact distal pulses.  An irregularly irregular rhythm present. Tachycardia present.    No murmur heard.  Pulmonary/Chest: Effort normal. Tachypnea noted. No respiratory distress. He has decreased breath sounds in the right middle field and the right lower field. He has no wheezes. He has rhonchi.   Chest tube on right   Decreased breath sounds on RLL, RML    Abdominal: Bowel sounds are normal. He exhibits no distension. There is no tenderness. There is no guarding.   Musculoskeletal: He exhibits no edema.   Neurological: He is alert and oriented to person, place, and  time.   Skin: Skin is warm. He is not diaphoretic.   Psychiatric: His speech is normal and behavior is normal. Judgment and thought content normal. His mood appears anxious. Cognition and memory are normal.   Nursing note and vitals reviewed.      Vents:  Oxygen Concentration (%): 35 (03/10/17 0611)  Lines/Drains/Airways     Central Venous Catheter Line                 Port A Cath Single Lumen 03/07/17 1939 right subclavian 2 days          Drain                 Chest Tube 03/07/17 1700 1 Right Pleural 8 Fr. 2 days          Peripheral Intravenous Line                 Peripheral IV - Single Lumen 03/06/17 1116 Right Antecubital 4 days              Significant Labs:    CBC/Anemia Profile:    Recent Labs  Lab 03/09/17  0242 03/09/17  0350 03/10/17  0329   WBC 31.84* 33.67* 23.01*   HGB 9.4* 9.6* 9.7*   HCT 29.8* 30.8* 29.8*   PLT SEE COMMENT 207 110*   MCV 89 89 88   RDW 18.4* 18.3* 18.2*        Chemistries:    Recent Labs  Lab 03/09/17  0242 03/10/17  0329   * 129*   K 4.3 4.6   CL 98 99   CO2 20* 22*   BUN 45* 39*   CREATININE 1.4 1.1   CALCIUM 7.8* 7.7*   ALBUMIN 1.8* 1.8*   PROT 5.7* 5.9*   BILITOT 2.3* 2.1*   ALKPHOS 67 96   ALT 18 24   AST 30 44*   MG 1.8 1.7   PHOS 4.3 4.3       A1C: No results for input(s): HGBA1C in the last 48 hours.  Blood Culture: No results for input(s): LABBLOO in the last 48 hours.  BMP:   Recent Labs  Lab 03/10/17  0329   *   *   K 4.6   CL 99   CO2 22*   BUN 39*   CREATININE 1.1   CALCIUM 7.7*   MG 1.7     CMP:   Recent Labs  Lab 03/09/17  0242 03/10/17  0329   * 129*   K 4.3 4.6   CL 98 99   CO2 20* 22*   * 118*   BUN 45* 39*   CREATININE 1.4 1.1   CALCIUM 7.8* 7.7*   PROT 5.7* 5.9*   ALBUMIN 1.8* 1.8*   BILITOT 2.3* 2.1*   ALKPHOS 67 96   AST 30 44*   ALT 18 24   ANIONGAP 10 8   EGFRNONAA 49.8* >60.0     Coagulation:   Recent Labs  Lab 03/10/17  0329   INR 3.0*     Respiratory Culture: No results for input(s): GSRESP, RESPIRATORYC in the last 48  hours.  Urine Culture: No results for input(s): LABURIN in the last 48 hours.  Urine Studies: No results for input(s): COLORU, APPEARANCEUA, PHUR, SPECGRAV, PROTEINUA, GLUCUA, KETONESU, BILIRUBINUA, OCCULTUA, NITRITE, UROBILINOGEN, LEUKOCYTESUR, RBCUA, WBCUA, BACTERIA, SQUAMEPITHEL, HYALINECASTS in the last 48 hours.    Invalid input(s): WRIGHTSUR  All pertinent labs within the past 24 hours have been reviewed.    Significant Imaging:  I have reviewed all pertinent imaging results/findings within the past 24 hours.    Assessment/Plan:      71 yo gentleman with hx of MDS on chemotherapy admitted for sepsis 2/2 CAP with loculated empyema necessitating tPA administration, chest tube insertion and oxygen for hypoxia which is now improved. His clinical course was complicated by persistent RVR of his permanent atrial fibrillation but now resolved with addition of home med diltiazem to metoprolol and digoxin.      Neuro  Insomnia  - start trazodone 25 mg po q HS prn insomnia     Pulmonary  * Acute hypoxemic respiratory failure, resolved as of 3/10/2017  - significantly improved    - tachypnea improved, not needing supplemental oxygen at bedside assessment today   - Likely 2/2 to suspected HCAP given CXR findings with respiratory sputum cx +Klebsiella and pleural fluid cx +strep viridans    - continue ceftriaxone       HCAP (healthcare-associated pneumonia)  - CXR concerning for right lower lob PNA  - Given history of recent hospitalization/procedure concern for HCAP    - S/p Pigtail placement by IR 3/7  - pleural fluid cx positive for strep intermedius (subtype of strep viridans,) and respiratory cx +Klebsiella, both sensitive to ceftriaxone    - will deescalate to ceftriaxone     Empyema of right pleural space  - s/p tPA for loculated empyema with last course today   - chest tube in place and with adequate output; may eventually need VATS     Cardiac  Paroxysmal atrial fibrillation  - significant improvement in rate  control after beginning home med diltiazem 30 mg q 6 h overnight    - RVR resolved, now with rate controlled atrial fib   - on metoprolol 50 mg bid, digoxin 0.125 mg q daily and diltiazem 30 mg q6H now with BP stable   - warfarin held given tPA administration and recent procedures          HTN (hypertension), benign  - holding losartan given recent hypotensive episodes needing pressor support, now off pressors   - continuing metoprolol tartrate 50 mg bid for atrial fib rate control   - started home med diltiazem 30 mg q 6 h     Renal  BERNICE (acute kidney injury)  - Likely prerenal given presentation, FeNA   - Currently improved with IV hydration and po diet     ID  Sepsis  - Resolving   - Elevated WBC, tachypnea, tachycardic and suspected HCAP on admission   - Please see HCAP for plan     Hem/Onc  Anemia, chronic disease  - hx of MDS with chemo therapy  - transfuse for Hb below 7     MDS (myelodysplastic syndrome)  - Hx since 2013 per patient   - Multiple chemo treatments most recent 3/03 Vidaza     Fluids/Electrolytes/Nutrition/GI  Hyponatremia  - Stable   - Given CXR findings will test for legionella pending  - diet advanced and fluid restricted     BMP  Lab Results   Component Value Date     (L) 03/10/2017    K 4.6 03/10/2017    CL 99 03/10/2017    CO2 22 (L) 03/10/2017    BUN 39 (H) 03/10/2017    CREATININE 1.1 03/10/2017    CALCIUM 7.7 (L) 03/10/2017    ANIONGAP 8 03/10/2017    ESTGFRAFRICA >60.0 03/10/2017    EGFRNONAA >60.0 03/10/2017              Critical Care Medicine Daily Checklist:     A: Awake: RASS Goal/Actual Goal:    Actual: [place] Agitation Sedation Scale (RASS): Alert and calm   B: Spontaneous Breathing Trial Performed?     C: SAT & SBT Coordinated?  N/a     D: Delirium: CAM-ICU Overall CAM-ICU: Negative   E: Early Mobility Performed? Yes   F: Feeding Goal:    Status:           Current Diet Order   Procedures    Diet Adult Regular Fluid - 1500mL       Order Specific Question:  Fluid  restriction:       Answer:  Fluid - 1500mL       AS: Analgesia/Sedation Morphine IV prn, Norco prn, trazodone for insomnia    T: Thromboembolic Prophylaxis Holding due to INR 3.8 today, tPA administration    H: HOB > 300 Yes   U: Stress Ulcer Prophylaxis (if needed) Famotidine 20 mg daily    G: Glucose Control N/a    B: Bowel Function Stool Occurrence: 0   I: Indwelling Catheter (Lines & [place]) Necessity Chest tube    D: De-escalation of Antimicrobials/Pharmacotherapies Discontinue zosyn, begin ceftriaxone for PNA      Plan for the day/ETD N/a      Code Status:  Family/Goals of Care: Full Code      Critical Care Time: 30 minutes     Critical care was time spent personally by me on the following activities: development of treatment plan with patient or surrogate and bedside caregivers, discussions with consultants, evaluation of patient's response to treatment, examination of patient, ordering and performing treatments and interventions, ordering and review of laboratory studies, ordering and review of radiographic studies, pulse oximetry, re-evaluation of patient's condition. This critical care time did not overlap with that of any other provider or involve time for any procedures.     Sally Reno MD  Critical Care Medicine  [facility]

## 2017-03-10 NOTE — ASSESSMENT & PLAN NOTE
- significant improvement in rate control after beginning home med diltiazem 30 mg q 6 h overnight    - RVR resolved, now with rate controlled atrial fib   - on metoprolol 50 mg bid, digoxin 0.125 mg q daily and diltiazem 30 mg q6H now with BP stable   - warfarin held given tPA administration and recent procedures

## 2017-03-10 NOTE — ASSESSMENT & PLAN NOTE
- holding losartan given recent hypotensive episodes needing pressor support, now off pressors   - continuing metoprolol tartrate 50 mg bid for atrial fib rate control   - started home med diltiazem 30 mg q 6 h

## 2017-03-10 NOTE — SUBJECTIVE & OBJECTIVE
Interval History/Significant Events: Improved anxiety, able to sleep well overnight. Appetite improved and pt wants to actually eat breakfast today which is a change for him since this admission. Feeling much better.     Review of Systems   Constitutional: Negative for chills, fatigue and fever.   HENT: Negative for mouth sores and trouble swallowing.    Respiratory: Negative for cough, shortness of breath and wheezing.    Cardiovascular: Positive for chest pain ( right sided chest wall pain at site of chest tube). Negative for palpitations.   Gastrointestinal: Negative for abdominal pain.   Neurological: Negative for dizziness, weakness and light-headedness.   Psychiatric/Behavioral: Negative for agitation and confusion. The patient is nervous/anxious ( significantly improved).      Objective:     Vital Signs (Most Recent):  Temp: 98.6 °F (37 °C) (03/10/17 1500)  Pulse: 101 (03/10/17 1600)  Resp: (!) 26 (03/10/17 1600)  BP: (!) 151/89 (03/10/17 1600)  SpO2: 95 % (03/10/17 1600) Vital Signs (24h Range):  Temp:  [97.8 °F (36.6 °C)-98.6 °F (37 °C)] 98.6 °F (37 °C)  Pulse:  [] 101  Resp:  [21-39] 26  SpO2:  [83 %-100 %] 95 %  BP: ()/(50-89) 151/89   Weight: 86.2 kg (190 lb)  Body mass index is 26.5 kg/(m^2).      Intake/Output Summary (Last 24 hours) at 03/10/17 1646  Last data filed at 03/10/17 1500   Gross per 24 hour   Intake              220 ml   Output             2505 ml   Net            -2285 ml       Physical Exam   Constitutional: He is oriented to person, place, and time. He appears well-developed and well-nourished. He has a sickly appearance. No distress.   HENT:   Head: Normocephalic and atraumatic.   Eyes: Conjunctivae and EOM are normal. Pupils are equal, round, and reactive to light.   Neck: Normal range of motion.   Cardiovascular: Intact distal pulses.  An irregularly irregular rhythm present. Tachycardia present.    No murmur heard.  Pulmonary/Chest: Effort normal. Tachypnea noted. No  respiratory distress. He has decreased breath sounds in the right middle field and the right lower field. He has no wheezes. He has rhonchi.   Chest tube on right   Decreased breath sounds on RLL, RML    Abdominal: Bowel sounds are normal. He exhibits no distension. There is no tenderness. There is no guarding.   Musculoskeletal: He exhibits no edema.   Neurological: He is alert and oriented to person, place, and time.   Skin: Skin is warm. He is not diaphoretic.   Psychiatric: His speech is normal and behavior is normal. Judgment and thought content normal. His mood appears anxious. Cognition and memory are normal.   Nursing note and vitals reviewed.      Vents:  Oxygen Concentration (%): 35 (03/10/17 0611)  Lines/Drains/Airways     Central Venous Catheter Line                 Port A Cath Single Lumen 03/07/17 1939 right subclavian 2 days          Drain                 Chest Tube 03/07/17 1700 1 Right Pleural 8 Fr. 2 days          Peripheral Intravenous Line                 Peripheral IV - Single Lumen 03/06/17 1116 Right Antecubital 4 days              Significant Labs:    CBC/Anemia Profile:    Recent Labs  Lab 03/09/17  0242 03/09/17  0350 03/10/17  0329   WBC 31.84* 33.67* 23.01*   HGB 9.4* 9.6* 9.7*   HCT 29.8* 30.8* 29.8*   PLT SEE COMMENT 207 110*   MCV 89 89 88   RDW 18.4* 18.3* 18.2*        Chemistries:    Recent Labs  Lab 03/09/17  0242 03/10/17  0329   * 129*   K 4.3 4.6   CL 98 99   CO2 20* 22*   BUN 45* 39*   CREATININE 1.4 1.1   CALCIUM 7.8* 7.7*   ALBUMIN 1.8* 1.8*   PROT 5.7* 5.9*   BILITOT 2.3* 2.1*   ALKPHOS 67 96   ALT 18 24   AST 30 44*   MG 1.8 1.7   PHOS 4.3 4.3       A1C: No results for input(s): HGBA1C in the last 48 hours.  Blood Culture: No results for input(s): LABBLOO in the last 48 hours.  BMP:   Recent Labs  Lab 03/10/17  0329   *   *   K 4.6   CL 99   CO2 22*   BUN 39*   CREATININE 1.1   CALCIUM 7.7*   MG 1.7     CMP:   Recent Labs  Lab 03/09/17  0242 03/10/17  8347    * 129*   K 4.3 4.6   CL 98 99   CO2 20* 22*   * 118*   BUN 45* 39*   CREATININE 1.4 1.1   CALCIUM 7.8* 7.7*   PROT 5.7* 5.9*   ALBUMIN 1.8* 1.8*   BILITOT 2.3* 2.1*   ALKPHOS 67 96   AST 30 44*   ALT 18 24   ANIONGAP 10 8   EGFRNONAA 49.8* >60.0     Coagulation:   Recent Labs  Lab 03/10/17  0329   INR 3.0*     Respiratory Culture: No results for input(s): GSRESP, RESPIRATORYC in the last 48 hours.  Urine Culture: No results for input(s): LABURIN in the last 48 hours.  Urine Studies: No results for input(s): COLORU, APPEARANCEUA, PHUR, SPECGRAV, PROTEINUA, GLUCUA, KETONESU, BILIRUBINUA, OCCULTUA, NITRITE, UROBILINOGEN, LEUKOCYTESUR, RBCUA, WBCUA, BACTERIA, SQUAMEPITHEL, HYALINECASTS in the last 48 hours.    Invalid input(s): EYAD  All pertinent labs within the past 24 hours have been reviewed.    Significant Imaging:  I have reviewed all pertinent imaging results/findings within the past 24 hours.

## 2017-03-10 NOTE — RESIDENT HANDOFF
ICU Transfer of Care note    Primary diagnosis: Acute hypoxemic respiratory failure  Admit date: 3/6/2017  Admission diagnosis: Cough [R05]  Sepsis [A41.9]  Cough [R05]    Date of Transfer / Stepdown: 3/9/2017    Brief History of Present Illness:      72 year old male with history of MDS (s/p Chemo x 5 most recent 3/03/2017), HTN, Afib (coumadin, lopressor), hyperlipidemia, and AS presents to the ED with fever and SOB. Patient reports he was in his usual state of health until Friday shortly after receiving chemotherapy (Vidaza) when he started to experience SOB. This SOB progressively worsened and patient developed a productive cough with brownish green foul tasting sputum. In addition he noticed a progressive right sided chest wall pain that radiated towards his back that is currently a 10/10. His pain is temporarily relived by cough. Patient brother notes patient appeared feverish starting Sunday. Patient denies sick contacts and report he has received the flu shot this year. He reports some nausea and vomiting beginning yesterday, no abdominal pain and normal BMs. Currently denies HA, or palpitations        Hospital Course By Problem with Pertinent Findings:     Admitted to ICU and started on broad spectrum antibiotics for suspected HCAP. Initially started on CPAP but was converted to BiPAP which he tolerated well. Underwent thoracentesis and subsequent pigtail placement by IR for fluid accumulation/loculated effusion on 3/7. Pleural fluid culture positive for strep intermedius (subtype of strep viridans) and respiratory culture +Klebsiella, both sensitive to ceftriaxone. Patient's antibiotics were deescalated to ceftriaxone; zosyn discontinued. He also received several tPA and DNAse for loculated empyema. Patient's acute hypoxemic respiratory failure resolving; weaned to NC with SpO2 >95% and improvement in tachypnea. Chest tube continues to have good output; 840 mL and 1150 mL the last two days. CTS signed off;  no VATS at this time.     Regarding patient's Afib; Cardiology is on board and has recommend to increase lopressor to 50 mg Q6H (done 3/9); monitored for BP fall. Warfarin still being held when giving tPA; restart when risk of bleeding decreases. Continue management of sepsis and consider restarting home CCBs. No need for further cardiac investigation of moderate aortic stenosis; just be careful with fluid boluses (0.4% risk for a major event).     Consultants and Procedures:     Consultants:  IR for thoracentesis; s/p pigtail drain placement  Cardiology for Afib management     Procedures:    Thoracentesis     Transfer Information:     Diet:  Current Diet Order   Procedures    Diet Adult Regular Fluid - 1500mL     Order Specific Question:   Fluid restriction:     Answer:   Fluid - 1500mL       Physical Activity:  Ad kan    Please remember to order PT/OT/SLP as necessary (ordered)    To Do / Pending Studies / Follow ups:  - continued tPA; DNAse. Can be performed on the floor by Pulmonary (please consult in the morning)  - Nursing communication states to bring Alteplase + Dornase to the bedside at 6am on 3/10/2017; will need tPA and Dornase BID (6am and 5pm administered by Pulmonary but will need to be ordered)   - eventual drain removal once output decreases  - may still need BiPAP at night depending on respiratory function   - BCx NGTD x2d; trend    Please do not hesitate to call with questions.    Bryan Rhodes MD  IM PGY-1  Spectra: [phone]  3/9/2017, 7:08 PM    # need to define antibiotic duration course  # Discuss with CT surgery need for repeat imaging and VATS (potentially as outpatient)    Kishor Medrano MD  [phone]  Critical Care Medicine  11:26 PM 3/10/2017

## 2017-03-10 NOTE — ASSESSMENT & PLAN NOTE
- s/p tPA for loculated empyema with last course today   - chest tube in place and with adequate output; may eventually need VATS

## 2017-03-10 NOTE — PROGRESS NOTES
Contacted [facility] regarding alteplase/ dornase orders for chest tube. Last dose administered at approx 12 PM on 3/9. MD states next dose will be given tomorrow on day shift. Info to be communicated to receiving day RN.

## 2017-03-10 NOTE — PROGRESS NOTES
Cardiology  Progress Note                                                              Team: [facility] [place] CRITICAL CARE MEDICINE     Patient Name: Wil Kwon Jr.   YOB: 1945  Location: [address] [place] [address] A    Admit Date: 3/6/2017                     LOS: 4    SUBJECTIVE     INTERVAL HISTORY:     Patient s/p pigtail cath for right sided pneumonia. Continue to be in Afib with with significant control on RVR. Continues to be off of anticoagulation, with no acute/NEW symptoms of chest discomfort, SOB, syncope etc.     HPI:     72 years old male with Hx of myelodysplastic syndrome ( blast 7%), s/p Vidaza last week, HTN, chronic Afib on warfarin, HLP, HFpEF with asymptomatic moderate AS ( JEFF 1.18, peak velocity 3.64 ) on ECHO 4/2016. Admitted to the hospital 3/6 for progressive SOB, productive cough, right side chest pain. Found to have RLL/RML consolidation with right pleural effusion. Elevated RR, HR with BERNICE and worsening BP requiring levo. Started on broad spectrum ABX, CT chest show loculated effusion. pleural tap 3/6 show  with Glu <5.unable to place pigtail, Pending Cxs. CTS invloved for complicated PNA for possible VATS. Consulted for priop evaluation. Patient denied any Hx of CAD, chest pain before. Also Denied Hx of CVA, DM. Known asymptomatic moderate AS, chronic Afib on warfarin. Since admission patient was Afib RVR. Currently patient describe right chest pain correlate with pleuritic. Quit active before admission, up and down stairs with no SOB or chest pain, active physically, attending G[employer] for aerobic. Never Hx of HF or syncope.     REVIEW OF SYSTEMS:  General: No syncope, fatigue, fevers, chills, nausea, or vomiting  HEENT: No HAs; No change in vision or pallor  Cardiac: No angina, palpitations, orthopnea, or PND  Pulmonary: No dyspnea, cough, hemoptysis, or wheezing  GI: No abdominal distention, pain, constipation, or diarrhea  : No dysuria, urgency, frequency,  "hematuria  Hematologic/Lymphatic: No night sweats, easy bruising, or LAD  Extremities: No claudication, arthralgias, or myalgias  Derm: No cyanosis or rash  Neuro: No focal weakness or numbness      MEDICATIONS:  Scheduled:   custom SYRINGE builder (PEDS/NICU)   Intra-Catheter Q12H    cefTRIAXone (ROCEPHIN) IVPB  2 g Intravenous Q24H    citalopram  40 mg Oral Daily    diltiaZEM  30 mg Oral Q6H    custom SYRINGE builder (PEDS/NICU)   Intra-Catheter Q12H    famotidine  20 mg Oral Daily    lidocaine HCL 10 mg/ml (1%)  1 mL Other Once    metoprolol tartrate  50 mg Oral QID    senna-docusate 8.6-50 mg  1 tablet Oral BID    sodium chloride 0.9%  3 mL Intravenous Q8H     Continuous:     PRN:  albuterol-ipratropium 2.5mg-0.5mg/3mL, hydrocodone-acetaminophen 5-325mg, morphine, trazodone      OBJECTIVE:     VITALS  Most Recent Range (Last 24H)   BP (!) 114/56 (BP Location: Right arm, Patient Position: Sitting, BP Method: Automatic)  Pulse 102  Temp 98.4 °F (36.9 °C) (Oral)   Resp (!) 24  Ht 5' 11" (1.803 m)  Wt 86.2 kg (190 lb)  SpO2 99%  BMI 26.5 kg/m2 Temp:  [97.8 °F (36.6 °C)-98.6 °F (37 °C)]   Pulse:  []   Resp:  [21-39]   BP: ()/(50-80)   SpO2:  [93 %-100 %]      Intake and Output  BMI     Intake/Output Summary (Last 24 hours) at 03/10/17 1223  Last data filed at 03/10/17 1100   Gross per 24 hour   Intake              170 ml   Output             2045 ml   Net            -1875 ml       Net I/O since admission: Body mass index is 26.5 kg/(m^2).        PHYSICAL EXAM  Constitutional: He is oriented to person, place, and time. He appears well-developed. He is active. Non-toxic appearance. He does not have a sickly appearance. He appears ill. .   Eyes: Pupils are equal, round, and reactive to light. No scleral icterus.   Neck: Neck supple.   Cardiovascular: Intact distal pulses. An irregularly irregular rhythm present. Tachycardia present. AS murmer +   No murmur heard.  Pulmonary/Chest: No " accessory muscle usage or stridor. Tachypnea noted. He has rales in the right lower field.   Abdominal: He exhibits no distension.   Musculoskeletal: He exhibits no edema.   Neurological: He is alert and oriented to person, place, and time.   Skin: Skin is warm. No rash noted.   Psychiatric: He has a normal mood and affect.       LABS  CBC/Anemia Labs Coag Labs     Recent Labs  Lab 03/09/17  0242 03/09/17  0350 03/10/17  0329   WBC 31.84* 33.67* 23.01*   HGB 9.4* 9.6* 9.7*   HCT 29.8* 30.8* 29.8*   MCV 89 89 88   PLT SEE COMMENT 207 110*    Lab Results   Component Value Date    INR 3.0 (H) 03/10/2017    INR 3.8 (H) 03/09/2017    INR 2.6 (H) 03/08/2017        BMP     Recent Labs  Lab 03/08/17 0354 03/09/17  0242 03/10/17  0329   * 117* 118*   * 128* 129*   K 4.3 4.3 4.6   CL 99 98 99   CO2 20* 20* 22*   BUN 49* 45* 39*   CREATININE 1.9* 1.4 1.1   CALCIUM 8.0* 7.8* 7.7*      Mag & Phos LFTs     Recent Labs  Lab 03/08/17 0354 03/09/17  0242 03/10/17  0329   MG 2.4 1.8 1.7   PHOS 4.6* 4.3 4.3      Recent Labs  Lab 03/08/17 0354 03/09/17  0242 03/10/17  0329   PROT 6.4 5.7* 5.9*   BILITOT 1.4* 2.3* 2.1*   ALKPHOS 53* 67 96   AST 33 30 44*   ALT 20 18 24             Cardiac Enzymes Ejection Fractions/BNP   No results for input(s): CKTOTAL, CPK, TROPONINI, TROPONINT, TROPTSTAT, CPKMB, MB in the last 168 hours. EF   Date Value Ref Range Status   03/01/2017 55 55 - 65    04/13/2016 55 55 - 65    05/12/2015 60 55 - 65      No results for input(s): BNP in the last 168 hours.     No results found for: HGBA1C      Microbiology Results (last 7 days)     Procedure Component Value Units Date/Time    Urine culture [[id number]]  (Susceptibility) Collected:  03/06/17 1333    Order Status:  Completed Specimen:  Urine from Urine, Clean Catch Updated:  03/10/17 1222     Urine Culture, Routine --     STAPHYLOCOCCUS EPIDERMIDIS  10,000 - 49,999 cfu/ml      Narrative:       If not done in the last 24 hours    Blood Culture #1  **CANNOT BE ORDERED STAT** [[id number]] Collected:  03/06/17 1116    Order Status:  Completed Specimen:  Blood from Peripheral, Antecubital, Right Updated:  03/10/17 1222     Blood Culture, Routine No Growth to date     Blood Culture, Routine No Growth to date     Blood Culture, Routine No Growth to date     Blood Culture, Routine No Growth to date     Blood Culture, Routine No Growth to date    Blood culture [[id number]] Collected:  03/07/17 0758    Order Status:  Completed Specimen:  Blood from Peripheral, Forearm, Left Updated:  03/10/17 1212     Blood Culture, Routine No Growth to date     Blood Culture, Routine No Growth to date     Blood Culture, Routine No Growth to date     Blood Culture, Routine No Growth to date    Culture, Body Fluid - Bactec [[id number]] Collected:  03/07/17 1726    Order Status:  Completed Specimen:  Body Fluid from Thoracentesis Fluid Updated:  03/09/17 2212     Body Fluid Culture, Sterile No Growth to date     Body Fluid Culture, Sterile No Growth to date     Body Fluid Culture, Sterile No Growth to date    Blood Culture #2 **CANNOT BE ORDERED STAT** [[id number]] Collected:  03/06/17 1145    Order Status:  Completed Specimen:  Blood from Peripheral, Wrist, Left Updated:  03/09/17 1412     Blood Culture, Routine No Growth to date     Blood Culture, Routine No Growth to date     Blood Culture, Routine No Growth to date     Blood Culture, Routine No Growth to date    Culture, Body Fluid (Aerobic) w/ GS [[id number]] Collected:  03/07/17 0927    Order Status:  Completed Specimen:  Body Fluid from Pleural Updated:  03/09/17 1243     AEROBIC CULTURE - FLUID --     STREPTOCOCCUS INTERMEDIUS  Moderate  Susceptibility testing not routinely performed       Gram Stain Result Moderate WBC's      Few Gram positive cocci    Narrative:       ADD ON PER DR GOSS, CX, ORDER #[id number]   07:50  03/07/2017     Culture, Respiratory with Gram Stain [[id number]] Collected:  03/06/17 2230    Order Status:   Completed Specimen:  Respiratory from Sputum Updated:  03/09/17 1046     Respiratory Culture Normal respiratory iqra     Gram Stain (Respiratory) <10 epithelial cells per low power field.     Gram Stain (Respiratory) Few WBC's     Gram Stain (Respiratory) Rare Gram positive cocci    Culture, Respiratory [[id number]]  (Susceptibility) Collected:  03/06/17 1346    Order Status:  Completed Specimen:  Respiratory from Sputum Updated:  03/09/17 1021     Respiratory Culture --     KLEBSIELLA OXYTOCA  Moderate       Gram Stain (Respiratory) <10 epithelial cells per low power field.     Gram Stain (Respiratory) Few WBC's     Gram Stain (Respiratory) Few Gram positive cocci     Gram Stain (Respiratory) Rare Gram negative rods    Narrative:       If not done in the last 24 hours    Culture, Body Fluid - Bactec [[id number]] Collected:  03/06/17 1845    Order Status:  Canceled Specimen:  Body Fluid from Pleural Updated:  03/07/17 0751    Narrative:       ADD ON PER DR GOSS, CXBF, ORDER #[id number]   07:50  03/07/2017   Culture Body Fluid Resin was cancelled on 03/07/2017 at 09:26 by SXD;   Quantity not sufficient for testing.    Culture, Body Fluid - Bactec [[id number]]     Order Status:  Completed Specimen:  Body Fluid from Pleural Fluid     Gram stain (sputum) [[id number]] Collected:  03/06/17 1346    Order Status:  Canceled Specimen:  Sputum from Mouth Updated:  03/06/17 1404    Narrative:       If not done in the last 24 hours  Gram Stain was cancelled on 03/06/2017 at 14:33 by NMJ; Duplicate   order, test included in another profile. see order #[id number]          INVESTIGATION RESULTS    Imaging Results         X-Ray Chest 1 View (Final result) Result time:  03/10/17 10:45:26    Final result by Tyler Perez MD (03/10/17 10:45:26)    Impression:     See above      Electronically signed by: Tyler Perez MD  Date:     03/10/17  Time:    10:45     Narrative:    Right-sided chest tube and central venous catheter identified.   Significant opacification within the right hemithorax consistent with a combination of pleural effusion and airspace consolidation.  Left lung is clear.            X-Ray Chest 1 View (Final result) Result time:  03/09/17 08:19:47    Final result by Ludwin Quigley MD (03/09/17 08:19:47)    Impression:        Decreased right pleural effusion since the previous study.  Rest of examination without change.      Electronically signed by: LUDWIN QUIGLEY MD  Date:     03/09/17  Time:    08:19     Narrative:    History: Shortness of breath.    Procedure: Chest one view    Findings:    The examination is compared with previous studies the most recent on 3/8/17.    The right pleural effusion has significantly decreased since the previous examination.  There is presence of a pigtail catheter in the right pleural space.  No pneumothorax noted there left lung is clear.    Increased haziness over the right hemithorax suggestive of layering of right pleural effusion.  There is also pulmonary vascular congestion.    Heart size within normal limits the mediastinum is unremarkable.  There is no tracheal abnormalities.    Position of the right internal jugular port remains without significant change.            X-Ray Chest 1 View (Final result) Result time:  03/08/17 08:40:19    Final result by Wil Saxena III, MD (03/08/17 08:40:19)    Narrative:     One view: Central and mid SVC.  Heart size is normal.  There is right edema/infiltrate and pleural fluid and a right pleural drain.  Left lung is clear.  There is a possible right paratracheal mass.  CT is recommended.      Electronically signed by: WIL SAXENA  Date:     03/08/17  Time:    08:40             X-Ray Chest 1 View (Final result) Result time:  03/07/17 19:41:57    Final result by Jasvir Rollins MD (03/07/17 19:41:57)    Impression:      As above        Electronically signed by: JASVIR ROLLINS MD  Date:     03/07/17  Time:    19:41     Narrative:    Chest AP  portable    Indication:Pleural drain placement    Comparison:3/7/2017    Findings: Since the previous exam, right pleural drainage catheter has been placed, with interval reduction in the volume of right pleural effusion however moderate effusion remains.  No pneumothorax or significant detrimental change otherwise in the cardiopulmonary status.            IR Pleural Drainage with Imaging (Final result) Result time:  03/08/17 17:31:58    Procedure changed from IR Thoracentesis with Catheter        Final result by Arnoldo Seo MD (03/08/17 17:31:58)    Impression:     Successful ultrasound guided right pleural drain placement.  ______________________________________     Electronically signed by resident: ARNOLDO SEO MD  Date:     03/08/17  Time:    10:25            As the supervising and teaching physician, I personally reviewed the images and resident's interpretation and I agree with the findings.          Electronically signed by: RODNEY VALLE MD  Date:     03/08/17  Time:    17:31     Narrative:    Procedure: Ultrasound-guided right chest tube placement.    History: Pleural effusion and shortness of breath    Radiologist: Viri     Procedure codes:  10464     Findings: Informed consent was obtained prior to the exam after discussion of risk and benefits of the procedure.  No sedation was administered.  Ultrasound demonstrated a moderate collection within the right hemithorax.  The skin was prepped and draped using sterile technique, and 1% lidocaine was used for local anesthesia.  A micropuncture needle was used to access the fluid collection.  There is return of 20 cc of fluid which was serous.  A pigtail catheter was subsequently placed over wire and sutured into place.  The patient tolerated the procedure well.  Resident physician was Arnoldo Seo MD.  Images and report were permanently stored.            X-Ray Chest 1 View (Final result) Result time:  03/07/17 08:51:06    Final result by  Connie Mathew MD (03/07/17 08:51:06)    Impression:        Stable exam, noting a large right pleural effusion with associated lower lobe consolidation and atelectasis.      Electronically signed by: CONNIE MATHEW MD  Date:     03/07/17  Time:    08:51     Narrative:    XR Chest    03/07/17 07:50:00    Accession #: [id number]    CLINICAL INDICATION: 72 year old M with shortness of breath    TECHNIQUE: Single frontal portable chest radiograph.    COMPARISON:  03/06/2017.    FINDINGS:    Central venous catheter in stable position.  The cardiomediastinal silhouette is stable in size and midline. Pulmonary vascularity appears within normal limits.    Large right pleural effusion with associated consolidation/atelectasis, grossly stable from recent exam. Left lung remains well-aerated and essentially clear.    No pneumothorax. No left-sided pleural effusion.            X-Ray Chest 1 View (Final result) Result time:  03/06/17 19:06:14    Final result by Pete Maki DO (03/06/17 19:06:14)    Impression:        See Above       Electronically signed by: PETE MAKI DO  Date:     03/06/17  Time:    19:06     Narrative:    Single portable frontal view of the chest    Comparison: Chest x-ray and CT 03/06/2017     Results: Moderate to large sized right basilar consolidative opacity concerning for continued effusion and possible superimposed airspace process. Please see recent CT report for further details. There is no large pneumothorax. Right-sided Port-A-Cath stable. Continued atherosclerotic aorta. Clinical correlation and followup recommended.            CT Chest Without Contrast (Final result) Result time:  03/06/17 16:21:09    Final result by Didier Medina MD (03/06/17 16:21:09)    Impression:        Diffuse consolidation throughout the right middle and lower lobes involving the central paramediastinal and hilar regions as described above with differential considerations to primarily include an infectious etiology  given loculated/heterogenous appearing right pleural effusion.  Alternatively, an underlying neoplastic process cannot be entirely excluded noting this process is relatively rapid in course as it was not seen on the prior CT from 8/29/2016.  Consider consultation with pulmonary medicine and close followup.    Trace pericardial effusion.  ______________________________________     Electronically signed by resident: SAMANTHA EDGAR MD  Date:     03/06/17  Time:    15:47            As the supervising and teaching physician, I personally reviewed the images and resident's interpretation and I agree with the findings.            Electronically signed by: JOCELYN DE SANTIAGO MD  Date:     03/06/17  Time:    16:21     Narrative:    Technique:   5 mm axial images were acquired using helical CT technique from the lung apices through costophrenic angles.  No intravenous contrast was administered.     Comparison: Radiograph 3/6/2017 and CT 8/29/2016.     Findings:     The soft tissue structures of the base of the neck are unremarkable.  There is a right-sided chest port catheter with its tip terminating within the superior cavoatrial junction.  The heart is normal in size demonstrating trace pericardial fluid.  The thoracic aorta demonstrates no aneurysmal dilatation.  There is mild calcification of the aortic annulus, coronary arteries, and thoracic aorta.    There are scattered axillary and mediastinal lymph nodes without evidence of lymphadenopathy.    The trachea and proximal airways of the left lung are patent with normal expansion and aeration of the left lung.  There is severe narrowing of the segmental bronchi to the right middle and lower lobes with associated diffuse consolidation involving the right paramediastinal and hilar regions.  There is additional diffuse patchy consolidation involving the middle and throughout the right lower lobes.  Although nonspecific, lack of air bronchograms in these regions at least raises  the possibility of an underlying mass noting there was no consolidation seen within the visualized right middle and lower lobes on the CT from 8/29/2016.      There is additional moderate volume right pleural effusion along the posterior and right lateral and anterior chest wall suggesting partial loculation.  Internal density is somewhat heterogenous suggesting blood or inflammatory products.  This is predominantly seen posteriorly within the more dependent regions.  No significant pleural thickening.  No pneumothorax.    Limited views of the upper abdomen demonstrate a focal hypodensity within the left hepatic lobe most compatible with a cyst.    The osseous structures demonstrate no acute process.            X-Ray Chest PA And Lateral (Final result) Result time:  03/06/17 12:39:45    Final result by Pete Maki DO (03/06/17 12:39:45)    Impression:      See above      Electronically signed by: PETE MAKI DO  Date:     03/06/17  Time:    12:39     Narrative:    PA and lateral views of the chest    Comparison: None    Results:    Right sided Port-A-Cath with tip projected over the right atrial/SVC junction. There is moderate right basilar opacity with blunting of both findings are concerning for effusion with atelectasis, however superimposed air space filling process not excluded. No pneumothorax. Atherosclerotic aorta.. Degenerative change in the shoulder joints                ASSESSMENT/PLAN:     Current Problems List:  Active Hospital Problems    Diagnosis  POA    *Acute hypoxemic respiratory failure [J96.01]  Yes    Empyema of right pleural space [J86.9]  Yes    Insomnia [G47.00]  Yes    Cough [R05]  Yes    Sepsis [A41.9]  Yes    HCAP (healthcare-associated pneumonia) [J18.9]  Yes    Leukocytosis [D72.829]  Yes    BERNICE (acute kidney injury) [N17.9]  Yes    MDS (myelodysplastic syndrome) [D46.9]  Yes    Depression [F32.9]  Yes    Hyponatremia [E87.1]  Yes    Anemia, chronic disease [D63.8]   Yes    Paroxysmal atrial fibrillation [I48.0]  Yes    HTN (hypertension), benign [I10]  Yes    Dyslipidemia [E78.5]  Yes      Resolved Hospital Problems    Diagnosis Date Resolved POA   No resolved problems to display.        ASSESSMENT:   72 years old male with Hx of asymptomatic moderate aortic stenosis JEFF 1.2 and peak velocity 3 with gradient 24, stage B with MET 4-10 at least. Revised Cardiac Risk Index for Pre-Operative Risk score is 0.4% in urgent intermediate risk surgery.     PLAN:    Afib with RVR with severe left atrial enlargement:   - Rate controlled today  - Continue lopressor 50 mg q6hr. Continue to closely monitor BP fall.   - Continue diltiazem 30 mg q6hr   - Continue management of sepsis.   - CHADS2 score 1, CHADS VASC score 3 ( HTN,PVD and age ). Consider restarting warfarin when the risk of bleed is low.      Asymptomatic moderate Aortic stenosis:  -- low risk with 0.4% risk of major cardiac events.   -- No need for Invasive cardiac investigation.   -- Cautious with volume overload or hypovolemia.     - Patient shall have F/u with his Cardiologist on op basis post discharge.     Shall sign off at this moment. Please contact us if you have any additional questions.    Cisco Harrington MD  PGY1  Page: [phone]

## 2017-03-10 NOTE — PLAN OF CARE
Problem: Patient Care Overview  Goal: Plan of Care Review  Outcome: Ongoing (interventions implemented as appropriate)  No acute events throughout night. O2 saturation remains stable on BiPAP overnight. Chest tube remains in place. Alteplase administered by MD Gt without complications.  Remains in a-fib with rate controlled. Adequate, manjeet U/O per urinal. PRN morphine administered for incisional pain. Transfer orders remain in place. VS and assessment per flow sheet, patient progressing towards goals as tolerated, plan of care reviewed with Wil Kwon Jr. and son, all concerns addressed, will continue to monitor.

## 2017-03-10 NOTE — ASSESSMENT & PLAN NOTE
- significantly improved    - tachypnea improved, not needing supplemental oxygen at bedside assessment today   - Likely 2/2 to suspected HCAP given CXR findings with respiratory sputum cx +Klebsiella and pleural fluid cx +strep viridans    - continue ceftriaxone

## 2017-03-10 NOTE — PROGRESS NOTES
MD Gt at bedside to discuss TPA order. MD states he does want to administer TPA now given that it has been 12 hours since last dose. Pharmacy notified to send medication STAT. Will contact MD once medication is available at bedside.

## 2017-03-10 NOTE — PLAN OF CARE
Problem: Patient Care Overview  Goal: Plan of Care Review  Outcome: Ongoing (interventions implemented as appropriate)  Pt had a good. He continues to c/o pain. Pt was up to chair most of shift and talk with staff and visitors. Pt and family concerns and question were address and answered. Plan of care was reviewed.

## 2017-03-10 NOTE — PROGRESS NOTES
MD Gt at bedside to administer alteplase. No complications noted. Ordered to unclamp chest tube in one hour. Orders to be carried out.

## 2017-03-11 PROBLEM — R65.21 SEPTIC SHOCK: Status: ACTIVE | Noted: 2017-03-06

## 2017-03-11 PROBLEM — I48.91 ATRIAL FIBRILLATION WITH RVR: Status: ACTIVE | Noted: 2017-03-11

## 2017-03-11 LAB
ALBUMIN SERPL BCP-MCNC: 1.8 G/DL
ALP SERPL-CCNC: 111 U/L
ALT SERPL W/O P-5'-P-CCNC: 20 U/L
ANION GAP SERPL CALC-SCNC: 11 MMOL/L
ANISOCYTOSIS BLD QL SMEAR: SLIGHT
AST SERPL-CCNC: 34 U/L
BACTERIA BLD CULT: NORMAL
BACTERIA BLD CULT: NORMAL
BASOPHILS # BLD AUTO: ABNORMAL K/UL
BASOPHILS NFR BLD: 0 %
BILIRUB SERPL-MCNC: 2 MG/DL
BUN SERPL-MCNC: 36 MG/DL
CALCIUM SERPL-MCNC: 7.9 MG/DL
CHLORIDE SERPL-SCNC: 99 MMOL/L
CO2 SERPL-SCNC: 19 MMOL/L
CREAT SERPL-MCNC: 1 MG/DL
DIFFERENTIAL METHOD: ABNORMAL
EOSINOPHIL # BLD AUTO: ABNORMAL K/UL
EOSINOPHIL NFR BLD: 0 %
ERYTHROCYTE [DISTWIDTH] IN BLOOD BY AUTOMATED COUNT: 18.2 %
EST. GFR  (AFRICAN AMERICAN): >60 ML/MIN/1.73 M^2
EST. GFR  (NON AFRICAN AMERICAN): >60 ML/MIN/1.73 M^2
GLUCOSE SERPL-MCNC: 111 MG/DL
HCT VFR BLD AUTO: 31.7 %
HGB BLD-MCNC: 10 G/DL
HYPOCHROMIA BLD QL SMEAR: ABNORMAL
INR PPP: 1.7
LYMPHOCYTES # BLD AUTO: ABNORMAL K/UL
LYMPHOCYTES NFR BLD: 8 %
MAGNESIUM SERPL-MCNC: 1.8 MG/DL
MCH RBC QN AUTO: 28.1 PG
MCHC RBC AUTO-ENTMCNC: 31.5 %
MCV RBC AUTO: 89 FL
MONOCYTES # BLD AUTO: ABNORMAL K/UL
MONOCYTES NFR BLD: 3 %
NEUTROPHILS NFR BLD: 88 %
NEUTS BAND NFR BLD MANUAL: 1 %
NRBC BLD-RTO: ABNORMAL /100 WBC
OVALOCYTES BLD QL SMEAR: ABNORMAL
PHOSPHATE SERPL-MCNC: 4.4 MG/DL
PLATELET # BLD AUTO: 95 K/UL
PMV BLD AUTO: ABNORMAL FL
POCT GLUCOSE: 278 MG/DL (ref 70–110)
POIKILOCYTOSIS BLD QL SMEAR: SLIGHT
POLYCHROMASIA BLD QL SMEAR: ABNORMAL
POTASSIUM SERPL-SCNC: 4.2 MMOL/L
PROT SERPL-MCNC: 6.1 G/DL
PROTHROMBIN TIME: 16.9 SEC
RBC # BLD AUTO: 3.56 M/UL
SODIUM SERPL-SCNC: 129 MMOL/L
WBC # BLD AUTO: 30.23 K/UL

## 2017-03-11 PROCEDURE — 84100 ASSAY OF PHOSPHORUS: CPT

## 2017-03-11 PROCEDURE — 11000001 HC ACUTE MED/SURG PRIVATE ROOM

## 2017-03-11 PROCEDURE — 80053 COMPREHEN METABOLIC PANEL: CPT

## 2017-03-11 PROCEDURE — 63600175 PHARM REV CODE 636 W HCPCS: Performed by: HOSPITALIST

## 2017-03-11 PROCEDURE — 85027 COMPLETE CBC AUTOMATED: CPT

## 2017-03-11 PROCEDURE — 25000003 PHARM REV CODE 250: Performed by: INTERNAL MEDICINE

## 2017-03-11 PROCEDURE — 36415 COLL VENOUS BLD VENIPUNCTURE: CPT

## 2017-03-11 PROCEDURE — 25000003 PHARM REV CODE 250: Performed by: HOSPITALIST

## 2017-03-11 PROCEDURE — 63600175 PHARM REV CODE 636 W HCPCS: Performed by: INTERNAL MEDICINE

## 2017-03-11 PROCEDURE — 85007 BL SMEAR W/DIFF WBC COUNT: CPT

## 2017-03-11 PROCEDURE — 99233 SBSQ HOSP IP/OBS HIGH 50: CPT | Mod: ,,, | Performed by: HOSPITALIST

## 2017-03-11 PROCEDURE — 85610 PROTHROMBIN TIME: CPT

## 2017-03-11 PROCEDURE — 83735 ASSAY OF MAGNESIUM: CPT

## 2017-03-11 PROCEDURE — 25000003 PHARM REV CODE 250: Performed by: STUDENT IN AN ORGANIZED HEALTH CARE EDUCATION/TRAINING PROGRAM

## 2017-03-11 RX ORDER — DILTIAZEM HYDROCHLORIDE 30 MG/1
60 TABLET, FILM COATED ORAL EVERY 6 HOURS
Status: DISCONTINUED | OUTPATIENT
Start: 2017-03-11 | End: 2017-03-12

## 2017-03-11 RX ORDER — ACETAMINOPHEN 325 MG/1
650 TABLET ORAL EVERY 6 HOURS PRN
Status: DISCONTINUED | OUTPATIENT
Start: 2017-03-11 | End: 2017-03-18 | Stop reason: HOSPADM

## 2017-03-11 RX ORDER — DILTIAZEM HYDROCHLORIDE 30 MG/1
30 TABLET, FILM COATED ORAL ONCE
Status: COMPLETED | OUTPATIENT
Start: 2017-03-11 | End: 2017-03-11

## 2017-03-11 RX ORDER — ENOXAPARIN SODIUM 100 MG/ML
40 INJECTION SUBCUTANEOUS EVERY 24 HOURS
Status: DISCONTINUED | OUTPATIENT
Start: 2017-03-11 | End: 2017-03-13

## 2017-03-11 RX ADMIN — ALTEPLASE: 2.2 INJECTION, POWDER, LYOPHILIZED, FOR SOLUTION INTRAVENOUS at 06:03

## 2017-03-11 RX ADMIN — METOPROLOL TARTRATE 50 MG: 50 TABLET, FILM COATED ORAL at 11:03

## 2017-03-11 RX ADMIN — DILTIAZEM HYDROCHLORIDE 30 MG: 30 TABLET, FILM COATED ORAL at 05:03

## 2017-03-11 RX ADMIN — DILTIAZEM HYDROCHLORIDE 60 MG: 30 TABLET, FILM COATED ORAL at 11:03

## 2017-03-11 RX ADMIN — HYDROCODONE BITARTRATE AND ACETAMINOPHEN 1 TABLET: 5; 325 TABLET ORAL at 07:03

## 2017-03-11 RX ADMIN — Medication 3 ML: at 10:03

## 2017-03-11 RX ADMIN — MORPHINE SULFATE 2 MG: 2 INJECTION, SOLUTION INTRAMUSCULAR; INTRAVENOUS at 11:03

## 2017-03-11 RX ADMIN — CEFTRIAXONE 2 G: 2 INJECTION, SOLUTION INTRAVENOUS at 01:03

## 2017-03-11 RX ADMIN — STANDARDIZED SENNA CONCENTRATE AND DOCUSATE SODIUM 1 TABLET: 8.6; 5 TABLET, FILM COATED ORAL at 07:03

## 2017-03-11 RX ADMIN — ENOXAPARIN SODIUM 40 MG: 100 INJECTION SUBCUTANEOUS at 11:03

## 2017-03-11 RX ADMIN — TRAZODONE HYDROCHLORIDE 25 MG: 50 TABLET ORAL at 11:03

## 2017-03-11 RX ADMIN — DILTIAZEM HYDROCHLORIDE 30 MG: 30 TABLET, FILM COATED ORAL at 01:03

## 2017-03-11 RX ADMIN — HYDROCODONE BITARTRATE AND ACETAMINOPHEN 1 TABLET: 5; 325 TABLET ORAL at 05:03

## 2017-03-11 RX ADMIN — Medication 3 ML: at 01:03

## 2017-03-11 RX ADMIN — DILTIAZEM HYDROCHLORIDE 30 MG: 30 TABLET, FILM COATED ORAL at 11:03

## 2017-03-11 RX ADMIN — METOPROLOL TARTRATE 50 MG: 50 TABLET, FILM COATED ORAL at 05:03

## 2017-03-11 RX ADMIN — MORPHINE SULFATE 2 MG: 2 INJECTION, SOLUTION INTRAMUSCULAR; INTRAVENOUS at 05:03

## 2017-03-11 RX ADMIN — CITALOPRAM HYDROBROMIDE 40 MG: 40 TABLET ORAL at 08:03

## 2017-03-11 RX ADMIN — DILTIAZEM HYDROCHLORIDE 60 MG: 30 TABLET, FILM COATED ORAL at 05:03

## 2017-03-11 RX ADMIN — Medication 3 ML: at 06:03

## 2017-03-11 RX ADMIN — DORNASE ALFA: 1 SOLUTION RESPIRATORY (INHALATION) at 06:03

## 2017-03-11 RX ADMIN — STANDARDIZED SENNA CONCENTRATE AND DOCUSATE SODIUM 1 TABLET: 8.6; 5 TABLET, FILM COATED ORAL at 08:03

## 2017-03-11 RX ADMIN — FAMOTIDINE 20 MG: 20 TABLET, FILM COATED ORAL at 08:03

## 2017-03-11 NOTE — PROGRESS NOTES
[facility] Hospital Medicine  Progress Note    Patient Name: Wil Kwon Jr.  MRN: [id number]  Patient Class: IP- Inpatient   Admission Date: 3/6/2017  Length of Stay: 5 days  Attending Physician: Mark Barry MD  Primary Care Provider: LAILA Serra MD    [facility] Medicine Team: [facility]P MED L Mark Barry MD    Subjective:     Principal Problem:HCAP (healthcare-associated pneumonia)    HPI:  72 year old male with history of MDS (s/p Chemo x 5 most recent 3/03/2017), HTN, Afib (coumadin, lopressor), hyperlipidemia, and AS presents to the ED with fever and SOB.  Patient reports he was in his usual state of health until Friday shortly after receiving chemotherapy (Vidaza) when he started to experience SOB.  This SOB progressively worsened and patient developed a productive cough with brownish green foul tasting sputum.  In addition he noticed a progressive right sided chest wall pain that radiated towards his back that is currently a 10/10.  His pain is temporarily relived by cough.  Patient brother notes patient appeared feverish starting Sunday.  Patient denies sick contacts and report he has received the flu shot this year.  He reports some nausea and vomiting beginning yesterday, no abdominal pain and normal BMs.  Currently denies HA, or palpitations              Hospital Course:  Admitted to ICU and started on broad spectrum antibiotics for suspected HCAP. Initially started on CPAP but was converted to BiPAP which he tolerated well. Underwent thoracentesis and subsequent pigtail placement by IR for fluid accumulation/loculated effusion on 3/7. Pleural fluid culture positive for strep intermedius (subtype of strep viridans) and respiratory culture +Klebsiella, both sensitive to ceftriaxone. Patient's antibiotics were deescalated to ceftriaxone; zosyn discontinued. He also received several tPA and DNAse for loculated empyema. Patient's acute hypoxemic respiratory failure  resolving; weaned to NC with SpO2 >95% and improvement in tachypnea. Chest tube continues to have good output; 840 mL and 1150 mL the last two days. CTS signed off; no VATS at this time.  Sent to floor 3/11.  See below.    Interval History:   Symptoms:  Improved.    Diet:  Adequate intake.    Activity level:  Impaired due to weakness.    Pain:  No pain.       Review of Systems   Constitutional: Negative for fever.   Respiratory: Positive for cough and shortness of breath.    Cardiovascular: Negative for chest pain.   Gastrointestinal: Negative for abdominal pain.     Objective:     Vital Signs (Most Recent):  Temp: 97.4 °F (36.3 °C) (03/11/17 0735)  Pulse: 106 (03/11/17 0855)  Resp: 17 (03/11/17 0735)  BP: (!) 124/58 (03/11/17 0735)  SpO2: 95 % (03/11/17 0735) Vital Signs (24h Range):  Temp:  [97.4 °F (36.3 °C)-98.6 °F (37 °C)] 97.4 °F (36.3 °C)  Pulse:  [] 106  Resp:  [16-32] 17  SpO2:  [83 %-99 %] 95 %  BP: (100-151)/(56-89) 124/58     Weight: 86.2 kg (190 lb)  Body mass index is 26.5 kg/(m^2).    Intake/Output Summary (Last 24 hours) at 03/11/17 1050  Last data filed at 03/11/17 0520   Gross per 24 hour   Intake               55 ml   Output             1220 ml   Net            -1165 ml      Physical Exam   Constitutional: He is oriented to person, place, and time. No distress.   Cardiovascular: Normal rate and normal heart sounds.  An irregularly irregular rhythm present.   Pulmonary/Chest: Effort normal. No respiratory distress. He has decreased breath sounds in the right middle field and the right lower field. He has rales (mild left base).   Abdominal: Soft. Bowel sounds are normal. He exhibits no distension.   Musculoskeletal: He exhibits edema (non-pitting).   Neurological: He is alert and oriented to person, place, and time.   Psychiatric: He has a normal mood and affect.       Significant Labs: All pertinent labs within the past 24 hours have been reviewed.    Significant Imaging: I have reviewed and  interpreted all pertinent imaging results/findings within the past 24 hours.    Assessment/Plan:      * HCAP (healthcare-associated pneumonia)  Septic Shock   Acute hypoxemic respiratory failure   HCAP (healthcare-associated pneumonia)   Empyema of right pleural space  Acute hypoxemic respiratory failure requiring Bipap and vasopressors in the ICU, improved and taken off Bipap, but still likes prn NC O2.  Likely 2/2 to suspected HCAP given CXR concerning for right lower lob PNA with respiratory sputum cx +Klebsiella and pleural fluid cx +strep viridans , both sensitive to ceftriaxone .  S/p Pigtail placement by IR 3/7 and  s/p tPA for right loculated empyema.  - c/w chest tube  - continue ceftriaxone        Essential hypertension  Paroxysmal atrial fibrillation with Rapid Ventricular Rate  BP required pressor support, now off pressors .   RVR resolved, now with rate controlled atrial fib after starting home diltiazem dose. Cardiology recommended to increase lopressor .  - on metoprolol 50 mg bid, digoxin 0.125 mg q daily and diltiazem 30 mg q6H   - warfarin held given tPA administration and recent procedures   - holding losartan given recent hypotensive episodes         BERNICE (acute kidney injury)  Likely prerenal given presentation, FeNA.  Cr peaked at 2.2 and then went back to normal slowly.   - c/w  po diet and monitor      Anemia, chronic disease  MDS (myelodysplastic syndrome)  Pancytopenia  On outpt chemo therapy.  - transfuse for Hb below 7       Hyponatremia  - fluid restict      VTE Risk Mitigation         Ordered     enoxaparin injection 40 mg  Daily     Route:  Subcutaneous        03/11/17 1049     Place sequential compression device  Until discontinued      03/11/17 1049          Mark Barry MD  Department of Hospital Medicine   [facility]

## 2017-03-11 NOTE — PROGRESS NOTES
Pulmonary Plan of Care      Last dose of TPA/dornase alpha instilled into chest tube this AM. Will evaluate output over the next 24 hrs to determine when it should be pulled vs re-imaging.      Maria Esther Michael MD  Pulmonary/Critical Care Fellow  [phone]

## 2017-03-11 NOTE — ASSESSMENT & PLAN NOTE
MDS (myelodysplastic syndrome)  Pancytopenia  On outpt chemo therapy.  - transfuse for Hb below 7

## 2017-03-11 NOTE — ASSESSMENT & PLAN NOTE
Septic Shock   Acute hypoxemic respiratory failure   HCAP (healthcare-associated pneumonia)   Empyema of right pleural space  Acute hypoxemic respiratory failure requiring Bipap and vasopressors in the ICU, improved and taken off Bipap, but still likes prn NC O2.  Likely 2/2 to suspected HCAP given CXR concerning for right lower lob PNA with respiratory sputum cx +Klebsiella and pleural fluid cx +strep viridans , both sensitive to ceftriaxone .  S/p Pigtail placement by IR 3/7 and  s/p tPA for right loculated empyema.  - c/w chest tube  - continue ceftriaxone

## 2017-03-11 NOTE — NURSING TRANSFER
Nursing Transfer Note      3/10/2017     Transfer To: 1153A    Transfer via bed    Transfer with none    Transported by RN    Medicines sent: Dornase    Chart send with patient: Yes    Notified: Brother    Patient reassessed at:3/10/17 18:45    Upon arrival to floor: patient oriented to room, call bell in reach and bed in lowest position

## 2017-03-11 NOTE — SUBJECTIVE & OBJECTIVE
Interval History:   Symptoms:  Improved.    Diet:  Adequate intake.    Activity level:  Impaired due to weakness.    Pain:  No pain.       Review of Systems   Constitutional: Negative for fever.   Respiratory: Positive for cough and shortness of breath.    Cardiovascular: Negative for chest pain.   Gastrointestinal: Negative for abdominal pain.     Objective:     Vital Signs (Most Recent):  Temp: 97.4 °F (36.3 °C) (03/11/17 0735)  Pulse: 106 (03/11/17 0855)  Resp: 17 (03/11/17 0735)  BP: (!) 124/58 (03/11/17 0735)  SpO2: 95 % (03/11/17 0735) Vital Signs (24h Range):  Temp:  [97.4 °F (36.3 °C)-98.6 °F (37 °C)] 97.4 °F (36.3 °C)  Pulse:  [] 106  Resp:  [16-32] 17  SpO2:  [83 %-99 %] 95 %  BP: (100-151)/(56-89) 124/58     Weight: 86.2 kg (190 lb)  Body mass index is 26.5 kg/(m^2).    Intake/Output Summary (Last 24 hours) at 03/11/17 1050  Last data filed at 03/11/17 0520   Gross per 24 hour   Intake               55 ml   Output             1220 ml   Net            -1165 ml      Physical Exam   Constitutional: He is oriented to person, place, and time. No distress.   Cardiovascular: Normal rate and normal heart sounds.  An irregularly irregular rhythm present.   Pulmonary/Chest: Effort normal. No respiratory distress. He has decreased breath sounds in the right middle field and the right lower field. He has rales (mild left base).   Abdominal: Soft. Bowel sounds are normal. He exhibits no distension.   Musculoskeletal: He exhibits edema (non-pitting).   Neurological: He is alert and oriented to person, place, and time.   Psychiatric: He has a normal mood and affect.       Significant Labs: All pertinent labs within the past 24 hours have been reviewed.    Significant Imaging: I have reviewed and interpreted all pertinent imaging results/findings within the past 24 hours.

## 2017-03-11 NOTE — ASSESSMENT & PLAN NOTE
Likely prerenal given presentation, FeNA.  Cr peaked at 2.2 and then went back to normal slowly.   - c/w  po diet and monitor

## 2017-03-11 NOTE — ASSESSMENT & PLAN NOTE
Paroxysmal atrial fibrillation with Rapid Ventricular Rate  BP required pressor support, now off pressors .   RVR resolved, now with rate controlled atrial fib after starting home diltiazem dose. Cardiology recommended to increase lopressor .  - on metoprolol 50 mg bid, digoxin 0.125 mg q daily and diltiazem 30 mg q6H   - warfarin held given tPA administration and recent procedures   - holding losartan given recent hypotensive episodes

## 2017-03-12 PROBLEM — E83.42 HYPOMAGNESEMIA: Status: ACTIVE | Noted: 2017-03-12

## 2017-03-12 LAB
ALBUMIN SERPL BCP-MCNC: 1.6 G/DL
ALP SERPL-CCNC: 93 U/L
ALT SERPL W/O P-5'-P-CCNC: 18 U/L
ANION GAP SERPL CALC-SCNC: 8 MMOL/L
ANISOCYTOSIS BLD QL SMEAR: SLIGHT
AST SERPL-CCNC: 34 U/L
BACTERIA BLD CULT: NORMAL
BASOPHILS # BLD AUTO: ABNORMAL K/UL
BASOPHILS NFR BLD: 0 %
BILIRUB SERPL-MCNC: 1.1 MG/DL
BUN SERPL-MCNC: 32 MG/DL
CALCIUM SERPL-MCNC: 7.8 MG/DL
CHLORIDE SERPL-SCNC: 98 MMOL/L
CO2 SERPL-SCNC: 23 MMOL/L
CREAT SERPL-MCNC: 1 MG/DL
DIFFERENTIAL METHOD: ABNORMAL
EOSINOPHIL # BLD AUTO: ABNORMAL K/UL
EOSINOPHIL NFR BLD: 0 %
ERYTHROCYTE [DISTWIDTH] IN BLOOD BY AUTOMATED COUNT: 18.3 %
EST. GFR  (AFRICAN AMERICAN): >60 ML/MIN/1.73 M^2
EST. GFR  (NON AFRICAN AMERICAN): >60 ML/MIN/1.73 M^2
GLUCOSE SERPL-MCNC: 122 MG/DL
HCT VFR BLD AUTO: 28.3 %
HGB BLD-MCNC: 9.1 G/DL
HYPOCHROMIA BLD QL SMEAR: ABNORMAL
INR PPP: 1.7
LYMPHOCYTES # BLD AUTO: ABNORMAL K/UL
LYMPHOCYTES NFR BLD: 3 %
MAGNESIUM SERPL-MCNC: 1.5 MG/DL
MCH RBC QN AUTO: 28.8 PG
MCHC RBC AUTO-ENTMCNC: 32.2 %
MCV RBC AUTO: 90 FL
MONOCYTES # BLD AUTO: ABNORMAL K/UL
MONOCYTES NFR BLD: 1 %
NEUTROPHILS NFR BLD: 95 %
NEUTS BAND NFR BLD MANUAL: 1 %
OVALOCYTES BLD QL SMEAR: ABNORMAL
PHOSPHATE SERPL-MCNC: 4.7 MG/DL
PLATELET # BLD AUTO: 73 K/UL
PMV BLD AUTO: ABNORMAL FL
POIKILOCYTOSIS BLD QL SMEAR: SLIGHT
POLYCHROMASIA BLD QL SMEAR: ABNORMAL
POTASSIUM SERPL-SCNC: 4.6 MMOL/L
PROT SERPL-MCNC: 5.8 G/DL
PROTHROMBIN TIME: 17.1 SEC
RBC # BLD AUTO: 3.16 M/UL
SODIUM SERPL-SCNC: 129 MMOL/L
WBC # BLD AUTO: 27.92 K/UL

## 2017-03-12 PROCEDURE — 25000003 PHARM REV CODE 250: Performed by: STUDENT IN AN ORGANIZED HEALTH CARE EDUCATION/TRAINING PROGRAM

## 2017-03-12 PROCEDURE — 63600175 PHARM REV CODE 636 W HCPCS: Performed by: HOSPITALIST

## 2017-03-12 PROCEDURE — 85027 COMPLETE CBC AUTOMATED: CPT

## 2017-03-12 PROCEDURE — 85007 BL SMEAR W/DIFF WBC COUNT: CPT

## 2017-03-12 PROCEDURE — 84100 ASSAY OF PHOSPHORUS: CPT

## 2017-03-12 PROCEDURE — 25000003 PHARM REV CODE 250: Performed by: INTERNAL MEDICINE

## 2017-03-12 PROCEDURE — 25000003 PHARM REV CODE 250: Performed by: HOSPITALIST

## 2017-03-12 PROCEDURE — 80053 COMPREHEN METABOLIC PANEL: CPT

## 2017-03-12 PROCEDURE — 36415 COLL VENOUS BLD VENIPUNCTURE: CPT

## 2017-03-12 PROCEDURE — 63600175 PHARM REV CODE 636 W HCPCS: Performed by: INTERNAL MEDICINE

## 2017-03-12 PROCEDURE — 83735 ASSAY OF MAGNESIUM: CPT

## 2017-03-12 PROCEDURE — 99233 SBSQ HOSP IP/OBS HIGH 50: CPT | Mod: ,,, | Performed by: HOSPITALIST

## 2017-03-12 PROCEDURE — 11000001 HC ACUTE MED/SURG PRIVATE ROOM

## 2017-03-12 PROCEDURE — 85610 PROTHROMBIN TIME: CPT

## 2017-03-12 RX ORDER — LANOLIN ALCOHOL/MO/W.PET/CERES
400 CREAM (GRAM) TOPICAL 2 TIMES DAILY
Status: COMPLETED | OUTPATIENT
Start: 2017-03-12 | End: 2017-03-13

## 2017-03-12 RX ORDER — METOPROLOL TARTRATE 25 MG/1
50 TABLET, FILM COATED ORAL 3 TIMES DAILY
Status: DISCONTINUED | OUTPATIENT
Start: 2017-03-12 | End: 2017-03-13

## 2017-03-12 RX ORDER — MAGNESIUM SULFATE HEPTAHYDRATE 40 MG/ML
2 INJECTION, SOLUTION INTRAVENOUS ONCE
Status: DISCONTINUED | OUTPATIENT
Start: 2017-03-12 | End: 2017-03-12

## 2017-03-12 RX ADMIN — TRAZODONE HYDROCHLORIDE 25 MG: 50 TABLET ORAL at 08:03

## 2017-03-12 RX ADMIN — FAMOTIDINE 20 MG: 20 TABLET, FILM COATED ORAL at 08:03

## 2017-03-12 RX ADMIN — STANDARDIZED SENNA CONCENTRATE AND DOCUSATE SODIUM 1 TABLET: 8.6; 5 TABLET, FILM COATED ORAL at 08:03

## 2017-03-12 RX ADMIN — MAGNESIUM OXIDE TAB 400 MG (241.3 MG ELEMENTAL MG) 400 MG: 400 (241.3 MG) TAB at 11:03

## 2017-03-12 RX ADMIN — ENOXAPARIN SODIUM 40 MG: 100 INJECTION SUBCUTANEOUS at 11:03

## 2017-03-12 RX ADMIN — METOPROLOL TARTRATE 50 MG: 25 TABLET ORAL at 02:03

## 2017-03-12 RX ADMIN — HYDROCODONE BITARTRATE AND ACETAMINOPHEN 1 TABLET: 5; 325 TABLET ORAL at 01:03

## 2017-03-12 RX ADMIN — CITALOPRAM HYDROBROMIDE 40 MG: 40 TABLET ORAL at 08:03

## 2017-03-12 RX ADMIN — DILTIAZEM HYDROCHLORIDE 300 MG: 180 CAPSULE, COATED, EXTENDED RELEASE ORAL at 11:03

## 2017-03-12 RX ADMIN — METOPROLOL TARTRATE 50 MG: 50 TABLET, FILM COATED ORAL at 05:03

## 2017-03-12 RX ADMIN — HYDROCODONE BITARTRATE AND ACETAMINOPHEN 1 TABLET: 5; 325 TABLET ORAL at 02:03

## 2017-03-12 RX ADMIN — MAGNESIUM OXIDE TAB 400 MG (241.3 MG ELEMENTAL MG) 400 MG: 400 (241.3 MG) TAB at 08:03

## 2017-03-12 RX ADMIN — CEFTRIAXONE 2 G: 2 INJECTION, SOLUTION INTRAVENOUS at 02:03

## 2017-03-12 RX ADMIN — HYDROCODONE BITARTRATE AND ACETAMINOPHEN 1 TABLET: 5; 325 TABLET ORAL at 08:03

## 2017-03-12 RX ADMIN — DILTIAZEM HYDROCHLORIDE 60 MG: 30 TABLET, FILM COATED ORAL at 05:03

## 2017-03-12 RX ADMIN — Medication 3 ML: at 02:03

## 2017-03-12 RX ADMIN — MORPHINE SULFATE 2 MG: 2 INJECTION, SOLUTION INTRAMUSCULAR; INTRAVENOUS at 08:03

## 2017-03-12 RX ADMIN — Medication 3 ML: at 06:03

## 2017-03-12 RX ADMIN — METOPROLOL TARTRATE 50 MG: 25 TABLET ORAL at 08:03

## 2017-03-12 NOTE — PROGRESS NOTES
[facility] Hospital Medicine  Progress Note    Patient Name: Wil Kwon Jr.  MRN: [id number]  Patient Class: IP- Inpatient   Admission Date: 3/6/2017  Length of Stay: 6 days  Attending Physician: Mark Barry MD  Primary Care Provider: LAILA Serra MD    [facility] Medicine Team: [facility]OSP MED L Mark Barry MD    Subjective:     Principal Problem:HCAP (healthcare-associated pneumonia)    HPI:  72 year old male with history of MDS (s/p Chemo x 5 most recent 3/03/2017), HTN, Afib (coumadin, lopressor), hyperlipidemia, and AS presents to the ED with fever and SOB.  Patient reports he was in his usual state of health until Friday shortly after receiving chemotherapy (Vidaza) when he started to experience SOB.  This SOB progressively worsened and patient developed a productive cough with brownish green foul tasting sputum.  In addition he noticed a progressive right sided chest wall pain that radiated towards his back that is currently a 10/10.  His pain is temporarily relived by cough.  Patient brother notes patient appeared feverish starting Sunday.  Patient denies sick contacts and report he has received the flu shot this year.  He reports some nausea and vomiting beginning yesterday, no abdominal pain and normal BMs.  Currently denies HA, or palpitations              Hospital Course:  Admitted to ICU and started on broad spectrum antibiotics for suspected HCAP. Initially started on CPAP but was converted to BiPAP which he tolerated well. Underwent thoracentesis and subsequent pigtail placement by IR for fluid accumulation/loculated effusion on 3/7. Pleural fluid culture positive for strep intermedius (subtype of strep viridans) and respiratory culture +Klebsiella, both sensitive to ceftriaxone. Patient's antibiotics were deescalated to ceftriaxone; zosyn discontinued. He also received several tPA and DNAse for loculated empyema. Patient's acute hypoxemic respiratory failure  resolving; weaned to NC with SpO2 >95% and improvement in tachypnea. Chest tube continues to have good output; 840 mL and 1150 mL the last two days. CTS signed off; no VATS at this time.  Sent to floor 3/11.  See below.    Interval History:   Symptoms:  Improved.    Diet:  Adequate intake.    Activity level:  Impaired due to weakness.    Pain:  No pain.       Review of Systems   Constitutional: Negative for fever.   Respiratory: Positive for cough and shortness of breath.    Cardiovascular: Negative for chest pain.   Gastrointestinal: Negative for abdominal pain.     Objective:     Vital Signs (Most Recent):  Temp: 97.7 °F (36.5 °C) (03/12/17 0724)  Pulse: (!) 111 (03/12/17 1049)  Resp: 18 (03/12/17 0724)  BP: (!) 141/64 (03/12/17 0724)  SpO2: 98 % (03/12/17 0724) Vital Signs (24h Range):  Temp:  [97.7 °F (36.5 °C)-98.5 °F (36.9 °C)] 97.7 °F (36.5 °C)  Pulse:  [] 111  Resp:  [18-22] 18  SpO2:  [95 %-98 %] 98 %  BP: (133-143)/(58-65) 141/64     Weight: 86.2 kg (190 lb)  Body mass index is 26.5 kg/(m^2).    Intake/Output Summary (Last 24 hours) at 03/12/17 1118  Last data filed at 03/12/17 0600   Gross per 24 hour   Intake                0 ml   Output             1220 ml   Net            -1220 ml      Physical Exam   Constitutional: He is oriented to person, place, and time. No distress.   Cardiovascular: Normal rate and normal heart sounds.  An irregularly irregular rhythm present.   Pulmonary/Chest: Effort normal. No respiratory distress. He has decreased breath sounds in the right middle field and the right lower field. He has rales (mild left base).   Abdominal: Soft. Bowel sounds are normal. He exhibits no distension.   Musculoskeletal: He exhibits edema (non-pitting).   Neurological: He is alert and oriented to person, place, and time.   Psychiatric: He has a normal mood and affect.       Significant Labs: All pertinent labs within the past 24 hours have been reviewed.    Significant Imaging: I have  reviewed and interpreted all pertinent imaging results/findings within the past 24 hours.    Assessment/Plan:      * HCAP (healthcare-associated pneumonia)  Septic Shock   Acute hypoxemic respiratory failure   HCAP (healthcare-associated pneumonia)   Empyema of right pleural space  Acute hypoxemic respiratory failure requiring Bipap and vasopressors in the ICU, improved and taken off Bipap, but still likes prn NC O2.  Likely 2/2 to suspected HCAP given CXR concerning for right lower lob PNA with respiratory sputum cx +Klebsiella and pleural fluid cx +strep viridans , both sensitive to ceftriaxone .  S/p Pigtail placement by IR 3/7 and  s/p tPA for right loculated empyema.  - pt still has a lot of drainage, c/w chest tube  - continue ceftriaxone        Essential hypertension  Paroxysmal atrial fibrillation with Rapid Ventricular Rate  BP required pressor support, now off pressors .   RVR resolved, now with rate controlled atrial fib after starting home diltiazem dose. Cardiology recommended to increase lopressor .  - on metoprolol and diltiazem (being titrated back to home dose)  - warfarin held given tPA administration and recent procedures   - holding losartan given hypotensive episodes in ICU        BERNICE (acute kidney injury)  Likely prerenal given presentation, FeNA.  Cr peaked at 2.2 and then went back to normal slowly.   - c/w  po diet and monitor      Anemia, chronic disease  MDS (myelodysplastic syndrome)  Pancytopenia  On outpt chemo therapy.  - transfuse for Hb below 7       Hyponatremia  - fluid restict      Hypomagnesemia  replaced      VTE Risk Mitigation         Ordered     enoxaparin injection 40 mg  Daily     Route:  Subcutaneous        03/11/17 1049     Place sequential compression device  Until discontinued      03/11/17 1049          Mark Barry MD  Department of Hospital Medicine   [facility]

## 2017-03-12 NOTE — ASSESSMENT & PLAN NOTE
Paroxysmal atrial fibrillation with Rapid Ventricular Rate  BP required pressor support, now off pressors .   RVR resolved, now with rate controlled atrial fib after starting home diltiazem dose. Cardiology recommended to increase lopressor .  - on metoprolol and diltiazem (being titrated back to home dose)  - warfarin held given tPA administration and recent procedures   - holding losartan given hypotensive episodes in ICU

## 2017-03-12 NOTE — SUBJECTIVE & OBJECTIVE
Interval History:   Symptoms:  Improved.    Diet:  Adequate intake.    Activity level:  Impaired due to weakness.    Pain:  No pain.       Review of Systems   Constitutional: Negative for fever.   Respiratory: Positive for cough and shortness of breath.    Cardiovascular: Negative for chest pain.   Gastrointestinal: Negative for abdominal pain.     Objective:     Vital Signs (Most Recent):  Temp: 97.7 °F (36.5 °C) (03/12/17 0724)  Pulse: (!) 111 (03/12/17 1049)  Resp: 18 (03/12/17 0724)  BP: (!) 141/64 (03/12/17 0724)  SpO2: 98 % (03/12/17 0724) Vital Signs (24h Range):  Temp:  [97.7 °F (36.5 °C)-98.5 °F (36.9 °C)] 97.7 °F (36.5 °C)  Pulse:  [] 111  Resp:  [18-22] 18  SpO2:  [95 %-98 %] 98 %  BP: (133-143)/(58-65) 141/64     Weight: 86.2 kg (190 lb)  Body mass index is 26.5 kg/(m^2).    Intake/Output Summary (Last 24 hours) at 03/12/17 1118  Last data filed at 03/12/17 0600   Gross per 24 hour   Intake                0 ml   Output             1220 ml   Net            -1220 ml      Physical Exam   Constitutional: He is oriented to person, place, and time. No distress.   Cardiovascular: Normal rate and normal heart sounds.  An irregularly irregular rhythm present.   Pulmonary/Chest: Effort normal. No respiratory distress. He has decreased breath sounds in the right middle field and the right lower field. He has rales (mild left base).   Abdominal: Soft. Bowel sounds are normal. He exhibits no distension.   Musculoskeletal: He exhibits edema (non-pitting).   Neurological: He is alert and oriented to person, place, and time.   Psychiatric: He has a normal mood and affect.       Significant Labs: All pertinent labs within the past 24 hours have been reviewed.    Significant Imaging: I have reviewed and interpreted all pertinent imaging results/findings within the past 24 hours.

## 2017-03-12 NOTE — CONSULTS
Please see full consult from today 3/12.      Maria Esther Michael MD  Pulmonary/Critical Care Fellow  [phone]

## 2017-03-12 NOTE — ASSESSMENT & PLAN NOTE
Septic Shock   Acute hypoxemic respiratory failure   HCAP (healthcare-associated pneumonia)   Empyema of right pleural space  Acute hypoxemic respiratory failure requiring Bipap and vasopressors in the ICU, improved and taken off Bipap, but still likes prn NC O2.  Likely 2/2 to suspected HCAP given CXR concerning for right lower lob PNA with respiratory sputum cx +Klebsiella and pleural fluid cx +strep viridans , both sensitive to ceftriaxone .  S/p Pigtail placement by IR 3/7 and  s/p tPA for right loculated empyema.  - pt still has a lot of drainage, c/w chest tube  - continue ceftriaxone

## 2017-03-12 NOTE — PROGRESS NOTES
Pulmonary & Critical Care Medicine   Consultation Note    Reason for Consultation: chest tube management, empyema    HPI: Pt is a 73 y/o M with PMHx MDS, HTN, Afib, HLD who was initially admitted to then ICU and started on broad spectrum antibiotics for suspected HCAP. I Underwent thoracentesis and subsequent pigtail placement by IR for fluid accumulation/loculated effusion on 3/7. Pleural fluid culture positive for strep intermedius (subtype of strep viridans) and respiratory culture +Klebsiella, both sensitive to ceftriaxone.DNAse/TPA was instilled for bid for three days with good output. His chest tube continues to drain and he is overall feeling better. Imaging has not changed dramatically. We are consulted to follow the chest tube management and empyema.       Past Medical History:   Diagnosis Date    Aortic valve stenosis, mild     secondary to bicuspid aortic alve    Atrial fibrillation     Carotid artery disease     Left CEA 4/9/13    Depression     DJD (degenerative joint disease)     H/O echocardiogram 04/13/2016    EF 55-60%. Diastolic dysfunction. PASP 39. mod AS with JEFF 1.18    History of psychiatric hospitalization     [place] [id number], [facility]    Hyperlipidemia     Hypertension     MDS (myelodysplastic syndrome) 2013    s/p Chemo     Therapy     [facility]      Past Surgical History:   Procedure Laterality Date    BONE MARROW BIOPSY  08/29/2016    CAROTID ENDARTERECTOMY Left     Inguinal hernia      Left    KNEE SURGERY      Right x2    neck fusion      TONSILLECTOMY       Social History:   Social History     Social History    Marital status:      Spouse name: Agatha    Number of children: N/A    Years of education: N/A     Occupational History    Not on file.     Social History Main Topics    Smoking status: Never Smoker    Smokeless tobacco: Never Used    Alcohol use No    Drug use: No    Sexual activity: Yes     Partners: Female      Other Topics Concern    Patient Feels They Ought To Cut Down On Drinking/Drug Use No    Patient Annoyed By Others Criticizing Their Drinking/Drug Use No    Patient Has Felt Bad Or Guilty About Drinking/Drug Use No    Patient Has Had A Drink/Used Drugs As An Eye Opener In The [place] No     Social History Narrative    40+ years , no children, 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; good social support     Family History   Problem Relation Age of Onset    Hyperlipidemia Brother      Drug Allergies:   Review of patient's allergies indicates:   Allergen Reactions    Lipitor [atorvastatin]      Muscle pain    Lisinopril Edema     Cheek swelling     Current Infusions:     Scheduled Medications:     cefTRIAXone (ROCEPHIN) IVPB  2 g Intravenous Q24H    citalopram  40 mg Oral Daily    diltiaZEM  300 mg Oral Daily    enoxaparin  40 mg Subcutaneous Daily    famotidine  20 mg Oral Daily    magnesium oxide  400 mg Oral BID    metoprolol tartrate  50 mg Oral TID    senna-docusate 8.6-50 mg  1 tablet Oral BID    sodium chloride 0.9%  3 mL Intravenous Q8H     PRN Medications:   acetaminophen, albuterol-ipratropium 2.5mg-0.5mg/3mL, hydrocodone-acetaminophen 5-325mg, morphine, trazodone    Review of Systems:   A comprehensive 12-point review of systems was performed, and is negative except for those items mentioned above in the HPI section of this note.     Vital Signs:    Vitals:    03/12/17 1143   BP: (!) 130/58   Pulse: (!) 112   Resp: 20   Temp: 97.6 °F (36.4 °C)       Fluid Balance:     Intake/Output Summary (Last 24 hours) at 03/12/17 1332  Last data filed at 03/12/17 0600   Gross per 24 hour   Intake                0 ml   Output             1220 ml   Net            -1220 ml       Physical Exam:   General: NAD, cooperative & interactive.  HEENT: AT/NC, PERRL, EOMI, nasal and oral mucosa moist. Normal dentition. Oropharynx without exudate.   Neck: Supple without JVD. Trachea midline. Thyroid feels  normal.   Cardiac: RRR with no MRG with brisk cap refill and symmetric pulses in distal extremities.  Respiratory: Normal inspection. Symmetric chest rise. . Auscultation diminished breath sounds on the right. No increased work of breathing noted. Chest tube in place draining serosanginuous fluid.  Abdomen: Soft, NT/ND. +BS. No palpable masses. No hepatosplenomegaly.   Extremities: Normal strength and tone (grossly). No visible atrophy. No clubbing or cyanosis on nail exam.   Skin: Warm and dry without visible rash.   Neuro: Grossly intact to brief exam. Oriented with appropriate mood & affect.     Personal Review and Summary of Prior Diagnostics    Laboratory Studies:   No results for input(s): PH, PCO2, PO2, HCO3, POCSATURATED, BE in the last 24 hours.    Recent Labs  Lab 03/12/17  0525   WBC 27.92*   RBC 3.16*   HGB 9.1*   HCT 28.3*   PLT 73*   MCV 90   MCH 28.8   MCHC 32.2       Recent Labs  Lab 03/12/17  0525   *   K 4.6   CL 98   CO2 23   BUN 32*   CREATININE 1.0   MG 1.5*       Microbiology Data:   Microbiology Results (last 7 days)     Procedure Component Value Units Date/Time    Culture, Body Fluid - Bactec [[id number]] Collected:  03/07/17 1726    Order Status:  Completed Specimen:  Body Fluid from Thoracentesis Fluid Updated:  03/12/17 1259     Body Fluid Culture, Sterile Gram stain: Gram positive cocci     Body Fluid Culture, Sterile 03/12/2017  12:59    Blood culture [[id number]] Collected:  03/07/17 0758    Order Status:  Completed Specimen:  Blood from Peripheral, Forearm, Left Updated:  03/12/17 1212     Blood Culture, Routine No growth after 5 days.    Blood Culture #2 **CANNOT BE ORDERED STAT** [[id number]] Collected:  03/06/17 1145    Order Status:  Completed Specimen:  Blood from Peripheral, Wrist, Left Updated:  03/11/17 1412     Blood Culture, Routine No growth after 5 days.    Blood Culture #1 **CANNOT BE ORDERED STAT** [[id number]] Collected:  03/06/17 1116    Order Status:  Completed  Specimen:  Blood from Peripheral, Antecubital, Right Updated:  03/11/17 1222     Blood Culture, Routine No growth after 5 days.    Urine culture [[id number]]  (Susceptibility) Collected:  03/06/17 1333    Order Status:  Completed Specimen:  Urine from Urine, Clean Catch Updated:  03/10/17 1222     Urine Culture, Routine --     STAPHYLOCOCCUS EPIDERMIDIS  10,000 - 49,999 cfu/ml      Narrative:       If not done in the last 24 hours    Culture, Body Fluid (Aerobic) w/ GS [[id number]] Collected:  03/07/17 0927    Order Status:  Completed Specimen:  Body Fluid from Pleural Updated:  03/09/17 1243     AEROBIC CULTURE - FLUID --     STREPTOCOCCUS INTERMEDIUS  Moderate  Susceptibility testing not routinely performed       Gram Stain Result Moderate WBC's      Few Gram positive cocci    Narrative:       ADD ON PER DR GOSS, CX, ORDER #[id number]   07:50  03/07/2017     Culture, Respiratory with Gram Stain [[id number]] Collected:  03/06/17 2230    Order Status:  Completed Specimen:  Respiratory from Sputum Updated:  03/09/17 1046     Respiratory Culture Normal respiratory iqra     Gram Stain (Respiratory) <10 epithelial cells per low power field.     Gram Stain (Respiratory) Few WBC's     Gram Stain (Respiratory) Rare Gram positive cocci    Culture, Respiratory [[id number]]  (Susceptibility) Collected:  03/06/17 1346    Order Status:  Completed Specimen:  Respiratory from Sputum Updated:  03/09/17 1021     Respiratory Culture --     KLEBSIELLA OXYTOCA  Moderate       Gram Stain (Respiratory) <10 epithelial cells per low power field.     Gram Stain (Respiratory) Few WBC's     Gram Stain (Respiratory) Few Gram positive cocci     Gram Stain (Respiratory) Rare Gram negative rods    Narrative:       If not done in the last 24 hours    Culture, Body Fluid - Bactec [[id number]] Collected:  03/06/17 1845    Order Status:  Canceled Specimen:  Body Fluid from Pleural Updated:  03/07/17 0751    Narrative:       ADD ON PER DR GOSS  CXBF, ORDER #[id number]   07:50  03/07/2017   Culture Body Fluid Resin was cancelled on 03/07/2017 at 09:26 by SXD;   Quantity not sufficient for testing.    Culture, Body Fluid - Bactec [[id number]]     Order Status:  Completed Specimen:  Body Fluid from Pleural Fluid     Gram stain (sputum) [[id number]] Collected:  03/06/17 1346    Order Status:  Canceled Specimen:  Sputum from Mouth Updated:  03/06/17 1404    Narrative:       If not done in the last 24 hours  Gram Stain was cancelled on 03/06/2017 at 14:33 by NMJ; Duplicate   order, test included in another profile. see order #[id number]        Summary of Chest Imaging Personally Reviewed:   CXR today appears worse compared to yesterday    2D Echo: 3/1/17:  CONCLUSIONS     1 - Severe left atrial enlargement.     2 - Normal left ventricular systolic function (EF 55-60%).     3 - Left ventricular diastolic dysfunction.     4 - Moderate aortic stenosis, JEFF = 1.2 cm2, peak velocity = 3.0 m/s, mean gradient = 24.0 mmHg.     5 - Pulmonary hypertension. The estimated PA systolic pressure is 46 mmHg.     6 - Moderate mitral regurgitation.     7 - LV strain is reduced measuring -12%.     Impression & Recommendations     Pt is a 71 y/o M with PMHx MDS, HTN, Afib, HLD who is here with strep intermedius empyema s/p 3 days TPA/DNAse    Chest tube output about 800 cc/24 hrs and >1L day prior. Despite this CXR does not look improved and in fact looks worse. Will get CT chest to evaluate. May need to re-discuss VATS with CTS tomorrow.      Maria Esther Michael MD  Pulmonary/Critical Care Fellow  [phone]      Maria Esther Michael M.D.  [facility] Pulmonary/Critical Care Fellow

## 2017-03-13 PROBLEM — R05.9 COUGH: Status: ACTIVE | Noted: 2017-03-13

## 2017-03-13 LAB
ALBUMIN SERPL BCP-MCNC: 1.6 G/DL
ALP SERPL-CCNC: 99 U/L
ALT SERPL W/O P-5'-P-CCNC: 21 U/L
ANION GAP SERPL CALC-SCNC: 9 MMOL/L
ANISOCYTOSIS BLD QL SMEAR: SLIGHT
AST SERPL-CCNC: 50 U/L
BASOPHILS # BLD AUTO: ABNORMAL K/UL
BASOPHILS NFR BLD: 0 %
BILIRUB SERPL-MCNC: 0.9 MG/DL
BUN SERPL-MCNC: 25 MG/DL
CALCIUM SERPL-MCNC: 7.7 MG/DL
CHLORIDE SERPL-SCNC: 98 MMOL/L
CO2 SERPL-SCNC: 20 MMOL/L
CREAT SERPL-MCNC: 0.9 MG/DL
DIFFERENTIAL METHOD: ABNORMAL
EOSINOPHIL # BLD AUTO: ABNORMAL K/UL
EOSINOPHIL NFR BLD: 0 %
ERYTHROCYTE [DISTWIDTH] IN BLOOD BY AUTOMATED COUNT: 18.1 %
EST. GFR  (AFRICAN AMERICAN): >60 ML/MIN/1.73 M^2
EST. GFR  (NON AFRICAN AMERICAN): >60 ML/MIN/1.73 M^2
GLUCOSE SERPL-MCNC: 105 MG/DL
HCT VFR BLD AUTO: 27.7 %
HGB BLD-MCNC: 8.9 G/DL
HYPOCHROMIA BLD QL SMEAR: ABNORMAL
INR PPP: 1.5
LYMPHOCYTES # BLD AUTO: ABNORMAL K/UL
LYMPHOCYTES NFR BLD: 8 %
MAGNESIUM SERPL-MCNC: 1.5 MG/DL
MCH RBC QN AUTO: 28.7 PG
MCHC RBC AUTO-ENTMCNC: 32.1 %
MCV RBC AUTO: 89 FL
MONOCYTES # BLD AUTO: ABNORMAL K/UL
MONOCYTES NFR BLD: 0 %
NEUTROPHILS NFR BLD: 91 %
NEUTS BAND NFR BLD MANUAL: 1 %
NRBC BLD-RTO: ABNORMAL /100 WBC
OVALOCYTES BLD QL SMEAR: ABNORMAL
PHOSPHATE SERPL-MCNC: 3.5 MG/DL
PLATELET # BLD AUTO: 69 K/UL
PLATELET BLD QL SMEAR: ABNORMAL
PMV BLD AUTO: ABNORMAL FL
POIKILOCYTOSIS BLD QL SMEAR: SLIGHT
POLYCHROMASIA BLD QL SMEAR: ABNORMAL
POTASSIUM SERPL-SCNC: 4.7 MMOL/L
PROT SERPL-MCNC: 6.1 G/DL
PROTHROMBIN TIME: 15.7 SEC
RBC # BLD AUTO: 3.1 M/UL
SODIUM SERPL-SCNC: 127 MMOL/L
TARGETS BLD QL SMEAR: ABNORMAL
WBC # BLD AUTO: 24.34 K/UL

## 2017-03-13 PROCEDURE — 83735 ASSAY OF MAGNESIUM: CPT

## 2017-03-13 PROCEDURE — 25000003 PHARM REV CODE 250: Performed by: INTERNAL MEDICINE

## 2017-03-13 PROCEDURE — 36415 COLL VENOUS BLD VENIPUNCTURE: CPT

## 2017-03-13 PROCEDURE — 25000003 PHARM REV CODE 250: Performed by: HOSPITALIST

## 2017-03-13 PROCEDURE — 11000001 HC ACUTE MED/SURG PRIVATE ROOM

## 2017-03-13 PROCEDURE — 97530 THERAPEUTIC ACTIVITIES: CPT

## 2017-03-13 PROCEDURE — 80053 COMPREHEN METABOLIC PANEL: CPT

## 2017-03-13 PROCEDURE — 85610 PROTHROMBIN TIME: CPT

## 2017-03-13 PROCEDURE — 25000003 PHARM REV CODE 250: Performed by: STUDENT IN AN ORGANIZED HEALTH CARE EDUCATION/TRAINING PROGRAM

## 2017-03-13 PROCEDURE — 63600175 PHARM REV CODE 636 W HCPCS: Performed by: STUDENT IN AN ORGANIZED HEALTH CARE EDUCATION/TRAINING PROGRAM

## 2017-03-13 PROCEDURE — 63600175 PHARM REV CODE 636 W HCPCS: Performed by: INTERNAL MEDICINE

## 2017-03-13 PROCEDURE — 84100 ASSAY OF PHOSPHORUS: CPT

## 2017-03-13 PROCEDURE — 99233 SBSQ HOSP IP/OBS HIGH 50: CPT | Mod: GC,,, | Performed by: INTERNAL MEDICINE

## 2017-03-13 PROCEDURE — 85027 COMPLETE CBC AUTOMATED: CPT

## 2017-03-13 PROCEDURE — 85007 BL SMEAR W/DIFF WBC COUNT: CPT

## 2017-03-13 RX ORDER — DILTIAZEM HYDROCHLORIDE 120 MG/1
120 CAPSULE, COATED, EXTENDED RELEASE ORAL ONCE
Status: COMPLETED | OUTPATIENT
Start: 2017-03-13 | End: 2017-03-13

## 2017-03-13 RX ORDER — MIDAZOLAM HYDROCHLORIDE 1 MG/ML
INJECTION, SOLUTION INTRAMUSCULAR; INTRAVENOUS CODE/TRAUMA/SEDATION MEDICATION
Status: COMPLETED | OUTPATIENT
Start: 2017-03-13 | End: 2017-03-13

## 2017-03-13 RX ORDER — DILTIAZEM HYDROCHLORIDE 180 MG/1
360 CAPSULE, COATED, EXTENDED RELEASE ORAL DAILY
Status: DISCONTINUED | OUTPATIENT
Start: 2017-03-14 | End: 2017-03-18 | Stop reason: HOSPADM

## 2017-03-13 RX ORDER — FENTANYL CITRATE 50 UG/ML
INJECTION, SOLUTION INTRAMUSCULAR; INTRAVENOUS CODE/TRAUMA/SEDATION MEDICATION
Status: COMPLETED | OUTPATIENT
Start: 2017-03-13 | End: 2017-03-13

## 2017-03-13 RX ORDER — METOPROLOL TARTRATE 25 MG/1
50 TABLET, FILM COATED ORAL 2 TIMES DAILY
Status: DISCONTINUED | OUTPATIENT
Start: 2017-03-13 | End: 2017-03-18 | Stop reason: HOSPADM

## 2017-03-13 RX ADMIN — MAGNESIUM OXIDE TAB 400 MG (241.3 MG ELEMENTAL MG) 400 MG: 400 (241.3 MG) TAB at 08:03

## 2017-03-13 RX ADMIN — NIFEDIPINE 120 MG: 10 CAPSULE ORAL at 09:03

## 2017-03-13 RX ADMIN — TRAZODONE HYDROCHLORIDE 25 MG: 50 TABLET ORAL at 08:03

## 2017-03-13 RX ADMIN — STANDARDIZED SENNA CONCENTRATE AND DOCUSATE SODIUM 1 TABLET: 8.6; 5 TABLET, FILM COATED ORAL at 08:03

## 2017-03-13 RX ADMIN — DILTIAZEM HYDROCHLORIDE 300 MG: 180 CAPSULE, COATED, EXTENDED RELEASE ORAL at 08:03

## 2017-03-13 RX ADMIN — Medication 3 ML: at 04:03

## 2017-03-13 RX ADMIN — HYDROCODONE BITARTRATE AND ACETAMINOPHEN 1 TABLET: 5; 325 TABLET ORAL at 08:03

## 2017-03-13 RX ADMIN — METOPROLOL TARTRATE 50 MG: 25 TABLET ORAL at 08:03

## 2017-03-13 RX ADMIN — Medication 3 ML: at 02:03

## 2017-03-13 RX ADMIN — HYDROCODONE BITARTRATE AND ACETAMINOPHEN 1 TABLET: 5; 325 TABLET ORAL at 03:03

## 2017-03-13 RX ADMIN — HYDROCODONE BITARTRATE AND ACETAMINOPHEN 1 TABLET: 5; 325 TABLET ORAL at 09:03

## 2017-03-13 RX ADMIN — METOPROLOL TARTRATE 50 MG: 25 TABLET ORAL at 04:03

## 2017-03-13 RX ADMIN — CEFTRIAXONE 2 G: 2 INJECTION, SOLUTION INTRAVENOUS at 02:03

## 2017-03-13 RX ADMIN — HYDROCODONE BITARTRATE AND ACETAMINOPHEN 1 TABLET: 5; 325 TABLET ORAL at 02:03

## 2017-03-13 RX ADMIN — CITALOPRAM HYDROBROMIDE 40 MG: 40 TABLET ORAL at 08:03

## 2017-03-13 RX ADMIN — FAMOTIDINE 20 MG: 20 TABLET, FILM COATED ORAL at 08:03

## 2017-03-13 RX ADMIN — MIDAZOLAM HYDROCHLORIDE 0.5 MG: 1 INJECTION INTRAMUSCULAR; INTRAVENOUS at 01:03

## 2017-03-13 RX ADMIN — FENTANYL CITRATE 25 MCG: 50 INJECTION, SOLUTION INTRAMUSCULAR; INTRAVENOUS at 01:03

## 2017-03-13 NOTE — PROGRESS NOTES
Unable to perform procedure due to insufficient amount of fluid found. Dr. Rodrigues to notify primary team. Patient to be transferred to [facility] awaiting transport back to room. Report called to nurse.

## 2017-03-13 NOTE — H&P
Inpatient Radiology Pre-procedure Note    History of Present Illness:  Wil Kwon Jr. is a 72 y.o. male with loculated pleural effusion who presents for CT guided pleural drain placement.  Admission H&P reviewed.  Past Medical History:   Diagnosis Date    Aortic valve stenosis, mild     secondary to bicuspid aortic alve    Atrial fibrillation     Carotid artery disease     Left CEA 4/9/13    Depression     DJD (degenerative joint disease)     H/O echocardiogram 04/13/2016    EF 55-60%. Diastolic dysfunction. PASP 39. mod AS with JEFF 1.18    History of psychiatric hospitalization     [place] [address], [facility] 1993    Hyperlipidemia     Hypertension     MDS (myelodysplastic syndrome) 2013    s/p Chemo     Therapy     [facility]      Past Surgical History:   Procedure Laterality Date    BONE MARROW BIOPSY  08/29/2016    CAROTID ENDARTERECTOMY Left     Inguinal hernia      Left    KNEE SURGERY      Right x2    neck fusion      TONSILLECTOMY         Review of Systems:   As documented in primary team H&P    Home Meds:   Prior to Admission medications    Medication Sig Start Date End Date Taking? Authorizing Provider   acetaminophen (TYLENOL) 325 MG tablet Take 325 mg by mouth as needed for Pain.    Historical Provider, MD   citalopram (CELEXA) 40 MG tablet TAKE 1 TABLET BY MOUTH DAILY  Patient taking differently: TAKE 1 TABLET BY MOUTH DAILY (am) 9/14/14   Margarita Cuevas MD   diltiaZEM (CARDIZEM CD) 300 MG 24 hr capsule TAKE ONE CAPSULE BY MOUTH EVERY DAY 1/16/17   SAMMY Serra MD   losartan (COZAAR) 50 MG tablet Take 50 mg by mouth once daily. 10/21/16   Historical Provider, MD   metoprolol succinate (TOPROL-XL) 25 MG 24 hr tablet Take 1 tablet (25 mg total) by mouth once daily. 10/11/16   SAMMY Serra MD   multivitamin capsule Take 1 capsule by mouth every morning.     Historical Provider, MD   nystatin (MYCOSTATIN) ointment Apply topically 3 (three) times  daily. 12/14/16 1/6/17  Julius Barry NP   omega-3 fatty acids-vitamin E (FISH OIL) 1,000 mg Cap Take 3 capsules by mouth once daily.     Historical Provider, MD   ondansetron (ZOFRAN) 8 MG tablet Take 1 tablet (8 mg total) by mouth every 8 (eight) hours as needed for Nausea. 3/3/17 3/3/18  Julius Barry NP   pravastatin (PRAVACHOL) 20 MG tablet Take 1 tablet (20 mg total) by mouth every other day. 1/6/17   SAMMY Serra MD   warfarin (COUMADIN) 3 MG tablet Take 1 tablet (3 mg total) by mouth Daily. 11/16/16 11/16/17  Laura Rader MD     Scheduled Meds:    cefTRIAXone (ROCEPHIN) IVPB  2 g Intravenous Q24H    citalopram  40 mg Oral Daily    [START ON 3/14/2017] diltiaZEM  360 mg Oral Daily    famotidine  20 mg Oral Daily    magnesium oxide  400 mg Oral BID    metoprolol tartrate  50 mg Oral BID    senna-docusate 8.6-50 mg  1 tablet Oral BID    sodium chloride 0.9%  3 mL Intravenous Q8H     Continuous Infusions:    PRN Meds:acetaminophen, albuterol-ipratropium 2.5mg-0.5mg/3mL, hydrocodone-acetaminophen 5-325mg, morphine, trazodone  Anticoagulants/Antiplatelets: no anticoagulation    Allergies:   Review of patient's allergies indicates:   Allergen Reactions    Lipitor [atorvastatin]      Muscle pain    Lisinopril Edema     Cheek swelling     Sedation Hx: have not been any systemic reactions    Labs:    Recent Labs  Lab 03/13/17 0352   INR 1.5*       Recent Labs  Lab 03/13/17 0352   WBC 24.34*   HGB 8.9*   HCT 27.7*   MCV 89   PLT 69*      Recent Labs  Lab 03/13/17  0352      *   K 4.7   CL 98   CO2 20*   BUN 25*   CREATININE 0.9   CALCIUM 7.7*   MG 1.5*   ALT 21   AST 50*   ALBUMIN 1.6*   BILITOT 0.9         Vitals:  Temp: 98.3 °F (36.8 °C) (03/13/17 0720)  Pulse: 109 (03/13/17 1100)  Resp: 20 (03/13/17 0720)  BP: 139/80 (03/13/17 0720)  SpO2: 98 % (03/13/17 0720)     Physical Exam:  ASA: 2  Mallampati: 2    General: no acute distress  Mental Status: alert and oriented to person,  place and time  HEENT: normocephalic, atraumatic  Chest: unlabored breathing  Heart: regular heart rate  Abdomen: nondistended  Extremity: moves all extremities    Plan: CT guided right pleural drain placement.  Sedation Plan: Moderate.     Dylan Rodrigues  Radiology  PGY-3

## 2017-03-13 NOTE — PROGRESS NOTES
Initial CT scan of the chest demonstrated near complete resolution of the right posterior medial loculated pleural effusion.  There is also interval improvement with decrease in size of the right lateral pleural effusion with existing pleural drain.  Procedure was aborted as there was insufficient amount of fluid to drain.     Primary team was notified.      Dylan Rodrigues  PGY-3

## 2017-03-13 NOTE — PROGRESS NOTES
Pt arrived to IR CT room 173 for pleural drain placement. Pt awake, alert, and oriented. Awaiting consent.

## 2017-03-13 NOTE — PT/OT/SLP PROGRESS
Physical Therapy      Wil Kwon Jr.  MRN: [id number]    Patient not seen today secondary to pt off unit for chest tube replacement  . Will follow-up next scheduled therapy session.    Josie Dobbs, PT

## 2017-03-13 NOTE — SIGNIFICANT EVENT
BMT team made aware that patient was stepped down from ICU to Hospital Medicine.  Per Dr. Driscoll, will transfer patient to the BMT service and resume care.    Josie Grover MD  Med PGY3  [phone]

## 2017-03-13 NOTE — PT/OT/SLP PROGRESS
"Occupational Therapy  Treatment    Wil Kwon Jr.   MRN: [id number]   Admitting Diagnosis: HCAP (healthcare-associated pneumonia)    OT Date of Treatment: 03/13/17   OT Start Time: 1020  OT Stop Time: 1047  OT Total Time (min): 27 min    Billable Minutes:  Therapeutic Activity 27    General Precautions: Standard, fall  Orthopedic Precautions: N/A  Braces: N/A    Subjective:  "I am going to need a lot of help."    Pain Rating: 3/10  Location - Side: Right  Location - Orientation: generalized  Location: chest  Pain Addressed: Distraction, Nurse notified  Pain Rating Post-Intervention: 3/10    Objective:  Patient found with: telemetry, chest tube, oxygen, peripheral IV     Functional Mobility:  Bed Mobility:  Scooting/Bridging: Stand by Assistance (with increased time) to scoot hips to EOB  Supine to Sit: Stand by Assistance (with HOB fully raised)    Transfers:  Sit <> Stand Assistance: Minimum Assistance  Sit <> Stand Assistive Device: Rolling Walker  Bed <> Chair Technique: Stand Pivot  Bed <> Chair Transfer Assistance: Contact Guard Assistance  Bed <> Chair Assistive Device: Rolling Walker    Functional Ambulation: ~8ft from EOB->stretcher with CGA using RW (transport arrived to take pt)    Activities of Daily Living:    LE Dressing Level of Assistance: Total assistance to don socks from EOB; when asked if pt was able to don his socks he replied, "Heck no"    Therapeutic Activities and Exercises:  Pt educated on POC, d/c rec of SNF; pt/brother had questions regarding SNF; OT provided education regarding SNF process, expectations, and benefits     AM-PAC 6 CLICK ADL   How much help from another person does this patient currently need?   1 = Unable, Total/Dependent Assistance  2 = A lot, Maximum/Moderate Assistance  3 = A little, Minimum/Contact Guard/Supervision  4 = None, Modified [place]/Independent    Putting on and taking off regular lower body clothing? : 1  Bathing (including washing, rinsing, " drying)?: 2  Toileting, which includes using toilet, bedpan, or urinal? : 2  Putting on and taking off regular upper body clothing?: 3  Taking care of personal grooming such as brushing teeth?: 4  Eating meals?: 4  Total Score: 16     AM-PAC Raw Score CMS G-Code Modifier Level of Impairment Assistance   6 % Total / Unable   7 - 9 CM 80 - 100% Maximal Assist   10 - 14 CL 60 - 80% Moderate Assist   15 - 19 CK 40 - 60% Moderate Assist   20 - 22 CJ 20 - 40% Minimal Assist   23 CI 1-20% SBA / CGA   24 CH 0% Independent/ Mod I     Patient left with transport on stretcher     ASSESSMENT:  Wil Kwon Jr. is a 72 y.o. male with a medical diagnosis of HCAP (healthcare-associated pneumonia) and presents with the below listed deficits affecting occupational performance.  Pt presents with anxiety and fear of falling.  Also demonstrated STM deficits, requiring repeated instruction for safety/mobility techniques throughout session.  Pt has limited assist at home.  Pt is a good candidate for continued skilled OT services in SNF setting to maximize functional independence prior to d/c home.      Rehab identified problem list/impairments: Rehab identified problem list/impairments: weakness, impaired endurance, impaired self care skills, impaired functional mobilty, impaired balance, decreased safety awareness    Rehab potential is good.    Activity tolerance: Good    Discharge recommendations: Discharge Facility/Level Of Care Needs: nursing facility, skilled     Barriers to discharge: Barriers to Discharge: Inaccessible home environment, Decreased caregiver support    Equipment recommendations:  (TBD at next level of care)     GOALS:   Occupational Therapy Goals        Problem: Occupational Therapy Goal    Goal Priority Disciplines Outcome Interventions   Occupational Therapy Goal     OT, PT/OT Ongoing (interventions implemented as appropriate)    Description:  Goals to be met by:  7 days 3/16/17     Patient will  increase functional independence with ADLs by performing:    UE Dressing with Supervision.  LE Dressing with Supervision.  Grooming while standing with Supervision.  Toileting from toilet with Supervision for hygiene and clothing management.   Stand pivot transfers with Supervision.  Toilet transfer to toilet with Supervision.                Plan:  Patient to be seen 5 x/week to address the above listed problems via self-care/home management, therapeutic activities, therapeutic exercises  Plan of Care expires:    Plan of Care reviewed with: patient, sibling         Len RANDLE SUSIE Alamo  03/13/2017

## 2017-03-13 NOTE — ASSESSMENT & PLAN NOTE
Septic Shock   Acute hypoxemic respiratory failure   HCAP (healthcare-associated pneumonia)   Empyema of right pleural space  Acute hypoxemic respiratory failure requiring Bipap and vasopressors in the ICU, improved and taken off Bipap, but still likes prn NC O2.  Likely 2/2 to suspected HCAP given CXR concerning for right lower lob PNA with respiratory sputum cx +Klebsiella and pleural fluid cx +strep viridans , both sensitive to ceftriaxone .  S/p Pigtail placement by IR 3/7 and  s/p tPA for right loculated empyema.  Repeat CT chest 3/13 found no improvement despite good drainage.  Spoke with Dr. Bah of CTS today, 3/13,  who said that pt needs two tubes and is a high risk cardiac patient for VATS.  - pt still has a lot of drainage, c/w chest tube; will consult IR for second  - continue ceftriaxone, but could switch to PO once effusion has a good plan

## 2017-03-13 NOTE — PLAN OF CARE
Pt not medically stable for d c but therapists rec SNF     03/13/17 3226   Right Care Assessment   Can the patient answer the patient profile reliably? Yes, cognitively intact   How often would a person be available to care for the patient? Often   Describe the patient's ability to walk at the present time. Minor restrictions or changes   How does the patient rate their overall health at the present time? Fair   Number of comorbid conditions (as recorded on the chart) Two   During the past month, has the patient often been bothered by little interest or pleasure in doing things? No

## 2017-03-13 NOTE — PLAN OF CARE
Problem: Occupational Therapy Goal  Goal: Occupational Therapy Goal  Goals to be met by: 7 days 3/16/17     Patient will increase functional independence with ADLs by performing:    UE Dressing with Supervision.  LE Dressing with Supervision.  Grooming while standing with Supervision.  Toileting from toilet with Supervision for hygiene and clothing management.   Stand pivot transfers with Supervision.  Toilet transfer to toilet with Supervision.   Outcome: Ongoing (interventions implemented as appropriate)  OT goals remain appropriate.  SUSIE Diaz  3/13/2017

## 2017-03-13 NOTE — PROGRESS NOTES
[facility] Hospital Medicine  Handoff Note    Patient Name: Wil Kwon Jr.  MRN: [id number]  Patient Class: IP- Inpatient   Admission Date: 3/6/2017  Length of Stay: 7 days  Attending Physician: Roddy Drisclol MD  Primary Care Provider: LAILA Serra MD    [facility] Medicine Team: [facility] BMT Mark Barry MD    Subjective:     Principal Problem:HCAP (healthcare-associated pneumonia)    HPI:  72 year old male with history of MDS (s/p Chemo x 5 most recent 3/03/2017), HTN, Afib (coumadin, lopressor), hyperlipidemia, and AS presents to the ED with fever and SOB.  Patient reports he was in his usual state of health until Friday shortly after receiving chemotherapy (Vidaza) when he started to experience SOB.  This SOB progressively worsened and patient developed a productive cough with brownish green foul tasting sputum.  In addition he noticed a progressive right sided chest wall pain that radiated towards his back that is currently a 10/10.  His pain is temporarily relived by cough.  Patient brother notes patient appeared feverish starting Sunday.  Patient denies sick contacts and report he has received the flu shot this year.  He reports some nausea and vomiting beginning yesterday, no abdominal pain and normal BMs.  Currently denies HA, or palpitations              Hospital Course:  Admitted to ICU and started on broad spectrum antibiotics for suspected HCAP. Initially started on CPAP but was converted to BiPAP which he tolerated well. Underwent thoracentesis and subsequent pigtail placement by IR for fluid accumulation/loculated effusion on 3/7. Pleural fluid culture positive for strep intermedius (subtype of strep viridans) and respiratory culture +Klebsiella, both sensitive to ceftriaxone. Patient's antibiotics were deescalated to ceftriaxone; zosyn discontinued. He also received several tPA and DNAse for loculated empyema. Patient's acute hypoxemic respiratory failure  resolving; weaned to NC with SpO2 >95% and improvement in tachypnea. Chest tube continues to have good output; 840 mL and 1150 mL the last two days. CTS signed off; no VATS at this time.  Sent to floor 3/11.  See below.    No new subjective & objective note has been filed under this hospital service since the last note was generated.    Assessment/Plan:      * HCAP (healthcare-associated pneumonia)  Septic Shock   Acute hypoxemic respiratory failure   HCAP (healthcare-associated pneumonia)   Empyema of right pleural space  Acute hypoxemic respiratory failure requiring Bipap and vasopressors in the ICU, improved and taken off Bipap, but still likes prn NC O2.  Likely 2/2 to suspected HCAP given CXR concerning for right lower lob PNA with respiratory sputum cx +Klebsiella and pleural fluid cx +strep viridans , both sensitive to ceftriaxone .  S/p Pigtail placement by IR 3/7 and  s/p tPA for right loculated empyema.  Repeat CT chest 3/13 found no improvement despite good drainage.  Spoke with Dr. Bah of CTS today, 3/13,  who said that pt needs two tubes and is a high risk cardiac patient for VATS.  - pt still has a lot of drainage, c/w chest tube; will consult IR for second  - continue ceftriaxone, but could switch to PO once effusion has a good plan        Essential hypertension  Paroxysmal atrial fibrillation with Rapid Ventricular Rate  BP required pressor support, now off pressors .   RVR resolved, now with rate controlled atrial fib after starting home diltiazem dose. Cardiology recommended to increase lopressor .  - on metoprolol and diltiazem (titrate up as HR/BP tolerates)  - warfarin held given tPA administration and recent procedures   - holding losartan given hypotensive episodes in ICU        BERNICE (acute kidney injury)  Likely prerenal given presentation, FeNA.  Cr peaked at 2.2 and then went back to normal slowly.   - c/w  po diet and monitor      Anemia, chronic disease  MDS (myelodysplastic  syndrome)  Pancytopenia  On outpt chemo therapy.  - transfuse for Hb below 7       Hyponatremia  - fluid restict      Thrombocytopenia  Due to sepsis.  Was given some lovenox for DVT px but he was already thrombocytopenic before that.    -Resume lovenox after possible chest tube placement today.  - monitor vaishnavi counts      VTE Risk Mitigation         Ordered     Place sequential compression device  Until discontinued      03/12/17 0224          Mark Barry MD  Department of Hospital Medicine   [facility]

## 2017-03-13 NOTE — PROGRESS NOTES
"Pulmonary & Critical Care Medicine   Progness Note      Reason for Consultation: chest tube management, empyema      Subjective: He denies SOB, on 1L NC at this time. Still with reported scant fouled smelling sputum. Afebrile. Chest tube put out ~1200ml today. Primary team was initially concern,  With CT read "There is a persistent loculated right-sided pleural effusion which appears mildly increased in overall volume compared to the prior examination."  Plan was for another chest tube today, but in IR, new scan showed "Significant interval decrease in loculated pleural effusion, with insufficient amount of fluid for drainage"       HPI: Pt is a 73 y/o M with PMHx MDS, HTN, Afib, HLD who was initially admitted to then ICU and started on broad spectrum antibiotics for suspected HCAP. I Underwent thoracentesis and subsequent pigtail placement by IR for fluid accumulation/loculated effusion on 3/7. Pleural fluid culture positive for strep intermedius (subtype of strep viridans) and respiratory culture +Klebsiella, both sensitive to ceftriaxone.DNAse/TPA was instilled for bid for three days with good output. His chest tube continues to drain and he is overall feeling better. Imaging has not changed dramatically. We are consulted to follow the chest tube management and empyema.       Past Medical History:   Diagnosis Date    Aortic valve stenosis, mild     secondary to bicuspid aortic alve    Atrial fibrillation     Carotid artery disease     Left CEA 4/9/13    Depression     DJD (degenerative joint disease)     H/O echocardiogram 04/13/2016    EF 55-60%. Diastolic dysfunction. PASP 39. mod AS with JEFF 1.18    History of psychiatric hospitalization     [facility] 2014, [facility]    Hyperlipidemia     Hypertension     MDS (myelodysplastic syndrome) 2013    s/p Chemo     Therapy     [facility]      Past Surgical History:   Procedure Laterality Date    BONE MARROW BIOPSY  08/29/2016 "    CAROTID ENDARTERECTOMY Left     Inguinal hernia      Left    KNEE SURGERY      Right x2    neck fusion      TONSILLECTOMY       Social History:   Social History     Social History    Marital status:      Spouse name: Agatha    Number of children: N/A    Years of education: N/A     Occupational History    Not on file.     Social History Main Topics    Smoking status: Never Smoker    Smokeless tobacco: Never Used    Alcohol use No    Drug use: No    Sexual activity: Yes     Partners: Female     Other Topics Concern    Patient Feels They Ought To Cut Down On Drinking/Drug Use No    Patient Annoyed By Others Criticizing Their Drinking/Drug Use No    Patient Has Felt Bad Or Guilty About Drinking/Drug Use No    Patient Has Had A Drink/Used Drugs As An Eye Opener In The [place] No     Social History Narrative    40+ years , no children, 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; good social support     Family History   Problem Relation Age of Onset    Hyperlipidemia Brother      Drug Allergies:   Review of patient's allergies indicates:   Allergen Reactions    Lipitor [atorvastatin]      Muscle pain    Lisinopril Edema     Cheek swelling     Current Infusions:     Scheduled Medications:     cefTRIAXone (ROCEPHIN) IVPB  2 g Intravenous Q24H    citalopram  40 mg Oral Daily    [START ON 3/14/2017] diltiaZEM  360 mg Oral Daily    famotidine  20 mg Oral Daily    magnesium oxide  400 mg Oral BID    metoprolol tartrate  50 mg Oral BID    senna-docusate 8.6-50 mg  1 tablet Oral BID    sodium chloride 0.9%  3 mL Intravenous Q8H     PRN Medications:   acetaminophen, albuterol-ipratropium 2.5mg-0.5mg/3mL, hydrocodone-acetaminophen 5-325mg, morphine, trazodone    Review of Systems:   A comprehensive 12-point review of systems was performed, and is negative except for those items mentioned above in the HPI section of this note.     Vital Signs:    Vitals:    03/13/17 1513   BP: 133/68   Pulse:  (!) 112   Resp: 20   Temp: 98 °F (36.7 °C)       Fluid Balance:     Intake/Output Summary (Last 24 hours) at 03/13/17 1556  Last data filed at 03/13/17 1258   Gross per 24 hour   Intake                0 ml   Output             1650 ml   Net            -1650 ml       Physical Exam:   General: NAD, cooperative & interactive.  HEENT: nasal and oral mucosa moist Oropharynx without exudate.   Neck: Supple without JVD. Trachea midline. Thyroid feels normal.   Cardiac: RRR with no MRG  Respiratory: Symmetric chest rise. Auscultation diminished breath sounds on the right. No increased work of breathing noted. Chest tube in place draining serosanginuous fluid. 1200cc today  Abdomen: Soft, NT/ND. +BS. No palpable masses. No hepatosplenomegaly.   Extremities: Normal strength and tone (grossly). No visible atrophy. No clubbing or cyanosis on nail exam.   Skin: Warm and dry without visible rash.   Neuro: Grossly intact to brief exam. Oriented with appropriate mood & affect.       Personal Review and Summary of Prior Diagnostics    Laboratory Studies:   No results for input(s): PH, PCO2, PO2, HCO3, POCSATURATED, BE in the last 24 hours.    Recent Labs  Lab 03/13/17  0352   WBC 24.34*   RBC 3.10*   HGB 8.9*   HCT 27.7*   PLT 69*   MCV 89   MCH 28.7   MCHC 32.1       Recent Labs  Lab 03/13/17  0352   *   K 4.7   CL 98   CO2 20*   BUN 25*   CREATININE 0.9   MG 1.5*       Microbiology Data:   Microbiology Results (last 7 days)     Procedure Component Value Units Date/Time    Culture, Respiratory [[id number]]     Order Status:  No result Specimen:  Respiratory     Culture, Body Fluid - Bactec [[id number]] Collected:  03/07/17 1726    Order Status:  Completed Specimen:  Body Fluid from Thoracentesis Fluid Updated:  03/13/17 0956     Body Fluid Culture, Sterile Gram stain: Gram positive cocci     Body Fluid Culture, Sterile 03/12/2017  12:59    Blood culture [[id number]] Collected:  03/07/17 0758    Order Status:  Completed Specimen:   Blood from Peripheral, Forearm, Left Updated:  03/12/17 1212     Blood Culture, Routine No growth after 5 days.    Blood Culture #2 **CANNOT BE ORDERED STAT** [[id number]] Collected:  03/06/17 1145    Order Status:  Completed Specimen:  Blood from Peripheral, Wrist, Left Updated:  03/11/17 1412     Blood Culture, Routine No growth after 5 days.    Blood Culture #1 **CANNOT BE ORDERED STAT** [[id number]] Collected:  03/06/17 1116    Order Status:  Completed Specimen:  Blood from Peripheral, Antecubital, Right Updated:  03/11/17 1222     Blood Culture, Routine No growth after 5 days.    Urine culture [[id number]]  (Susceptibility) Collected:  03/06/17 1333    Order Status:  Completed Specimen:  Urine from Urine, Clean Catch Updated:  03/10/17 1222     Urine Culture, Routine --     STAPHYLOCOCCUS EPIDERMIDIS  10,000 - 49,999 cfu/ml      Narrative:       If not done in the last 24 hours    Culture, Body Fluid (Aerobic) w/ GS [[id number]] Collected:  03/07/17 0927    Order Status:  Completed Specimen:  Body Fluid from Pleural Updated:  03/09/17 1243     AEROBIC CULTURE - FLUID --     STREPTOCOCCUS INTERMEDIUS  Moderate  Susceptibility testing not routinely performed       Gram Stain Result Moderate WBC's      Few Gram positive cocci    Narrative:       ADD ON PER DR GOSS, CX, ORDER #[id number]   07:50  03/07/2017     Culture, Respiratory with Gram Stain [[id number]] Collected:  03/06/17 2230    Order Status:  Completed Specimen:  Respiratory from Sputum Updated:  03/09/17 1046     Respiratory Culture Normal respiratory iqra     Gram Stain (Respiratory) <10 epithelial cells per low power field.     Gram Stain (Respiratory) Few WBC's     Gram Stain (Respiratory) Rare Gram positive cocci    Culture, Respiratory [[id number]]  (Susceptibility) Collected:  03/06/17 1346    Order Status:  Completed Specimen:  Respiratory from Sputum Updated:  03/09/17 1021     Respiratory Culture --     KLEBSIELLA OXYTOCA  Moderate       Gram  "Stain (Respiratory) <10 epithelial cells per low power field.     Gram Stain (Respiratory) Few WBC's     Gram Stain (Respiratory) Few Gram positive cocci     Gram Stain (Respiratory) Rare Gram negative rods    Narrative:       If not done in the last 24 hours    Culture, Body Fluid - Bactec [[id number]] Collected:  03/06/17 1845    Order Status:  Canceled Specimen:  Body Fluid from Pleural Updated:  03/07/17 0751    Narrative:       ADD ON PER DR GOSS, CXBF, ORDER #[id number]   07:50  03/07/2017   Culture Body Fluid Resin was cancelled on 03/07/2017 at 09:26 by SXD;   Quantity not sufficient for testing.    Culture, Body Fluid - Bactec [[id number]]     Order Status:  Completed Specimen:  Body Fluid from Pleural Fluid         Summary of Chest Imaging Personally Reviewed:   CXR today appears worse compared to yesterday    2D Echo: 3/1/17:  CONCLUSIONS     1 - Severe left atrial enlargement.     2 - Normal left ventricular systolic function (EF 55-60%).     3 - Left ventricular diastolic dysfunction.     4 - Moderate aortic stenosis, JEFF = 1.2 cm2, peak velocity = 3.0 m/s, mean gradient = 24.0 mmHg.     5 - Pulmonary hypertension. The estimated PA systolic pressure is 46 mmHg.     6 - Moderate mitral regurgitation.     7 - LV strain is reduced measuring -12%.     Impression & Recommendations    Pt is a 71 y/o M with PMHx MDS, HTN, Afib, HLD who is here with strep intermedius empyema s/p 3 days TPA/DNAse    Chest tube output about 1200 cc/12 hrs. Primary team was initially concern,  With CT read "There is a persistent loculated right-sided pleural effusion which appears mildly increased in overall volume compared to the prior examination."  Plan was for another chest tube today, but new scan showed "Significant interval decrease in loculated pleural effusion, with insufficient amount of fluid for drainage". IR procedure was aborted.  At this time we will continue to monitor chest tube output.     We recommend getting a " repeat sputum culture due to patient persistent leukocytosis and sputum production. If he would to start having fever, would recommend broaden out ABx coverage.    Will hold off VATS discussion at this time as this chest tube is producing.    Will continue to follow along    Signing Physician   Jason Lundberg MD  Internal Medicine PGY-3  Pulm Consult  3/13/2017

## 2017-03-13 NOTE — ASSESSMENT & PLAN NOTE
Due to sepsis.  Was given some lovenox for DVT px but he was already thrombocytopenic before that.

## 2017-03-13 NOTE — ASSESSMENT & PLAN NOTE
Paroxysmal atrial fibrillation with Rapid Ventricular Rate  BP required pressor support, now off pressors .   RVR resolved, now with rate controlled atrial fib after starting home diltiazem dose. Cardiology recommended to increase lopressor .  - on metoprolol and diltiazem (titrate up as HR/BP tolerates)  - warfarin held given tPA administration and recent procedures   - holding losartan given hypotensive episodes in ICU

## 2017-03-14 LAB
ALBUMIN SERPL BCP-MCNC: 1.5 G/DL
ALP SERPL-CCNC: 84 U/L
ALT SERPL W/O P-5'-P-CCNC: 17 U/L
ANION GAP SERPL CALC-SCNC: 10 MMOL/L
ANISOCYTOSIS BLD QL SMEAR: SLIGHT
AST SERPL-CCNC: 35 U/L
BASOPHILS # BLD AUTO: ABNORMAL K/UL
BASOPHILS NFR BLD: 0 %
BILIRUB SERPL-MCNC: 0.8 MG/DL
BUN SERPL-MCNC: 20 MG/DL
CALCIUM SERPL-MCNC: 7.6 MG/DL
CHLORIDE SERPL-SCNC: 98 MMOL/L
CO2 SERPL-SCNC: 21 MMOL/L
CREAT SERPL-MCNC: 0.8 MG/DL
DIFFERENTIAL METHOD: ABNORMAL
EOSINOPHIL # BLD AUTO: ABNORMAL K/UL
EOSINOPHIL NFR BLD: 0 %
ERYTHROCYTE [DISTWIDTH] IN BLOOD BY AUTOMATED COUNT: 18 %
EST. GFR  (AFRICAN AMERICAN): >60 ML/MIN/1.73 M^2
EST. GFR  (NON AFRICAN AMERICAN): >60 ML/MIN/1.73 M^2
GLUCOSE SERPL-MCNC: 97 MG/DL
HCT VFR BLD AUTO: 25.9 %
HGB BLD-MCNC: 8.5 G/DL
HYPOCHROMIA BLD QL SMEAR: ABNORMAL
INR PPP: 1.8
LYMPHOCYTES # BLD AUTO: ABNORMAL K/UL
LYMPHOCYTES NFR BLD: 7 %
MAGNESIUM SERPL-MCNC: 1.4 MG/DL
MCH RBC QN AUTO: 29 PG
MCHC RBC AUTO-ENTMCNC: 32.8 %
MCV RBC AUTO: 88 FL
MONOCYTES # BLD AUTO: ABNORMAL K/UL
MONOCYTES NFR BLD: 0 %
NEUTROPHILS NFR BLD: 93 %
OVALOCYTES BLD QL SMEAR: ABNORMAL
PHOSPHATE SERPL-MCNC: 4.1 MG/DL
PLATELET # BLD AUTO: 69 K/UL
PLATELET BLD QL SMEAR: ABNORMAL
PMV BLD AUTO: ABNORMAL FL
POIKILOCYTOSIS BLD QL SMEAR: SLIGHT
POLYCHROMASIA BLD QL SMEAR: ABNORMAL
POTASSIUM SERPL-SCNC: 4.6 MMOL/L
PROT SERPL-MCNC: 5.8 G/DL
PROTHROMBIN TIME: 18.1 SEC
RBC # BLD AUTO: 2.93 M/UL
SODIUM SERPL-SCNC: 129 MMOL/L
TARGETS BLD QL SMEAR: ABNORMAL
WBC # BLD AUTO: 17.24 K/UL

## 2017-03-14 PROCEDURE — 36415 COLL VENOUS BLD VENIPUNCTURE: CPT

## 2017-03-14 PROCEDURE — 25000003 PHARM REV CODE 250: Performed by: HOSPITALIST

## 2017-03-14 PROCEDURE — 80053 COMPREHEN METABOLIC PANEL: CPT

## 2017-03-14 PROCEDURE — 25000003 PHARM REV CODE 250: Performed by: NURSE PRACTITIONER

## 2017-03-14 PROCEDURE — 97530 THERAPEUTIC ACTIVITIES: CPT

## 2017-03-14 PROCEDURE — 11000001 HC ACUTE MED/SURG PRIVATE ROOM

## 2017-03-14 PROCEDURE — 85610 PROTHROMBIN TIME: CPT

## 2017-03-14 PROCEDURE — 25000242 PHARM REV CODE 250 ALT 637 W/ HCPCS: Performed by: STUDENT IN AN ORGANIZED HEALTH CARE EDUCATION/TRAINING PROGRAM

## 2017-03-14 PROCEDURE — 84100 ASSAY OF PHOSPHORUS: CPT

## 2017-03-14 PROCEDURE — 63600175 PHARM REV CODE 636 W HCPCS: Performed by: INTERNAL MEDICINE

## 2017-03-14 PROCEDURE — 25000003 PHARM REV CODE 250: Performed by: STUDENT IN AN ORGANIZED HEALTH CARE EDUCATION/TRAINING PROGRAM

## 2017-03-14 PROCEDURE — 85027 COMPLETE CBC AUTOMATED: CPT

## 2017-03-14 PROCEDURE — 97110 THERAPEUTIC EXERCISES: CPT

## 2017-03-14 PROCEDURE — 99232 SBSQ HOSP IP/OBS MODERATE 35: CPT | Mod: GC,,, | Performed by: INTERNAL MEDICINE

## 2017-03-14 PROCEDURE — 99233 SBSQ HOSP IP/OBS HIGH 50: CPT | Mod: GC,,, | Performed by: INTERNAL MEDICINE

## 2017-03-14 PROCEDURE — 25000003 PHARM REV CODE 250: Performed by: INTERNAL MEDICINE

## 2017-03-14 PROCEDURE — 85007 BL SMEAR W/DIFF WBC COUNT: CPT

## 2017-03-14 PROCEDURE — 83735 ASSAY OF MAGNESIUM: CPT

## 2017-03-14 PROCEDURE — 94640 AIRWAY INHALATION TREATMENT: CPT

## 2017-03-14 RX ORDER — PHYTONADIONE 5 MG/1
5 TABLET ORAL DAILY
Status: COMPLETED | OUTPATIENT
Start: 2017-03-14 | End: 2017-03-16

## 2017-03-14 RX ADMIN — PHYTONADIONE 5 MG: 5 TABLET ORAL at 10:03

## 2017-03-14 RX ADMIN — HYDROCODONE BITARTRATE AND ACETAMINOPHEN 1 TABLET: 5; 325 TABLET ORAL at 02:03

## 2017-03-14 RX ADMIN — Medication 3 ML: at 09:03

## 2017-03-14 RX ADMIN — DILTIAZEM HYDROCHLORIDE 360 MG: 180 CAPSULE, COATED, EXTENDED RELEASE ORAL at 09:03

## 2017-03-14 RX ADMIN — HYDROCODONE BITARTRATE AND ACETAMINOPHEN 1 TABLET: 5; 325 TABLET ORAL at 09:03

## 2017-03-14 RX ADMIN — FAMOTIDINE 20 MG: 20 TABLET, FILM COATED ORAL at 09:03

## 2017-03-14 RX ADMIN — Medication 3 ML: at 02:03

## 2017-03-14 RX ADMIN — METOPROLOL TARTRATE 50 MG: 25 TABLET ORAL at 09:03

## 2017-03-14 RX ADMIN — IPRATROPIUM BROMIDE AND ALBUTEROL SULFATE 3 ML: .5; 3 SOLUTION RESPIRATORY (INHALATION) at 07:03

## 2017-03-14 RX ADMIN — STANDARDIZED SENNA CONCENTRATE AND DOCUSATE SODIUM 1 TABLET: 8.6; 5 TABLET, FILM COATED ORAL at 09:03

## 2017-03-14 RX ADMIN — METOPROLOL TARTRATE 50 MG: 25 TABLET ORAL at 08:03

## 2017-03-14 RX ADMIN — CEFTRIAXONE 2 G: 2 INJECTION, SOLUTION INTRAVENOUS at 02:03

## 2017-03-14 RX ADMIN — CITALOPRAM HYDROBROMIDE 40 MG: 40 TABLET ORAL at 09:03

## 2017-03-14 NOTE — PLAN OF CARE
Problem: Physical Therapy Goal  Goal: Physical Therapy Goal  Goals to be met by: 3/19/17     Patient will increase functional independence with mobility by performin. Supine to sit with [place]  2. Sit to stand transfer with Supervision  3. Gait x 100 feet with Modified [place] using Rolling Walker.   4. Ascend/descend 2 stair with bilateral Handrails Supervision.   5. Lower extremity exercise program x15 reps per handout, with assistance as needed   Outcome: Ongoing (interventions implemented as appropriate)  Pt progressing towards goals.     MARYAN SULLIVAN, PT  3/14/2017

## 2017-03-14 NOTE — PLAN OF CARE
Problem: Fall Risk (Adult)  Goal: Identify Related Risk Factors and Signs and Symptoms  Related risk factors and signs and symptoms are identified upon initiation of Human Response Clinical Practice Guideline (CPG)   Outcome: Ongoing (interventions implemented as appropriate)  Fall risk bands and socks on. Pt oriented to fall risk reduction, as well as family member in room.    Problem: Patient Care Overview  Goal: Plan of Care Review  Outcome: Ongoing (interventions implemented as appropriate)  Pt weaned from O2 successfully. No respiratory distress. Diet increased since AM, but declined dinner. Unhappy with spice on food, and encouraged to order bland diet in future.  No falls or injuries during shift.  Will continue to monitor for changes.    Problem: Infection, Risk/Actual (Adult)  Goal: Identify Related Risk Factors and Signs and Symptoms  Related risk factors and signs and symptoms are identified upon initiation of Human Response Clinical Practice Guideline (CPG)   Outcome: Ongoing (interventions implemented as appropriate)  No fevers today, and being treated with abx as prescribed.    Problem: Pressure Ulcer Risk (Vijay Scale) (Adult,Obstetrics,Pediatric)  Goal: Identify Related Risk Factors and Signs and Symptoms  Related risk factors and signs and symptoms are identified upon initiation of Human Response Clinical Practice Guideline (CPG)   Outcome: Ongoing (interventions implemented as appropriate)  Patient able to position self independently. Up with PT today and sitting up in chair since lunch.

## 2017-03-14 NOTE — PROGRESS NOTES
Pulmonary & Critical Care Medicine   Progness Note      Reason for Consultation: chest tube management, empyema      Subjective: He denies SOB, on 1L NC at this time. Report near resolution of sputum. Afebrile. Chest tube put out 60cc overnight and 100 this AM today.     HPI: Pt is a 73 y/o M with PMHx MDS, HTN, Afib, HLD who was initially admitted to then ICU and started on broad spectrum antibiotics for suspected HCAP. I Underwent thoracentesis and subsequent pigtail placement by IR for fluid accumulation/loculated effusion on 3/7. Pleural fluid culture positive for strep intermedius (subtype of strep viridans) and respiratory culture +Klebsiella, both sensitive to ceftriaxone.DNAse/TPA was instilled for bid for three days with good output. His chest tube continues to drain and he is overall feeling better. Imaging has not changed dramatically. We are consulted to follow the chest tube management and empyema.       Past Medical History:   Diagnosis Date    Aortic valve stenosis, mild     secondary to bicuspid aortic alve    Atrial fibrillation     Carotid artery disease     Left CEA 4/9/13    Depression     DJD (degenerative joint disease)     H/O echocardiogram 04/13/2016    EF 55-60%. Diastolic dysfunction. PASP 39. mod AS with JEFF 1.18    History of psychiatric hospitalization     [facility] 2014, [facility] 1993    Hyperlipidemia     Hypertension     MDS (myelodysplastic syndrome) 2013    s/p Chemo     Therapy     [facility]      Past Surgical History:   Procedure Laterality Date    BONE MARROW BIOPSY  08/29/2016    CAROTID ENDARTERECTOMY Left     Inguinal hernia      Left    KNEE SURGERY      Right x2    neck fusion      TONSILLECTOMY       Social History:   Social History     Social History    Marital status:      Spouse name: Agatha    Number of children: N/A    Years of education: N/A     Occupational History    Not on file.     Social History Main  Topics    Smoking status: Never Smoker    Smokeless tobacco: Never Used    Alcohol use No    Drug use: No    Sexual activity: Yes     Partners: Female     Other Topics Concern    Patient Feels They Ought To Cut Down On Drinking/Drug Use No    Patient Annoyed By Others Criticizing Their Drinking/Drug Use No    Patient Has Felt Bad Or Guilty About Drinking/Drug Use No    Patient Has Had A Drink/Used Drugs As An Eye Opener In The Am No     Social History Narrative    40+ years , no children, 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; good social support     Family History   Problem Relation Age of Onset    Hyperlipidemia Brother      Drug Allergies:   Review of patient's allergies indicates:   Allergen Reactions    Lipitor [atorvastatin]      Muscle pain    Lisinopril Edema     Cheek swelling     Current Infusions:     Scheduled Medications:     cefTRIAXone (ROCEPHIN) IVPB  2 g Intravenous Q24H    citalopram  40 mg Oral Daily    diltiaZEM  360 mg Oral Daily    famotidine  20 mg Oral Daily    metoprolol tartrate  50 mg Oral BID    phytonadione  5 mg Oral Daily    senna-docusate 8.6-50 mg  1 tablet Oral BID    sodium chloride 0.9%  3 mL Intravenous Q8H     PRN Medications:   acetaminophen, albuterol-ipratropium 2.5mg-0.5mg/3mL, hydrocodone-acetaminophen 5-325mg, morphine, trazodone    Review of Systems:   A comprehensive 12-point review of systems was performed, and is negative except for those items mentioned above in the HPI section of this note.     Vital Signs:    Vitals:    03/14/17 1252   BP:    Pulse: 74   Resp:    Temp:        Fluid Balance:     Intake/Output Summary (Last 24 hours) at 03/14/17 1444  Last data filed at 03/14/17 0900   Gross per 24 hour   Intake              480 ml   Output             2665 ml   Net            -2185 ml       Physical Exam:   General: NAD, cooperative & interactive.  HEENT: nasal and oral mucosa moist Oropharynx without exudate.   Neck: Supple without  JVD. Trachea midline. Thyroid feels normal.   Cardiac: RRR with no MRG  Respiratory: Symmetric chest rise. Auscultation diminished breath sounds on the right. No increased work of breathing noted. Chest tube in place draining serosanginuous fluid. 1200cc today  Abdomen: Soft, NT/ND. +BS. No palpable masses. No hepatosplenomegaly.   Extremities: Normal strength and tone (grossly). No visible atrophy. No clubbing or cyanosis on nail exam.   Skin: Warm and dry without visible rash.   Neuro: Grossly intact to brief exam. Oriented with appropriate mood & affect.       Personal Review and Summary of Prior Diagnostics    Laboratory Studies:   No results for input(s): PH, PCO2, PO2, HCO3, POCSATURATED, BE in the last 24 hours.    Recent Labs  Lab 03/14/17  0405   WBC 17.24*   RBC 2.93*   HGB 8.5*   HCT 25.9*   PLT 69*   MCV 88   MCH 29.0   MCHC 32.8       Recent Labs  Lab 03/14/17  0405   *   K 4.6   CL 98   CO2 21*   BUN 20   CREATININE 0.8   MG 1.4*       Microbiology Data:   Microbiology Results (last 7 days)     Procedure Component Value Units Date/Time    Culture, Body Fluid - Bactec [[id number]] Collected:  03/07/17 1726    Order Status:  Completed Specimen:  Body Fluid from Thoracentesis Fluid Updated:  03/14/17 1026     Body Fluid Culture, Sterile Gram stain: Gram positive cocci     Body Fluid Culture, Sterile 03/12/2017  12:59    Culture, Respiratory [[id number]]     Order Status:  No result Specimen:  Respiratory     Blood culture [[id number]] Collected:  03/07/17 0758    Order Status:  Completed Specimen:  Blood from Peripheral, Forearm, Left Updated:  03/12/17 1212     Blood Culture, Routine No growth after 5 days.    Blood Culture #2 **CANNOT BE ORDERED STAT** [[id number]] Collected:  03/06/17 1145    Order Status:  Completed Specimen:  Blood from Peripheral, Wrist, Left Updated:  03/11/17 1412     Blood Culture, Routine No growth after 5 days.    Blood Culture #1 **CANNOT BE ORDERED STAT** [[id number]]  Collected:  03/06/17 1116    Order Status:  Completed Specimen:  Blood from Peripheral, Antecubital, Right Updated:  03/11/17 1222     Blood Culture, Routine No growth after 5 days.    Urine culture [[id number]]  (Susceptibility) Collected:  03/06/17 1333    Order Status:  Completed Specimen:  Urine from Urine, Clean Catch Updated:  03/10/17 1222     Urine Culture, Routine --     STAPHYLOCOCCUS EPIDERMIDIS  10,000 - 49,999 cfu/ml      Narrative:       If not done in the last 24 hours    Culture, Body Fluid (Aerobic) w/ GS [[id number]] Collected:  03/07/17 0927    Order Status:  Completed Specimen:  Body Fluid from Pleural Updated:  03/09/17 1243     AEROBIC CULTURE - FLUID --     STREPTOCOCCUS INTERMEDIUS  Moderate  Susceptibility testing not routinely performed       Gram Stain Result Moderate WBC's      Few Gram positive cocci    Narrative:       ADD ON PER DR GOSS, CXBF, ORDER #[id number]   07:50  03/07/2017     Culture, Respiratory with Gram Stain [[id number]] Collected:  03/06/17 2230    Order Status:  Completed Specimen:  Respiratory from Sputum Updated:  03/09/17 1046     Respiratory Culture Normal respiratory iqra     Gram Stain (Respiratory) <10 epithelial cells per low power field.     Gram Stain (Respiratory) Few WBC's     Gram Stain (Respiratory) Rare Gram positive cocci    Culture, Respiratory [[id number]]  (Susceptibility) Collected:  03/06/17 1346    Order Status:  Completed Specimen:  Respiratory from Sputum Updated:  03/09/17 1021     Respiratory Culture --     KLEBSIELLA OXYTOCA  Moderate       Gram Stain (Respiratory) <10 epithelial cells per low power field.     Gram Stain (Respiratory) Few WBC's     Gram Stain (Respiratory) Few Gram positive cocci     Gram Stain (Respiratory) Rare Gram negative rods    Narrative:       If not done in the last 24 hours        Summary of Chest Imaging Personally Reviewed:   CXR today appears worse compared to yesterday    2D Echo: 3/1/17:  CONCLUSIONS     1 - Severe  left atrial enlargement.     2 - Normal left ventricular systolic function (EF 55-60%).     3 - Left ventricular diastolic dysfunction.     4 - Moderate aortic stenosis, JEFF = 1.2 cm2, peak velocity = 3.0 m/s, mean gradient = 24.0 mmHg.     5 - Pulmonary hypertension. The estimated PA systolic pressure is 46 mmHg.     6 - Moderate mitral regurgitation.     7 - LV strain is reduced measuring -12%.     Impression & Recommendations    Pt is a 73 y/o M with PMHx MDS, HTN, Afib, HLD who is here with strep intermedius empyema s/p 3 days TPA/DNAse    Chest tube output about 1200 cc yesterday and with 600cc output today. Pt with improving leukocytosis down to 17K today.     We will continue to follow up on chest tube output.  Recommend changing ABx with Unasyn IV for additional anaerobic coverage, unasyn will cover both strep/klelb, we suspect that the cause of the PNA>>Empyema is from aspiration given that he is growing     Would continue IV abx until chest tube is pulled out, in a few days   Will continue to follow along    Signing Physician   Jason Lundberg MD  Internal Medicine PGY-3  Pulm Consult  3/14/2017

## 2017-03-14 NOTE — PROGRESS NOTES
[facility]  Hematology/Oncology  Progress Note    Patient Name: Wil Kwon Jr.  Admission Date: 3/6/2017  Hospital Length of Stay: 8 days  Code Status: Full Code     Subjective:     Interval History: Transitioned from medicine service to hem/BMT this morning. No acute issues overnight. Remains on rocephin x 5 days. Tachycardia noted overnight 110-120. Remains on 1 L NC.     Oncology Treatment Plan:   OP AZACITADINE 7-DAY (SUB-Q)    Medications:  Continuous Infusions:   Scheduled Meds:   cefTRIAXone (ROCEPHIN) IVPB  2 g Intravenous Q24H    citalopram  40 mg Oral Daily    diltiaZEM  360 mg Oral Daily    famotidine  20 mg Oral Daily    metoprolol tartrate  50 mg Oral BID    phytonadione  5 mg Oral Daily    senna-docusate 8.6-50 mg  1 tablet Oral BID    sodium chloride 0.9%  3 mL Intravenous Q8H     PRN Meds:acetaminophen, albuterol-ipratropium 2.5mg-0.5mg/3mL, hydrocodone-acetaminophen 5-325mg, morphine, trazodone     Review of Systems   Constitutional: Positive for fatigue. Negative for fever.   HENT: Negative for nosebleeds and sore throat.    Respiratory: Positive for cough and shortness of breath. Negative for chest tightness.    Cardiovascular: Positive for leg swelling. Negative for chest pain and palpitations.   Gastrointestinal: Negative for abdominal pain, blood in stool, constipation, diarrhea, nausea and vomiting.   Genitourinary: Negative for frequency, hematuria and urgency.   Skin: Negative for rash.   Allergic/Immunologic: Negative for immunocompromised state.   Neurological: Positive for weakness. Negative for dizziness, light-headedness, numbness and headaches.     Objective:     Vital Signs (Most Recent):  Temp: 98.1 °F (36.7 °C) (03/14/17 1234)  Pulse: 74 (03/14/17 1252)  Resp: 18 (03/14/17 1234)  BP: (!) 140/73 (03/14/17 1234)  SpO2: 98 % (03/14/17 1237) Vital Signs (24h Range):  Temp:  [97.9 °F (36.6 °C)-98.1 °F (36.7 °C)] 98.1 °F (36.7 °C)  Pulse:  []  74  Resp:  [16-21] 18  SpO2:  [96 %-100 %] 98 %  BP: (133-174)/(49-79) 140/73     Weight: 86.2 kg (190 lb)  Body mass index is 26.5 kg/(m^2).  Body surface area is 2.08 meters squared.      Intake/Output Summary (Last 24 hours) at 03/14/17 1323  Last data filed at 03/14/17 0900   Gross per 24 hour   Intake              480 ml   Output             2665 ml   Net            -2185 ml       Physical Exam   Constitutional: He is oriented to person, place, and time. He appears well-developed and well-nourished. No distress.   HENT:   Head: Normocephalic and atraumatic.   Right Ear: External ear normal.   Left Ear: External ear normal.   Nose: Nose normal.   Mouth/Throat: Oropharynx is clear and moist and mucous membranes are normal. No oropharyngeal exudate.   Eyes: Conjunctivae and EOM are normal. Pupils are equal, round, and reactive to light. No scleral icterus.   Neck: Normal range of motion. Neck supple.   Cardiovascular: Normal rate, regular rhythm and normal heart sounds.    Pulmonary/Chest: Effort normal and breath sounds normal.   Right chest wall tube   Abdominal: Soft. Normal appearance and bowel sounds are normal. He exhibits no distension. There is no tenderness.   Musculoskeletal: Normal range of motion. He exhibits no edema.   Lymphadenopathy:     He has no cervical adenopathy.   Neurological: He is alert and oriented to person, place, and time.   Skin: Skin is warm, dry and intact. No rash noted. No cyanosis or erythema. Nails show no clubbing.   Psychiatric: He has a normal mood and affect. His behavior is normal. Thought content normal. His mood appears not anxious.   Nursing note and vitals reviewed.      Significant Labs:   CBC:   Recent Labs  Lab 03/13/17  0352 03/14/17  0405   WBC 24.34* 17.24*   HGB 8.9* 8.5*   HCT 27.7* 25.9*   PLT 69* 69*   , CMP:   Recent Labs  Lab 03/13/17  0352 03/14/17  0405   * 129*   K 4.7 4.6   CL 98 98   CO2 20* 21*    97   BUN 25* 20   CREATININE 0.9 0.8    CALCIUM 7.7* 7.6*   PROT 6.1 5.8*   ALBUMIN 1.6* 1.5*   BILITOT 0.9 0.8   ALKPHOS 99 84   AST 50* 35   ALT 21 17   ANIONGAP 9 10   EGFRNONAA >60.0 >60.0   , Coagulation:   Recent Labs  Lab 03/13/17  0352 03/14/17  0405   INR 1.5* 1.8*    and All pertinent labs from the last 24 hours have been reviewed.    Diagnostic Results:  I have reviewed all pertinent imaging results/findings within the past 24 hours.    Assessment/Plan:     Active Diagnoses:    Diagnosis Date Noted POA    PRINCIPAL PROBLEM:  HCAP (healthcare-associated pneumonia) [J18.9] 03/06/2017 Yes    Cough [R05] 03/13/2017 Yes    Hypomagnesemia [E83.42] 03/12/2017 No    Atrial fibrillation with RVR [I48.91] 03/11/2017 No    Empyema of right pleural space [J86.9] 03/09/2017 Yes    Insomnia [G47.00] 03/09/2017 Yes    Septic shock [A41.9, R65.21] 03/06/2017 Yes    Acute hypoxemic respiratory failure [J96.01] 03/06/2017 Yes    BERNICE (acute kidney injury) [N17.9] 03/06/2017 Yes    MDS (myelodysplastic syndrome) [D46.9] 09/07/2016 Yes    Depression [F32.9] 08/17/2016 Yes    Hyponatremia [E87.1] 04/22/2016 Yes    Anemia, chronic disease [D63.8] 04/01/2016 Yes    Thrombocytopenia [D69.6] 04/01/2016 Yes    Essential hypertension [I10] 10/17/2012 Yes      Problems Resolved During this Admission:    Diagnosis Date Noted Date Resolved POA     * HCAP (healthcare-associated pneumonia)  Septic Shock   Acute hypoxemic respiratory failure   HCAP (healthcare-associated pneumonia)   Empyema of right pleural space  Acute hypoxemic respiratory failure requiring Bipap and vasopressors in the ICU, improved and taken off Bipap, but still likes prn NC O2.  Likely 2/2 to suspected HCAP given CXR concerning for right lower lob PNA with respiratory sputum cx +Klebsiella and pleural fluid cx +strep viridans , both sensitive to ceftriaxone . S/p Pigtail placement by IR 3/7 and s/p tPA for right loculated empyema. Repeat CT chest 3/13 Repeat CT with dramatic improvement.    - pt still has a lot of drainage, c/w chest tube; IR states significant improvement per CT on 3/13 - declined a second chest tube  - Right chest tube with 1315 mL drainage over the last 24 hours  - continue ceftriaxone day 5, but could switch to PO once effusion has a good plan   - repeat sputum culture per pulmonology recommendation         Essential hypertension  Paroxysmal atrial fibrillation with Rapid Ventricular Rate  BP required pressor support, now off pressors . RVR resolved, now with rate controlled atrial fib after starting home diltiazem dose. Cardiology recommended to increase lopressor .  - on metoprolol and diltiazem (titrate up as HR/BP tolerates)  - warfarin held given tPA administration and recent procedures        BERNICE (acute kidney injury) - resolved   Likely prerenal given presentation, FeNA. Cr peaked at 2.2 and then went back to normal slowly.   - c/w po diet and monitor        Anemia, chronic disease  MDS (myelodysplastic syndrome)  Anemia and thrombocytopenia due to chemotherapy  Hemoglobin 8.5  Platelets 69,000  - INR elevated Vitamin K x 3 days   - transfuse for Hb below 7     Depression  - continue home dose of citalopram         Hyponatremia  - fluid restict         Renetta Maldonado, ANDRAE  Hematology/Oncology  [facility]

## 2017-03-14 NOTE — PT/OT/SLP PROGRESS
Physical Therapy  Treatment    Wil Kwon Jr.   MRN: [id number]   Admitting Diagnosis: HCAP (healthcare-associated pneumonia)    PT Received On: 17  PT Start Time: 1005     PT Stop Time: 1033    PT Total Time (min): 28 min       Billable Minutes:  Therapeutic Activity 18 and Therapeutic Exercise 10    Treatment Type: Treatment  PT/PTA: PT     PTA Visit Number: 0       General Precautions: Standard, fall  Orthopedic Precautions: N/A   Braces: N/A    Do you have any cultural, spiritual, [employer]alism conflicts, given your current situation?: na    Subjective:  Communicated with RN prior to session; reports chest tube must remain on suction, unable to disconnect for further amb.   Pt agreeable to therapy session.     Pain Ratin/10              Pain Rating Post-Intervention: 0/10    Objective:   Patient found with: telemetry, chest tube, peripheral IV    Functional Mobility:  Bed Mobility:   Supine to Sit: Stand by Assistance    Transfers:  Sit <> Stand Assistance: Stand By Assistance (from EOB  x1 trial and from bedside chair x3 trials )  Sit <> Stand Assistive Device: Rolling Walker    Gait:   Gait Distance: 5 steps to bedside chair; no LOB or SOB. Pt amb limited 2* chest tube on suction.   Assistance 1: Contact Guard Assistance  Gait Assistive Device: Rolling walker    Balance:   Static Sit: GOOD-: Takes MODERATE challenges from all directions but inconsistently  Dynamic Sit: GOOD-: Maintains balance through MODERATE excursions of active trunk movement,     Static Stand: FAIR+: Takes MINIMAL challenges from all directions  Dynamic stand: FAIR: Needs CONTACT GUARD during gait     Therapeutic Activities and Exercises:  Pt educated on safety with transfers and amb.  Pt educated on importance of OOB activity, sitting UIC for majority of the day.   Pt performed standing B LE there-ex- marching, mini squats, heel raises, HS curls x 10 reps; pt required 2 seated rest breaks.  Pt safe to perform transfers with RW  staff.      AM-PAC 6 CLICK MOBILITY  How much help from another person does this patient currently need?   1 = Unable, Total/Dependent Assistance  2 = A lot, Maximum/Moderate Assistance  3 = A little, Minimum/Contact Guard/Supervision  4 = None, Modified [place]/Independent    Turning over in bed (including adjusting bedclothes, sheets and blankets)?: 4  Sitting down on and standing up from a chair with arms (e.g., wheelchair, bedside commode, etc.): 3  Moving from lying on back to sitting on the side of the bed?: 3  Moving to and from a bed to a chair (including a wheelchair)?: 3  Need to walk in hospital room?: 3  Climbing 3-5 steps with a railing?: 3  Total Score: 19    AM-PAC Raw Score CMS G-Code Modifier Level of Impairment Assistance   6 % Total / Unable   7 - 9 CM 80 - 100% Maximal Assist   10 - 14 CL 60 - 80% Moderate Assist   15 - 19 CK 40 - 60% Moderate Assist   20 - 22 CJ 20 - 40% Minimal Assist   23 CI 1-20% SBA / CGA   24 CH 0% Independent/ Mod I     Patient left up in chair with all lines intact, call button in reach and RN notified.    Assessment:  Wil Kwon Jr. is a 72 y.o. male with a medical diagnosis of HCAP (healthcare-associated pneumonia) and presents with decreased endurance, balance, strength and overall functional mobility. Pt performed bed mobility S and transfers SBA with RW. Pt took 5 steps to bedside chair CGA with RW; no LOB or SOB. Pt amb limited 2* chest tube on suction; RN reported unable to disconnect for further amb at this time. Pt will continue to benefit from skilled PT to improve deficits and increase overall functional mobility.     Rehab identified problem list/impairments: Rehab identified problem list/impairments: weakness, impaired endurance, gait instability, impaired balance, impaired functional mobilty    Rehab potential is good.    Activity tolerance: Good    Discharge recommendations: Discharge Facility/Level Of Care Needs: nursing facility,  skilled     Barriers to discharge: Barriers to Discharge: Inaccessible home environment, Decreased caregiver support    Equipment recommendations: Equipment Needed After Discharge: other (see comments) (TBD)     GOALS:   Physical Therapy Goals        Problem: Physical Therapy Goal    Goal Priority Disciplines Outcome Goal Variances Interventions   Physical Therapy Goal     PT/OT, PT Ongoing (interventions implemented as appropriate)     Description:  Goals to be met by: 3/19/17     Patient will increase functional independence with mobility by performin. Supine to sit with [place]  2. Sit to stand transfer with Supervision  3. Gait  x 100 feet with Modified [place] using Rolling Walker.   4. Ascend/descend 2 stair with bilateral Handrails Supervision.   5. Lower extremity exercise program x15 reps per handout, with assistance as needed                PLAN:    Patient to be seen 4 x/week  to address the above listed problems via gait training, therapeutic activities, therapeutic exercises  Plan of Care expires: 17  Plan of Care reviewed with: patient         MARYAN LAURIE, PT  2017

## 2017-03-14 NOTE — PLAN OF CARE
Problem: Patient Care Overview  Goal: Plan of Care Review  Outcome: Ongoing (interventions implemented as appropriate)  PT free of any injury or falls. Vital signs stable. Pain medication given as needed. Chest tube put out 65 ml. Will continue to monitor.

## 2017-03-15 LAB
ALBUMIN SERPL BCP-MCNC: 1.5 G/DL
ALP SERPL-CCNC: 88 U/L
ALT SERPL W/O P-5'-P-CCNC: 18 U/L
ANION GAP SERPL CALC-SCNC: 8 MMOL/L
ANISOCYTOSIS BLD QL SMEAR: SLIGHT
AST SERPL-CCNC: 34 U/L
BASOPHILS # BLD AUTO: ABNORMAL K/UL
BASOPHILS NFR BLD: 0 %
BILIRUB SERPL-MCNC: 0.8 MG/DL
BUN SERPL-MCNC: 17 MG/DL
CALCIUM SERPL-MCNC: 7.6 MG/DL
CHLORIDE SERPL-SCNC: 98 MMOL/L
CO2 SERPL-SCNC: 22 MMOL/L
CREAT SERPL-MCNC: 0.8 MG/DL
DIFFERENTIAL METHOD: ABNORMAL
EOSINOPHIL # BLD AUTO: ABNORMAL K/UL
EOSINOPHIL NFR BLD: 0 %
ERYTHROCYTE [DISTWIDTH] IN BLOOD BY AUTOMATED COUNT: 17.9 %
EST. GFR  (AFRICAN AMERICAN): >60 ML/MIN/1.73 M^2
EST. GFR  (NON AFRICAN AMERICAN): >60 ML/MIN/1.73 M^2
GLUCOSE SERPL-MCNC: 99 MG/DL
HCT VFR BLD AUTO: 24.9 %
HGB BLD-MCNC: 7.8 G/DL
HYPOCHROMIA BLD QL SMEAR: ABNORMAL
INR PPP: 1.2
LYMPHOCYTES # BLD AUTO: ABNORMAL K/UL
LYMPHOCYTES NFR BLD: 3 %
MAGNESIUM SERPL-MCNC: 1.5 MG/DL
MCH RBC QN AUTO: 28.2 PG
MCHC RBC AUTO-ENTMCNC: 31.3 %
MCV RBC AUTO: 90 FL
MONOCYTES # BLD AUTO: ABNORMAL K/UL
MONOCYTES NFR BLD: 1 %
NEUTROPHILS NFR BLD: 96 %
NRBC BLD-RTO: ABNORMAL /100 WBC
OVALOCYTES BLD QL SMEAR: ABNORMAL
PHOSPHATE SERPL-MCNC: 3.8 MG/DL
PLATELET # BLD AUTO: 97 K/UL
PMV BLD AUTO: ABNORMAL FL
POIKILOCYTOSIS BLD QL SMEAR: SLIGHT
POLYCHROMASIA BLD QL SMEAR: ABNORMAL
POTASSIUM SERPL-SCNC: 4.3 MMOL/L
PROT SERPL-MCNC: 6 G/DL
PROTHROMBIN TIME: 12.6 SEC
RBC # BLD AUTO: 2.77 M/UL
SODIUM SERPL-SCNC: 128 MMOL/L
WBC # BLD AUTO: 18.52 K/UL

## 2017-03-15 PROCEDURE — 85610 PROTHROMBIN TIME: CPT

## 2017-03-15 PROCEDURE — 84100 ASSAY OF PHOSPHORUS: CPT

## 2017-03-15 PROCEDURE — 83735 ASSAY OF MAGNESIUM: CPT

## 2017-03-15 PROCEDURE — 11000001 HC ACUTE MED/SURG PRIVATE ROOM

## 2017-03-15 PROCEDURE — 25000003 PHARM REV CODE 250: Performed by: HOSPITALIST

## 2017-03-15 PROCEDURE — 87205 SMEAR GRAM STAIN: CPT

## 2017-03-15 PROCEDURE — 63600175 PHARM REV CODE 636 W HCPCS: Performed by: HOSPITALIST

## 2017-03-15 PROCEDURE — 99233 SBSQ HOSP IP/OBS HIGH 50: CPT | Mod: GC,,, | Performed by: INTERNAL MEDICINE

## 2017-03-15 PROCEDURE — 25000003 PHARM REV CODE 250: Performed by: INTERNAL MEDICINE

## 2017-03-15 PROCEDURE — 80053 COMPREHEN METABOLIC PANEL: CPT

## 2017-03-15 PROCEDURE — 25000003 PHARM REV CODE 250: Performed by: NURSE PRACTITIONER

## 2017-03-15 PROCEDURE — 87070 CULTURE OTHR SPECIMN AEROBIC: CPT

## 2017-03-15 PROCEDURE — 85007 BL SMEAR W/DIFF WBC COUNT: CPT

## 2017-03-15 PROCEDURE — 25000003 PHARM REV CODE 250: Performed by: STUDENT IN AN ORGANIZED HEALTH CARE EDUCATION/TRAINING PROGRAM

## 2017-03-15 PROCEDURE — 97116 GAIT TRAINING THERAPY: CPT

## 2017-03-15 PROCEDURE — 85027 COMPLETE CBC AUTOMATED: CPT

## 2017-03-15 PROCEDURE — 99232 SBSQ HOSP IP/OBS MODERATE 35: CPT | Mod: GC,,, | Performed by: INTERNAL MEDICINE

## 2017-03-15 PROCEDURE — 99223 1ST HOSP IP/OBS HIGH 75: CPT | Mod: ,,, | Performed by: INTERNAL MEDICINE

## 2017-03-15 PROCEDURE — 36415 COLL VENOUS BLD VENIPUNCTURE: CPT

## 2017-03-15 PROCEDURE — 97110 THERAPEUTIC EXERCISES: CPT

## 2017-03-15 RX ORDER — GUAIFENESIN 100 MG/5ML
400 SOLUTION ORAL EVERY 4 HOURS PRN
Status: DISCONTINUED | OUTPATIENT
Start: 2017-03-15 | End: 2017-03-18 | Stop reason: HOSPADM

## 2017-03-15 RX ORDER — FUROSEMIDE 10 MG/ML
20 INJECTION INTRAMUSCULAR; INTRAVENOUS ONCE
Status: COMPLETED | OUTPATIENT
Start: 2017-03-15 | End: 2017-03-15

## 2017-03-15 RX ORDER — BENZONATATE 100 MG/1
100 CAPSULE ORAL 3 TIMES DAILY PRN
Status: DISCONTINUED | OUTPATIENT
Start: 2017-03-15 | End: 2017-03-18 | Stop reason: HOSPADM

## 2017-03-15 RX ORDER — OXYMETAZOLINE HCL 0.05 %
2 SPRAY, NON-AEROSOL (ML) NASAL 2 TIMES DAILY
Status: COMPLETED | OUTPATIENT
Start: 2017-03-15 | End: 2017-03-18

## 2017-03-15 RX ADMIN — CITALOPRAM HYDROBROMIDE 40 MG: 40 TABLET ORAL at 08:03

## 2017-03-15 RX ADMIN — PHYTONADIONE 5 MG: 5 TABLET ORAL at 08:03

## 2017-03-15 RX ADMIN — FAMOTIDINE 20 MG: 20 TABLET, FILM COATED ORAL at 08:03

## 2017-03-15 RX ADMIN — HYDROCODONE BITARTRATE AND ACETAMINOPHEN 1 TABLET: 5; 325 TABLET ORAL at 10:03

## 2017-03-15 RX ADMIN — HYDROCODONE BITARTRATE AND ACETAMINOPHEN 1 TABLET: 5; 325 TABLET ORAL at 03:03

## 2017-03-15 RX ADMIN — METOPROLOL TARTRATE 50 MG: 25 TABLET ORAL at 08:03

## 2017-03-15 RX ADMIN — Medication 3 ML: at 06:03

## 2017-03-15 RX ADMIN — STANDARDIZED SENNA CONCENTRATE AND DOCUSATE SODIUM 1 TABLET: 8.6; 5 TABLET, FILM COATED ORAL at 08:03

## 2017-03-15 RX ADMIN — HYDROCODONE BITARTRATE AND ACETAMINOPHEN 1 TABLET: 5; 325 TABLET ORAL at 05:03

## 2017-03-15 RX ADMIN — AMPICILLIN SODIUM AND SULBACTAM SODIUM 3 G: 2; 1 INJECTION, POWDER, FOR SOLUTION INTRAMUSCULAR; INTRAVENOUS at 03:03

## 2017-03-15 RX ADMIN — Medication 3 ML: at 03:03

## 2017-03-15 RX ADMIN — HYDROCODONE BITARTRATE AND ACETAMINOPHEN 1 TABLET: 5; 325 TABLET ORAL at 11:03

## 2017-03-15 RX ADMIN — DILTIAZEM HYDROCHLORIDE 360 MG: 180 CAPSULE, COATED, EXTENDED RELEASE ORAL at 08:03

## 2017-03-15 RX ADMIN — OXYMETAZOLINE HYDROCHLORIDE 2 SPRAY: 5 SPRAY NASAL at 08:03

## 2017-03-15 RX ADMIN — AMPICILLIN SODIUM AND SULBACTAM SODIUM 3 G: 2; 1 INJECTION, POWDER, FOR SOLUTION INTRAMUSCULAR; INTRAVENOUS at 08:03

## 2017-03-15 RX ADMIN — FUROSEMIDE 20 MG: 10 INJECTION, SOLUTION INTRAMUSCULAR; INTRAVENOUS at 10:03

## 2017-03-15 NOTE — ASSESSMENT & PLAN NOTE
73yo man w/a history of MDS (s/p vidaza x5, last 3/3/2017), pAF (on anticoagulation), and HTN who was admitted on 3/6/2017 with 1wk of fevers (>101F), progressive SOB, R pleuritic CP, and cough productive of brown/green sputum due to a right likely polymicrobial multifocal pneumonia with an associated empyema (s/p CT insertion on 3/7 with over 5L of sanguinopurulent drainage so far; S.intermedius in fluid cx and Klebsiella in sputum cx). He is stable on IV CTX at present and ID has been consulted to assist in tailoring his antibiotics.    - would stop IV CTX  - would start unasyn for now in its place as suspect his infection may be more polymicrobial in nature than cultures reflect (aspiration likely initial source)  - will continue to monitor drainage output via chest tube -- does not appear ready for transition to orals

## 2017-03-15 NOTE — PLAN OF CARE
Problem: Physical Therapy Goal  Goal: Physical Therapy Goal  Goals to be met by: 3/19/17     Patient will increase functional independence with mobility by performin. Supine to sit with [place]  2. Sit to stand transfer with Supervision  3. Gait x 100 feet with Modified [place] using Rolling Walker.   4. Ascend/descend 2 stair with bilateral Handrails Supervision.   5. Lower extremity exercise program x15 reps per handout, with assistance as needed   Outcome: Ongoing (interventions implemented as appropriate)  No goals met today. Pt is progressing well toward goals. Goals remain appropriate.

## 2017-03-15 NOTE — CONSULTS
[facility]  Infectious Disease  Consult Note    Patient Name: Wil Kwon Jr.  MRN: [id number]  Admission Date: 3/6/2017  Hospital Length of Stay: 9 days  Attending Physician: Roddy Driscoll MD  Primary Care Provider: LAILA Serra MD     Isolation Status: No active isolations    Patient information was obtained from patient and past medical records.      Inpatient consult to Infectious Diseases  Consult performed by: JESSY CAMPBELL  Consult ordered by: RAMON HENDRICKS  Reason for consult: empyema        Assessment/Plan:     Empyema of right pleural space  71yo man w/a history of MDS (s/p vidaza x5, last 3/3/2017), pAF (on anticoagulation), and HTN who was admitted on 3/6/2017 with 1wk of fevers (>101F), progressive SOB, R pleuritic CP, and cough productive of brown/green sputum due to a right likely polymicrobial multifocal pneumonia with an associated empyema (s/p CT insertion on 3/7 with over 5L of sanguinopurulent drainage so far; S.intermedius in fluid cx and Klebsiella in sputum cx). He is stable on IV CTX at present and ID has been consulted to assist in tailoring his antibiotics.    - would stop IV CTX  - would start unasyn for now in its place as suspect his infection may be more polymicrobial in nature than cultures reflect (aspiration likely initial source)  - will continue to monitor drainage output via chest tube -- does not appear ready for transition to orals      Thank you for your consult. I will follow-up with patient. Please contact us if you have any additional questions.     Jessy Campbell MD  Transplant ID Attending  [phone]      Jessy Campbell MD  Infectious Disease  [facility]    Subjective:     Principal Problem: HCAP (healthcare-associated pneumonia)    HPI: Mr. Kwon is a 71yo man w/a history of MDS (s/p vidaza x5, last 3/3/2017), pAF (on anticoagulation), and HTN who was admitted on 3/6/2017 with 1wk of fevers (>101F),  progressive SOB, R pleuritic CP, and cough productive of brown/green sputum found to be due to a right multifocal pneumonia with an associated empyema. He underwent chest tube insertion on 3/7 with over 5L of sanguinopurulent drainage to date. Sputum cultures have grown Klebsiella pneumoniae and all pleural fluid cultures have grown S.intermedius. He has been afebrile but with a residual leukocytosis. Fluid output has tapered but not resolved. Patient notes he feels much better but is still weak with a productive cough. He is currently on CTX tapered to cultures.    Past Medical History:   Diagnosis Date    Aortic valve stenosis, mild     secondary to bicuspid aortic alve    Atrial fibrillation     Carotid artery disease     Left CEA 4/9/13    Depression     DJD (degenerative joint disease)     H/O echocardiogram 04/13/2016    EF 55-60%. Diastolic dysfunction. PASP 39. mod AS with JEFF 1.18    History of psychiatric hospitalization     [place] [id number], [facility]    Hyperlipidemia     Hypertension     MDS (myelodysplastic syndrome) 2013    s/p Chemo     Therapy     [facility]        Past Surgical History:   Procedure Laterality Date    BONE MARROW BIOPSY  08/29/2016    CAROTID ENDARTERECTOMY Left     Inguinal hernia      Left    KNEE SURGERY      Right x2    neck fusion      TONSILLECTOMY         Review of patient's allergies indicates:   Allergen Reactions    Lipitor [atorvastatin]      Muscle pain    Lisinopril Edema     Cheek swelling       Medications:  Prescriptions Prior to Admission   Medication Sig    acetaminophen (TYLENOL) 325 MG tablet Take 325 mg by mouth as needed for Pain.    citalopram (CELEXA) 40 MG tablet TAKE 1 TABLET BY MOUTH DAILY (Patient taking differently: TAKE 1 TABLET BY MOUTH DAILY (am))    diltiaZEM (CARDIZEM CD) 300 MG 24 hr capsule TAKE ONE CAPSULE BY MOUTH EVERY DAY    losartan (COZAAR) 50 MG tablet Take 50 mg by mouth once daily.     metoprolol succinate (TOPROL-XL) 25 MG 24 hr tablet Take 1 tablet (25 mg total) by mouth once daily.    multivitamin capsule Take 1 capsule by mouth every morning.     nystatin (MYCOSTATIN) ointment Apply topically 3 (three) times daily.    omega-3 fatty acids-vitamin E (FISH OIL) 1,000 mg Cap Take 3 capsules by mouth once daily.     ondansetron (ZOFRAN) 8 MG tablet Take 1 tablet (8 mg total) by mouth every 8 (eight) hours as needed for Nausea.    pravastatin (PRAVACHOL) 20 MG tablet Take 1 tablet (20 mg total) by mouth every other day.    warfarin (COUMADIN) 3 MG tablet Take 1 tablet (3 mg total) by mouth Daily.     Antibiotics     Start     Stop Route Frequency Ordered    03/15/17 1348  ampicillin-sulbactam 3 g in sodium chloride 0.9 % 100 mL IVPB (ready to mix system)      -- IV Every 6 hours (non-standard times) 03/15/17 1349        Antifungals     None        Antivirals     None           Immunization History   Administered Date(s) Administered    Influenza Whole 10/06/2014    Pneumococcal Conjugate - 13 Valent 12/21/2015    Pneumococcal Polysaccharide - 23 Valent 02/16/2015    Zoster 04/16/2012       Family History     Problem Relation (Age of Onset)    Hyperlipidemia Brother        Social History     Social History    Marital status:      Spouse name: Agatha    Number of children: N/A    Years of education: N/A     Social History Main Topics    Smoking status: Never Smoker    Smokeless tobacco: Never Used    Alcohol use No    Drug use: No    Sexual activity: Yes     Partners: Female     Other Topics Concern    Patient Feels They Ought To Cut Down On Drinking/Drug Use No    Patient Annoyed By Others Criticizing Their Drinking/Drug Use No    Patient Has Felt Bad Or Guilty About Drinking/Drug Use No    Patient Has Had A Drink/Used Drugs As An Eye Opener In The Am No     Social History Narrative    40+ years , no children, 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 Negative for appetite change, diaphoresis and fever.   HENT: Negative for ear pain, mouth sores, sinus pressure and sore throat.    Eyes: Negative for photophobia, pain and redness.   Respiratory: Positive for cough and shortness of breath. Negative for wheezing.    Cardiovascular: Negative for chest pain and leg swelling.   Gastrointestinal: Negative for abdominal distention, abdominal pain, diarrhea and nausea.   Genitourinary: Negative for dysuria, flank pain, frequency and urgency.   Musculoskeletal: Negative for arthralgias, back pain, gait problem and myalgias.   Skin: Negative for pallor and rash.   Neurological: Negative for dizziness, tremors, seizures and headaches.   Psychiatric/Behavioral: Negative for confusion.     Objective:     Vital Signs (Most Recent):  Temp: 97.6 °F (36.4 °C) (03/15/17 1503)  Pulse: 97 (03/15/17 1503)  Resp: 18 (03/15/17 1503)  BP: (!) 144/65 (03/15/17 1503)  SpO2: 97 % (03/15/17 1503) Vital Signs (24h Range):  Temp:  [97.6 °F (36.4 °C)-98.8 °F (37.1 °C)] 97.6 °F (36.4 °C)  Pulse:  [] 97  Resp:  [18-22] 18  SpO2:  [96 %-99 %] 97 %  BP: (140-165)/(65-79) 144/65     Weight: 86.2 kg (190 lb)  Body mass index is 26.5 kg/(m^2).    Estimated Creatinine Clearance: 88.9 mL/min (based on Cr of 0.8).    Physical Exam   Constitutional: He is oriented to person, place, and time. He appears well-developed. No distress.   Chronically ill appearing.   HENT:   Head: Atraumatic.   Mouth/Throat: Oropharynx is clear and moist. No oropharyngeal exudate.   Eyes: Conjunctivae and EOM are normal. Pupils are equal, round, and reactive to light. No scleral icterus.   Neck: Neck supple.   Cardiovascular: Normal rate and regular rhythm.  Exam reveals no friction rub.    Murmur heard.  Pulmonary/Chest: Effort normal. No respiratory distress. He has no wheezes. He has rales. He exhibits no tenderness.   Crackles in  right lung diffusely, diminished in base. CT with sanguinopurulent drainage.   Abdominal: Soft. Bowel sounds are normal. He exhibits no distension. There is no tenderness. There is no rebound and no guarding.   Musculoskeletal: Normal range of motion. He exhibits no edema.   Lymphadenopathy:     He has no cervical adenopathy.   Neurological: He is alert and oriented to person, place, and time. No cranial nerve deficit. He exhibits normal muscle tone. Coordination normal.   Skin: No rash noted. No erythema.       Significant Labs:   CBC:   Recent Labs  Lab 03/14/17  0405 03/15/17  0427   WBC 17.24* 18.52*   HGB 8.5* 7.8*   HCT 25.9* 24.9*   PLT 69* 97*     CMP:   Recent Labs  Lab 03/14/17  0405 03/15/17  0427   * 128*   K 4.6 4.3   CL 98 98   CO2 21* 22*   GLU 97 99   BUN 20 17   CREATININE 0.8 0.8   CALCIUM 7.6* 7.6*   PROT 5.8* 6.0   ALBUMIN 1.5* 1.5*   BILITOT 0.8 0.8   ALKPHOS 84 88   AST 35 34   ALT 17 18   ANIONGAP 10 8   EGFRNONAA >60.0 >60.0       Significant Imaging: I have reviewed all pertinent imaging results/findings within the past 24 hours.     3/12 CT chest:  1.  Interval placement of a right-sided pigtail pleural drainage catheter which appears appropriately positioned. There is a persistent loculated right-sided pleural effusion which appears mildly increased in overall volume compared to the prior examination. Correlation with drain output is advised.    2. Persistent patchy consolidation throughout the right middle and lower lobes, mildly improved from prior examination, noting more dense areas of consolidation with associated lack of air bronchograms in the dependent aspect of the right lower lobe.    3. Atherosclerosis.    3/13 CT chest limited (IR):  Significant interval decrease in loculated pleural effusion, with insufficient amount of fluid for drainage.    Microbiology:  3/6 blood cx: negative x2  3/6 urine cx; MSSE (contaminant)  3/6 sputum cx: Kleb oxytoca (R amp, S rest)  3/7 blood  cx: negative x2  3/7 pleural fluid cx: S.intermedius (GPC on stain)  3/15 sputum cx: rare WBC/GPC on stain

## 2017-03-15 NOTE — PT/OT/SLP PROGRESS
Physical Therapy  Treatment    Wil Kwon Jr.   MRN: [id number]   Admitting Diagnosis: HCAP (healthcare-associated pneumonia)    PT Received On: 03/15/17  PT Start Time: 1534     PT Stop Time: 1559    PT Total Time (min): 25 min       Billable Minutes:  Gait [id number] and Therapeutic Exercise 10    Treatment Type: Treatment  PT/PTA: PT     PTA Visit Number: 0       General Precautions: Standard, fall  Orthopedic Precautions: N/A   Braces: N/A    Do you have any cultural, spiritual, Latter-day conflicts, given your current situation?: na    Subjective:  Communicated with RN prior to session.  Pt agreeable to treatment.     Pain Ratin/10      Objective:   Patient found with: chest tube, telemetry, peripheral IV   Pt found supine in bed.   RN cleared to remove chest from suction for a brief period during ambulation.     Functional Mobility:  Bed Mobility:   Supine to Sit: Stand by Assistance  Sit to Supine: Stand by Assistance    Transfers:  Sit <> Stand Assistance: Stand By Assistance  Sit <> Stand Assistive Device: Rolling Walker    Gait:   Gait Distance: Pt ambulated ~50ft w/ RW and CGA. Pt given cues for hip extension, shoulder retraction, and scanning environment. Family member followed w/ chair.   Assistance 1: Contact Guard Assistance  Gait Assistive Device: Rolling walker  Gait Pattern: 3-point gait  Gait Deviation(s): decreased uli, increased time in double stance, decreased step length, decreased velocity of limb motion, decreased stride length    Stairs: Not assessed.     Balance:   Static Sit: FAIR+: Able to take MINIMAL challenges from all directions  Dynamic Sit: FAIR+: Maintains balance through MINIMAL excursions of active trunk motion  Static Stand: FAIR: Maintains without assist but unable to take challenges  Dynamic stand: FAIR: Needs CONTACT GUARD during gait     Therapeutic Activities and Exercises:  Pt performed seated exercises x30 reps:  -marching  -LAQ  -heel raises  -Hip adduction  squeezes w/ pillow.  Pt educated to perform these exercises on own 3-5x per day.   Pt educated on importance of OOB activities and encouraged to stay sitting in bedside chair as long as tolerated.   Whiteboard updated. Spoke to RN about ordering RW for room use.   Pt safe to ambulate w/ nursing.     AM-PAC 6 CLICK MOBILITY  How much help from another person does this patient currently need?   1 = Unable, Total/Dependent Assistance  2 = A lot, Maximum/Moderate Assistance  3 = A little, Minimum/Contact Guard/Supervision  4 = None, Modified [place]/Independent    Turning over in bed (including adjusting bedclothes, sheets and blankets)?: 4  Sitting down on and standing up from a chair with arms (e.g., wheelchair, bedside commode, etc.): 4  Moving from lying on back to sitting on the side of the bed?: 4  Moving to and from a bed to a chair (including a wheelchair)?: 3  Need to walk in hospital room?: 3  Climbing 3-5 steps with a railing?: 3  Total Score: 21    AM-PAC Raw Score CMS G-Code Modifier Level of Impairment Assistance   6 % Total / Unable   7 - 9 CM 80 - 100% Maximal Assist   10 - 14 CL 60 - 80% Moderate Assist   15 - 19 CK 40 - 60% Moderate Assist   20 - 22 CJ 20 - 40% Minimal Assist   23 CI 1-20% SBA / CGA   24 CH 0% Independent/ Mod I     Patient left up in chair with all lines intact, call button in reach and son present.    Assessment:  Wil Kwon Jr. is a 72 y.o. male with a medical diagnosis of HCAP (healthcare-associated pneumonia) and presents with deficits listed below. Pt tolerated treatment well today. Pt has good overall mobility and is progressing well. Pt tolerated exercises well and appears to be motivated. Pt is progressing well and no longer requires SNF. Pt is now a good candidate for HHPT. Pt will continue to benefit from skilled therapy services to address the deficits listed below and increase functional independence.     Rehab identified problem list/impairments:  Rehab identified problem list/impairments: weakness, impaired endurance, impaired functional mobilty, gait instability, impaired balance    Rehab potential is good.    Activity tolerance: Good    Discharge recommendations: Discharge Facility/Level Of Care Needs: home health PT     Barriers to discharge: Barriers to Discharge: Decreased caregiver support, Inaccessible home environment    Equipment recommendations: Equipment Needed After Discharge:  (TBD)     GOALS:   Physical Therapy Goals        Problem: Physical Therapy Goal    Goal Priority Disciplines Outcome Goal Variances Interventions   Physical Therapy Goal     PT/OT, PT Ongoing (interventions implemented as appropriate)     Description:  Goals to be met by: 3/19/17     Patient will increase functional independence with mobility by performin. Supine to sit with [place]  2. Sit to stand transfer with Supervision  3. Gait  x 100 feet with Modified [place] using Rolling Walker.   4. Ascend/descend 2 stair with bilateral Handrails Supervision.   5. Lower extremity exercise program x15 reps per handout, with assistance as needed                PLAN:    Patient to be seen 4 x/week  to address the above listed problems via gait training, therapeutic activities, therapeutic exercises  Plan of Care expires: 17  Plan of Care reviewed with: patient      Leonora Wallace, SPT  03/15/2017

## 2017-03-15 NOTE — PROGRESS NOTES
Pulmonary Progress Note  3/15/2017     Last 24 hours:  --approx 200cc of CT output in last 12 hours    Subjective:  Cough, difficulty sleep, no fevers/chills;     Vitals:    03/15/17 0329 03/15/17 0500 03/15/17 0700 03/15/17 0833   BP: (!) 140/75   (!) 142/76   BP Location: Right arm   Left arm   Patient Position: Lying   Sitting   BP Method: Automatic   Automatic   Pulse: 108 107 (!) 118 80   Resp: 18   18   Temp: 98.8 °F (37.1 °C)   98.3 °F (36.8 °C)   TempSrc: Oral   Oral   SpO2: 96%   98%   Weight:       Height:         NAD, A&O  Supple  CT in place, no airleak, on suction  No wheezing  Irreg, irreg  S/nt/nd  Edema    Labs:   Reviewed  WBC 18  Plt 97    Cr .8    Imaging:  Reviewed    Assessment:  1. CAP  2. Empyema   3. HypoNA-seems to be hypervolemic   4  Afib  5. Allergic rhinitis      Plan:  1. Would switch to something with better anaerobic coverage: Zosyn would be a good option with transition to prolonged course of Augmentin once chest tube is out  2. CT to suction, CXR  3. Would give a dose of lasix today  4. PT/OT  5. Consider starting some pharmacologic DVT prophylaxis  6. Consider starting flonase/nasal saline    Narciso Rich MD  Pulmonary Fellow  [phone]

## 2017-03-15 NOTE — MEDICAL/APP STUDENT
Pulmonary Progress Note  03/15/2017     Subjective:  Feeling slightly better today, not feeling SOB.   Objective:  Less diaphoretic than yesterday  Chest tube still in situ, drained ~60mL since 5am (4 hours)  Drain Output  03/14 0701 - 03/15 0700  In: 960   Out: 1575      No supplemental oxygen being given    Vitals:    03/15/17 0833   BP: (!) 142/76   Pulse: 80   Resp: 18   Temp: 98.3 °F (36.8 °C)     WCC 18.5 this morning, up from 17 yesterday    Assessment:  Empyema not yet resolved  AF    Plan:  Switch abx to include anaerobic coverage  Culture sputum  Continue to monitor leukocytosis and temperature    Deion XAVIER IV

## 2017-03-15 NOTE — PLAN OF CARE
Problem: Fall Risk (Adult)  Goal: Absence of Falls  Patient will demonstrate the desired outcomes by discharge/transition of care.   Outcome: Ongoing (interventions implemented as appropriate)  Pt remained free of falls, injuries, or trauma during shift. Fall precautions maintained throughout shift. Bed kept in lowest position, locked. Call light within reach. Fall risk band on    Problem: Patient Care Overview  Goal: Discharge Needs Assessment  Outcome: Ongoing (interventions implemented as appropriate)  No acute events throughout the shift. VS and assessment performed per orders. Patient progressing towards goals as tolerated. Plan of care reviewed with pt, all concerns addressed. Will continue to monitor    Problem: Pressure Ulcer Risk (Vijay Scale) (Adult,Obstetrics,Pediatric)  Goal: Skin Integrity  Patient will demonstrate the desired outcomes by discharge/transition of care.   Outcome: Ongoing (interventions implemented as appropriate)  Pt showed no new s/s of skin breakdown during shift. Pt was repositioned Q2hr to prevent skin breakdown.

## 2017-03-16 LAB
ALBUMIN SERPL BCP-MCNC: 1.6 G/DL
ALP SERPL-CCNC: 91 U/L
ALT SERPL W/O P-5'-P-CCNC: 23 U/L
ANION GAP SERPL CALC-SCNC: 7 MMOL/L
ANISOCYTOSIS BLD QL SMEAR: ABNORMAL
AST SERPL-CCNC: 38 U/L
BASOPHILS # BLD AUTO: ABNORMAL K/UL
BASOPHILS NFR BLD: 0 %
BILIRUB SERPL-MCNC: 0.8 MG/DL
BUN SERPL-MCNC: 16 MG/DL
CALCIUM SERPL-MCNC: 7.6 MG/DL
CHLORIDE SERPL-SCNC: 98 MMOL/L
CO2 SERPL-SCNC: 25 MMOL/L
CREAT SERPL-MCNC: 0.8 MG/DL
DIFFERENTIAL METHOD: ABNORMAL
EOSINOPHIL # BLD AUTO: ABNORMAL K/UL
EOSINOPHIL NFR BLD: 0 %
ERYTHROCYTE [DISTWIDTH] IN BLOOD BY AUTOMATED COUNT: 18.2 %
EST. GFR  (AFRICAN AMERICAN): >60 ML/MIN/1.73 M^2
EST. GFR  (NON AFRICAN AMERICAN): >60 ML/MIN/1.73 M^2
GIANT PLATELETS BLD QL SMEAR: PRESENT
GLUCOSE SERPL-MCNC: 100 MG/DL
HCT VFR BLD AUTO: 24.9 %
HGB BLD-MCNC: 8 G/DL
HYPOCHROMIA BLD QL SMEAR: ABNORMAL
INR PPP: 1.1
LYMPHOCYTES # BLD AUTO: ABNORMAL K/UL
LYMPHOCYTES NFR BLD: 6 %
MAGNESIUM SERPL-MCNC: 1.5 MG/DL
MCH RBC QN AUTO: 29.2 PG
MCHC RBC AUTO-ENTMCNC: 32.1 %
MCV RBC AUTO: 91 FL
MONOCYTES # BLD AUTO: ABNORMAL K/UL
MONOCYTES NFR BLD: 1 %
NEUTROPHILS NFR BLD: 93 %
OVALOCYTES BLD QL SMEAR: ABNORMAL
PHOSPHATE SERPL-MCNC: 3.9 MG/DL
PLATELET # BLD AUTO: 123 K/UL
PLATELET BLD QL SMEAR: ABNORMAL
PMV BLD AUTO: ABNORMAL FL
POIKILOCYTOSIS BLD QL SMEAR: SLIGHT
POLYCHROMASIA BLD QL SMEAR: ABNORMAL
POTASSIUM SERPL-SCNC: 3.9 MMOL/L
PROT SERPL-MCNC: 6.1 G/DL
PROTHROMBIN TIME: 11.6 SEC
RBC # BLD AUTO: 2.74 M/UL
SODIUM SERPL-SCNC: 130 MMOL/L
WBC # BLD AUTO: 15.08 K/UL

## 2017-03-16 PROCEDURE — 83735 ASSAY OF MAGNESIUM: CPT

## 2017-03-16 PROCEDURE — 63600175 PHARM REV CODE 636 W HCPCS: Performed by: HOSPITALIST

## 2017-03-16 PROCEDURE — 25000003 PHARM REV CODE 250: Performed by: HOSPITALIST

## 2017-03-16 PROCEDURE — 97110 THERAPEUTIC EXERCISES: CPT

## 2017-03-16 PROCEDURE — 25000003 PHARM REV CODE 250: Performed by: INTERNAL MEDICINE

## 2017-03-16 PROCEDURE — 99233 SBSQ HOSP IP/OBS HIGH 50: CPT | Mod: ,,, | Performed by: INTERNAL MEDICINE

## 2017-03-16 PROCEDURE — 97535 SELF CARE MNGMENT TRAINING: CPT

## 2017-03-16 PROCEDURE — 25000003 PHARM REV CODE 250: Performed by: NURSE PRACTITIONER

## 2017-03-16 PROCEDURE — 11000001 HC ACUTE MED/SURG PRIVATE ROOM

## 2017-03-16 PROCEDURE — 85027 COMPLETE CBC AUTOMATED: CPT

## 2017-03-16 PROCEDURE — 25000003 PHARM REV CODE 250: Performed by: STUDENT IN AN ORGANIZED HEALTH CARE EDUCATION/TRAINING PROGRAM

## 2017-03-16 PROCEDURE — 85007 BL SMEAR W/DIFF WBC COUNT: CPT

## 2017-03-16 PROCEDURE — 80053 COMPREHEN METABOLIC PANEL: CPT

## 2017-03-16 PROCEDURE — 99232 SBSQ HOSP IP/OBS MODERATE 35: CPT | Mod: ,,, | Performed by: INTERNAL MEDICINE

## 2017-03-16 PROCEDURE — 84100 ASSAY OF PHOSPHORUS: CPT

## 2017-03-16 PROCEDURE — 85610 PROTHROMBIN TIME: CPT

## 2017-03-16 RX ORDER — FUROSEMIDE 10 MG/ML
20 INJECTION INTRAMUSCULAR; INTRAVENOUS ONCE
Status: COMPLETED | OUTPATIENT
Start: 2017-03-16 | End: 2017-03-16

## 2017-03-16 RX ORDER — BENZONATATE 100 MG/1
100 CAPSULE ORAL 3 TIMES DAILY PRN
Qty: 30 CAPSULE | Refills: 1 | Status: SHIPPED | OUTPATIENT
Start: 2017-03-16 | End: 2017-03-26

## 2017-03-16 RX ORDER — AMOXICILLIN AND CLAVULANATE POTASSIUM 875; 125 MG/1; MG/1
1 TABLET, FILM COATED ORAL 2 TIMES DAILY
Qty: 8 TABLET | Refills: 0 | Status: SHIPPED | OUTPATIENT
Start: 2017-03-16 | End: 2017-03-18

## 2017-03-16 RX ORDER — FLUTICASONE PROPIONATE 50 MCG
2 SPRAY, SUSPENSION (ML) NASAL DAILY
Status: DISCONTINUED | OUTPATIENT
Start: 2017-03-16 | End: 2017-03-18 | Stop reason: HOSPADM

## 2017-03-16 RX ADMIN — HYDROCODONE BITARTRATE AND ACETAMINOPHEN 1 TABLET: 5; 325 TABLET ORAL at 06:03

## 2017-03-16 RX ADMIN — STANDARDIZED SENNA CONCENTRATE AND DOCUSATE SODIUM 1 TABLET: 8.6; 5 TABLET, FILM COATED ORAL at 08:03

## 2017-03-16 RX ADMIN — OXYMETAZOLINE HYDROCHLORIDE 2 SPRAY: 5 SPRAY NASAL at 09:03

## 2017-03-16 RX ADMIN — PHYTONADIONE 5 MG: 5 TABLET ORAL at 09:03

## 2017-03-16 RX ADMIN — CITALOPRAM HYDROBROMIDE 40 MG: 40 TABLET ORAL at 09:03

## 2017-03-16 RX ADMIN — Medication 3 ML: at 10:03

## 2017-03-16 RX ADMIN — FLUTICASONE PROPIONATE 2 SPRAY: 50 SPRAY, METERED NASAL at 11:03

## 2017-03-16 RX ADMIN — HYDROCODONE BITARTRATE AND ACETAMINOPHEN 1 TABLET: 5; 325 TABLET ORAL at 09:03

## 2017-03-16 RX ADMIN — Medication 3 ML: at 02:03

## 2017-03-16 RX ADMIN — METOPROLOL TARTRATE 50 MG: 25 TABLET ORAL at 08:03

## 2017-03-16 RX ADMIN — OXYMETAZOLINE HYDROCHLORIDE 2 SPRAY: 5 SPRAY NASAL at 08:03

## 2017-03-16 RX ADMIN — Medication 3 ML: at 05:03

## 2017-03-16 RX ADMIN — DILTIAZEM HYDROCHLORIDE 360 MG: 180 CAPSULE, COATED, EXTENDED RELEASE ORAL at 09:03

## 2017-03-16 RX ADMIN — STANDARDIZED SENNA CONCENTRATE AND DOCUSATE SODIUM 1 TABLET: 8.6; 5 TABLET, FILM COATED ORAL at 09:03

## 2017-03-16 RX ADMIN — MORPHINE SULFATE 2 MG: 2 INJECTION, SOLUTION INTRAMUSCULAR; INTRAVENOUS at 08:03

## 2017-03-16 RX ADMIN — AMPICILLIN SODIUM AND SULBACTAM SODIUM 3 G: 2; 1 INJECTION, POWDER, FOR SOLUTION INTRAMUSCULAR; INTRAVENOUS at 08:03

## 2017-03-16 RX ADMIN — AMPICILLIN SODIUM AND SULBACTAM SODIUM 3 G: 2; 1 INJECTION, POWDER, FOR SOLUTION INTRAMUSCULAR; INTRAVENOUS at 03:03

## 2017-03-16 RX ADMIN — FUROSEMIDE 20 MG: 10 INJECTION, SOLUTION INTRAMUSCULAR; INTRAVENOUS at 06:03

## 2017-03-16 RX ADMIN — FAMOTIDINE 20 MG: 20 TABLET, FILM COATED ORAL at 09:03

## 2017-03-16 RX ADMIN — AMPICILLIN SODIUM AND SULBACTAM SODIUM 3 G: 2; 1 INJECTION, POWDER, FOR SOLUTION INTRAMUSCULAR; INTRAVENOUS at 02:03

## 2017-03-16 RX ADMIN — MORPHINE SULFATE 2 MG: 2 INJECTION, SOLUTION INTRAMUSCULAR; INTRAVENOUS at 03:03

## 2017-03-16 RX ADMIN — METOPROLOL TARTRATE 50 MG: 25 TABLET ORAL at 09:03

## 2017-03-16 NOTE — PLAN OF CARE
[facility]    HOME HEALTH ORDERS  FACE TO FACE ENCOUNTER    Patient Name: Wil Kwon Jr.  YOB: 1945    PCP: LAILA Serra MD   PCP Address: [address] / [place] [address]  PCP Phone Number: [phone]  PCP Fax: [phone]    Encounter Date: 03/16/2017    Admit to Home Health    Diagnoses:  Active Hospital Problems    Diagnosis  POA    *HCAP (healthcare-associated pneumonia) [J18.9]  Yes    Cough [R05]  Yes    Hypomagnesemia [E83.42]  No    Atrial fibrillation with RVR [I48.91]  No    Empyema of right pleural space [J86.9]  Yes    Insomnia [G47.00]  Yes    Septic shock [A41.9, R65.21]  Yes    Acute hypoxemic respiratory failure [J96.01]  Yes    BERNICE (acute kidney injury) [N17.9]  Yes    MDS (myelodysplastic syndrome) [D46.9]  Yes    Depression [F32.9]  Yes    Hyponatremia [E87.1]  Yes    Anemia, chronic disease [D63.8]  Yes    Thrombocytopenia [D69.6]  Yes    Essential hypertension [I10]  Yes      Resolved Hospital Problems    Diagnosis Date Resolved POA   No resolved problems to display.       No future appointments.        I have seen and examined this patient face to face today. My clinical findings that support the need for the home health skilled services and home bound status are the following:  Weakness/numbness causing balance and gait disturbance due to Infection, Weakness/Debility, Anemia and Malignancy/Cancer making it taxing to leave home.    Allergies:  Review of patient's allergies indicates:   Allergen Reactions    Lipitor [atorvastatin]      Muscle pain    Lisinopril Edema     Cheek swelling       Diet: cardiac diet and fluid restriction: 1500mL    Activities: activity as tolerated    Nursing:   SN to complete comprehensive assessment including routine vital signs. Instruct on disease process and s/s of complications to report to MD. Review/verify medication list sent home with the patient at time of discharge   and instruct patient/caregiver as needed. Frequency may be adjusted depending on start of care date.    Notify MD if SBP > 160 or < 90; DBP > 90 or < 50; HR > 120 or < 50; Temp > 101; Other:         CONSULTS:    Physical Therapy to evaluate and treat. Evaluate for home safety and equipment needs; Establish/upgrade home exercise program. Perform / instruct on therapeutic exercises, gait training, transfer training, and Range of Motion.  Occupational Therapy to evaluate and treat. Evaluate home environment for safety and equipment needs. Perform/Instruct on transfers, ADL training, ROM, and therapeutic exercises.    MISCELLANEOUS CARE:  N/A    WOUND CARE ORDERS  n/a      Medications: Review discharge medications with patient and family and provide education.      Current Discharge Medication List      START taking these medications    Details   amoxicillin-clavulanate 875-125mg (AUGMENTIN) 875-125 mg per tablet Take 1 tablet by mouth 2 (two) times daily.  Qty: 8 tablet, Refills: 0      benzonatate (TESSALON) 100 MG capsule Take 1 capsule (100 mg total) by mouth 3 (three) times daily as needed for Cough.  Qty: 30 capsule, Refills: 1         CONTINUE these medications which have NOT CHANGED    Details   acetaminophen (TYLENOL) 325 MG tablet Take 325 mg by mouth as needed for Pain.      citalopram (CELEXA) 40 MG tablet TAKE 1 TABLET BY MOUTH DAILY  Qty: 90 tablet, Refills: 0      diltiaZEM (CARDIZEM CD) 300 MG 24 hr capsule TAKE ONE CAPSULE BY MOUTH EVERY DAY  Qty: 90 capsule, Refills: 1      metoprolol succinate (TOPROL-XL) 25 MG 24 hr tablet Take 1 tablet (25 mg total) by mouth once daily.  Qty: 90 tablet, Refills: 3    Comments: **Patient requests 90 days supply**      multivitamin capsule Take 1 capsule by mouth every morning.       nystatin (MYCOSTATIN) ointment Apply topically 3 (three) times daily.  Qty: 1 Tube, Refills: 1    Associated Diagnoses: Tinea      omega-3 fatty acids-vitamin E (FISH OIL) 1,000 mg Cap  Take 3 capsules by mouth once daily.       ondansetron (ZOFRAN) 8 MG tablet Take 1 tablet (8 mg total) by mouth every 8 (eight) hours as needed for Nausea.  Qty: 30 tablet, Refills: 2    Associated Diagnoses: Nausea      pravastatin (PRAVACHOL) 20 MG tablet Take 1 tablet (20 mg total) by mouth every other day.  Qty: 45 tablet, Refills: 3    Associated Diagnoses: Dyslipidemia; H/O carotid endarterectomy; HTN (hypertension), benign; Aortic valve stenosis, mild      warfarin (COUMADIN) 3 MG tablet Take 1 tablet (3 mg total) by mouth Daily.  Qty: 30 tablet, Refills: 11    Associated Diagnoses: MDS (myelodysplastic syndrome); Atrial fibrillation with RVR; Bilateral carotid artery stenosis; HTN (hypertension), benign; Pulmonary hypertension; Other depression; Dyslipidemia         STOP taking these medications       losartan (COZAAR) 50 MG tablet Comments:   Reason for Stopping:               I certify that this patient is confined to his home and needs physical therapy and occupational therapy.

## 2017-03-16 NOTE — PT/OT/SLP PROGRESS
"Occupational Therapy  Treatment    Wil Kwon Jr.   MRN: [id number]   Admitting Diagnosis: HCAP (healthcare-associated pneumonia)    OT Date of Treatment: 17   OT Start Time: 930  OT Stop Time: 955  OT Total Time (min): 25 min    Billable Minutes:  Self Care/Home Management 10 minutes and Therapeutic Exercise 15 minutes    General Precautions: Standard, fall    Subjective:  Communicated with RN prior to session.  "I can't go anywhere with all this stuff on me."    Pain Ratin/10  Pain Rating Post-Intervention: 0/10    Objective:  Patient found with: chest tube, telemetry, peripheral IV, pt found UIC and agreeable to therapy.     Functional Mobility:  Bed Mobility:   Found UIC, left UIC    Transfers:  Sit <> Stand Assistance: Stand By Assistance  Sit <> Stand Assistive Device: No Assistive Device    Functional Ambulation: CGA with no AD to march in place, SBA to march in place with RW.    Activities of Daily Living:    Grooming Position: Standing  Grooming Level of Assistance: Stand by assistance    Balance:   Static Sit: NORMAL: No deviations seen in posture held statically  Dynamic Sit: GOOD: Maintains balance through MODERATE excursions of active trunk movement  Static Stand: GOOD: Takes MODERATE challenges from all directions  Dynamic stand: FAIR+: Needs CLOSE SUPERVISION during gait and is able to right self with minor LOB    Therapeutic Activities and Exercises:  Pt ed re OT role and POC. Pt performed grooming task of brushing teeth with setup and S. Pt performed first half of the activity seated, then second half of activity in stance with tray table, and SBA. Pt able to t/f to standing position with SBA and no AD. Pt combed his hair in stance with Setup SBA. Pt sat in chair. Pt performed therex of: leg extensions, 3x10, sit to stand t/f with SBA and no AD, then mini squats with RW, 3x10 and SBA, and 3x10 standing calf raises with RW and SBA. Pt sat in chair. Pt stood with no AD and remained " standing with no AD, requiring CGA to prevent posterior lean. Pt sat in chair and performed (I) gross grasp exercises.    AM-PAC 6 CLICK ADL   How much help from another person does this patient currently need?   1 = Unable, Total/Dependent Assistance  2 = A lot, Maximum/Moderate Assistance  3 = A little, Minimum/Contact Guard/Supervision  4 = None, Modified [place]/Independent    Putting on and taking off regular lower body clothing? : 2  Bathing (including washing, rinsing, drying)?: 2  Toileting, which includes using toilet, bedpan, or urinal? : 2  Putting on and taking off regular upper body clothing?: 3  Taking care of personal grooming such as brushing teeth?: 4  Eating meals?: 4  Total Score: 17     AM-PAC Raw Score CMS G-Code Modifier Level of Impairment Assistance   6 % Total / Unable   7 - 9 CM 80 - 100% Maximal Assist   10 - 14 CL 60 - 80% Moderate Assist   15 - 19 CK 40 - 60% Moderate Assist   20 - 22 CJ 20 - 40% Minimal Assist   23 CI 1-20% SBA / CGA   24 CH 0% Independent/ Mod I     Patient left up in chair with all lines intact, call button in reach and family present    ASSESSMENT:  Wil Kwon Jr. is a 72 y.o. male with a medical diagnosis of HCAP (healthcare-associated pneumonia) and presents with the impairments listed below. Pt is motivated to regain (I)ce. Pt requiring CGA and RW for gait. Pt able to perform grooming while seated with Setup A, and with SBA in stance with UE support on table or RW. Pt refused LBD. Pt tolerates therex well. Pt would benefit from cont OT to improve participation, endurance, and balance for self care tasks.    Rehab identified problem list/impairments: Rehab identified problem list/impairments: weakness, impaired endurance, impaired self care skills, impaired functional mobilty, gait instability, impaired balance    Rehab potential is good.    Activity tolerance: Fair    Discharge recommendations: Discharge Facility/Level Of Care Needs: home with  home health     Barriers to discharge: Barriers to Discharge: Inaccessible home environment, Decreased caregiver support    Equipment recommendations:  (TBD)     GOALS:   Occupational Therapy Goals        Problem: Occupational Therapy Goal    Goal Priority Disciplines Outcome Interventions   Occupational Therapy Goal     OT, PT/OT Ongoing (interventions implemented as appropriate)    Description:  Goals to be met by:  7 days 3/16/17     Patient will increase functional independence with ADLs by performing:    UE Dressing with Supervision.  LE Dressing with Supervision.  Grooming while standing with Supervision.  Toileting from toilet with Supervision for hygiene and clothing management.   Stand pivot transfers with Supervision.  Toilet transfer to toilet with Supervision.                Plan:  Patient to be seen 5 x/week to address the above listed problems via self-care/home management, therapeutic activities, therapeutic exercises  Plan of Care expires:    Plan of Care reviewed with: patient, family    SUSIE Cobb  3/16/2017  Pager: [phone]

## 2017-03-16 NOTE — PROGRESS NOTES
[facility]  Infectious Disease  Progress Note    Patient Name: Wil Kwon Jr.  MRN: [id number]  Admission Date: 3/6/2017  Length of Stay: 10 days  Attending Physician: Roddy Driscoll MD  Primary Care Provider: LAILA Serra MD    Isolation Status: No active isolations  Assessment/Plan:      Empyema of right pleural space  73yo man w/a history of MDS (s/p vidaza x5, last 3/3/2017), pAF (on anticoagulation), and HTN who was admitted on 3/6/2017 with 1wk of fevers (>101F), progressive SOB, R pleuritic CP, and cough productive of brown/green sputum due to a right likely polymicrobial multifocal pneumonia with an associated empyema (s/p CT insertion on 3/7 with over 5L of sanguinopurulent drainage so far; S.intermedius in fluid cx and Klebsiella in sputum cx). He is slowly improving on IV unasyn.    - would continue unasyn for polymicrobial infection (aspiration likely initial source)  - will continue to monitor drainage output via chest tube -- does not appear ready for transition to orals      Anticipated Disposition: pending improvement    Thank you for your consult. I will follow-up with patient. Please contact us if you have any additional questions.     Jessy Briseno MD  Transplant ID Attending  [phone]    Jessy Briseno MD  Infectious Disease  [facility]    Subjective:     Principal Problem:HCAP (healthcare-associated pneumonia)    HPI:   Interval History: Doing well today, slowly feels better each day. Afebrile. WBC downtrending. Cannister of CT just emptied.    Review of Systems   Constitutional: Positive for activity change. Negative for appetite change, chills, diaphoresis and fever.   HENT: Negative for ear pain, mouth sores, sinus pressure and sore throat.    Eyes: Negative for photophobia, pain and redness.   Respiratory: Positive for cough and shortness of breath. Negative for wheezing.    Cardiovascular: Negative for chest pain and leg swelling.    Gastrointestinal: Negative for abdominal distention, abdominal pain, diarrhea and nausea.   Genitourinary: Negative for dysuria, flank pain, frequency and urgency.   Musculoskeletal: Negative for arthralgias, back pain, gait problem and myalgias.   Skin: Negative for pallor and rash.   Neurological: Negative for dizziness, tremors, seizures and headaches.   Psychiatric/Behavioral: Negative for confusion.     Objective:     Vital Signs (Most Recent):  Temp: 98.1 °F (36.7 °C) (03/16/17 0800)  Pulse: 91 (03/16/17 1500)  Resp: 16 (03/16/17 0800)  BP: (!) 161/91 (03/16/17 0800)  SpO2: 100 % (03/16/17 0800) Vital Signs (24h Range):  Temp:  [98 °F (36.7 °C)-98.3 °F (36.8 °C)] 98.1 °F (36.7 °C)  Pulse:  [] 91  Resp:  [16-18] 16  SpO2:  [97 %-100 %] 100 %  BP: (119-174)/(55-91) 161/91     Weight: 86.2 kg (190 lb)  Body mass index is 26.5 kg/(m^2).    Estimated Creatinine Clearance: 88.9 mL/min (based on Cr of 0.8).    Physical Exam   Constitutional: He is oriented to person, place, and time. He appears well-developed. No distress.   Chronically ill appearing.   HENT:   Head: Atraumatic.   Mouth/Throat: Oropharynx is clear and moist. No oropharyngeal exudate.   Eyes: Conjunctivae and EOM are normal. Pupils are equal, round, and reactive to light. No scleral icterus.   Neck: Neck supple.   Cardiovascular: Normal rate and regular rhythm.  Exam reveals no friction rub.    Murmur heard.  Pulmonary/Chest: Effort normal. No respiratory distress. He has no wheezes. He has rales. He exhibits no tenderness.   Crackles in right lung diffusely, diminished in base. CT with sanguinopurulent drainage.   Abdominal: Soft. Bowel sounds are normal. He exhibits no distension. There is no tenderness. There is no rebound and no guarding.   Musculoskeletal: Normal range of motion. He exhibits no edema.   Lymphadenopathy:     He has no cervical adenopathy.   Neurological: He is alert and oriented to person, place, and time. No cranial nerve  deficit. He exhibits normal muscle tone. Coordination normal.   Skin: No rash noted. No erythema.       Significant Labs:   CBC:   Recent Labs  Lab 03/15/17  0427 03/16/17  0314   WBC 18.52* 15.08*   HGB 7.8* 8.0*   HCT 24.9* 24.9*   PLT 97* 123*     CMP:   Recent Labs  Lab 03/15/17  0427 03/16/17  0314   * 130*   K 4.3 3.9   CL 98 98   CO2 22* 25   GLU 99 100   BUN 17 16   CREATININE 0.8 0.8   CALCIUM 7.6* 7.6*   PROT 6.0 6.1   ALBUMIN 1.5* 1.6*   BILITOT 0.8 0.8   ALKPHOS 88 91   AST 34 38   ALT 18 23   ANIONGAP 8 7*   EGFRNONAA >60.0 >60.0       Significant Imaging: I have reviewed all pertinent imaging results/findings within the past 24 hours.     3/12 CT chest:  1.  Interval placement of a right-sided pigtail pleural drainage catheter which appears appropriately positioned. There is a persistent loculated right-sided pleural effusion which appears mildly increased in overall volume compared to the prior examination. Correlation with drain output is advised.  2. Persistent patchy consolidation throughout the right middle and lower lobes, mildly improved from prior examination, noting more dense areas of consolidation with associated lack of air bronchograms in the dependent aspect of the right lower lobe.  3. Atherosclerosis.     3/13 CT chest limited (IR):  Significant interval decrease in loculated pleural effusion, with insufficient amount of fluid for drainage.     Microbiology:  3/6 blood cx: negative x2  3/6 urine cx; MSSE (contaminant)  3/6 sputum cx: Kleb oxytoca (R amp, S rest)  3/7 blood cx: negative x2  3/7 pleural fluid cx: S.intermedius (GPC on stain)  3/15 sputum cx: rare WBC/GPC on stain, cx pending

## 2017-03-16 NOTE — PROGRESS NOTES
Progress Note  BMT                                                              Team: [facility] HEMATOLOGY BMT    Patient Name: Wil Kwon Jr.  YOB: 1945    Admit Date: 3/6/2017                   LOS: 10    SUBJECTIVE:     Reason for Admission: HCAP (healthcare-associated pneumonia)    Subjective and Interval History:   No acute events over night.  Remains afebrile.  Tolerating Unasyn well.    Review of Systems:  General:  Denies weight loss, fever, chills, weakness,(+) fatigue  HEENT:  Denies vision changes, sore throat, congestion  CVS:  Denies chest pain, pressure, palpitations  Resp: (+) shortness of breath, cough, sputum  GI:  Denies diarrhea, constipation, abdominal pain, nausea, vomiting  :  Denies dysuria, hematuria, nocturia  MSK:  Denies myalgias, arthralgias  Skin:  Denies rashes, bleeding  Neuro:  Denies headache, dizziness, syncope, numbness, paresthesias  All other systems otherwise negative    Scheduled Medications   ampicillin-sulbactim (UNASYN) IVPB  3 g Intravenous Q6H    citalopram  40 mg Oral Daily    diltiaZEM  360 mg Oral Daily    famotidine  20 mg Oral Daily    metoprolol tartrate  50 mg Oral BID    oxymetazoline  2 spray Each Nare BID    phytonadione  5 mg Oral Daily    senna-docusate 8.6-50 mg  1 tablet Oral BID    sodium chloride 0.9%  3 mL Intravenous Q8H       Continuous Infusions       PRN Medications  acetaminophen, albuterol-ipratropium 2.5mg-0.5mg/3mL, benzonatate, guaifenesin 100 mg/5 ml, hydrocodone-acetaminophen 5-325mg, morphine, trazodone    OBJECTIVE:     Temp:  [97.6 °F (36.4 °C)-98.3 °F (36.8 °C)]   Pulse:  []   Resp:  [18]   BP: (119-174)/(55-77)   SpO2:  [97 %-100 %]   Body mass index is 26.5 kg/(m^2).    Intake/Output Summary    Intake/Output Summary (Last 24 hours) at 03/16/17 0703  Last data filed at 03/16/17 0510   Gross per 24 hour   Intake                0 ml   Output             1075 ml   Net            -1075 ml       Physical  Exam:  General:  Well developed, well nourished, no acute distress  HEENT:  Normocephalic, atraumatic, EOMI, clear sclera, throat clear without erythema or exudates  CVS:  RRR, S1 and S2 normal, no murmurs, rubs, gallops  Resp:  Lungs clear to auscultation, no wheezes, rales, rhonchi, cough.  Right chest tube  GI:  Abdomen soft, non-tender, non-distended, normoactive bowel sounds, no organomegaly  :  Deferred  MSK:  No muscle atrophy, cyanosis, peripheral edema  Skin:  No rashes, ulcers, erythema  Neuro:  CNII-XII grossly intact  Psych:  Alert and oriented to person, place, and time    Diagnostic Result    Lab Results   Component Value Date    WBC 15.08 (H) 03/16/2017    HGB 8.0 (L) 03/16/2017    HCT 24.9 (L) 03/16/2017     (L) 03/16/2017         Recent Labs  Lab 03/16/17  0314   *   K 3.9   CL 98   CO2 25   BUN 16   CREATININE 0.8   MG 1.5*   PHOS 3.9       Lab Results   Component Value Date    INR 1.1 03/16/2017    INR 1.2 03/15/2017    INR 1.8 (H) 03/14/2017       No results for input(s): POCTGLUCOSE in the last 72 hours.    ASSESSMENT/PLAN:   Mr. Wil Kwon Jr. is a 72 y.o. male     Current Problems List:  Active Hospital Problems    Diagnosis  POA    *HCAP (healthcare-associated pneumonia) [J18.9]  Yes    Cough [R05]  Yes    Hypomagnesemia [E83.42]  No    Atrial fibrillation with RVR [I48.91]  No    Empyema of right pleural space [J86.9]  Yes    Insomnia [G47.00]  Yes    Septic shock [A41.9, R65.21]  Yes    Acute hypoxemic respiratory failure [J96.01]  Yes    BERNICE (acute kidney injury) [N17.9]  Yes    MDS (myelodysplastic syndrome) [D46.9]  Yes    Depression [F32.9]  Yes    Hyponatremia [E87.1]  Yes    Anemia, chronic disease [D63.8]  Yes    Thrombocytopenia [D69.6]  Yes    Essential hypertension [I10]  Yes      Resolved Hospital Problems    Diagnosis Date Resolved POA   No resolved problems to display.       Active Problems, Status & Treatment Plan Addressed Today:    HCAP  (healthcare-associated pneumonia)  Acute hypoxemic respiratory failure   Empyema of right pleural space  - Likely 2/2 to suspected HCAP given CXR concerning for right lower lob PNA with respiratory sputum cx +Klebsiella and pleural fluid cx +strep viridans , both sensitive to ceftriaxone . S/p Pigtail placement by IR 3/7 and s/p tPA for right loculated empyema. Repeat CT chest 3/13 with dramatic improvement.   - Chest tube remains.  IR states significant improvement per CT on 3/13 - declined a second chest tube  - Continue Unasyn day 7.  ID consulted, appreciate assistance  - Repeat sputum cx 3/15 pending  - Tessalon Perles and Robitussin prn      Essential hypertension  Paroxysmal atrial fibrillation with Rapid Ventricular Rate  - Cardiology recommended to increase lopressor.  On metoprolol and diltiazem (titrate up as HR/BP tolerates)  - Warfarin held given tPA administration and recent procedures      Anemia, chronic disease  MDS (myelodysplastic syndrome)  Anemia and thrombocytopenia due to chemotherapy  - Hemoglobin 8  - Platelets 123  - INR elevated Vitamin K x 3 days   - Transfuse for Hb below 7     Depression  - continue home dose of citalopram, will consider stopping given hyponatremia     Hyponatremia  - Improving  - Likely 2/2 SSRI  - Fluid restict    Diet:  Cardiac  DVT PPx:  Mechanical  Code Status:  Full      Dispo:  Pending improvement in respiratory symptoms.  PT/OT recommending SNF        Signing Physician:  Josie Grover MD  Med PGY3  [phone]

## 2017-03-16 NOTE — ASSESSMENT & PLAN NOTE
71yo man w/a history of MDS (s/p vidaza x5, last 3/3/2017), pAF (on anticoagulation), and HTN who was admitted on 3/6/2017 with 1wk of fevers (>101F), progressive SOB, R pleuritic CP, and cough productive of brown/green sputum due to a right likely polymicrobial multifocal pneumonia with an associated empyema (s/p CT insertion on 3/7 with over 5L of sanguinopurulent drainage so far; S.intermedius in fluid cx and Klebsiella in sputum cx). He is slowly improving on IV unasyn.    - would continue unasyn for polymicrobial infection (aspiration likely initial source)  - will continue to monitor drainage output via chest tube -- does not appear ready for transition to orals

## 2017-03-16 NOTE — SUBJECTIVE & OBJECTIVE
Interval History: Doing well today, slowly feels better each day. Afebrile. WBC downtrending. Cannister of CT just emptied.    Review of Systems   Constitutional: Positive for activity change. Negative for appetite change, chills, diaphoresis and fever.   HENT: Negative for ear pain, mouth sores, sinus pressure and sore throat.    Eyes: Negative for photophobia, pain and redness.   Respiratory: Positive for cough and shortness of breath. Negative for wheezing.    Cardiovascular: Negative for chest pain and leg swelling.   Gastrointestinal: Negative for abdominal distention, abdominal pain, diarrhea and nausea.   Genitourinary: Negative for dysuria, flank pain, frequency and urgency.   Musculoskeletal: Negative for arthralgias, back pain, gait problem and myalgias.   Skin: Negative for pallor and rash.   Neurological: Negative for dizziness, tremors, seizures and headaches.   Psychiatric/Behavioral: Negative for confusion.     Objective:     Vital Signs (Most Recent):  Temp: 98.1 °F (36.7 °C) (03/16/17 0800)  Pulse: 91 (03/16/17 1500)  Resp: 16 (03/16/17 0800)  BP: (!) 161/91 (03/16/17 0800)  SpO2: 100 % (03/16/17 0800) Vital Signs (24h Range):  Temp:  [98 °F (36.7 °C)-98.3 °F (36.8 °C)] 98.1 °F (36.7 °C)  Pulse:  [] 91  Resp:  [16-18] 16  SpO2:  [97 %-100 %] 100 %  BP: (119-174)/(55-91) 161/91     Weight: 86.2 kg (190 lb)  Body mass index is 26.5 kg/(m^2).    Estimated Creatinine Clearance: 88.9 mL/min (based on Cr of 0.8).    Physical Exam   Constitutional: He is oriented to person, place, and time. He appears well-developed. No distress.   Chronically ill appearing.   HENT:   Head: Atraumatic.   Mouth/Throat: Oropharynx is clear and moist. No oropharyngeal exudate.   Eyes: Conjunctivae and EOM are normal. Pupils are equal, round, and reactive to light. No scleral icterus.   Neck: Neck supple.   Cardiovascular: Normal rate and regular rhythm.  Exam reveals no friction rub.    Murmur heard.  Pulmonary/Chest:  Effort normal. No respiratory distress. He has no wheezes. He has rales. He exhibits no tenderness.   Crackles in right lung diffusely, diminished in base. CT with sanguinopurulent drainage.   Abdominal: Soft. Bowel sounds are normal. He exhibits no distension. There is no tenderness. There is no rebound and no guarding.   Musculoskeletal: Normal range of motion. He exhibits no edema.   Lymphadenopathy:     He has no cervical adenopathy.   Neurological: He is alert and oriented to person, place, and time. No cranial nerve deficit. He exhibits normal muscle tone. Coordination normal.   Skin: No rash noted. No erythema.       Significant Labs:   CBC:   Recent Labs  Lab 03/15/17  0427 03/16/17  0314   WBC 18.52* 15.08*   HGB 7.8* 8.0*   HCT 24.9* 24.9*   PLT 97* 123*     CMP:   Recent Labs  Lab 03/15/17  0427 03/16/17  0314   * 130*   K 4.3 3.9   CL 98 98   CO2 22* 25   GLU 99 100   BUN 17 16   CREATININE 0.8 0.8   CALCIUM 7.6* 7.6*   PROT 6.0 6.1   ALBUMIN 1.5* 1.6*   BILITOT 0.8 0.8   ALKPHOS 88 91   AST 34 38   ALT 18 23   ANIONGAP 8 7*   EGFRNONAA >60.0 >60.0       Significant Imaging: I have reviewed all pertinent imaging results/findings within the past 24 hours.     3/12 CT chest:  1.  Interval placement of a right-sided pigtail pleural drainage catheter which appears appropriately positioned. There is a persistent loculated right-sided pleural effusion which appears mildly increased in overall volume compared to the prior examination. Correlation with drain output is advised.  2. Persistent patchy consolidation throughout the right middle and lower lobes, mildly improved from prior examination, noting more dense areas of consolidation with associated lack of air bronchograms in the dependent aspect of the right lower lobe.  3. Atherosclerosis.     3/13 CT chest limited (IR):  Significant interval decrease in loculated pleural effusion, with insufficient amount of fluid for drainage.     Microbiology:  3/6  blood cx: negative x2  3/6 urine cx; MSSE (contaminant)  3/6 sputum cx: Kleb oxytoca (R amp, S rest)  3/7 blood cx: negative x2  3/7 pleural fluid cx: S.intermedius (GPC on stain)  3/15 sputum cx: rare WBC/GPC on stain, cx pending

## 2017-03-16 NOTE — PROGRESS NOTES
Pulmonary Progress Note  3/16/2017     Last 24 hours:  --approx 100cc of CT output in last 12 hours    Subjective:  Slept better, cough improving; +OBB    Vitals:    03/16/17 0330 03/16/17 0500 03/16/17 0658 03/16/17 0858   BP: (!) 120/55      BP Location: Left arm      Patient Position: Lying      BP Method: Automatic      Pulse: 107 105 109 (!) 149   Resp: 18      Temp: 98.3 °F (36.8 °C)      TempSrc: Oral      SpO2: 97%      Weight:       Height:         NAD, A&O  Supple  CT in place, no airleak, on suction  No wheezing  Irreg, irreg  S/nt/nd  Edema better    Labs:   Reviewed  WBC 15      Imaging:  Reviewed  CXR-better    Assessment:  1. CAP  2. Empyema   3. HypoNA-seems to be hypervolemic-improving  4  Afib     Plan:  1. C/w Unasyn  2. CT to suction  3. Would give another dose of lasix today  4. PT/OT  5. Consider starting some pharmacologic DVT prophylaxis      Narciso Rich MD  Pulmonary Fellow  [phone]

## 2017-03-16 NOTE — PLAN OF CARE
Problem: Occupational Therapy Goal  Goal: Occupational Therapy Goal  Goals to be met by: 7 days 3/16/17     Patient will increase functional independence with ADLs by performing:    UE Dressing with Supervision.  LE Dressing with Supervision.  Grooming while standing with Supervision.  Toileting from toilet with Supervision for hygiene and clothing management.   Stand pivot transfers with Supervision.  Toilet transfer to toilet with Supervision.   Outcome: Ongoing (interventions implemented as appropriate)  Goals remain appropriate. Cont POC.     SUSIE Cobb  3/16/2017  Pager: [phone]

## 2017-03-17 LAB
ALBUMIN SERPL BCP-MCNC: 1.5 G/DL
ALP SERPL-CCNC: 89 U/L
ALT SERPL W/O P-5'-P-CCNC: 26 U/L
ANION GAP SERPL CALC-SCNC: 8 MMOL/L
ANISOCYTOSIS BLD QL SMEAR: ABNORMAL
AST SERPL-CCNC: 41 U/L
BASO STIPL BLD QL SMEAR: ABNORMAL
BASOPHILS # BLD AUTO: ABNORMAL K/UL
BASOPHILS NFR BLD: 0 %
BILIRUB SERPL-MCNC: 0.7 MG/DL
BUN SERPL-MCNC: 15 MG/DL
CALCIUM SERPL-MCNC: 7.5 MG/DL
CHLORIDE SERPL-SCNC: 99 MMOL/L
CO2 SERPL-SCNC: 26 MMOL/L
CREAT SERPL-MCNC: 0.8 MG/DL
DIFFERENTIAL METHOD: ABNORMAL
EOSINOPHIL # BLD AUTO: ABNORMAL K/UL
EOSINOPHIL NFR BLD: 0 %
ERYTHROCYTE [DISTWIDTH] IN BLOOD BY AUTOMATED COUNT: 18.3 %
EST. GFR  (AFRICAN AMERICAN): >60 ML/MIN/1.73 M^2
EST. GFR  (NON AFRICAN AMERICAN): >60 ML/MIN/1.73 M^2
GIANT PLATELETS BLD QL SMEAR: PRESENT
GLUCOSE SERPL-MCNC: 101 MG/DL
HCT VFR BLD AUTO: 23 %
HGB BLD-MCNC: 7.4 G/DL
HYPOCHROMIA BLD QL SMEAR: ABNORMAL
INR PPP: 1.1
LYMPHOCYTES # BLD AUTO: ABNORMAL K/UL
LYMPHOCYTES NFR BLD: 18 %
MAGNESIUM SERPL-MCNC: 1.4 MG/DL
MCH RBC QN AUTO: 28.5 PG
MCHC RBC AUTO-ENTMCNC: 32.2 %
MCV RBC AUTO: 89 FL
MONOCYTES # BLD AUTO: ABNORMAL K/UL
MONOCYTES NFR BLD: 2 %
NEUTROPHILS NFR BLD: 76 %
NEUTS BAND NFR BLD MANUAL: 4 %
OVALOCYTES BLD QL SMEAR: ABNORMAL
PHOSPHATE SERPL-MCNC: 4 MG/DL
PLATELET # BLD AUTO: 167 K/UL
PLATELET BLD QL SMEAR: ABNORMAL
PMV BLD AUTO: 13 FL
POIKILOCYTOSIS BLD QL SMEAR: SLIGHT
POLYCHROMASIA BLD QL SMEAR: ABNORMAL
POTASSIUM SERPL-SCNC: 3.6 MMOL/L
PROT SERPL-MCNC: 6.2 G/DL
PROTHROMBIN TIME: 11.8 SEC
RBC # BLD AUTO: 2.6 M/UL
SODIUM SERPL-SCNC: 133 MMOL/L
TARGETS BLD QL SMEAR: ABNORMAL
WBC # BLD AUTO: 8.79 K/UL

## 2017-03-17 PROCEDURE — 63600175 PHARM REV CODE 636 W HCPCS: Performed by: HOSPITALIST

## 2017-03-17 PROCEDURE — 83735 ASSAY OF MAGNESIUM: CPT

## 2017-03-17 PROCEDURE — 25000003 PHARM REV CODE 250: Performed by: HOSPITALIST

## 2017-03-17 PROCEDURE — 85007 BL SMEAR W/DIFF WBC COUNT: CPT

## 2017-03-17 PROCEDURE — 85027 COMPLETE CBC AUTOMATED: CPT

## 2017-03-17 PROCEDURE — 97110 THERAPEUTIC EXERCISES: CPT

## 2017-03-17 PROCEDURE — 99232 SBSQ HOSP IP/OBS MODERATE 35: CPT | Mod: ,,, | Performed by: INTERNAL MEDICINE

## 2017-03-17 PROCEDURE — 85610 PROTHROMBIN TIME: CPT

## 2017-03-17 PROCEDURE — 11000001 HC ACUTE MED/SURG PRIVATE ROOM

## 2017-03-17 PROCEDURE — 99233 SBSQ HOSP IP/OBS HIGH 50: CPT | Mod: ,,, | Performed by: INTERNAL MEDICINE

## 2017-03-17 PROCEDURE — 80053 COMPREHEN METABOLIC PANEL: CPT

## 2017-03-17 PROCEDURE — 25000003 PHARM REV CODE 250: Performed by: INTERNAL MEDICINE

## 2017-03-17 PROCEDURE — 84100 ASSAY OF PHOSPHORUS: CPT

## 2017-03-17 PROCEDURE — 25000003 PHARM REV CODE 250: Performed by: STUDENT IN AN ORGANIZED HEALTH CARE EDUCATION/TRAINING PROGRAM

## 2017-03-17 RX ORDER — MAGNESIUM SULFATE HEPTAHYDRATE 40 MG/ML
2 INJECTION, SOLUTION INTRAVENOUS ONCE
Status: COMPLETED | OUTPATIENT
Start: 2017-03-17 | End: 2017-03-17

## 2017-03-17 RX ORDER — FUROSEMIDE 10 MG/ML
20 INJECTION INTRAMUSCULAR; INTRAVENOUS ONCE
Status: COMPLETED | OUTPATIENT
Start: 2017-03-17 | End: 2017-03-17

## 2017-03-17 RX ADMIN — Medication 3 ML: at 02:03

## 2017-03-17 RX ADMIN — FLUTICASONE PROPIONATE 2 SPRAY: 50 SPRAY, METERED NASAL at 08:03

## 2017-03-17 RX ADMIN — MAGNESIUM SULFATE IN WATER 2 G: 40 INJECTION, SOLUTION INTRAVENOUS at 11:03

## 2017-03-17 RX ADMIN — HYDROCODONE BITARTRATE AND ACETAMINOPHEN 1 TABLET: 5; 325 TABLET ORAL at 11:03

## 2017-03-17 RX ADMIN — HYDROCODONE BITARTRATE AND ACETAMINOPHEN 1 TABLET: 5; 325 TABLET ORAL at 12:03

## 2017-03-17 RX ADMIN — METOPROLOL TARTRATE 50 MG: 25 TABLET ORAL at 09:03

## 2017-03-17 RX ADMIN — METOPROLOL TARTRATE 50 MG: 25 TABLET ORAL at 08:03

## 2017-03-17 RX ADMIN — OXYMETAZOLINE HYDROCHLORIDE 2 SPRAY: 5 SPRAY NASAL at 09:03

## 2017-03-17 RX ADMIN — CITALOPRAM HYDROBROMIDE 40 MG: 40 TABLET ORAL at 08:03

## 2017-03-17 RX ADMIN — FAMOTIDINE 20 MG: 20 TABLET, FILM COATED ORAL at 08:03

## 2017-03-17 RX ADMIN — AMPICILLIN SODIUM AND SULBACTAM SODIUM 3 G: 2; 1 INJECTION, POWDER, FOR SOLUTION INTRAMUSCULAR; INTRAVENOUS at 02:03

## 2017-03-17 RX ADMIN — AMPICILLIN SODIUM AND SULBACTAM SODIUM 3 G: 2; 1 INJECTION, POWDER, FOR SOLUTION INTRAMUSCULAR; INTRAVENOUS at 09:03

## 2017-03-17 RX ADMIN — STANDARDIZED SENNA CONCENTRATE AND DOCUSATE SODIUM 1 TABLET: 8.6; 5 TABLET, FILM COATED ORAL at 08:03

## 2017-03-17 RX ADMIN — HYDROCODONE BITARTRATE AND ACETAMINOPHEN 1 TABLET: 5; 325 TABLET ORAL at 09:03

## 2017-03-17 RX ADMIN — Medication 3 ML: at 05:03

## 2017-03-17 RX ADMIN — FUROSEMIDE 20 MG: 10 INJECTION, SOLUTION INTRAMUSCULAR; INTRAVENOUS at 06:03

## 2017-03-17 RX ADMIN — AMPICILLIN SODIUM AND SULBACTAM SODIUM 3 G: 2; 1 INJECTION, POWDER, FOR SOLUTION INTRAMUSCULAR; INTRAVENOUS at 08:03

## 2017-03-17 RX ADMIN — OXYMETAZOLINE HYDROCHLORIDE 2 SPRAY: 5 SPRAY NASAL at 08:03

## 2017-03-17 RX ADMIN — Medication 3 ML: at 09:03

## 2017-03-17 RX ADMIN — MORPHINE SULFATE 2 MG: 2 INJECTION, SOLUTION INTRAMUSCULAR; INTRAVENOUS at 04:03

## 2017-03-17 RX ADMIN — AMPICILLIN SODIUM AND SULBACTAM SODIUM 3 G: 2; 1 INJECTION, POWDER, FOR SOLUTION INTRAMUSCULAR; INTRAVENOUS at 03:03

## 2017-03-17 RX ADMIN — STANDARDIZED SENNA CONCENTRATE AND DOCUSATE SODIUM 1 TABLET: 8.6; 5 TABLET, FILM COATED ORAL at 09:03

## 2017-03-17 RX ADMIN — DILTIAZEM HYDROCHLORIDE 360 MG: 180 CAPSULE, COATED, EXTENDED RELEASE ORAL at 08:03

## 2017-03-17 NOTE — MEDICAL/APP STUDENT
Pulmonary Progress Note  03/17/2017    Subjective  Feeling much improved overnight  Breathing easily, feels cough is less bothersome   Objective  Chest tube continuing to drain, rate slowing  Fluid less dark/bloody but still pink  Drain Output  03/16 0701 - 03/17 0700  In: 420   Out: 1610     Vitals:    03/17/17 0800   BP:    Pulse: (!) 115   Resp:    Temp:      WCC reduced signifcantly to 8.79  Hyponatremia resolving slowly, up to 133 today with fluid restriction 1500mL    Assessment  HCAP with empyema of right pleural space  - Improving  - Afebrile, WCC markedly reduced, breathing well  - Chest tube in situ, still draining  - Abx continuing  - Sputum culture pending  Other ongoing problems include   HTN  AF  Anaemia  MDS  Depression  Hyponatraemia    Plan  Anticipate chest tube removal   Continue fluid restriction, repeat electrolytes  Continue abx, repeat blood count  Monitor vitals  Notify concerns

## 2017-03-17 NOTE — PT/OT/SLP PROGRESS
Physical Therapy  Treatment    Wil Kwon Jr.   MRN: [id number]   Admitting Diagnosis: HCAP (healthcare-associated pneumonia)    PT Received On: 17  PT Start Time: 1309     PT Stop Time: 1326    PT Total Time (min): 17 min       Billable Minutes:  Therapeutic Exercise 17    Treatment Type: Treatment  PT/PTA: PT     PTA Visit Number: 0       General Precautions: Standard, fall  Orthopedic Precautions: N/A   Braces: N/A    Do you have any cultural, spiritual, Gnosticism conflicts, given your current situation?: na    Subjective:  Communicated with RN prior to session.  Pt agreeable to treatment.     Pain Ratin/10  Pain Rating Post-Intervention: 0/10    Objective:   Patient found with: chest tube, peripheral IV, telemetry   Pt found up in bedside chair.     Functional Mobility:  Bed Mobility:   Did not occur. Pt in bedside chair upon PT arrival and returned to chair at conclusion of treatment     Transfers:  Sit <> Stand Assistance: Stand By Assistance    Gait:   Gait Distance: Pt took 10 steps from beside chair to stand EOB to perform standing thera ex and then returned to bedside chair. Ambulation limited 2/2 chest tub suction.   Assistance 1: Stand by Assistance  Gait Assistive Device: No device  Gait Pattern: reciprocal  Gait Deviation(s): decreased uli    Stairs: Not performed 2/2 chest tube suction.     Balance:   Static Sit: GOOD: Takes MODERATE challenges from all directions  Dynamic Sit: GOOD: Maintains balance through MODERATE excursions of active trunk movement  Static Stand: FAIR+: Takes MINIMAL challenges from all directions  Dynamic stand: FAIR+: Needs CLOSE SUPERVISION during gait and is able to right self with minor LOB     Therapeutic Activities and Exercises:  Pt unable to ambulate from room 2/2 chest tube suction. Pt performed standing exercises using bed rail to assist w/ balance. PT provided SBA during exercises.   -hamstring curls: x20 reps BLE  -marching x30 reps  -heel raises:  x30 reps  -hip abduction; x20 BLE  -hip extension: x20 BLE  Pt has good tolerance of exercises and only required mininal cues for form.      AM-PAC 6 CLICK MOBILITY  How much help from another person does this patient currently need?   1 = Unable, Total/Dependent Assistance  2 = A lot, Maximum/Moderate Assistance  3 = A little, Minimum/Contact Guard/Supervision  4 = None, Modified [place]/Independent    Turning over in bed (including adjusting bedclothes, sheets and blankets)?: 4  Sitting down on and standing up from a chair with arms (e.g., wheelchair, bedside commode, etc.): 4  Moving from lying on back to sitting on the side of the bed?: 4  Moving to and from a bed to a chair (including a wheelchair)?: 4  Need to walk in hospital room?: 3  Climbing 3-5 steps with a railing?: 3  Total Score: 22    AM-PAC Raw Score CMS G-Code Modifier Level of Impairment Assistance   6 % Total / Unable   7 - 9 CM 80 - 100% Maximal Assist   10 - 14 CL 60 - 80% Moderate Assist   15 - 19 CK 40 - 60% Moderate Assist   20 - 22 CJ 20 - 40% Minimal Assist   23 CI 1-20% SBA / CGA   24 CH 0% Independent/ Mod I     Patient left up in chair with all lines intact and call button in reach.    Assessment:  Wil Kwon Jr. is a 72 y.o. male with a medical diagnosis of HCAP (healthcare-associated pneumonia) and presents with the deficits listed below. Pt tolerated treatment well today. Pt ambulation limited 2/2 chest tube suction. Pt had goof tolerance of standing therapeutic exercises. Pt will continue to benefit from skilled acute PT services to address the impairments listed below and to increase functional mobility.     Rehab identified problem list/impairments: Rehab identified problem list/impairments: weakness, impaired endurance, impaired functional mobilty, gait instability    Rehab potential is good.    Activity tolerance: Good    Discharge recommendations: Discharge Facility/Level Of Care Needs: home with home health      Barriers to discharge: Barriers to Discharge: Inaccessible home environment, Decreased caregiver support    Equipment recommendations: Equipment Needed After Discharge:  (TBD)     GOALS:   Physical Therapy Goals        Problem: Physical Therapy Goal    Goal Priority Disciplines Outcome Goal Variances Interventions   Physical Therapy Goal     PT/OT, PT Ongoing (interventions implemented as appropriate)     Description:  Goals to be met by: 3/19/17     Patient will increase functional independence with mobility by performin. Supine to sit with [place]  2. Sit to stand transfer with Supervision  3. Gait  x 100 feet with Modified [place] using Rolling Walker.   4. Ascend/descend 2 stair with bilateral Handrails Supervision.   5. Lower extremity exercise program x15 reps per handout, with assistance as needed                PLAN:    Patient to be seen 4 x/week  to address the above listed problems via gait training, therapeutic exercises, therapeutic activities  Plan of Care expires: 17  Plan of Care reviewed with: patient    Leonora Garrett [place], SPT  2017

## 2017-03-17 NOTE — PLAN OF CARE
Problem: Patient Care Overview  Goal: Plan of Care Review  Outcome: Ongoing (interventions implemented as appropriate)  Pt AA&Ox4. No injury/falls this shift. VSS, afebrile. C/o pain managed with PRN medication per MAR. Skin intact, no new breakdown noted. Pt able to reposition self independently. I&O monitored throughout, see flowsheet for detail. Pt in NAD, able to vocalize needs. Bed locked in lowest position, SR upx2. Call light within reach. Will continue to monitor.

## 2017-03-17 NOTE — PROGRESS NOTES
Progress Note  BMT                                                              Team: [facility] HEMATOLOGY BMT    Patient Name: Wil Kwon Jr.  YOB: 1945    Admit Date: 3/6/2017                   LOS: 11    SUBJECTIVE:     Reason for Admission: HCAP (healthcare-associated pneumonia)    Subjective and Interval History:   No acute events over night.  Chest tube remains with decreasing drainage.  Patient mentions that he was able to sleep lying down without having any cough or respiratory difficulty.    Review of Systems:  General:  Denies weight loss, fever, chills, weakness,(+) fatigue  HEENT:  Denies vision changes, sore throat, congestion  CVS:  Denies chest pain, pressure, palpitations  Resp: (+) shortness of breath, cough, sputum  GI:  Denies diarrhea, constipation, abdominal pain, nausea, vomiting  :  Denies dysuria, hematuria, nocturia  MSK:  Denies myalgias, arthralgias  Skin:  Denies rashes, bleeding  Neuro:  Denies headache, dizziness, syncope, numbness, paresthesias  All other systems otherwise negative    Scheduled Medications   ampicillin-sulbactim (UNASYN) IVPB  3 g Intravenous Q6H    citalopram  40 mg Oral Daily    diltiaZEM  360 mg Oral Daily    famotidine  20 mg Oral Daily    fluticasone  2 spray Each Nare Daily    metoprolol tartrate  50 mg Oral BID    oxymetazoline  2 spray Each Nare BID    senna-docusate 8.6-50 mg  1 tablet Oral BID    sodium chloride 0.9%  3 mL Intravenous Q8H       Continuous Infusions       PRN Medications  acetaminophen, albuterol-ipratropium 2.5mg-0.5mg/3mL, benzonatate, guaifenesin 100 mg/5 ml, hydrocodone-acetaminophen 5-325mg, morphine, trazodone    OBJECTIVE:     Temp:  [97.6 °F (36.4 °C)-98.2 °F (36.8 °C)]   Pulse:  []   Resp:  [16-20]   BP: (111-161)/(57-91)   SpO2:  [97 %-100 %]   Body mass index is 26.5 kg/(m^2).    Intake/Output Summary    Intake/Output Summary (Last 24 hours) at 03/17/17 0753  Last data filed at 03/17/17 8825    Gross per 24 hour   Intake                0 ml   Output             1610 ml   Net            -1610 ml       Physical Exam:  General:  Well developed, well nourished, no acute distress  HEENT:  Normocephalic, atraumatic, EOMI, clear sclera, throat clear without erythema or exudates  CVS:  RRR, S1 and S2 normal, no murmurs, rubs, gallops  Resp:  Lungs clear to auscultation, no wheezes, rales, rhonchi, cough.  Right chest tube  GI:  Abdomen soft, non-tender, non-distended, normoactive bowel sounds, no organomegaly  :  Deferred  MSK:  No muscle atrophy, cyanosis, peripheral edema  Skin:  No rashes, ulcers, erythema  Neuro:  CNII-XII grossly intact  Psych:  Alert and oriented to person, place, and time    Diagnostic Result    Lab Results   Component Value Date    WBC 8.79 03/17/2017    HGB 7.4 (L) 03/17/2017    HCT 23.0 (L) 03/17/2017     03/17/2017         Recent Labs  Lab 03/17/17  0307   *   K 3.6   CL 99   CO2 26   BUN 15   CREATININE 0.8   MG 1.4*   PHOS 4.0       Lab Results   Component Value Date    INR 1.1 03/17/2017    INR 1.1 03/16/2017    INR 1.2 03/15/2017       No results for input(s): POCTGLUCOSE in the last 72 hours.    ASSESSMENT/PLAN:   Mr. Wil Kwon Jr. is a 72 y.o. male     Current Problems List:  Active Hospital Problems    Diagnosis  POA    *HCAP (healthcare-associated pneumonia) [J18.9]  Yes    Cough [R05]  Yes    Hypomagnesemia [E83.42]  No    Atrial fibrillation with RVR [I48.91]  No    Empyema of right pleural space [J86.9]  Yes    Insomnia [G47.00]  Yes    Septic shock [A41.9, R65.21]  Yes    Acute hypoxemic respiratory failure [J96.01]  Yes    BERNICE (acute kidney injury) [N17.9]  Yes    MDS (myelodysplastic syndrome) [D46.9]  Yes    Depression [F32.9]  Yes    Hyponatremia [E87.1]  Yes    Anemia, chronic disease [D63.8]  Yes    Thrombocytopenia [D69.6]  Yes    Essential hypertension [I10]  Yes      Resolved Hospital Problems    Diagnosis Date Resolved POA    No resolved problems to display.       Active Problems, Status & Treatment Plan Addressed Today:    HCAP (healthcare-associated pneumonia)  Acute hypoxemic respiratory failure   Empyema of right pleural space  - Afebrile and leukocytosis resolved  - Likely 2/2 to suspected HCAP given CXR concerning for right lower lob PNA with respiratory sputum cx +Klebsiella and pleural fluid cx +strep viridans , both sensitive to ceftriaxone . S/p Pigtail placement by IR 3/7 and s/p tPA for right loculated empyema. Repeat CT chest 3/13 with dramatic improvement.   - Chest tube remains.  IR states significant improvement per CT on 3/13 - declined a second chest tube  - Continue Unasyn day 8.  ID consulted, appreciate assistance  - Repeat sputum cx 3/15 pending  - Tessalon Perles and Robitussin prn      Essential hypertension  Paroxysmal atrial fibrillation with Rapid Ventricular Rate  - Cardiology recommended to increase lopressor.  On metoprolol and diltiazem (titrate up as HR/BP tolerates)  - Warfarin held given tPA administration and recent procedures      Anemia, chronic disease  MDS (myelodysplastic syndrome)  Anemia and thrombocytopenia due to chemotherapy  - Hemoglobin 7.4  - Platelets 167  - INR elevated Vitamin K x 3 days   - Transfuse for Hb below 7     Depression  - continue home dose of citalopram, will consider stopping given hyponatremia     Hyponatremia  - Improving  - Likely 2/2 SSRI  - Fluid restict    Diet:  Cardiac  DVT PPx:  Mechanical  Code Status:  Full      Dispo:  Pending removal of chest tube either today or tomorrow, then home with home health.  Home hospital bed ordered        Signing Physician:  Josie Grover MD  Med PGY3  [phone]

## 2017-03-17 NOTE — PLAN OF CARE
MDR's with Dr Driscoll.  Patient is progressing toward d/c.  D/c pending removal of CT.  Orders for a hospital bed have been placed.  The patient states that someone can be available for bed delivery.  HH orders placed and SW arranging HH/DME needs.  Follow up appointment and patient is aware.  IMM signed and the patient agrees with the d/c plan.  Will continue to follow.    Future Appointments  Date Time Provider Department Center   3/22/2017 2:15 PM INJECTION[facility]   3/22/2017 3:15 PM Laura Rader MD [facility]ERV[facility]ES [facility]        03/17/17 1207   Final Note   Assessment Type Final Discharge Note   Discharge planning education complete? Yes   What phone number can be called within the next 1-3 days to see how you are doing after discharge? ([phone])   Hospital Follow Up  Appt(s) scheduled? Yes   Discharge plans and expectations educations in teach back method with documentation complete? Yes   Offered [facility] -- Bedside Delivery? n/a   Discharge/Hospital Encounter Summary to (non-[facility]) PCP n/a   Referral to Outpatient Case Management complete? n/a   Referral to / orders for Home Health Complete? Yes   30 day supply of medicines given at discharge, if documented non-compliance / non-adherence? n/a   Any social issues identified prior to discharge? No   Did you assess the readiness or willingness of the family or caregiver to support self management of care? Yes

## 2017-03-17 NOTE — PLAN OF CARE
Problem: Physical Therapy Goal  Goal: Physical Therapy Goal  Goals to be met by: 3/19/17     Patient will increase functional independence with mobility by performin. Supine to sit with [place]  2. Sit to stand transfer with Supervision  3. Gait x 100 feet with Modified [place] using Rolling Walker.   4. Ascend/descend 2 stair with bilateral Handrails Supervision.   5. Lower extremity exercise program x15 reps per handout, with assistance as needed   Outcome: Ongoing (interventions implemented as appropriate)  No goals met today. Pt is progressing well toward goals. Goals remain appropriate.

## 2017-03-17 NOTE — PROGRESS NOTES
Pulmonary Progress Note  3/17/2017     Last 24 hours:  --CT still with approx 200cc output last night    Subjective:  cough still improving; +OBB, no fever/chills    Vitals:    03/17/17 0800 03/17/17 1100 03/17/17 1132 03/17/17 1300   BP:   (!) 116/55    BP Location:   Right arm    Patient Position:   Lying    BP Method:   Automatic    Pulse: (!) 115 (!) 114 109 (!) 135   Resp:   20    Temp:   98.6 °F (37 °C)    TempSrc:   Oral    SpO2:   100%    Weight:       Height:         NAD, A&O  Supple  CT in place, no airleak, on suction  No wheezing  Irreg, irreg  S/nt/nd  Edema better    Labs:   Reviewed  WBC 9      Imaging:  Reviewed  CXR-better    Assessment:  1. CAP  2. Empyema   3.  Afib     Plan:  1. C/w Unasyn  2. CT to suction  3. Would give another dose of lasix today  4. PT/OT  5. Consider starting some pharmacologic DVT prophylaxis      Narciso Rich MD  Pulmonary Fellow  [phone]

## 2017-03-17 NOTE — SUBJECTIVE & OBJECTIVE
Interval History:     Review of Systems   Constitutional: Positive for activity change. Negative for appetite change, chills, diaphoresis and fever.   HENT: Negative for ear pain, mouth sores, sinus pressure and sore throat.    Eyes: Negative for photophobia, pain and redness.   Respiratory: Positive for cough and shortness of breath. Negative for wheezing.    Cardiovascular: Negative for chest pain and leg swelling.   Gastrointestinal: Negative for abdominal distention, abdominal pain, diarrhea and nausea.   Genitourinary: Negative for dysuria, flank pain, frequency and urgency.   Musculoskeletal: Negative for arthralgias, back pain, gait problem and myalgias.   Skin: Negative for pallor and rash.   Neurological: Negative for dizziness, tremors, seizures and headaches.   Psychiatric/Behavioral: Negative for confusion.     Objective:     Vital Signs (Most Recent):  Temp: 98.6 °F (37 °C) (03/17/17 1132)  Pulse: (!) 123 (03/17/17 1500)  Resp: 20 (03/17/17 1132)  BP: (!) 116/55 (03/17/17 1132)  SpO2: 100 % (03/17/17 1132) Vital Signs (24h Range):  Temp:  [97.6 °F (36.4 °C)-98.6 °F (37 °C)] 98.6 °F (37 °C)  Pulse:  [] 123  Resp:  [16-20] 20  SpO2:  [97 %-100 %] 100 %  BP: (116-137)/(55-67) 116/55     Weight: 86.2 kg (190 lb)  Body mass index is 26.5 kg/(m^2).    Estimated Creatinine Clearance: 88.9 mL/min (based on Cr of 0.8).    Physical Exam   Constitutional: He is oriented to person, place, and time. He appears well-developed. No distress.   Chronically ill appearing.   HENT:   Head: Atraumatic.   Mouth/Throat: Oropharynx is clear and moist. No oropharyngeal exudate.   Eyes: Conjunctivae and EOM are normal. Pupils are equal, round, and reactive to light. No scleral icterus.   Neck: Neck supple.   Cardiovascular: Normal rate and regular rhythm.  Exam reveals no friction rub.    Murmur heard.  Pulmonary/Chest: Effort normal. No respiratory distress. He has no wheezes. He has rales. He exhibits no tenderness.    Crackles in right lung diffusely, diminished in base. CT with sanguinopurulent drainage.   Abdominal: Soft. Bowel sounds are normal. He exhibits no distension. There is no tenderness. There is no rebound and no guarding.   Musculoskeletal: Normal range of motion. He exhibits no edema.   Lymphadenopathy:     He has no cervical adenopathy.   Neurological: He is alert and oriented to person, place, and time. No cranial nerve deficit. He exhibits normal muscle tone. Coordination normal.   Skin: No rash noted. No erythema.       Significant Labs:   CBC:     Recent Labs  Lab 03/16/17 0314 03/17/17  0307   WBC 15.08* 8.79   HGB 8.0* 7.4*   HCT 24.9* 23.0*   * 167     CMP:     Recent Labs  Lab 03/16/17 0314 03/17/17  0307   * 133*   K 3.9 3.6   CL 98 99   CO2 25 26    101   BUN 16 15   CREATININE 0.8 0.8   CALCIUM 7.6* 7.5*   PROT 6.1 6.2   ALBUMIN 1.6* 1.5*   BILITOT 0.8 0.7   ALKPHOS 91 89   AST 38 41*   ALT 23 26   ANIONGAP 7* 8   EGFRNONAA >60.0 >60.0       Significant Imaging: I have reviewed all pertinent imaging results/findings within the past 24 hours.     3/12 CT chest:  1.  Interval placement of a right-sided pigtail pleural drainage catheter which appears appropriately positioned. There is a persistent loculated right-sided pleural effusion which appears mildly increased in overall volume compared to the prior examination. Correlation with drain output is advised.  2. Persistent patchy consolidation throughout the right middle and lower lobes, mildly improved from prior examination, noting more dense areas of consolidation with associated lack of air bronchograms in the dependent aspect of the right lower lobe.  3. Atherosclerosis.     3/13 CT chest limited (IR):  Significant interval decrease in loculated pleural effusion, with insufficient amount of fluid for drainage.     Microbiology:  3/6 blood cx: negative x2  3/6 urine cx; MSSE (contaminant)  3/6 sputum cx: Kleb oxytoca (R amp, S  rest)  3/7 blood cx: negative x2  3/7 pleural fluid cx: S.intermedius (GPC on stain)  3/15 sputum cx: rare WBC/GPC on stain, cx pending

## 2017-03-17 NOTE — PROGRESS NOTES
[facility]  Infectious Disease  Progress Note    Patient Name: Wil Kwon Jr.  MRN: [id number]  Admission Date: 3/6/2017  Length of Stay: 11 days  Attending Physician: Roddy Driscoll MD  Primary Care Provider: LAILA Serra MD    Isolation Status: No active isolations  Assessment/Plan:      Empyema of right pleural space  73yo man w/a history of MDS (s/p vidaza x5, last 3/3/2017), pAF (on anticoagulation), and HTN who was admitted on 3/6/2017 with 1wk of fevers (>101F), progressive SOB, R pleuritic CP, and cough productive of brown/green sputum due to a right likely polymicrobial multifocal pneumonia with an associated empyema (s/p CT insertion on 3/7 with over 5L of sanguinopurulent drainage so far; S.intermedius in fluid cx and Klebsiella in sputum cx). He is improving on IV unasyn.    - would continue unasyn for polymicrobial infection (aspiration likely initial source) while in house  - would transition to oral agents (likely augmentin) when tube is pulled      Anticipated Disposition: pending tube removal    Thank you for your consult. I will follow-up with patient. Please contact us if you have any additional questions.     Jessy Briseno MD  Transplant ID Attending  [phone]      Jessy Briseno MD  Infectious Disease  [facility]    Subjective:     Principal Problem:HCAP (healthcare-associated pneumonia)    HPI:   Interval History:     Review of Systems   Constitutional: Positive for activity change. Negative for appetite change, chills, diaphoresis and fever.   HENT: Negative for ear pain, mouth sores, sinus pressure and sore throat.    Eyes: Negative for photophobia, pain and redness.   Respiratory: Positive for cough and shortness of breath. Negative for wheezing.    Cardiovascular: Negative for chest pain and leg swelling.   Gastrointestinal: Negative for abdominal distention, abdominal pain, diarrhea and nausea.   Genitourinary: Negative for  dysuria, flank pain, frequency and urgency.   Musculoskeletal: Negative for arthralgias, back pain, gait problem and myalgias.   Skin: Negative for pallor and rash.   Neurological: Negative for dizziness, tremors, seizures and headaches.   Psychiatric/Behavioral: Negative for confusion.     Objective:     Vital Signs (Most Recent):  Temp: 98.6 °F (37 °C) (03/17/17 1132)  Pulse: (!) 123 (03/17/17 1500)  Resp: 20 (03/17/17 1132)  BP: (!) 116/55 (03/17/17 1132)  SpO2: 100 % (03/17/17 1132) Vital Signs (24h Range):  Temp:  [97.6 °F (36.4 °C)-98.6 °F (37 °C)] 98.6 °F (37 °C)  Pulse:  [] 123  Resp:  [16-20] 20  SpO2:  [97 %-100 %] 100 %  BP: (116-137)/(55-67) 116/55     Weight: 86.2 kg (190 lb)  Body mass index is 26.5 kg/(m^2).    Estimated Creatinine Clearance: 88.9 mL/min (based on Cr of 0.8).    Physical Exam   Constitutional: He is oriented to person, place, and time. He appears well-developed. No distress.   Chronically ill appearing.   HENT:   Head: Atraumatic.   Mouth/Throat: Oropharynx is clear and moist. No oropharyngeal exudate.   Eyes: Conjunctivae and EOM are normal. Pupils are equal, round, and reactive to light. No scleral icterus.   Neck: Neck supple.   Cardiovascular: Normal rate and regular rhythm.  Exam reveals no friction rub.    Murmur heard.  Pulmonary/Chest: Effort normal. No respiratory distress. He has no wheezes. He has rales. He exhibits no tenderness.   Crackles in right lung diffusely, diminished in base. CT with sanguinopurulent drainage.   Abdominal: Soft. Bowel sounds are normal. He exhibits no distension. There is no tenderness. There is no rebound and no guarding.   Musculoskeletal: Normal range of motion. He exhibits no edema.   Lymphadenopathy:     He has no cervical adenopathy.   Neurological: He is alert and oriented to person, place, and time. No cranial nerve deficit. He exhibits normal muscle tone. Coordination normal.   Skin: No rash noted. No erythema.       Significant  Labs:   CBC:     Recent Labs  Lab 03/16/17  0314 03/17/17  0307   WBC 15.08* 8.79   HGB 8.0* 7.4*   HCT 24.9* 23.0*   * 167     CMP:     Recent Labs  Lab 03/16/17  0314 03/17/17  0307   * 133*   K 3.9 3.6   CL 98 99   CO2 25 26    101   BUN 16 15   CREATININE 0.8 0.8   CALCIUM 7.6* 7.5*   PROT 6.1 6.2   ALBUMIN 1.6* 1.5*   BILITOT 0.8 0.7   ALKPHOS 91 89   AST 38 41*   ALT 23 26   ANIONGAP 7* 8   EGFRNONAA >60.0 >60.0       Significant Imaging: I have reviewed all pertinent imaging results/findings within the past 24 hours.     3/12 CT chest:  1.  Interval placement of a right-sided pigtail pleural drainage catheter which appears appropriately positioned. There is a persistent loculated right-sided pleural effusion which appears mildly increased in overall volume compared to the prior examination. Correlation with drain output is advised.  2. Persistent patchy consolidation throughout the right middle and lower lobes, mildly improved from prior examination, noting more dense areas of consolidation with associated lack of air bronchograms in the dependent aspect of the right lower lobe.  3. Atherosclerosis.     3/13 CT chest limited (IR):  Significant interval decrease in loculated pleural effusion, with insufficient amount of fluid for drainage.     Microbiology:  3/6 blood cx: negative x2  3/6 urine cx; MSSE (contaminant)  3/6 sputum cx: Kleb oxytoca (R amp, S rest)  3/7 blood cx: negative x2  3/7 pleural fluid cx: S.intermedius (GPC on stain)  3/15 sputum cx: rare WBC/GPC on stain, cx pending

## 2017-03-17 NOTE — PROGRESS NOTES
Consult received to arrange for HH for skilled nurse and PT/OT. Referral for HH sent to [facility] ([phone]) for coordination. Will await call from N with name of HH that will provide services to the pt. Will follow.

## 2017-03-17 NOTE — PROGRESS NOTES
Call received from Norma at [facility] ([phone]) stating that pts. Home health has been set up with [facility]  ([phone]). Pt. Will be seen for services the day following dc. Also arranged for hospital bed thru [facility] ([phone]). Spoke with Yomaira and confirmed receipt of referral. Bed cannot be delivered until there is a definite dc date for this patient. Will inform on-call 'er to contact [facility] should pt. dc over the weekend. Pt. States that he will dc home with his wife. His wife has been ill but they have help from family and a neighbor that will assist both of them at home. Will follow as needed.

## 2017-03-17 NOTE — ASSESSMENT & PLAN NOTE
71yo man w/a history of MDS (s/p vidaza x5, last 3/3/2017), pAF (on anticoagulation), and HTN who was admitted on 3/6/2017 with 1wk of fevers (>101F), progressive SOB, R pleuritic CP, and cough productive of brown/green sputum due to a right likely polymicrobial multifocal pneumonia with an associated empyema (s/p CT insertion on 3/7 with over 5L of sanguinopurulent drainage so far; S.intermedius in fluid cx and Klebsiella in sputum cx). He is improving on IV unasyn.    - would continue unasyn for polymicrobial infection (aspiration likely initial source) while in house  - would transition to oral agents (likely augmentin) when tube is pulled

## 2017-03-18 VITALS
SYSTOLIC BLOOD PRESSURE: 114 MMHG | WEIGHT: 190 LBS | DIASTOLIC BLOOD PRESSURE: 57 MMHG | HEART RATE: 98 BPM | TEMPERATURE: 98 F | HEIGHT: 71 IN | RESPIRATION RATE: 18 BRPM | BODY MASS INDEX: 26.6 KG/M2 | OXYGEN SATURATION: 99 %

## 2017-03-18 LAB
ALBUMIN SERPL BCP-MCNC: 1.6 G/DL
ALP SERPL-CCNC: 98 U/L
ALT SERPL W/O P-5'-P-CCNC: 30 U/L
ANION GAP SERPL CALC-SCNC: 6 MMOL/L
ANISOCYTOSIS BLD QL SMEAR: SLIGHT
AST SERPL-CCNC: 48 U/L
BACTERIA FLD CULT: NORMAL
BACTERIA SPEC AEROBE CULT: NORMAL
BASOPHILS # BLD AUTO: ABNORMAL K/UL
BASOPHILS NFR BLD: 0 %
BILIRUB SERPL-MCNC: 0.7 MG/DL
BUN SERPL-MCNC: 13 MG/DL
CALCIUM SERPL-MCNC: 7.7 MG/DL
CHLORIDE SERPL-SCNC: 98 MMOL/L
CO2 SERPL-SCNC: 29 MMOL/L
CREAT SERPL-MCNC: 0.8 MG/DL
DIFFERENTIAL METHOD: ABNORMAL
EOSINOPHIL # BLD AUTO: ABNORMAL K/UL
EOSINOPHIL NFR BLD: 0 %
ERYTHROCYTE [DISTWIDTH] IN BLOOD BY AUTOMATED COUNT: 18.3 %
EST. GFR  (AFRICAN AMERICAN): >60 ML/MIN/1.73 M^2
EST. GFR  (NON AFRICAN AMERICAN): >60 ML/MIN/1.73 M^2
GIANT PLATELETS BLD QL SMEAR: PRESENT
GLUCOSE SERPL-MCNC: 100 MG/DL
GRAM STN SPEC: NORMAL
HCT VFR BLD AUTO: 24.2 %
HGB BLD-MCNC: 7.4 G/DL
HYPOCHROMIA BLD QL SMEAR: ABNORMAL
INR PPP: 1.1
LYMPHOCYTES # BLD AUTO: ABNORMAL K/UL
LYMPHOCYTES NFR BLD: 22 %
MAGNESIUM SERPL-MCNC: 1.7 MG/DL
MCH RBC QN AUTO: 28.1 PG
MCHC RBC AUTO-ENTMCNC: 30.6 %
MCV RBC AUTO: 92 FL
MONOCYTES # BLD AUTO: ABNORMAL K/UL
MONOCYTES NFR BLD: 1 %
NEUTROPHILS NFR BLD: 77 %
OVALOCYTES BLD QL SMEAR: ABNORMAL
PHOSPHATE SERPL-MCNC: 3.7 MG/DL
PLATELET # BLD AUTO: 203 K/UL
PLATELET BLD QL SMEAR: ABNORMAL
PMV BLD AUTO: 12.7 FL
POIKILOCYTOSIS BLD QL SMEAR: SLIGHT
POLYCHROMASIA BLD QL SMEAR: ABNORMAL
POTASSIUM SERPL-SCNC: 3.8 MMOL/L
PROT SERPL-MCNC: 6.3 G/DL
PROTHROMBIN TIME: 11.6 SEC
RBC # BLD AUTO: 2.63 M/UL
SODIUM SERPL-SCNC: 133 MMOL/L
WBC # BLD AUTO: 7.18 K/UL

## 2017-03-18 PROCEDURE — 84100 ASSAY OF PHOSPHORUS: CPT

## 2017-03-18 PROCEDURE — 99239 HOSP IP/OBS DSCHRG MGMT >30: CPT | Mod: ,,, | Performed by: INTERNAL MEDICINE

## 2017-03-18 PROCEDURE — 25000003 PHARM REV CODE 250: Performed by: INTERNAL MEDICINE

## 2017-03-18 PROCEDURE — 83735 ASSAY OF MAGNESIUM: CPT

## 2017-03-18 PROCEDURE — 85610 PROTHROMBIN TIME: CPT

## 2017-03-18 PROCEDURE — 80053 COMPREHEN METABOLIC PANEL: CPT

## 2017-03-18 PROCEDURE — 85027 COMPLETE CBC AUTOMATED: CPT

## 2017-03-18 PROCEDURE — 25000003 PHARM REV CODE 250: Performed by: STUDENT IN AN ORGANIZED HEALTH CARE EDUCATION/TRAINING PROGRAM

## 2017-03-18 PROCEDURE — 99233 SBSQ HOSP IP/OBS HIGH 50: CPT | Mod: ,,, | Performed by: INTERNAL MEDICINE

## 2017-03-18 PROCEDURE — 25000003 PHARM REV CODE 250: Performed by: HOSPITALIST

## 2017-03-18 PROCEDURE — 85007 BL SMEAR W/DIFF WBC COUNT: CPT

## 2017-03-18 RX ORDER — AMOXICILLIN AND CLAVULANATE POTASSIUM 875; 125 MG/1; MG/1
1 TABLET, FILM COATED ORAL 2 TIMES DAILY
Qty: 28 TABLET | Refills: 0 | Status: SHIPPED | OUTPATIENT
Start: 2017-03-18 | End: 2017-03-22

## 2017-03-18 RX ORDER — HYDROCODONE BITARTRATE AND ACETAMINOPHEN 5; 325 MG/1; MG/1
1 TABLET ORAL EVERY 6 HOURS PRN
Qty: 30 TABLET | Refills: 0 | Status: SHIPPED | OUTPATIENT
Start: 2017-03-18 | End: 2017-06-22

## 2017-03-18 RX ORDER — HEPARIN SODIUM 1000 [USP'U]/ML
3000 INJECTION INTRAVENOUS; SUBCUTANEOUS ONCE
Status: COMPLETED | OUTPATIENT
Start: 2017-03-18 | End: 2017-03-18

## 2017-03-18 RX ADMIN — FAMOTIDINE 20 MG: 20 TABLET, FILM COATED ORAL at 08:03

## 2017-03-18 RX ADMIN — Medication 3 ML: at 02:03

## 2017-03-18 RX ADMIN — Medication 3 ML: at 05:03

## 2017-03-18 RX ADMIN — FLUTICASONE PROPIONATE 2 SPRAY: 50 SPRAY, METERED NASAL at 08:03

## 2017-03-18 RX ADMIN — DILTIAZEM HYDROCHLORIDE 360 MG: 180 CAPSULE, COATED, EXTENDED RELEASE ORAL at 08:03

## 2017-03-18 RX ADMIN — AMPICILLIN SODIUM AND SULBACTAM SODIUM 3 G: 2; 1 INJECTION, POWDER, FOR SOLUTION INTRAMUSCULAR; INTRAVENOUS at 08:03

## 2017-03-18 RX ADMIN — CITALOPRAM HYDROBROMIDE 40 MG: 40 TABLET ORAL at 08:03

## 2017-03-18 RX ADMIN — HEPARIN SODIUM 3000 UNITS: 1000 INJECTION INTRAVENOUS; SUBCUTANEOUS at 04:03

## 2017-03-18 RX ADMIN — AMPICILLIN SODIUM AND SULBACTAM SODIUM 3 G: 2; 1 INJECTION, POWDER, FOR SOLUTION INTRAMUSCULAR; INTRAVENOUS at 02:03

## 2017-03-18 RX ADMIN — STANDARDIZED SENNA CONCENTRATE AND DOCUSATE SODIUM 1 TABLET: 8.6; 5 TABLET, FILM COATED ORAL at 08:03

## 2017-03-18 RX ADMIN — METOPROLOL TARTRATE 50 MG: 25 TABLET ORAL at 08:03

## 2017-03-18 RX ADMIN — HYDROCODONE BITARTRATE AND ACETAMINOPHEN 1 TABLET: 5; 325 TABLET ORAL at 04:03

## 2017-03-18 RX ADMIN — OXYMETAZOLINE HYDROCHLORIDE 2 SPRAY: 5 SPRAY NASAL at 08:03

## 2017-03-18 NOTE — SUBJECTIVE & OBJECTIVE
Interval History: Doing well today. Tube output minimal. Water seal planned for today.    Review of Systems   Constitutional: Positive for activity change. Negative for appetite change, chills, diaphoresis and fever.   HENT: Negative for ear pain, mouth sores, sinus pressure and sore throat.    Eyes: Negative for photophobia, pain and redness.   Respiratory: Positive for cough and shortness of breath. Negative for wheezing.    Cardiovascular: Negative for chest pain and leg swelling.   Gastrointestinal: Negative for abdominal distention, abdominal pain, diarrhea and nausea.   Genitourinary: Negative for dysuria, flank pain, frequency and urgency.   Musculoskeletal: Negative for arthralgias, back pain, gait problem and myalgias.   Skin: Negative for pallor and rash.   Neurological: Negative for dizziness, tremors, seizures and headaches.   Psychiatric/Behavioral: Negative for confusion.     Objective:     Vital Signs (Most Recent):  Temp: 98 °F (36.7 °C) (03/18/17 1224)  Pulse: 98 (03/18/17 1300)  Resp: 18 (03/18/17 1224)  BP: (!) 114/57 (03/18/17 1224)  SpO2: 99 % (03/18/17 1224) Vital Signs (24h Range):  Temp:  [97.9 °F (36.6 °C)-98.9 °F (37.2 °C)] 98 °F (36.7 °C)  Pulse:  [] 98  Resp:  [18-20] 18  SpO2:  [99 %-100 %] 99 %  BP: (114-138)/(57-69) 114/57     Weight: 86.2 kg (190 lb)  Body mass index is 26.5 kg/(m^2).    Estimated Creatinine Clearance: 88.9 mL/min (based on Cr of 0.8).    Physical Exam   Constitutional: He is oriented to person, place, and time. He appears well-developed. No distress.   Chronically ill appearing.   HENT:   Head: Atraumatic.   Mouth/Throat: Oropharynx is clear and moist. No oropharyngeal exudate.   Eyes: Conjunctivae and EOM are normal. Pupils are equal, round, and reactive to light. No scleral icterus.   Neck: Neck supple.   Cardiovascular: Normal rate and regular rhythm.  Exam reveals no friction rub.    Murmur heard.  Pulmonary/Chest: Effort normal. No respiratory distress. He  has no wheezes. He has rales. He exhibits no tenderness.   Decreased in R base and some crackles. Overall much improved.   Abdominal: Soft. Bowel sounds are normal. He exhibits no distension. There is no tenderness. There is no rebound and no guarding.   Musculoskeletal: Normal range of motion. He exhibits no edema.   Lymphadenopathy:     He has no cervical adenopathy.   Neurological: He is alert and oriented to person, place, and time. No cranial nerve deficit. He exhibits normal muscle tone. Coordination normal.   Skin: No rash noted. No erythema.       Significant Labs:   CBC:     Recent Labs  Lab 03/17/17 0307 03/18/17  0443   WBC 8.79 7.18   HGB 7.4* 7.4*   HCT 23.0* 24.2*    203     CMP:     Recent Labs  Lab 03/17/17 0307 03/18/17  0443   * 133*   K 3.6 3.8   CL 99 98   CO2 26 29    100   BUN 15 13   CREATININE 0.8 0.8   CALCIUM 7.5* 7.7*   PROT 6.2 6.3   ALBUMIN 1.5* 1.6*   BILITOT 0.7 0.7   ALKPHOS 89 98   AST 41* 48*   ALT 26 30   ANIONGAP 8 6*   EGFRNONAA >60.0 >60.0       Significant Imaging: I have reviewed all pertinent imaging results/findings within the past 24 hours.     3/12 CT chest:  1.  Interval placement of a right-sided pigtail pleural drainage catheter which appears appropriately positioned. There is a persistent loculated right-sided pleural effusion which appears mildly increased in overall volume compared to the prior examination. Correlation with drain output is advised.  2. Persistent patchy consolidation throughout the right middle and lower lobes, mildly improved from prior examination, noting more dense areas of consolidation with associated lack of air bronchograms in the dependent aspect of the right lower lobe.  3. Atherosclerosis.     3/13 CT chest limited (IR):  Significant interval decrease in loculated pleural effusion, with insufficient amount of fluid for drainage.     Microbiology:  3/6 blood cx: negative x2  3/6 urine cx; MSSE (contaminant)  3/6 sputum  cx: Kleb oxytoca (R amp, S rest)  3/7 blood cx: negative x2  3/7 pleural fluid cx: S.intermedius (GPC on stain)  3/15 sputum cx: rare WBC/GPC on stain, cx pending

## 2017-03-18 NOTE — PROGRESS NOTES
[facility]  Infectious Disease  Progress Note    Patient Name: Wil Kwon Jr.  MRN: [id number]  Admission Date: 3/6/2017  Length of Stay: 12 days  Attending Physician: Roddy Driscoll MD  Primary Care Provider: LAILA Serra MD    Isolation Status: No active isolations  Assessment/Plan:      Empyema of right pleural space  71yo man w/a history of MDS (s/p vidaza x5, last 3/3/2017), pAF (on anticoagulation), and HTN who was admitted on 3/6/2017 with 1wk of fevers (>101F), progressive SOB, R pleuritic CP, and cough productive of brown/green sputum due to a right likely polymicrobial multifocal pneumonia with an associated empyema (s/p CT insertion on 3/7 with over 5L of sanguinopurulent drainage so far; S.intermedius in fluid cx and Klebsiella in sputum cx). He is improved on IV unasyn with adequate drainage at this point it seems.    - would stop unasyn and transition to oral augmentin for another 2wks  - follow-up in Dr. Rader' clinic (per pt preference) with repeat CXR to ensure stop date seems appropriate and fluid has not reaccumulated      Anticipated Disposition: per primary team    Thank you for your consult. I will sign off. Please contact us if you have any additional questions.     Jessy Briseno MD  Transplant ID Attending  [phone]      Jessy Briseno MD  Infectious Disease  [facility]    Subjective:     Principal Problem:HCAP (healthcare-associated pneumonia)    HPI:   Interval History: Doing well today. Tube output minimal. Water seal planned for today.    Review of Systems   Constitutional: Positive for activity change. Negative for appetite change, chills, diaphoresis and fever.   HENT: Negative for ear pain, mouth sores, sinus pressure and sore throat.    Eyes: Negative for photophobia, pain and redness.   Respiratory: Positive for cough and shortness of breath. Negative for wheezing.    Cardiovascular: Negative for chest pain and leg swelling.    Gastrointestinal: Negative for abdominal distention, abdominal pain, diarrhea and nausea.   Genitourinary: Negative for dysuria, flank pain, frequency and urgency.   Musculoskeletal: Negative for arthralgias, back pain, gait problem and myalgias.   Skin: Negative for pallor and rash.   Neurological: Negative for dizziness, tremors, seizures and headaches.   Psychiatric/Behavioral: Negative for confusion.     Objective:     Vital Signs (Most Recent):  Temp: 98 °F (36.7 °C) (03/18/17 1224)  Pulse: 98 (03/18/17 1300)  Resp: 18 (03/18/17 1224)  BP: (!) 114/57 (03/18/17 1224)  SpO2: 99 % (03/18/17 1224) Vital Signs (24h Range):  Temp:  [97.9 °F (36.6 °C)-98.9 °F (37.2 °C)] 98 °F (36.7 °C)  Pulse:  [] 98  Resp:  [18-20] 18  SpO2:  [99 %-100 %] 99 %  BP: (114-138)/(57-69) 114/57     Weight: 86.2 kg (190 lb)  Body mass index is 26.5 kg/(m^2).    Estimated Creatinine Clearance: 88.9 mL/min (based on Cr of 0.8).    Physical Exam   Constitutional: He is oriented to person, place, and time. He appears well-developed. No distress.   Chronically ill appearing.   HENT:   Head: Atraumatic.   Mouth/Throat: Oropharynx is clear and moist. No oropharyngeal exudate.   Eyes: Conjunctivae and EOM are normal. Pupils are equal, round, and reactive to light. No scleral icterus.   Neck: Neck supple.   Cardiovascular: Normal rate and regular rhythm.  Exam reveals no friction rub.    Murmur heard.  Pulmonary/Chest: Effort normal. No respiratory distress. He has no wheezes. He has rales. He exhibits no tenderness.   Decreased in R base and some crackles. Overall much improved.   Abdominal: Soft. Bowel sounds are normal. He exhibits no distension. There is no tenderness. There is no rebound and no guarding.   Musculoskeletal: Normal range of motion. He exhibits no edema.   Lymphadenopathy:     He has no cervical adenopathy.   Neurological: He is alert and oriented to person, place, and time. No cranial nerve deficit. He exhibits normal  muscle tone. Coordination normal.   Skin: No rash noted. No erythema.       Significant Labs:   CBC:     Recent Labs  Lab 03/17/17  0307 03/18/17  0443   WBC 8.79 7.18   HGB 7.4* 7.4*   HCT 23.0* 24.2*    203     CMP:     Recent Labs  Lab 03/17/17  0307 03/18/17  0443   * 133*   K 3.6 3.8   CL 99 98   CO2 26 29    100   BUN 15 13   CREATININE 0.8 0.8   CALCIUM 7.5* 7.7*   PROT 6.2 6.3   ALBUMIN 1.5* 1.6*   BILITOT 0.7 0.7   ALKPHOS 89 98   AST 41* 48*   ALT 26 30   ANIONGAP 8 6*   EGFRNONAA >60.0 >60.0       Significant Imaging: I have reviewed all pertinent imaging results/findings within the past 24 hours.     3/12 CT chest:  1.  Interval placement of a right-sided pigtail pleural drainage catheter which appears appropriately positioned. There is a persistent loculated right-sided pleural effusion which appears mildly increased in overall volume compared to the prior examination. Correlation with drain output is advised.  2. Persistent patchy consolidation throughout the right middle and lower lobes, mildly improved from prior examination, noting more dense areas of consolidation with associated lack of air bronchograms in the dependent aspect of the right lower lobe.  3. Atherosclerosis.     3/13 CT chest limited (IR):  Significant interval decrease in loculated pleural effusion, with insufficient amount of fluid for drainage.     Microbiology:  3/6 blood cx: negative x2  3/6 urine cx; MSSE (contaminant)  3/6 sputum cx: Kleb oxytoca (R amp, S rest)  3/7 blood cx: negative x2  3/7 pleural fluid cx: S.intermedius (GPC on stain)  3/15 sputum cx: rare WBC/GPC on stain, cx pending

## 2017-03-18 NOTE — NURSING
Pt d/c'd to home. D/C instructions reviewed with pt. Paper prescription given for pain med. Pt verbalized understanding of instruction. VSS. Port-o-cath removed whole/intact without incident. Transported off unit via w/c per transport staff.

## 2017-03-18 NOTE — ASSESSMENT & PLAN NOTE
73yo man w/a history of MDS (s/p vidaza x5, last 3/3/2017), pAF (on anticoagulation), and HTN who was admitted on 3/6/2017 with 1wk of fevers (>101F), progressive SOB, R pleuritic CP, and cough productive of brown/green sputum due to a right likely polymicrobial multifocal pneumonia with an associated empyema (s/p CT insertion on 3/7 with over 5L of sanguinopurulent drainage so far; S.intermedius in fluid cx and Klebsiella in sputum cx). He is improved on IV unasyn with adequate drainage at this point it seems.    - would stop unasyn and transition to oral augmentin for another 2wks  - follow-up in Dr. Rader' clinic (per pt preference) with repeat CXR to ensure stop date seems appropriate and fluid has not reaccumulated

## 2017-03-18 NOTE — PROGRESS NOTES
Progress Note  [facility]    Admit Date: 3/6/2017   LOS: 12 days     SUBJECTIVE:     Follow-up For:  Right Chest Tube, CAP, Empyema    Scheduled Meds:   ampicillin-sulbactim (UNASYN) IVPB  3 g Intravenous Q6H    citalopram  40 mg Oral Daily    diltiaZEM  360 mg Oral Daily    famotidine  20 mg Oral Daily    fluticasone  2 spray Each Nare Daily    metoprolol tartrate  50 mg Oral BID    senna-docusate 8.6-50 mg  1 tablet Oral BID    sodium chloride 0.9%  3 mL Intravenous Q8H     Continuous Infusions:   PRN Meds:acetaminophen, albuterol-ipratropium 2.5mg-0.5mg/3mL, benzonatate, guaifenesin 100 mg/5 ml, hydrocodone-acetaminophen 5-325mg, morphine, trazodone    Review of patient's allergies indicates:   Allergen Reactions    Lipitor [atorvastatin]      Muscle pain    Lisinopril Edema     Cheek swelling       OBJECTIVE:     Vital Signs (Most Recent)  Temp: 98 °F (36.7 °C) (03/18/17 1224)  Pulse: 92 (03/18/17 1224)  Resp: 18 (03/18/17 1224)  BP: (!) 114/57 (03/18/17 1224)  SpO2: 99 % (03/18/17 1224)  Vital Signs Range (Last 24H):  Temp:  [97.9 °F (36.6 °C)-98.9 °F (37.2 °C)]   Pulse:  []   Resp:  [18-20]   BP: (114-138)/(57-69)   SpO2:  [99 %-100 %]   I & O (Last 24H):  Intake/Output Summary (Last 24 hours) at 03/18/17 1330  Last data filed at 03/18/17 0504   Gross per 24 hour   Intake                0 ml   Output             1360 ml   Net            -1360 ml     Ventilator:    Physical Exam:  NAD, A&O  CT in place -- switched to water seal  Port in place - no erythema or drainage  Irregular Irregular  CTAB    Laboratory:  CBC:   Recent Labs  Lab 03/18/17  0443   WBC 7.18   RBC 2.63*   HGB 7.4*   HCT 24.2*      MCV 92   MCH 28.1   MCHC 30.6*     CMP:   Recent Labs  Lab 03/18/17  0443      CALCIUM 7.7*   ALBUMIN 1.6*   PROT 6.3   *   K 3.8   CO2 29   CL 98   BUN 13   CREATININE 0.8   ALKPHOS 98   ALT 30   AST 48*   BILITOT 0.7       ASSESSMENT/PLAN:     1.  CAP  2. Empyema   3. AF    Chest tube switched from suction to water seal  Repeat CXR 2 hours later showed no fluid reaccumulation or pneumothorax  CT removed without incident    Abdi Hernandez MD  Fellow, [facility]  Pager [phone]

## 2017-03-18 NOTE — DISCHARGE SUMMARY
DISCHARGE SUMMARY  BMT    Team: [facility] HEMATOLOGY BMT    Patient Name: Wil Kwon Jr.  YOB: 1945    Admit Date: 3/6/2017    Discharge Date: 03/18/2017    LOS: 12    Discharge Attending Physician: Roddy Driscoll MD   Discharge Resident:  Josie Grover MD    Diagnoses:  Active Hospital Problems    Diagnosis  POA    *HCAP (healthcare-associated pneumonia) [J18.9]  Yes    Cough [R05]  Yes    Hypomagnesemia [E83.42]  No    Atrial fibrillation with RVR [I48.91]  No    Empyema of right pleural space [J86.9]  Yes    Insomnia [G47.00]  Yes    Septic shock [A41.9, R65.21]  Yes    Acute hypoxemic respiratory failure [J96.01]  Yes    BERNICE (acute kidney injury) [N17.9]  Yes    MDS (myelodysplastic syndrome) [D46.9]  Yes    Depression [F32.9]  Yes    Hyponatremia [E87.1]  Yes    Anemia, chronic disease [D63.8]  Yes    Thrombocytopenia [D69.6]  Yes    Essential hypertension [I10]  Yes      Resolved Hospital Problems    Diagnosis Date Resolved POA   No resolved problems to display.       Discharged Condition: Stable    Hospital Course:    Initial Presentation:  72 year old male with history of MDS (s/p Chemo x 5 most recent 3/03/2017), HTN, Afib (coumadin, lopressor), hyperlipidemia, and AS presents to the ED with fever and SOB. Patient reports he was in his usual state of health until Friday shortly after receiving chemotherapy (Vidaza) when he started to experience SOB. This SOB progressively worsened and patient developed a productive cough with brownish green foul tasting sputum. In addition he noticed a progressive right sided chest wall pain that radiated towards his back that is currently a 10/10. His pain is temporarily relived by cough. Patient brother notes patient appeared feverish starting Sunday. Patient denies sick contacts and report he has received the flu shot this year. He reports some nausea and vomiting beginning yesterday, no abdominal pain and normal BMs. Currently  denies HA, or palpitations     Course of Principle Problem for Admission:  Acute hypoxemic respiratory failure   HCAP (healthcare-associated pneumonia)  Empyema of right pleural space  Admitted to ICU and started on broad spectrum antibiotics for suspected HCAP. Initially started on CPAP but was converted to BiPAP which he tolerated well. Underwent thoracentesis and subsequent pigtail placement by IR for fluid accumulation/loculated effusion on 3/7. Pleural fluid culture positive for Strep intermedius (subtype of strep viridans) and respiratory culture +Klebsiella, both sensitive to Ceftriaxone. Patient's antibiotics were deescalated to Ceftriaxone and then changed to Unasyn for anaerobic coverage. He also received several tPA and DNAse for loculated empyema.  CTS signed off and said no VATS.   Chest tube was placed, and pulled on 3/18 prior to dischrage.  Discharged home to complete a course of PO Augmentin.    Other Medical Problems Addressed in the Hospital:  Essential hypertension  Paroxysmal atrial fibrillation with Rapid Ventricular Rate  - Cardiology recommended to increase lopressor. On metoprolol and diltiazem (titrate up as HR/BP tolerates)  - Warfarin held given tPA administration and recent procedures     Anemia, chronic disease  MDS (myelodysplastic syndrome)  Anemia and thrombocytopenia due to chemotherapy  - Transfuse for Hb below 7      Depression  - Continue home dose of Citalopram, will consider stopping given hyponatremia      Hyponatremia  - Improving with fluid restriction  - Also possibly contributed by SSRI    Consults:  Pulm:  Empyema and chest tube placement  Cards:  Afib    Pertinent Lab Findings:  None    Pertinent/Significant Diagnostic Studies:   CT chest    Special Treatments/Procedures:   Thoracentesis  Chest tube placement    Disposition:  Home with Home Health    Future Scheduled Appointments:  Future Appointments  Date Time Provider Department Center   3/22/2017 2:15 PM INJECTION,  [facility] INFUSION [facility] CHEMO Maurilio Montemayor   3/22/2017 3:15 PM Laura Rader MD [facility] CERVANTES [facility] Sim       [facility]    Discharge Medication List:  Reconciled Home Medications:   Current Discharge Medication List      START taking these medications    Details   amoxicillin-clavulanate 875-125mg (AUGMENTIN) 875-125 mg per tablet Take 1 tablet by mouth 2 (two) times daily.  Qty: 28 tablet, Refills: 0      benzonatate (TESSALON) 100 MG capsule Take 1 capsule (100 mg total) by mouth 3 (three) times daily as needed for Cough.  Qty: 30 capsule, Refills: 1      hydrocodone-acetaminophen 5-325mg (NORCO) 5-325 mg per tablet Take 1 tablet by mouth every 6 (six) hours as needed.  Qty: 30 tablet, Refills: 0         CONTINUE these medications which have NOT CHANGED    Details   acetaminophen (TYLENOL) 325 MG tablet Take 325 mg by mouth as needed for Pain.      citalopram (CELEXA) 40 MG tablet TAKE 1 TABLET BY MOUTH DAILY  Qty: 90 tablet, Refills: 0      diltiaZEM (CARDIZEM CD) 300 MG 24 hr capsule TAKE ONE CAPSULE BY MOUTH EVERY DAY  Qty: 90 capsule, Refills: 1      metoprolol succinate (TOPROL-XL) 25 MG 24 hr tablet Take 1 tablet (25 mg total) by mouth once daily.  Qty: 90 tablet, Refills: 3    Comments: **Patient requests 90 days supply**      multivitamin capsule Take 1 capsule by mouth every morning.       nystatin (MYCOSTATIN) ointment Apply topically 3 (three) times daily.  Qty: 1 Tube, Refills: 1    Associated Diagnoses: Tinea      omega-3 fatty acids-vitamin E (FISH OIL) 1,000 mg Cap Take 3 capsules by mouth once daily.       ondansetron (ZOFRAN) 8 MG tablet Take 1 tablet (8 mg total) by mouth every 8 (eight) hours as needed for Nausea.  Qty: 30 tablet, Refills: 2    Associated Diagnoses: Nausea      pravastatin (PRAVACHOL) 20 MG tablet Take 1 tablet (20 mg total) by mouth every other day.  Qty: 45 tablet, Refills: 3    Associated Diagnoses: Dyslipidemia; H/O carotid  endarterectomy; HTN (hypertension), benign; Aortic valve stenosis, mild      warfarin (COUMADIN) 3 MG tablet Take 1 tablet (3 mg total) by mouth Daily.  Qty: 30 tablet, Refills: 11    Associated Diagnoses: MDS (myelodysplastic syndrome); Atrial fibrillation with RVR; Bilateral carotid artery stenosis; HTN (hypertension), benign; Pulmonary hypertension; Other depression; Dyslipidemia         STOP taking these medications       losartan (COZAAR) 50 MG tablet Comments:   Reason for Stopping:               At the time of discharge, patient was advised to take all medications as directed, and to follow up with all future appointments.  Patient was informed to return to the ER if symptoms worsen or fail to resolve.      Signing Physician:  Josie Grover MD  Med PGY3  [phone]

## 2017-03-18 NOTE — PROGRESS NOTES
Progress Note  BMT                                                              Team: [facility] HEMATOLOGY BMT    Patient Name: Wil Kwon Jr.  YOB: 1945    Admit Date: 3/6/2017                   LOS: 12    SUBJECTIVE:     Reason for Admission: HCAP (healthcare-associated pneumonia)    Subjective and Interval History:   No acute events over night.  Chest tube remains with decreasing drainage, 60cc overnight.  Says he slept well and is hopeful to go home today.    Review of Systems:  General:  Denies weight loss, fever, chills, weakness,(+) fatigue  HEENT:  Denies vision changes, sore throat, congestion  CVS:  Denies chest pain, pressure, palpitations  Resp: (+) shortness of breath, cough, sputum  GI:  Denies diarrhea, constipation, abdominal pain, nausea, vomiting  :  Denies dysuria, hematuria, nocturia  MSK:  Denies myalgias, arthralgias  Skin:  Denies rashes, bleeding  Neuro:  Denies headache, dizziness, syncope, numbness, paresthesias  All other systems otherwise negative    Scheduled Medications   ampicillin-sulbactim (UNASYN) IVPB  3 g Intravenous Q6H    citalopram  40 mg Oral Daily    diltiaZEM  360 mg Oral Daily    famotidine  20 mg Oral Daily    fluticasone  2 spray Each Nare Daily    metoprolol tartrate  50 mg Oral BID    oxymetazoline  2 spray Each Nare BID    senna-docusate 8.6-50 mg  1 tablet Oral BID    sodium chloride 0.9%  3 mL Intravenous Q8H       Continuous Infusions       PRN Medications  acetaminophen, albuterol-ipratropium 2.5mg-0.5mg/3mL, benzonatate, guaifenesin 100 mg/5 ml, hydrocodone-acetaminophen 5-325mg, morphine, trazodone    OBJECTIVE:     Temp:  [97.9 °F (36.6 °C)-98.9 °F (37.2 °C)]   Pulse:  []   Resp:  [18-20]   BP: (116-138)/(55-69)   SpO2:  [98 %-100 %]   Body mass index is 26.5 kg/(m^2).    Intake/Output Summary    Intake/Output Summary (Last 24 hours) at 03/18/17 0618  Last data filed at 03/18/17 0504   Gross per 24 hour   Intake                 0 ml   Output             1360 ml   Net            -1360 ml       Physical Exam:  General:  Well developed, well nourished, no acute distress  HEENT:  Normocephalic, atraumatic, EOMI, clear sclera, throat clear without erythema or exudates  CVS:  RRR, S1 and S2 normal, no murmurs, rubs, gallops  Resp:  Lungs clear to auscultation, no wheezes, rales, rhonchi, cough.  Right chest tube with minimal drainage  GI:  Abdomen soft, non-tender, non-distended, normoactive bowel sounds, no organomegaly  :  Deferred  MSK:  No muscle atrophy, cyanosis, peripheral edema  Skin:  No rashes, ulcers, erythema  Neuro:  CNII-XII grossly intact  Psych:  Alert and oriented to person, place, and time    Diagnostic Result    Lab Results   Component Value Date    WBC 8.79 03/17/2017    HGB 7.4 (L) 03/18/2017    HCT 24.2 (L) 03/18/2017     03/18/2017         Recent Labs  Lab 03/18/17  0443   *   K 3.8   CL 98   CO2 29   BUN 13   CREATININE 0.8   MG 1.7   PHOS 3.7       Lab Results   Component Value Date    INR 1.1 03/18/2017    INR 1.1 03/17/2017    INR 1.1 03/16/2017       No results for input(s): POCTGLUCOSE in the last 72 hours.    ASSESSMENT/PLAN:   Mr. Wil Kwon Jr. is a 72 y.o. male     Current Problems List:  Active Hospital Problems    Diagnosis  POA    *HCAP (healthcare-associated pneumonia) [J18.9]  Yes    Cough [R05]  Yes    Hypomagnesemia [E83.42]  No    Atrial fibrillation with RVR [I48.91]  No    Empyema of right pleural space [J86.9]  Yes    Insomnia [G47.00]  Yes    Septic shock [A41.9, R65.21]  Yes    Acute hypoxemic respiratory failure [J96.01]  Yes    BERNICE (acute kidney injury) [N17.9]  Yes    MDS (myelodysplastic syndrome) [D46.9]  Yes    Depression [F32.9]  Yes    Hyponatremia [E87.1]  Yes    Anemia, chronic disease [D63.8]  Yes    Thrombocytopenia [D69.6]  Yes    Essential hypertension [I10]  Yes      Resolved Hospital Problems    Diagnosis Date Resolved POA   No resolved problems  to display.       Active Problems, Status & Treatment Plan Addressed Today:    HCAP (healthcare-associated pneumonia)  Acute hypoxemic respiratory failure   Empyema of right pleural space  - Afebrile and leukocytosis resolved  - Likely 2/2 to suspected HCAP given CXR concerning for right lower lob PNA with respiratory sputum cx +Klebsiella and pleural fluid cx +strep viridans , both sensitive to ceftriaxone . S/p Pigtail placement by IR 3/7 and s/p tPA for right loculated empyema. Repeat CT chest 3/13 with dramatic improvement.   - Chest tube remains, will need <100cc in order to Pulm to pull.  Seems about 75cc overnight.  IR states significant improvement per CT on 3/13 - declined a second chest tube  - Continue Unasyn day 9.  ID consulted, appreciate assistance  - Repeat sputum cx 3/15 pending  - Tessalon Perles and Robitussin prn      Essential hypertension  Paroxysmal atrial fibrillation with Rapid Ventricular Rate  - Cardiology recommended to increase lopressor.  On metoprolol and diltiazem (titrate up as HR/BP tolerates)  - Warfarin held given tPA administration and recent procedures      Anemia, chronic disease  MDS (myelodysplastic syndrome)  Anemia and thrombocytopenia due to chemotherapy  - Hemoglobin 7.4  - Platelets 167  - INR elevated Vitamin K x 3 days   - Transfuse for Hb below 7     Depression  - continue home dose of citalopram, will consider stopping given hyponatremia     Hyponatremia  - Improving  - Likely 2/2 SSRI  - Fluid restict    Diet:  Cardiac  DVT PPx:  Mechanical  Code Status:  Full      Dispo:  Pending removal of chest tube either today or tomorrow, then home with home health.         Signing Physician:  Josie Grover MD  Med PGY3  [phone]

## 2017-03-20 NOTE — PROGRESS NOTES
This Oncology Social Worker was on call this past weekend. Follow up re: d/c arrangements. Patient was d/c'ed home on Sat. 3/18 evening. Home health services through [facility] to begin with nurse admission assessment visit Mon. 3/20. Called agency at [phone] to notify of pt's d/c. Per conversation with Tamera at agency this morning, patient will be seen either today or tomorrow to be admitted for services. Hospital bed through [facility] to be delivered to patient's home on Mon. 3/20. Called agency at [phone] to notify of pt's d/c. Spoke with Yomaira at ext. [phone] this morning to confirm hospital bed delivery today. Called patient at his home phone number to discuss arrangements. Patient in agreement with home health services, but now states that he feels stronger. And doesn't feel he needs the hospital bed; he conferred with a family member while I was on the phone with him, and doesn't want to get the hospital bed at this time; he will call back if the situation changes. Called Yomaira back to notify her and she will inform staff. No other needs indicated at this time.

## 2017-03-22 ENCOUNTER — INFUSION (OUTPATIENT)
Dept: INFUSION THERAPY | Facility: HOSPITAL | Age: 72
End: 2017-03-22
Attending: INTERNAL MEDICINE
Payer: MEDICARE

## 2017-03-22 ENCOUNTER — OFFICE VISIT (OUTPATIENT)
Dept: HEMATOLOGY/ONCOLOGY | Facility: CLINIC | Age: 72
End: 2017-03-22
Payer: MEDICARE

## 2017-03-22 VITALS
HEART RATE: 133 BPM | DIASTOLIC BLOOD PRESSURE: 79 MMHG | TEMPERATURE: 100 F | BODY MASS INDEX: 25.96 KG/M2 | RESPIRATION RATE: 12 BRPM | HEIGHT: 71 IN | WEIGHT: 185.44 LBS | SYSTOLIC BLOOD PRESSURE: 138 MMHG

## 2017-03-22 DIAGNOSIS — R21 RASH: ICD-10-CM

## 2017-03-22 DIAGNOSIS — D46.9 MDS (MYELODYSPLASTIC SYNDROME): ICD-10-CM

## 2017-03-22 DIAGNOSIS — D46.9 MDS (MYELODYSPLASTIC SYNDROME): Primary | ICD-10-CM

## 2017-03-22 DIAGNOSIS — I10 ESSENTIAL HYPERTENSION: ICD-10-CM

## 2017-03-22 DIAGNOSIS — F32.A DEPRESSION, UNSPECIFIED DEPRESSION TYPE: ICD-10-CM

## 2017-03-22 DIAGNOSIS — L27.0 RASH, DRUG: ICD-10-CM

## 2017-03-22 DIAGNOSIS — I48.0 PAROXYSMAL ATRIAL FIBRILLATION: ICD-10-CM

## 2017-03-22 DIAGNOSIS — I65.23 BILATERAL CAROTID ARTERY STENOSIS: ICD-10-CM

## 2017-03-22 DIAGNOSIS — I35.0 AORTIC STENOSIS, MODERATE: ICD-10-CM

## 2017-03-22 DIAGNOSIS — D63.8 ANEMIA, CHRONIC DISEASE: ICD-10-CM

## 2017-03-22 DIAGNOSIS — E78.5 DYSLIPIDEMIA: ICD-10-CM

## 2017-03-22 LAB
ALBUMIN SERPL BCP-MCNC: 2 G/DL
ALP SERPL-CCNC: 91 U/L
ALT SERPL W/O P-5'-P-CCNC: 38 U/L
ANION GAP SERPL CALC-SCNC: 7 MMOL/L
AST SERPL-CCNC: 54 U/L
BASOPHILS # BLD AUTO: 0.02 K/UL
BASOPHILS NFR BLD: 0.5 %
BILIRUB SERPL-MCNC: 0.5 MG/DL
BUN SERPL-MCNC: 12 MG/DL
CALCIUM SERPL-MCNC: 7.9 MG/DL
CHLORIDE SERPL-SCNC: 103 MMOL/L
CO2 SERPL-SCNC: 24 MMOL/L
CREAT SERPL-MCNC: 0.9 MG/DL
DIFFERENTIAL METHOD: ABNORMAL
EOSINOPHIL # BLD AUTO: 0 K/UL
EOSINOPHIL NFR BLD: 0.5 %
ERYTHROCYTE [DISTWIDTH] IN BLOOD BY AUTOMATED COUNT: 18.7 %
EST. GFR  (AFRICAN AMERICAN): >60 ML/MIN/1.73 M^2
EST. GFR  (NON AFRICAN AMERICAN): >60 ML/MIN/1.73 M^2
GLUCOSE SERPL-MCNC: 103 MG/DL
HCT VFR BLD AUTO: 23.2 %
HGB BLD-MCNC: 7 G/DL
LYMPHOCYTES # BLD AUTO: 1 K/UL
LYMPHOCYTES NFR BLD: 26.3 %
MCH RBC QN AUTO: 28.5 PG
MCHC RBC AUTO-ENTMCNC: 30.2 %
MCV RBC AUTO: 94 FL
MONOCYTES # BLD AUTO: 0.1 K/UL
MONOCYTES NFR BLD: 1.3 %
NEUTROPHILS # BLD AUTO: 2.7 K/UL
NEUTROPHILS NFR BLD: 71.4 %
PLATELET # BLD AUTO: 305 K/UL
PMV BLD AUTO: 11.8 FL
POTASSIUM SERPL-SCNC: 3.9 MMOL/L
PROT SERPL-MCNC: 7 G/DL
RBC # BLD AUTO: 2.46 M/UL
SODIUM SERPL-SCNC: 134 MMOL/L
WBC # BLD AUTO: 3.88 K/UL

## 2017-03-22 PROCEDURE — 3078F DIAST BP <80 MM HG: CPT | Mod: S$GLB,,, | Performed by: INTERNAL MEDICINE

## 2017-03-22 PROCEDURE — 1159F MED LIST DOCD IN RCRD: CPT | Mod: S$GLB,,, | Performed by: INTERNAL MEDICINE

## 2017-03-22 PROCEDURE — 25000003 PHARM REV CODE 250: Performed by: INTERNAL MEDICINE

## 2017-03-22 PROCEDURE — 85025 COMPLETE CBC W/AUTO DIFF WBC: CPT

## 2017-03-22 PROCEDURE — 99999 PR PBB SHADOW E&M-EST. PATIENT-LVL IV: CPT | Mod: PBBFAC,,, | Performed by: INTERNAL MEDICINE

## 2017-03-22 PROCEDURE — 1157F ADVNC CARE PLAN IN RCRD: CPT | Mod: S$GLB,,, | Performed by: INTERNAL MEDICINE

## 2017-03-22 PROCEDURE — 1126F AMNT PAIN NOTED NONE PRSNT: CPT | Mod: S$GLB,,, | Performed by: INTERNAL MEDICINE

## 2017-03-22 PROCEDURE — 36591 DRAW BLOOD OFF VENOUS DEVICE: CPT

## 2017-03-22 PROCEDURE — 99215 OFFICE O/P EST HI 40 MIN: CPT | Mod: S$GLB,,, | Performed by: INTERNAL MEDICINE

## 2017-03-22 PROCEDURE — 80053 COMPREHEN METABOLIC PANEL: CPT

## 2017-03-22 PROCEDURE — 1160F RVW MEDS BY RX/DR IN RCRD: CPT | Mod: S$GLB,,, | Performed by: INTERNAL MEDICINE

## 2017-03-22 PROCEDURE — 3075F SYST BP GE 130 - 139MM HG: CPT | Mod: S$GLB,,, | Performed by: INTERNAL MEDICINE

## 2017-03-22 RX ORDER — SODIUM CHLORIDE 0.9 % (FLUSH) 0.9 %
10 SYRINGE (ML) INJECTION
Status: DISCONTINUED | OUTPATIENT
Start: 2017-03-22 | End: 2017-03-22 | Stop reason: HOSPADM

## 2017-03-22 RX ORDER — METHYLPREDNISOLONE 4 MG/1
4 TABLET ORAL DAILY
Qty: 1 PACKAGE | Refills: 0 | Status: SHIPPED | OUTPATIENT
Start: 2017-03-22 | End: 2017-04-19

## 2017-03-22 RX ORDER — DIPHENHYDRAMINE HCL 25 MG
25 CAPSULE ORAL
Status: CANCELLED | OUTPATIENT
Start: 2017-03-22

## 2017-03-22 RX ORDER — FUROSEMIDE 10 MG/ML
20 INJECTION INTRAMUSCULAR; INTRAVENOUS
Status: CANCELLED | OUTPATIENT
Start: 2017-03-22

## 2017-03-22 RX ORDER — HYDROCODONE BITARTRATE AND ACETAMINOPHEN 500; 5 MG/1; MG/1
TABLET ORAL ONCE
Status: CANCELLED | OUTPATIENT
Start: 2017-03-22 | End: 2017-03-22

## 2017-03-22 RX ORDER — CLINDAMYCIN HYDROCHLORIDE 300 MG/1
300 CAPSULE ORAL 3 TIMES DAILY
Qty: 63 CAPSULE | Refills: 0 | Status: SHIPPED | OUTPATIENT
Start: 2017-03-22 | End: 2017-04-05

## 2017-03-22 RX ORDER — FLUCONAZOLE 200 MG/1
200 TABLET ORAL ONCE
Qty: 10 TABLET | Refills: 2 | Status: SHIPPED | OUTPATIENT
Start: 2017-03-22 | End: 2017-03-22

## 2017-03-22 RX ORDER — HEPARIN 100 UNIT/ML
500 SYRINGE INTRAVENOUS
Status: COMPLETED | OUTPATIENT
Start: 2017-03-22 | End: 2017-03-22

## 2017-03-22 RX ADMIN — SODIUM CHLORIDE, PRESERVATIVE FREE 10 ML: 5 INJECTION INTRAVENOUS at 02:03

## 2017-03-22 RX ADMIN — SODIUM CHLORIDE, PRESERVATIVE FREE 500 UNITS: 5 INJECTION INTRAVENOUS at 02:03

## 2017-03-22 NOTE — Clinical Note
Stop Augmentin  Start Clindamycin and Medrol Pack  Stop Pravastatin  No other change in meds  Come in for blood tomorrow  Get Aranesp Tomorrow  See me in 1 week

## 2017-03-22 NOTE — NURSING
Port accessed without difficulty to obtain lab specimens.  Labs sent for processing.  Port flushed and heplocked and remained accessed.  NAD noted upon discharge for Dr Cele leblanc.

## 2017-03-22 NOTE — PROGRESS NOTES
"PATIENT: Wil Kwon Jr.  MRN: [id number]  DATE: 3/22/2017    Subjective:     Chief complaint:  Chief Complaint   Patient presents with    MDS       Oncologic History:  Mr. Kwon 71-year-old gentleman with several chronic medical conditions living healthy lifestyle. He has hypertension and reports home readings are all normal. He did recently develop angioedema from ACE inhibitor and was switched to an ARB. His dyslipidemia is treated with pharmacologic therapy. He has history of carotid atherosclerotic disease status post left carotid endarterectomy and up-to-date with surveillance carotid Doppler study. He has a history of PAF followed by our electrophysiology cardiology department and is chronically anticoagulated. He has moderate aortic stenosis. He has DJD of the right knee which has become more symptomatic in recent months. He was noted to be progressively anemic over the last 1 year. He has hence been referred to see us. His hematology workup to date reveals a normal B12 and folate. His iron stores are normal. His reticulocyte count is slightly elevated at 4.2. His white count has remained low and progressively dropping over the last 3 years with monocytosis. He also has developed mild progressive thrombocytopenia. He is moderately symptomatic with the fatigue. He has no history of extrinsic GI bleeding. He also has no history of previous transfusions.he underwenta marrow. 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 Of 20 metaphases, 2 metaphases were normal and 18 metaphases had a 2;21  translocation. 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 RUNX1 rearrangements are  associated with de alfredo AML and therapeutic-related AML and MDS. Clinical and pathologic correlation is  recommended."  FINAL PATHOLOGIC DIAGNOSIS  CBC " DATA 8/24/16:  RBC: 3.45 M/UL, H/H : 9.9/32.1, MCV : 93 FL, WBC: 3.1 K/UL, Gran 1.2 k/ul %, Platelet: 200 K/UL.  No peripheral blood smear was submitted for evaluation.  BONE MARROW ASPIRATE, BONE MARROW CLOT, AND BONE MARROW CORE BIOPSY WITH:  CELLULARITY= 95%, TRILINEAGE HEMATOPOIETIC ACTIVITY (M/E= 2:1) AND INCREASED IMMATURE  CELLS (9%). SEE COMMENT.  LEFT SHIFTED MATURATION OF GRANULOPOIESIS.  ADEQUATE STORAGE IRON.  ADEQUATE NUMBER OF MEGAKARYOCYTES.  COMMENT: Flow cytometry analysis of bone marrow aspirate shows lymph gate (4.2 %) containing T and B cells.  No B cell clonality or T-cell aberrancy is evident. Blast gate is increased (7.9%) with cells expression of CD13 and  CD34. Immunohistochemical studies were performed on the clot and core biopsy for further architecture evaluation with  adequate positive and negative controls. Scattered mixed predominantly T cells (CD3 positive) and B cells (CD20  positive) are evident. About 6-7 % plasma cells ( positive) and 9% CD34 positive cells are noted. Findings  are nondiagnostic for lymphoma or plasma cell dyscrasia.  Microscopic Examination  BONE MARROW ASPIRATE DIFFERENTIAL:  Blasts----------------------------------------------5%  Promyelocytes---------------------------------2%  Myelocytes--------------------------------------11%  Metamyelocytes------------------------------ 19%  Bands----------------------------------------------5%  PMN Neutrophils------------------------------15%  Eosinophils------------------------------------------2%  Basophils---------------------------------------0%  Monocytes------------------------------------2%  Lymphocytes-------------------------------------6%  Plasma cells------------------------------------------2%  Erythroid precursors--------------------------31%  BONE MARROW ASPIRATE:  Myeloid and erythroid maturation shows M:E ratio at 2:1. No significant dysplasia is evident. Left shifted maturation of  granulopoiesis is  noted. Stainable iron is present without increased ringed sideroblasts. Megakaryocytes are seen.  BONE MARROW CLOT:  Cellularity is 95 % with trilineage hematopoiesis. No abnormal infiltrates are seen. Megakaryocytes are adequate in  number. Stainable iron is present.  BONE MARROW CORE BIOPSY:  Cellularity is 95 %. No abnormal infiltrates are seen. Stainable iron is not identified. Megakaryocytes are adequate in  No significantly increased reticular fibrosis is evident by special stain (reticulin) with adequate positive and  negative controls.     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 He did not meet criteria for AML.  I started him on HMA,s and monitor for response. I discussed an Allo BMT with him also.He was also started on Aranesp.     He has started Vidaza and currently post  3rd cycle. He is also receiving Aranesp. I repeated a marrow on him and he is here post marrow for results and plan.     His repeat marrow revealed     BONE MARROW ASPIRATE, BONE MARROW CLOT, AND BONE MARROW CORE BIOPSY WITH:  CELLULARITY= %, TRILINEAGE HEMATOPOIETIC ACTIVITY (M/E= 1.4:1).  CONSISTENT WITH REFRACTORY ANEMIA WITH EXCESS BLASTS -1. SEE COMMENT.  DECREASED STORAGE IRON.  INCREASED NUMBER OF MEGAKARYOCYTES WITH DYSPLASTIC MORPHOLOGY.  COMMENT: Flow cytometry analysis of bone marrow aspirate shows lymph gate (15.9 %) containing T and B cells.  No B cell clonality or T-cell aberrancy is evident. Blast gate is slightly increased (7.1%).  Immunohistochemical studies were performed on the clot and core biopsy for further architecture evaluation with  adequate positive and negative controls. About 6-7% CD34 positive cells are noted. CD61 highlights increased in  number of megakaryocytes with dysplastic morphology.  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 Correlate clinically and with a cytogenetics  report.  Microscopic Examination  BONE MARROW ASPIRATE  DIFFERENTIAL:  Blasts---------------------------------------------- 7 %  Promyelocytes---------------------------------1%  Myelocytes--------------------------------------10%  Metamyelocytes------------------------------ 8%  Bands----------------------------------------------9%  PMN Neutrophils------------------------------15%  Eosinophils------------------------------------------1%  Basophils---------------------------------------1%  Monocytes------------------------------------1%  Lymphocytes-------------------------------------9%  Plasma cells------------------------------------------1%  Erythroid precursors--------------------------37%  BONE MARROW ASPIRATE:  Myeloid and erythroid maturation shows M:E ratio at 1.4:1. Dysgranulopoiesis and occasional dyserythropoiesis is  evident. Stainable iron is decreased without increased ringed sideroblasts. Megakaryocytes are seen.  BONE MARROW CLOT:  Cellularity is  % with trilineage hematopoiesis. No abnormal infiltrates are seen. Megakaryocytes are increased  in number with dysplastic morphology. Stainable iron is decreased.  BONE MARROW CORE BIOPSY:  Cellularity is  %. No abnormal infiltrates are seen. Stainable iron is not identified. Megakaryocytes are  increased in number with dysplastic morphology.     His disease appears stable by Blast count. I will await cytogenetics. I will refer him for a BMT eval to Dr Chino.   He will continue his Vidaza and Aranesp in the interim.    Last Vidaza was cycle 5 day 5 on 3/3/17.      He was recently hospitalized 3/6/17 thru 3/18/17 with fever and SOB. Admitted to ICU and started on broad spectrum antibiotics for suspected HCAP. Initially started on CPAP but was converted to BiPAP which he tolerated well. Underwent thoracentesis and subsequent pigtail placement by IR for fluid accumulation/loculated effusion on 3/7. Pleural fluid culture positive for Strep intermedius (subtype of strep viridans) and respiratory culture  +Klebsiella, both sensitive to Ceftriaxone. Patient's antibiotics were deescalated to Ceftriaxone and then changed to Unasyn for anaerobic coverage. He also received several tPA and DNAse for loculated empyema. CTS signed off and said no VATS.  Chest tube was placed, and pulled on 3/18 prior to dischrage. Discharged home to complete a course of PO Augmentin.    Interval History: Mr. Kwon returns for follow up. He was recently hospitalized as described above. He is feeling weak today. He developed a rash to chest wall today while waiting for an appointment. He feels its related to being hot. No new medications today except Augmentin which he started 3/18/17. He ate soup for lunch. He reports having a rash to inner groin since hospitalization but it seems to be healing with gold bond. He denies any nausea, vomiting, diarrhea, constipation, abdominal pain, weight loss or loss of appetite, chest pain, shortness of breath, leg swelling, fatigue, pain, headache, dizziness, or mood changes. he is accompanied by his wife and family member.     PMFSH: all information reviewed and updated as relevant to today's visit    Review of Systems:   Review of Systems   Constitutional: Positive for fatigue. Negative for activity change, appetite change and fever.   HENT: Negative for mouth sores, nosebleeds and sore throat.    Eyes: Negative for visual disturbance.   Respiratory: Negative for cough and shortness of breath.    Cardiovascular: Negative for chest pain, palpitations and leg swelling.   Gastrointestinal: Negative for abdominal pain, constipation, diarrhea, nausea and vomiting.   Genitourinary: Negative for difficulty urinating and frequency.   Musculoskeletal: Negative for arthralgias and back pain.   Skin: Positive for rash.   Neurological: Negative for dizziness, numbness and headaches.   Hematological: Negative for adenopathy. Does not bruise/bleed easily.   Psychiatric/Behavioral: Negative for confusion and sleep  "disturbance. The patient is not nervous/anxious.    All other systems reviewed and are negative.        Objective:      Vitals:   Vitals:    03/22/17 1442   BP: 138/79   BP Location: Right arm   Patient Position: Sitting   BP Method: Automatic   Pulse: (!) 133   Resp: 12   Temp: 99.7 °F (37.6 °C)   Weight: 84.1 kg (185 lb 6.5 oz)   Height: 5' 11" (1.803 m)     BMI: Body mass index is 25.86 kg/(m^2).      Physical Exam:   Physical Exam   Constitutional: He is oriented to person, place, and time. He appears well-developed and well-nourished. No distress.   HENT:   Right Ear: External ear normal.   Left Ear: External ear normal.   Mouth/Throat: No oropharyngeal exudate.   Eyes: Conjunctivae and lids are normal. Pupils are equal, round, and reactive to light. No scleral icterus.   Neck: Trachea normal and normal range of motion. Neck supple. No thyromegaly present.   Cardiovascular: Normal rate, normal heart sounds and normal pulses.    irregular   Pulmonary/Chest: Effort normal.   Decreased breath sounds to right posterior lobe.    Abdominal: Soft. Normal appearance and bowel sounds are normal. He exhibits no distension and no mass. There is no hepatosplenomegaly or splenomegaly. There is no tenderness.   Musculoskeletal: Normal range of motion.   Lymphadenopathy:        Head (right side): No submental and no submandibular adenopathy present.        Head (left side): No submental and no submandibular adenopathy present.     He has no cervical adenopathy.     He has no axillary adenopathy.        Right: No supraclavicular adenopathy present.        Left: No supraclavicular adenopathy present.   Neurological: He is alert and oriented to person, place, and time. He has normal reflexes. No sensory deficit.   Skin: Skin is warm, dry and intact. Rash (maculopapular) noted. No bruising noted. Nails show no clubbing.   Drug reaction rash to chest/back and extremities.    Psychiatric: He has a normal mood and affect. His speech " is normal and behavior is normal. Cognition and memory are normal.   Vitals reviewed.        Laboratory Data:  WBC   Date Value Ref Range Status   03/22/2017 3.88 (L) 3.90 - 12.70 K/uL Final     Hemoglobin   Date Value Ref Range Status   03/22/2017 7.0 (L) 14.0 - 18.0 g/dL Final     POC Hematocrit   Date Value Ref Range Status   07/07/2016 28 (L) 36 - 54 %PCV Final     Hematocrit   Date Value Ref Range Status   03/22/2017 23.2 (L) 40.0 - 54.0 % Final     Platelets   Date Value Ref Range Status   03/22/2017 305 150 - 350 K/uL Final     Gran #   Date Value Ref Range Status   03/22/2017 2.7 1.8 - 7.7 K/uL Final     Gran%   Date Value Ref Range Status   03/22/2017 71.4 38.0 - 73.0 % Final       Chemistry        Component Value Date/Time     (L) 03/22/2017 1417    K 3.9 03/22/2017 1417     03/22/2017 1417    CO2 24 03/22/2017 1417    BUN 12 03/22/2017 1417    CREATININE 0.9 03/22/2017 1417     03/22/2017 1417        Component Value Date/Time    CALCIUM 7.9 (L) 03/22/2017 1417    ALKPHOS 91 03/22/2017 1417    AST 54 (H) 03/22/2017 1417    ALT 38 03/22/2017 1417    BILITOT 0.5 03/22/2017 1417              Assessment/Plan:     1. MDS (myelodysplastic syndrome)    2. Anemia, chronic disease    3. Essential hypertension    4. Bilateral carotid artery stenosis    5. Aortic stenosis, moderate    6. Depression, unspecified depression type    7. Paroxysmal atrial fibrillation    8. Rash    9. Dyslipidemia    10. Rash, drug        Plan:    His MDS therapy is on hold until he recovers from his empyema.    He has been Augmentin for his empyema but has developed a rash. I will change to Clinda.    I will treat rash with Benadryl and Medrol pack. This appears to be a drug rash.    I will give him 2 U PRBC due to symptoms and will recheck Fe stores and start Aranesp.    He has a perineal rash and I will treat with Diflucan.    He will continue AC for A fib.    His HTN, Depression appear stable.      Med and  Orders:  Orders Placed This Encounter    CBC Oncology    Comprehensive metabolic panel    Iron and TIBC    Ferritin    Protime-INR    Ambulatory Referral to Chemotherapy Infusion    Type & Screen    clindamycin (CLEOCIN) 300 MG capsule    methylPREDNISolone (MEDROL DOSEPACK) 4 mg tablet    fluconazole (DIFLUCAN) 200 MG Tab    Prepare RBC 2 Units; symptoms       Follow Up:  Return in about 1 week (around 3/29/2017).    Patient instructions:   Patient Instructions   Stop Augmentin    Start Clindamycin and Medrol Pack    Stop Pravastatin    No other change in meds    Come in for blood tomorrow    Get Aranesp Tomorrow    See me in 1 week

## 2017-03-22 NOTE — MR AVS SNAPSHOT
[facility]  [address] [facility] [address]  Phone: [phone]                  Wil Kwon Jr.   3/22/2017 3:15 PM   Office Visit    Description:  Male : 1945   Provider:  Laura Rader MD   Department:  [facility]           Reason for Visit     MDS           Diagnoses this Visit        Comments    MDS (myelodysplastic syndrome)    -  Primary     Anemia, chronic disease         Essential hypertension         Bilateral carotid artery stenosis         Aortic stenosis, moderate         Depression, unspecified depression type         Paroxysmal atrial fibrillation         Rash         Dyslipidemia         Rash, drug                To Do List           Future Appointments        Provider Department Dept Phone    3/23/2017 9:00 AM [facility], [facility] [phone]    3/29/2017 11:15 AM Laura Rader MD [facility] [phone]      Goals (5 Years of Data)     None      Follow-Up and Disposition     Return in about 1 week (around 3/29/2017).       These Medications        Disp Refills Start End    clindamycin (CLEOCIN) 300 MG capsule 63 capsule 0 3/22/2017 2017    Take 1 capsule (300 mg total) by mouth 3 (three) times daily. - Oral    Pharmacy: [facility] #[id number] - [address] Ph #: [phone]       methylPREDNISolone (MEDROL DOSEPACK) 4 mg tablet 1 Package 0 3/22/2017 3/22/2018    Take 1 tablet (4 mg total) by mouth once daily. use as directed - Oral    Pharmacy: [facility] #[id number] [address] Ph #: [phone]       fluconazole (DIFLUCAN) 200 MG Tab 10 tablet 2 3/22/2017 3/22/2017    Take 1 tablet (200 mg total) by mouth once. - Oral    Pharmacy: [facility] #[id number] [address] Ph #: [phone]         [facility]     [facility] Nurse Care [facility] - 24/7 Assistance  Registered nurses in the [facility] provide clinical advisement, health education, appointment booking, and other advisory services.  Call for this free service at [phone].             Medications           Message regarding Medications     Verify the changes and/or additions to your medication regime listed below are the same as discussed with your clinician today.  If any of these changes or additions are incorrect, please notify your healthcare provider.        START taking these NEW medications        Refills    clindamycin (CLEOCIN) 300 MG capsule 0    Sig: Take 1 capsule (300 mg total) by mouth 3 (three) times daily.    Class: Normal    Route: Oral    methylPREDNISolone (MEDROL DOSEPACK) 4 mg tablet 0    Sig: Take 1 tablet (4 mg total) by mouth once daily. use as directed    Class: Normal    Route: Oral    fluconazole (DIFLUCAN) 200 MG Tab 2    Sig: Take 1 tablet (200 mg total) by mouth once.    Class: Normal    Route: Oral      STOP taking these medications     pravastatin (PRAVACHOL) 20 MG tablet Take 1 tablet (20 mg total) by mouth every other day.    amoxicillin-clavulanate 875-125mg (AUGMENTIN) 875-125 mg per tablet Take 1 tablet by mouth 2 (two) times daily.           Verify that the below list of medications is an accurate representation of the medications you are currently taking.  If none reported, the list may be blank. If incorrect, please contact your healthcare provider. Carry this list with you in case of emergency.           Current Medications     acetaminophen (TYLENOL) 325 MG tablet Take 325 mg by mouth as needed for Pain.    benzonatate (TESSALON) 100 MG capsule Take 1 capsule (100 mg total) by mouth 3 (three) times daily as needed for Cough.    citalopram (CELEXA) 40 MG tablet TAKE 1 TABLET BY MOUTH DAILY    clindamycin (CLEOCIN) 300 MG capsule Take 1 capsule (300 mg total) by mouth 3 (three) times daily.    diltiaZEM (CARDIZEM CD) 300 MG 24 hr capsule TAKE ONE CAPSULE BY MOUTH EVERY DAY    fluconazole  "(DIFLUCAN) 200 MG Tab Take 1 tablet (200 mg total) by mouth once.    hydrocodone-acetaminophen 5-325mg (NORCO) 5-325 mg per tablet Take 1 tablet by mouth every 6 (six) hours as needed.    methylPREDNISolone (MEDROL DOSEPACK) 4 mg tablet Take 1 tablet (4 mg total) by mouth once daily. use as directed    metoprolol succinate (TOPROL-XL) 25 MG 24 hr tablet Take 1 tablet (25 mg total) by mouth once daily.    multivitamin capsule Take 1 capsule by mouth every morning.     nystatin (MYCOSTATIN) ointment Apply topically 3 (three) times daily.    omega-3 fatty acids-vitamin E (FISH OIL) 1,000 mg Cap Take 3 capsules by mouth once daily.     ondansetron (ZOFRAN) 8 MG tablet Take 1 tablet (8 mg total) by mouth every 8 (eight) hours as needed for Nausea.    warfarin (COUMADIN) 3 MG tablet Take 1 tablet (3 mg total) by mouth Daily.           Clinical Reference Information           Your Vitals Were     BP Pulse Temp Resp Height Weight    138/79 (BP Location: Right arm, Patient Position: Sitting, BP Method: Automatic) 133 99.7 °F (37.6 °C) 12 5' 11" (1.803 m) 84.1 kg (185 lb 6.5 oz)    BMI                25.86 kg/m2          Blood Pressure          Most Recent Value    BP  138/79      Allergies as of 3/22/2017     Lipitor [Atorvastatin]    Lisinopril      Immunizations Administered on Date of Encounter - 3/22/2017     None      Orders Placed During Today's Visit      Normal Orders This Visit    Ambulatory Referral to Chemotherapy Infusion     Future Labs/Procedures Expected by Expires    CBC Oncology  As directed 3/22/2018    Comprehensive metabolic panel  As directed 3/22/2018    Ferritin  As directed 3/22/2018    Iron and TIBC  As directed 3/22/2018    Prepare RBC 2 Units; symptoms  As directed 4/22/2018    Protime-INR  As directed 3/22/2018      Instructions    Stop Augmentin    Start Clindamycin and Medrol Pack    Stop Pravastatin    No other change in meds    Come in for blood tomorrow    Get Aranesp Tomorrow    See me in 1 " week       Language Assistance Services     ATTENTION: Language assistance services are available, free of charge. Please call [phone].      ATENCIÓN: Si habla lillian, tiene a gannon disposición servicios gratuitos de asistencia lingüística. Llame al [phone].     CHÚ Ý: N?u b?n nói Ti?ng Vi?t, có các d?ch v? h? tr? ngôn ng? mi?n phí dành cho b?n. G?i s? [phone].         [facility] complies with applicable Federal civil rights laws and does not discriminate on the basis of race, color, national origin, age, disability, or sex.

## 2017-03-23 ENCOUNTER — INFUSION (OUTPATIENT)
Dept: INFUSION THERAPY | Facility: HOSPITAL | Age: 72
End: 2017-03-23
Attending: INTERNAL MEDICINE
Payer: MEDICARE

## 2017-03-23 ENCOUNTER — TELEPHONE (OUTPATIENT)
Dept: HEMATOLOGY/ONCOLOGY | Facility: CLINIC | Age: 72
End: 2017-03-23

## 2017-03-23 VITALS
TEMPERATURE: 98 F | SYSTOLIC BLOOD PRESSURE: 132 MMHG | RESPIRATION RATE: 18 BRPM | DIASTOLIC BLOOD PRESSURE: 70 MMHG | HEART RATE: 113 BPM

## 2017-03-23 DIAGNOSIS — D46.9 MDS (MYELODYSPLASTIC SYNDROME): ICD-10-CM

## 2017-03-23 DIAGNOSIS — I65.23 BILATERAL CAROTID ARTERY STENOSIS: ICD-10-CM

## 2017-03-23 DIAGNOSIS — I10 ESSENTIAL HYPERTENSION: ICD-10-CM

## 2017-03-23 DIAGNOSIS — F32.A DEPRESSION, UNSPECIFIED DEPRESSION TYPE: ICD-10-CM

## 2017-03-23 DIAGNOSIS — E78.5 DYSLIPIDEMIA: ICD-10-CM

## 2017-03-23 DIAGNOSIS — D63.8 ANEMIA, CHRONIC DISEASE: ICD-10-CM

## 2017-03-23 DIAGNOSIS — I35.0 AORTIC STENOSIS, MODERATE: ICD-10-CM

## 2017-03-23 DIAGNOSIS — I48.0 PAROXYSMAL ATRIAL FIBRILLATION: ICD-10-CM

## 2017-03-23 DIAGNOSIS — R21 RASH: ICD-10-CM

## 2017-03-23 DIAGNOSIS — L27.0 RASH, DRUG: ICD-10-CM

## 2017-03-23 LAB
BLD PROD TYP BPU: NORMAL
BLD PROD TYP BPU: NORMAL
BLOOD UNIT EXPIRATION DATE: NORMAL
BLOOD UNIT EXPIRATION DATE: NORMAL
BLOOD UNIT TYPE CODE: 5100
BLOOD UNIT TYPE CODE: 5100
BLOOD UNIT TYPE: NORMAL
BLOOD UNIT TYPE: NORMAL
CODING SYSTEM: NORMAL
CODING SYSTEM: NORMAL
DISPENSE STATUS: NORMAL
DISPENSE STATUS: NORMAL
NUM UNITS TRANS PACKED RBC: NORMAL
NUM UNITS TRANS PACKED RBC: NORMAL

## 2017-03-23 PROCEDURE — 86920 COMPATIBILITY TEST SPIN: CPT

## 2017-03-23 PROCEDURE — 36430 TRANSFUSION BLD/BLD COMPNT: CPT

## 2017-03-23 PROCEDURE — P9040 RBC LEUKOREDUCED IRRADIATED: HCPCS

## 2017-03-23 PROCEDURE — 25000003 PHARM REV CODE 250: Performed by: INTERNAL MEDICINE

## 2017-03-23 RX ORDER — FUROSEMIDE 10 MG/ML
20 INJECTION INTRAMUSCULAR; INTRAVENOUS
Status: DISCONTINUED | OUTPATIENT
Start: 2017-03-23 | End: 2017-03-23 | Stop reason: HOSPADM

## 2017-03-23 RX ORDER — HYDROCODONE BITARTRATE AND ACETAMINOPHEN 500; 5 MG/1; MG/1
TABLET ORAL ONCE
Status: DISCONTINUED | OUTPATIENT
Start: 2017-03-23 | End: 2017-03-23 | Stop reason: HOSPADM

## 2017-03-23 RX ORDER — DIPHENHYDRAMINE HCL 25 MG
25 CAPSULE ORAL
Status: COMPLETED | OUTPATIENT
Start: 2017-03-23 | End: 2017-03-23

## 2017-03-23 RX ADMIN — DIPHENHYDRAMINE HYDROCHLORIDE 25 MG: 25 CAPSULE ORAL at 09:03

## 2017-03-23 NOTE — PLAN OF CARE
Problem: Patient Care Overview  Goal: Plan of Care Review  Outcome: Ongoing (interventions implemented as appropriate)  Pt tolerated 2 units PRBCs well with no s/s of reaction.  Vitals stable.  NAD.

## 2017-03-23 NOTE — TELEPHONE ENCOUNTER
----- Message from Bairon Bradley sent at 3/23/2017  2:28 PM CDT -----  Contact: PT  States he still has the rash, and he need to know what to do next?    Call: [phone]

## 2017-03-23 NOTE — TELEPHONE ENCOUNTER
----- Message from Nadia Brown sent at 3/23/2017  3:42 PM CDT -----  Patient would like the nurse to give him a call back about his injection. He can be reached at [phone].

## 2017-03-23 NOTE — TELEPHONE ENCOUNTER
Returned call and spoke with Ana with   [facility]. Patient admitted to their service today and it was noted patient is currently on Coumadin. Orders will be need to draw patient's PT/INR.   Ana asking if the orders will be written by Dr Rader or do she need to contact patient Cardiologist? Please advise.

## 2017-03-23 NOTE — TELEPHONE ENCOUNTER
----- Message from Jasmyn Alfonso sent at 3/23/2017  1:02 PM CDT -----  Contact: Ms Perez /  [facility] [phone]  Ms Perez states Patient was admiitted today.   Patient is  on coumadian need order to check patient  PT/ INR level   Please call Ms Perez /  [facility] [phone]

## 2017-03-24 ENCOUNTER — TELEPHONE (OUTPATIENT)
Dept: HEMATOLOGY/ONCOLOGY | Facility: CLINIC | Age: 72
End: 2017-03-24

## 2017-03-24 ENCOUNTER — INFUSION (OUTPATIENT)
Dept: INFUSION THERAPY | Facility: HOSPITAL | Age: 72
End: 2017-03-24
Attending: INTERNAL MEDICINE
Payer: MEDICARE

## 2017-03-24 VITALS
RESPIRATION RATE: 18 BRPM | DIASTOLIC BLOOD PRESSURE: 90 MMHG | TEMPERATURE: 98 F | SYSTOLIC BLOOD PRESSURE: 142 MMHG | HEART RATE: 112 BPM

## 2017-03-24 DIAGNOSIS — D46.9 MDS (MYELODYSPLASTIC SYNDROME): Primary | ICD-10-CM

## 2017-03-24 PROCEDURE — 63600175 PHARM REV CODE 636 W HCPCS: Performed by: INTERNAL MEDICINE

## 2017-03-24 PROCEDURE — 96372 THER/PROPH/DIAG INJ SC/IM: CPT

## 2017-03-24 RX ADMIN — DARBEPOETIN ALFA 200 MCG: 200 INJECTION, SOLUTION INTRAVENOUS; SUBCUTANEOUS at 10:03

## 2017-03-24 NOTE — NURSING
Pt. Here today for aranesp injection. Discussed mechanism of action. Received and tolerated in back of L. Arm. VSS. HR a little elevated. Pt. States he is a little anxious at baseline due to some recent medical complications he has. MD aware and addressing per pt. No questions at this time.

## 2017-03-24 NOTE — TELEPHONE ENCOUNTER
Called and spoke with Mr Kwon, informed patient of the following. Patient will continue to watch and continue his to take his medication. Patient to call the clinic on Monday with updated status. If no improvement, patient will be referred to Dermatology.

## 2017-03-24 NOTE — TELEPHONE ENCOUNTER
----- Message from Laura Rader MD sent at 3/24/2017  1:14 PM CDT -----  Watch     Continue medrol pack    Use benadryl lotion 3 times daily to rash    If not better by mon, will send to derm

## 2017-03-29 ENCOUNTER — OFFICE VISIT (OUTPATIENT)
Dept: HEMATOLOGY/ONCOLOGY | Facility: CLINIC | Age: 72
End: 2017-03-29
Payer: MEDICARE

## 2017-03-29 ENCOUNTER — TELEPHONE (OUTPATIENT)
Dept: HEMATOLOGY/ONCOLOGY | Facility: CLINIC | Age: 72
End: 2017-03-29

## 2017-03-29 ENCOUNTER — LAB VISIT (OUTPATIENT)
Dept: LAB | Facility: HOSPITAL | Age: 72
End: 2017-03-29
Attending: INTERNAL MEDICINE
Payer: MEDICARE

## 2017-03-29 ENCOUNTER — TELEPHONE (OUTPATIENT)
Dept: PSYCHIATRY | Facility: CLINIC | Age: 72
End: 2017-03-29

## 2017-03-29 VITALS
DIASTOLIC BLOOD PRESSURE: 87 MMHG | RESPIRATION RATE: 20 BRPM | TEMPERATURE: 98 F | HEART RATE: 121 BPM | WEIGHT: 162 LBS | SYSTOLIC BLOOD PRESSURE: 125 MMHG | BODY MASS INDEX: 22.59 KG/M2

## 2017-03-29 DIAGNOSIS — I48.91 ATRIAL FIBRILLATION WITH RVR: ICD-10-CM

## 2017-03-29 DIAGNOSIS — D63.8 ANEMIA, CHRONIC DISEASE: ICD-10-CM

## 2017-03-29 DIAGNOSIS — L27.0 RASH, DRUG: ICD-10-CM

## 2017-03-29 DIAGNOSIS — D46.9 MDS (MYELODYSPLASTIC SYNDROME): ICD-10-CM

## 2017-03-29 DIAGNOSIS — J86.9 EMPYEMA OF RIGHT PLEURAL SPACE: ICD-10-CM

## 2017-03-29 DIAGNOSIS — I51.89 DIASTOLIC DYSFUNCTION: ICD-10-CM

## 2017-03-29 DIAGNOSIS — F32.A DEPRESSION, UNSPECIFIED DEPRESSION TYPE: ICD-10-CM

## 2017-03-29 DIAGNOSIS — I10 ESSENTIAL HYPERTENSION: ICD-10-CM

## 2017-03-29 DIAGNOSIS — D46.9 MDS (MYELODYSPLASTIC SYNDROME): Primary | ICD-10-CM

## 2017-03-29 DIAGNOSIS — I35.0 AORTIC STENOSIS, MODERATE: ICD-10-CM

## 2017-03-29 LAB
ALBUMIN SERPL BCP-MCNC: 2.7 G/DL
ALP SERPL-CCNC: 102 U/L
ALT SERPL W/O P-5'-P-CCNC: 66 U/L
ANION GAP SERPL CALC-SCNC: 10 MMOL/L
AST SERPL-CCNC: 52 U/L
BILIRUB SERPL-MCNC: 0.5 MG/DL
BUN SERPL-MCNC: 22 MG/DL
CALCIUM SERPL-MCNC: 8.9 MG/DL
CHLORIDE SERPL-SCNC: 100 MMOL/L
CO2 SERPL-SCNC: 24 MMOL/L
CREAT SERPL-MCNC: 1 MG/DL
ERYTHROCYTE [DISTWIDTH] IN BLOOD BY AUTOMATED COUNT: 19.4 %
EST. GFR  (AFRICAN AMERICAN): >60 ML/MIN/1.73 M^2
EST. GFR  (NON AFRICAN AMERICAN): >60 ML/MIN/1.73 M^2
GLUCOSE SERPL-MCNC: 101 MG/DL
HCT VFR BLD AUTO: 38.6 %
HGB BLD-MCNC: 12.2 G/DL
MCH RBC QN AUTO: 29.4 PG
MCHC RBC AUTO-ENTMCNC: 31.6 %
MCV RBC AUTO: 93 FL
NEUTROPHILS # BLD AUTO: 2.8 K/UL
PLATELET # BLD AUTO: 314 K/UL
PMV BLD AUTO: 10.8 FL
POTASSIUM SERPL-SCNC: 4.9 MMOL/L
PROT SERPL-MCNC: 8.1 G/DL
RBC # BLD AUTO: 4.15 M/UL
SODIUM SERPL-SCNC: 134 MMOL/L
WBC # BLD AUTO: 4.21 K/UL

## 2017-03-29 PROCEDURE — 3079F DIAST BP 80-89 MM HG: CPT | Mod: S$GLB,,, | Performed by: INTERNAL MEDICINE

## 2017-03-29 PROCEDURE — 36415 COLL VENOUS BLD VENIPUNCTURE: CPT

## 2017-03-29 PROCEDURE — 3074F SYST BP LT 130 MM HG: CPT | Mod: S$GLB,,, | Performed by: INTERNAL MEDICINE

## 2017-03-29 PROCEDURE — 1157F ADVNC CARE PLAN IN RCRD: CPT | Mod: S$GLB,,, | Performed by: INTERNAL MEDICINE

## 2017-03-29 PROCEDURE — 1159F MED LIST DOCD IN RCRD: CPT | Mod: S$GLB,,, | Performed by: INTERNAL MEDICINE

## 2017-03-29 PROCEDURE — 1126F AMNT PAIN NOTED NONE PRSNT: CPT | Mod: S$GLB,,, | Performed by: INTERNAL MEDICINE

## 2017-03-29 PROCEDURE — 99215 OFFICE O/P EST HI 40 MIN: CPT | Mod: S$GLB,,, | Performed by: INTERNAL MEDICINE

## 2017-03-29 PROCEDURE — 80053 COMPREHEN METABOLIC PANEL: CPT

## 2017-03-29 PROCEDURE — 85027 COMPLETE CBC AUTOMATED: CPT

## 2017-03-29 PROCEDURE — 1160F RVW MEDS BY RX/DR IN RCRD: CPT | Mod: S$GLB,,, | Performed by: INTERNAL MEDICINE

## 2017-03-29 PROCEDURE — 99999 PR PBB SHADOW E&M-EST. PATIENT-LVL IV: CPT | Mod: PBBFAC,,, | Performed by: INTERNAL MEDICINE

## 2017-03-29 RX ORDER — PREDNISONE 10 MG/1
10 TABLET ORAL DAILY
Qty: 30 TABLET | Refills: 3 | Status: SHIPPED | OUTPATIENT
Start: 2017-03-29 | End: 2017-04-08

## 2017-03-29 NOTE — PATIENT INSTRUCTIONS
Continue all meds    Increase Celexa to 1 1/2 pill daily    Start Prednisone 10 mg daily--script sent    See Dr Garcia today    See Derm when scheduled    See me in 1 week with cbc/cmp

## 2017-03-29 NOTE — TELEPHONE ENCOUNTER
"Dr. Rader asked this patient to be seen today before leaving clinic due to concerns over depression. Mr. Kwon stated he was too fatigued and requested an appointment with me tomorrow, instead.  Upon arriving home, Mr. Kwon called to cancel tomorrow's appointment.    I called to speak to patient to check on his depression and coping.  He stated that he felt "much better" after speaking with Dr. Rader and realized "My health is not as bad as I thought."  He states that this discussion significantly improved his mood and he no longer feels he needs to come in to see me tomorrow. He denies suicidal ideation. He does have contact information and states he will call if he wishes additional psychological support.    ANUPAMA Garcia, PhD  "

## 2017-03-29 NOTE — PROGRESS NOTES
"PATIENT: Wil Kwon Jr.  MRN: [id number]  DATE: 3/29/2017    Subjective:     Chief complaint:  Chief Complaint   Patient presents with    MDS       Oncologic History:  Mr. Kwon 71-year-old gentleman with several chronic medical conditions living healthy lifestyle. He has hypertension and reports home readings are all normal. He did recently develop angioedema from ACE inhibitor and was switched to an ARB. His dyslipidemia is treated with pharmacologic therapy. He has history of carotid atherosclerotic disease status post left carotid endarterectomy and up-to-date with surveillance carotid Doppler study. He has a history of PAF followed by our electrophysiology cardiology department and is chronically anticoagulated. He has moderate aortic stenosis. He has DJD of the right knee which has become more symptomatic in recent months. He was noted to be progressively anemic over the last 1 year. He has hence been referred to see us. His hematology workup to date reveals a normal B12 and folate. His iron stores are normal. His reticulocyte count is slightly elevated at 4.2. His white count has remained low and progressively dropping over the last 3 years with monocytosis. He also has developed mild progressive thrombocytopenia. He is moderately symptomatic with the fatigue. He has no history of extrinsic GI bleeding. He also has no history of previous transfusions.he underwenta marrow. 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 Of 20 metaphases, 2 metaphases were normal and 18 metaphases had a 2;21  translocation. 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 RUNX1 rearrangements are  associated with de alfredo AML and therapeutic-related AML and MDS. Clinical and pathologic correlation is  recommended."  FINAL PATHOLOGIC DIAGNOSIS  CBC " DATA 8/24/16:  RBC: 3.45 M/UL, H/H : 9.9/32.1, MCV : 93 FL, WBC: 3.1 K/UL, Gran 1.2 k/ul %, Platelet: 200 K/UL.  No peripheral blood smear was submitted for evaluation.  BONE MARROW ASPIRATE, BONE MARROW CLOT, AND BONE MARROW CORE BIOPSY WITH:  CELLULARITY= 95%, TRILINEAGE HEMATOPOIETIC ACTIVITY (M/E= 2:1) AND INCREASED IMMATURE  CELLS (9%). SEE COMMENT.  LEFT SHIFTED MATURATION OF GRANULOPOIESIS.  ADEQUATE STORAGE IRON.  ADEQUATE NUMBER OF MEGAKARYOCYTES.  COMMENT: Flow cytometry analysis of bone marrow aspirate shows lymph gate (4.2 %) containing T and B cells.  No B cell clonality or T-cell aberrancy is evident. Blast gate is increased (7.9%) with cells expression of CD13 and  CD34. Immunohistochemical studies were performed on the clot and core biopsy for further architecture evaluation with  adequate positive and negative controls. Scattered mixed predominantly T cells (CD3 positive) and B cells (CD20  positive) are evident. About 6-7 % plasma cells ( positive) and 9% CD34 positive cells are noted. Findings  are nondiagnostic for lymphoma or plasma cell dyscrasia.  Microscopic Examination  BONE MARROW ASPIRATE DIFFERENTIAL:  Blasts----------------------------------------------5%  Promyelocytes---------------------------------2%  Myelocytes--------------------------------------11%  Metamyelocytes------------------------------ 19%  Bands----------------------------------------------5%  PMN Neutrophils------------------------------15%  Eosinophils------------------------------------------2%  Basophils---------------------------------------0%  Monocytes------------------------------------2%  Lymphocytes-------------------------------------6%  Plasma cells------------------------------------------2%  Erythroid precursors--------------------------31%  BONE MARROW ASPIRATE:  Myeloid and erythroid maturation shows M:E ratio at 2:1. No significant dysplasia is evident. Left shifted maturation of  granulopoiesis is  noted. Stainable iron is present without increased ringed sideroblasts. Megakaryocytes are seen.  BONE MARROW CLOT:  Cellularity is 95 % with trilineage hematopoiesis. No abnormal infiltrates are seen. Megakaryocytes are adequate in  number. Stainable iron is present.  BONE MARROW CORE BIOPSY:  Cellularity is 95 %. No abnormal infiltrates are seen. Stainable iron is not identified. Megakaryocytes are adequate in  No significantly increased reticular fibrosis is evident by special stain (reticulin) with adequate positive and  negative controls.      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 He did not meet criteria for AML.  I started him on HMA,s and monitor for response. I discussed an Allo BMT with him also.He was also started on Aranesp.      He has started Vidaza and currently post 3rd cycle. He is also receiving Aranesp. I repeated a marrow on him and he is here post marrow for results and plan.      His repeat marrow revealed      BONE MARROW ASPIRATE, BONE MARROW CLOT, AND BONE MARROW CORE BIOPSY WITH:  CELLULARITY= %, TRILINEAGE HEMATOPOIETIC ACTIVITY (M/E= 1.4:1).  CONSISTENT WITH REFRACTORY ANEMIA WITH EXCESS BLASTS -1. SEE COMMENT.  DECREASED STORAGE IRON.  INCREASED NUMBER OF MEGAKARYOCYTES WITH DYSPLASTIC MORPHOLOGY.  COMMENT: Flow cytometry analysis of bone marrow aspirate shows lymph gate (15.9 %) containing T and B cells.  No B cell clonality or T-cell aberrancy is evident. Blast gate is slightly increased (7.1%).  Immunohistochemical studies were performed on the clot and core biopsy for further architecture evaluation with  adequate positive and negative controls. About 6-7% CD34 positive cells are noted. CD61 highlights increased in  number of megakaryocytes with dysplastic morphology.  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 Correlate clinically and with a cytogenetics  report.  Microscopic Examination  BONE MARROW ASPIRATE  DIFFERENTIAL:  Blasts---------------------------------------------- 7 %  Promyelocytes---------------------------------1%  Myelocytes--------------------------------------10%  Metamyelocytes------------------------------ 8%  Bands----------------------------------------------9%  PMN Neutrophils------------------------------15%  Eosinophils------------------------------------------1%  Basophils---------------------------------------1%  Monocytes------------------------------------1%  Lymphocytes-------------------------------------9%  Plasma cells------------------------------------------1%  Erythroid precursors--------------------------37%  BONE MARROW ASPIRATE:  Myeloid and erythroid maturation shows M:E ratio at 1.4:1. Dysgranulopoiesis and occasional dyserythropoiesis is  evident. Stainable iron is decreased without increased ringed sideroblasts. Megakaryocytes are seen.  BONE MARROW CLOT:  Cellularity is  % with trilineage hematopoiesis. No abnormal infiltrates are seen. Megakaryocytes are increased  in number with dysplastic morphology. Stainable iron is decreased.  BONE MARROW CORE BIOPSY:  Cellularity is  %. No abnormal infiltrates are seen. Stainable iron is not identified. Megakaryocytes are  increased in number with dysplastic morphology.      His disease appears stable by Blast count. I will await cytogenetics. I will refer him for a BMT eval to Dr Chino.   He will continue his Vidaza and Aranesp in the interim.     Last Vidaza was cycle 5 day 5 on 3/3/17.      His MDS therapy has been on hold until he is recovered from his empyema. He developed a drug rash while on Augmentin and was switched to Clindamycin.  He recently received 2 u PRBC's.     Interval History: Mr. Kwon returns for follow up. He continues to have a rash mainly to arms and legs. The rash is non pruritic. He reports continued weakness. He reports labored breathing with exertion. He feels he can't go on any longer and is  ready for hospice. He is depressed.  No suicidal ideation. He has no appetite and has lost weight. He denies any nausea, vomiting, diarrhea, constipation, abdominal pain, chest pain, leg swelling,  pain, headache, dizziness, or mood changes. He is alone.      PMFSH: all information reviewed and updated as relevant to today's visit    Review of Systems:   Review of Systems   Constitutional: Positive for appetite change, fatigue and unexpected weight change. Negative for activity change and fever.   HENT: Negative for mouth sores, nosebleeds and sore throat.    Eyes: Negative for visual disturbance.   Respiratory: Positive for shortness of breath. Negative for cough.    Cardiovascular: Negative for chest pain, palpitations and leg swelling.   Gastrointestinal: Negative for abdominal pain, constipation, diarrhea, nausea and vomiting.   Genitourinary: Negative for difficulty urinating and frequency.   Musculoskeletal: Negative for arthralgias and back pain.   Skin: Positive for rash.   Neurological: Negative for dizziness, numbness and headaches.   Hematological: Negative for adenopathy. Does not bruise/bleed easily.   Psychiatric/Behavioral: Negative for confusion and sleep disturbance. The patient is not nervous/anxious.         Depressed mood   All other systems reviewed and are negative.        Objective:      Vitals:   Vitals:    03/29/17 1329   BP: 125/87   BP Location: Left arm   Patient Position: Sitting   BP Method: Automatic   Pulse: (!) 121   Resp: 20   Temp: 98.1 °F (36.7 °C)   TempSrc: Oral   Weight: 73.5 kg (162 lb)     BMI: Body mass index is 22.59 kg/(m^2).      Physical Exam:   Physical Exam   Constitutional: He is oriented to person, place, and time. No distress.   Thin male using walker for walking assistance   HENT:   Right Ear: External ear normal.   Left Ear: External ear normal.   Mouth/Throat: No oropharyngeal exudate.   Eyes: Conjunctivae and lids are normal. Pupils are equal, round, and  reactive to light. No scleral icterus.   Neck: Trachea normal and normal range of motion. Neck supple. No thyromegaly present.   Cardiovascular: Normal rate, regular rhythm, normal heart sounds and normal pulses.    Pulmonary/Chest: Effort normal and breath sounds normal.   Abdominal: Soft. Normal appearance and bowel sounds are normal. He exhibits no distension and no mass. There is no hepatosplenomegaly or splenomegaly. There is no tenderness.   Musculoskeletal: Normal range of motion.   Lymphadenopathy:        Head (right side): No submental and no submandibular adenopathy present.        Head (left side): No submental and no submandibular adenopathy present.     He has no cervical adenopathy.     He has no axillary adenopathy.        Right: No supraclavicular adenopathy present.        Left: No supraclavicular adenopathy present.   Neurological: He is alert and oriented to person, place, and time. He has normal reflexes. No sensory deficit.   Skin: Skin is warm, dry and intact. Rash (drug rash noted. Less angry ) noted. No bruising noted. Nails show no clubbing.   Psychiatric: He has a normal mood and affect. His speech is normal and behavior is normal. Cognition and memory are normal.   Vitals reviewed.        Laboratory Data:  WBC   Date Value Ref Range Status   03/29/2017 4.21 3.90 - 12.70 K/uL Final     Hemoglobin   Date Value Ref Range Status   03/29/2017 12.2 (L) 14.0 - 18.0 g/dL Final     POC Hematocrit   Date Value Ref Range Status   07/07/2016 28 (L) 36 - 54 %PCV Final     Hematocrit   Date Value Ref Range Status   03/29/2017 38.6 (L) 40.0 - 54.0 % Final     Platelets   Date Value Ref Range Status   03/29/2017 314 150 - 350 K/uL Final     Gran #   Date Value Ref Range Status   03/29/2017 2.8 1.8 - 7.7 K/uL Final     Comment:     The ANC is based on a white cell differential from an   automated cell counter. It has not been microscopically   reviewed for the presence of abnormal cells. Clinical    correlation is required.         Chemistry        Component Value Date/Time     (L) 03/22/2017 1417    K 3.9 03/22/2017 1417     03/22/2017 1417    CO2 24 03/22/2017 1417    BUN 12 03/22/2017 1417    CREATININE 0.9 03/22/2017 1417     03/22/2017 1417        Component Value Date/Time    CALCIUM 7.9 (L) 03/22/2017 1417    ALKPHOS 91 03/22/2017 1417    AST 54 (H) 03/22/2017 1417    ALT 38 03/22/2017 1417    BILITOT 0.5 03/22/2017 1417              Assessment/Plan:     1. MDS (myelodysplastic syndrome)    2. Anemia, chronic disease    3. Rash, drug    4. Diastolic dysfunction    5. Empyema of right pleural space    6. Depression, unspecified depression type    7. Aortic stenosis, moderate    8. Atrial fibrillation with RVR    9. Essential hypertension        Plan:    His MDS is stable. I will continue to hold Vidaza. I will continue aranesp. I will monitor him closely.    His Empyema is resolving. He will complete his Clinda and then will be monitored. I expect he will need a repeat CT in 8 weeks.    His rash has not resolved. I believe this is a drug rash. However I will restart steroids and have Derm evaluate him.    He was very depressed. I had a long talk with him and have arranged for him to see Dr Garcia and increased his Celexa to 60 daily.    His A Fib, HTN and CHF are stable. I have not changed any meds.      Med and Orders:  Orders Placed This Encounter    CBC Oncology    Comprehensive metabolic panel    Ambulatory Referral to Dermatology    predniSONE (DELTASONE) 10 MG tablet       Follow Up:  Return in about 1 week (around 4/5/2017).    Patient instructions:   Patient Instructions   Continue all meds    Increase Celexa to 1 1/2 pill daily    Start Prednisone 10 mg daily--script sent    See Dr Garcia today    See Derm when scheduled    See me in 1 week with cbc/cmp

## 2017-03-29 NOTE — Clinical Note
Continue all meds  Increase Celexa to 1 1/2 pill daily  Start Prednisone 10 mg daily--script sent  See Dr Garcia today  See Derm when scheduled--Please get an appt for this week  See me in 1 week with cbc/cmp

## 2017-03-29 NOTE — TELEPHONE ENCOUNTER
Returned call and spoke with Lore with [facility]. Informed Lore with will see Mr Kwon earlier than 2:30. Patient instructed to come into the clinic around 1:00 today. Lore will contact patient and inform him of the time.

## 2017-03-30 NOTE — PT/OT/SLP DISCHARGE
Occupational Therapy Discharge Summary    Wil Kwon Jr.  MRN: [id number]   HCAP (healthcare-associated pneumonia)   Patient Discharged from acute Occupational Therapy on 3/18/17.  Please refer to prior OT note dated on 3/16/17 for functional status.     Assessment:   Patient appropriate for care in another setting.  GOALS:   Occupational Therapy Goals        Problem: Occupational Therapy Goal    Goal Priority Disciplines Outcome Interventions   Occupational Therapy Goal     OT, PT/OT Ongoing (interventions implemented as appropriate)    Description:  Goals to be met by:  7 days 3/16/17     Patient will increase functional independence with ADLs by performing:    UE Dressing with Supervision.  LE Dressing with Supervision.  Grooming while standing with Supervision.  Toileting from toilet with Supervision for hygiene and clothing management.   Stand pivot transfers with Supervision.  Toilet transfer to toilet with Supervision.              Reasons for Discontinuation of Therapy Services  Transfer to alternate level of care.      Plan:  Patient Discharged to: Home with Home Health Service.    SUSIE Cobb  3/30/2017  Pager: [phone]

## 2017-04-01 NOTE — PT/OT/SLP DISCHARGE
Physical Therapy Discharge Summary    Wil Kwon Jr.  MRN: [id number]   HCAP (healthcare-associated pneumonia)   Patient Discharged from acute Physical Therapy on 2017.  Please refer to prior PT noted date on 2017 for functional status.     Assessment:   Patient appropriate for care in another setting.  GOALS:   Physical Therapy Goals        Problem: Physical Therapy Goal    Goal Priority Disciplines Outcome Goal Variances Interventions   Physical Therapy Goal     PT/OT, PT Ongoing (interventions implemented as appropriate)     Description:  Goals to be met by: 3/19/17     Patient will increase functional independence with mobility by performin. Supine to sit with Bonner  2. Sit to stand transfer with Supervision  3. Gait  x 100 feet with Modified Bonner using Rolling Walker.   4. Ascend/descend 2 stair with bilateral Handrails Supervision.   5. Lower extremity exercise program x15 reps per handout, with assistance as needed              Reasons for Discontinuation of Therapy Services  Transfer to alternate level of care.      Plan:  Patient Discharged to: Home with Home Health Service.    MARYAN SULLIVAN, PT  2017

## 2017-04-03 ENCOUNTER — OFFICE VISIT (OUTPATIENT)
Dept: DERMATOLOGY | Facility: CLINIC | Age: 72
End: 2017-04-03
Payer: MEDICARE

## 2017-04-03 VITALS — BODY MASS INDEX: 22.59 KG/M2 | WEIGHT: 162 LBS

## 2017-04-03 DIAGNOSIS — L27.0 DRUG ERUPTION: Primary | ICD-10-CM

## 2017-04-03 DIAGNOSIS — L30.9 DERMATITIS: ICD-10-CM

## 2017-04-03 DIAGNOSIS — L81.7 SCHAMBERG'S PURPURA: ICD-10-CM

## 2017-04-03 PROCEDURE — 1157F ADVNC CARE PLAN IN RCRD: CPT | Mod: S$GLB,,, | Performed by: DERMATOLOGY

## 2017-04-03 PROCEDURE — 99203 OFFICE O/P NEW LOW 30 MIN: CPT | Mod: S$GLB,,, | Performed by: DERMATOLOGY

## 2017-04-03 PROCEDURE — 1159F MED LIST DOCD IN RCRD: CPT | Mod: S$GLB,,, | Performed by: DERMATOLOGY

## 2017-04-03 PROCEDURE — 1160F RVW MEDS BY RX/DR IN RCRD: CPT | Mod: S$GLB,,, | Performed by: DERMATOLOGY

## 2017-04-03 PROCEDURE — 99999 PR PBB SHADOW E&M-EST. PATIENT-LVL III: CPT | Mod: PBBFAC,,, | Performed by: DERMATOLOGY

## 2017-04-03 RX ORDER — TRIAMCINOLONE ACETONIDE 1 MG/G
CREAM TOPICAL
Qty: 454 G | Refills: 3 | Status: SHIPPED | OUTPATIENT
Start: 2017-04-03 | End: 2017-04-19

## 2017-04-03 NOTE — MR AVS SNAPSHOT
[place] [facility]   [facility]  [place] [address]  Phone: [phone]  Fax: [phone]                  Wil Kwon Jr.   4/3/2017 10:00 AM   Office Visit    Description:  Male : 1945   Provider:  Frida Da Silva MD   Department:  [place] [facility]           Reason for Visit     Rash     Lesion           Diagnoses this Visit        Comments    Drug eruption    -  Primary     Schamberg's purpura         Dermatitis                To Do List           Future Appointments        Provider Department Dept Phone    2017 10:45 AM LAB, HEMONC SAME DAY [facility] [phone]    2017 11:45 AM Laura Rader MD [facility] [phone]    2017 10:00 AM INJECTION, [facility] [phone]    2017 10:00 AM INJECTION, [facility] [phone]    2017 10:00 AM INJECTION, [facility] [phone]      Goals (5 Years of Data)     None      Follow-Up and Disposition     Return if symptoms worsen or fail to improve.       These Medications        Disp Refills Start End    triamcinolone acetonide 0.1% (KENALOG) 0.1 % cream 454 g 3 4/3/2017     AAA bid    Pharmacy: [id number] - [place] [address] Ph #: [phone]         [facility]     [facility] Nurse Care Line -  Assistance  Unless otherwise directed by your provider, please contact [facility], our nurse care line that is available for  assistance.     Registered nurses in the [facility] provide: appointment scheduling, clinical advisement, health education, and other advisory services.  Call: [phone] (toll free)               Medications           Message regarding Medications     Verify the changes and/or additions to your medication regime listed below are the same as discussed with your clinician  today.  If any of these changes or additions are incorrect, please notify your healthcare provider.        START taking these NEW medications        Refills    triamcinolone acetonide 0.1% (KENALOG) 0.1 % cream 3    Sig: AAA bid    Class: Normal           Verify that the below list of medications is an accurate representation of the medications you are currently taking.  If none reported, the list may be blank. If incorrect, please contact your healthcare provider. Carry this list with you in case of emergency.           Current Medications     acetaminophen (TYLENOL) 325 MG tablet Take 325 mg by mouth as needed for Pain.    citalopram (CELEXA) 40 MG tablet TAKE 1 TABLET BY MOUTH DAILY    clindamycin (CLEOCIN) 300 MG capsule Take 1 capsule (300 mg total) by mouth 3 (three) times daily.    diltiaZEM (CARDIZEM CD) 300 MG 24 hr capsule TAKE ONE CAPSULE BY MOUTH EVERY DAY    hydrocodone-acetaminophen 5-325mg (NORCO) 5-325 mg per tablet Take 1 tablet by mouth every 6 (six) hours as needed.    methylPREDNISolone (MEDROL DOSEPACK) 4 mg tablet Take 1 tablet (4 mg total) by mouth once daily. use as directed    metoprolol succinate (TOPROL-XL) 25 MG 24 hr tablet Take 1 tablet (25 mg total) by mouth once daily.    multivitamin capsule Take 1 capsule by mouth every morning.     nystatin (MYCOSTATIN) ointment Apply topically 3 (three) times daily.    omega-3 fatty acids-vitamin E (FISH OIL) 1,000 mg Cap Take 3 capsules by mouth once daily.     ondansetron (ZOFRAN) 8 MG tablet Take 1 tablet (8 mg total) by mouth every 8 (eight) hours as needed for Nausea.    predniSONE (DELTASONE) 10 MG tablet Take 1 tablet (10 mg total) by mouth once daily.    triamcinolone acetonide 0.1% (KENALOG) 0.1 % cream AAA bid    warfarin (COUMADIN) 3 MG tablet Take 1 tablet (3 mg total) by mouth Daily.           Clinical Reference Information           Your Vitals Were     Weight BMI             73.5 kg (162 lb) 22.59 kg/m2         Allergies as of  4/3/2017     Lipitor [Atorvastatin]    Lisinopril      Immunizations Administered on Date of Encounter - 4/3/2017     None      Language Assistance Services     ATTENTION: Language assistance services are available, free of charge. Please call [phone].      ATENCIÓN: Si habla lillian, tiene a gannon disposición servicios gratuitos de asistencia lingüística. Llame al [phone].     CHÚ Ý: N?u b?n nói Ti?ng Vi?t, có các d?ch v? h? tr? ngôn ng? mi?n phí dành cho b?n. G?i s? [phone].         [facility] [place] [facility] complies with applicable Federal civil rights laws and does not discriminate on the basis of race, color, national origin, age, disability, or sex.

## 2017-04-04 ENCOUNTER — TELEPHONE (OUTPATIENT)
Dept: HEMATOLOGY/ONCOLOGY | Facility: CLINIC | Age: 72
End: 2017-04-04

## 2017-04-04 NOTE — TELEPHONE ENCOUNTER
Called [facility] to speak with Ana. She was away from her desk. Message left on her voice mail. Dr Rader will have patient PT/INR drawn here in the clinic.

## 2017-04-04 NOTE — TELEPHONE ENCOUNTER
----- Message from Laura Rader MD sent at 4/4/2017 11:46 AM CDT -----  Contact: [phone]  We will  ----- Message -----     From: Azeb Mckeon MA     Sent: 4/4/2017  11:02 AM       To: MD Dr Dior Naik,  I scheduled Mr Kwon PT/INR to be drawn tomorrow when he come into the clinin. Should we continue to have it drawn here or do you wish HH to drawn it?  ----- Message -----     From: Dali Alvarez LCSW     Sent: 3/29/2017  11:15 AM       To: RADHA Sunshine,     Got a call from [facility] (Ana) stating that they are seeing pt. For HH. Pt. is on 3mg coumadin and they want to know if HH should  be checking PT/INR or does he get this done at the clinic. If he needs it done thru HH then they can take a verbal order from the MD. Thanks    Lorrie

## 2017-04-05 ENCOUNTER — LAB VISIT (OUTPATIENT)
Dept: LAB | Facility: HOSPITAL | Age: 72
End: 2017-04-05
Attending: INTERNAL MEDICINE
Payer: MEDICARE

## 2017-04-05 ENCOUNTER — OFFICE VISIT (OUTPATIENT)
Dept: HEMATOLOGY/ONCOLOGY | Facility: CLINIC | Age: 72
End: 2017-04-05
Payer: MEDICARE

## 2017-04-05 VITALS
BODY MASS INDEX: 23.83 KG/M2 | RESPIRATION RATE: 15 BRPM | WEIGHT: 170.19 LBS | SYSTOLIC BLOOD PRESSURE: 117 MMHG | TEMPERATURE: 99 F | HEART RATE: 97 BPM | HEIGHT: 71 IN | DIASTOLIC BLOOD PRESSURE: 78 MMHG

## 2017-04-05 DIAGNOSIS — F32.A DEPRESSION, UNSPECIFIED DEPRESSION TYPE: ICD-10-CM

## 2017-04-05 DIAGNOSIS — E78.5 DYSLIPIDEMIA: ICD-10-CM

## 2017-04-05 DIAGNOSIS — I48.0 PAROXYSMAL ATRIAL FIBRILLATION: ICD-10-CM

## 2017-04-05 DIAGNOSIS — I65.23 BILATERAL CAROTID ARTERY STENOSIS: ICD-10-CM

## 2017-04-05 DIAGNOSIS — J86.9 EMPYEMA OF RIGHT PLEURAL SPACE: ICD-10-CM

## 2017-04-05 DIAGNOSIS — D46.9 MDS (MYELODYSPLASTIC SYNDROME): ICD-10-CM

## 2017-04-05 DIAGNOSIS — I51.89 DIASTOLIC DYSFUNCTION: ICD-10-CM

## 2017-04-05 DIAGNOSIS — D46.9 MDS (MYELODYSPLASTIC SYNDROME): Primary | ICD-10-CM

## 2017-04-05 DIAGNOSIS — I35.0 AORTIC STENOSIS, MODERATE: ICD-10-CM

## 2017-04-05 DIAGNOSIS — L27.0 RASH, DRUG: ICD-10-CM

## 2017-04-05 DIAGNOSIS — D63.8 ANEMIA, CHRONIC DISEASE: ICD-10-CM

## 2017-04-05 DIAGNOSIS — I10 ESSENTIAL HYPERTENSION: ICD-10-CM

## 2017-04-05 DIAGNOSIS — I48.91 ATRIAL FIBRILLATION WITH RVR: ICD-10-CM

## 2017-04-05 DIAGNOSIS — D70.8 OTHER NEUTROPENIA: ICD-10-CM

## 2017-04-05 DIAGNOSIS — R21 RASH: ICD-10-CM

## 2017-04-05 LAB
ALBUMIN SERPL BCP-MCNC: 2.6 G/DL
ALP SERPL-CCNC: 85 U/L
ALT SERPL W/O P-5'-P-CCNC: 49 U/L
ANION GAP SERPL CALC-SCNC: 9 MMOL/L
AST SERPL-CCNC: 37 U/L
BILIRUB SERPL-MCNC: 0.6 MG/DL
BUN SERPL-MCNC: 18 MG/DL
CALCIUM SERPL-MCNC: 8.6 MG/DL
CHLORIDE SERPL-SCNC: 103 MMOL/L
CO2 SERPL-SCNC: 22 MMOL/L
CREAT SERPL-MCNC: 0.9 MG/DL
ERYTHROCYTE [DISTWIDTH] IN BLOOD BY AUTOMATED COUNT: 18 %
EST. GFR  (AFRICAN AMERICAN): >60 ML/MIN/1.73 M^2
EST. GFR  (NON AFRICAN AMERICAN): >60 ML/MIN/1.73 M^2
GLUCOSE SERPL-MCNC: 179 MG/DL
HCT VFR BLD AUTO: 37.3 %
HGB BLD-MCNC: 11.5 G/DL
INR PPP: 2.3
MCH RBC QN AUTO: 28.4 PG
MCHC RBC AUTO-ENTMCNC: 30.8 %
MCV RBC AUTO: 92 FL
NEUTROPHILS # BLD AUTO: 1.4 K/UL
PLATELET # BLD AUTO: 153 K/UL
PMV BLD AUTO: 11.2 FL
POTASSIUM SERPL-SCNC: 4.2 MMOL/L
PROT SERPL-MCNC: 7.6 G/DL
PROTHROMBIN TIME: 22.9 SEC
RBC # BLD AUTO: 4.05 M/UL
SODIUM SERPL-SCNC: 134 MMOL/L
WBC # BLD AUTO: 2.36 K/UL

## 2017-04-05 PROCEDURE — 3074F SYST BP LT 130 MM HG: CPT | Mod: S$GLB,,, | Performed by: INTERNAL MEDICINE

## 2017-04-05 PROCEDURE — 99214 OFFICE O/P EST MOD 30 MIN: CPT | Mod: S$GLB,,, | Performed by: INTERNAL MEDICINE

## 2017-04-05 PROCEDURE — 1159F MED LIST DOCD IN RCRD: CPT | Mod: S$GLB,,, | Performed by: INTERNAL MEDICINE

## 2017-04-05 PROCEDURE — 1126F AMNT PAIN NOTED NONE PRSNT: CPT | Mod: S$GLB,,, | Performed by: INTERNAL MEDICINE

## 2017-04-05 PROCEDURE — 85027 COMPLETE CBC AUTOMATED: CPT

## 2017-04-05 PROCEDURE — 1157F ADVNC CARE PLAN IN RCRD: CPT | Mod: S$GLB,,, | Performed by: INTERNAL MEDICINE

## 2017-04-05 PROCEDURE — 3078F DIAST BP <80 MM HG: CPT | Mod: S$GLB,,, | Performed by: INTERNAL MEDICINE

## 2017-04-05 PROCEDURE — 99999 PR PBB SHADOW E&M-EST. PATIENT-LVL III: CPT | Mod: PBBFAC,,, | Performed by: INTERNAL MEDICINE

## 2017-04-05 PROCEDURE — 80053 COMPREHEN METABOLIC PANEL: CPT

## 2017-04-05 PROCEDURE — 85610 PROTHROMBIN TIME: CPT

## 2017-04-05 PROCEDURE — 36415 COLL VENOUS BLD VENIPUNCTURE: CPT

## 2017-04-05 NOTE — Clinical Note
Please schedule labs CBC, CMP, T&S and INR in 2 weeks with appt with Dior. Can we changed patients aranesp appts to Wednesdays starting 4/19 so he doesn't have to come twice on 1 week. Thanks

## 2017-04-05 NOTE — PROGRESS NOTES
"PATIENT: Wil Kwon Jr.  MRN: [id number]  DATE: 4/5/2017    Subjective:     Chief complaint:  Chief Complaint   Patient presents with    Hospital Follow Up    Leukemia       Oncologic History:  Mr. Kwon 71-year-old gentleman with several chronic medical conditions living healthy lifestyle. He has hypertension and reports home readings are all normal. He did recently develop angioedema from ACE inhibitor and was switched to an ARB. His dyslipidemia is treated with pharmacologic therapy. He has history of carotid atherosclerotic disease status post left carotid endarterectomy and up-to-date with surveillance carotid Doppler study. He has a history of PAF followed by our electrophysiology cardiology department and is chronically anticoagulated. He has moderate aortic stenosis. He has DJD of the right knee which has become more symptomatic in recent months. He was noted to be progressively anemic over the last 1 year. Hematology workup reveals a normal B12 and folate. His iron stores are normal. His reticulocyte count is slightly elevated at 4.2. His white count has remained low and progressively dropping over the last 3 years with monocytosis. He also has developed mild progressive thrombocytopenia. He is moderately symptomatic with the fatigue. He has no history of extrinsic GI bleeding. He also has no history of previous transfusions.he underwent a marrow. 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 Of 20 metaphases, 2 metaphases were normal and 18 metaphases had a 2;21  translocation. 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 RUNX1 rearrangements are  associated with de alfredo AML and therapeutic-related AML and MDS. Clinical and pathologic correlation is  recommended."  FINAL PATHOLOGIC DIAGNOSIS  CBC DATA 8/24/16:  RBC: 3.45 " M/UL, H/H : 9.9/32.1, MCV : 93 FL, WBC: 3.1 K/UL, Gran 1.2 k/ul %, Platelet: 200 K/UL.  No peripheral blood smear was submitted for evaluation.  BONE MARROW ASPIRATE, BONE MARROW CLOT, AND BONE MARROW CORE BIOPSY WITH:  CELLULARITY= 95%, TRILINEAGE HEMATOPOIETIC ACTIVITY (M/E= 2:1) AND INCREASED IMMATURE  CELLS (9%). SEE COMMENT.  LEFT SHIFTED MATURATION OF GRANULOPOIESIS.  ADEQUATE STORAGE IRON.  ADEQUATE NUMBER OF MEGAKARYOCYTES.  COMMENT: Flow cytometry analysis of bone marrow aspirate shows lymph gate (4.2 %) containing T and B cells.  No B cell clonality or T-cell aberrancy is evident. Blast gate is increased (7.9%) with cells expression of CD13 and  CD34. Immunohistochemical studies were performed on the clot and core biopsy for further architecture evaluation with  adequate positive and negative controls. Scattered mixed predominantly T cells (CD3 positive) and B cells (CD20  positive) are evident. About 6-7 % plasma cells ( positive) and 9% CD34 positive cells are noted. Findings  are nondiagnostic for lymphoma or plasma cell dyscrasia.  Microscopic Examination  BONE MARROW ASPIRATE DIFFERENTIAL:  Blasts----------------------------------------------5%  Promyelocytes---------------------------------2%  Myelocytes--------------------------------------11%  Metamyelocytes------------------------------ 19%  Bands----------------------------------------------5%  PMN Neutrophils------------------------------15%  Eosinophils------------------------------------------2%  Basophils---------------------------------------0%  Monocytes------------------------------------2%  Lymphocytes-------------------------------------6%  Plasma cells------------------------------------------2%  Erythroid precursors--------------------------31%  BONE MARROW ASPIRATE:  Myeloid and erythroid maturation shows M:E ratio at 2:1. No significant dysplasia is evident. Left shifted maturation of  granulopoiesis is noted. Stainable iron is  present without increased ringed sideroblasts. Megakaryocytes are seen.  BONE MARROW CLOT:  Cellularity is 95 % with trilineage hematopoiesis. No abnormal infiltrates are seen. Megakaryocytes are adequate in  number. Stainable iron is present.  BONE MARROW CORE BIOPSY:  Cellularity is 95 %. No abnormal infiltrates are seen. Stainable iron is not identified. Megakaryocytes are adequate in  No significantly increased reticular fibrosis is evident by special stain (reticulin) with adequate positive and  negative controls.      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 He did not meet criteria for AML. He was started on Vidaza and Aranes and completed 3 cycles when a repeat bone marrow biopsy revealed:      BONE MARROW ASPIRATE, BONE MARROW CLOT, AND BONE MARROW CORE BIOPSY WITH:  CELLULARITY= %, TRILINEAGE HEMATOPOIETIC ACTIVITY (M/E= 1.4:1).  CONSISTENT WITH REFRACTORY ANEMIA WITH EXCESS BLASTS -1. SEE COMMENT.  DECREASED STORAGE IRON.  INCREASED NUMBER OF MEGAKARYOCYTES WITH DYSPLASTIC MORPHOLOGY.  COMMENT: Flow cytometry analysis of bone marrow aspirate shows lymph gate (15.9 %) containing T and B cells.  No B cell clonality or T-cell aberrancy is evident. Blast gate is slightly increased (7.1%).  Immunohistochemical studies were performed on the clot and core biopsy for further architecture evaluation with  adequate positive and negative controls. About 6-7% CD34 positive cells are noted. CD61 highlights increased in  number of megakaryocytes with dysplastic morphology.  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 Correlate clinically and with a cytogenetics  report.  Microscopic Examination  BONE MARROW ASPIRATE DIFFERENTIAL:  Blasts---------------------------------------------- 7 %  Promyelocytes---------------------------------1%  Myelocytes--------------------------------------10%  Metamyelocytes------------------------------  8%  Bands----------------------------------------------9%  PMN Neutrophils------------------------------15%  Eosinophils------------------------------------------1%  Basophils---------------------------------------1%  Monocytes------------------------------------1%  Lymphocytes-------------------------------------9%  Plasma cells------------------------------------------1%  Erythroid precursors--------------------------37%  BONE MARROW ASPIRATE:  Myeloid and erythroid maturation shows M:E ratio at 1.4:1. Dysgranulopoiesis and occasional dyserythropoiesis is  evident. Stainable iron is decreased without increased ringed sideroblasts. Megakaryocytes are seen.  BONE MARROW CLOT:  Cellularity is  % with trilineage hematopoiesis. No abnormal infiltrates are seen. Megakaryocytes are increased  in number with dysplastic morphology. Stainable iron is decreased.  BONE MARROW CORE BIOPSY:  Cellularity is  %. No abnormal infiltrates are seen. Stainable iron is not identified. Megakaryocytes are  increased in number with dysplastic morphology.        Interval History: Mr. Kwon returns for follow up. Last Vidaza was cycle 5 completed on 3/3/17. His MDS therapy has been on hold until he is recovered from his empyema. He developed a drug rash while on Augmentin and was switched to Clindamycin. He has completed 14 days of clinda as of today. Patient was evaluated by derm and started steroid cream. Rash has improved on topical steroids and he continues Prednisone 10 mg daily. Patient reports his depression and outlook on life have improved and so has his appetite since his last visit. He denies any fevers, nausea, vomiting, diarrhea, constipation, abdominal pain, chest pain, sob/cough, dizziness. He is alone.His wife was recently diagnosed with colon cancer.      [facility] all information reviewed and updated as relevant to today's visit    Review of Systems:   Review of Systems   Constitutional: Positive for appetite  "change and fatigue. Negative for activity change, fever and unexpected weight change.   HENT: Negative for mouth sores, nosebleeds and sore throat.    Eyes: Negative for visual disturbance.   Respiratory: Negative for cough and shortness of breath.    Cardiovascular: Negative for chest pain, palpitations and leg swelling.   Gastrointestinal: Negative for abdominal pain, constipation, diarrhea, nausea and vomiting.   Genitourinary: Negative for difficulty urinating and frequency.   Musculoskeletal: Negative for arthralgias and back pain.   Skin: Positive for rash.        improved   Neurological: Negative for dizziness, numbness and headaches.   Hematological: Negative for adenopathy. Does not bruise/bleed easily.   Psychiatric/Behavioral: Negative for confusion and sleep disturbance. The patient is not nervous/anxious.         Depressed mood   All other systems reviewed and are negative.      Objective:      Vitals:   Vitals:    04/05/17 1126   BP: 117/78   BP Location: Left arm   Patient Position: Sitting   BP Method: Automatic   Pulse: 97   Resp: 15   Temp: 99 °F (37.2 °C)   Weight: 77.2 kg (170 lb 3.1 oz)   Height: 5' 11" (1.803 m)     BMI: Body mass index is 23.74 kg/(m^2).      Physical Exam:   Physical Exam   Constitutional: He is oriented to person, place, and time. He appears well-developed and well-nourished. No distress.   Thin male using walker for walking assistance   HENT:   Nose: Nose normal.   Mouth/Throat: Oropharynx is clear and moist. No oropharyngeal exudate.   Eyes: Conjunctivae and lids are normal. Pupils are equal, round, and reactive to light. No scleral icterus.   Neck: Trachea normal and normal range of motion. Neck supple. No thyromegaly present.   Cardiovascular: Normal rate, regular rhythm, normal heart sounds and normal pulses.    Pulmonary/Chest: Effort normal and breath sounds normal. No respiratory distress. He exhibits no tenderness.   Abdominal: Soft. Normal appearance and bowel " sounds are normal. He exhibits no distension and no mass. There is no hepatosplenomegaly or splenomegaly. There is no tenderness.   Musculoskeletal: Normal range of motion.   Lymphadenopathy:        Head (right side): No submental and no submandibular adenopathy present.        Head (left side): No submental and no submandibular adenopathy present.     He has no cervical adenopathy.     He has no axillary adenopathy.        Right: No supraclavicular adenopathy present.        Left: No supraclavicular adenopathy present.   Neurological: He is alert and oriented to person, place, and time. No sensory deficit.   Skin: Skin is warm, dry and intact. Rash (drug rash noted. Less angry ) noted. No bruising noted. Nails show no clubbing.   Psychiatric: He has a normal mood and affect. His speech is normal and behavior is normal. Cognition and memory are normal.   Vitals reviewed.        Laboratory Data:  WBC   Date Value Ref Range Status   04/05/2017 2.36 (L) 3.90 - 12.70 K/uL Final     Hemoglobin   Date Value Ref Range Status   04/05/2017 11.5 (L) 14.0 - 18.0 g/dL Final     POC Hematocrit   Date Value Ref Range Status   07/07/2016 28 (L) 36 - 54 %PCV Final     Hematocrit   Date Value Ref Range Status   04/05/2017 37.3 (L) 40.0 - 54.0 % Final     Platelets   Date Value Ref Range Status   04/05/2017 153 150 - 350 K/uL Final     Gran #   Date Value Ref Range Status   04/05/2017 1.4 (L) 1.8 - 7.7 K/uL Final     Comment:     The ANC is based on a white cell differential from an   automated cell counter. It has not been microscopically   reviewed for the presence of abnormal cells. Clinical   correlation is required.         Chemistry        Component Value Date/Time     (L) 04/05/2017 1030    K 4.2 04/05/2017 1030     04/05/2017 1030    CO2 22 (L) 04/05/2017 1030    BUN 18 04/05/2017 1030    CREATININE 0.9 04/05/2017 1030     (H) 04/05/2017 1030        Component Value Date/Time    CALCIUM 8.6 (L) 04/05/2017  1030    ALKPHOS 85 04/05/2017 1030    AST 37 04/05/2017 1030    ALT 49 (H) 04/05/2017 1030    BILITOT 0.6 04/05/2017 1030              Assessment/Plan:     1. MDS (myelodysplastic syndrome)    2. Rash, drug    3. Empyema of right pleural space    4. Anemia, chronic disease    5. Other neutropenia        Plan:    MDS post 5 cycles of Vidaza. Continue to hold Vidaza and continue aranesp every 2 weeks (due 4/7) for now while he continues to recover from empyema. His blood counts are stable with a WBC 2.36, ANC 1400, hgb 11.5 gm/dl and platelets 153,000.    His Empyema is resolving. He completes 14 days of Clinda today. No sob, fevers or cough. Plan for repeat CT in about 8 weeks.    Drug rash resolving. Seen by Derm and started steroid cream, will continue Prednisone 10 mg daily for now.    His depression has improved some, followed by Dr Garcia and continue Celexa 60 mg daily.    His A Fib (on coumadin), HTN and CHF are stable.       Med and Orders:  Orders Placed This Encounter    CBC auto differential    Comprehensive metabolic panel    Protime-INR    Type & Screen       Follow Up:  Return in about 2 weeks (around 4/19/2017).    Patient instructions:   Patient Instructions   Stop clindamycin  Continue Prednisone until next visit  Follow-up in 2 weeks, Aranesp on Friday      Patient was seen with collaborating physician Dr Laura Jarquin NP  Hematology/BMT

## 2017-04-05 NOTE — MR AVS SNAPSHOT
[facility]  [address]  [facility] [address]  Phone: [phone]                  Wil Kwon Jr.   2017 11:45 AM   Office Visit    Description:  Male : 1945   Provider:  Laura Rader MD   Department:  [facility]                To Do List           Future Appointments        Provider Department Dept Phone    2017 10:00 AM INJECTION, [facility] [phone]    2017 10:00 AM INJECTION, [facility] [phone]    2017 10:00 AM INJECTION, [facility] [phone]      Goals (5 Years of Data)     None      Follow-Up and Disposition     Return in about 2 weeks (around 2017).      [facility]     [facility] -  Assistance  Unless otherwise directed by your provider, please contact [facility], our nurse care line that is available for  assistance.     Registered nurses in the [facility] provide: appointment scheduling, clinical advisement, health education, and other advisory services.  Call: [phone] (toll free)               Medications           Message regarding Medications     Verify the changes and/or additions to your medication regime listed below are the same as discussed with your clinician today.  If any of these changes or additions are incorrect, please notify your healthcare provider.        STOP taking these medications     clindamycin (CLEOCIN) 300 MG capsule Take 1 capsule (300 mg total) by mouth 3 (three) times daily.           Verify that the below list of medications is an accurate representation of the medications you are currently taking.  If none reported, the list may be blank. If incorrect, please contact your healthcare provider. Carry this list with you in case of emergency.           Current Medications     acetaminophen (TYLENOL) 325 MG tablet Take  "325 mg by mouth as needed for Pain.    citalopram (CELEXA) 40 MG tablet TAKE 1 TABLET BY MOUTH DAILY    diltiaZEM (CARDIZEM CD) 300 MG 24 hr capsule TAKE ONE CAPSULE BY MOUTH EVERY DAY    hydrocodone-acetaminophen 5-325mg (NORCO) 5-325 mg per tablet Take 1 tablet by mouth every 6 (six) hours as needed.    methylPREDNISolone (MEDROL DOSEPACK) 4 mg tablet Take 1 tablet (4 mg total) by mouth once daily. use as directed    metoprolol succinate (TOPROL-XL) 25 MG 24 hr tablet Take 1 tablet (25 mg total) by mouth once daily.    multivitamin capsule Take 1 capsule by mouth every morning.     nystatin (MYCOSTATIN) ointment Apply topically 3 (three) times daily.    omega-3 fatty acids-vitamin E (FISH OIL) 1,000 mg Cap Take 3 capsules by mouth once daily.     ondansetron (ZOFRAN) 8 MG tablet Take 1 tablet (8 mg total) by mouth every 8 (eight) hours as needed for Nausea.    predniSONE (DELTASONE) 10 MG tablet Take 1 tablet (10 mg total) by mouth once daily.    triamcinolone acetonide 0.1% (KENALOG) 0.1 % cream AAA bid    warfarin (COUMADIN) 3 MG tablet Take 1 tablet (3 mg total) by mouth Daily.           Clinical Reference Information           Your Vitals Were     BP Pulse Temp Resp Height Weight    117/78 (BP Location: Left arm, Patient Position: Sitting, BP Method: Automatic) 97 99 °F (37.2 °C) 15 5' 11" (1.803 m) 77.2 kg (170 lb 3.1 oz)    BMI                23.74 kg/m2          Blood Pressure          Most Recent Value    BP  117/78      Allergies as of 4/5/2017     Lipitor [Atorvastatin]    Lisinopril    Augmentin [Amoxicillin-pot Clavulanate]      Immunizations Administered on Date of Encounter - 4/5/2017     None      Language Assistance Services     ATTENTION: Language assistance services are available, free of charge. Please call [phone].      ATENCIÓN: Si jose guadalupela lillian, tiene a gannon disposición servicios gratuitos de asistencia lingüística. Llame al [phone].     CHÚ Ý: N?u b?n nói Ti?ng Vi?t, có các d?ch " v? h? tr? ngadelaide ng? mi?n phí sohailh cho b?n. G?i s? [phone].         [facility] complies with applicable Federal civil rights laws and does not discriminate on the basis of race, color, national origin, age, disability, or sex.

## 2017-04-07 ENCOUNTER — INFUSION (OUTPATIENT)
Dept: INFUSION THERAPY | Facility: HOSPITAL | Age: 72
End: 2017-04-07
Attending: INTERNAL MEDICINE
Payer: MEDICARE

## 2017-04-07 DIAGNOSIS — D46.9 MDS (MYELODYSPLASTIC SYNDROME): Primary | ICD-10-CM

## 2017-04-07 PROCEDURE — 96372 THER/PROPH/DIAG INJ SC/IM: CPT

## 2017-04-07 PROCEDURE — 63600175 PHARM REV CODE 636 W HCPCS: Mod: EC | Performed by: INTERNAL MEDICINE

## 2017-04-07 RX ADMIN — DARBEPOETIN ALFA 200 MCG: 200 INJECTION, SOLUTION INTRAVENOUS; SUBCUTANEOUS at 10:04

## 2017-04-19 ENCOUNTER — OFFICE VISIT (OUTPATIENT)
Dept: HEMATOLOGY/ONCOLOGY | Facility: CLINIC | Age: 72
End: 2017-04-19
Payer: MEDICARE

## 2017-04-19 VITALS
HEART RATE: 144 BPM | RESPIRATION RATE: 16 BRPM | TEMPERATURE: 98 F | BODY MASS INDEX: 26.84 KG/M2 | HEIGHT: 69 IN | SYSTOLIC BLOOD PRESSURE: 129 MMHG | DIASTOLIC BLOOD PRESSURE: 79 MMHG | WEIGHT: 181.19 LBS

## 2017-04-19 DIAGNOSIS — D46.9 MDS (MYELODYSPLASTIC SYNDROME): Primary | ICD-10-CM

## 2017-04-19 DIAGNOSIS — I10 ESSENTIAL HYPERTENSION: ICD-10-CM

## 2017-04-19 DIAGNOSIS — F32.0 MILD SINGLE CURRENT EPISODE OF MAJOR DEPRESSIVE DISORDER: ICD-10-CM

## 2017-04-19 DIAGNOSIS — I35.0 AORTIC VALVE STENOSIS, MILD: Chronic | ICD-10-CM

## 2017-04-19 DIAGNOSIS — E78.5 DYSLIPIDEMIA: ICD-10-CM

## 2017-04-19 DIAGNOSIS — I48.91 ATRIAL FIBRILLATION WITH RVR: ICD-10-CM

## 2017-04-19 DIAGNOSIS — J86.9 EMPYEMA OF RIGHT PLEURAL SPACE: ICD-10-CM

## 2017-04-19 DIAGNOSIS — R21 RASH: ICD-10-CM

## 2017-04-19 PROCEDURE — 1159F MED LIST DOCD IN RCRD: CPT | Mod: S$GLB,,, | Performed by: INTERNAL MEDICINE

## 2017-04-19 PROCEDURE — 3074F SYST BP LT 130 MM HG: CPT | Mod: S$GLB,,, | Performed by: INTERNAL MEDICINE

## 2017-04-19 PROCEDURE — 3078F DIAST BP <80 MM HG: CPT | Mod: S$GLB,,, | Performed by: INTERNAL MEDICINE

## 2017-04-19 PROCEDURE — 99214 OFFICE O/P EST MOD 30 MIN: CPT | Mod: S$GLB,,, | Performed by: INTERNAL MEDICINE

## 2017-04-19 PROCEDURE — 1126F AMNT PAIN NOTED NONE PRSNT: CPT | Mod: S$GLB,,, | Performed by: INTERNAL MEDICINE

## 2017-04-19 PROCEDURE — 1157F ADVNC CARE PLAN IN RCRD: CPT | Mod: S$GLB,,, | Performed by: INTERNAL MEDICINE

## 2017-04-19 PROCEDURE — 99999 PR PBB SHADOW E&M-EST. PATIENT-LVL III: CPT | Mod: PBBFAC,,, | Performed by: INTERNAL MEDICINE

## 2017-04-19 PROCEDURE — 1160F RVW MEDS BY RX/DR IN RCRD: CPT | Mod: S$GLB,,, | Performed by: INTERNAL MEDICINE

## 2017-04-19 RX ORDER — TRAZODONE HYDROCHLORIDE 100 MG/1
100 TABLET ORAL NIGHTLY
Qty: 30 TABLET | Refills: 11 | Status: SHIPPED | OUTPATIENT
Start: 2017-04-19 | End: 2018-05-02

## 2017-04-19 NOTE — PATIENT INSTRUCTIONS
Stop prednisone    Take Trazodone as prescribed    Continue all other meds    Come in for cbc and aranesp if needed every 2 weeks    See me in 6 weeks

## 2017-04-19 NOTE — MR AVS SNAPSHOT
[facility]  [address]  [facility] [address]  Phone: [phone]                  Wil Kwon Jr.   2017 1:30 PM   Office Visit    Description:  Male : 1945   Provider:  Laura Rader MD   Department:  [facility]           Diagnoses this Visit        Comments    MDS (myelodysplastic syndrome)    -  Primary     Atrial fibrillation with RVR         Essential hypertension         Aortic valve stenosis, mild         Empyema of right pleural space         Mild single current episode of major depressive disorder         Dyslipidemia         Rash                To Do List           Future Appointments        Provider Department Dept Phone    2017 2:15 PM INJECTION, [facility] [phone]    5/3/2017 2:15 PM INJECTION, [facility] [phone]      Goals (5 Years of Data)     None      Follow-Up and Disposition     Return in about 6 weeks (around 2017).       These Medications        Disp Refills Start End    trazodone (DESYREL) 100 MG tablet 30 tablet 11 2017    Take 1 tablet (100 mg total) by mouth every evening. - Oral    Pharmacy: [id number] - [place] [facility] [address] [facility]: [phone]         [facility]     [facility] Care [facility] - 24 Assistance  Unless otherwise directed by your provider, please contact [facility], our nurse care line that is available for / assistance.     Registered nurses in the [facility] provide: appointment scheduling, clinical advisement, health education, and other advisory services.  Call: [phone] (toll free)               Medications           Message regarding Medications     Verify the changes and/or additions to your medication regime listed below are the same as discussed with your clinician today.  If any of these changes or additions are incorrect,  "please notify your healthcare provider.        START taking these NEW medications        Refills    trazodone (DESYREL) 100 MG tablet 11    Sig: Take 1 tablet (100 mg total) by mouth every evening.    Class: Normal    Route: Oral      STOP taking these medications     methylPREDNISolone (MEDROL DOSEPACK) 4 mg tablet Take 1 tablet (4 mg total) by mouth once daily. use as directed    triamcinolone acetonide 0.1% (KENALOG) 0.1 % cream AAA bid           Verify that the below list of medications is an accurate representation of the medications you are currently taking.  If none reported, the list may be blank. If incorrect, please contact your healthcare provider. Carry this list with you in case of emergency.           Current Medications     acetaminophen (TYLENOL) 325 MG tablet Take 325 mg by mouth as needed for Pain.    citalopram (CELEXA) 40 MG tablet TAKE 1 TABLET BY MOUTH DAILY    diltiaZEM (CARDIZEM CD) 300 MG 24 hr capsule TAKE ONE CAPSULE BY MOUTH EVERY DAY    hydrocodone-acetaminophen 5-325mg (NORCO) 5-325 mg per tablet Take 1 tablet by mouth every 6 (six) hours as needed.    metoprolol succinate (TOPROL-XL) 25 MG 24 hr tablet Take 1 tablet (25 mg total) by mouth once daily.    multivitamin capsule Take 1 capsule by mouth every morning.     nystatin (MYCOSTATIN) ointment Apply topically 3 (three) times daily.    omega-3 fatty acids-vitamin E (FISH OIL) 1,000 mg Cap Take 3 capsules by mouth once daily.     ondansetron (ZOFRAN) 8 MG tablet Take 1 tablet (8 mg total) by mouth every 8 (eight) hours as needed for Nausea.    trazodone (DESYREL) 100 MG tablet Take 1 tablet (100 mg total) by mouth every evening.    warfarin (COUMADIN) 3 MG tablet Take 1 tablet (3 mg total) by mouth Daily.           Clinical Reference Information           Your Vitals Were     BP Pulse Temp Resp Height Weight    129/79 (BP Location: Left arm, Patient Position: Sitting, BP Method: Automatic) 144 98.1 °F (36.7 °C) 16 5' 9" (1.753 m) 82.2 " kg (181 lb 3.5 oz)    BMI                26.76 kg/m2          Blood Pressure          Most Recent Value    BP  129/79      Allergies as of 4/19/2017     Lipitor [Atorvastatin]    Lisinopril    Augmentin [Amoxicillin-pot Clavulanate]      Immunizations Administered on Date of Encounter - 4/19/2017     None      Orders Placed During Today's Visit     Future Labs/Procedures Expected by Expires    CBC Oncology  As directed 4/19/2018    Comprehensive metabolic panel  As directed 4/19/2018      Instructions    Stop prednisone    Take Trazodone as prescribed    Continue all other meds    Come in for cbc and aranesp if needed every 2 weeks    See me in 6 weeks           Language Assistance Services     ATTENTION: Language assistance services are available, free of charge. Please call [phone].      ATENCIÓN: Si michelle snyder, tiene a gannon disposición servicios gratuitos de asistencia lingüística. Llame al [phone].     CHÚ Ý: N?u b?n nói Ti?ng Vi?t, có các d?ch v? h? tr? ngôn ng? mi?n phí dành cho b?n. G?i s? [phone].         [facility] complies with applicable Federal civil rights laws and does not discriminate on the basis of race, color, national origin, age, disability, or sex.

## 2017-04-25 NOTE — PROGRESS NOTES
"PATIENT: Wil Kwon Jr.  MRN: [id number]  DATE: 4/25/2017    Subjective:   MDS/AML    Oncologic History:  Mr. Kwon 71-year-old gentleman with several chronic medical conditions living healthy lifestyle. He has hypertension and reports home readings are all normal. He did recently develop angioedema from ACE inhibitor and was switched to an ARB. His dyslipidemia is treated with pharmacologic therapy. He has history of carotid atherosclerotic disease status post left carotid endarterectomy and up-to-date with surveillance carotid Doppler study. He has a history of PAF followed by our electrophysiology cardiology department and is chronically anticoagulated. He has moderate aortic stenosis. He has DJD of the right knee which has become more symptomatic in recent months. He was noted to be progressively anemic over the last 1 year. Hematology workup reveals a normal B12 and folate. His iron stores are normal. His reticulocyte count is slightly elevated at 4.2. His white count has remained low and progressively dropping over the last 3 years with monocytosis. He also has developed mild progressive thrombocytopenia. He is moderately symptomatic with the fatigue. He has no history of extrinsic GI bleeding. He also has no history of previous transfusions.he underwent a marrow. 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 Of 20 metaphases, 2 metaphases were normal and 18 metaphases had a 2;21  translocation. 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 RUNX1 rearrangements are  associated with de alfredo AML and therapeutic-related AML and MDS. Clinical and pathologic correlation is  recommended."  FINAL PATHOLOGIC DIAGNOSIS  CBC DATA 8/24/16:  RBC: 3.45 M/UL, H/H : 9.9/32.1, MCV : 93 FL, WBC: 3.1 K/UL, Gran 1.2 k/ul %, Platelet: 200 " K/UL.  No peripheral blood smear was submitted for evaluation.  BONE MARROW ASPIRATE, BONE MARROW CLOT, AND BONE MARROW CORE BIOPSY WITH:  CELLULARITY= 95%, TRILINEAGE HEMATOPOIETIC ACTIVITY (M/E= 2:1) AND INCREASED IMMATURE  CELLS (9%). SEE COMMENT.  LEFT SHIFTED MATURATION OF GRANULOPOIESIS.  ADEQUATE STORAGE IRON.  ADEQUATE NUMBER OF MEGAKARYOCYTES.  COMMENT: Flow cytometry analysis of bone marrow aspirate shows lymph gate (4.2 %) containing T and B cells.  No B cell clonality or T-cell aberrancy is evident. Blast gate is increased (7.9%) with cells expression of CD13 and  CD34. Immunohistochemical studies were performed on the clot and core biopsy for further architecture evaluation with  adequate positive and negative controls. Scattered mixed predominantly T cells (CD3 positive) and B cells (CD20  positive) are evident. About 6-7 % plasma cells ( positive) and 9% CD34 positive cells are noted. Findings  are nondiagnostic for lymphoma or plasma cell dyscrasia.  Microscopic Examination  BONE MARROW ASPIRATE DIFFERENTIAL:  Blasts----------------------------------------------5%  Promyelocytes---------------------------------2%  Myelocytes--------------------------------------11%  Metamyelocytes------------------------------ 19%  Bands----------------------------------------------5%  PMN Neutrophils------------------------------15%  Eosinophils------------------------------------------2%  Basophils---------------------------------------0%  Monocytes------------------------------------2%  Lymphocytes-------------------------------------6%  Plasma cells------------------------------------------2%  Erythroid precursors--------------------------31%  BONE MARROW ASPIRATE:  Myeloid and erythroid maturation shows M:E ratio at 2:1. No significant dysplasia is evident. Left shifted maturation of  granulopoiesis is noted. Stainable iron is present without increased ringed sideroblasts. Megakaryocytes are seen.  BONE  MARROW CLOT:  Cellularity is 95 % with trilineage hematopoiesis. No abnormal infiltrates are seen. Megakaryocytes are adequate in  number. Stainable iron is present.  BONE MARROW CORE BIOPSY:  Cellularity is 95 %. No abnormal infiltrates are seen. Stainable iron is not identified. Megakaryocytes are adequate in  No significantly increased reticular fibrosis is evident by special stain (reticulin) with adequate positive and  negative controls.      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 He did not meet criteria for AML. He was started on Vidaza and Aranes and completed 3 cycles when a repeat bone marrow biopsy revealed:      BONE MARROW ASPIRATE, BONE MARROW CLOT, AND BONE MARROW CORE BIOPSY WITH:  CELLULARITY= %, TRILINEAGE HEMATOPOIETIC ACTIVITY (M/E= 1.4:1).  CONSISTENT WITH REFRACTORY ANEMIA WITH EXCESS BLASTS -1. SEE COMMENT.  DECREASED STORAGE IRON.  INCREASED NUMBER OF MEGAKARYOCYTES WITH DYSPLASTIC MORPHOLOGY.  COMMENT: Flow cytometry analysis of bone marrow aspirate shows lymph gate (15.9 %) containing T and B cells.  No B cell clonality or T-cell aberrancy is evident. Blast gate is slightly increased (7.1%).  Immunohistochemical studies were performed on the clot and core biopsy for further architecture evaluation with  adequate positive and negative controls. About 6-7% CD34 positive cells are noted. CD61 highlights increased in  number of megakaryocytes with dysplastic morphology.  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 Correlate clinically and with a cytogenetics  report.  Microscopic Examination  BONE MARROW ASPIRATE DIFFERENTIAL:  Blasts---------------------------------------------- 7 %  Promyelocytes---------------------------------1%  Myelocytes--------------------------------------10%  Metamyelocytes------------------------------ 8%  Bands----------------------------------------------9%  PMN  Neutrophils------------------------------15%  Eosinophils------------------------------------------1%  Basophils---------------------------------------1%  Monocytes------------------------------------1%  Lymphocytes-------------------------------------9%  Plasma cells------------------------------------------1%  Erythroid precursors--------------------------37%  BONE MARROW ASPIRATE:  Myeloid and erythroid maturation shows M:E ratio at 1.4:1. Dysgranulopoiesis and occasional dyserythropoiesis is  evident. Stainable iron is decreased without increased ringed sideroblasts. Megakaryocytes are seen.  BONE MARROW CLOT:  Cellularity is  % with trilineage hematopoiesis. No abnormal infiltrates are seen. Megakaryocytes are increased  in number with dysplastic morphology. Stainable iron is decreased.  BONE MARROW CORE BIOPSY:  Cellularity is  %. No abnormal infiltrates are seen. Stainable iron is not identified. Megakaryocytes are  increased in number with dysplastic morphology.        Interval History: Mr. Kwon returns for follow up. Last Vidaza was cycle 5 completed on 3/3/17.  He was evaluated for an allogeneic transplant but decided against it.  He subsequently got admitted to the hospital with an empyema.  His MDS therapy has been on hold until he is recovered from his empyema. He developed a drug rash while on Augmentin and was switched to Clindamycin. He has completed 14 days of clinda as of today. Patient was evaluated by derm and started steroid cream. Rash has improved on topical steroids and he continues Prednisone 10 mg daily.  He feels significantly better.  He is eating more.  He has no shortness of breath.  He has no fever.  He is gaining weight.  His rash is now completely disappeared.    PMFSH: all information reviewed and updated as relevant to today's visit    Review of Systems:   Review of Systems   Constitutional: Positive for appetite change and fatigue. Negative for activity change,  "fever and unexpected weight change.   HENT: Negative for mouth sores, nosebleeds and sore throat.    Eyes: Negative for visual disturbance.   Respiratory: Negative for cough and shortness of breath.    Cardiovascular: Negative for chest pain, palpitations and leg swelling.   Gastrointestinal: Negative for abdominal pain, constipation, diarrhea, nausea and vomiting.   Genitourinary: Negative for difficulty urinating and frequency.   Musculoskeletal: Negative for arthralgias and back pain.   Skin: Positive for rash.        improved   Neurological: Negative for dizziness, numbness and headaches.   Hematological: Negative for adenopathy. Does not bruise/bleed easily.   Psychiatric/Behavioral: Negative for confusion and sleep disturbance. The patient is not nervous/anxious.         Depressed mood   All other systems reviewed and are negative.      Objective:      Vitals:   Vitals:    04/19/17 1257   BP: 129/79   BP Location: Left arm   Patient Position: Sitting   BP Method: Automatic   Pulse: (!) 144   Resp: 16   Temp: 98.1 °F (36.7 °C)   Weight: 82.2 kg (181 lb 3.5 oz)   Height: 5' 9" (1.753 m)     BMI: Body mass index is 26.76 kg/(m^2).      Physical Exam:   Physical Exam   Constitutional: He is oriented to person, place, and time. He appears well-developed and well-nourished. No distress.   Thin male using walker for walking assistance   HENT:   Nose: Nose normal.   Mouth/Throat: Oropharynx is clear and moist. No oropharyngeal exudate.   Eyes: Conjunctivae and lids are normal. Pupils are equal, round, and reactive to light. No scleral icterus.   Neck: Trachea normal and normal range of motion. Neck supple. No thyromegaly present.   Cardiovascular: Normal rate, regular rhythm, normal heart sounds and normal pulses.    Pulmonary/Chest: Effort normal and breath sounds normal. No respiratory distress. He exhibits no tenderness.   Abdominal: Soft. Normal appearance and bowel sounds are normal. He exhibits no distension and " no mass. There is no hepatosplenomegaly or splenomegaly. There is no tenderness.   Musculoskeletal: Normal range of motion.   Lymphadenopathy:        Head (right side): No submental and no submandibular adenopathy present.        Head (left side): No submental and no submandibular adenopathy present.     He has no cervical adenopathy.     He has no axillary adenopathy.        Right: No supraclavicular adenopathy present.        Left: No supraclavicular adenopathy present.   Neurological: He is alert and oriented to person, place, and time. No sensory deficit.   Skin: Skin is warm, dry and intact. Rash (drug rash noted. Less angry ) noted. No bruising noted. Nails show no clubbing.   Psychiatric: He has a normal mood and affect. His speech is normal and behavior is normal. Cognition and memory are normal.   Vitals reviewed.        Laboratory Data:  WBC   Date Value Ref Range Status   04/19/2017 3.35 (L) 3.90 - 12.70 K/uL Final     Hemoglobin   Date Value Ref Range Status   04/19/2017 10.0 (L) 14.0 - 18.0 g/dL Final     POC Hematocrit   Date Value Ref Range Status   07/07/2016 28 (L) 36 - 54 %PCV Final     Hematocrit   Date Value Ref Range Status   04/19/2017 31.9 (L) 40.0 - 54.0 % Final     Platelets   Date Value Ref Range Status   04/19/2017 110 (L) 150 - 350 K/uL Final     Gran #   Date Value Ref Range Status   04/19/2017 1.6 (L) 1.8 - 7.7 K/uL Final     Gran%   Date Value Ref Range Status   04/19/2017 46.3 38.0 - 73.0 % Final       Chemistry        Component Value Date/Time     04/19/2017 1206    K 4.4 04/19/2017 1206     04/19/2017 1206    CO2 28 04/19/2017 1206    BUN 14 04/19/2017 1206    CREATININE 0.8 04/19/2017 1206    GLU 87 04/19/2017 1206        Component Value Date/Time    CALCIUM 8.7 04/19/2017 1206    ALKPHOS 91 04/19/2017 1206    AST 24 04/19/2017 1206    ALT 21 04/19/2017 1206    BILITOT 0.6 04/19/2017 1206              Assessment/Plan:     1. MDS (myelodysplastic syndrome)    2. Atrial  fibrillation with RVR    3. Essential hypertension    4. Aortic valve stenosis, mild    5. Empyema of right pleural space    6. Mild single current episode of major depressive disorder    7. Dyslipidemia    8. Rash        Plan:    MDS post 5 cycles of Vidaza. Continue to hold Vidaza and continue aranesp every 2 weeks (due 4/7) for now while he continues to recover from empyema. His blood counts are stable with a WBC 2.36, ANC 1400, hgb 11.5 gm/dl and platelets 153,000.    His Empyema is resolving. He completes 14 days of Clinda today. No sob, fevers or cough. Plan for repeat CT in about 8 weeks.    Drug rash resolving. Seen by Derm and started steroid cream, will continue Prednisone 10 mg daily for now.    His depression has improved some, followed by Dr Garcia and continue Celexa 60 mg daily.    His A Fib (on coumadin), HTN and CHF are stable.       Med and Orders:  Orders Placed This Encounter    CBC Oncology    Comprehensive metabolic panel    trazodone (DESYREL) 100 MG tablet       Follow Up:  Return in about 6 weeks (around 5/31/2017).    Patient instructions:   Patient Instructions   Stop prednisone    Take Trazodone as prescribed    Continue all other meds    Come in for cbc and aranesp if needed every 2 weeks    See me in 6 weeks          Patient was seen with collaborating physician Dr Laura Jarquin NP  Hematology/BMT    Past Medical History:   Diagnosis Date    Aortic valve stenosis, mild     secondary to bicuspid aortic alve    Atrial fibrillation     Carotid artery disease     Left CEA 4/9/13    Depression     DJD (degenerative joint disease)     H/O echocardiogram 04/13/2016    EF 55-60%. Diastolic dysfunction. PASP 39. mod AS with JEFF 1.18    History of psychiatric hospitalization     [facility] 2014, [facility]    Hyperlipidemia     Hypertension     MDS (myelodysplastic syndrome) 2013    s/p Chemo     Therapy     [facility]        Past  Surgical History:   Procedure Laterality Date    BONE MARROW BIOPSY  08/29/2016    CAROTID ENDARTERECTOMY Left     Inguinal hernia      Left    KNEE SURGERY      Right x2    neck fusion      TONSILLECTOMY         Family History   Problem Relation Age of Onset    Hyperlipidemia Brother         Social History:  reports that he has never smoked. He has never used smokeless tobacco. He reports that he does not drink alcohol or use illicit drugs.    Allergies:  Review of patient's allergies indicates:   Allergen Reactions    Lipitor [atorvastatin]      Muscle pain    Lisinopril Edema     Cheek swelling    Augmentin [amoxicillin-pot clavulanate] Rash       Medications:  Current Outpatient Prescriptions   Medication Sig Dispense Refill    acetaminophen (TYLENOL) 325 MG tablet Take 325 mg by mouth as needed for Pain.      citalopram (CELEXA) 40 MG tablet TAKE 1 TABLET BY MOUTH DAILY (Patient taking differently: TAKE 1 TABLET BY MOUTH DAILY (am)) 90 tablet 0    diltiaZEM (CARDIZEM CD) 300 MG 24 hr capsule TAKE ONE CAPSULE BY MOUTH EVERY DAY 90 capsule 1    hydrocodone-acetaminophen 5-325mg (NORCO) 5-325 mg per tablet Take 1 tablet by mouth every 6 (six) hours as needed. 30 tablet 0    metoprolol succinate (TOPROL-XL) 25 MG 24 hr tablet Take 1 tablet (25 mg total) by mouth once daily. 90 tablet 3    multivitamin capsule Take 1 capsule by mouth every morning.       nystatin (MYCOSTATIN) ointment Apply topically 3 (three) times daily. 1 Tube 1    omega-3 fatty acids-vitamin E (FISH OIL) 1,000 mg Cap Take 3 capsules by mouth once daily.       ondansetron (ZOFRAN) 8 MG tablet Take 1 tablet (8 mg total) by mouth every 8 (eight) hours as needed for Nausea. 30 tablet 2    trazodone (DESYREL) 100 MG tablet Take 1 tablet (100 mg total) by mouth every evening. 30 tablet 11    warfarin (COUMADIN) 3 MG tablet Take 1 tablet (3 mg total) by mouth Daily. 30 tablet 11     No current facility-administered medications for  "this visit.          Review of Systems   HENT: Negative.    Eyes: Negative.    Respiratory: Negative.    Cardiovascular: Negative.    Gastrointestinal: Negative.    Endocrine: Negative.    Genitourinary: Negative.    Musculoskeletal: Negative.    Skin: Negative.    Neurological: Negative.    Hematological: Negative.    Psychiatric/Behavioral: Negative.        ECOG Performance Status: 1  Objective:      Vitals:   Vitals:    04/19/17 1257   BP: 129/79   BP Location: Left arm   Patient Position: Sitting   BP Method: Automatic   Pulse: (!) 144   Resp: 16   Temp: 98.1 °F (36.7 °C)   Weight: 82.2 kg (181 lb 3.5 oz)   Height: 5' 9" (1.753 m)     BMI: Body mass index is 26.76 kg/(m^2).      Physical Exam   Constitutional: he is oriented to person, place, and time. He appears well-developed and well-nourished.   HENT: NC AT   Head: Normocephalic.   Right Ear: External ear normal.   Left Ear: External ear normal.   Nose: Nose normal.   Mouth/Throat: Oropharynx is clear and moist.   Eyes: Conjunctivae and EOM are normal. Pupils are equal, round, and reactive to light.   Neck: Normal range of motion. Neck supple.   Cardiovascular: Normal rate, regular rhythm, normal heart sounds and intact distal pulses.    Pulmonary/Chest: Effort normal and breath sounds normal.   Abdominal: Soft. Bowel sounds are normal.   Musculoskeletal: Normal range of motion.   Neurological: he is alert and oriented to person, place, and time. He has normal reflexes.   Skin: Skin is warm and dry.   Psychiatric: he has a normal mood and affect. His behavior is normal. Judgment and thought content normal.       Laboratory Data:  Lab Visit on 04/19/2017   Component Date Value Ref Range Status    WBC 04/19/2017 3.35* 3.90 - 12.70 K/uL Final    RBC 04/19/2017 3.43* 4.60 - 6.20 M/uL Final    Hemoglobin 04/19/2017 10.0* 14.0 - 18.0 g/dL Final    Hematocrit 04/19/2017 31.9* 40.0 - 54.0 % Final    MCV 04/19/2017 93  82 - 98 fL Final    MCH 04/19/2017 29.2  " 27.0 - 31.0 pg Final    MCHC 04/19/2017 31.3* 32.0 - 36.0 % Final    RDW 04/19/2017 17.4* 11.5 - 14.5 % Final    Platelets 04/19/2017 110* 150 - 350 K/uL Final    MPV 04/19/2017 12.4  9.2 - 12.9 fL Final    Gran # 04/19/2017 1.6* 1.8 - 7.7 K/uL Final    Lymph # 04/19/2017 1.7  1.0 - 4.8 K/uL Final    Mono # 04/19/2017 0.0* 0.3 - 1.0 K/uL Final    Eos # 04/19/2017 0.0  0.0 - 0.5 K/uL Final    Baso # 04/19/2017 0.01  0.00 - 0.20 K/uL Final    Gran% 04/19/2017 46.3  38.0 - 73.0 % Final    Lymph% 04/19/2017 51.9* 18.0 - 48.0 % Final    Mono% 04/19/2017 0.6* 4.0 - 15.0 % Final    Eosinophil% 04/19/2017 0.6  0.0 - 8.0 % Final    Basophil% 04/19/2017 0.3  0.0 - 1.9 % Final    Differential Method 04/19/2017 Automated   Final    Sodium 04/19/2017 138  136 - 145 mmol/L Final    Potassium 04/19/2017 4.4  3.5 - 5.1 mmol/L Final    Chloride 04/19/2017 106  95 - 110 mmol/L Final    CO2 04/19/2017 28  23 - 29 mmol/L Final    Glucose 04/19/2017 87  70 - 110 mg/dL Final    BUN, Bld 04/19/2017 14  8 - 23 mg/dL Final    Creatinine 04/19/2017 0.8  0.5 - 1.4 mg/dL Final    Calcium 04/19/2017 8.7  8.7 - 10.5 mg/dL Final    Total Protein 04/19/2017 7.7  6.0 - 8.4 g/dL Final    Albumin 04/19/2017 3.0* 3.5 - 5.2 g/dL Final    Total Bilirubin 04/19/2017 0.6  0.1 - 1.0 mg/dL Final    Comment: For infants and newborns, interpretation of results should be based  on gestational age, weight and in agreement with clinical  observations.  Premature Infant recommended reference ranges:  Up to 24 hours.............<8.0 mg/dL  Up to 48 hours............<12.0 mg/dL  3-5 days..................<15.0 mg/dL  6-29 days.................<15.0 mg/dL      Alkaline Phosphatase 04/19/2017 91  55 - 135 U/L Final    AST 04/19/2017 24  10 - 40 U/L Final    ALT 04/19/2017 21  10 - 44 U/L Final    Anion Gap 04/19/2017 4* 8 - 16 mmol/L Final    eGFR if African American 04/19/2017 >60.0  >60 mL/min/1.73 m^2 Final    eGFR if non African  American 04/19/2017 >60.0  >60 mL/min/1.73 m^2 Final    Comment: Calculation used to obtain the estimated glomerular filtration  rate (eGFR) is the CKD-EPI equation. Since race is unknown   in our information system, the eGFR values for   -American and Non--American patients are given   for each creatinine result.      Prothrombin Time 04/19/2017 13.8* 9.0 - 12.5 sec Final    INR 04/19/2017 1.3* 0.8 - 1.2 Final    Comment: Coumadin Therapy:  2.0 - 3.0 for INR for all indicators except mechanical heart valves  and antiphospholipid syndromes which should use 2.5 - 3.5.      Group & Rh 04/19/2017 O POS   Final    Indirect Dora 04/19/2017 NEG   Final       Assessment/Plan:     1. MDS (myelodysplastic syndrome)    2. Atrial fibrillation with RVR    3. Essential hypertension    4. Aortic valve stenosis, mild    5. Empyema of right pleural space    6. Mild single current episode of major depressive disorder    7. Dyslipidemia    8. Rash      For his MDS I will continue to observe him without any active therapy as his counts are reasonably stable.  Once he has completely recovered from the episode of empyema we may consider restarting Wydase.    His atrial fibrillation, hypertension, aortic stenosis and depression appears stable.  There being managed by his other physicians and I have not changed any medications.    His rash has resolved and I will taper his steroids over the next few weeks.    I have given him a trazodone refill for insomnia.    Med and Order  Orders Placed This Encounter    CBC Oncology    Comprehensive metabolic panel    trazodone (DESYREL) 100 MG tablet       Follow Up  Return in about 6 weeks (around 5/31/2017).

## 2017-05-03 ENCOUNTER — INFUSION (OUTPATIENT)
Dept: INFUSION THERAPY | Facility: HOSPITAL | Age: 72
End: 2017-05-03
Attending: INTERNAL MEDICINE
Payer: MEDICARE

## 2017-05-03 VITALS — HEART RATE: 113 BPM | DIASTOLIC BLOOD PRESSURE: 88 MMHG | SYSTOLIC BLOOD PRESSURE: 134 MMHG

## 2017-05-03 DIAGNOSIS — D46.9 MDS (MYELODYSPLASTIC SYNDROME): Primary | ICD-10-CM

## 2017-05-03 PROCEDURE — 63600175 PHARM REV CODE 636 W HCPCS: Performed by: INTERNAL MEDICINE

## 2017-05-03 PROCEDURE — 96372 THER/PROPH/DIAG INJ SC/IM: CPT

## 2017-05-03 RX ADMIN — DARBEPOETIN ALFA 200 MCG: 200 INJECTION, SOLUTION INTRAVENOUS; SUBCUTANEOUS at 01:05

## 2017-05-03 NOTE — NURSING
Patient here for injection-very depressed over wife's diagnosis-complains of SOB-tolerated injection well.

## 2017-05-08 ENCOUNTER — HOSPITAL ENCOUNTER (OUTPATIENT)
Dept: RADIOLOGY | Facility: HOSPITAL | Age: 72
Discharge: HOME | End: 2017-05-08
Attending: NURSE PRACTITIONER
Payer: MEDICARE

## 2017-05-08 ENCOUNTER — TELEPHONE (OUTPATIENT)
Dept: HEMATOLOGY/ONCOLOGY | Facility: CLINIC | Age: 72
End: 2017-05-08

## 2017-05-08 DIAGNOSIS — D46.9 MDS (MYELODYSPLASTIC SYNDROME): ICD-10-CM

## 2017-05-08 DIAGNOSIS — D46.9 MDS (MYELODYSPLASTIC SYNDROME): Primary | ICD-10-CM

## 2017-05-08 PROCEDURE — 71020 XR CHEST PA AND LATERAL: CPT | Mod: 26,,, | Performed by: RADIOLOGY

## 2017-05-08 PROCEDURE — 71020 XR CHEST PA AND LATERAL: CPT | Mod: TC

## 2017-05-08 NOTE — TELEPHONE ENCOUNTER
Mr. Kwon voiced to Dr Sneed this morning he was feeling SOB. She voiced, patient may have fluid in his right lung that may have come back. The above information forward to Dr Rader. Per Dr Rader, he wish the patient to have a chest xray today.     Called Mr Kwon with the above, patient scheduled today for his CXR @ 4:30. Patient given appointment information.

## 2017-05-10 ENCOUNTER — OFFICE VISIT (OUTPATIENT)
Dept: HEMATOLOGY/ONCOLOGY | Facility: CLINIC | Age: 72
End: 2017-05-10
Payer: MEDICARE

## 2017-05-10 ENCOUNTER — INFUSION (OUTPATIENT)
Dept: INFUSION THERAPY | Facility: HOSPITAL | Age: 72
End: 2017-05-10
Attending: INTERNAL MEDICINE
Payer: MEDICARE

## 2017-05-10 VITALS
BODY MASS INDEX: 26.55 KG/M2 | WEIGHT: 189.63 LBS | TEMPERATURE: 98 F | HEART RATE: 88 BPM | DIASTOLIC BLOOD PRESSURE: 92 MMHG | HEIGHT: 71 IN | SYSTOLIC BLOOD PRESSURE: 154 MMHG

## 2017-05-10 DIAGNOSIS — I35.0 AORTIC STENOSIS, MODERATE: ICD-10-CM

## 2017-05-10 DIAGNOSIS — D46.9 MDS (MYELODYSPLASTIC SYNDROME): Primary | ICD-10-CM

## 2017-05-10 DIAGNOSIS — F32.89 OTHER DEPRESSION: ICD-10-CM

## 2017-05-10 DIAGNOSIS — I48.91 ATRIAL FIBRILLATION WITH RVR: ICD-10-CM

## 2017-05-10 DIAGNOSIS — F51.01 PRIMARY INSOMNIA: ICD-10-CM

## 2017-05-10 DIAGNOSIS — R09.89 BILATERAL CAROTID BRUITS: ICD-10-CM

## 2017-05-10 DIAGNOSIS — E78.5 DYSLIPIDEMIA: ICD-10-CM

## 2017-05-10 DIAGNOSIS — I10 ESSENTIAL HYPERTENSION: ICD-10-CM

## 2017-05-10 DIAGNOSIS — J86.9 EMPYEMA OF RIGHT PLEURAL SPACE: ICD-10-CM

## 2017-05-10 LAB
ALBUMIN SERPL BCP-MCNC: 3.4 G/DL
ALP SERPL-CCNC: 68 U/L
ALT SERPL W/O P-5'-P-CCNC: 25 U/L
ANION GAP SERPL CALC-SCNC: 9 MMOL/L
AST SERPL-CCNC: 31 U/L
BASOPHILS # BLD AUTO: 0 K/UL
BASOPHILS NFR BLD: 0 %
BILIRUB SERPL-MCNC: 1.2 MG/DL
BNP SERPL-MCNC: 960 PG/ML
BUN SERPL-MCNC: 15 MG/DL
CALCIUM SERPL-MCNC: 9 MG/DL
CHLORIDE SERPL-SCNC: 107 MMOL/L
CO2 SERPL-SCNC: 22 MMOL/L
CREAT SERPL-MCNC: 0.9 MG/DL
DIFFERENTIAL METHOD: ABNORMAL
EOSINOPHIL # BLD AUTO: 0 K/UL
EOSINOPHIL NFR BLD: 0 %
ERYTHROCYTE [DISTWIDTH] IN BLOOD BY AUTOMATED COUNT: 19 %
EST. GFR  (AFRICAN AMERICAN): >60 ML/MIN/1.73 M^2
EST. GFR  (NON AFRICAN AMERICAN): >60 ML/MIN/1.73 M^2
GLUCOSE SERPL-MCNC: 90 MG/DL
HCT VFR BLD AUTO: 33.7 %
HGB BLD-MCNC: 10.1 G/DL
LDH SERPL L TO P-CCNC: 226 U/L
LYMPHOCYTES # BLD AUTO: 1.4 K/UL
LYMPHOCYTES NFR BLD: 60 %
MCH RBC QN AUTO: 29.4 PG
MCHC RBC AUTO-ENTMCNC: 30 %
MCV RBC AUTO: 98 FL
MONOCYTES # BLD AUTO: 0 K/UL
MONOCYTES NFR BLD: 0.8 %
NEUTROPHILS # BLD AUTO: 0.9 K/UL
NEUTROPHILS NFR BLD: 39.2 %
PLATELET # BLD AUTO: 131 K/UL
PLATELET BLD QL SMEAR: ABNORMAL
PMV BLD AUTO: 11.5 FL
POTASSIUM SERPL-SCNC: 4 MMOL/L
PROT SERPL-MCNC: 7.6 G/DL
RBC # BLD AUTO: 3.44 M/UL
SODIUM SERPL-SCNC: 138 MMOL/L
WBC # BLD AUTO: 2.4 K/UL

## 2017-05-10 PROCEDURE — 1160F RVW MEDS BY RX/DR IN RCRD: CPT | Mod: S$GLB,,, | Performed by: INTERNAL MEDICINE

## 2017-05-10 PROCEDURE — 3077F SYST BP >= 140 MM HG: CPT | Mod: S$GLB,,, | Performed by: INTERNAL MEDICINE

## 2017-05-10 PROCEDURE — 85025 COMPLETE CBC W/AUTO DIFF WBC: CPT

## 2017-05-10 PROCEDURE — 1159F MED LIST DOCD IN RCRD: CPT | Mod: S$GLB,,, | Performed by: INTERNAL MEDICINE

## 2017-05-10 PROCEDURE — 1126F AMNT PAIN NOTED NONE PRSNT: CPT | Mod: S$GLB,,, | Performed by: INTERNAL MEDICINE

## 2017-05-10 PROCEDURE — 3080F DIAST BP >= 90 MM HG: CPT | Mod: S$GLB,,, | Performed by: INTERNAL MEDICINE

## 2017-05-10 PROCEDURE — 99215 OFFICE O/P EST HI 40 MIN: CPT | Mod: S$GLB,,, | Performed by: INTERNAL MEDICINE

## 2017-05-10 PROCEDURE — 99999 PR PBB SHADOW E&M-EST. PATIENT-LVL III: CPT | Mod: PBBFAC,,, | Performed by: INTERNAL MEDICINE

## 2017-05-10 PROCEDURE — 25000003 PHARM REV CODE 250: Performed by: INTERNAL MEDICINE

## 2017-05-10 PROCEDURE — 83615 LACTATE (LD) (LDH) ENZYME: CPT

## 2017-05-10 PROCEDURE — 36591 DRAW BLOOD OFF VENOUS DEVICE: CPT

## 2017-05-10 PROCEDURE — 1157F ADVNC CARE PLAN IN RCRD: CPT | Mod: S$GLB,,, | Performed by: INTERNAL MEDICINE

## 2017-05-10 PROCEDURE — 80053 COMPREHEN METABOLIC PANEL: CPT

## 2017-05-10 PROCEDURE — 83880 ASSAY OF NATRIURETIC PEPTIDE: CPT

## 2017-05-10 RX ORDER — HEPARIN 100 UNIT/ML
500 SYRINGE INTRAVENOUS
Status: COMPLETED | OUTPATIENT
Start: 2017-05-10 | End: 2017-05-10

## 2017-05-10 RX ORDER — FUROSEMIDE 20 MG/1
20 TABLET ORAL EVERY OTHER DAY
Qty: 60 TABLET | Refills: 11 | Status: SHIPPED | OUTPATIENT
Start: 2017-05-10 | End: 2017-06-21 | Stop reason: SDUPTHER

## 2017-05-10 RX ORDER — SODIUM CHLORIDE 0.9 % (FLUSH) 0.9 %
10 SYRINGE (ML) INJECTION
Status: COMPLETED | OUTPATIENT
Start: 2017-05-10 | End: 2017-05-10

## 2017-05-10 RX ADMIN — HEPARIN 500 UNITS: 100 SYRINGE at 10:05

## 2017-05-10 RX ADMIN — SODIUM CHLORIDE, PRESERVATIVE FREE 10 ML: 5 INJECTION INTRAVENOUS at 10:05

## 2017-05-10 NOTE — Clinical Note
Get labs today  Start Lasix every other day as prescribed  Continue all other meds  CBC/CMP on the 17th  Aranesp on the 17th and every 2 weeks  See me in 6 weeks with cbc/cmp

## 2017-05-10 NOTE — MR AVS SNAPSHOT
[facility]  [address]  [facility] [id number]  Phone: [phone]                  Wil Kwon Jr.   5/10/2017 8:45 AM   Office Visit    Description:  Male : 1945   Provider:  Laura Rader MD   Department:  [facility]           Diagnoses this Visit        Comments    MDS (myelodysplastic syndrome)    -  Primary     Atrial fibrillation with RVR         Aortic stenosis, moderate         Essential hypertension         Empyema of right pleural space         Other depression         Primary insomnia         Dyslipidemia         Bilateral carotid bruits                To Do List           Future Appointments        Provider Department Dept Phone    2017 1:15 PM LAB, [facility] [phone]    2017 2:15 PM INJECTION, [facility] [phone]    2017 1:00 PM Laura Rader MD [facility] [phone]    2017 1:15 PM LAB, HEMONC SAME DAY [facility] [phone]    2017 2:15 PM INJECTION, [facility] [phone]      Goals (5 Years of Data)     None       These Medications        Disp Refills Start End    furosemide (LASIX) 20 MG tablet 60 tablet 11 5/10/2017 5/10/2018    Take 1 tablet (20 mg total) by mouth every other day. - Oral    Pharmacy: [facility]/pharm[facility] #[id number] - [place]  [address] Ph #: [phone]         [facility]     [facility] Nurse Care [facility] - 24/ Assistance  Unless otherwise directed by your provider, please contact [facility], our nurse care line that is available for 24/7 assistance.     Registered nurses in the [facility] provide: appointment scheduling, clinical advisement, health education, and other advisory services.  Call: [phone] (toll free)               Medications           Message regarding Medications      Verify the changes and/or additions to your medication regime listed below are the same as discussed with your clinician today.  If any of these changes or additions are incorrect, please notify your healthcare provider.        START taking these NEW medications        Refills    furosemide (LASIX) 20 MG tablet 11    Sig: Take 1 tablet (20 mg total) by mouth every other day.    Class: Normal    Route: Oral           Verify that the below list of medications is an accurate representation of the medications you are currently taking.  If none reported, the list may be blank. If incorrect, please contact your healthcare provider. Carry this list with you in case of emergency.           Current Medications     acetaminophen (TYLENOL) 325 MG tablet Take 325 mg by mouth as needed for Pain.    citalopram (CELEXA) 40 MG tablet TAKE 1 TABLET BY MOUTH DAILY    diltiaZEM (CARDIZEM CD) 300 MG 24 hr capsule TAKE ONE CAPSULE BY MOUTH EVERY DAY    hydrocodone-acetaminophen 5-325mg (NORCO) 5-325 mg per tablet Take 1 tablet by mouth every 6 (six) hours as needed.    metoprolol succinate (TOPROL-XL) 25 MG 24 hr tablet Take 1 tablet (25 mg total) by mouth once daily.    multivitamin capsule Take 1 capsule by mouth every morning.     omega-3 fatty acids-vitamin E (FISH OIL) 1,000 mg Cap Take 3 capsules by mouth once daily.     ondansetron (ZOFRAN) 8 MG tablet Take 1 tablet (8 mg total) by mouth every 8 (eight) hours as needed for Nausea.    trazodone (DESYREL) 100 MG tablet Take 1 tablet (100 mg total) by mouth every evening.    warfarin (COUMADIN) 3 MG tablet Take 1 tablet (3 mg total) by mouth Daily.    furosemide (LASIX) 20 MG tablet Take 1 tablet (20 mg total) by mouth every other day.    nystatin (MYCOSTATIN) ointment Apply topically 3 (three) times daily.           Clinical Reference Information           Your Vitals Were     BP Pulse Temp Height Weight BMI    154/92 (BP Location: Right arm, Patient Position: Sitting) 88 98.4 °F  "(36.9 °C) 5' 11" (1.803 m) 86 kg (189 lb 9.5 oz) 26.44 kg/m2      Blood Pressure          Most Recent Value    BP  (!)  154/92      Allergies as of 5/10/2017     Lipitor [Atorvastatin]    Lisinopril    Augmentin [Amoxicillin-pot Clavulanate]      Immunizations Administered on Date of Encounter - 5/10/2017     None      Orders Placed During Today's Visit     Future Labs/Procedures Expected by Expires    B-TYPE NATRIURETIC PEPTIDE  5/10/2017 7/9/2018    CBC Oncology  As directed 5/10/2018    Comprehensive metabolic panel  As directed 5/10/2018    Lactate dehydrogenase  As directed 5/10/2018    Recurring Lab Work Interval Expires    B-TYPE NATRIURETIC PEPTIDE   5/10/2018    CBC auto differential   5/10/2018    Comprehensive metabolic panel   5/10/2018    Lactate dehydrogenase   5/10/2018      Instructions    Get labs today    Start Lasix every other day as prescribed    Continue all other meds    CBC/CMP on the 17th    Aranesp on the 17th and every 2 weeks    See me in 6 weeks with cbc/cmp       Language Assistance Services     ATTENTION: Language assistance services are available, free of charge. Please call [phone].      ATENCIÓN: Si habla español, tiene a gannon disposición servicios gratuitos de asistencia lingüística. Llame al [phone].     CARLOS Ý: N?u b?n nói Ti?ng Vi?t, có các d?ch v? h? tr? ngôn ng? mi?n phí dành cho b?n. G?i s? [phone].         [facility] complies with applicable Federal civil rights laws and does not discriminate on the basis of race, color, national origin, age, disability, or sex.        "

## 2017-05-10 NOTE — NURSING
Patient here for blood draw from right chest port-accesses easily with good blood return-blood to lab-line flushed-needle removed-patient tolerated well.

## 2017-05-16 NOTE — PROGRESS NOTES
"PATIENT: Wil Kwon Jr.  MRN: [id number]  DATE: 5/16/2017    Subjective:   MDS    Oncologic History:  Mr. Kwon 71-year-old gentleman with several chronic medical conditions living healthy lifestyle. He has hypertension and reports home readings are all normal. He did recently develop angioedema from ACE inhibitor and was switched to an ARB. His dyslipidemia is treated with pharmacologic therapy. He has history of carotid atherosclerotic disease status post left carotid endarterectomy and up-to-date with surveillance carotid Doppler study. He has a history of PAF followed by our electrophysiology cardiology department and is chronically anticoagulated. He has moderate aortic stenosis. He has DJD of the right knee which has become more symptomatic in recent months. He was noted to be progressively anemic over the last 1 year. Hematology workup reveals a normal B12 and folate. His iron stores are normal. His reticulocyte count is slightly elevated at 4.2. His white count has remained low and progressively dropping over the last 3 years with monocytosis. He also has developed mild progressive thrombocytopenia. He is moderately symptomatic with the fatigue. He has no history of extrinsic GI bleeding. He also has no history of previous transfusions.he underwent a marrow. 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 Of 20 metaphases, 2 metaphases were normal and 18 metaphases had a 2;21  translocation. 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 RUNX1 rearrangements are  associated with de alfredo AML and therapeutic-related AML and MDS. Clinical and pathologic correlation is  recommended."  FINAL PATHOLOGIC DIAGNOSIS  CBC DATA 8/24/16:  RBC: 3.45 M/UL, H/H : 9.9/32.1, MCV : 93 FL, WBC: 3.1 K/UL, Gran 1.2 k/ul %, Platelet: 200 K/UL.  No " peripheral blood smear was submitted for evaluation.  BONE MARROW ASPIRATE, BONE MARROW CLOT, AND BONE MARROW CORE BIOPSY WITH:  CELLULARITY= 95%, TRILINEAGE HEMATOPOIETIC ACTIVITY (M/E= 2:1) AND INCREASED IMMATURE  CELLS (9%). SEE COMMENT.  LEFT SHIFTED MATURATION OF GRANULOPOIESIS.  ADEQUATE STORAGE IRON.  ADEQUATE NUMBER OF MEGAKARYOCYTES.  COMMENT: Flow cytometry analysis of bone marrow aspirate shows lymph gate (4.2 %) containing T and B cells.  No B cell clonality or T-cell aberrancy is evident. Blast gate is increased (7.9%) with cells expression of CD13 and  CD34. Immunohistochemical studies were performed on the clot and core biopsy for further architecture evaluation with  adequate positive and negative controls. Scattered mixed predominantly T cells (CD3 positive) and B cells (CD20  positive) are evident. About 6-7 % plasma cells ( positive) and 9% CD34 positive cells are noted. Findings  are nondiagnostic for lymphoma or plasma cell dyscrasia.  Microscopic Examination  BONE MARROW ASPIRATE DIFFERENTIAL:  Blasts----------------------------------------------5%  Promyelocytes---------------------------------2%  Myelocytes--------------------------------------11%  Metamyelocytes------------------------------ 19%  Bands----------------------------------------------5%  PMN Neutrophils------------------------------15%  Eosinophils------------------------------------------2%  Basophils---------------------------------------0%  Monocytes------------------------------------2%  Lymphocytes-------------------------------------6%  Plasma cells------------------------------------------2%  Erythroid precursors--------------------------31%  BONE MARROW ASPIRATE:  Myeloid and erythroid maturation shows M:E ratio at 2:1. No significant dysplasia is evident. Left shifted maturation of  granulopoiesis is noted. Stainable iron is present without increased ringed sideroblasts. Megakaryocytes are seen.  BONE MARROW  CLOT:  Cellularity is 95 % with trilineage hematopoiesis. No abnormal infiltrates are seen. Megakaryocytes are adequate in  number. Stainable iron is present.  BONE MARROW CORE BIOPSY:  Cellularity is 95 %. No abnormal infiltrates are seen. Stainable iron is not identified. Megakaryocytes are adequate in  No significantly increased reticular fibrosis is evident by special stain (reticulin) with adequate positive and  negative controls.      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 He did not meet criteria for AML. He was started on Vidaza and Aranes and completed 3 cycles when a repeat bone marrow biopsy revealed:      BONE MARROW ASPIRATE, BONE MARROW CLOT, AND BONE MARROW CORE BIOPSY WITH:  CELLULARITY= %, TRILINEAGE HEMATOPOIETIC ACTIVITY (M/E= 1.4:1).  CONSISTENT WITH REFRACTORY ANEMIA WITH EXCESS BLASTS -1. SEE COMMENT.  DECREASED STORAGE IRON.  INCREASED NUMBER OF MEGAKARYOCYTES WITH DYSPLASTIC MORPHOLOGY.  COMMENT: Flow cytometry analysis of bone marrow aspirate shows lymph gate (15.9 %) containing T and B cells.  No B cell clonality or T-cell aberrancy is evident. Blast gate is slightly increased (7.1%).  Immunohistochemical studies were performed on the clot and core biopsy for further architecture evaluation with  adequate positive and negative controls. About 6-7% CD34 positive cells are noted. CD61 highlights increased in  number of megakaryocytes with dysplastic morphology.  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 Correlate clinically and with a cytogenetics  report.  Microscopic Examination  BONE MARROW ASPIRATE DIFFERENTIAL:  Blasts---------------------------------------------- 7 %  Promyelocytes---------------------------------1%  Myelocytes--------------------------------------10%  Metamyelocytes------------------------------ 8%  Bands----------------------------------------------9%  PMN  Neutrophils------------------------------15%  Eosinophils------------------------------------------1%  Basophils---------------------------------------1%  Monocytes------------------------------------1%  Lymphocytes-------------------------------------9%  Plasma cells------------------------------------------1%  Erythroid precursors--------------------------37%  BONE MARROW ASPIRATE:  Myeloid and erythroid maturation shows M:E ratio at 1.4:1. Dysgranulopoiesis and occasional dyserythropoiesis is  evident. Stainable iron is decreased without increased ringed sideroblasts. Megakaryocytes are seen.  BONE MARROW CLOT:  Cellularity is  % with trilineage hematopoiesis. No abnormal infiltrates are seen. Megakaryocytes are increased  in number with dysplastic morphology. Stainable iron is decreased.  BONE MARROW CORE BIOPSY:  Cellularity is  %. No abnormal infiltrates are seen. Stainable iron is not identified. Megakaryocytes are  increased in number with dysplastic morphology.        Interval History: Mr. Kwon returns for follow up. Last Vidaza was cycle 5 completed on 3/3/17.  He was evaluated for an allogeneic transplant but decided against it.  He subsequently got admitted to the hospital with an empyema.  His MDS therapy has been on hold until he is recovered from his empyema. He developed a drug rash while on Augmentin and was switched to Clindamycin. He has completed 14 days of clinda as of today. Patient was evaluated by derm.  His rash is now completely disappeared.   However he is having increasing dyspnea on excursion and orthopnea.  He also has 2+ lower extremity edema.      Past Medical History:   Diagnosis Date    Aortic valve stenosis, mild     secondary to bicuspid aortic alve    Atrial fibrillation     Carotid artery disease     Left CEA 4/9/13    Depression     DJD (degenerative joint disease)     H/O echocardiogram 04/13/2016    EF 55-60%. Diastolic dysfunction. PASP 39. mod AS with  JEFF 1.18    History of psychiatric hospitalization     [place] [address], [facility] 1993    Hyperlipidemia     Hypertension     MDS (myelodysplastic syndrome) 2013    s/p Chemo     Therapy     [facility]        Past Surgical History:   Procedure Laterality Date    BONE MARROW BIOPSY  08/29/2016    CAROTID ENDARTERECTOMY Left     Inguinal hernia      Left    KNEE SURGERY      Right x2    neck fusion      TONSILLECTOMY         Family History   Problem Relation Age of Onset    Hyperlipidemia Brother         Social History:  reports that he has never smoked. He has never used smokeless tobacco. He reports that he does not drink alcohol or use illicit drugs.    Allergies:  Review of patient's allergies indicates:   Allergen Reactions    Lipitor [atorvastatin]      Muscle pain    Lisinopril Edema     Cheek swelling    Augmentin [amoxicillin-pot clavulanate] Rash       Medications:  Current Outpatient Prescriptions   Medication Sig Dispense Refill    acetaminophen (TYLENOL) 325 MG tablet Take 325 mg by mouth as needed for Pain.      citalopram (CELEXA) 40 MG tablet TAKE 1 TABLET BY MOUTH DAILY (Patient taking differently: TAKE 1 TABLET BY MOUTH DAILY (am)) 90 tablet 0    diltiaZEM (CARDIZEM CD) 300 MG 24 hr capsule TAKE ONE CAPSULE BY MOUTH EVERY DAY 90 capsule 1    hydrocodone-acetaminophen 5-325mg (NORCO) 5-325 mg per tablet Take 1 tablet by mouth every 6 (six) hours as needed. 30 tablet 0    metoprolol succinate (TOPROL-XL) 25 MG 24 hr tablet Take 1 tablet (25 mg total) by mouth once daily. 90 tablet 3    multivitamin capsule Take 1 capsule by mouth every morning.       omega-3 fatty acids-vitamin E (FISH OIL) 1,000 mg Cap Take 3 capsules by mouth once daily.       ondansetron (ZOFRAN) 8 MG tablet Take 1 tablet (8 mg total) by mouth every 8 (eight) hours as needed for Nausea. 30 tablet 2    trazodone (DESYREL) 100 MG tablet Take 1 tablet (100 mg total) by mouth every  "evening. 30 tablet 11    warfarin (COUMADIN) 3 MG tablet Take 1 tablet (3 mg total) by mouth Daily. 30 tablet 11    furosemide (LASIX) 20 MG tablet Take 1 tablet (20 mg total) by mouth every other day. 60 tablet 11    nystatin (MYCOSTATIN) ointment Apply topically 3 (three) times daily. 1 Tube 1     No current facility-administered medications for this visit.          Review of Systems   HENT: Negative.    Eyes: Negative.    Respiratory: Negative.    Cardiovascular: Negative.    Gastrointestinal: Negative.    Endocrine: Negative.    Genitourinary: Negative.    Musculoskeletal: Negative.    Skin: Negative.    Neurological: Negative.    Hematological: Negative.    Psychiatric/Behavioral: Negative.        ECOG Performance Status: 1  Objective:      Vitals:   Vitals:    05/10/17 0842   BP: (!) 154/92   BP Location: Right arm   Patient Position: Sitting   Pulse: 88   Temp: 98.4 °F (36.9 °C)   Weight: 86 kg (189 lb 9.5 oz)   Height: 5' 11" (1.803 m)     BMI: Body mass index is 26.44 kg/(m^2).      Physical Exam   Constitutional: he is oriented to person, place, and time. He appears well-developed and well-nourished.   HENT: NC AT   Head: Normocephalic.   Right Ear: External ear normal.   Left Ear: External ear normal.   Nose: Nose normal.   Mouth/Throat: Oropharynx is clear and moist.   Eyes: Conjunctivae and EOM are normal. Pupils are equal, round, and reactive to light.   Neck: Normal range of motion. Neck supple.   Cardiovascular: Normal rate, regular rhythm, normal heart sounds and intact distal pulses.    Pulmonary/Chest: Effort normal and breath sounds normal.   Abdominal: Soft. Bowel sounds are normal.   Musculoskeletal: Normal range of motion. 2+ LE edema  Neurological: he is alert and oriented to person, place, and time. He has normal reflexes.   Skin: Skin is warm and dry.   Psychiatric: he has a normal mood and affect. His behavior is normal. Judgment and thought content normal.       Laboratory " Data:  Infusion on 05/10/2017   Component Date Value Ref Range Status    WBC 05/10/2017 2.40* 3.90 - 12.70 K/uL Final    RBC 05/10/2017 3.44* 4.60 - 6.20 M/uL Final    Hemoglobin 05/10/2017 10.1* 14.0 - 18.0 g/dL Final    Hematocrit 05/10/2017 33.7* 40.0 - 54.0 % Final    MCV 05/10/2017 98  82 - 98 fL Final    MCH 05/10/2017 29.4  27.0 - 31.0 pg Final    MCHC 05/10/2017 30.0* 32.0 - 36.0 % Final    RDW 05/10/2017 19.0* 11.5 - 14.5 % Final    Platelets 05/10/2017 131* 150 - 350 K/uL Final    MPV 05/10/2017 11.5  9.2 - 12.9 fL Final    Gran # 05/10/2017 0.9* 1.8 - 7.7 K/uL Final    Lymph # 05/10/2017 1.4  1.0 - 4.8 K/uL Final    Mono # 05/10/2017 0.0* 0.3 - 1.0 K/uL Final    Eos # 05/10/2017 0.0  0.0 - 0.5 K/uL Final    Baso # 05/10/2017 0.00  0.00 - 0.20 K/uL Final    Gran% 05/10/2017 39.2  38.0 - 73.0 % Final    Lymph% 05/10/2017 60.0* 18.0 - 48.0 % Final    Mono% 05/10/2017 0.8* 4.0 - 15.0 % Final    Eosinophil% 05/10/2017 0.0  0.0 - 8.0 % Final    Basophil% 05/10/2017 0.0  0.0 - 1.9 % Final    Platelet Estimate 05/10/2017 Decreased*  Final    Differential Method 05/10/2017 Automated   Final    Sodium 05/10/2017 138  136 - 145 mmol/L Final    Potassium 05/10/2017 4.0  3.5 - 5.1 mmol/L Final    Chloride 05/10/2017 107  95 - 110 mmol/L Final    CO2 05/10/2017 22* 23 - 29 mmol/L Final    Glucose 05/10/2017 90  70 - 110 mg/dL Final    BUN, Bld 05/10/2017 15  8 - 23 mg/dL Final    Creatinine 05/10/2017 0.9  0.5 - 1.4 mg/dL Final    Calcium 05/10/2017 9.0  8.7 - 10.5 mg/dL Final    Total Protein 05/10/2017 7.6  6.0 - 8.4 g/dL Final    Albumin 05/10/2017 3.4* 3.5 - 5.2 g/dL Final    Total Bilirubin 05/10/2017 1.2* 0.1 - 1.0 mg/dL Final    Comment: For infants and newborns, interpretation of results should be based  on gestational age, weight and in agreement with clinical  observations.  Premature Infant recommended reference ranges:  Up to 24 hours.............<8.0 mg/dL  Up to 48  hours............<12.0 mg/dL  3-5 days..................<15.0 mg/dL  6-29 days.................<15.0 mg/dL      Alkaline Phosphatase 05/10/2017 68  55 - 135 U/L Final    AST 05/10/2017 31  10 - 40 U/L Final    ALT 05/10/2017 25  10 - 44 U/L Final    Anion Gap 05/10/2017 9  8 - 16 mmol/L Final    eGFR if African American 05/10/2017 >60.0  >60 mL/min/1.73 m^2 Final    eGFR if non African American 05/10/2017 >60.0  >60 mL/min/1.73 m^2 Final    Comment: Calculation used to obtain the estimated glomerular filtration  rate (eGFR) is the CKD-EPI equation. Since race is unknown   in our information system, the eGFR values for   -American and Non--American patients are given   for each creatinine result.      LD 05/10/2017 226  110 - 260 U/L Final    Results are increased in hemolyzed samples.    BNP 05/10/2017 960* 0 - 99 pg/mL Final    Values of less than 100 pg/ml are consistent with non-CHF populations.   Lab Visit on 05/03/2017   Component Date Value Ref Range Status    WBC 05/03/2017 2.56* 3.90 - 12.70 K/uL Final    RBC 05/03/2017 3.47* 4.60 - 6.20 M/uL Final    Hemoglobin 05/03/2017 10.0* 14.0 - 18.0 g/dL Final    Hematocrit 05/03/2017 32.6* 40.0 - 54.0 % Final    MCV 05/03/2017 94  82 - 98 fL Final    MCH 05/03/2017 28.8  27.0 - 31.0 pg Final    MCHC 05/03/2017 30.7* 32.0 - 36.0 % Final    RDW 05/03/2017 18.1* 11.5 - 14.5 % Final    Platelets 05/03/2017 115* 150 - 350 K/uL Final    MPV 05/03/2017 11.7  9.2 - 12.9 fL Final    Gran # 05/03/2017 0.8* 1.8 - 7.7 K/uL Final    Comment: The ANC is based on a white cell differential from an   automated cell counter. It has not been microscopically   reviewed for the presence of abnormal cells. Clinical   correlation is required.         Assessment/Plan:     1. MDS (myelodysplastic syndrome)    2. Atrial fibrillation with RVR    3. Aortic stenosis, moderate    4. Essential hypertension    5. Empyema of right pleural space    6. Other  depression    7. Primary insomnia    8. Dyslipidemia    9. Bilateral carotid bruits      His MDS is stable I will continue observing him.  He will hold his Vidaza but will continue Aranesp.    I believe he is developing congestive heart failure.  I will increase his diurese is and watch.    His hypertension, aortic stenosis and atrial fibrillation appears stable.  His empyema has resolved and on her ears most recent chest x-ray there is no sign of reaccumulation.    Get labs today    Start Lasix every other day as prescribed    Continue all other meds    CBC/CMP on the 17th    Aranesp on the 17th and every 2 weeks    See me in 6 weeks with cbc/cmp    Med and Order  Orders Placed This Encounter    CBC Oncology    Comprehensive metabolic panel    Lactate dehydrogenase    B-TYPE NATRIURETIC PEPTIDE    CBC auto differential    Comprehensive metabolic panel    Lactate dehydrogenase    B-TYPE NATRIURETIC PEPTIDE    furosemide (LASIX) 20 MG tablet       Follow Up  Return in about 6 weeks (around 6/21/2017).

## 2017-05-17 ENCOUNTER — LAB VISIT (OUTPATIENT)
Dept: LAB | Facility: HOSPITAL | Age: 72
End: 2017-05-17
Attending: INTERNAL MEDICINE
Payer: MEDICARE

## 2017-05-17 ENCOUNTER — INFUSION (OUTPATIENT)
Dept: INFUSION THERAPY | Facility: HOSPITAL | Age: 72
End: 2017-05-17
Attending: INTERNAL MEDICINE
Payer: MEDICARE

## 2017-05-17 DIAGNOSIS — E78.5 DYSLIPIDEMIA: ICD-10-CM

## 2017-05-17 DIAGNOSIS — I35.0 AORTIC STENOSIS, MODERATE: ICD-10-CM

## 2017-05-17 DIAGNOSIS — D46.9 MDS (MYELODYSPLASTIC SYNDROME): ICD-10-CM

## 2017-05-17 DIAGNOSIS — I10 ESSENTIAL HYPERTENSION: ICD-10-CM

## 2017-05-17 DIAGNOSIS — L27.0 RASH, DRUG: ICD-10-CM

## 2017-05-17 DIAGNOSIS — I65.23 BILATERAL CAROTID ARTERY STENOSIS: ICD-10-CM

## 2017-05-17 DIAGNOSIS — R21 RASH: ICD-10-CM

## 2017-05-17 DIAGNOSIS — F32.A DEPRESSION, UNSPECIFIED DEPRESSION TYPE: ICD-10-CM

## 2017-05-17 DIAGNOSIS — D63.8 ANEMIA, CHRONIC DISEASE: ICD-10-CM

## 2017-05-17 DIAGNOSIS — I48.0 PAROXYSMAL ATRIAL FIBRILLATION: ICD-10-CM

## 2017-05-17 LAB
ALBUMIN SERPL BCP-MCNC: 3.3 G/DL
ALP SERPL-CCNC: 67 U/L
ALT SERPL W/O P-5'-P-CCNC: 24 U/L
ANION GAP SERPL CALC-SCNC: 5 MMOL/L
AST SERPL-CCNC: 29 U/L
BILIRUB SERPL-MCNC: 0.9 MG/DL
BUN SERPL-MCNC: 15 MG/DL
CALCIUM SERPL-MCNC: 8.9 MG/DL
CHLORIDE SERPL-SCNC: 107 MMOL/L
CO2 SERPL-SCNC: 27 MMOL/L
CREAT SERPL-MCNC: 0.9 MG/DL
ERYTHROCYTE [DISTWIDTH] IN BLOOD BY AUTOMATED COUNT: 18.4 %
EST. GFR  (AFRICAN AMERICAN): >60 ML/MIN/1.73 M^2
EST. GFR  (NON AFRICAN AMERICAN): >60 ML/MIN/1.73 M^2
GLUCOSE SERPL-MCNC: 105 MG/DL
HCT VFR BLD AUTO: 34.7 %
HGB BLD-MCNC: 10.3 G/DL
MCH RBC QN AUTO: 29.5 PG
MCHC RBC AUTO-ENTMCNC: 29.7 %
MCV RBC AUTO: 99 FL
NEUTROPHILS # BLD AUTO: 0.8 K/UL
PLATELET # BLD AUTO: 104 K/UL
PMV BLD AUTO: 11.7 FL
POTASSIUM SERPL-SCNC: 4.4 MMOL/L
PROT SERPL-MCNC: 7.4 G/DL
RBC # BLD AUTO: 3.49 M/UL
SODIUM SERPL-SCNC: 139 MMOL/L
WBC # BLD AUTO: 2.06 K/UL

## 2017-05-17 PROCEDURE — 36415 COLL VENOUS BLD VENIPUNCTURE: CPT

## 2017-05-17 PROCEDURE — 85027 COMPLETE CBC AUTOMATED: CPT

## 2017-05-17 PROCEDURE — 80053 COMPREHEN METABOLIC PANEL: CPT

## 2017-05-17 NOTE — NURSING
Pt arrived for aranesp.  Labs reviewed with pt.  Notified Dr. Rader of lab result, states no aranesp for today.  Instructed pt no injection today due to lab result above 10.   Discharged to home with wife.

## 2017-05-25 ENCOUNTER — PATIENT MESSAGE (OUTPATIENT)
Dept: HEMATOLOGY/ONCOLOGY | Facility: CLINIC | Age: 72
End: 2017-05-25

## 2017-05-31 ENCOUNTER — OFFICE VISIT (OUTPATIENT)
Dept: HEMATOLOGY/ONCOLOGY | Facility: CLINIC | Age: 72
End: 2017-05-31
Payer: MEDICARE

## 2017-05-31 VITALS
DIASTOLIC BLOOD PRESSURE: 73 MMHG | HEART RATE: 117 BPM | TEMPERATURE: 98 F | OXYGEN SATURATION: 100 % | SYSTOLIC BLOOD PRESSURE: 122 MMHG | RESPIRATION RATE: 12 BRPM | HEIGHT: 71 IN | WEIGHT: 182.13 LBS | BODY MASS INDEX: 25.5 KG/M2

## 2017-05-31 DIAGNOSIS — R21 RASH: ICD-10-CM

## 2017-05-31 DIAGNOSIS — I65.29 OCCLUSION AND STENOSIS OF CAROTID ARTERY WITHOUT MENTION OF CEREBRAL INFARCTION: ICD-10-CM

## 2017-05-31 DIAGNOSIS — E78.5 DYSLIPIDEMIA: ICD-10-CM

## 2017-05-31 DIAGNOSIS — J86.9 EMPYEMA OF RIGHT PLEURAL SPACE: ICD-10-CM

## 2017-05-31 DIAGNOSIS — D46.9 MDS (MYELODYSPLASTIC SYNDROME): Primary | ICD-10-CM

## 2017-05-31 DIAGNOSIS — F32.0 MILD SINGLE CURRENT EPISODE OF MAJOR DEPRESSIVE DISORDER: ICD-10-CM

## 2017-05-31 DIAGNOSIS — I35.0 AORTIC STENOSIS, MODERATE: ICD-10-CM

## 2017-05-31 DIAGNOSIS — I10 ESSENTIAL HYPERTENSION: ICD-10-CM

## 2017-05-31 DIAGNOSIS — I48.0 PAROXYSMAL ATRIAL FIBRILLATION: ICD-10-CM

## 2017-05-31 PROCEDURE — 99999 PR PBB SHADOW E&M-EST. PATIENT-LVL III: CPT | Mod: PBBFAC,,, | Performed by: INTERNAL MEDICINE

## 2017-05-31 PROCEDURE — 1157F ADVNC CARE PLAN IN RCRD: CPT | Mod: S$GLB,,, | Performed by: INTERNAL MEDICINE

## 2017-05-31 PROCEDURE — 1126F AMNT PAIN NOTED NONE PRSNT: CPT | Mod: S$GLB,,, | Performed by: INTERNAL MEDICINE

## 2017-05-31 PROCEDURE — 99215 OFFICE O/P EST HI 40 MIN: CPT | Mod: S$GLB,,, | Performed by: INTERNAL MEDICINE

## 2017-05-31 PROCEDURE — 1159F MED LIST DOCD IN RCRD: CPT | Mod: S$GLB,,, | Performed by: INTERNAL MEDICINE

## 2017-05-31 RX ORDER — PRAVASTATIN SODIUM 20 MG/1
TABLET ORAL
COMMUNITY
Start: 2017-04-12 | End: 2017-06-22

## 2017-05-31 NOTE — Clinical Note
Take Lasix every day  Continue all other meds  Weekly cbc/inr  Aranesp next week after labs  See me in 6 weeks with cbc/cmp/inr

## 2017-06-01 DIAGNOSIS — D46.9 MDS (MYELODYSPLASTIC SYNDROME): Primary | ICD-10-CM

## 2017-06-06 NOTE — PROGRESS NOTES
PATIENT: Wil Kwon Jr.  MRN: [id number]  DATE: 6/5/2017    Subjective:   MDS  Oncologic History:  He is currently on observation and aranesp after receiving 3 cycles of Vidaza. His therapy was stopped due to development of an Empyema    Interval History: Mr. Kwon returns for follow up . He is starting to feel better. He has no CHF s/s. He has no bleeding or new infx.    Past Medical History:   Diagnosis Date    Aortic valve stenosis, mild     secondary to bicuspid aortic alve    Atrial fibrillation     Carotid artery disease     Left CEA 4/9/13    Depression     DJD (degenerative joint disease)     H/O echocardiogram 04/13/2016    EF 55-60%. Diastolic dysfunction. PASP 39. mod AS with JEFF 1.18    History of psychiatric hospitalization     [place] [address], [facility] 1993    Hyperlipidemia     Hypertension     MDS (myelodysplastic syndrome) 2013    s/p Chemo     Therapy     [facility]        Past Surgical History:   Procedure Laterality Date    BONE MARROW BIOPSY  08/29/2016    CAROTID ENDARTERECTOMY Left     Inguinal hernia      Left    KNEE SURGERY      Right x2    neck fusion      TONSILLECTOMY         Family History   Problem Relation Age of Onset    Hyperlipidemia Brother         Social History:  reports that he has never smoked. He has never used smokeless tobacco. He reports that he does not drink alcohol or use drugs.    Allergies:  Review of patient's allergies indicates:   Allergen Reactions    Lipitor [atorvastatin]      Muscle pain    Lisinopril Edema     Cheek swelling    Augmentin [amoxicillin-pot clavulanate] Rash       Medications:  Current Outpatient Prescriptions   Medication Sig Dispense Refill    acetaminophen (TYLENOL) 325 MG tablet Take 325 mg by mouth as needed for Pain.      citalopram (CELEXA) 40 MG tablet TAKE 1 TABLET BY MOUTH DAILY (Patient taking differently: TAKE 1 TABLET BY MOUTH DAILY (am)) 90 tablet 0    diltiaZEM (CARDIZEM  "CD) 300 MG 24 hr capsule TAKE ONE CAPSULE BY MOUTH EVERY DAY 90 capsule 1    furosemide (LASIX) 20 MG tablet Take 1 tablet (20 mg total) by mouth every other day. 60 tablet 11    hydrocodone-acetaminophen 5-325mg (NORCO) 5-325 mg per tablet Take 1 tablet by mouth every 6 (six) hours as needed. 30 tablet 0    metoprolol succinate (TOPROL-XL) 25 MG 24 hr tablet Take 1 tablet (25 mg total) by mouth once daily. 90 tablet 3    multivitamin capsule Take 1 capsule by mouth every morning.       omega-3 fatty acids-vitamin E (FISH OIL) 1,000 mg Cap Take 3 capsules by mouth once daily.       ondansetron (ZOFRAN) 8 MG tablet Take 1 tablet (8 mg total) by mouth every 8 (eight) hours as needed for Nausea. 30 tablet 2    pravastatin (PRAVACHOL) 20 MG tablet       trazodone (DESYREL) 100 MG tablet Take 1 tablet (100 mg total) by mouth every evening. 30 tablet 11    warfarin (COUMADIN) 3 MG tablet Take 1 tablet (3 mg total) by mouth Daily. 30 tablet 11    nystatin (MYCOSTATIN) ointment Apply topically 3 (three) times daily. 1 Tube 1     No current facility-administered medications for this visit.          Review of Systems   HENT: Negative.    Eyes: Negative.    Respiratory: Negative.    Cardiovascular: Negative.    Gastrointestinal: Negative.    Endocrine: Negative.    Genitourinary: Negative.    Musculoskeletal: Negative.    Skin: Negative.    Neurological: Negative.    Hematological: Negative.    Psychiatric/Behavioral: Negative.        ECOG Performance Status: 1  Objective:      Vitals:   Vitals:    05/31/17 1316   BP: 122/73   BP Location: Right arm   Patient Position: Sitting   BP Method: Automatic   Pulse: (!) 117   Resp: 12   Temp: 98.2 °F (36.8 °C)   TempSrc: Oral   SpO2: 100%   Weight: 82.6 kg (182 lb 1.6 oz)   Height: 5' 11" (1.803 m)     BMI: Body mass index is 25.4 kg/m².      Physical Exam   Constitutional: he is oriented to person, place, and time. He appears well-developed and well-nourished.   HENT: NC AT "   Head: Normocephalic.   Right Ear: External ear normal.   Left Ear: External ear normal.   Nose: Nose normal.   Mouth/Throat: Oropharynx is clear and moist.   Eyes: Conjunctivae and EOM are normal. Pupils are equal, round, and reactive to light.   Neck: Normal range of motion. Neck supple.   Cardiovascular: Normal rate, regular rhythm, normal heart sounds and intact distal pulses.    Pulmonary/Chest: Effort normal and breath sounds normal.   Abdominal: Soft. Bowel sounds are normal.   Musculoskeletal: Normal range of motion.   Neurological: he is alert and oriented to person, place, and time. He has normal reflexes.   Skin: Skin is warm and dry.   Psychiatric: he has a normal mood and affect. His behavior is normal. Judgment and thought content normal.       Laboratory Data:  Lab Visit on 05/31/2017   Component Date Value Ref Range Status    WBC 05/31/2017 2.19* 3.90 - 12.70 K/uL Final    RBC 05/31/2017 3.58* 4.60 - 6.20 M/uL Final    Hemoglobin 05/31/2017 10.4* 14.0 - 18.0 g/dL Final    Hematocrit 05/31/2017 34.4* 40.0 - 54.0 % Final    MCV 05/31/2017 96  82 - 98 fL Final    MCH 05/31/2017 29.1  27.0 - 31.0 pg Final    MCHC 05/31/2017 30.2* 32.0 - 36.0 % Final    RDW 05/31/2017 16.9* 11.5 - 14.5 % Final    Platelets 05/31/2017 128* 150 - 350 K/uL Final    MPV 05/31/2017 11.3  9.2 - 12.9 fL Final    Gran # 05/31/2017 0.8* 1.8 - 7.7 K/uL Final    Lymph # 05/31/2017 1.4  1.0 - 4.8 K/uL Final    Mono # 05/31/2017 0.0* 0.3 - 1.0 K/uL Final    Eos # 05/31/2017 0.0  0.0 - 0.5 K/uL Final    Baso # 05/31/2017 0.00  0.00 - 0.20 K/uL Final    Gran% 05/31/2017 36.1* 38.0 - 73.0 % Final    Lymph% 05/31/2017 63.9* 18.0 - 48.0 % Final    Mono% 05/31/2017 0.0* 4.0 - 15.0 % Final    Eosinophil% 05/31/2017 0.0  0.0 - 8.0 % Final    Basophil% 05/31/2017 0.0  0.0 - 1.9 % Final    Platelet Estimate 05/31/2017 Decreased*  Final    Aniso 05/31/2017 Slight   Final    Poik 05/31/2017 Slight   Final    Ovalocytes  05/31/2017 Occasional   Final    Differential Method 05/31/2017 Automated   Final       Assessment/Plan:     1. MDS (myelodysplastic syndrome)    2. Essential hypertension    3. Occlusion and stenosis of carotid artery without mention of cerebral infarction    4. Paroxysmal atrial fibrillation    5. Aortic stenosis, moderate    6. Empyema of right pleural space    7. Mild single current episode of major depressive disorder    8. Dyslipidemia    9. Rash      Continue to hold Vidaza    Continue all other meds    Weekly cbc/inr    Aranesp next week after labs    See me in 6 weeks with cbc/cmp/inr  Med and Order  Orders Placed This Encounter    CBC Oncology    Comprehensive metabolic panel    Protime-INR    Protime-INR       Follow Up  Return in about 6 weeks (around 7/12/2017).

## 2017-06-07 ENCOUNTER — LAB VISIT (OUTPATIENT)
Dept: LAB | Facility: HOSPITAL | Age: 72
End: 2017-06-07
Attending: INTERNAL MEDICINE
Payer: MEDICARE

## 2017-06-07 ENCOUNTER — INFUSION (OUTPATIENT)
Dept: INFUSION THERAPY | Facility: HOSPITAL | Age: 72
End: 2017-06-07
Attending: INTERNAL MEDICINE
Payer: MEDICARE

## 2017-06-07 DIAGNOSIS — D46.9 MDS (MYELODYSPLASTIC SYNDROME): ICD-10-CM

## 2017-06-07 DIAGNOSIS — I65.29 OCCLUSION AND STENOSIS OF CAROTID ARTERY WITHOUT MENTION OF CEREBRAL INFARCTION: ICD-10-CM

## 2017-06-07 DIAGNOSIS — I35.0 AORTIC STENOSIS, MODERATE: ICD-10-CM

## 2017-06-07 LAB
BASOPHILS # BLD AUTO: 0.01 K/UL
BASOPHILS NFR BLD: 0.4 %
DIFFERENTIAL METHOD: ABNORMAL
EOSINOPHIL # BLD AUTO: 0 K/UL
EOSINOPHIL NFR BLD: 0 %
ERYTHROCYTE [DISTWIDTH] IN BLOOD BY AUTOMATED COUNT: 16.6 %
HCT VFR BLD AUTO: 34 %
HGB BLD-MCNC: 10.4 G/DL
INR PPP: 1.3
LYMPHOCYTES # BLD AUTO: 1.3 K/UL
LYMPHOCYTES NFR BLD: 55.6 %
MCH RBC QN AUTO: 29.1 PG
MCHC RBC AUTO-ENTMCNC: 30.6 %
MCV RBC AUTO: 95 FL
MONOCYTES # BLD AUTO: 0 K/UL
MONOCYTES NFR BLD: 0.9 %
NEUTROPHILS # BLD AUTO: 1 K/UL
NEUTROPHILS NFR BLD: 42.7 %
PLATELET # BLD AUTO: 117 K/UL
PMV BLD AUTO: 11.8 FL
PROTHROMBIN TIME: 13.3 SEC
RBC # BLD AUTO: 3.58 M/UL
WBC # BLD AUTO: 2.34 K/UL

## 2017-06-07 PROCEDURE — 85025 COMPLETE CBC W/AUTO DIFF WBC: CPT

## 2017-06-07 PROCEDURE — 36415 COLL VENOUS BLD VENIPUNCTURE: CPT

## 2017-06-07 PROCEDURE — 85610 PROTHROMBIN TIME: CPT

## 2017-06-21 ENCOUNTER — OFFICE VISIT (OUTPATIENT)
Dept: HEMATOLOGY/ONCOLOGY | Facility: CLINIC | Age: 72
End: 2017-06-21
Payer: MEDICARE

## 2017-06-21 ENCOUNTER — INFUSION (OUTPATIENT)
Dept: INFUSION THERAPY | Facility: HOSPITAL | Age: 72
End: 2017-06-21
Attending: INTERNAL MEDICINE
Payer: MEDICARE

## 2017-06-21 VITALS
RESPIRATION RATE: 12 BRPM | SYSTOLIC BLOOD PRESSURE: 134 MMHG | TEMPERATURE: 98 F | BODY MASS INDEX: 26.91 KG/M2 | HEART RATE: 111 BPM | WEIGHT: 192.25 LBS | OXYGEN SATURATION: 99 % | HEIGHT: 71 IN | DIASTOLIC BLOOD PRESSURE: 73 MMHG

## 2017-06-21 VITALS — HEART RATE: 107 BPM | RESPIRATION RATE: 16 BRPM | DIASTOLIC BLOOD PRESSURE: 73 MMHG | SYSTOLIC BLOOD PRESSURE: 120 MMHG

## 2017-06-21 DIAGNOSIS — I48.91 ATRIAL FIBRILLATION, UNSPECIFIED TYPE: Primary | ICD-10-CM

## 2017-06-21 DIAGNOSIS — D46.9 MDS (MYELODYSPLASTIC SYNDROME): Primary | ICD-10-CM

## 2017-06-21 DIAGNOSIS — R09.89 BILATERAL CAROTID BRUITS: ICD-10-CM

## 2017-06-21 DIAGNOSIS — F51.01 PRIMARY INSOMNIA: ICD-10-CM

## 2017-06-21 DIAGNOSIS — J86.9 EMPYEMA OF RIGHT PLEURAL SPACE: ICD-10-CM

## 2017-06-21 DIAGNOSIS — F32.0 MILD SINGLE CURRENT EPISODE OF MAJOR DEPRESSIVE DISORDER: ICD-10-CM

## 2017-06-21 DIAGNOSIS — I50.23 ACUTE ON CHRONIC SYSTOLIC CONGESTIVE HEART FAILURE: ICD-10-CM

## 2017-06-21 DIAGNOSIS — E78.5 DYSLIPIDEMIA: ICD-10-CM

## 2017-06-21 DIAGNOSIS — D63.8 ANEMIA, CHRONIC DISEASE: ICD-10-CM

## 2017-06-21 DIAGNOSIS — I35.0 AORTIC VALVE STENOSIS, MILD: Chronic | ICD-10-CM

## 2017-06-21 DIAGNOSIS — I10 ESSENTIAL HYPERTENSION: ICD-10-CM

## 2017-06-21 DIAGNOSIS — D69.6 THROMBOCYTOPENIA: ICD-10-CM

## 2017-06-21 DIAGNOSIS — I48.91 ATRIAL FIBRILLATION WITH RVR: ICD-10-CM

## 2017-06-21 DIAGNOSIS — F32.89 OTHER DEPRESSION: ICD-10-CM

## 2017-06-21 DIAGNOSIS — I27.20 PULMONARY HYPERTENSION: ICD-10-CM

## 2017-06-21 DIAGNOSIS — I35.0 AORTIC STENOSIS, MODERATE: ICD-10-CM

## 2017-06-21 PROBLEM — I50.9 CHF, ACUTE ON CHRONIC: Status: ACTIVE | Noted: 2017-06-21

## 2017-06-21 PROCEDURE — 99999 PR PBB SHADOW E&M-EST. PATIENT-LVL IV: CPT | Mod: PBBFAC,,, | Performed by: INTERNAL MEDICINE

## 2017-06-21 PROCEDURE — 1126F AMNT PAIN NOTED NONE PRSNT: CPT | Mod: S$GLB,,, | Performed by: INTERNAL MEDICINE

## 2017-06-21 PROCEDURE — 99215 OFFICE O/P EST HI 40 MIN: CPT | Mod: S$GLB,,, | Performed by: INTERNAL MEDICINE

## 2017-06-21 PROCEDURE — 96372 THER/PROPH/DIAG INJ SC/IM: CPT

## 2017-06-21 PROCEDURE — 1157F ADVNC CARE PLAN IN RCRD: CPT | Mod: S$GLB,,, | Performed by: INTERNAL MEDICINE

## 2017-06-21 PROCEDURE — 1159F MED LIST DOCD IN RCRD: CPT | Mod: S$GLB,,, | Performed by: INTERNAL MEDICINE

## 2017-06-21 PROCEDURE — 63600175 PHARM REV CODE 636 W HCPCS: Performed by: INTERNAL MEDICINE

## 2017-06-21 RX ORDER — FUROSEMIDE 20 MG/1
20 TABLET ORAL 2 TIMES DAILY
Qty: 60 TABLET | Refills: 11 | Status: ON HOLD | OUTPATIENT
Start: 2017-06-21 | End: 2017-07-03

## 2017-06-21 RX ADMIN — DARBEPOETIN ALFA 200 MCG: 200 INJECTION, SOLUTION INTRAVENOUS; SUBCUTANEOUS at 12:06

## 2017-06-21 NOTE — Clinical Note
Increase Lasix to 1 pill twice daily  No other change in meds  See Cardiology when scheduled--Please get him an appt with his cardiologist--Dr Yoder at [facility] [place]  Continue aranesp  See me in 1 month

## 2017-06-21 NOTE — PATIENT INSTRUCTIONS
Increase Lasix to 1 pill twice daily    No other change in meds    See Cardiology when scheduled    Continue aranesp    See me in 1 month

## 2017-06-21 NOTE — NURSING
Pt arrived for [facility] following MD appt.  Labs reviewed.  Pt tolerated injection to left arm.  Discharged to home with his wife.

## 2017-06-22 ENCOUNTER — HOSPITAL ENCOUNTER (OUTPATIENT)
Dept: RADIOLOGY | Facility: HOSPITAL | Age: 72
Discharge: HOME | End: 2017-06-22
Attending: INTERNAL MEDICINE
Payer: MEDICARE

## 2017-06-22 ENCOUNTER — HOSPITAL ENCOUNTER (OUTPATIENT)
Dept: CARDIOLOGY | Facility: CLINIC | Age: 72
Discharge: HOME | End: 2017-06-22
Payer: MEDICARE

## 2017-06-22 ENCOUNTER — OFFICE VISIT (OUTPATIENT)
Dept: CARDIOLOGY | Facility: CLINIC | Age: 72
End: 2017-06-22
Payer: MEDICARE

## 2017-06-22 VITALS
SYSTOLIC BLOOD PRESSURE: 133 MMHG | WEIGHT: 191.81 LBS | HEART RATE: 131 BPM | BODY MASS INDEX: 26.85 KG/M2 | HEIGHT: 71 IN | DIASTOLIC BLOOD PRESSURE: 73 MMHG | OXYGEN SATURATION: 98 %

## 2017-06-22 DIAGNOSIS — I35.0 AORTIC STENOSIS, MODERATE: ICD-10-CM

## 2017-06-22 DIAGNOSIS — D64.9 ANEMIA, UNSPECIFIED TYPE: ICD-10-CM

## 2017-06-22 DIAGNOSIS — I10 ESSENTIAL HYPERTENSION: ICD-10-CM

## 2017-06-22 DIAGNOSIS — I48.91 ATRIAL FIBRILLATION WITH RVR: ICD-10-CM

## 2017-06-22 DIAGNOSIS — I48.91 ATRIAL FIBRILLATION, UNSPECIFIED TYPE: ICD-10-CM

## 2017-06-22 DIAGNOSIS — I50.9 ACUTE ON CHRONIC CONGESTIVE HEART FAILURE, UNSPECIFIED CONGESTIVE HEART FAILURE TYPE: ICD-10-CM

## 2017-06-22 DIAGNOSIS — D46.9 MDS (MYELODYSPLASTIC SYNDROME): ICD-10-CM

## 2017-06-22 DIAGNOSIS — I50.9 ACUTE ON CHRONIC CONGESTIVE HEART FAILURE, UNSPECIFIED CONGESTIVE HEART FAILURE TYPE: Primary | ICD-10-CM

## 2017-06-22 DIAGNOSIS — I48.0 PAROXYSMAL ATRIAL FIBRILLATION: ICD-10-CM

## 2017-06-22 DIAGNOSIS — E78.5 DYSLIPIDEMIA: ICD-10-CM

## 2017-06-22 PROCEDURE — 71020 XR CHEST PA AND LATERAL: CPT | Mod: 26,,, | Performed by: RADIOLOGY

## 2017-06-22 PROCEDURE — 99499 UNLISTED E&M SERVICE: CPT | Mod: S$PBB,,, | Performed by: INTERNAL MEDICINE

## 2017-06-22 PROCEDURE — 1126F AMNT PAIN NOTED NONE PRSNT: CPT | Mod: S$GLB,,, | Performed by: INTERNAL MEDICINE

## 2017-06-22 PROCEDURE — 93000 ELECTROCARDIOGRAM COMPLETE: CPT | Mod: S$GLB,,, | Performed by: INTERNAL MEDICINE

## 2017-06-22 PROCEDURE — 71020 XR CHEST PA AND LATERAL: CPT | Mod: TC

## 2017-06-22 PROCEDURE — 99999 PR PBB SHADOW E&M-EST. PATIENT-LVL III: CPT | Mod: PBBFAC,,, | Performed by: INTERNAL MEDICINE

## 2017-06-22 PROCEDURE — 99214 OFFICE O/P EST MOD 30 MIN: CPT | Mod: S$GLB,,, | Performed by: INTERNAL MEDICINE

## 2017-06-22 PROCEDURE — 1157F ADVNC CARE PLAN IN RCRD: CPT | Mod: S$GLB,,, | Performed by: INTERNAL MEDICINE

## 2017-06-22 PROCEDURE — 1159F MED LIST DOCD IN RCRD: CPT | Mod: S$GLB,,, | Performed by: INTERNAL MEDICINE

## 2017-06-22 RX ORDER — METOPROLOL SUCCINATE 100 MG/1
100 TABLET, EXTENDED RELEASE ORAL DAILY
Qty: 30 TABLET | Refills: 3 | Status: SHIPPED | OUTPATIENT
Start: 2017-06-22 | End: 2017-08-03 | Stop reason: SDUPTHER

## 2017-06-22 NOTE — PROGRESS NOTES
Subjective:    Patient ID:  Wil Kwon Jr. is a 72 y.o. male who presents for evaluation of Shortness of Breath (x 1 week); Edema (abdomen ); and Atrial fibrillation, unspecified      HPI  The patient is a 72 year old male with MDS haven gone though chemotherapy. He has  Paroxsymal atrial fibrillations since a CEA in 2013 but appears to be persistent since February this year. He was seen by Dr Rader yesterday who was concern with the patient's increasing NASH, increased girth and dependent edema. EKG today reveals AF with RVR. His additional diagnosis include hypertension, hyperlipidemia, aortic stenosis due to bicuspid valve.      CONCLUSIONS     1 - Severe left atrial enlargement.     2 - Normal left ventricular systolic function (EF 55-60%).     3 - Left ventricular diastolic dysfunction.     4 - Moderate aortic stenosis, JEFF = 1.2 cm2, peak velocity = 3.0 m/s, mean gradient = 24.0 mmHg.     5 - Pulmonary hypertension. The estimated PA systolic pressure is 46 mmHg.     6 - Moderate mitral regurgitation.     7 - LV strain is reduced measuring -12%.             This document has been electronically    SIGNED BY: Chandra Gallagher MD On: 03/01/2017 10:12  Past Medical History:   Diagnosis Date    Aortic valve stenosis, mild     secondary to bicuspid aortic alve    Atrial fibrillation     Carotid artery disease     Left CEA 4/9/13    Depression     DJD (degenerative joint disease)     H/O echocardiogram 04/13/2016    EF 55-60%. Diastolic dysfunction. PASP 39. mod AS with JEFF 1.18    History of psychiatric hospitalization     [facility], [facility]    Hyperlipidemia     Hypertension     MDS (myelodysplastic syndrome) 2013    s/p Chemo     Therapy     [facility]      Current Outpatient Prescriptions   Medication Sig    acetaminophen (TYLENOL) 325 MG tablet Take 325 mg by mouth as needed for Pain.    citalopram (CELEXA) 40 MG tablet TAKE 1 TABLET BY MOUTH DAILY  "(Patient taking differently: TAKE 1 TABLET BY MOUTH DAILY (am))    diltiaZEM (CARDIZEM CD) 300 MG 24 hr capsule TAKE ONE CAPSULE BY MOUTH EVERY DAY    furosemide (LASIX) 20 MG tablet Take 1 tablet (20 mg total) by mouth 2 (two) times daily.    metoprolol succinate (TOPROL-XL) 100 MG 24 hr tablet Take 1 tablet (100 mg total) by mouth once daily.    multivitamin capsule Take 1 capsule by mouth every morning.     nystatin (MYCOSTATIN) ointment Apply topically 3 (three) times daily.    omega-3 fatty acids-vitamin E (FISH OIL) 1,000 mg Cap Take 3 capsules by mouth once daily.     trazodone (DESYREL) 100 MG tablet Take 1 tablet (100 mg total) by mouth every evening.    warfarin (COUMADIN) 3 MG tablet Take 1 tablet (3 mg total) by mouth Daily.     No current facility-administered medications for this visit.      Review of Systems   Constitution: Positive for weakness and malaise/fatigue.   Cardiovascular: Positive for dyspnea on exertion and leg swelling.   Respiratory: Positive for shortness of breath.         Objective:/73 (BP Location: Left arm, Patient Position: Sitting, BP Method: Automatic)   Pulse (!) 131   Ht 5' 11" (1.803 m)   Wt 87 kg (191 lb 12.8 oz)   SpO2 98%   BMI 26.75 kg/m²     Physical Exam   Constitutional: He is oriented to person, place, and time. He appears well-developed and well-nourished.   HENT:   Head: Normocephalic.   Right Ear: External ear normal.   Left Ear: External ear normal.   Nose: Nose normal.   Inspection of lips, teeth and gums normal   Eyes: EOM are normal. Pupils are equal, round, and reactive to light. No scleral icterus.   Neck: Normal range of motion. Neck supple. No JVD present. No tracheal deviation present. No thyromegaly present.   Cardiovascular: Normal rate, regular rhythm and intact distal pulses.  Exam reveals no gallop and no friction rub.    Murmur heard.   Harsh midsystolic murmur is present with a grade of 2/6  at the upper right sternal border, " upper left sternal border radiating to the neck  Pulses:       Dorsalis pedis pulses are 2+ on the right side, and 2+ on the left side.        Posterior tibial pulses are 2+ on the right side, and 2+ on the left side.   Pulmonary/Chest: Effort normal and breath sounds normal.           Abdominal: Bowel sounds are normal. He exhibits no distension. There is no hepatosplenomegaly. There is no tenderness. There is no guarding.       Musculoskeletal: Normal range of motion. He exhibits edema (plus 2 edema). He exhibits no tenderness.   Lymphadenopathy:   Palpation of neck and groin lymph nodes normal   Neurological: He is alert and oriented to person, place, and time. No cranial nerve deficit. He exhibits normal muscle tone. Coordination normal.   Skin: Skin is dry.   Palpation of skin normal   Psychiatric: His behavior is normal. Judgment and thought content normal.         Assessment:       1. Acute on chronic congestive heart failure, unspecified congestive heart failure type    2. Atrial fibrillation with RVR    3. Aortic stenosis, moderate    4. Essential hypertension    5. Paroxysmal atrial fibrillation    6. MDS (myelodysplastic syndrome)    7. Dyslipidemia    8. Anemia, unspecified type         Plan:       Wil was seen today for shortness of breath, edema and atrial fibrillation, unspecified.    Diagnoses and all orders for this visit:    Acute on chronic congestive heart failure, unspecified congestive heart failure type  -     2D echo with color flow doppler; Future  -     X-Ray Chest PA And Lateral; Future; Expected date: 06/22/2017  -     B-TYPE NATRIURETIC PEPTIDE; Future; Expected date: 06/22/2017    Atrial fibrillation with RVR  -     2D echo with color flow doppler; Future  -     B-TYPE NATRIURETIC PEPTIDE; Future; Expected date: 06/22/2017    Aortic stenosis, moderate  -     2D echo with color flow doppler; Future    Essential hypertension    Paroxysmal atrial fibrillation    MDS (myelodysplastic  syndrome)    Dyslipidemia    Anemia, unspecified type    Other orders  -     metoprolol succinate (TOPROL-XL) 100 MG 24 hr tablet; Take 1 tablet (100 mg total) by mouth once daily.

## 2017-06-23 ENCOUNTER — CLINICAL SUPPORT (OUTPATIENT)
Dept: CARDIOLOGY | Facility: CLINIC | Age: 72
End: 2017-06-23
Payer: MEDICARE

## 2017-06-23 ENCOUNTER — PATIENT MESSAGE (OUTPATIENT)
Dept: CARDIOLOGY | Facility: CLINIC | Age: 72
End: 2017-06-23

## 2017-06-23 DIAGNOSIS — I50.9 ACUTE ON CHRONIC CONGESTIVE HEART FAILURE, UNSPECIFIED CONGESTIVE HEART FAILURE TYPE: ICD-10-CM

## 2017-06-23 DIAGNOSIS — I48.91 ATRIAL FIBRILLATION WITH RVR: ICD-10-CM

## 2017-06-23 DIAGNOSIS — I35.0 AORTIC STENOSIS, MODERATE: ICD-10-CM

## 2017-06-23 LAB
AORTIC VALVE STENOSIS: ABNORMAL
ESTIMATED PA SYSTOLIC PRESSURE: 51
MITRAL VALVE REGURGITATION: ABNORMAL
RETIRED EF AND QEF - SEE NOTES: 53 (ref 55–65)
TRICUSPID VALVE REGURGITATION: ABNORMAL

## 2017-06-23 PROCEDURE — 93306 TTE W/DOPPLER COMPLETE: CPT | Mod: S$GLB,,, | Performed by: INTERNAL MEDICINE

## 2017-06-27 NOTE — PROGRESS NOTES
"PATIENT: Wil Kwon Jr.  MRN: [id number]  DATE: 6/27/2017    Subjective:   MDS    Oncologic History:  Mr. Kwon 71-year-old gentleman with several chronic medical conditions living healthy lifestyle. He has hypertension and reports home readings are all normal. He did recently develop angioedema from ACE inhibitor and was switched to an ARB. His dyslipidemia is treated with pharmacologic therapy. He has history of carotid atherosclerotic disease status post left carotid endarterectomy and up-to-date with surveillance carotid Doppler study. He has a history of PAF followed by our electrophysiology cardiology department and is chronically anticoagulated. He has moderate aortic stenosis. He has DJD of the right knee which has become more symptomatic in recent months. He was noted to be progressively anemic over the last 1 year. Hematology workup reveals a normal B12 and folate. His iron stores are normal. His reticulocyte count is slightly elevated at 4.2. His white count has remained low and progressively dropping over the last 3 years with monocytosis. He also has developed mild progressive thrombocytopenia. He is moderately symptomatic with the fatigue. He has no history of extrinsic GI bleeding. He also has no history of previous transfusions.he underwent a marrow. 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 Of 20 metaphases, 2 metaphases were normal and 18 metaphases had a 2;21  translocation. 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 RUNX1 rearrangements are  associated with de alfredo AML and therapeutic-related AML and MDS. Clinical and pathologic correlation is  recommended."  FINAL PATHOLOGIC DIAGNOSIS  CBC DATA 8/24/16:  RBC: 3.45 M/UL, H/H : 9.9/32.1, MCV : 93 FL, WBC: 3.1 K/UL, Gran 1.2 k/ul %, Platelet: 200 K/UL.  No " peripheral blood smear was submitted for evaluation.  BONE MARROW ASPIRATE, BONE MARROW CLOT, AND BONE MARROW CORE BIOPSY WITH:  CELLULARITY= 95%, TRILINEAGE HEMATOPOIETIC ACTIVITY (M/E= 2:1) AND INCREASED IMMATURE  CELLS (9%). SEE COMMENT.  LEFT SHIFTED MATURATION OF GRANULOPOIESIS.  ADEQUATE STORAGE IRON.  ADEQUATE NUMBER OF MEGAKARYOCYTES.  COMMENT: Flow cytometry analysis of bone marrow aspirate shows lymph gate (4.2 %) containing T and B cells.  No B cell clonality or T-cell aberrancy is evident. Blast gate is increased (7.9%) with cells expression of CD13 and  CD34. Immunohistochemical studies were performed on the clot and core biopsy for further architecture evaluation with  adequate positive and negative controls. Scattered mixed predominantly T cells (CD3 positive) and B cells (CD20  positive) are evident. About 6-7 % plasma cells ( positive) and 9% CD34 positive cells are noted. Findings  are nondiagnostic for lymphoma or plasma cell dyscrasia.  Microscopic Examination  BONE MARROW ASPIRATE DIFFERENTIAL:  Blasts----------------------------------------------5%  Promyelocytes---------------------------------2%  Myelocytes--------------------------------------11%  Metamyelocytes------------------------------ 19%  Bands----------------------------------------------5%  PMN Neutrophils------------------------------15%  Eosinophils------------------------------------------2%  Basophils---------------------------------------0%  Monocytes------------------------------------2%  Lymphocytes-------------------------------------6%  Plasma cells------------------------------------------2%  Erythroid precursors--------------------------31%  BONE MARROW ASPIRATE:  Myeloid and erythroid maturation shows M:E ratio at 2:1. No significant dysplasia is evident. Left shifted maturation of  granulopoiesis is noted. Stainable iron is present without increased ringed sideroblasts. Megakaryocytes are seen.  BONE MARROW  CLOT:  Cellularity is 95 % with trilineage hematopoiesis. No abnormal infiltrates are seen. Megakaryocytes are adequate in  number. Stainable iron is present.  BONE MARROW CORE BIOPSY:  Cellularity is 95 %. No abnormal infiltrates are seen. Stainable iron is not identified. Megakaryocytes are adequate in  No significantly increased reticular fibrosis is evident by special stain (reticulin) with adequate positive and  negative controls.      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 He did not meet criteria for AML. He was started on Vidaza and Aranes and completed 3 cycles when a repeat bone marrow biopsy revealed:      BONE MARROW ASPIRATE, BONE MARROW CLOT, AND BONE MARROW CORE BIOPSY WITH:  CELLULARITY= %, TRILINEAGE HEMATOPOIETIC ACTIVITY (M/E= 1.4:1).  CONSISTENT WITH REFRACTORY ANEMIA WITH EXCESS BLASTS -1. SEE COMMENT.  DECREASED STORAGE IRON.  INCREASED NUMBER OF MEGAKARYOCYTES WITH DYSPLASTIC MORPHOLOGY.  COMMENT: Flow cytometry analysis of bone marrow aspirate shows lymph gate (15.9 %) containing T and B cells.  No B cell clonality or T-cell aberrancy is evident. Blast gate is slightly increased (7.1%).  Immunohistochemical studies were performed on the clot and core biopsy for further architecture evaluation with  adequate positive and negative controls. About 6-7% CD34 positive cells are noted. CD61 highlights increased in  number of megakaryocytes with dysplastic morphology.  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 Correlate clinically and with a cytogenetics  report.  Microscopic Examination  BONE MARROW ASPIRATE DIFFERENTIAL:  Blasts---------------------------------------------- 7 %  Promyelocytes---------------------------------1%  Myelocytes--------------------------------------10%  Metamyelocytes------------------------------ 8%  Bands----------------------------------------------9%  PMN  Neutrophils------------------------------15%  Eosinophils------------------------------------------1%  Basophils---------------------------------------1%  Monocytes------------------------------------1%  Lymphocytes-------------------------------------9%  Plasma cells------------------------------------------1%  Erythroid precursors--------------------------37%  BONE MARROW ASPIRATE:  Myeloid and erythroid maturation shows M:E ratio at 1.4:1. Dysgranulopoiesis and occasional dyserythropoiesis is  evident. Stainable iron is decreased without increased ringed sideroblasts. Megakaryocytes are seen.  BONE MARROW CLOT:  Cellularity is  % with trilineage hematopoiesis. No abnormal infiltrates are seen. Megakaryocytes are increased  in number with dysplastic morphology. Stainable iron is decreased.  BONE MARROW CORE BIOPSY:  Cellularity is  %. No abnormal infiltrates are seen. Stainable iron is not identified. Megakaryocytes are  increased in number with dysplastic morphology.       Last Vidaza was cycle 5 completed on 3/3/17.  He was evaluated for an allogeneic transplant but decided against it.  He subsequently got admitted to the hospital with an empyema.  His MDS therapy has been on hold until he is recovered from his empyema. He developed a drug rash while on Augmentin and was switched to Clindamycin.  However he is having increasing dyspnea on excursion and orthopnea.  He also has 2+ lower extremity edema.  He was treated with diuretics for congestive heart failure.  His symptoms improved.  He decreased his diuretic and has been on observation without any right days.  He has been receiving Aranesp.  My plan has been to watch his counts and only if there decreased significantly restart his Vidaza.  During this time and wanted him to recover from his empyema and congestive heart failure.      Interval History: Mr. Kwon returns for follow up.  He returns unscheduled.  He is having increasing shortness  of breath and dyspnea on exertion.  He also has 2+ lower extremity edema and abdominal bloating.  He denies any fevers chills cough or chest pain.    Past Medical History:   Diagnosis Date    Aortic valve stenosis, mild     secondary to bicuspid aortic alve    Atrial fibrillation     Carotid artery disease     Left CEA 4/9/13    Depression     DJD (degenerative joint disease)     H/O echocardiogram 04/13/2016    EF 55-60%. Diastolic dysfunction. PASP 39. mod AS with JEFF 1.18    History of psychiatric hospitalization     [place] [id number], [facility] 1993    Hyperlipidemia     Hypertension     MDS (myelodysplastic syndrome) 2013    s/p Chemo     Therapy     [facility]        Past Surgical History:   Procedure Laterality Date    BONE MARROW BIOPSY  08/29/2016    CAROTID ENDARTERECTOMY Left     Inguinal hernia      Left    KNEE SURGERY      Right x2    neck fusion      TONSILLECTOMY         Family History   Problem Relation Age of Onset    Hyperlipidemia Brother         Social History:  reports that he has never smoked. He has never used smokeless tobacco. He reports that he does not drink alcohol or use drugs.    Allergies:  Review of patient's allergies indicates:   Allergen Reactions    Lipitor [atorvastatin]      Muscle pain    Lisinopril Edema     Cheek swelling    Augmentin [amoxicillin-pot clavulanate] Rash       Medications:  Current Outpatient Prescriptions   Medication Sig Dispense Refill    acetaminophen (TYLENOL) 325 MG tablet Take 325 mg by mouth as needed for Pain.      citalopram (CELEXA) 40 MG tablet TAKE 1 TABLET BY MOUTH DAILY (Patient taking differently: TAKE 1 TABLET BY MOUTH DAILY (am)) 90 tablet 0    diltiaZEM (CARDIZEM CD) 300 MG 24 hr capsule TAKE ONE CAPSULE BY MOUTH EVERY DAY 90 capsule 1    furosemide (LASIX) 20 MG tablet Take 1 tablet (20 mg total) by mouth 2 (two) times daily. 60 tablet 11    metoprolol succinate (TOPROL-XL) 100 MG 24 hr  "tablet Take 1 tablet (100 mg total) by mouth once daily. 30 tablet 3    multivitamin capsule Take 1 capsule by mouth every morning.       nystatin (MYCOSTATIN) ointment Apply topically 3 (three) times daily. 1 Tube 1    omega-3 fatty acids-vitamin E (FISH OIL) 1,000 mg Cap Take 3 capsules by mouth once daily.       trazodone (DESYREL) 100 MG tablet Take 1 tablet (100 mg total) by mouth every evening. 30 tablet 11    warfarin (COUMADIN) 3 MG tablet Take 1 tablet (3 mg total) by mouth Daily. 30 tablet 11     No current facility-administered medications for this visit.          Review of Systems   HENT: Negative.    Eyes: Negative.    Respiratory: Negative.  NASH/SOB  Cardiovascular: Negative.    Gastrointestinal: Negative.    Endocrine: Negative.    Genitourinary: Negative.    Musculoskeletal: Negative.    Skin: Negative.    Neurological: Negative.    Hematological: Negative.    Psychiatric/Behavioral: Negative.        ECOG Performance Status: 1  Objective:      Vitals:   Vitals:    06/21/17 1101   BP: 134/73   BP Location: Left arm   Patient Position: Sitting   BP Method: Automatic   Pulse: (!) 111   Resp: 12   Temp: 98.3 °F (36.8 °C)   TempSrc: Oral   SpO2: 99%   Weight: 87.2 kg (192 lb 3.9 oz)   Height: 5' 11" (1.803 m)     BMI: Body mass index is 26.81 kg/m².      Physical Exam   Constitutional: he is oriented to person, place, and time. He appears well-developed and well-nourished.   HENT: NC AT   Head: Normocephalic.   Right Ear: External ear normal.   Left Ear: External ear normal.   Nose: Nose normal.   Mouth/Throat: Oropharynx is clear and moist.   Eyes: Conjunctivae and EOM are normal. Pupils are equal, round, and reactive to light.   Neck: Normal range of motion. Neck supple.   Cardiovascular: Normal rate, regular rhythm, normal heart sounds and intact distal pulses.    Pulmonary/Chest: Effort normal and breath sounds normal.   Abdominal: Soft. Bowel sounds are normal.   Musculoskeletal: Normal range of " motion. 2+ LE odema.  Neurological: he is alert and oriented to person, place, and time. He has normal reflexes.   Skin: Skin is warm and dry.   Psychiatric: he has a normal mood and affect. His behavior is normal. Judgment and thought content normal.       Laboratory Data:  Lab Visit on 06/21/2017   Component Date Value Ref Range Status    WBC 06/21/2017 3.43* 3.90 - 12.70 K/uL Final    RBC 06/21/2017 3.33* 4.60 - 6.20 M/uL Final    Hemoglobin 06/21/2017 9.7* 14.0 - 18.0 g/dL Final    Hematocrit 06/21/2017 31.5* 40.0 - 54.0 % Final    MCV 06/21/2017 95  82 - 98 fL Final    MCH 06/21/2017 29.1  27.0 - 31.0 pg Final    MCHC 06/21/2017 30.8* 32.0 - 36.0 % Final    RDW 06/21/2017 16.7* 11.5 - 14.5 % Final    Platelets 06/21/2017 125* 150 - 350 K/uL Final    MPV 06/21/2017 11.6  9.2 - 12.9 fL Final    Gran # 06/21/2017 2.3  1.8 - 7.7 K/uL Final    Comment: The ANC is based on a white cell differential from an   automated cell counter. It has not been microscopically   reviewed for the presence of abnormal cells. Clinical   correlation is required.      Sodium 06/21/2017 138  136 - 145 mmol/L Final    Potassium 06/21/2017 4.3  3.5 - 5.1 mmol/L Final    Chloride 06/21/2017 106  95 - 110 mmol/L Final    CO2 06/21/2017 21* 23 - 29 mmol/L Final    Glucose 06/21/2017 141* 70 - 110 mg/dL Final    BUN, Bld 06/21/2017 20  8 - 23 mg/dL Final    Creatinine 06/21/2017 1.0  0.5 - 1.4 mg/dL Final    Calcium 06/21/2017 8.8  8.7 - 10.5 mg/dL Final    Total Protein 06/21/2017 7.6  6.0 - 8.4 g/dL Final    Albumin 06/21/2017 3.3* 3.5 - 5.2 g/dL Final    Total Bilirubin 06/21/2017 1.6* 0.1 - 1.0 mg/dL Final    Comment: For infants and newborns, interpretation of results should be based  on gestational age, weight and in agreement with clinical  observations.  Premature Infant recommended reference ranges:  Up to 24 hours.............<8.0 mg/dL  Up to 48 hours............<12.0 mg/dL  3-5 days..................<15.0  mg/dL  6-29 days.................<15.0 mg/dL      Alkaline Phosphatase 06/21/2017 91  55 - 135 U/L Final    AST 06/21/2017 31  10 - 40 U/L Final    ALT 06/21/2017 22  10 - 44 U/L Final    Anion Gap 06/21/2017 11  8 - 16 mmol/L Final    eGFR if African American 06/21/2017 >60.0  >60 mL/min/1.73 m^2 Final    eGFR if non African American 06/21/2017 >60.0  >60 mL/min/1.73 m^2 Final    Comment: Calculation used to obtain the estimated glomerular filtration  rate (eGFR) is the CKD-EPI equation. Since race is unknown   in our information system, the eGFR values for   -American and Non--American patients are given   for each creatinine result.      LD 06/21/2017 265* 110 - 260 U/L Final    BNP 06/21/2017 780* 0 - 99 pg/mL Final    Prothrombin Time 06/21/2017 13.6* 9.0 - 12.5 sec Final    INR 06/21/2017 1.3* 0.8 - 1.2 Final    Comment: Coumadin Therapy:  2.0 - 3.0 for INR for all indicators except mechanical heart valves  and antiphospholipid syndromes which should use 2.5 - 3.5.     Lab Visit on 06/14/2017   Component Date Value Ref Range Status    WBC 06/14/2017 2.64* 3.90 - 12.70 K/uL Final    RBC 06/14/2017 3.45* 4.60 - 6.20 M/uL Final    Hemoglobin 06/14/2017 9.8* 14.0 - 18.0 g/dL Final    Hematocrit 06/14/2017 32.7* 40.0 - 54.0 % Final    MCV 06/14/2017 95  82 - 98 fL Final    MCH 06/14/2017 28.4  27.0 - 31.0 pg Final    MCHC 06/14/2017 30.0* 32.0 - 36.0 % Final    RDW 06/14/2017 16.3* 11.5 - 14.5 % Final    Platelets 06/14/2017 121* 150 - 350 K/uL Final    MPV 06/14/2017 13.2* 9.2 - 12.9 fL Final    Gran # 06/14/2017 1.3* 1.8 - 7.7 K/uL Final    Lymph # 06/14/2017 1.4  1.0 - 4.8 K/uL Final    Mono # 06/14/2017 0.0* 0.3 - 1.0 K/uL Final    Eos # 06/14/2017 0.0  0.0 - 0.5 K/uL Final    Baso # 06/14/2017 0.00  0.00 - 0.20 K/uL Final    Gran% 06/14/2017 48.1  38.0 - 73.0 % Final    Lymph% 06/14/2017 51.1* 18.0 - 48.0 % Final    Mono% 06/14/2017 0.8* 4.0 - 15.0 % Final     Eosinophil% 06/14/2017 0.0  0.0 - 8.0 % Final    Basophil% 06/14/2017 0.0  0.0 - 1.9 % Final    Platelet Estimate 06/14/2017 Decreased*  Final    Aniso 06/14/2017 Slight   Final    Poly 06/14/2017 Occasional   Final    Differential Method 06/14/2017 Automated   Final    Prothrombin Time 06/14/2017 13.2* 9.0 - 12.5 sec Final    INR 06/14/2017 1.3* 0.8 - 1.2 Final    Comment: Coumadin Therapy:  2.0 - 3.0 for INR for all indicators except mechanical heart valves  and antiphospholipid syndromes which should use 2.5 - 3.5.         Assessment/Plan:     1. MDS (myelodysplastic syndrome)    2. Atrial fibrillation with RVR    3. Essential hypertension    4. Aortic valve stenosis, mild    5. Acute on chronic systolic congestive heart failure    6. Pulmonary hypertension- April 2016- The estimated PA systolic pressure is 39 mmHg    7. Empyema of right pleural space    8. Mild single current episode of major depressive disorder    9. Dyslipidemia    10. Anemia, chronic disease    11. Thrombocytopenia    12. Aortic stenosis, moderate    13. Other depression    14. Primary insomnia    15. Bilateral carotid bruits      His myelodysplasia remains stable.  I will continue add Aranesp and continue to observe him.    He has developed congestive heart failure symptoms again.  I will increase his Lasix.  I will ask him to be seen by cardiology.    His aortic stenosis, pulmonary hypertension, type attention, atrial fibrillation, depression and dyslipidemia remains stable.  I have not changed any other medications.    Increase Lasix to 1 pill twice daily    No other change in meds    See Cardiology when scheduled    Continue aranesp    See me in 1 month    Med and Order  Orders Placed This Encounter    CBC Oncology    Comprehensive metabolic panel    furosemide (LASIX) 20 MG tablet       Follow Up  Return in about 4 weeks (around 7/19/2017).

## 2017-07-01 ENCOUNTER — HOSPITAL ENCOUNTER (INPATIENT)
Facility: HOSPITAL | Age: 72
LOS: 2 days | Discharge: HOME | DRG: 293 | End: 2017-07-03
Attending: EMERGENCY MEDICINE | Admitting: HOSPITALIST
Payer: MEDICARE

## 2017-07-01 DIAGNOSIS — F32.89 OTHER DEPRESSION: ICD-10-CM

## 2017-07-01 DIAGNOSIS — F51.01 PRIMARY INSOMNIA: ICD-10-CM

## 2017-07-01 DIAGNOSIS — I48.91 ATRIAL FIBRILLATION, UNSPECIFIED TYPE: ICD-10-CM

## 2017-07-01 DIAGNOSIS — R09.89 BILATERAL CAROTID BRUITS: ICD-10-CM

## 2017-07-01 DIAGNOSIS — D46.9 MDS (MYELODYSPLASTIC SYNDROME): ICD-10-CM

## 2017-07-01 DIAGNOSIS — E78.5 DYSLIPIDEMIA: ICD-10-CM

## 2017-07-01 DIAGNOSIS — J86.9 EMPYEMA OF RIGHT PLEURAL SPACE: ICD-10-CM

## 2017-07-01 DIAGNOSIS — I48.91 ATRIAL FIBRILLATION WITH RVR: ICD-10-CM

## 2017-07-01 DIAGNOSIS — I35.0 AORTIC STENOSIS, MODERATE: ICD-10-CM

## 2017-07-01 DIAGNOSIS — I35.0 NONRHEUMATIC AORTIC VALVE STENOSIS: ICD-10-CM

## 2017-07-01 DIAGNOSIS — I10 ESSENTIAL HYPERTENSION: ICD-10-CM

## 2017-07-01 DIAGNOSIS — M25.50 ARTHRALGIA: ICD-10-CM

## 2017-07-01 DIAGNOSIS — I50.33 ACUTE ON CHRONIC DIASTOLIC CONGESTIVE HEART FAILURE: ICD-10-CM

## 2017-07-01 DIAGNOSIS — I50.9 ACUTE ON CHRONIC CONGESTIVE HEART FAILURE, UNSPECIFIED CONGESTIVE HEART FAILURE TYPE: Primary | ICD-10-CM

## 2017-07-01 PROBLEM — E83.42 HYPOMAGNESEMIA: Status: RESOLVED | Noted: 2017-03-12 | Resolved: 2017-07-01

## 2017-07-01 PROBLEM — A41.9 SEPTIC SHOCK: Status: RESOLVED | Noted: 2017-03-06 | Resolved: 2017-07-01

## 2017-07-01 PROBLEM — R05.9 COUGH: Status: RESOLVED | Noted: 2017-03-13 | Resolved: 2017-07-01

## 2017-07-01 PROBLEM — J18.9 HCAP (HEALTHCARE-ASSOCIATED PNEUMONIA): Status: RESOLVED | Noted: 2017-03-06 | Resolved: 2017-07-01

## 2017-07-01 PROBLEM — J96.01 ACUTE HYPOXEMIC RESPIRATORY FAILURE: Status: RESOLVED | Noted: 2017-03-06 | Resolved: 2017-07-01

## 2017-07-01 PROBLEM — N17.9 AKI (ACUTE KIDNEY INJURY): Status: RESOLVED | Noted: 2017-03-06 | Resolved: 2017-07-01

## 2017-07-01 PROBLEM — R65.21 SEPTIC SHOCK: Status: RESOLVED | Noted: 2017-03-06 | Resolved: 2017-07-01

## 2017-07-01 LAB
ALBUMIN SERPL BCP-MCNC: 3 G/DL
ALP SERPL-CCNC: 113 U/L
ALT SERPL W/O P-5'-P-CCNC: 151 U/L
ANION GAP SERPL CALC-SCNC: 12 MMOL/L
ANION GAP SERPL CALC-SCNC: 14 MMOL/L
ANISOCYTOSIS BLD QL SMEAR: SLIGHT
AST SERPL-CCNC: 213 U/L
BASOPHILS # BLD AUTO: 0 K/UL
BASOPHILS NFR BLD: 0 %
BILIRUB SERPL-MCNC: 2.2 MG/DL
BILIRUB UR QL STRIP: NEGATIVE
BNP SERPL-MCNC: 617 PG/ML
BUN SERPL-MCNC: 27 MG/DL
BUN SERPL-MCNC: 28 MG/DL
CALCIUM SERPL-MCNC: 8.7 MG/DL
CALCIUM SERPL-MCNC: 8.8 MG/DL
CHLORIDE SERPL-SCNC: 103 MMOL/L
CHLORIDE SERPL-SCNC: 103 MMOL/L
CLARITY UR REFRACT.AUTO: CLEAR
CO2 SERPL-SCNC: 16 MMOL/L
CO2 SERPL-SCNC: 18 MMOL/L
COLOR UR AUTO: YELLOW
CREAT SERPL-MCNC: 1.2 MG/DL
CREAT SERPL-MCNC: 1.2 MG/DL
DIFFERENTIAL METHOD: ABNORMAL
EOSINOPHIL # BLD AUTO: 0 K/UL
EOSINOPHIL NFR BLD: 0 %
ERYTHROCYTE [DISTWIDTH] IN BLOOD BY AUTOMATED COUNT: 17.3 %
EST. GFR  (AFRICAN AMERICAN): >60 ML/MIN/1.73 M^2
EST. GFR  (AFRICAN AMERICAN): >60 ML/MIN/1.73 M^2
EST. GFR  (NON AFRICAN AMERICAN): >60 ML/MIN/1.73 M^2
EST. GFR  (NON AFRICAN AMERICAN): >60 ML/MIN/1.73 M^2
GIANT PLATELETS BLD QL SMEAR: PRESENT
GLUCOSE SERPL-MCNC: 137 MG/DL
GLUCOSE SERPL-MCNC: 165 MG/DL
GLUCOSE UR QL STRIP: NEGATIVE
HCT VFR BLD AUTO: 33.3 %
HGB BLD-MCNC: 10.2 G/DL
HGB UR QL STRIP: NEGATIVE
HYPOCHROMIA BLD QL SMEAR: ABNORMAL
INR PPP: 1.6
KETONES UR QL STRIP: NEGATIVE
LEUKOCYTE ESTERASE UR QL STRIP: NEGATIVE
LYMPHOCYTES # BLD AUTO: 0.9 K/UL
LYMPHOCYTES NFR BLD: 25.1 %
MCH RBC QN AUTO: 29.1 PG
MCHC RBC AUTO-ENTMCNC: 30.6 %
MCV RBC AUTO: 95 FL
MONOCYTES # BLD AUTO: 0 K/UL
MONOCYTES NFR BLD: 0.6 %
NEUTROPHILS # BLD AUTO: 2.7 K/UL
NEUTROPHILS NFR BLD: 74.3 %
NITRITE UR QL STRIP: NEGATIVE
OVALOCYTES BLD QL SMEAR: ABNORMAL
PH UR STRIP: 5 [PH] (ref 5–8)
PLATELET # BLD AUTO: 154 K/UL
PMV BLD AUTO: ABNORMAL FL
POIKILOCYTOSIS BLD QL SMEAR: SLIGHT
POLYCHROMASIA BLD QL SMEAR: ABNORMAL
POTASSIUM SERPL-SCNC: 4.1 MMOL/L
POTASSIUM SERPL-SCNC: 4.1 MMOL/L
PROT SERPL-MCNC: 7.9 G/DL
PROT UR QL STRIP: NEGATIVE
PROTHROMBIN TIME: 16.7 SEC
RBC # BLD AUTO: 3.51 M/UL
SCHISTOCYTES BLD QL SMEAR: ABNORMAL
SODIUM SERPL-SCNC: 133 MMOL/L
SODIUM SERPL-SCNC: 133 MMOL/L
SP GR UR STRIP: 1.01 (ref 1–1.03)
TROPONIN I SERPL DL<=0.01 NG/ML-MCNC: 0.02 NG/ML
URN SPEC COLLECT METH UR: NORMAL
UROBILINOGEN UR STRIP-ACNC: NEGATIVE EU/DL
WBC # BLD AUTO: 3.62 K/UL

## 2017-07-01 PROCEDURE — 99223 1ST HOSP IP/OBS HIGH 75: CPT | Mod: GC,,, | Performed by: INTERNAL MEDICINE

## 2017-07-01 PROCEDURE — 25000003 PHARM REV CODE 250: Performed by: STUDENT IN AN ORGANIZED HEALTH CARE EDUCATION/TRAINING PROGRAM

## 2017-07-01 PROCEDURE — 96374 THER/PROPH/DIAG INJ IV PUSH: CPT

## 2017-07-01 PROCEDURE — 63600175 PHARM REV CODE 636 W HCPCS: Performed by: STUDENT IN AN ORGANIZED HEALTH CARE EDUCATION/TRAINING PROGRAM

## 2017-07-01 PROCEDURE — 93005 ELECTROCARDIOGRAM TRACING: CPT

## 2017-07-01 PROCEDURE — 93010 ELECTROCARDIOGRAM REPORT: CPT | Mod: S$GLB,,, | Performed by: INTERNAL MEDICINE

## 2017-07-01 PROCEDURE — 36415 COLL VENOUS BLD VENIPUNCTURE: CPT

## 2017-07-01 PROCEDURE — 63600175 PHARM REV CODE 636 W HCPCS: Performed by: PHYSICIAN ASSISTANT

## 2017-07-01 PROCEDURE — 11000001 HC ACUTE MED/SURG PRIVATE ROOM

## 2017-07-01 PROCEDURE — 99285 EMERGENCY DEPT VISIT HI MDM: CPT | Mod: ,,, | Performed by: EMERGENCY MEDICINE

## 2017-07-01 PROCEDURE — 85025 COMPLETE CBC W/AUTO DIFF WBC: CPT

## 2017-07-01 PROCEDURE — 99285 EMERGENCY DEPT VISIT HI MDM: CPT | Mod: 25

## 2017-07-01 PROCEDURE — 99223 1ST HOSP IP/OBS HIGH 75: CPT | Mod: AI,GC,, | Performed by: HOSPITALIST

## 2017-07-01 PROCEDURE — 80053 COMPREHEN METABOLIC PANEL: CPT

## 2017-07-01 PROCEDURE — 81003 URINALYSIS AUTO W/O SCOPE: CPT

## 2017-07-01 PROCEDURE — 80048 BASIC METABOLIC PNL TOTAL CA: CPT

## 2017-07-01 PROCEDURE — 85610 PROTHROMBIN TIME: CPT

## 2017-07-01 PROCEDURE — 84484 ASSAY OF TROPONIN QUANT: CPT

## 2017-07-01 PROCEDURE — 83880 ASSAY OF NATRIURETIC PEPTIDE: CPT

## 2017-07-01 RX ORDER — TRAZODONE HYDROCHLORIDE 50 MG/1
50 TABLET ORAL NIGHTLY PRN
Status: DISCONTINUED | OUTPATIENT
Start: 2017-07-01 | End: 2017-07-03 | Stop reason: HOSPADM

## 2017-07-01 RX ORDER — METOPROLOL SUCCINATE 100 MG/1
100 TABLET, EXTENDED RELEASE ORAL DAILY
Status: DISCONTINUED | OUTPATIENT
Start: 2017-07-01 | End: 2017-07-03 | Stop reason: HOSPADM

## 2017-07-01 RX ORDER — AMOXICILLIN 250 MG
1 CAPSULE ORAL 2 TIMES DAILY PRN
Status: DISCONTINUED | OUTPATIENT
Start: 2017-07-01 | End: 2017-07-03 | Stop reason: HOSPADM

## 2017-07-01 RX ORDER — WARFARIN 7.5 MG/1
7.5 TABLET ORAL DAILY
Status: DISCONTINUED | OUTPATIENT
Start: 2017-07-01 | End: 2017-07-01

## 2017-07-01 RX ORDER — FUROSEMIDE 10 MG/ML
40 INJECTION INTRAMUSCULAR; INTRAVENOUS 2 TIMES DAILY
Status: DISCONTINUED | OUTPATIENT
Start: 2017-07-01 | End: 2017-07-02

## 2017-07-01 RX ORDER — SODIUM CHLORIDE 0.9 % (FLUSH) 0.9 %
3 SYRINGE (ML) INJECTION EVERY 8 HOURS
Status: DISCONTINUED | OUTPATIENT
Start: 2017-07-01 | End: 2017-07-03 | Stop reason: HOSPADM

## 2017-07-01 RX ORDER — WARFARIN 7.5 MG/1
7.5 TABLET ORAL DAILY
Status: COMPLETED | OUTPATIENT
Start: 2017-07-02 | End: 2017-07-02

## 2017-07-01 RX ORDER — FUROSEMIDE 10 MG/ML
40 INJECTION INTRAMUSCULAR; INTRAVENOUS ONCE
Status: COMPLETED | OUTPATIENT
Start: 2017-07-01 | End: 2017-07-01

## 2017-07-01 RX ORDER — CITALOPRAM 40 MG/1
40 TABLET, FILM COATED ORAL DAILY
Status: DISCONTINUED | OUTPATIENT
Start: 2017-07-01 | End: 2017-07-03 | Stop reason: HOSPADM

## 2017-07-01 RX ORDER — FUROSEMIDE 10 MG/ML
80 INJECTION INTRAMUSCULAR; INTRAVENOUS 2 TIMES DAILY
Status: DISCONTINUED | OUTPATIENT
Start: 2017-07-01 | End: 2017-07-01

## 2017-07-01 RX ORDER — WARFARIN 7.5 MG/1
7.5 TABLET ORAL ONCE
Status: DISCONTINUED | OUTPATIENT
Start: 2017-07-03 | End: 2017-07-03 | Stop reason: HOSPADM

## 2017-07-01 RX ORDER — FUROSEMIDE 10 MG/ML
40 INJECTION INTRAMUSCULAR; INTRAVENOUS
Status: COMPLETED | OUTPATIENT
Start: 2017-07-01 | End: 2017-07-01

## 2017-07-01 RX ADMIN — TRAZODONE HYDROCHLORIDE 50 MG: 50 TABLET ORAL at 10:07

## 2017-07-01 RX ADMIN — Medication 3 ML: at 02:07

## 2017-07-01 RX ADMIN — FUROSEMIDE 40 MG: 10 INJECTION, SOLUTION INTRAVENOUS at 12:07

## 2017-07-01 RX ADMIN — FUROSEMIDE 40 MG: 10 INJECTION, SOLUTION INTRAVENOUS at 05:07

## 2017-07-01 RX ADMIN — FUROSEMIDE 40 MG: 10 INJECTION, SOLUTION INTRAVENOUS at 09:07

## 2017-07-01 RX ADMIN — Medication 3 ML: at 10:07

## 2017-07-01 NOTE — ASSESSMENT & PLAN NOTE
· Continue with home metoprolol and diltiazem   · Rate currently 100s but BP ok  · Cardiology consulted, recs appreciated  · Can consider DCCV after KIMBERLY after diuresis   · Currently anticoagulated on warfarin, INR 1.5  · Currently subtherapeutic, continue to monitor. W/d with pharmacy.   · Will avoid DOAC in setting of MDS for reversibility   · [id number] score 4  · HAS-BLED score 1

## 2017-07-01 NOTE — CONSULTS
Cardiology Initial Consult Note    7/1/2017    Reason for Consult: Atrial fibrillation    SUBJECTIVE  Wil Kwon Jr. is a 72 y.o. male with a history significant for MDS, atrial fibrillation, aortic stenosis (JEFF 0.88, V 3.48, MG 32) who presents with worsening NASH, abdominal distention, and edema.  Over the past several weeks, he has had worsening shortness of breath, to the point that he is no longer able to walk across his house or walk up the stairs without getting short of breath.  He recently saw Dr. Barrera, who uptitrated his metoprolol to control his rapid ventricular rate.  He has a previous diagnosis of paroxysmal atrial fibrillation, but seems to be persistent now.  He was previously on Xarelto, but stopped this due to gum/nose bleeding, so he was switched to warfarin however is subtherapeutic on this.  He has not tried Eliquis or Pradaxa.   ?  Review of Systems   Constitution: Negative for chills, fever, positive for weight gain  HENT: Negative.    Eyes: Negative.    Cardiovascular: Positive for NASH, orthopnea, edema  Respiratory: Negative for shortness of breath. Negative for cough.    Endocrine: Negative.    Hematologic/Lymphatic: Negative.    Skin: Negative for color change and rash.   Musculoskeletal: Negative.    Gastrointestinal: Positive for abdominal distention, indigestion. Negative for abdominal pain, change in bowel habit  Genitourinary: Negative.    Neurological: Negative for dizziness, light-headedness  Psychiatric/Behavioral: Negative for altered mental status.     OBJECTIVE  No current facility-administered medications for this encounter.      Current Outpatient Prescriptions   Medication Sig Dispense Refill    acetaminophen (TYLENOL) 325 MG tablet Take 325 mg by mouth as needed for Pain.      citalopram (CELEXA) 40 MG tablet TAKE 1 TABLET BY MOUTH DAILY (Patient taking differently: TAKE 1 TABLET BY MOUTH DAILY (am)) 90 tablet 0    diltiaZEM (CARDIZEM CD) 300 MG 24 hr capsule  TAKE ONE CAPSULE BY MOUTH EVERY DAY 90 capsule 1    furosemide (LASIX) 20 MG tablet Take 1 tablet (20 mg total) by mouth 2 (two) times daily. 60 tablet 11    metoprolol succinate (TOPROL-XL) 100 MG 24 hr tablet Take 1 tablet (100 mg total) by mouth once daily. 30 tablet 3    multivitamin capsule Take 1 capsule by mouth every morning.       omega-3 fatty acids-vitamin E (FISH OIL) 1,000 mg Cap Take 3 capsules by mouth once daily.       trazodone (DESYREL) 100 MG tablet Take 1 tablet (100 mg total) by mouth every evening. 30 tablet 11    warfarin (COUMADIN) 3 MG tablet Take 1 tablet (3 mg total) by mouth Daily. 30 tablet 11    nystatin (MYCOSTATIN) ointment Apply topically 3 (three) times daily. 1 Tube 1       Past Medical History:   Diagnosis Date    Aortic valve stenosis, mild     secondary to bicuspid aortic alve    Atrial fibrillation     Carotid artery disease     Left CEA 4/9/13    Depression     DJD (degenerative joint disease)     H/O echocardiogram 04/13/2016    EF 55-60%. Diastolic dysfunction. PASP 39. mod AS with JEFF 1.18    History of psychiatric hospitalization     On license of [facility] 2014, [facility]    Hyperlipidemia     Hypertension     MDS (myelodysplastic syndrome) 2013    s/p Chemo     Therapy     [facility]        Past Surgical History:   Procedure Laterality Date    BONE MARROW BIOPSY  08/29/2016    CAROTID ENDARTERECTOMY Left     Inguinal hernia      Left    KNEE SURGERY      Right x2    neck fusion      TONSILLECTOMY         Review of patient's allergies indicates:   Allergen Reactions    Lipitor [atorvastatin]      Muscle pain    Lisinopril Edema     Cheek swelling    Augmentin [amoxicillin-pot clavulanate] Rash       Family History   Problem Relation Age of Onset    Hyperlipidemia Brother        Social History     Social History    Marital status:      Spouse name: Agatha    Number of children: N/A    Years of education: N/A  "    Social History Main Topics    Smoking status: Never Smoker    Smokeless tobacco: Never Used    Alcohol use No    Drug use: No    Sexual activity: Yes     Partners: Female     Other Topics Concern    Patient Feels They Ought To Cut Down On Drinking/Drug Use No    Patient Annoyed By Others Criticizing Their Drinking/Drug Use No    Patient Has Felt Bad Or Guilty About Drinking/Drug Use No    Patient Has Had A Drink/Used Drugs As An Eye Opener In The [place] No     Social History Narrative    40+ years , no children, 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; good social support       Physical Exam  Vitals: BP (!) 143/75   Pulse (!) 120   Temp 98.2 °F (36.8 °C) (Oral)   Resp (!) 24   Ht 5' 11" (1.803 m)   Wt 83.5 kg (184 lb)   BMI 25.66 kg/m²    Gen: NAD  Head/Eyes/Ears/Nose: NCAT, MMM   Neck: JVD to ear  Lung: Eupneic, crackles present  Heart: Irregular rhythm, tachycardic, crescendo-decrescendo murmur present most prominent at RUSB  Abdomen: Mild distention noted, not tender  Extremities: Warm, 1+ edema bilaterally  Skin: Normal color and turgor  Neuro: AAOx3    No results for input(s): NA, K, CO2, CL, BUN, CREATININE, GLU, CALCIUM, ALT, AST, ALKPHOS, BILITOT, PROT, ALBUMIN in the last 168 hours.    Invalid input(s): MAGNESIUM,  PHOSPHORUS      Recent Labs  Lab 06/28/17  1100   WBC 4.47   HGB 9.7*   HCT 31.6*      MCV 94       Recent Labs  Lab 06/28/17  1100   INR 1.5*     No results for input(s): CPK, CPKMB, TROPONINI, MB in the last 168 hours.     Results  TTE 6/23/17  1 - Upper limit of normal left ventricular enlargement.     2 - Low normal to mildly depressed left ventricular systolic function (EF 50-55%).     3 - Wall motion abnormalities.     4 - Right ventricular enlargement with low normal systolic function.     5 - Biatrial enlargement.     6 - Moderate to severe aortic stenosis, JEFF = 0.88 cm2, peak velocity = 3.48 m/s, mean gradient = 32 mmHg.     7 - Moderate to severe mitral " regurgitation.     8 - Severe tricuspid regurgitation.     9 - Increased central venous pressure.     10 - Pulmonary hypertension. The estimated PA systolic pressure is 51 mmHg.     EKG   Atrial fibrillation with RVR,  bpm, non-specific T wave flattening  ?  ASSESSMENT AND PLAN  72 y.o. male with  a history significant for MDS, atrial fibrillation, aortic stenosis (JEFF 0.88, V 3.48, MG 32) who presents with worsening congestive heart failure and atrial fibrillation with RVR.     Recommendations:   - Admit to IM-C/J, if feasible, and the following:     Acute on Chronic HFpEF  - Valvular and diastolic heart failure from AFib  - Diurese with lasix 40mg IV bid for now, increase if he does not respond.  He takes lasix 20mg po bid at home but does not notice a response to this.    - Would benefit from improved rate control or DCCV    Atrial fibrillation  - Chronic persistent  - On AV jd agents, however still RVR  - Worsened in the setting of acute decompensated heart failure  - Would diurese to euvolemia, then consider KIMBERLY/DCCV with pursual of rhythm control strategy with anti-arrhythmic  - Currently subtherapeutic on warfarin, would change to Eliquis 5mg po bid but may need to run this by heme/onc    Aortic Stenosis  - Moderate-severe, does not yet meet criteria for SAVR/TAVR  - Diuretics for symptomatic heart failure    Carotid Stenosis s/p CEA  - Aspirin, statin as tolerated    MDS  - Currently labs with only mild anemia  - s/p trx with Vidaza, followed by Dr. Dior Ruiz MD  Cardiology Fellow  I have personally taken the history and examined the patient and agree with the resident's note as stated above.  Would consult EP for AF etc and agree with diuresis.

## 2017-07-01 NOTE — ED TRIAGE NOTES
Increasingly short of breath over the last week and pt states now he can't take more than 20 steps before he has to rest.

## 2017-07-01 NOTE — ED PROVIDER NOTES
Encounter Date: 2017    SCRIBE #1 NOTE: I, Sarahy Trammell, am scribing for, and in the presence of,  Dr. Harrell. I have scribed the following portions of the note - the APC attestation and the EKG reading.       History     Chief Complaint   Patient presents with    Shortness of Breath     hx chf     Patient is a 72-year-old male with a past medical history of MDS (receiving Aranesp),  A. fib, CHF, AS, carotid artery disease status post , HTN, HLD who presents the ED with worsening SOB.  Patient states for the past 2 weeks his SOB has been progressively worse.  He normally goes to the gym daily, but now can only walk to from the front to the back of the house before developing significant SOB.  He endorses onset of cough.  No chest pain.  His lower extremity swelling is worse than usual and has noticed abdominal distention. Despite increasing his diuretics, he reports urine decrease. No dysuria or hematuria. He recently increase metoprolol to 100 mg.          Review of patient's allergies indicates:   Allergen Reactions    Lipitor [atorvastatin]      Muscle pain    Lisinopril Edema     Cheek swelling    Augmentin [amoxicillin-pot clavulanate] Rash     Past Medical History:   Diagnosis Date    Aortic valve stenosis, mild     secondary to bicuspid aortic alve    Atrial fibrillation     Carotid artery disease     Left CEA 13    Depression     DJD (degenerative joint disease)     H/O echocardiogram 2016    EF 55-60%. Diastolic dysfunction. PASP 39. mod AS with JEFF 1.18    History of [facility] hospitalization     [facility] , [facility]     Hyperlipidemia     Hypertension     MDS (myelodysplastic syndrome)     s/p Chemo     Therapy     [facility]      Past Surgical History:   Procedure Laterality Date    BONE MARROW BIOPSY  2016    CAROTID ENDARTERECTOMY Left     Inguinal hernia      Left    KNEE SURGERY      Right x2    neck fusion       TONSILLECTOMY       Family History   Problem Relation Age of Onset    Hyperlipidemia Brother      Social History   Substance Use Topics    Smoking status: Never Smoker    Smokeless tobacco: Never Used    Alcohol use No     Review of Systems   Constitutional: Positive for activity change. Negative for chills and fever.   HENT: Negative for ear pain and sore throat.    Eyes: Negative for visual disturbance.   Respiratory: Positive for cough and shortness of breath.    Cardiovascular: Positive for palpitations. Negative for chest pain and leg swelling.   Gastrointestinal: Positive for abdominal distention. Negative for abdominal pain, blood in stool, constipation, nausea and vomiting.   Endocrine: Negative for polyuria.   Genitourinary: Positive for decreased urine volume. Negative for dysuria and urgency.   Musculoskeletal: Negative for back pain and neck pain.   Skin: Negative for rash.   Neurological: Negative for weakness and headaches.   Hematological: Does not bruise/bleed easily.       Physical Exam     Initial Vitals [07/01/17 0815]   BP Pulse Resp Temp SpO2   (!) 143/75 (!) 120 (!) 24 98.2 °F (36.8 °C) --      MAP       97.67         Physical Exam    Vitals reviewed.  Constitutional: He appears well-developed and well-nourished.   HENT:   Head: Normocephalic and atraumatic.   Nose: Nose normal.   Eyes: Conjunctivae and EOM are normal.   Neck: Normal range of motion.   Cardiovascular: Normal heart sounds. An irregularly irregular rhythm present. Tachycardia present.  Exam reveals no friction rub.    No murmur heard.  1+ pitting edema to mid shin bilaterally. No calf tenderness.   Pulmonary/Chest: No respiratory distress. He has no wheezes. He has rales in the right lower field.   Abdominal: Soft. Bowel sounds are normal. He exhibits no distension. There is no tenderness.   Musculoskeletal: Normal range of motion.   Neurological: He is alert and oriented to person, place, and time. He has normal strength. No  sensory deficit.   Skin: Skin is warm and dry. No erythema.   Port-o-cath to right chest without signs of infection.   Psychiatric: He has a normal mood and affect. Thought content normal.         ED Course   Procedures  Labs Reviewed   CBC W/ AUTO DIFFERENTIAL - Abnormal; Notable for the following:        Result Value    WBC 3.62 (*)     RBC 3.51 (*)     Hemoglobin 10.2 (*)     Hematocrit 33.3 (*)     MCHC 30.6 (*)     RDW 17.3 (*)     Lymph # 0.9 (*)     Mono # 0.0 (*)     Gran% 74.3 (*)     Mono% 0.6 (*)     All other components within normal limits   B-TYPE NATRIURETIC PEPTIDE - Abnormal; Notable for the following:      (*)     All other components within normal limits   COMPREHENSIVE METABOLIC PANEL - Abnormal; Notable for the following:     Sodium 133 (*)     CO2 18 (*)     Glucose 137 (*)     BUN, Bld 28 (*)     Albumin 3.0 (*)     Total Bilirubin 2.2 (*)      (*)      (*)     All other components within normal limits   PROTIME-INR - Abnormal; Notable for the following:     Prothrombin Time 16.7 (*)     INR 1.6 (*)     All other components within normal limits   TROPONIN I   URINALYSIS, REFLEX TO URINE CULTURE    Narrative:     Preferred Collection Type->Urine, Clean Catch   PROTIME-INR     EKG Readings: (Independently Interpreted)   Afib with RVR, rate of 112, some abberantly conducted premature beats, non-specific st/t changes, no STEMI,  no significant change from previous EKG on 6/22.           Medical Decision Making:   History:   Old Medical Records: I decided to obtain old medical records.  Independently Interpreted Test(s):   I have ordered and independently interpreted EKG Reading(s) - see prior notes  Clinical Tests:   Lab Tests: Ordered and Reviewed  Radiological Study: Ordered and Reviewed  Medical Tests: Ordered and Reviewed    Imaging Results          X-Ray Chest AP Portable (Final result)  Result time 07/01/17 08:53:51    Final result by Tyler Perez MD (07/01/17  08:53:51)                 Impression:     See above      Electronically signed by: Tyler Perez MD  Date:     07/01/17  Time:    08:53              Narrative:    Central venous catheter in the SVC.  Mild cardiomegaly.  Accentuation interstitial markings.  No significant airspace consolidation or pleural effusion identified                                 APC / Resident Notes:   DDx includes but is not limited to ACS, heart failure, electrolyte abnormalities, pneumonia, viral URI syndrome.  Will order labs and imaging and reassess. Will give lasix 40 mg IV.    ECG with afib with rvr at 121.   UA with no signs of infection.  CBC with RBC increase from last to 3.51.  H/H stable.  Negative troponin at 0.024.  BNP elevated to 617.  Chest x-ray with mild cardiomegaly and accentuation interstitial markings.  No significant airspace consolidation or pleural effusion identified.  Central catheter in the SVC.    Given patients comorbidities and current presentation, patient will be admitted to hospital medicine for further evaluation and management of acute on chronic CHF.         Scribe Attestation:   Scribe #1: I performed the above scribed service and the documentation accurately describes the services I performed. I attest to the accuracy of the note.    Attending Attestation:     Physician Attestation Statement for NP/PA:   I have conducted a face to face encounter with this patient in addition to the NP/PA, due to Medical Complexity    Other NP/PA Attestation Additions:    History of Present Illness: Agree with hx per APC. Pt also reports indigestion concerning for postprandial  angina.   Physical Exam: Skin: warm, dry, no pallor. Eyes: conjunctiva clear, PERRL, and EOMI. HENT: pharynx clear, moist mucous membranes. Neck: supple, possible JVD to angle of the jaw sitting upright.  Lungs: positive rales at the right base, otherwise clear. Cardio: rapid rate, irregularly irregular rhythm, no murmur or gallop. Abdomen:  distended but non-tender, active bowel sounds. MSK: extremities with 2-3+ pitting edema up to the knees. DP pulses 2 +.  Neuro: alert and oriented, cranial nerves intact, gross motor and sensory intact.    Medical Decision Making: Initial considerations based upon present complaint include but are not limited to: CHF, metabolic abnormality, cardia dysrhythmia, side effect of medication (pt recently placed on metoprolol), and ACS.        Physician Attestation for Scribe:  Physician Attestation Statement for Scribe #1: I, Dr. Harrell, reviewed documentation, as scribed by Sarahy Trammell in my presence, and it is both accurate and complete.                 ED Course     Clinical Impression:   The primary encounter diagnosis was Acute on chronic congestive heart failure, unspecified congestive heart failure type. Diagnoses of MDS (myelodysplastic syndrome), Atrial fibrillation, unspecified type, Nonrheumatic aortic valve stenosis, and Acute on chronic diastolic congestive heart failure were also pertinent to this visit.    Disposition:   Disposition: Admitted                        Madeline Coyle PA-C  07/01/17 1627       Zeke Harrell MD  07/03/17 1800

## 2017-07-01 NOTE — ED NOTES
Patient identifiers verified and correct for Wil Kwon . Pt is connected to cardiac monitor.     LOC: The patient is awake, alert and aware of environment with an appropriate affect, the patient is oriented x 3 and speaking appropriately.  APPEARANCE: Patient resting comfortably and in no acute distress, patient is clean and well groomed, patient's clothing is properly fastened.  SKIN: The skin is warm and dry, color consistent with ethnicity, patient has decreased skin turgor and moist mucus membranes, skin intact, no breakdown or bruising noted.  MUSCULOSKELETAL: Patient moving all extremities spontaneously, no obvious swelling or deformities noted.  RESPIRATORY: Airway is open and patent, respirations are spontaneous, patient has a normal effort and rate, no accessory muscle use noted, bilateral breath sounds clear in upper lobes - crackles noted to right lower lobe.  +JVD  CARDIAC: Patient has a increased rate and irregular rhythm (afib), +1-2 pitting periphreal edema noted to bonifacio lower extr , capillary refill < 3 seconds.  ABDOMEN: Soft and mildly  tender to palpation upper left side, slight abdominal distention noted, normoactive bowel sounds present in all four quadrants.  NEUROLOGIC: PERRL, 3 mm bilaterally, eyes open spontaneously, behavior appropriate to situation, follows commands, facial expression symmetrical, bilateral hand grasp equal and even, purposeful motor response noted, normal sensation in all extremities when touched with a finger.

## 2017-07-01 NOTE — HPI
Wil Kwon Jr. is a 72 y.o. male with pmhx of MDS, HFpEF, HTN, Afib on warfarin presented to ED with c/o 1 week of worsening abdominal distension, lower leg swelling, decreased UOP, and 2-3 days of SOB with ambulating. Pt was evaluated in Cardiology Clinic on 6/22/17 and started on metoprolol 100 mg QD for afib with RVR. Pt being followed by Dr. Rader for MDS, where he received  5 cycles of chemotherapy ending 3/2017.  Pt states Dr. Rader has been acting as his PCP since that time and was started on 20 mg BID Lasix.  Pt also reports, recently decreasing his oral fluid intake after reading an article on WebMD. On presentation to emergency dept, pt received 40 mg Lasix injection with good UOP.

## 2017-07-01 NOTE — ASSESSMENT & PLAN NOTE
· S/p 5 cycles of chemotherapy.  Pt of Dr. Rader.   · Stable.   · Continue to monitor. Will if necessary transfuse for hgb <7 or plt <10.

## 2017-07-01 NOTE — SUBJECTIVE & OBJECTIVE
Past Medical History:   Diagnosis Date    Aortic valve stenosis, mild     secondary to bicuspid aortic alve    Atrial fibrillation     Carotid artery disease     Left CEA 4/9/13    Depression     DJD (degenerative joint disease)     H/O echocardiogram 04/13/2016    EF 55-60%. Diastolic dysfunction. PASP 39. mod AS with JEFF 1.18    History of psychiatric hospitalization     [facility] 2014, [facility] 1993    Hyperlipidemia     Hypertension     MDS (myelodysplastic syndrome) 2013    s/p Chemo     Therapy     [facility]        Past Surgical History:   Procedure Laterality Date    BONE MARROW BIOPSY  08/29/2016    CAROTID ENDARTERECTOMY Left     Inguinal hernia      Left    KNEE SURGERY      Right x2    neck fusion      TONSILLECTOMY         Review of patient's allergies indicates:   Allergen Reactions    Lipitor [atorvastatin]      Muscle pain    Lisinopril Edema     Cheek swelling    Augmentin [amoxicillin-pot clavulanate] Rash       No current facility-administered medications on file prior to encounter.      Current Outpatient Prescriptions on File Prior to Encounter   Medication Sig    citalopram (CELEXA) 40 MG tablet TAKE 1 TABLET BY MOUTH DAILY (Patient taking differently: TAKE 1 TABLET BY MOUTH DAILY (am))    diltiaZEM (CARDIZEM CD) 300 MG 24 hr capsule TAKE ONE CAPSULE BY MOUTH EVERY DAY    furosemide (LASIX) 20 MG tablet Take 1 tablet (20 mg total) by mouth 2 (two) times daily.    metoprolol succinate (TOPROL-XL) 100 MG 24 hr tablet Take 1 tablet (100 mg total) by mouth once daily.    multivitamin capsule Take 1 capsule by mouth every morning.     trazodone (DESYREL) 100 MG tablet Take 1 tablet (100 mg total) by mouth every evening.    warfarin (COUMADIN) 3 MG tablet Take 1 tablet (3 mg total) by mouth Daily.    [DISCONTINUED] acetaminophen (TYLENOL) 325 MG tablet Take 325 mg by mouth as needed for Pain.    [DISCONTINUED] omega-3 fatty acids-vitamin E (FISH  OIL) 1,000 mg Cap Take 3 capsules by mouth once daily.     nystatin (MYCOSTATIN) ointment Apply topically 3 (three) times daily.     Family History     Problem Relation (Age of Onset)    Hyperlipidemia Brother        Social History Main Topics    Smoking status: Never Smoker    Smokeless tobacco: Never Used    Alcohol use No    Drug use: No    Sexual activity: Yes     Partners: Female     Review of Systems   Constitutional: Negative for chills and fever.   HENT: Negative for congestion and rhinorrhea.    Eyes: Negative for pain and redness.   Respiratory: Positive for shortness of breath (with ambulation). Negative for cough.    Cardiovascular: Positive for leg swelling. Negative for chest pain.   Gastrointestinal: Positive for abdominal distention. Negative for abdominal pain, constipation, diarrhea, nausea and vomiting.   Genitourinary: Positive for decreased urine volume. Negative for dysuria and hematuria.   Musculoskeletal: Negative for arthralgias and myalgias.   Skin: Negative for rash and wound.   Neurological: Negative for dizziness and headaches.     Objective:     Vital Signs (Most Recent):  Temp: 98.2 °F (36.8 °C) (07/01/17 0815)  Pulse: 105 (07/01/17 1101)  Resp: 12 (07/01/17 1101)  BP: 137/75 (07/01/17 1101)  SpO2: 99 % (07/01/17 1101) Vital Signs (24h Range):  Temp:  [98.2 °F (36.8 °C)] 98.2 °F (36.8 °C)  Pulse:  [105-120] 105  Resp:  [12-24] 12  SpO2:  [98 %-100 %] 99 %  BP: (131-150)/(75-94) 137/75     Weight: 83.5 kg (184 lb)  Body mass index is 25.66 kg/m².    Physical Exam   Constitutional: He is oriented to person, place, and time. He appears well-developed and well-nourished. No distress.   HENT:   Head: Normocephalic and atraumatic.   Eyes: Conjunctivae and EOM are normal. Pupils are equal, round, and reactive to light.   Neck: Normal range of motion. Neck supple. JVD (to ear) present.   Cardiovascular: An irregularly irregular rhythm present. Tachycardia present.  Exam reveals no gallop  and no friction rub.    No murmur heard.  Pulmonary/Chest: Effort normal. He has no wheezes. He has rhonchi in the right lower field. He has no rales.   Port noted right upper chest, c/d/i   Abdominal: Soft. Bowel sounds are normal. He exhibits distension. There is no tenderness.   Musculoskeletal: Normal range of motion. He exhibits edema (1+ to shins b/l).   Neurological: He is alert and oriented to person, place, and time.   Skin: Skin is warm and dry. Capillary refill takes less than 2 seconds.   Nursing note and vitals reviewed.       Significant Labs:     Recent Labs  Lab 06/28/17  1100 07/01/17  0900   WBC 4.47 3.62*   HGB 9.7* 10.2*   HCT 31.6* 33.3*    154       Recent Labs  Lab 07/01/17  0959   *   K 4.1      CO2 18*   BUN 28*   CREATININE 1.2   CALCIUM 8.8   PROT 7.9   BILITOT 2.2*   ALKPHOS 113   *   *     BNP    Recent Labs  Lab 07/01/17  0900   *     Lab Results   Component Value Date    INR 1.6 (H) 07/01/2017    INR 1.5 (H) 06/28/2017    INR 1.3 (H) 06/21/2017         Significant Imaging:   CXR 07/01/2017   Central venous catheter in the SVC.  Mild cardiomegaly.  Accentuation interstitial markings.  No significant airspace consolidation or pleural effusion identified

## 2017-07-01 NOTE — H&P
[facility] Hospital Medicine  History & Physical    Patient Name: Wil Kwon Jr.  MRN: [id number]  Admission Date: 7/1/2017  Attending Physician: Mark Barry MD   Primary Care Provider: LAILA Serra MD    [facility] Medicine Team: [facility] MED 2 Marcelina Sotomayor MD     Patient information was obtained from patient, past medical records and ER records.     Subjective:     Principal Problem:Acute on chronic diastolic congestive heart failure    Chief Complaint:   Chief Complaint   Patient presents with    Shortness of Breath     hx chf        HPI: Wil Kwon Jr. is a 72 y.o. male with pmhx of MDS, HFpEF, HTN, Afib on warfarin presented to ED with c/o 1 week of worsening abdominal distension, lower leg swelling, decreased UOP, and 2-3 days of SOB with ambulating. Pt was evaluated in Cardiology Clinic on 6/22/17 and started on metoprolol 100 mg QD for afib with RVR. Pt being followed by Dr. Rader for MDS, where he received  5 cycles of chemotherapy ending 3/2017.  Pt states Dr. Rader has been acting as his PCP since that time and was started on 20 mg BID Lasix.  Pt also reports, recently decreasing his oral fluid intake after reading an article on WebMD. On presentation to emergency dept, pt received 40 mg Lasix injection with good UOP.      Past Medical History:   Diagnosis Date    Aortic valve stenosis, mild     secondary to bicuspid aortic alve    Atrial fibrillation     Carotid artery disease     Left CEA 4/9/13    Depression     DJD (degenerative joint disease)     H/O echocardiogram 04/13/2016    EF 55-60%. Diastolic dysfunction. PASP 39. mod AS with JEFF 1.18    History of psychiatric hospitalization     [facility] 2014, [facility] 1993    Hyperlipidemia     Hypertension     MDS (myelodysplastic syndrome) 2013    s/p Chemo     Therapy     [facility]        Past Surgical History:   Procedure Laterality Date    BONE MARROW BIOPSY   08/29/2016    CAROTID ENDARTERECTOMY Left     Inguinal hernia      Left    KNEE SURGERY      Right x2    neck fusion      TONSILLECTOMY         Review of patient's allergies indicates:   Allergen Reactions    Lipitor [atorvastatin]      Muscle pain    Lisinopril Edema     Cheek swelling    Augmentin [amoxicillin-pot clavulanate] Rash       No current facility-administered medications on file prior to encounter.      Current Outpatient Prescriptions on File Prior to Encounter   Medication Sig    citalopram (CELEXA) 40 MG tablet TAKE 1 TABLET BY MOUTH DAILY (Patient taking differently: TAKE 1 TABLET BY MOUTH DAILY (am))    diltiaZEM (CARDIZEM CD) 300 MG 24 hr capsule TAKE ONE CAPSULE BY MOUTH EVERY DAY    furosemide (LASIX) 20 MG tablet Take 1 tablet (20 mg total) by mouth 2 (two) times daily.    metoprolol succinate (TOPROL-XL) 100 MG 24 hr tablet Take 1 tablet (100 mg total) by mouth once daily.    multivitamin capsule Take 1 capsule by mouth every morning.     trazodone (DESYREL) 100 MG tablet Take 1 tablet (100 mg total) by mouth every evening.    warfarin (COUMADIN) 3 MG tablet Take 1 tablet (3 mg total) by mouth Daily.    [DISCONTINUED] acetaminophen (TYLENOL) 325 MG tablet Take 325 mg by mouth as needed for Pain.    [DISCONTINUED] omega-3 fatty acids-vitamin E (FISH OIL) 1,000 mg Cap Take 3 capsules by mouth once daily.     nystatin (MYCOSTATIN) ointment Apply topically 3 (three) times daily.     Family History     Problem Relation (Age of Onset)    Hyperlipidemia Brother        Social History Main Topics    Smoking status: Never Smoker    Smokeless tobacco: Never Used    Alcohol use No    Drug use: No    Sexual activity: Yes     Partners: Female     Review of Systems   Constitutional: Negative for chills and fever.   HENT: Negative for congestion and rhinorrhea.    Eyes: Negative for pain and redness.   Respiratory: Positive for shortness of breath (with ambulation). Negative for cough.     Cardiovascular: Positive for leg swelling. Negative for chest pain.   Gastrointestinal: Positive for abdominal distention. Negative for abdominal pain, constipation, diarrhea, nausea and vomiting.   Genitourinary: Positive for decreased urine volume. Negative for dysuria and hematuria.   Musculoskeletal: Negative for arthralgias and myalgias.   Skin: Negative for rash and wound.   Neurological: Negative for dizziness and headaches.     Objective:     Vital Signs (Most Recent):  Temp: 98.2 °F (36.8 °C) (07/01/17 0815)  Pulse: 105 (07/01/17 1101)  Resp: 12 (07/01/17 1101)  BP: 137/75 (07/01/17 1101)  SpO2: 99 % (07/01/17 1101) Vital Signs (24h Range):  Temp:  [98.2 °F (36.8 °C)] 98.2 °F (36.8 °C)  Pulse:  [105-120] 105  Resp:  [12-24] 12  SpO2:  [98 %-100 %] 99 %  BP: (131-150)/(75-94) 137/75     Weight: 83.5 kg (184 lb)  Body mass index is 25.66 kg/m².    Physical Exam   Constitutional: He is oriented to person, place, and time. He appears well-developed and well-nourished. No distress.   HENT:   Head: Normocephalic and atraumatic.   Eyes: Conjunctivae and EOM are normal. Pupils are equal, round, and reactive to light.   Neck: Normal range of motion. Neck supple. JVD (to ear) present.   Cardiovascular: An irregularly irregular rhythm present. Tachycardia present.  Exam reveals no gallop and no friction rub.    No murmur heard.  Pulmonary/Chest: Effort normal. He has no wheezes. He has rhonchi in the right lower field. He has no rales.   Port noted right upper chest, c/d/i   Abdominal: Soft. Bowel sounds are normal. He exhibits distension. There is no tenderness.   Musculoskeletal: Normal range of motion. He exhibits edema (1+ to shins b/l).   Neurological: He is alert and oriented to person, place, and time.   Skin: Skin is warm and dry. Capillary refill takes less than 2 seconds.   Nursing note and vitals reviewed.       Significant Labs:     Recent Labs  Lab 06/28/17  1100 07/01/17  0900   WBC 4.47 3.62*   HGB  9.7* 10.2*   HCT 31.6* 33.3*    154       Recent Labs  Lab 07/01/17  0959   *   K 4.1      CO2 18*   BUN 28*   CREATININE 1.2   CALCIUM 8.8   PROT 7.9   BILITOT 2.2*   ALKPHOS 113   *   *     BNP    Recent Labs  Lab 07/01/17  0900   *     Lab Results   Component Value Date    INR 1.6 (H) 07/01/2017    INR 1.5 (H) 06/28/2017    INR 1.3 (H) 06/21/2017         Significant Imaging:   CXR 07/01/2017   Central venous catheter in the SVC.  Mild cardiomegaly.  Accentuation interstitial markings.  No significant airspace consolidation or pleural effusion identified      Assessment/Plan:     Atrial fibrillation with RVR    · Continue with home metoprolol and diltiazem   · Rate currently 100s but BP ok  · Cardiology consulted, recs appreciated  · Can consider DCCV after KIMBERLY after diuresis   · Currently anticoagulated on warfarin, INR 1.5  · Currently subtherapeutic, continue to monitor. W/d with pharmacy.   · Will avoid DOAC in setting of MDS for reversibility   · [id number] score 4  · HAS-BLED score 1        Insomnia    · C/w home trazodone PRN nocte          MDS (myelodysplastic syndrome)    · S/p 5 cycles of chemotherapy.  Pt of Dr. Rader.   · Stable.   · Continue to monitor. Will if necessary transfuse for hgb <7 or plt <10.         Depression    · C/w home citalopram           Essential hypertension    · Continue with home metoprolol and diltiazem           * Acute on chronic diastolic congestive heart failure    · Elevated BNP, elevated JVD, pitting edema on exam  · Last ECHO 6/23/17 showed diastolic dysfunction with EF 53%. Moderate mitral regurg, moderate aortic stenosis, and severe tricuspid regurg.   · Received 40 mg IV furosemide in ED with good response.   · Will provide 40 mg BID IV furosemide  · Additional 40 mg IV furosemide now   · Strict I/Os, daily weights, cardiac diet with 2L fluid restriction  · Will monitor lytes daily and this afternoon while aggressively  diuresing  · Consult heart failure nurse to set up Home Health services          VTE Risk Mitigation         Ordered     warfarin tablet 3 mg  Daily     Route:  Oral        07/01/17 1155     Reason for No Pharmacological VTE Prophylaxis  Once      07/01/17 1215     High Risk of VTE  Once      07/01/17 1215     Place TAMMIE hose  Until discontinued      07/01/17 1215        Marcelina Sotomayor MD  Department of Hospital Medicine   [facility]

## 2017-07-01 NOTE — ASSESSMENT & PLAN NOTE
· Elevated BNP, elevated JVD, pitting edema on exam  · Last ECHO 6/23/17 showed diastolic dysfunction with EF 53%. Moderate mitral regurg, moderate aortic stenosis, and severe tricuspid regurg.   · Received 40 mg IV furosemide in ED with good response.   · Will provide 40 mg BID IV furosemide  · Additional 40 mg IV furosemide now   · Strict I/Os, daily weights, cardiac diet with 2L fluid restriction  · Will monitor lytes daily and this afternoon while aggressively diuresing  · Consult heart failure nurse to set up Home Health services

## 2017-07-01 NOTE — HOSPITAL COURSE
7/1 - Admitted to Hospital Medicine   7/2 - NAEON. Net (-2955 ml) since admission. Continue diuresis.   7/3 - NAEON. Net (-5100 ml). Replete K+. Continue diuresis.  Plan for dispo today with close cardiology f/u.

## 2017-07-02 LAB
ANION GAP SERPL CALC-SCNC: 11 MMOL/L
ANION GAP SERPL CALC-SCNC: 9 MMOL/L
ANISOCYTOSIS BLD QL SMEAR: SLIGHT
BASOPHILS # BLD AUTO: 0.01 K/UL
BASOPHILS NFR BLD: 0.2 %
BUN SERPL-MCNC: 26 MG/DL
BUN SERPL-MCNC: 26 MG/DL
CALCIUM SERPL-MCNC: 8.5 MG/DL
CALCIUM SERPL-MCNC: 8.9 MG/DL
CHLORIDE SERPL-SCNC: 102 MMOL/L
CHLORIDE SERPL-SCNC: 97 MMOL/L
CO2 SERPL-SCNC: 25 MMOL/L
CO2 SERPL-SCNC: 27 MMOL/L
CREAT SERPL-MCNC: 1.1 MG/DL
CREAT SERPL-MCNC: 1.2 MG/DL
DIFFERENTIAL METHOD: ABNORMAL
EOSINOPHIL # BLD AUTO: 0 K/UL
EOSINOPHIL NFR BLD: 0 %
ERYTHROCYTE [DISTWIDTH] IN BLOOD BY AUTOMATED COUNT: 17.3 %
EST. GFR  (AFRICAN AMERICAN): >60 ML/MIN/1.73 M^2
EST. GFR  (AFRICAN AMERICAN): >60 ML/MIN/1.73 M^2
EST. GFR  (NON AFRICAN AMERICAN): >60 ML/MIN/1.73 M^2
EST. GFR  (NON AFRICAN AMERICAN): >60 ML/MIN/1.73 M^2
GIANT PLATELETS BLD QL SMEAR: PRESENT
GLUCOSE SERPL-MCNC: 108 MG/DL
GLUCOSE SERPL-MCNC: 91 MG/DL
HCT VFR BLD AUTO: 31.5 %
HGB BLD-MCNC: 9.6 G/DL
HYPOCHROMIA BLD QL SMEAR: ABNORMAL
INR PPP: 1.6
LYMPHOCYTES # BLD AUTO: 1.1 K/UL
LYMPHOCYTES NFR BLD: 20.7 %
MAGNESIUM SERPL-MCNC: 1.8 MG/DL
MAGNESIUM SERPL-MCNC: 1.9 MG/DL
MCH RBC QN AUTO: 28.8 PG
MCHC RBC AUTO-ENTMCNC: 30.5 %
MCV RBC AUTO: 95 FL
MONOCYTES # BLD AUTO: 0.1 K/UL
MONOCYTES NFR BLD: 2.3 %
NEUTROPHILS # BLD AUTO: 3.9 K/UL
NEUTROPHILS NFR BLD: 76.8 %
OVALOCYTES BLD QL SMEAR: ABNORMAL
PHOSPHATE SERPL-MCNC: 3.3 MG/DL
PLATELET # BLD AUTO: 154 K/UL
PMV BLD AUTO: ABNORMAL FL
POIKILOCYTOSIS BLD QL SMEAR: SLIGHT
POLYCHROMASIA BLD QL SMEAR: ABNORMAL
POTASSIUM SERPL-SCNC: 3.9 MMOL/L
POTASSIUM SERPL-SCNC: 4.2 MMOL/L
PROTHROMBIN TIME: 16.6 SEC
RBC # BLD AUTO: 3.33 M/UL
SODIUM SERPL-SCNC: 135 MMOL/L
SODIUM SERPL-SCNC: 136 MMOL/L
WBC # BLD AUTO: 5.13 K/UL

## 2017-07-02 PROCEDURE — 80048 BASIC METABOLIC PNL TOTAL CA: CPT | Mod: 91

## 2017-07-02 PROCEDURE — 85610 PROTHROMBIN TIME: CPT

## 2017-07-02 PROCEDURE — 25000003 PHARM REV CODE 250: Performed by: STUDENT IN AN ORGANIZED HEALTH CARE EDUCATION/TRAINING PROGRAM

## 2017-07-02 PROCEDURE — 84100 ASSAY OF PHOSPHORUS: CPT

## 2017-07-02 PROCEDURE — 85025 COMPLETE CBC W/AUTO DIFF WBC: CPT

## 2017-07-02 PROCEDURE — 63600175 PHARM REV CODE 636 W HCPCS: Performed by: STUDENT IN AN ORGANIZED HEALTH CARE EDUCATION/TRAINING PROGRAM

## 2017-07-02 PROCEDURE — 83735 ASSAY OF MAGNESIUM: CPT

## 2017-07-02 PROCEDURE — 99233 SBSQ HOSP IP/OBS HIGH 50: CPT | Mod: GC,,, | Performed by: HOSPITALIST

## 2017-07-02 PROCEDURE — 11000001 HC ACUTE MED/SURG PRIVATE ROOM

## 2017-07-02 PROCEDURE — 36415 COLL VENOUS BLD VENIPUNCTURE: CPT

## 2017-07-02 RX ORDER — FUROSEMIDE 10 MG/ML
80 INJECTION INTRAMUSCULAR; INTRAVENOUS 2 TIMES DAILY
Status: DISCONTINUED | OUTPATIENT
Start: 2017-07-02 | End: 2017-07-03 | Stop reason: HOSPADM

## 2017-07-02 RX ADMIN — THERA TABS 1 TABLET: TAB at 08:07

## 2017-07-02 RX ADMIN — FUROSEMIDE 80 MG: 10 INJECTION, SOLUTION INTRAVENOUS at 05:07

## 2017-07-02 RX ADMIN — Medication 3 ML: at 10:07

## 2017-07-02 RX ADMIN — FUROSEMIDE 80 MG: 10 INJECTION, SOLUTION INTRAVENOUS at 08:07

## 2017-07-02 RX ADMIN — Medication 3 ML: at 02:07

## 2017-07-02 RX ADMIN — METOPROLOL SUCCINATE 100 MG: 100 TABLET, EXTENDED RELEASE ORAL at 08:07

## 2017-07-02 RX ADMIN — CITALOPRAM HYDROBROMIDE 40 MG: 40 TABLET ORAL at 08:07

## 2017-07-02 RX ADMIN — TRAZODONE HYDROCHLORIDE 50 MG: 50 TABLET ORAL at 08:07

## 2017-07-02 RX ADMIN — WARFARIN SODIUM 7.5 MG: 7.5 TABLET ORAL at 05:07

## 2017-07-02 RX ADMIN — Medication 3 ML: at 06:07

## 2017-07-02 RX ADMIN — DILTIAZEM HYDROCHLORIDE 300 MG: 180 CAPSULE, EXTENDED RELEASE ORAL at 08:07

## 2017-07-02 NOTE — ASSESSMENT & PLAN NOTE
· Continue with home metoprolol and diltiazem   · Rate currently 100s but BP ok  · Cardiology consulted, recs appreciated  · Can consider DCCV after KIMBERLY after diuresis   · Currently anticoagulated on warfarin, INR 1.6  · Currently subtherapeutic, continue to monitor. W/d with pharmacy.   · Will avoid DOAC in setting of MDS for reversibility   · [id number] score 4  · HAS-BLED score 1

## 2017-07-02 NOTE — PROGRESS NOTES
[facility] Hospital Medicine  Progress Note    Patient Name: Wil Kwon Jr.  MRN: [id number]  Patient Class: IP- Inpatient   Admission Date: 7/1/2017  Length of Stay: 1 days  Attending Physician: Mark Barry MD  Primary Care Provider: LAILA Serra MD    [facility] Medicine Team: [place] [facility] MED 2 Marceilna Sotomayor MD    Subjective:     Principal Problem:Acute on chronic diastolic congestive heart failure    HPI:  Wil Kwon Jr. is a 72 y.o. male with pmhx of MDS, HFpEF, HTN, Afib on warfarin presented to ED with c/o 1 week of worsening abdominal distension, lower leg swelling, decreased UOP, and 2-3 days of SOB with ambulating. Pt was evaluated in Cardiology Clinic on 6/22/17 and started on metoprolol 100 mg QD for afib with RVR. Pt being followed by Dr. Rader for MDS, where he received  5 cycles of chemotherapy ending 3/2017.  Pt states Dr. Rader has been acting as his PCP since that time and was started on 20 mg BID Lasix.  Pt also reports, recently decreasing his oral fluid intake after reading an article on WebMD. On presentation to emergency dept, pt received 40 mg Lasix injection with good UOP.      Hospital Course:  7/1/17 - Admitted to Hospital Medicine   7/2/17 - NAEON. Net (-2955 ml) since admission. Continue diuresis.     Interval History: NAEON.  Denies any difficulty breathing or shortness of breath. Pt is urinating well.  Reports abdominal distension and lower extremity edema is improving.  Pt is concerned about making his cardiology appointment with Dr. Barrera scheduled for tomorrow at 8:30 am.      Review of Systems   Constitutional: Negative for chills and fever.   HENT: Negative for congestion and rhinorrhea.    Eyes: Negative for pain and redness.   Respiratory: Negative for cough and shortness of breath (with ambulation).    Cardiovascular: Positive for leg swelling. Negative for chest pain.   Gastrointestinal: Positive for abdominal distention. Negative  for abdominal pain, constipation, diarrhea, nausea and vomiting.   Genitourinary: Negative for decreased urine volume, dysuria and hematuria.   Musculoskeletal: Negative for arthralgias and myalgias.   Skin: Negative for rash and wound.   Neurological: Negative for dizziness and headaches.     Objective:     Vital Signs (Most Recent):  Temp: 98.2 °F (36.8 °C) (07/02/17 0759)  Pulse: 108 (07/02/17 0759)  Resp: 18 (07/02/17 0759)  BP: 131/80 (07/02/17 0759)  SpO2: (!) 94 % (07/02/17 0759) Vital Signs (24h Range):  Temp:  [97.6 °F (36.4 °C)-98.7 °F (37.1 °C)] 98.2 °F (36.8 °C)  Pulse:  [] 108  Resp:  [12-18] 18  SpO2:  [94 %-99 %] 94 %  BP: ()/(52-86) 131/80     Weight: 83.5 kg (184 lb)  Body mass index is 25.66 kg/m².    Intake/Output Summary (Last 24 hours) at 07/02/17 0844  Last data filed at 07/02/17 0619   Gross per 24 hour   Intake              820 ml   Output             3775 ml   Net            -2955 ml      Physical Exam   Constitutional: He is oriented to person, place, and time. He appears well-developed and well-nourished. No distress.   HENT:   Head: Normocephalic and atraumatic.   Eyes: Conjunctivae and EOM are normal. Pupils are equal, round, and reactive to light.   Neck: Normal range of motion. Neck supple. JVD: to ear.   Cardiovascular: An irregularly irregular rhythm present. Exam reveals no gallop and no friction rub.    No murmur heard.  Pulmonary/Chest: Effort normal. He has no wheezes. He has no rhonchi. He has no rales.   Port noted right upper chest, c/d/i   Abdominal: Soft. Bowel sounds are normal. He exhibits distension. There is no tenderness.   Musculoskeletal: Normal range of motion. He exhibits edema (1+ to shins b/l).   Neurological: He is alert and oriented to person, place, and time.   Skin: Skin is warm and dry. Capillary refill takes less than 2 seconds.   Nursing note and vitals reviewed.      Significant Labs:    Recent Labs  Lab 06/28/17  1100 07/01/17  0900  07/02/17  0608   WBC 4.47 3.62*  --    HGB 9.7* 10.2* 9.6*   HCT 31.6* 33.3* 31.5*    154  --          Recent Labs  Lab 07/01/17  0959 07/01/17  1740 07/02/17  0608   * 133* 136   K 4.1 4.1 3.9    103 102   CO2 18* 16* 25   BUN 28* 27* 26*   CREATININE 1.2 1.2 1.1   CALCIUM 8.8 8.7 8.5*   PROT 7.9  --   --    BILITOT 2.2*  --   --    ALKPHOS 113  --   --    *  --   --    *  --   --        Recent Labs  Lab 07/02/17  0608   MG 1.9       Recent Labs  Lab 07/02/17  0608      K 3.9      CO2 25   BUN 26*   CREATININE 1.1   CALCIUM 8.5*   PHOS 3.3       Recent Labs  Lab 06/28/17  1100 07/01/17  0959 07/02/17  0608   INR 1.5* 1.6* 1.6*         Significant Imaging   CXR: Central venous catheter in the SVC.  Mild cardiomegaly.  Accentuation interstitial markings.  No significant airspace consolidation or pleural effusion identified    Assessment/Plan:      Atrial fibrillation with RVR    · Continue with home metoprolol and diltiazem   · Rate currently 100s but BP ok  · Cardiology consulted, recs appreciated  · Can consider DCCV after KIMBERLY after diuresis   · Currently anticoagulated on warfarin, INR 1.6  · Currently subtherapeutic, continue to monitor. W/d with pharmacy.   · Will avoid DOAC in setting of MDS for reversibility   · [id number] score 4  · HAS-BLED score 1        Insomnia    · C/w home trazodone PRN nocte          MDS (myelodysplastic syndrome)    · S/p 5 cycles of chemotherapy.  Pt of Dr. Rader.   · Stable.   · Continue to monitor. Will if necessary transfuse for hgb <7 or plt <10.         Depression    · C/w home citalopram           Essential hypertension    · Continue with home metoprolol and diltiazem           * Acute on chronic diastolic congestive heart failure    · Elevated BNP, elevated JVD, pitting edema on initial exam  · Last ECHO 6/23/17 showed diastolic dysfunction with EF 53%. Moderate mitral regurg, moderate aortic stenosis, and severe tricuspid regurg.    · Received 40 mg IV furosemide in ED with good response.   · Will provide 40 mg BID IV furosemide  · Increased furosemide to 80 mg IV BID  · Strict I/Os, daily weights, cardiac diet with 2L fluid restriction  · Net negative (-2955 ml) since admission.   · Continue to monitor lytes  · Consult heart failure nurse to set up Home Health services  · Per Cardiology, valvular and diastolic CHF 2/2 Afib. Would benefit from improved rate control or DCCV.           VTE Risk Mitigation         Ordered     warfarin (COUMADIN) tablet 7.5 mg  Once     Route:  Oral        07/01/17 1341     warfarin (COUMADIN) tablet 7.5 mg  Daily     Route:  Oral        07/01/17 1341     Reason for No Pharmacological VTE Prophylaxis  Once      07/01/17 1215     High Risk of VTE  Once      07/01/17 1215     Place TAMMIE ramireze  Until discontinued      07/01/17 1215          Marcelina Sotomayor MD  Department of Hospital Medicine   [facility]

## 2017-07-02 NOTE — SUBJECTIVE & OBJECTIVE
Interval History: NAEON.  Denies any difficulty breathing or shortness of breath. Pt is urinating well.  Reports abdominal distension and lower extremity edema is improving.  Pt is concerned about making his cardiology appointment with Dr. Barrera scheduled for tomorrow at 8:30 am.      Review of Systems   Constitutional: Negative for chills and fever.   HENT: Negative for congestion and rhinorrhea.    Eyes: Negative for pain and redness.   Respiratory: Negative for cough and shortness of breath (with ambulation).    Cardiovascular: Positive for leg swelling. Negative for chest pain.   Gastrointestinal: Positive for abdominal distention. Negative for abdominal pain, constipation, diarrhea, nausea and vomiting.   Genitourinary: Negative for decreased urine volume, dysuria and hematuria.   Musculoskeletal: Negative for arthralgias and myalgias.   Skin: Negative for rash and wound.   Neurological: Negative for dizziness and headaches.     Objective:     Vital Signs (Most Recent):  Temp: 98.2 °F (36.8 °C) (07/02/17 0759)  Pulse: 108 (07/02/17 0759)  Resp: 18 (07/02/17 0759)  BP: 131/80 (07/02/17 0759)  SpO2: (!) 94 % (07/02/17 0759) Vital Signs (24h Range):  Temp:  [97.6 °F (36.4 °C)-98.7 °F (37.1 °C)] 98.2 °F (36.8 °C)  Pulse:  [] 108  Resp:  [12-18] 18  SpO2:  [94 %-99 %] 94 %  BP: ()/(52-86) 131/80     Weight: 83.5 kg (184 lb)  Body mass index is 25.66 kg/m².    Intake/Output Summary (Last 24 hours) at 07/02/17 0844  Last data filed at 07/02/17 0619   Gross per 24 hour   Intake              820 ml   Output             3775 ml   Net            -2955 ml      Physical Exam   Constitutional: He is oriented to person, place, and time. He appears well-developed and well-nourished. No distress.   HENT:   Head: Normocephalic and atraumatic.   Eyes: Conjunctivae and EOM are normal. Pupils are equal, round, and reactive to light.   Neck: Normal range of motion. Neck supple. JVD: to ear.   Cardiovascular: An  irregularly irregular rhythm present. Exam reveals no gallop and no friction rub.    No murmur heard.  Pulmonary/Chest: Effort normal. He has no wheezes. He has no rhonchi. He has no rales.   Port noted right upper chest, c/d/i   Abdominal: Soft. Bowel sounds are normal. He exhibits distension. There is no tenderness.   Musculoskeletal: Normal range of motion. He exhibits edema (1+ to shins b/l).   Neurological: He is alert and oriented to person, place, and time.   Skin: Skin is warm and dry. Capillary refill takes less than 2 seconds.   Nursing note and vitals reviewed.      Significant Labs:    Recent Labs  Lab 06/28/17  1100 07/01/17  0900 07/02/17  0608   WBC 4.47 3.62*  --    HGB 9.7* 10.2* 9.6*   HCT 31.6* 33.3* 31.5*    154  --          Recent Labs  Lab 07/01/17  0959 07/01/17  1740 07/02/17  0608   * 133* 136   K 4.1 4.1 3.9    103 102   CO2 18* 16* 25   BUN 28* 27* 26*   CREATININE 1.2 1.2 1.1   CALCIUM 8.8 8.7 8.5*   PROT 7.9  --   --    BILITOT 2.2*  --   --    ALKPHOS 113  --   --    *  --   --    *  --   --        Recent Labs  Lab 07/02/17  0608   MG 1.9       Recent Labs  Lab 07/02/17  0608      K 3.9      CO2 25   BUN 26*   CREATININE 1.1   CALCIUM 8.5*   PHOS 3.3       Recent Labs  Lab 06/28/17  1100 07/01/17  0959 07/02/17  0608   INR 1.5* 1.6* 1.6*         Significant Imaging   CXR: Central venous catheter in the SVC.  Mild cardiomegaly.  Accentuation interstitial markings.  No significant airspace consolidation or pleural effusion identified

## 2017-07-02 NOTE — PLAN OF CARE
Problem: Patient Care Overview  Goal: Plan of Care Review  Outcome: Ongoing (interventions implemented as appropriate)  Patient awake, alert, and oriented. Patient's vitals remain stable. Patient remains free from falls/injury throughout shift. No complaints of pain this shift. Will continue to monitor.

## 2017-07-02 NOTE — ASSESSMENT & PLAN NOTE
· Elevated BNP, elevated JVD, pitting edema on initial exam  · Last ECHO 6/23/17 showed diastolic dysfunction with EF 53%. Moderate mitral regurg, moderate aortic stenosis, and severe tricuspid regurg.   · Received 40 mg IV furosemide in ED with good response.   · Will provide 40 mg BID IV furosemide  · Increased furosemide to 80 mg IV BID  · Strict I/Os, daily weights, cardiac diet with 2L fluid restriction  · Net negative (-2955 ml) since admission.   · Continue to monitor lytes  · Consult heart failure nurse to set up Home Health services  · Per Cardiology, valvular and diastolic CHF 2/2 Afib. Would benefit from improved rate control or DCCV.

## 2017-07-03 VITALS
OXYGEN SATURATION: 93 % | HEART RATE: 108 BPM | DIASTOLIC BLOOD PRESSURE: 72 MMHG | TEMPERATURE: 98 F | SYSTOLIC BLOOD PRESSURE: 126 MMHG | RESPIRATION RATE: 18 BRPM | BODY MASS INDEX: 24.08 KG/M2 | WEIGHT: 172 LBS | HEIGHT: 71 IN

## 2017-07-03 LAB
ANION GAP SERPL CALC-SCNC: 13 MMOL/L
ANISOCYTOSIS BLD QL SMEAR: SLIGHT
BASOPHILS # BLD AUTO: 0.01 K/UL
BASOPHILS NFR BLD: 0.2 %
BUN SERPL-MCNC: 23 MG/DL
CALCIUM SERPL-MCNC: 8.3 MG/DL
CHLORIDE SERPL-SCNC: 98 MMOL/L
CO2 SERPL-SCNC: 23 MMOL/L
CREAT SERPL-MCNC: 1 MG/DL
DIFFERENTIAL METHOD: ABNORMAL
EOSINOPHIL # BLD AUTO: 0 K/UL
EOSINOPHIL NFR BLD: 0 %
ERYTHROCYTE [DISTWIDTH] IN BLOOD BY AUTOMATED COUNT: 16.9 %
EST. GFR  (AFRICAN AMERICAN): >60 ML/MIN/1.73 M^2
EST. GFR  (NON AFRICAN AMERICAN): >60 ML/MIN/1.73 M^2
GLUCOSE SERPL-MCNC: 80 MG/DL
HCT VFR BLD AUTO: 31.4 %
HGB BLD-MCNC: 9.7 G/DL
HYPOCHROMIA BLD QL SMEAR: ABNORMAL
INR PPP: 1.6
LYMPHOCYTES # BLD AUTO: 0.9 K/UL
LYMPHOCYTES NFR BLD: 19.6 %
MAGNESIUM SERPL-MCNC: 1.6 MG/DL
MCH RBC QN AUTO: 28.8 PG
MCHC RBC AUTO-ENTMCNC: 30.9 %
MCV RBC AUTO: 93 FL
MONOCYTES # BLD AUTO: 0.1 K/UL
MONOCYTES NFR BLD: 1.1 %
NEUTROPHILS # BLD AUTO: 3.6 K/UL
NEUTROPHILS NFR BLD: 79.1 %
OVALOCYTES BLD QL SMEAR: ABNORMAL
PHOSPHATE SERPL-MCNC: 3.4 MG/DL
PLATELET # BLD AUTO: 163 K/UL
PLATELET BLD QL SMEAR: ABNORMAL
PMV BLD AUTO: 12.8 FL
POIKILOCYTOSIS BLD QL SMEAR: SLIGHT
POLYCHROMASIA BLD QL SMEAR: ABNORMAL
POTASSIUM SERPL-SCNC: 3 MMOL/L
PROTHROMBIN TIME: 15.9 SEC
RBC # BLD AUTO: 3.37 M/UL
SODIUM SERPL-SCNC: 134 MMOL/L
WBC # BLD AUTO: 4.55 K/UL

## 2017-07-03 PROCEDURE — 84100 ASSAY OF PHOSPHORUS: CPT

## 2017-07-03 PROCEDURE — 85610 PROTHROMBIN TIME: CPT

## 2017-07-03 PROCEDURE — 99238 HOSP IP/OBS DSCHRG MGMT 30/<: CPT | Mod: GC,,, | Performed by: HOSPITALIST

## 2017-07-03 PROCEDURE — 25000003 PHARM REV CODE 250: Performed by: STUDENT IN AN ORGANIZED HEALTH CARE EDUCATION/TRAINING PROGRAM

## 2017-07-03 PROCEDURE — 36415 COLL VENOUS BLD VENIPUNCTURE: CPT

## 2017-07-03 PROCEDURE — 83735 ASSAY OF MAGNESIUM: CPT

## 2017-07-03 PROCEDURE — 80048 BASIC METABOLIC PNL TOTAL CA: CPT

## 2017-07-03 PROCEDURE — 63600175 PHARM REV CODE 636 W HCPCS: Performed by: STUDENT IN AN ORGANIZED HEALTH CARE EDUCATION/TRAINING PROGRAM

## 2017-07-03 PROCEDURE — 85025 COMPLETE CBC W/AUTO DIFF WBC: CPT

## 2017-07-03 RX ORDER — FUROSEMIDE 20 MG/1
40 TABLET ORAL 2 TIMES DAILY
Qty: 60 TABLET | Refills: 11 | Status: SHIPPED | OUTPATIENT
Start: 2017-07-03 | End: 2017-07-05 | Stop reason: SDUPTHER

## 2017-07-03 RX ADMIN — Medication 3 ML: at 06:07

## 2017-07-03 RX ADMIN — POTASSIUM BICARBONATE 50 MEQ: 25 TABLET, EFFERVESCENT ORAL at 12:07

## 2017-07-03 RX ADMIN — POTASSIUM BICARBONATE 50 MEQ: 25 TABLET, EFFERVESCENT ORAL at 09:07

## 2017-07-03 RX ADMIN — FUROSEMIDE 80 MG: 10 INJECTION, SOLUTION INTRAVENOUS at 10:07

## 2017-07-03 RX ADMIN — DILTIAZEM HYDROCHLORIDE 300 MG: 180 CAPSULE, EXTENDED RELEASE ORAL at 09:07

## 2017-07-03 RX ADMIN — THERA TABS 1 TABLET: TAB at 10:07

## 2017-07-03 RX ADMIN — CITALOPRAM HYDROBROMIDE 40 MG: 40 TABLET ORAL at 09:07

## 2017-07-03 RX ADMIN — METOPROLOL SUCCINATE 100 MG: 100 TABLET, EXTENDED RELEASE ORAL at 09:07

## 2017-07-03 NOTE — PROGRESS NOTES
[facility] Hospital Medicine  Progress Note    Patient Name: Wil Kwon Jr.  MRN: [id number]  Patient Class: IP- Inpatient   Admission Date: 7/1/2017  Length of Stay: 2 days  Attending Physician: Mark Barry MD  Primary Care Provider: LAILA Serra MD    [facility] Medicine Team: [facility] [place] [facility]OSP MED 2 Marcleina Sotomayor MD    Subjective:     Principal Problem:Acute on chronic diastolic congestive heart failure    HPI:  Wil Kwon Jr. is a 72 y.o. male with pmhx of MDS, HFpEF, HTN, Afib on warfarin presented to ED with c/o 1 week of worsening abdominal distension, lower leg swelling, decreased UOP, and 2-3 days of SOB with ambulating. Pt was evaluated in Cardiology Clinic on 6/22/17 and started on metoprolol 100 mg QD for afib with RVR. Pt being followed by Dr. Rader for MDS, where he received  5 cycles of chemotherapy ending 3/2017.  Pt states Dr. Rader has been acting as his PCP since that time and was started on 20 mg BID Lasix.  Pt also reports, recently decreasing his oral fluid intake after reading an article on WebMD. On presentation to emergency dept, pt received 40 mg Lasix injection with good UOP.      Hospital Course:  7/1 - Admitted to Hospital Medicine   7/2 - NAEON. Net (-2955 ml) since admission. Continue diuresis.   7/3 - NAEON. Net (-5100 ml). Replete K+. Continue diuresis.  Plan for dispo today with close cardiology f/u.     Interval History: NAEON.  Pt notes abdominal distention and leg swelling has resolved.  No chest pain, SOB, or difficulty breathing.      Review of Systems   Constitutional: Negative for chills and fever.   HENT: Negative for congestion and rhinorrhea.    Eyes: Negative for pain and redness.   Respiratory: Negative for cough and shortness of breath (with ambulation).    Cardiovascular: Negative for chest pain and leg swelling.   Gastrointestinal: Negative for abdominal distention, abdominal pain, constipation, diarrhea, nausea and vomiting.    Genitourinary: Negative for decreased urine volume, dysuria and hematuria.   Musculoskeletal: Negative for arthralgias and myalgias.   Skin: Negative for rash and wound.   Neurological: Negative for dizziness and headaches.     Objective:     Vital Signs (Most Recent):  Temp: 98.2 °F (36.8 °C) (07/03/17 0809)  Pulse: 109 (07/03/17 0809)  Resp: 18 (07/03/17 0809)  BP: 138/68 (07/03/17 0809)  SpO2: 99 % (07/03/17 0809) Vital Signs (24h Range):  Temp:  [97.8 °F (36.6 °C)-98.9 °F (37.2 °C)] 98.2 °F (36.8 °C)  Pulse:  [] 109  Resp:  [16-18] 18  SpO2:  [96 %-99 %] 99 %  BP: ()/(52-74) 138/68     Weight: 78 kg (172 lb)  Body mass index is 23.99 kg/m².    Intake/Output Summary (Last 24 hours) at 07/03/17 0839  Last data filed at 07/03/17 0600   Gross per 24 hour   Intake                0 ml   Output             5100 ml   Net            -5100 ml      Physical Exam   Constitutional: He is oriented to person, place, and time. He appears well-developed and well-nourished. No distress.   HENT:   Head: Normocephalic and atraumatic.   Eyes: Conjunctivae and EOM are normal. Pupils are equal, round, and reactive to light.   Neck: Normal range of motion. Neck supple. JVD: to ear.   Cardiovascular: An irregularly irregular rhythm present. Exam reveals no gallop and no friction rub.    No murmur heard.  Pulmonary/Chest: Effort normal. He has no wheezes. He has no rhonchi. He has no rales.   Port noted right upper chest, c/d/i   Abdominal: Soft. Bowel sounds are normal. He exhibits no distension. There is no tenderness.   Musculoskeletal: Normal range of motion. He exhibits edema (trace b/l LE).   Neurological: He is alert and oriented to person, place, and time.   Skin: Skin is warm and dry. Capillary refill takes less than 2 seconds.   Nursing note and vitals reviewed.      Significant Labs:     Recent Labs  Lab 07/01/17  0900 07/02/17  0608 07/03/17  0510   WBC 3.62* 5.13 4.55   HGB 10.2* 9.6* 9.7*   HCT 33.3* 31.5*  31.4*    154 163         Recent Labs  Lab 07/02/17  0608 07/02/17  1501 07/03/17  0510    135* 134*   K 3.9 4.2 3.0*    97 98   CO2 25 27 23   BUN 26* 26* 23   CREATININE 1.1 1.2 1.0   CALCIUM 8.5* 8.9 8.3*         Recent Labs  Lab 07/02/17  0608 07/02/17  1501   MG 1.9 1.8         Significant Imaging: No new imaging over last 24 hours.     Assessment/Plan:      Atrial fibrillation with RVR    · Continue with home metoprolol and diltiazem   · Rate currently 100s but BP ok  · Cardiology consulted, recs appreciated  · Can consider DCCV after KIMBERLY after diuresis   · Currently anticoagulated on warfarin, INR 1.6  · Currently subtherapeutic, continue to monitor. W/d with pharmacy.   · Will avoid DOAC in setting of MDS for reversibility   · [id number] score 4  · HAS-BLED score 1        Insomnia    · C/w home trazodone PRN nocte          MDS (myelodysplastic syndrome)    · S/p 5 cycles of chemotherapy.  Pt of Dr. Rader.   · Stable.   · Continue to monitor. Will if necessary transfuse for hgb <7 or plt <10.         Depression    · C/w home citalopram           Essential hypertension    · Continue with home metoprolol and diltiazem           * Acute on chronic diastolic congestive heart failure    · Elevated BNP, elevated JVD, pitting edema on initial exam  · Last ECHO 6/23/17 showed diastolic dysfunction with EF 53%. Moderate mitral regurg, moderate aortic stenosis, and severe tricuspid regurg.   · Received 40 mg IV furosemide in ED with good response.   · Will provide 40 mg BID IV furosemide  · Increased furosemide to 80 mg IV BID  · Strict I/Os, daily weights, cardiac diet with 2L fluid restriction  · Net negative (-8000 ml) since admission.   · Continue to monitor lytes  · Consult heart failure nurse to set up Home Health services  · Per Cardiology, valvular and diastolic CHF 2/2 Afib. Would benefit from improved rate control or DCCV. Appreciate recs.   · Plan for dispo today on furosemide 40 mg PO  BID.  F/u with outpatient cardiology on discharge.           VTE Risk Mitigation         Ordered     warfarin (COUMADIN) tablet 7.5 mg  Once     Route:  Oral        07/01/17 1341     Reason for No Pharmacological VTE Prophylaxis  Once      07/01/17 1215     High Risk of VTE  Once      07/01/17 1215     Place TAMMIE hose  Until discontinued      07/01/17 1215          Marcelina Sotomayor MD  Department of Hospital Medicine   [facility]

## 2017-07-03 NOTE — PLAN OF CARE
Problem: Patient Care Overview  Goal: Plan of Care Review  Outcome: Ongoing (interventions implemented as appropriate)  No acute events throughout shift. VS and assessment performed per orders. Patient progressing toward goals as tolerated. Plan of care reviewed with pt, all concerns addressed. Will continue to monitor

## 2017-07-03 NOTE — DISCHARGE SUMMARY
[facility] Hospital Medicine  Discharge Summary      Patient Name: Wil Kwon Jr.  MRN: [id number]  Admission Date: 7/1/2017  Hospital Length of Stay: 2 days  Discharge Date and Time:  07/03/2017 3:04 PM  Attending Physician: No att. providers found   Discharging Provider: Delilah Sotomayor MD  Primary Care Provider: LAILA Serra MD  [place] Medicine [place] [facility] [place] 2 Delilah Sotomayor MD    HPI:   Wil Kwon Jr. is a 72 y.o. male with pmhx of MDS, HFpEF, HTN, Afib on warfarin presented to ED with c/o 1 week of worsening abdominal distension, lower leg swelling, decreased UOP, and 2-3 days of SOB with ambulating. Pt was evaluated in Cardiology Clinic on 6/22/17 and started on metoprolol 100 mg QD for afib with RVR. Pt being followed by Dr. Rader for MDS, where he received  5 cycles of chemotherapy ending 3/2017.  Pt states Dr. Rader has been acting as his PCP since that time and was started on 20 mg BID Lasix.  Pt also reports, recently decreasing his oral fluid intake after reading an article on WebMD. On presentation to emergency dept, pt received 40 mg Lasix injection with good UOP.      * No surgery found *      Indwelling Lines/Drains at time of discharge:   Lines/Drains/Airways     Central Venous Catheter Line                 Port A Cath Single Lumen 03/07/17 1939 right subclavian 117 days              Hospital Course:   7/1 - Admitted to Hospital Medicine   7/2 - NAEON. Net (-2955 ml) since admission. Continue diuresis.   7/3 - NAEON. Net (-5100 ml). Replete K+. Continue diuresis.  Plan for dispo today with close cardiology f/u.      Consults:   Consults         Status Ordering Provider     Inpatient consult to Cardiac Teaching  Once     Provider:  (Not yet assigned)    Acknowledged DELILAH SOTOMAYOR          Significant Diagnostic Studies:     Recent Labs  Lab 07/01/17  0900 07/02/17  0608 07/03/17  0510   WBC 3.62* 5.13 4.55   HGB 10.2* 9.6* 9.7*   HCT 33.3*  31.5* 31.4*    154 163         Recent Labs  Lab 07/01/17  0959  07/02/17  0608 07/02/17  1501 07/03/17  0510   *  < > 136 135* 134*   K 4.1  < > 3.9 4.2 3.0*     < > 102 97 98   CO2 18*  < > 25 27 23   BUN 28*  < > 26* 26* 23   CREATININE 1.2  < > 1.1 1.2 1.0   CALCIUM 8.8  < > 8.5* 8.9 8.3*   PROT 7.9  --   --   --   --    BILITOT 2.2*  --   --   --   --    ALKPHOS 113  --   --   --   --    *  --   --   --   --    *  --   --   --   --    < > = values in this interval not displayed.    Recent Labs  Lab 07/02/17  0608 07/02/17  1501 07/03/17  0510   MG 1.9 1.8 1.6         Pending Diagnostic Studies:     None        Final Active Diagnoses:    Diagnosis Date Noted POA    PRINCIPAL PROBLEM:  Acute on chronic diastolic congestive heart failure [I50.33] 07/01/2017 Yes    Atrial fibrillation with RVR [I48.91] 03/11/2017 Yes    Insomnia [G47.00] 03/09/2017 Yes    MDS (myelodysplastic syndrome) [D46.9] 09/07/2016 Yes    Depression [F32.9] 08/17/2016 Yes    Essential hypertension [I10] 10/17/2012 Yes      Problems Resolved During this Admission:    Diagnosis Date Noted Date Resolved POA      Atrial fibrillation with RVR    · Continue with home metoprolol and diltiazem   · Rate currently 100s but BP ok  · Cardiology consulted, recs appreciated  · Can consider DCCV after KIMBERLY after diuresis   · Currently anticoagulated on warfarin, INR 1.6  · Currently subtherapeutic, continue to monitor. W/d with pharmacy.   · Will avoid DOAC in setting of MDS for reversibility   · [id number] score 4  · HAS-BLED score 1        Insomnia    · C/w home trazodone PRN nocte          MDS (myelodysplastic syndrome)    · S/p 5 cycles of chemotherapy.  Pt of Dr. Rader.   · Stable.   · Continue to monitor. Will if necessary transfuse for hgb <7 or plt <10.         Depression    · C/w home citalopram           Essential hypertension    · Continue with home metoprolol and diltiazem           * Acute on chronic  diastolic congestive heart failure    · Elevated BNP, elevated JVD, pitting edema on initial exam  · Last ECHO 6/23/17 showed diastolic dysfunction with EF 53%. Moderate mitral regurg, moderate aortic stenosis, and severe tricuspid regurg.   · Received 40 mg IV furosemide in ED with good response.   · Will provide 40 mg BID IV furosemide  · Increased furosemide to 80 mg IV BID  · Strict I/Os, daily weights, cardiac diet with 2L fluid restriction  · Net negative (-8000 ml) since admission.   · Continue to monitor lytes  · Consult heart failure nurse to set up Home Health services  · Per Cardiology, valvular and diastolic CHF 2/2 Afib. Would benefit from improved rate control or DCCV. Appreciate recs.   · Plan for dispo today on furosemide 40 mg PO BID.  F/u with outpatient cardiology on discharge.             Discharged Condition: good    Disposition: Home or Self Care    Follow Up:  Follow-up Information     Schedule an appointment as soon as possible for a visit with [facility] CARDIOLOGY.    Specialty:  Cardiology  Contact information:  [address] [place] [address] [id number] [phone]               Patient Instructions:   No discharge procedures on file.  Medications:  Reconciled Home Medications:   Discharge Medication List as of 7/3/2017  1:19 PM      CONTINUE these medications which have CHANGED    Details   furosemide (LASIX) 20 MG tablet Take 2 tablets (40 mg total) by mouth 2 (two) times daily., Starting Mon 7/3/2017, Until Tue 7/3/2018, Normal         CONTINUE these medications which have NOT CHANGED    Details   citalopram (CELEXA) 40 MG tablet TAKE 1 TABLET BY MOUTH DAILY, Normal      diltiaZEM (CARDIZEM CD) 300 MG 24 hr capsule TAKE ONE CAPSULE BY MOUTH EVERY DAY, Normal      metoprolol succinate (TOPROL-XL) 100 MG 24 hr tablet Take 1 tablet (100 mg total) by mouth once daily., Starting Thu 6/22/2017, Normal      multivitamin capsule Take 1 capsule by mouth every morning. , Until Discontinued,  Historical Med      trazodone (DESYREL) 100 MG tablet Take 1 tablet (100 mg total) by mouth every evening., Starting 4/19/2017, Until Thu 4/19/18, Normal      warfarin (COUMADIN) 3 MG tablet Take 1 tablet (3 mg total) by mouth Daily., Starting 11/16/2016, Until Thu 11/16/17, Normal         STOP taking these medications       acetaminophen (TYLENOL) 325 MG tablet Comments:   Reason for Stopping:         omega-3 fatty acids-vitamin E (FISH OIL) 1,000 mg Cap Comments:   Reason for Stopping:             Time spent on the discharge of patient: 60 minutes    Marcelina Sotomayor MD  Department of Hospital Medicine  [facility]

## 2017-07-03 NOTE — ASSESSMENT & PLAN NOTE
· Continue with home metoprolol and diltiazem   · Rate currently 100s but BP ok  · Cardiology consulted, recs appreciated  · Can consider DCCV after KIMBERLY after diuresis   · Currently anticoagulated on warfarin, INR 1.6  · Currently subtherapeutic, continue to monitor. W/d with pharmacy.   · Will avoid DOAC in setting of MDS for reversibility   · [id number] score 4  · HAS-BLED score 1

## 2017-07-03 NOTE — ASSESSMENT & PLAN NOTE
· Continue with home metoprolol and diltiazem   · Rate currently 100s but BP ok  · Cardiology consulted, recs appreciated  · Can consider DCCV after KIMBERLY after diuresis   · Currently anticoagulated on warfarin, INR 1.6  · Currently subtherapeutic, continue to monitor. W/d with pharmacy.   · Will avoid DOAC in setting of MDS for reversibility   · [id number] score 4  · HAS-BLED score 1

## 2017-07-03 NOTE — ASSESSMENT & PLAN NOTE
· Elevated BNP, elevated JVD, pitting edema on initial exam  · Last ECHO 6/23/17 showed diastolic dysfunction with EF 53%. Moderate mitral regurg, moderate aortic stenosis, and severe tricuspid regurg.   · Received 40 mg IV furosemide in ED with good response.   · Will provide 40 mg BID IV furosemide  · Increased furosemide to 80 mg IV BID  · Strict I/Os, daily weights, cardiac diet with 2L fluid restriction  · Net negative (-8000 ml) since admission.   · Continue to monitor lytes  · Consult heart failure nurse to set up Home Health services  · Per Cardiology, valvular and diastolic CHF 2/2 Afib. Would benefit from improved rate control or DCCV. Appreciate recs.   · Plan for dispo today on furosemide 40 mg PO BID.  F/u with outpatient cardiology on discharge.

## 2017-07-03 NOTE — PROGRESS NOTES
Patient currently undergoing treatment with Aranes[facility] for MDS. Follows with Dr. Rader. Per Cardiology, valvular and diastolic CHF 2/2 Afib. Discharge order in place. At this time, not a candidate for DM HF Program.    Removed from hf list.

## 2017-07-03 NOTE — PHARMACY MED REC
[facility] Medication Reconciliation  Template    Patient was admitted on 7/1/2017 for Acute on chronic diastolic congestive heart failure.      Patient's prior to admission medication regimen was as follows:  Prescriptions Prior to Admission   Medication Sig Dispense Refill Last Dose    citalopram (CELEXA) 40 MG tablet TAKE 1 TABLET BY MOUTH DAILY (Patient taking differently: TAKE 1 TABLET BY MOUTH DAILY (am)) 90 tablet 0 7/1/2017    diltiaZEM (CARDIZEM CD) 300 MG 24 hr capsule TAKE ONE CAPSULE BY MOUTH EVERY DAY 90 capsule 1 7/1/2017    furosemide (LASIX) 20 MG tablet Take 1 tablet (20 mg total) by mouth 2 (two) times daily. 60 tablet 11 6/30/2017    metoprolol succinate (TOPROL-XL) 100 MG 24 hr tablet Take 1 tablet (100 mg total) by mouth once daily. 30 tablet 3 7/1/2017    multivitamin capsule Take 1 capsule by mouth every morning.    7/1/2017    trazodone (DESYREL) 100 MG tablet Take 1 tablet (100 mg total) by mouth every evening. (Patient taking differently: Take  mg by mouth every evening. ) 30 tablet 11 6/30/2017    warfarin (COUMADIN) 3 MG tablet Take 1 tablet (3 mg total) by mouth Daily. 30 tablet 11 7/1/2017         Please add appropriate    SmartPhrase below: Admission Medication Reconciliation - Pharmacy Consult Note    The home medication history was taken by the medication reconciliation technician - Amanda Cloud.    Based on information gathered and subsequent review by the clinical pharmacist, the items below may need attention.    No problems with med reconciliation          Potential issues to be addressed PRIOR TO DISCHARGE  None    Please address this information as you see fit.  Feel free to contact us if you have any questions or require assistance.    PHARMACIST NAME  Dejon Rodriguez  EXT  [id number]

## 2017-07-03 NOTE — PLAN OF CARE
Future Appointments  Date Time Provider Department Center   7/5/2017 11:30 AM INJECTION, [facility]   7/12/2017 11:00 AM INJE[facility], [facility]   7/14/2017 2:00 PM SAMMY Serra MD [facility] IM [facility]   7/19/2017 11:30 AM INJECTION, [facility]   7/26/2017 7:40 AM LAB, HEMONC CANCER BLDG [facility] HO [facility]   7/26/2017 8:45 AM Laura Rader MD [facility] BM CERVANTES [facility]   7/26/2017 11:30 AM INJECTION, [facility]        07/03/17 1308   Final Note   Assessment Type Final Discharge Note   Discharge Disposition Home   What phone number can be called within the next 1-3 days to see how you are doing after discharge? [phone]   Hospital Follow Up  Appt(s) scheduled? Yes   Offered [facility] -- Bedside Delivery? n/a   Discharge/Hospital Encounter Summary to ([facility]) PCP n/a   Referral to Outpatient Case Management complete? n/a   Referral to / orders for Home Health Complete? n/a   30 day supply of medicines given at discharge, if documented non-compliance / non-adherence? n/a   Any social issues identified prior to discharge? No   Did you assess the readiness or willingness of the family or caregiver to support self management of care? Yes   Right Care Referral Info   Post Acute Recommendation No Care

## 2017-07-03 NOTE — SUBJECTIVE & OBJECTIVE
Interval History: NAEON.  Pt notes abdominal distention and leg swelling has resolved.  No chest pain, SOB, or difficulty breathing.      Review of Systems   Constitutional: Negative for chills and fever.   HENT: Negative for congestion and rhinorrhea.    Eyes: Negative for pain and redness.   Respiratory: Negative for cough and shortness of breath (with ambulation).    Cardiovascular: Negative for chest pain and leg swelling.   Gastrointestinal: Negative for abdominal distention, abdominal pain, constipation, diarrhea, nausea and vomiting.   Genitourinary: Negative for decreased urine volume, dysuria and hematuria.   Musculoskeletal: Negative for arthralgias and myalgias.   Skin: Negative for rash and wound.   Neurological: Negative for dizziness and headaches.     Objective:     Vital Signs (Most Recent):  Temp: 98.2 °F (36.8 °C) (07/03/17 0809)  Pulse: 109 (07/03/17 0809)  Resp: 18 (07/03/17 0809)  BP: 138/68 (07/03/17 0809)  SpO2: 99 % (07/03/17 0809) Vital Signs (24h Range):  Temp:  [97.8 °F (36.6 °C)-98.9 °F (37.2 °C)] 98.2 °F (36.8 °C)  Pulse:  [] 109  Resp:  [16-18] 18  SpO2:  [96 %-99 %] 99 %  BP: ()/(52-74) 138/68     Weight: 78 kg (172 lb)  Body mass index is 23.99 kg/m².    Intake/Output Summary (Last 24 hours) at 07/03/17 0839  Last data filed at 07/03/17 0600   Gross per 24 hour   Intake                0 ml   Output             5100 ml   Net            -5100 ml      Physical Exam   Constitutional: He is oriented to person, place, and time. He appears well-developed and well-nourished. No distress.   HENT:   Head: Normocephalic and atraumatic.   Eyes: Conjunctivae and EOM are normal. Pupils are equal, round, and reactive to light.   Neck: Normal range of motion. Neck supple. JVD: to ear.   Cardiovascular: An irregularly irregular rhythm present. Exam reveals no gallop and no friction rub.    No murmur heard.  Pulmonary/Chest: Effort normal. He has no wheezes. He has no rhonchi. He has no  rales.   Port noted right upper chest, c/d/i   Abdominal: Soft. Bowel sounds are normal. He exhibits no distension. There is no tenderness.   Musculoskeletal: Normal range of motion. He exhibits edema (trace b/l LE).   Neurological: He is alert and oriented to person, place, and time.   Skin: Skin is warm and dry. Capillary refill takes less than 2 seconds.   Nursing note and vitals reviewed.      Significant Labs:     Recent Labs  Lab 07/01/17  0900 07/02/17  0608 07/03/17  0510   WBC 3.62* 5.13 4.55   HGB 10.2* 9.6* 9.7*   HCT 33.3* 31.5* 31.4*    154 163         Recent Labs  Lab 07/02/17  0608 07/02/17  1501 07/03/17  0510    135* 134*   K 3.9 4.2 3.0*    97 98   CO2 25 27 23   BUN 26* 26* 23   CREATININE 1.1 1.2 1.0   CALCIUM 8.5* 8.9 8.3*         Recent Labs  Lab 07/02/17  0608 07/02/17  1501   MG 1.9 1.8         Significant Imaging: No new imaging over last 24 hours.

## 2017-07-05 ENCOUNTER — TELEPHONE (OUTPATIENT)
Dept: CARDIOLOGY | Facility: CLINIC | Age: 72
End: 2017-07-05

## 2017-07-05 ENCOUNTER — OFFICE VISIT (OUTPATIENT)
Dept: CARDIOLOGY | Facility: CLINIC | Age: 72
End: 2017-07-05
Payer: MEDICARE

## 2017-07-05 ENCOUNTER — INFUSION (OUTPATIENT)
Dept: INFUSION THERAPY | Facility: HOSPITAL | Age: 72
End: 2017-07-05
Attending: INTERNAL MEDICINE
Payer: MEDICARE

## 2017-07-05 VITALS — HEART RATE: 97 BPM | SYSTOLIC BLOOD PRESSURE: 101 MMHG | RESPIRATION RATE: 18 BRPM | DIASTOLIC BLOOD PRESSURE: 60 MMHG

## 2017-07-05 VITALS
SYSTOLIC BLOOD PRESSURE: 125 MMHG | HEART RATE: 89 BPM | BODY MASS INDEX: 24.85 KG/M2 | DIASTOLIC BLOOD PRESSURE: 68 MMHG | HEIGHT: 71 IN | WEIGHT: 177.5 LBS

## 2017-07-05 DIAGNOSIS — Z98.890 H/O CAROTID ENDARTERECTOMY: ICD-10-CM

## 2017-07-05 DIAGNOSIS — I35.0 AORTIC STENOSIS, MODERATE: ICD-10-CM

## 2017-07-05 DIAGNOSIS — D69.6 THROMBOCYTOPENIA: ICD-10-CM

## 2017-07-05 DIAGNOSIS — J86.9 EMPYEMA OF RIGHT PLEURAL SPACE: ICD-10-CM

## 2017-07-05 DIAGNOSIS — R09.89 BILATERAL CAROTID BRUITS: ICD-10-CM

## 2017-07-05 DIAGNOSIS — E78.5 DYSLIPIDEMIA: ICD-10-CM

## 2017-07-05 DIAGNOSIS — F51.01 PRIMARY INSOMNIA: ICD-10-CM

## 2017-07-05 DIAGNOSIS — D46.9 MDS (MYELODYSPLASTIC SYNDROME): ICD-10-CM

## 2017-07-05 DIAGNOSIS — I10 ESSENTIAL HYPERTENSION: ICD-10-CM

## 2017-07-05 DIAGNOSIS — I48.91 ATRIAL FIBRILLATION WITH RVR: ICD-10-CM

## 2017-07-05 DIAGNOSIS — I48.0 PAROXYSMAL ATRIAL FIBRILLATION: ICD-10-CM

## 2017-07-05 DIAGNOSIS — I51.89 DIASTOLIC DYSFUNCTION: ICD-10-CM

## 2017-07-05 DIAGNOSIS — I35.0 AORTIC VALVE STENOSIS, MILD: Primary | Chronic | ICD-10-CM

## 2017-07-05 DIAGNOSIS — F32.89 OTHER DEPRESSION: ICD-10-CM

## 2017-07-05 DIAGNOSIS — I65.29 OCCLUSION AND STENOSIS OF CAROTID ARTERY WITHOUT MENTION OF CEREBRAL INFARCTION: ICD-10-CM

## 2017-07-05 DIAGNOSIS — D46.9 MDS (MYELODYSPLASTIC SYNDROME): Primary | ICD-10-CM

## 2017-07-05 PROCEDURE — 1126F AMNT PAIN NOTED NONE PRSNT: CPT | Mod: S$GLB,,, | Performed by: HOSPITALIST

## 2017-07-05 PROCEDURE — 99999 PR PBB SHADOW E&M-EST. PATIENT-LVL III: CPT | Mod: PBBFAC,GC,, | Performed by: HOSPITALIST

## 2017-07-05 PROCEDURE — 96372 THER/PROPH/DIAG INJ SC/IM: CPT

## 2017-07-05 PROCEDURE — 99214 OFFICE O/P EST MOD 30 MIN: CPT | Mod: S$GLB,,, | Performed by: HOSPITALIST

## 2017-07-05 PROCEDURE — 63600175 PHARM REV CODE 636 W HCPCS: Performed by: INTERNAL MEDICINE

## 2017-07-05 PROCEDURE — 1157F ADVNC CARE PLAN IN RCRD: CPT | Mod: S$GLB,,, | Performed by: HOSPITALIST

## 2017-07-05 PROCEDURE — 1159F MED LIST DOCD IN RCRD: CPT | Mod: S$GLB,,, | Performed by: HOSPITALIST

## 2017-07-05 RX ORDER — FUROSEMIDE 40 MG/1
40 TABLET ORAL 2 TIMES DAILY
Qty: 60 TABLET | Refills: 11 | Status: SHIPPED | OUTPATIENT
Start: 2017-07-05 | End: 2017-07-06 | Stop reason: SDUPTHER

## 2017-07-05 RX ORDER — VALSARTAN 40 MG/1
40 TABLET ORAL DAILY
Qty: 90 TABLET | Refills: 3 | Status: SHIPPED | OUTPATIENT
Start: 2017-07-05 | End: 2017-11-08

## 2017-07-05 RX ADMIN — DARBEPOETIN ALFA 200 MCG: 200 INJECTION, SOLUTION INTRAVENOUS; SUBCUTANEOUS at 11:07

## 2017-07-05 NOTE — NURSING
Pt arrived for aranesp injection.  Pt tolerated injection to right arm.  Discharged to home with appt calendar.

## 2017-07-05 NOTE — TELEPHONE ENCOUNTER
----- Message from Cezar Johansen sent at 7/5/2017  4:38 PM CDT -----  Contact: Latricia/[facility] pharmacy  Please call Latricia at [phone]. Need clear directions for Furosemide 40 mg. Last seen Dr Rhodes today 07/05/17    Thank you

## 2017-07-05 NOTE — PROGRESS NOTES
Subjective:    Patient ID:  Wil Kwon Jr. is a 72 y.o. male who presents for follow-up of Hospital Follow Up  Heart Failure    HPI  73 Y/O M with PMH signfincant for MDS failued chemotherapy and currently on palliative therapy to on observation and aranesp after receiving 3 cycles of Vidaza. His therapy was stopped due to development of an Empyema. He was has progressive Aortic stenosis with JEFF = 0.88 cm2, peak velocity = 3.48 m/s, mean gradient = 32 mmHg. That progressed over 4 months period from mod AS ( same LVOT diameter used). He has worsening Mod to severe MR and worsening severe TR. He has chronic persistant AF on coumadin with subtherapeutic INR, follows INR with Dr Rader given thrombocytopenia and history of nose bleed in the past. Patient was admitted earlier in June with decompensated heart failure and ~ 15 Lbs weight gain. He was discharged on lasix 40 BID with improvement in symptoms and weight 172 that has been stable. Today he is feeling better.    ROS    Constitutional: negative for chills, fevers and night sweats  Ears, nose, mouth, throat, and face: negative for nasal congestion, sore throat and tinnitus  Respiratory: negative for cough, dyspnea on exertion, pleurisy/chest pain, sputum and wheezing  Cardiovascular: See HPI  Gastrointestinal: negative  Genitourinary:negative for dysuria, frequency, hematuria, hesitancy and nocturia  Hematologic/lymphatic: negative for bleeding and easy bruising  Musculoskeletal:negative for muscle weakness and myalgias  Neurological: negative for dizziness, headaches, paresthesia and weakness  Behavioral/Psych: negative for anxiety and bad mood    Objective:    Physical Exam    General: Patient in no acute distress or discomfort  HEENT: No JVD, moist mucous membranes  Cardiac: S1S2 2/6 WILLIAM, brisk carotic upstroke.   Chest: Rales on the R base  Abd:Soft NTND  Ext: + 2 Edema No swelling  Neuro: A and O X 3, non focal    Vitals:    07/05/17 1331   BP: 125/68    Pulse: 89     Assessment:       1. Aortic valve stenosis, mild    2. MDS (myelodysplastic syndrome)    3. Atrial fibrillation with RVR    4. Aortic stenosis, moderate    5. Essential hypertension    6. Empyema of right pleural space    7. Other depression    8. Primary insomnia    9. Dyslipidemia    10. Bilateral carotid bruits    11. Occlusion and stenosis of carotid artery without mention of cerebral infarction    12. Paroxysmal atrial fibrillation    13. Thrombocytopenia    14. H/O carotid endarterectomy    15. Diastolic dysfunction         Plan:     Valvular heart disease  -Moderate to Severe AS  JEFF = 0.88 cm2, peak velocity = 3.48 m/s, mean gradient = 32 mmHg  -Moderate to Severe MR  -Severe TR  He is unlikely to be candidate for definitive therapy given his MDS and life expectancy.  Will repeat TTE in 3 months.    Chronic heart failure with preserved EF  secondary to valvular heart disease and Chronic AF  Will add small Dose ABR, he had cheek swelling with ACE but no angioedema  Resume Toprol 100  Resume lasix 40 BID, check daily weight and double AM dose if gain 3 Lbs in 1 day or 5 Lbs     Chronic persisatant AF: CHADS VASC 5, HAS Bled 3  Resume Toprol and Diltizem for rate control  His INR is subtherpeutic, follows with Dr Oakley  He is at increased risk for CVA given his subtherpeutic INR, and at increased risk for bleeding given his thrombocytopenia and MDS.  Discussed with patient the risk and benefit.     RTC in 4 weeks    Jenni Rhodes MD  Cardiology Fellow

## 2017-07-05 NOTE — TELEPHONE ENCOUNTER
Dr. Rhodes, The pt's pharmacy is calling to clarify dose of Lasix, there are two sets of instructions on the prescription sent to the pharmacy. Is pt to take Lasix 40 mg twice a day, and take an additional 40 mg in the am for weight gain of 3 lbs in one day or 5 lbs in one week?  Please advise. Thanks, Esha

## 2017-07-06 DIAGNOSIS — I48.91 ATRIAL FIBRILLATION WITH RVR: ICD-10-CM

## 2017-07-06 DIAGNOSIS — F32.89 OTHER DEPRESSION: ICD-10-CM

## 2017-07-06 DIAGNOSIS — E78.5 DYSLIPIDEMIA: ICD-10-CM

## 2017-07-06 DIAGNOSIS — F51.01 PRIMARY INSOMNIA: ICD-10-CM

## 2017-07-06 DIAGNOSIS — R09.89 BILATERAL CAROTID BRUITS: ICD-10-CM

## 2017-07-06 DIAGNOSIS — I10 ESSENTIAL HYPERTENSION: ICD-10-CM

## 2017-07-06 DIAGNOSIS — D46.9 MDS (MYELODYSPLASTIC SYNDROME): ICD-10-CM

## 2017-07-06 DIAGNOSIS — J86.9 EMPYEMA OF RIGHT PLEURAL SPACE: ICD-10-CM

## 2017-07-06 DIAGNOSIS — I35.0 AORTIC STENOSIS, MODERATE: ICD-10-CM

## 2017-07-06 RX ORDER — FUROSEMIDE 40 MG/1
TABLET ORAL
Qty: 80 TABLET | Refills: 11 | Status: ON HOLD | OUTPATIENT
Start: 2017-07-06 | End: 2018-04-25 | Stop reason: HOSPADM

## 2017-07-07 ENCOUNTER — TELEPHONE (OUTPATIENT)
Dept: HEMATOLOGY/ONCOLOGY | Facility: CLINIC | Age: 72
End: 2017-07-07

## 2017-07-07 NOTE — TELEPHONE ENCOUNTER
----- Message from Evelyn Singleton sent at 7/7/2017  8:45 AM CDT -----  Contact: [facility] ([place])  Needs orders to resume home health after hospitalization.    Please call:  [phone]    Spoke with Tammi BELTRAN at [facility]. Orders given to restart  services.    Deyanira Jarquin

## 2017-07-10 RX ORDER — DILTIAZEM HYDROCHLORIDE 300 MG/1
CAPSULE, COATED, EXTENDED RELEASE ORAL
Qty: 90 CAPSULE | Refills: 1 | Status: SHIPPED | OUTPATIENT
Start: 2017-07-10 | End: 2018-01-21 | Stop reason: SDUPTHER

## 2017-07-14 ENCOUNTER — LAB VISIT (OUTPATIENT)
Dept: LAB | Facility: HOSPITAL | Age: 72
End: 2017-07-14
Attending: INTERNAL MEDICINE
Payer: MEDICARE

## 2017-07-14 ENCOUNTER — OFFICE VISIT (OUTPATIENT)
Dept: INTERNAL MEDICINE | Facility: CLINIC | Age: 72
End: 2017-07-14
Payer: MEDICARE

## 2017-07-14 VITALS
HEART RATE: 90 BPM | DIASTOLIC BLOOD PRESSURE: 59 MMHG | WEIGHT: 177 LBS | BODY MASS INDEX: 24.78 KG/M2 | HEIGHT: 71 IN | RESPIRATION RATE: 16 BRPM | TEMPERATURE: 99 F | SYSTOLIC BLOOD PRESSURE: 105 MMHG

## 2017-07-14 DIAGNOSIS — E87.6 HYPOKALEMIA: ICD-10-CM

## 2017-07-14 DIAGNOSIS — I50.40 COMBINED SYSTOLIC AND DIASTOLIC CONGESTIVE HEART FAILURE, UNSPECIFIED CONGESTIVE HEART FAILURE CHRONICITY: Primary | ICD-10-CM

## 2017-07-14 DIAGNOSIS — D46.9 MDS (MYELODYSPLASTIC SYNDROME): ICD-10-CM

## 2017-07-14 DIAGNOSIS — I35.0 AORTIC VALVE STENOSIS, MILD: ICD-10-CM

## 2017-07-14 DIAGNOSIS — I48.91 ATRIAL FIBRILLATION, UNSPECIFIED TYPE: ICD-10-CM

## 2017-07-14 DIAGNOSIS — I50.40 COMBINED SYSTOLIC AND DIASTOLIC CONGESTIVE HEART FAILURE, UNSPECIFIED CONGESTIVE HEART FAILURE CHRONICITY: ICD-10-CM

## 2017-07-14 LAB
ANION GAP SERPL CALC-SCNC: 11 MMOL/L
BUN SERPL-MCNC: 28 MG/DL
CALCIUM SERPL-MCNC: 8.7 MG/DL
CHLORIDE SERPL-SCNC: 101 MMOL/L
CO2 SERPL-SCNC: 23 MMOL/L
CREAT SERPL-MCNC: 1.1 MG/DL
EST. GFR  (AFRICAN AMERICAN): >60 ML/MIN/1.73 M^2
EST. GFR  (NON AFRICAN AMERICAN): >60 ML/MIN/1.73 M^2
GLUCOSE SERPL-MCNC: 97 MG/DL
POTASSIUM SERPL-SCNC: 4.3 MMOL/L
SODIUM SERPL-SCNC: 135 MMOL/L

## 2017-07-14 PROCEDURE — 99499 UNLISTED E&M SERVICE: CPT | Mod: S$PBB,,, | Performed by: INTERNAL MEDICINE

## 2017-07-14 PROCEDURE — 99214 OFFICE O/P EST MOD 30 MIN: CPT | Mod: S$GLB,,, | Performed by: INTERNAL MEDICINE

## 2017-07-14 PROCEDURE — 1159F MED LIST DOCD IN RCRD: CPT | Mod: S$GLB,,, | Performed by: INTERNAL MEDICINE

## 2017-07-14 PROCEDURE — 80048 BASIC METABOLIC PNL TOTAL CA: CPT

## 2017-07-14 PROCEDURE — 36415 COLL VENOUS BLD VENIPUNCTURE: CPT | Mod: PO

## 2017-07-14 PROCEDURE — 1126F AMNT PAIN NOTED NONE PRSNT: CPT | Mod: S$GLB,,, | Performed by: INTERNAL MEDICINE

## 2017-07-14 PROCEDURE — 99999 PR PBB SHADOW E&M-EST. PATIENT-LVL III: CPT | Mod: PBBFAC,,, | Performed by: INTERNAL MEDICINE

## 2017-07-14 PROCEDURE — 1157F ADVNC CARE PLAN IN RCRD: CPT | Mod: S$GLB,,, | Performed by: INTERNAL MEDICINE

## 2017-07-14 NOTE — PROGRESS NOTES
This office note has been dictated.  HISTORY OF PRESENT ILLNESS:  This 72-year-old gentleman following up recent   hospitalization.  The patient presented with shortness of breath and acute heart   failure decompensation.  Diastolic dysfunction and mild LV dysfunction   systolic.  He also had atrial fibrillation with rapid ventricular response and   he has moderate to severe aortic stenosis.  He was treated with increased   diuretics.  Symptoms have improved appropriately.  He has followed with   Cardiology since then and low-dose of valsartan was also added.  He currently is   without overt shortness of breath.  No hemoptysis.  No chest pain.  No syncope   or presyncope.    CURRENT MEDICATIONS:  All noted and reviewed in our electronic medical record   medication list.    REVIEW OF SYSTEMS:  CONSTITUTIONAL:  Fatigue, but no fever, no chills.  HEENT:  No hoarseness or dysphagia described.  RESPIRATORY:  No cough, decreased exertional capacity, but no shortness of   breath at rest.  CARDIOVASCULAR:  No chest pain, no palpitations, syncope or presyncope.  GASTROINTESTINAL:  No nausea, vomiting, abdominal pain or diarrhea.    PAST MEDICAL HISTORY, PAST SURGICAL HISTORY, FAMILY AND SOCIAL HISTORY:  All   noted and reviewed in the electronic medical record history sections.    PHYSICAL EXAMINATION:  GENERAL:  Alert, pleasant, appropriately groomed gentleman in no acute distress.  VITAL SIGNS:  All noted and reviewed.  EYES:  Sclerae white, nonicteric.  HEENT:  Normocephalic.  NECK:  Supple, no masses, no thyromegaly.  LUNGS:  Dry crackles in the right base.  No wheezing.  CARDIOVASCULAR:  Apical rate is irregularly irregular, II/VI systolic murmur.    No JVD.  There is trace to 1+ distal lower extremity edema.  GASTROINTESTINAL:  The abdomen is soft, benign.  No masses, tenderness or   organomegaly.  Small nontender umbilical hernia.  MENTAL STATUS:  Alert and oriented.  Affect and mood all appropriate.    DATA:   Reviewed the recent hospital discharge summary.  Reviewed recent lab   data, Cardiology and radiographic studies.  As noted in his last metabolic   profile, he was hypokalemic.  He is not currently on A potassium   supplementation.  A low-dose ARB has been added.    IMPRESSION:  1.  Recent CHF decompensation with diastolic dysfunction and mild left   ventricular systolic dysfunction.  2.  AFib with rapid ventricular response, now improved regarding response   anticoagulated.  3.  Myelodysplastic syndrome, being followed by Hematology.  4.  Moderate-to-severe aortic stenosis.    PLAN:  Update a basic metabolic profile today and discuss probable need for   potassium supplementation.  Instructed that he should communicate on the   results.  Today is Friday.    Does have followup with Cardiology and others already planned.      JULIEN/JORGE L  dd: 07/14/2017 14:43:27 (CDT)  td: 07/15/2017 15:07:54 (CDT)  Doc ID   #[id number]  Job ID #[id number]    CC:

## 2017-07-17 ENCOUNTER — PATIENT MESSAGE (OUTPATIENT)
Dept: INTERNAL MEDICINE | Facility: CLINIC | Age: 72
End: 2017-07-17

## 2017-07-18 RX ORDER — POTASSIUM CHLORIDE 750 MG/1
10 TABLET, EXTENDED RELEASE ORAL DAILY
Qty: 30 TABLET | Refills: 3 | Status: SHIPPED | OUTPATIENT
Start: 2017-07-18 | End: 2017-11-08

## 2017-07-19 ENCOUNTER — INFUSION (OUTPATIENT)
Dept: INFUSION THERAPY | Facility: HOSPITAL | Age: 72
End: 2017-07-19
Attending: INTERNAL MEDICINE
Payer: MEDICARE

## 2017-07-19 VITALS — HEART RATE: 91 BPM | SYSTOLIC BLOOD PRESSURE: 115 MMHG | DIASTOLIC BLOOD PRESSURE: 63 MMHG | RESPIRATION RATE: 17 BRPM

## 2017-07-19 DIAGNOSIS — D46.9 MDS (MYELODYSPLASTIC SYNDROME): Primary | ICD-10-CM

## 2017-07-19 PROCEDURE — 96372 THER/PROPH/DIAG INJ SC/IM: CPT

## 2017-07-19 PROCEDURE — 63600175 PHARM REV CODE 636 W HCPCS: Performed by: INTERNAL MEDICINE

## 2017-07-19 RX ADMIN — DARBEPOETIN ALFA 200 MCG: 200 INJECTION, SOLUTION INTRAVENOUS; SUBCUTANEOUS at 10:07

## 2017-07-25 ENCOUNTER — PATIENT MESSAGE (OUTPATIENT)
Dept: HEMATOLOGY/ONCOLOGY | Facility: CLINIC | Age: 72
End: 2017-07-25

## 2017-07-26 ENCOUNTER — OFFICE VISIT (OUTPATIENT)
Dept: HEMATOLOGY/ONCOLOGY | Facility: CLINIC | Age: 72
End: 2017-07-26
Payer: MEDICARE

## 2017-07-26 ENCOUNTER — INFUSION (OUTPATIENT)
Dept: INFUSION THERAPY | Facility: HOSPITAL | Age: 72
End: 2017-07-26
Attending: INTERNAL MEDICINE
Payer: MEDICARE

## 2017-07-26 VITALS
SYSTOLIC BLOOD PRESSURE: 133 MMHG | DIASTOLIC BLOOD PRESSURE: 83 MMHG | WEIGHT: 172.63 LBS | RESPIRATION RATE: 16 BRPM | TEMPERATURE: 98 F | BODY MASS INDEX: 24.08 KG/M2 | HEART RATE: 108 BPM

## 2017-07-26 DIAGNOSIS — D46.9 MDS (MYELODYSPLASTIC SYNDROME): Primary | ICD-10-CM

## 2017-07-26 DIAGNOSIS — E78.5 DYSLIPIDEMIA: ICD-10-CM

## 2017-07-26 DIAGNOSIS — I48.0 PAROXYSMAL ATRIAL FIBRILLATION: ICD-10-CM

## 2017-07-26 DIAGNOSIS — I10 ESSENTIAL HYPERTENSION: ICD-10-CM

## 2017-07-26 DIAGNOSIS — I35.0 AORTIC VALVE STENOSIS, MILD: Chronic | ICD-10-CM

## 2017-07-26 DIAGNOSIS — I50.23 ACUTE ON CHRONIC SYSTOLIC CONGESTIVE HEART FAILURE: ICD-10-CM

## 2017-07-26 DIAGNOSIS — F32.0 MILD SINGLE CURRENT EPISODE OF MAJOR DEPRESSIVE DISORDER: ICD-10-CM

## 2017-07-26 LAB
ALBUMIN SERPL BCP-MCNC: 2.9 G/DL
ALP SERPL-CCNC: 134 U/L
ALT SERPL W/O P-5'-P-CCNC: 21 U/L
ANION GAP SERPL CALC-SCNC: 7 MMOL/L
AST SERPL-CCNC: 27 U/L
BILIRUB SERPL-MCNC: 1.2 MG/DL
BUN SERPL-MCNC: 25 MG/DL
CALCIUM SERPL-MCNC: 8.8 MG/DL
CHLORIDE SERPL-SCNC: 105 MMOL/L
CO2 SERPL-SCNC: 26 MMOL/L
CREAT SERPL-MCNC: 1.1 MG/DL
ERYTHROCYTE [DISTWIDTH] IN BLOOD BY AUTOMATED COUNT: 17.4 %
EST. GFR  (AFRICAN AMERICAN): >60 ML/MIN/1.73 M^2
EST. GFR  (NON AFRICAN AMERICAN): >60 ML/MIN/1.73 M^2
GLUCOSE SERPL-MCNC: 92 MG/DL
HCT VFR BLD AUTO: 33.2 %
HGB BLD-MCNC: 10 G/DL
INR PPP: 1.6
MCH RBC QN AUTO: 28.5 PG
MCHC RBC AUTO-ENTMCNC: 30.1 G/DL
MCV RBC AUTO: 95 FL
NEUTROPHILS # BLD AUTO: 1.2 K/UL
PLATELET # BLD AUTO: 128 K/UL
PMV BLD AUTO: 12.1 FL
POTASSIUM SERPL-SCNC: 3.8 MMOL/L
PROT SERPL-MCNC: 8 G/DL
PROTHROMBIN TIME: 16.2 SEC
RBC # BLD AUTO: 3.51 M/UL
SODIUM SERPL-SCNC: 138 MMOL/L
WBC # BLD AUTO: 2.23 K/UL

## 2017-07-26 PROCEDURE — 85027 COMPLETE CBC AUTOMATED: CPT

## 2017-07-26 PROCEDURE — 1126F AMNT PAIN NOTED NONE PRSNT: CPT | Mod: S$GLB,,, | Performed by: INTERNAL MEDICINE

## 2017-07-26 PROCEDURE — 99215 OFFICE O/P EST HI 40 MIN: CPT | Mod: S$GLB,,, | Performed by: INTERNAL MEDICINE

## 2017-07-26 PROCEDURE — A4216 STERILE WATER/SALINE, 10 ML: HCPCS | Performed by: INTERNAL MEDICINE

## 2017-07-26 PROCEDURE — 99999 PR PBB SHADOW E&M-EST. PATIENT-LVL III: CPT | Mod: PBBFAC,,, | Performed by: INTERNAL MEDICINE

## 2017-07-26 PROCEDURE — 36591 DRAW BLOOD OFF VENOUS DEVICE: CPT

## 2017-07-26 PROCEDURE — 63600175 PHARM REV CODE 636 W HCPCS: Performed by: INTERNAL MEDICINE

## 2017-07-26 PROCEDURE — 1159F MED LIST DOCD IN RCRD: CPT | Mod: S$GLB,,, | Performed by: INTERNAL MEDICINE

## 2017-07-26 PROCEDURE — 1157F ADVNC CARE PLAN IN RCRD: CPT | Mod: S$GLB,,, | Performed by: INTERNAL MEDICINE

## 2017-07-26 PROCEDURE — 85610 PROTHROMBIN TIME: CPT

## 2017-07-26 PROCEDURE — 80053 COMPREHEN METABOLIC PANEL: CPT

## 2017-07-26 PROCEDURE — 25000003 PHARM REV CODE 250: Performed by: INTERNAL MEDICINE

## 2017-07-26 RX ORDER — SODIUM CHLORIDE 0.9 % (FLUSH) 0.9 %
10 SYRINGE (ML) INJECTION
Status: COMPLETED | OUTPATIENT
Start: 2017-07-26 | End: 2017-07-26

## 2017-07-26 RX ORDER — HEPARIN 100 UNIT/ML
500 SYRINGE INTRAVENOUS
Status: COMPLETED | OUTPATIENT
Start: 2017-07-26 | End: 2017-07-26

## 2017-07-26 RX ADMIN — SODIUM CHLORIDE, PRESERVATIVE FREE 10 ML: 5 INJECTION INTRAVENOUS at 08:07

## 2017-07-26 RX ADMIN — HEPARIN 500 UNITS: 100 SYRINGE at 08:07

## 2017-07-26 NOTE — PATIENT INSTRUCTIONS
No change in meds    Get cbc/cmp every 2 weeks    Continue Aranesp every 2 weeks     Get labs and aranesp on same day    See me in 2 months

## 2017-08-01 NOTE — PROGRESS NOTES
"PATIENT: Wil Kwon Jr.  MRN: [id number]  DATE: 8/1/2017    Subjective:   MDS    Oncologic History:  Mr. Kwon 71-year-old gentleman with several chronic medical conditions living healthy lifestyle. He has hypertension and reports home readings are all normal. He did recently develop angioedema from ACE inhibitor and was switched to an ARB. His dyslipidemia is treated with pharmacologic therapy. He has history of carotid atherosclerotic disease status post left carotid endarterectomy and up-to-date with surveillance carotid Doppler study. He has a history of PAF followed by our electrophysiology cardiology department and is chronically anticoagulated. He has moderate aortic stenosis. He has DJD of the right knee which has become more symptomatic in recent months. He was noted to be progressively anemic over the last 1 year. Hematology workup reveals a normal B12 and folate. His iron stores are normal. His reticulocyte count is slightly elevated at 4.2. His white count has remained low and progressively dropping over the last 3 years with monocytosis. He also has developed mild progressive thrombocytopenia. He is moderately symptomatic with the fatigue. He has no history of extrinsic GI bleeding. He also has no history of previous transfusions.he underwent a marrow. 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 Of 20 metaphases, 2 metaphases were normal and 18 metaphases had a 2;21  translocation. 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 RUNX1 rearrangements are  associated with de alfredo AML and therapeutic-related AML and MDS. Clinical and pathologic correlation is  recommended."  FINAL PATHOLOGIC DIAGNOSIS  CBC DATA 8/24/16:  RBC: 3.45 M/UL, H/H : 9.9/32.1, MCV : 93 FL, WBC: 3.1 K/UL, Gran 1.2 k/ul %, Platelet: 200 K/UL.  No " peripheral blood smear was submitted for evaluation.  BONE MARROW ASPIRATE, BONE MARROW CLOT, AND BONE MARROW CORE BIOPSY WITH:  CELLULARITY= 95%, TRILINEAGE HEMATOPOIETIC ACTIVITY (M/E= 2:1) AND INCREASED IMMATURE  CELLS (9%). SEE COMMENT.  LEFT SHIFTED MATURATION OF GRANULOPOIESIS.  ADEQUATE STORAGE IRON.  ADEQUATE NUMBER OF MEGAKARYOCYTES.  COMMENT: Flow cytometry analysis of bone marrow aspirate shows lymph gate (4.2 %) containing T and B cells.  No B cell clonality or T-cell aberrancy is evident. Blast gate is increased (7.9%) with cells expression of CD13 and  CD34. Immunohistochemical studies were performed on the clot and core biopsy for further architecture evaluation with  adequate positive and negative controls. Scattered mixed predominantly T cells (CD3 positive) and B cells (CD20  positive) are evident. About 6-7 % plasma cells ( positive) and 9% CD34 positive cells are noted. Findings  are nondiagnostic for lymphoma or plasma cell dyscrasia.  Microscopic Examination  BONE MARROW ASPIRATE DIFFERENTIAL:  Blasts----------------------------------------------5%  Promyelocytes---------------------------------2%  Myelocytes--------------------------------------11%  Metamyelocytes------------------------------ 19%  Bands----------------------------------------------5%  PMN Neutrophils------------------------------15%  Eosinophils------------------------------------------2%  Basophils---------------------------------------0%  Monocytes------------------------------------2%  Lymphocytes-------------------------------------6%  Plasma cells------------------------------------------2%  Erythroid precursors--------------------------31%  BONE MARROW ASPIRATE:  Myeloid and erythroid maturation shows M:E ratio at 2:1. No significant dysplasia is evident. Left shifted maturation of  granulopoiesis is noted. Stainable iron is present without increased ringed sideroblasts. Megakaryocytes are seen.  BONE MARROW  CLOT:  Cellularity is 95 % with trilineage hematopoiesis. No abnormal infiltrates are seen. Megakaryocytes are adequate in  number. Stainable iron is present.  BONE MARROW CORE BIOPSY:  Cellularity is 95 %. No abnormal infiltrates are seen. Stainable iron is not identified. Megakaryocytes are adequate in  No significantly increased reticular fibrosis is evident by special stain (reticulin) with adequate positive and  negative controls.      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 He did not meet criteria for AML. He was started on Vidaza and Aranes and completed 3 cycles when a repeat bone marrow biopsy revealed:      BONE MARROW ASPIRATE, BONE MARROW CLOT, AND BONE MARROW CORE BIOPSY WITH:  CELLULARITY= %, TRILINEAGE HEMATOPOIETIC ACTIVITY (M/E= 1.4:1).  CONSISTENT WITH REFRACTORY ANEMIA WITH EXCESS BLASTS -1. SEE COMMENT.  DECREASED STORAGE IRON.  INCREASED NUMBER OF MEGAKARYOCYTES WITH DYSPLASTIC MORPHOLOGY.  COMMENT: Flow cytometry analysis of bone marrow aspirate shows lymph gate (15.9 %) containing T and B cells.  No B cell clonality or T-cell aberrancy is evident. Blast gate is slightly increased (7.1%).  Immunohistochemical studies were performed on the clot and core biopsy for further architecture evaluation with  adequate positive and negative controls. About 6-7% CD34 positive cells are noted. CD61 highlights increased in  number of megakaryocytes with dysplastic morphology.  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 Correlate clinically and with a cytogenetics  report.  Microscopic Examination  BONE MARROW ASPIRATE DIFFERENTIAL:  Blasts---------------------------------------------- 7 %  Promyelocytes---------------------------------1%  Myelocytes--------------------------------------10%  Metamyelocytes------------------------------ 8%  Bands----------------------------------------------9%  PMN  Neutrophils------------------------------15%  Eosinophils------------------------------------------1%  Basophils---------------------------------------1%  Monocytes------------------------------------1%  Lymphocytes-------------------------------------9%  Plasma cells------------------------------------------1%  Erythroid precursors--------------------------37%  BONE MARROW ASPIRATE:  Myeloid and erythroid maturation shows M:E ratio at 1.4:1. Dysgranulopoiesis and occasional dyserythropoiesis is  evident. Stainable iron is decreased without increased ringed sideroblasts. Megakaryocytes are seen.  BONE MARROW CLOT:  Cellularity is  % with trilineage hematopoiesis. No abnormal infiltrates are seen. Megakaryocytes are increased  in number with dysplastic morphology. Stainable iron is decreased.  BONE MARROW CORE BIOPSY:  Cellularity is  %. No abnormal infiltrates are seen. Stainable iron is not identified. Megakaryocytes are  increased in number with dysplastic morphology.       Last Vidaza was cycle 5 completed on 3/3/17.  He was evaluated for an allogeneic transplant but decided against it.  He subsequently got admitted to the hospital with an empyema.  His MDS therapy has been on hold until he is recovered from his empyema. He developed a drug rash while on Augmentin and was switched to Clindamycin.  However he is having increasing dyspnea on excursion and orthopnea.  He also has 2+ lower extremity edema.  He was treated with diuretics for congestive heart failure.  His symptoms improved.  He decreased his diuretic and has been on observation without any right days.  He has been receiving Aranesp.  My plan has been to watch his counts and only if there decreased significantly restart his Vidaza.  During this time and wanted him to recover from his empyema and congestive heart failure.    He has had one more admission with CHF. He has been transfusion independent.  Interval History: Mr. Kwon returns  for follow up. He is slightly fatigued but otherwise doing well.    Past Medical History:   Diagnosis Date    Aortic valve stenosis, mild     secondary to bicuspid aortic alve    Atrial fibrillation     Carotid artery disease     Left CEA 4/9/13    Depression     DJD (degenerative joint disease)     H/O echocardiogram 04/13/2016    EF 55-60%. Diastolic dysfunction. PASP 39. mod AS with JEFF 1.18    History of psychiatric hospitalization     [place] [id number]    Hyperlipidemia     Hypertension     MDS (myelodysplastic syndrome) 2013    s/p Chemo     Therapy     [facility]        Past Surgical History:   Procedure Laterality Date    BONE MARROW BIOPSY  08/29/2016    CAROTID ENDARTERECTOMY Left     Inguinal hernia      Left    KNEE SURGERY      Right x2    neck fusion      TONSILLECTOMY         Family History   Problem Relation Age of Onset    Hyperlipidemia Brother         Social History:  reports that he has never smoked. He has never used smokeless tobacco. He reports that he does not drink alcohol or use drugs.    Allergies:  Review of patient's allergies indicates:   Allergen Reactions    Lipitor [atorvastatin]      Muscle pain    Lisinopril Edema     Cheek swelling    Augmentin [amoxicillin-pot clavulanate] Rash       Medications:  Current Outpatient Prescriptions   Medication Sig Dispense Refill    citalopram (CELEXA) 40 MG tablet TAKE 1 TABLET BY MOUTH DAILY (Patient taking differently: TAKE 1 TABLET BY MOUTH DAILY (am)) 90 tablet 0    diltiaZEM (CARDIZEM CD) 300 MG 24 hr capsule TAKE ONE CAPSULE BY MOUTH EVERY DAY 90 capsule 1    furosemide (LASIX) 40 MG tablet Take Lasix 40 mg twice a day and an additional 40 mg in the am for weight gain of 3 lbs in one day or 5 lbs in one week. 80 tablet 11    metoprolol succinate (TOPROL-XL) 100 MG 24 hr tablet Take 1 tablet (100 mg total) by mouth once daily. 30 tablet 3    multivitamin capsule Take 1 capsule  by mouth every morning.       potassium chloride (KLOR-CON) 10 MEQ TbSR Take 1 tablet (10 mEq total) by mouth once daily. 30 tablet 3    trazodone (DESYREL) 100 MG tablet Take 1 tablet (100 mg total) by mouth every evening. (Patient taking differently: Take  mg by mouth every evening. ) 30 tablet 11    valsartan (DIOVAN) 40 MG tablet Take 1 tablet (40 mg total) by mouth once daily. 90 tablet 3    warfarin (COUMADIN) 3 MG tablet Take 1 tablet (3 mg total) by mouth Daily. 30 tablet 11     No current facility-administered medications for this visit.          Review of Systems   HENT: Negative.    Eyes: Negative.    Respiratory: Negative.    Cardiovascular: Negative.    Gastrointestinal: Negative.    Endocrine: Negative.    Genitourinary: Negative.    Musculoskeletal: Negative.    Skin: Negative.    Neurological: Negative.    Hematological: Negative.    Psychiatric/Behavioral: Negative.        ECOG Performance Status: 0  Objective:      Vitals:   Vitals:    07/26/17 0822   BP: 133/83   BP Location: Right arm   Patient Position: Sitting   BP Method: Automatic   Pulse: 108   Resp: 16   Temp: 97.8 °F (36.6 °C)   TempSrc: Oral   Weight: 78.3 kg (172 lb 9.9 oz)     BMI: Body mass index is 24.08 kg/m².      Physical Exam   Constitutional: he is oriented to person, place, and time. He appears well-developed and well-nourished.   HENT: NC AT   Head: Normocephalic.   Right Ear: External ear normal.   Left Ear: External ear normal.   Nose: Nose normal.   Mouth/Throat: Oropharynx is clear and moist.   Eyes: Conjunctivae and EOM are normal. Pupils are equal, round, and reactive to light.   Neck: Normal range of motion. Neck supple.   Cardiovascular: Normal rate, regular rhythm, normal heart sounds and intact distal pulses.    Pulmonary/Chest: Effort normal and breath sounds normal.   Abdominal: Soft. Bowel sounds are normal.   Musculoskeletal: Normal range of motion.   Neurological: he is alert and oriented to person,  place, and time. He has normal reflexes.   Skin: Skin is warm and dry.   Psychiatric: he has a normal mood and affect. His behavior is normal. Judgment and thought content normal.       Laboratory Data:  Infusion on 07/26/2017   Component Date Value Ref Range Status    WBC 07/26/2017 2.23* 3.90 - 12.70 K/uL Final    RBC 07/26/2017 3.51* 4.60 - 6.20 M/uL Final    Hemoglobin 07/26/2017 10.0* 14.0 - 18.0 g/dL Final    Hematocrit 07/26/2017 33.2* 40.0 - 54.0 % Final    MCV 07/26/2017 95  82 - 98 fL Final    MCH 07/26/2017 28.5  27.0 - 31.0 pg Final    MCHC 07/26/2017 30.1* 32.0 - 36.0 g/dL Final    RDW 07/26/2017 17.4* 11.5 - 14.5 % Final    Platelets 07/26/2017 128* 150 - 350 K/uL Final    MPV 07/26/2017 12.1  9.2 - 12.9 fL Final    Gran # 07/26/2017 1.2* 1.8 - 7.7 K/uL Final    Comment: The ANC is based on a white cell differential from an   automated cell counter. It has not been microscopically   reviewed for the presence of abnormal cells. Clinical   correlation is required.      Sodium 07/26/2017 138  136 - 145 mmol/L Final    Potassium 07/26/2017 3.8  3.5 - 5.1 mmol/L Final    Chloride 07/26/2017 105  95 - 110 mmol/L Final    CO2 07/26/2017 26  23 - 29 mmol/L Final    Glucose 07/26/2017 92  70 - 110 mg/dL Final    BUN, Bld 07/26/2017 25* 8 - 23 mg/dL Final    Creatinine 07/26/2017 1.1  0.5 - 1.4 mg/dL Final    Calcium 07/26/2017 8.8  8.7 - 10.5 mg/dL Final    Total Protein 07/26/2017 8.0  6.0 - 8.4 g/dL Final    Albumin 07/26/2017 2.9* 3.5 - 5.2 g/dL Final    Total Bilirubin 07/26/2017 1.2* 0.1 - 1.0 mg/dL Final    Comment: For infants and newborns, interpretation of results should be based  on gestational age, weight and in agreement with clinical  observations.  Premature Infant recommended reference ranges:  Up to 24 hours.............<8.0 mg/dL  Up to 48 hours............<12.0 mg/dL  3-5 days..................<15.0 mg/dL  6-29 days.................<15.0 mg/dL      Alkaline Phosphatase  07/26/2017 134  55 - 135 U/L Final    AST 07/26/2017 27  10 - 40 U/L Final    ALT 07/26/2017 21  10 - 44 U/L Final    Anion Gap 07/26/2017 7* 8 - 16 mmol/L Final    eGFR if African American 07/26/2017 >60.0  >60 mL/min/1.73 m^2 Final    eGFR if non African American 07/26/2017 >60.0  >60 mL/min/1.73 m^2 Final    Comment: Calculation used to obtain the estimated glomerular filtration  rate (eGFR) is the CKD-EPI equation. Since race is unknown   in our information system, the eGFR values for   -American and Non--American patients are given   for each creatinine result.      Prothrombin Time 07/26/2017 16.2* 9.0 - 12.5 sec Final    INR 07/26/2017 1.6* 0.8 - 1.2 Final    Comment: Coumadin Therapy:  2.0 - 3.0 for INR for all indicators except mechanical heart valves  and antiphospholipid syndromes which should use 2.5 - 3.5.     Lab Visit on 07/14/2017   Component Date Value Ref Range Status    Sodium 07/14/2017 135* 136 - 145 mmol/L Final    Potassium 07/14/2017 4.3  3.5 - 5.1 mmol/L Final    Chloride 07/14/2017 101  95 - 110 mmol/L Final    CO2 07/14/2017 23  23 - 29 mmol/L Final    Glucose 07/14/2017 97  70 - 110 mg/dL Final    BUN, Bld 07/14/2017 28* 8 - 23 mg/dL Final    Creatinine 07/14/2017 1.1  0.5 - 1.4 mg/dL Final    Calcium 07/14/2017 8.7  8.7 - 10.5 mg/dL Final    Anion Gap 07/14/2017 11  8 - 16 mmol/L Final    eGFR if African American 07/14/2017 >60.0  >60 mL/min/1.73 m^2 Final    eGFR if non African American 07/14/2017 >60.0  >60 mL/min/1.73 m^2 Final    Comment: Calculation used to obtain the estimated glomerular filtration  rate (eGFR) is the CKD-EPI equation. Since race is unknown   in our information system, the eGFR values for   -American and Non--American patients are given   for each creatinine result.         Assessment/Plan:     1. MDS (myelodysplastic syndrome)    2. Aortic valve stenosis, mild    3. Essential hypertension    4. Paroxysmal atrial  fibrillation    5. Acute on chronic systolic congestive heart failure    6. Mild single current episode of major depressive disorder    7. Dyslipidemia      MDS is stable. Counts look good. Will continue as is.    No change in meds    Get cbc/cmp every 2 weeks    Continue Aranesp every 2 weeks     Get labs and aranesp on same day    See me in 2 months  Med and Order  Orders Placed This Encounter    CBC Oncology    Comprehensive metabolic panel    Iron and TIBC    Ferritin       Follow Up  Return in about 2 months (around 9/26/2017).

## 2017-08-03 ENCOUNTER — OFFICE VISIT (OUTPATIENT)
Dept: CARDIOLOGY | Facility: CLINIC | Age: 72
End: 2017-08-03
Payer: MEDICARE

## 2017-08-03 VITALS
WEIGHT: 172.81 LBS | HEART RATE: 102 BPM | SYSTOLIC BLOOD PRESSURE: 121 MMHG | HEIGHT: 71 IN | BODY MASS INDEX: 24.19 KG/M2 | DIASTOLIC BLOOD PRESSURE: 68 MMHG

## 2017-08-03 DIAGNOSIS — E78.5 DYSLIPIDEMIA: ICD-10-CM

## 2017-08-03 DIAGNOSIS — I35.0 AORTIC VALVE STENOSIS, SEVERE: ICD-10-CM

## 2017-08-03 DIAGNOSIS — I48.91 ATRIAL FIBRILLATION WITH RVR: ICD-10-CM

## 2017-08-03 DIAGNOSIS — I50.33 ACUTE ON CHRONIC DIASTOLIC CONGESTIVE HEART FAILURE: Primary | ICD-10-CM

## 2017-08-03 PROCEDURE — 1126F AMNT PAIN NOTED NONE PRSNT: CPT | Mod: GC,S$GLB,, | Performed by: HOSPITALIST

## 2017-08-03 PROCEDURE — 99999 PR PBB SHADOW E&M-EST. PATIENT-LVL III: CPT | Mod: PBBFAC,GC,, | Performed by: HOSPITALIST

## 2017-08-03 PROCEDURE — 99213 OFFICE O/P EST LOW 20 MIN: CPT | Mod: GC,S$GLB,, | Performed by: HOSPITALIST

## 2017-08-03 PROCEDURE — 1159F MED LIST DOCD IN RCRD: CPT | Mod: GC,S$GLB,, | Performed by: HOSPITALIST

## 2017-08-03 PROCEDURE — 1157F ADVNC CARE PLAN IN RCRD: CPT | Mod: GC,S$GLB,, | Performed by: HOSPITALIST

## 2017-08-03 RX ORDER — METOPROLOL SUCCINATE 50 MG/1
150 TABLET, EXTENDED RELEASE ORAL DAILY
Qty: 90 TABLET | Refills: 11 | Status: ON HOLD | OUTPATIENT
Start: 2017-08-03 | End: 2018-04-25 | Stop reason: HOSPADM

## 2017-08-03 NOTE — PROGRESS NOTES
Subjective:    Patient ID:  Wil Kwon Jr. is a 72 y.o. male who presents for follow-up of Aortic valve stenosis, mild (4 weeks fu)      HPI  HPI  73 Y/O M with PMH signfincant for MDS failed chemotherapy and currently on palliative therapy on observation and aranesp after receiving 3 cycles of Vidaza. His therapy was stopped due to development of an Empyema. He was has progressive Mod-Severe Aortic stenosis with JEFF = 0.88 cm2, peak velocity = 3.48 m/s, mean gradient = 32 mmHg. That progressed over 4 months period from mod AS ( same LVOT diameter used). He has worsening Mod to severe MR and worsening severe TR. He has chronic persistant AF on coumadin with subtherapeutic INR, follows INR with Dr Rader given thrombocytopenia and history of nose bleed in the past. Patient was seen first after decompensated heart failure in 7/2017. Patient is currently on lasix 40 BID. Today he is feeling well, denied orthopnea or PND, denied LE edema. His weight has been trending down.    ROS    ROS    Constitutional: negative for chills, fevers and night sweats  Ears, nose, mouth, throat, and face: negative for nasal congestion, sore throat and tinnitus  Respiratory: negative for cough, dyspnea on exertion, pleurisy/chest pain, sputum and wheezing  Cardiovascular: See HPI  Gastrointestinal: negative  Genitourinary:negative for dysuria, frequency, hematuria, hesitancy and nocturia  Hematologic/lymphatic: negative for bleeding and easy bruising  Musculoskeletal:negative for muscle weakness and myalgias  Neurological: negative for dizziness, headaches, paresthesia and weakness  Behavioral/Psych: negative for anxiety and bad mood    Objective:    Physical Exam    Physical Exam    General: Patient in no acute distress or discomfort  HEENT: No JVD, moist mucous membranes  Cardiac: S1S2 2/6 WILLIAM, brisk carotic upstroke.   Chest: Rales on the R base  Abd:Soft NTND  Ext: + 2 Edema No swelling  Neuro: A and O X 3, non  focal     Assessment:       1. Aortic valve stenosis, mild    2. MDS (myelodysplastic syndrome)    3. Atrial fibrillation with RVR    4. Aortic stenosis, moderate    5. Essential hypertension    6. Empyema of right pleural space    7. Other depression    8. Primary insomnia    9. Dyslipidemia    10. Bilateral carotid bruits    11. Occlusion and stenosis of carotid artery without mention of cerebral infarction    12. Paroxysmal atrial fibrillation    13. Thrombocytopenia    14. H/O carotid endarterectomy    15. Diastolic dysfunction         Plan:   Valvular heart disease  -Moderate to Severe AS  JEFF = 0.88 cm2, peak velocity = 3.48 m/s, mean gradient = 32 mmHg  -Moderate to Severe MR  -Severe TR  He is unlikely to be candidate for definitive therapy given his MDS and life expectancy.  In the mean while with single heart failure admission and compensated heart failure, he is not a candidate for BAV  Will repeat TTE in 3 months.     Chronic heart failure with preserved EF  secondary to valvular heart disease and Chronic AF  Will add small Dose ABR, he had cheek swelling with ACE but no angioedema  Will increase Toprol to 150 daily for heart rate control  Resume lasix 40 BID, check daily weight and double AM dose if gain 3 Lbs in 1 day or 5 Lbs      Chronic persisatant AF: CHADS VASC 5, HAS Bled 3  Resume Toprol 150 and Diltizem 300 for rate control  His INR is subtherpeutic, follows with Dr Oakley  He is at increased risk for CVA given his subtherpeutic INR, and at increased risk for bleeding given his thrombocytopenia and MDS.  Discussed with patient the risk and benefit.      RTC in 3 months with Echo with color flow     Jenni Rhodes MD  Cardiology Fellow

## 2017-08-03 NOTE — PROGRESS NOTES
I have personally taken the history and examined this patient and agree with the Fellow's note as stated above.    CHF compensated.  One hospital admit - now doing ok  Agree with attempt to better control hr resp AFib

## 2017-08-09 ENCOUNTER — INFUSION (OUTPATIENT)
Dept: INFUSION THERAPY | Facility: HOSPITAL | Age: 72
End: 2017-08-09
Attending: INTERNAL MEDICINE
Payer: MEDICARE

## 2017-08-09 NOTE — NURSING
Pt arrived for aaranesp.  Labs reviewed with pt.  Pt does not need injection today.  Pt discharged to home with wife.

## 2017-08-23 ENCOUNTER — INFUSION (OUTPATIENT)
Dept: INFUSION THERAPY | Facility: HOSPITAL | Age: 72
End: 2017-08-23
Attending: INTERNAL MEDICINE
Payer: MEDICARE

## 2017-08-23 VITALS — HEART RATE: 104 BPM | SYSTOLIC BLOOD PRESSURE: 102 MMHG | DIASTOLIC BLOOD PRESSURE: 57 MMHG

## 2017-08-23 DIAGNOSIS — D46.9 MDS (MYELODYSPLASTIC SYNDROME): Primary | ICD-10-CM

## 2017-08-23 RX ADMIN — DARBEPOETIN ALFA 200 MCG: 200 INJECTION, SOLUTION INTRAVENOUS; SUBCUTANEOUS at 11:08

## 2017-08-25 ENCOUNTER — PATIENT OUTREACH (OUTPATIENT)
Dept: ORTHOPEDICS | Facility: CLINIC | Age: 72
End: 2017-08-25

## 2017-08-30 ENCOUNTER — PATIENT MESSAGE (OUTPATIENT)
Dept: CARDIOLOGY | Facility: CLINIC | Age: 72
End: 2017-08-30

## 2017-09-06 ENCOUNTER — INFUSION (OUTPATIENT)
Dept: INFUSION THERAPY | Facility: HOSPITAL | Age: 72
End: 2017-09-06
Attending: INTERNAL MEDICINE
Payer: MEDICARE

## 2017-09-06 NOTE — NURSING
Pt arrived for [facility]. Labs reviewed with pt.  Pt does not need injection today.  Pt discharged to home

## 2017-09-08 ENCOUNTER — TELEPHONE (OUTPATIENT)
Dept: HEMATOLOGY/ONCOLOGY | Facility: CLINIC | Age: 72
End: 2017-09-08

## 2017-09-08 NOTE — TELEPHONE ENCOUNTER
----- Message from Zee Coffey RN sent at 9/8/2017  9:40 AM CDT -----  Contact: Wil and Agatha Kwon  I am working on 9/13 patients currently and then will move to the pts schedled 9/20  ----- Message -----  From: Kacie Chung RN  Sent: 9/8/2017   9:24 AM  To: Zee Coffey RN    Both Agatha and Wil Kwon (Dr Rader pt's) came into the office wanting to know who their new Dr will be. They are requesting to have the same Dr.    Agatha MRN [id number] has a solid tumor cancer and Wil has MDS.  Their appointments are 9/20 and 9/28.     Please call at [phone] or [phone]

## 2017-09-19 NOTE — PROGRESS NOTES
"MDS     Oncologic History:  Mr. 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 He was 's patient. He has a history of PAF followed by our electrophysiology cardiology department and is chronically anticoagulated. He has moderate aortic stenosis. He was noted to be progressively anemic . Hematology workup revealed a normal B12 and folate. His iron stores were normal. His reticulocyte count was slightly elevated at 4.2. His WBC count was low and was  progressively dropping over the last 3 years with monocytosis. He also has developed mild progressive thrombocytopenia. He is moderately symptomatic with the fatigue. He has no history of extrinsic GI bleeding. He also has no history of previous transfusions.He underwent a marrow biopsy in Aug 2016. Results revealed:    "46,XY,t(2;21)(p23;q22)[18]/46,XY[2]  Comments: The result is abnormal. Of 20 metaphases, 2 metaphases were normal and 18 metaphases had a 2;21 translocation. 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 Clinical and pathologic correlation is recommended."    FINAL PATHOLOGIC DIAGNOSIS    CBC DATA 8/24/16:    RBC: 3.45 M/UL, H/H : 9.9/32.1, MCV : 93 FL, WBC: 3.1 K/UL, Gran 1.2 k/ul %, Platelet: 200 K/UL.  No peripheral blood smear was submitted for evaluation.  BONE MARROW ASPIRATE, BONE MARROW CLOT, AND BONE MARROW CORE BIOPSY WITH:  CELLULARITY= 95%, TRILINEAGE HEMATOPOIETIC ACTIVITY (M/E= 2:1) AND INCREASED IMMATURE CELLS (9%). SEE COMMENT.  LEFT SHIFTED MATURATION OF GRANULOPOIESIS.  ADEQUATE STORAGE IRON.  ADEQUATE NUMBER OF MEGAKARYOCYTES.  COMMENT: Flow cytometry analysis of bone marrow aspirate shows lymph gate (4.2 %) containing T and B cells.  No B cell clonality or T-cell aberrancy is evident. Blast gate is increased " (7.9%) with cells expression of CD13 and  CD34. Immunohistochemical studies were performed on the clot and core biopsy for further architecture evaluation with  adequate positive and negative controls. Scattered mixed predominantly T cells (CD3 positive) and B cells (CD20  positive) are evident. About 6-7 % plasma cells ( positive) and 9% CD34 positive cells are noted. Findings  are nondiagnostic for lymphoma or plasma cell dyscrasia.  Microscopic Examination  BONE MARROW ASPIRATE DIFFERENTIAL:  Blasts----------------------------------------------5%  Promyelocytes---------------------------------2%  Myelocytes--------------------------------------11%  Metamyelocytes------------------------------ 19%  Bands----------------------------------------------5%  PMN Neutrophils------------------------------15%  Eosinophils------------------------------------------2%  Basophils---------------------------------------0%  Monocytes------------------------------------2%  Lymphocytes-------------------------------------6%  Plasma cells------------------------------------------2%  Erythroid precursors--------------------------31%  BONE MARROW ASPIRATE:  Myeloid and erythroid maturation shows M:E ratio at 2:1. No significant dysplasia is evident. Left shifted maturation of  granulopoiesis is noted. Stainable iron is present without increased ringed sideroblasts. Megakaryocytes are seen.  BONE MARROW CLOT:  Cellularity is 95 % with trilineage hematopoiesis. No abnormal infiltrates are seen. Megakaryocytes are adequate in  number. Stainable iron is present.  BONE MARROW CORE BIOPSY:  Cellularity is 95 %. No abnormal infiltrates are seen. Stainable iron is not identified. Megakaryocytes are adequate in  No significantly increased reticular fibrosis is evident by special stain (reticulin) with adequate positive and  negative controls.      His marrow result was C/W evolving MDS. He did not meet criteria for AML. He was started on  Vidaza and Aranesp and completed 3 cycles when a repeat bone marrow biopsy revealed:      2/2/17 bone marrow biopsy    BONE MARROW ASPIRATE, BONE MARROW CLOT, AND BONE MARROW CORE BIOPSY WITH:  CELLULARITY= %, TRILINEAGE HEMATOPOIETIC ACTIVITY (M/E= 1.4:1).  CONSISTENT WITH REFRACTORY ANEMIA WITH EXCESS BLASTS -1. SEE COMMENT.  DECREASED STORAGE IRON.  INCREASED NUMBER OF MEGAKARYOCYTES WITH DYSPLASTIC MORPHOLOGY.  COMMENT: Flow cytometry analysis of bone marrow aspirate shows lymph gate (15.9 %) containing T and B cells.  No B cell clonality or T-cell aberrancy is evident. Blast gate is slightly increased (7.1%).  Immunohistochemical studies were performed on the clot and core biopsy for further architecture evaluation with adequate positive and negative controls. About 6-7% CD34 positive cells are noted. CD61 highlights increased in  number of megakaryocytes with dysplastic morphology.  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 Correlate clinically and with a cytogenetics  report.  Microscopic Examination  BONE MARROW ASPIRATE DIFFERENTIAL:  Blasts---------------------------------------------- 7 %  Promyelocytes---------------------------------1%  Myelocytes--------------------------------------10%  Metamyelocytes------------------------------ 8%  Bands----------------------------------------------9%  PMN Neutrophils------------------------------15%  Eosinophils------------------------------------------1%  Basophils---------------------------------------1%  Monocytes------------------------------------1%  Lymphocytes-------------------------------------9%  Plasma cells------------------------------------------1%  Erythroid precursors--------------------------37%  BONE MARROW ASPIRATE:  Myeloid and erythroid maturation shows M:E ratio at 1.4:1. Dysgranulopoiesis and occasional dyserythropoiesis is evident. Stainable iron is decreased without increased ringed sideroblasts.  Megakaryocytes are seen.  BONE MARROW CLOT:  Cellularity is  % with trilineage hematopoiesis. No abnormal infiltrates are seen. Megakaryocytes are increased in number with dysplastic morphology. Stainable iron is decreased.  BONE MARROW CORE BIOPSY:  Cellularity is  %. No abnormal infiltrates are seen. Stainable iron is not identified. Megakaryocytes are increased in number with dysplastic morphology.       Last Vidaza was cycle 5 completed on 3/3/17.  He was evaluated for an allogeneic transplant but decided against it.  He subsequently got admitted to the hospital with an empyema.  His MDS therapy was on hold until he recovered from his empyema. He developed a drug rash while on Augmentin and was switched to Clindamycin.  However he was having increasing dyspnea on excursion and orthopnea.  He also has 2+ lower extremity edema.  He was treated with diuretics for congestive heart failure.  His symptoms improved.  He decreased his diuretic and has been on observation.He has been receiving Aranesp.The plan per  was to watch his counts and only if there decreased significantly restart his Vidaza.   He has been transfusion independent.  Interval History: Mr. Kwon returns for follow up. He is slightly fatigued but otherwise doing well.    Review of Systems   Constitutional: Positive for malaise/fatigue. Negative for chills, fever and weight loss.   HENT: Negative for congestion and nosebleeds.    Eyes: Negative for blurred vision and double vision.   Respiratory: Positive for shortness of breath. Negative for cough, hemoptysis and wheezing.    Cardiovascular: Positive for leg swelling. Negative for chest pain, palpitations and PND.   Gastrointestinal: Negative for abdominal pain, blood in stool, diarrhea, heartburn, nausea and vomiting.   Genitourinary: Negative for frequency.   Musculoskeletal: Negative for back pain and myalgias.   Skin: Positive for rash.        He has an itchy,red rash on  his left foot   Neurological: Negative for tremors, sensory change and headaches.   Endo/Heme/Allergies: Does not bruise/bleed easily.   Psychiatric/Behavioral: Negative for depression. The patient is nervous/anxious.    All other systems reviewed and are negative.        Past Medical History:   Diagnosis Date    Aortic valve stenosis, mild     secondary to bicuspid aortic alve    Atrial fibrillation     Carotid artery disease     Left CEA 4/9/13    Depression     DJD (degenerative joint disease)     H/O echocardiogram 04/13/2016    EF 55-60%. Diastolic dysfunction. PASP 39. mod AS with JEFF 1.18    History of psychiatric hospitalization     [address]    Hyperlipidemia     Hypertension     MDS (myelodysplastic syndrome) 2013    s/p Chemo     Therapy     [facility]          Past Surgical History:   Procedure Laterality Date    BONE MARROW BIOPSY  08/29/2016    CAROTID ENDARTERECTOMY Left     Inguinal hernia      Left    KNEE SURGERY      Right x2    neck fusion      TONSILLECTOMY           Family History   Problem Relation Age of Onset    Hyperlipidemia Brother      Social History     Social History    Marital status:      Spouse name: Agatha    Number of children: N/A    Years of education: N/A     Occupational History    Not on file.     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    furosemide (LASIX) 40 MG tablet Take Lasix 40 mg twice a day and an additional 40 mg in the am for weight gain of 3 lbs in one day or 5 lbs in one week.    metoprolol succinate (TOPROL-XL) 50 MG 24 hr tablet Take 3 tablets (150 mg total) by mouth once daily.    potassium chloride (KLOR-CON) 10 MEQ TbSR Take 1 tablet (10 mEq total) by mouth once daily.    trazodone (DESYREL) 100 MG tablet Take 1 tablet (100 mg total) by mouth every evening. (Patient taking differently: Take  mg by mouth every evening. )    valsartan (DIOVAN) 40 MG tablet Take 1 tablet (40 mg total) by mouth once daily.    warfarin (COUMADIN) 3 MG tablet Take 1 tablet (3 mg total) by mouth Daily.     No current facility-administered medications for this visit.        Vitals:    09/20/17 0945   BP: (!) 144/76   Pulse: 70   Resp: 18   Temp: 98.5 °F (36.9 °C)     Physical Exam   Constitutional: He is oriented to person, place, and time. He appears well-developed and well-nourished. No distress.   HENT:   Mouth/Throat: No oropharyngeal exudate.   Eyes: Pupils are equal, round, and reactive to light. No scleral icterus.   Neck: Normal range of motion.   Cardiovascular: Normal rate and regular rhythm.    Murmur heard.  Pulmonary/Chest: Effort normal and breath sounds normal. No respiratory distress. He has no wheezes. He has no rales.   Abdominal: Soft. He exhibits no distension. There is no tenderness. There is no  guarding.   Musculoskeletal: He exhibits edema.   Lymphadenopathy:     He has no cervical adenopathy.   Neurological: He is alert and oriented to person, place, and time. No cranial nerve deficit.   Skin: Rash noted.   Over left foot. Appears patchy, papular, erythematous   Psychiatric: He has a normal mood and affect.        Component      Latest Ref Rng & Units 9/20/2017 9/6/2017   Sodium      136 - 145 mmol/L  139   Potassium      3.5 - 5.1 mmol/L  3.5   Chloride      95 - 110 mmol/L  103   CO2      23 - 29 mmol/L  28   Glucose      70 - 110 mg/dL  106   BUN, Bld      8 - 23 mg/dL  29 (H)   Creatinine      0.5 - 1.4 mg/dL  1.4   Calcium      8.7 - 10.5 mg/dL  8.8   Total Protein      6.0 - 8.4 g/dL  8.0   Albumin      3.5 - 5.2 g/dL  3.2 (L)   Total Bilirubin      0.1 - 1.0 mg/dL  1.8 (H)   Alkaline Phosphatase      55 - 135 U/L  139 (H)   AST      10 - 40 U/L  30   ALT      10 - 44 U/L  17   Anion Gap      8 - 16 mmol/L  8   eGFR if African American      >60 mL/min/1.73 m:2  57.6 (A)   eGFR if non African American      >60 mL/min/1.73 m:2  49.8 (A)   WBC      3.90 - 12.70 K/uL 2.43 (L)    RBC      4.60 - 6.20 M/uL 3.43 (L)    Hemoglobin      14.0 - 18.0 g/dL 10.3 (L)    Hematocrit      40.0 - 54.0 % 32.5 (L)    MCV      82 - 98 fL 95    MCH      27.0 - 31.0 pg 30.0    MCHC      32.0 - 36.0 g/dL 31.7 (L)    RDW      11.5 - 14.5 % 18.9 (H)    Platelets      150 - 350 K/uL 127 (L)    MPV      9.2 - 12.9 fL 11.1    Gran #      1.8 - 7.7 K/uL 1.4 (L)        Assessment:    1. MDS: Bone marrow biopsy on 2/2/17 (after 3 cycles of Azacytidine) showed MDS RAEB-1. There was no significant change in the bone marrow blast percentage between August 2016 and February 2017. RUNX-1 re-arrangements were noted both in August 2016 and February 2017.Vidaza was stopped after March 2017 due to fatigue. He is not transfusion dependent. He is on intermittent Aranesp.He has mild pancytopenia today. WBC count appears to be trending  slightly down.  No indication for repeat bone marrow biopsy at this time. He has CHF and does not appear to be able to tolerate induction chemotherapy, if he transforms to AML.  He will continue CBC every 2 weeks and Aranesp for hemoglobin < 9.     2. Rash: He has macuo-papular rash over his left foot. Etiology not clear. He is using topical steroid.

## 2017-09-20 ENCOUNTER — OFFICE VISIT (OUTPATIENT)
Dept: HEMATOLOGY/ONCOLOGY | Facility: CLINIC | Age: 72
End: 2017-09-20
Payer: MEDICARE

## 2017-09-20 VITALS
DIASTOLIC BLOOD PRESSURE: 76 MMHG | HEIGHT: 71 IN | HEART RATE: 70 BPM | WEIGHT: 172.81 LBS | SYSTOLIC BLOOD PRESSURE: 144 MMHG | RESPIRATION RATE: 18 BRPM | BODY MASS INDEX: 24.19 KG/M2 | TEMPERATURE: 99 F | OXYGEN SATURATION: 99 %

## 2017-09-20 DIAGNOSIS — I50.23 ACUTE ON CHRONIC SYSTOLIC CONGESTIVE HEART FAILURE: ICD-10-CM

## 2017-09-20 DIAGNOSIS — L27.0 RASH, DRUG: ICD-10-CM

## 2017-09-20 DIAGNOSIS — D69.6 THROMBOCYTOPENIA: ICD-10-CM

## 2017-09-20 DIAGNOSIS — D46.9 MDS (MYELODYSPLASTIC SYNDROME): Primary | ICD-10-CM

## 2017-09-20 PROCEDURE — 1159F MED LIST DOCD IN RCRD: CPT | Mod: S$GLB,,, | Performed by: INTERNAL MEDICINE

## 2017-09-20 PROCEDURE — 99213 OFFICE O/P EST LOW 20 MIN: CPT | Mod: S$GLB,,, | Performed by: INTERNAL MEDICINE

## 2017-09-20 PROCEDURE — 3078F DIAST BP <80 MM HG: CPT | Mod: S$GLB,,, | Performed by: INTERNAL MEDICINE

## 2017-09-20 PROCEDURE — 99999 PR PBB SHADOW E&M-EST. PATIENT-LVL III: CPT | Mod: PBBFAC,,, | Performed by: INTERNAL MEDICINE

## 2017-09-20 PROCEDURE — 3077F SYST BP >= 140 MM HG: CPT | Mod: S$GLB,,, | Performed by: INTERNAL MEDICINE

## 2017-09-20 PROCEDURE — 3008F BODY MASS INDEX DOCD: CPT | Mod: S$GLB,,, | Performed by: INTERNAL MEDICINE

## 2017-09-20 PROCEDURE — 1126F AMNT PAIN NOTED NONE PRSNT: CPT | Mod: S$GLB,,, | Performed by: INTERNAL MEDICINE

## 2017-09-20 PROCEDURE — 1157F ADVNC CARE PLAN IN RCRD: CPT | Mod: S$GLB,,, | Performed by: INTERNAL MEDICINE

## 2017-09-20 PROCEDURE — 99499 UNLISTED E&M SERVICE: CPT | Mod: S$PBB,,, | Performed by: INTERNAL MEDICINE

## 2017-09-26 ENCOUNTER — PATIENT MESSAGE (OUTPATIENT)
Dept: INTERNAL MEDICINE | Facility: CLINIC | Age: 72
End: 2017-09-26

## 2017-09-27 RX ORDER — AZITHROMYCIN 250 MG/1
TABLET, FILM COATED ORAL
Qty: 6 TABLET | Refills: 0 | Status: SHIPPED | OUTPATIENT
Start: 2017-09-27 | End: 2017-10-02

## 2017-09-27 NOTE — TELEPHONE ENCOUNTER
Because of his underlying condition 1 do rec antibiotics--sent in.  If worsens he needs evaluation

## 2017-10-03 ENCOUNTER — PATIENT MESSAGE (OUTPATIENT)
Dept: HEMATOLOGY/ONCOLOGY | Facility: CLINIC | Age: 72
End: 2017-10-03

## 2017-10-04 ENCOUNTER — TELEPHONE (OUTPATIENT)
Dept: HEMATOLOGY/ONCOLOGY | Facility: CLINIC | Age: 72
End: 2017-10-04

## 2017-10-04 ENCOUNTER — INFUSION (OUTPATIENT)
Dept: INFUSION THERAPY | Facility: HOSPITAL | Age: 72
End: 2017-10-04
Attending: INTERNAL MEDICINE
Payer: MEDICARE

## 2017-10-04 VITALS — SYSTOLIC BLOOD PRESSURE: 108 MMHG | HEART RATE: 63 BPM | DIASTOLIC BLOOD PRESSURE: 57 MMHG

## 2017-10-04 DIAGNOSIS — D46.9 MDS (MYELODYSPLASTIC SYNDROME): Primary | ICD-10-CM

## 2017-10-04 PROCEDURE — 96372 THER/PROPH/DIAG INJ SC/IM: CPT

## 2017-10-04 PROCEDURE — 63600175 PHARM REV CODE 636 W HCPCS: Performed by: INTERNAL MEDICINE

## 2017-10-04 RX ADMIN — DARBEPOETIN ALFA 200 MCG: 200 INJECTION, SOLUTION INTRAVENOUS; SUBCUTANEOUS at 01:10

## 2017-10-04 NOTE — TELEPHONE ENCOUNTER
----- Message from Milo Slater MD sent at 10/4/2017  9:00 AM CDT -----  This pt needs CBC every 2 weeks. If it's not ordered that way I will re order. thanks

## 2017-10-11 DIAGNOSIS — F32.0 MILD SINGLE CURRENT EPISODE OF MAJOR DEPRESSIVE DISORDER: Primary | ICD-10-CM

## 2017-10-11 RX ORDER — CITALOPRAM 40 MG/1
40 TABLET, FILM COATED ORAL DAILY
Qty: 90 TABLET | Refills: 0 | Status: SHIPPED | OUTPATIENT
Start: 2017-10-11 | End: 2018-01-07 | Stop reason: SDUPTHER

## 2017-10-18 ENCOUNTER — INFUSION (OUTPATIENT)
Dept: INFUSION THERAPY | Facility: HOSPITAL | Age: 72
End: 2017-10-18
Attending: INTERNAL MEDICINE
Payer: MEDICARE

## 2017-10-18 ENCOUNTER — LAB VISIT (OUTPATIENT)
Dept: LAB | Facility: HOSPITAL | Age: 72
End: 2017-10-18
Attending: INTERNAL MEDICINE
Payer: MEDICARE

## 2017-10-18 VITALS — RESPIRATION RATE: 18 BRPM | SYSTOLIC BLOOD PRESSURE: 114 MMHG | HEART RATE: 69 BPM | DIASTOLIC BLOOD PRESSURE: 53 MMHG

## 2017-10-18 DIAGNOSIS — D46.9 MDS (MYELODYSPLASTIC SYNDROME): Primary | ICD-10-CM

## 2017-10-18 DIAGNOSIS — D46.9 MDS (MYELODYSPLASTIC SYNDROME): ICD-10-CM

## 2017-10-18 LAB
BASOPHILS # BLD AUTO: 0.01 K/UL
BASOPHILS NFR BLD: 0.2 %
DIFFERENTIAL METHOD: ABNORMAL
EOSINOPHIL # BLD AUTO: 0 K/UL
EOSINOPHIL NFR BLD: 0 %
ERYTHROCYTE [DISTWIDTH] IN BLOOD BY AUTOMATED COUNT: 17.2 %
HCT VFR BLD AUTO: 28.6 %
HGB BLD-MCNC: 9.1 G/DL
IMM GRANULOCYTES # BLD AUTO: 0.09 K/UL
IMM GRANULOCYTES NFR BLD AUTO: 2.2 %
LYMPHOCYTES # BLD AUTO: 1.7 K/UL
LYMPHOCYTES NFR BLD: 41.5 %
MCH RBC QN AUTO: 30.4 PG
MCHC RBC AUTO-ENTMCNC: 31.8 G/DL
MCV RBC AUTO: 96 FL
MONOCYTES # BLD AUTO: 0.1 K/UL
MONOCYTES NFR BLD: 2.2 %
NEUTROPHILS # BLD AUTO: 2.2 K/UL
NEUTROPHILS NFR BLD: 53.9 %
NRBC BLD-RTO: 0 /100 WBC
PLATELET # BLD AUTO: 123 K/UL
PMV BLD AUTO: 12.4 FL
RBC # BLD AUTO: 2.99 M/UL
WBC # BLD AUTO: 4.14 K/UL

## 2017-10-18 PROCEDURE — 63600175 PHARM REV CODE 636 W HCPCS: Performed by: INTERNAL MEDICINE

## 2017-10-18 PROCEDURE — 36415 COLL VENOUS BLD VENIPUNCTURE: CPT

## 2017-10-18 PROCEDURE — 96372 THER/PROPH/DIAG INJ SC/IM: CPT

## 2017-10-18 PROCEDURE — 85025 COMPLETE CBC W/AUTO DIFF WBC: CPT

## 2017-10-18 RX ORDER — METOPROLOL SUCCINATE 100 MG/1
TABLET, EXTENDED RELEASE ORAL
COMMUNITY
Start: 2017-09-19 | End: 2017-11-08

## 2017-10-18 RX ORDER — FUROSEMIDE 20 MG/1
TABLET ORAL
COMMUNITY
Start: 2017-09-20 | End: 2017-11-08

## 2017-10-18 RX ADMIN — DARBEPOETIN ALFA 200 MCG: 200 INJECTION, SOLUTION INTRAVENOUS; SUBCUTANEOUS at 02:10

## 2017-10-18 NOTE — NURSING
Pt arrived for aranesp Q2W.  Labs reviewed with pt.  Pt tolerated injection SQ to right arm.  Discharged to home with appt calendar.

## 2017-10-20 ENCOUNTER — PATIENT MESSAGE (OUTPATIENT)
Dept: HEMATOLOGY/ONCOLOGY | Facility: CLINIC | Age: 72
End: 2017-10-20

## 2017-10-20 DIAGNOSIS — E78.5 DYSLIPIDEMIA: ICD-10-CM

## 2017-10-20 DIAGNOSIS — F32.89 OTHER DEPRESSION: ICD-10-CM

## 2017-10-20 DIAGNOSIS — I48.91 ATRIAL FIBRILLATION WITH RVR: ICD-10-CM

## 2017-10-20 DIAGNOSIS — I27.20 PULMONARY HYPERTENSION: ICD-10-CM

## 2017-10-20 DIAGNOSIS — D46.9 MDS (MYELODYSPLASTIC SYNDROME): ICD-10-CM

## 2017-10-20 DIAGNOSIS — I10 HTN (HYPERTENSION), BENIGN: ICD-10-CM

## 2017-10-20 DIAGNOSIS — I65.23 BILATERAL CAROTID ARTERY STENOSIS: ICD-10-CM

## 2017-10-20 RX ORDER — WARFARIN 3 MG/1
3 TABLET ORAL DAILY
Qty: 30 TABLET | Refills: 11 | Status: SHIPPED | OUTPATIENT
Start: 2017-10-20 | End: 2018-04-13 | Stop reason: SDUPTHER

## 2017-10-31 NOTE — PROGRESS NOTES
"MDS     Oncologic History:  Mr. 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 He was 's patient. He has a history of PAF followed by our electrophysiology cardiology department and is chronically anticoagulated. He has moderate aortic stenosis. He was noted to be progressively anemic . Hematology workup revealed a normal B12 and folate. His iron stores were normal. His reticulocyte count was slightly elevated at 4.2. His WBC count was low and was  progressively dropping over the last 3 years with monocytosis. He also has developed mild progressive thrombocytopenia. He is moderately symptomatic with the fatigue. He has no history of extrinsic GI bleeding. He also has no history of previous transfusions.He underwent a marrow biopsy in Aug 2016. Results revealed:     "46,XY,t(2;21)(p23;q22)[18]/46,XY[2]  Comments: The result is abnormal. Of 20 metaphases, 2 metaphases were normal and 18 metaphases had a 2;21 translocation. 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 Clinical and pathologic correlation is recommended."     FINAL PATHOLOGIC DIAGNOSIS     CBC DATA 8/24/16:     RBC: 3.45 M/UL, H/H : 9.9/32.1, MCV : 93 FL, WBC: 3.1 K/UL, Gran 1.2 k/ul %, Platelet: 200 K/UL.  No peripheral blood smear was submitted for evaluation.  BONE MARROW ASPIRATE, BONE MARROW CLOT, AND BONE MARROW CORE BIOPSY WITH:  CELLULARITY= 95%, TRILINEAGE HEMATOPOIETIC ACTIVITY (M/E= 2:1) AND INCREASED IMMATURE CELLS (9%). SEE COMMENT.  LEFT SHIFTED MATURATION OF GRANULOPOIESIS.  ADEQUATE STORAGE IRON.  ADEQUATE NUMBER OF MEGAKARYOCYTES.  COMMENT: Flow cytometry analysis of bone marrow aspirate shows lymph gate (4.2 %) containing T and B cells.  No B cell clonality or T-cell aberrancy is evident. Blast gate is increased " (7.9%) with cells expression of CD13 and  CD34. Immunohistochemical studies were performed on the clot and core biopsy for further architecture evaluation with  adequate positive and negative controls. Scattered mixed predominantly T cells (CD3 positive) and B cells (CD20  positive) are evident. About 6-7 % plasma cells ( positive) and 9% CD34 positive cells are noted. Findings  are nondiagnostic for lymphoma or plasma cell dyscrasia.  Microscopic Examination  BONE MARROW ASPIRATE DIFFERENTIAL:  Blasts----------------------------------------------5%  Promyelocytes---------------------------------2%  Myelocytes--------------------------------------11%  Metamyelocytes------------------------------ 19%  Bands----------------------------------------------5%  PMN Neutrophils------------------------------15%  Eosinophils------------------------------------------2%  Basophils---------------------------------------0%  Monocytes------------------------------------2%  Lymphocytes-------------------------------------6%  Plasma cells------------------------------------------2%  Erythroid precursors--------------------------31%  BONE MARROW ASPIRATE:  Myeloid and erythroid maturation shows M:E ratio at 2:1. No significant dysplasia is evident. Left shifted maturation of  granulopoiesis is noted. Stainable iron is present without increased ringed sideroblasts. Megakaryocytes are seen.  BONE MARROW CLOT:  Cellularity is 95 % with trilineage hematopoiesis. No abnormal infiltrates are seen. Megakaryocytes are adequate in  number. Stainable iron is present.  BONE MARROW CORE BIOPSY:  Cellularity is 95 %. No abnormal infiltrates are seen. Stainable iron is not identified. Megakaryocytes are adequate in  No significantly increased reticular fibrosis is evident by special stain (reticulin) with adequate positive and  negative controls.      His marrow result was C/W evolving MDS. He did not meet criteria for AML. He was started on  Vidaza and Aranesp and completed 3 cycles when a repeat bone marrow biopsy revealed:      2/2/17 bone marrow biopsy     BONE MARROW ASPIRATE, BONE MARROW CLOT, AND BONE MARROW CORE BIOPSY WITH:  CELLULARITY= %, TRILINEAGE HEMATOPOIETIC ACTIVITY (M/E= 1.4:1).  CONSISTENT WITH REFRACTORY ANEMIA WITH EXCESS BLASTS -1. SEE COMMENT.  DECREASED STORAGE IRON.  INCREASED NUMBER OF MEGAKARYOCYTES WITH DYSPLASTIC MORPHOLOGY.  COMMENT: Flow cytometry analysis of bone marrow aspirate shows lymph gate (15.9 %) containing T and B cells.  No B cell clonality or T-cell aberrancy is evident. Blast gate is slightly increased (7.1%).  Immunohistochemical studies were performed on the clot and core biopsy for further architecture evaluation with adequate positive and negative controls. About 6-7% CD34 positive cells are noted. CD61 highlights increased in  number of megakaryocytes with dysplastic morphology.  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 Correlate clinically and with a cytogenetics  report.  Microscopic Examination  BONE MARROW ASPIRATE DIFFERENTIAL:  Blasts---------------------------------------------- 7 %  Promyelocytes---------------------------------1%  Myelocytes--------------------------------------10%  Metamyelocytes------------------------------ 8%  Bands----------------------------------------------9%  PMN Neutrophils------------------------------15%  Eosinophils------------------------------------------1%  Basophils---------------------------------------1%  Monocytes------------------------------------1%  Lymphocytes-------------------------------------9%  Plasma cells------------------------------------------1%  Erythroid precursors--------------------------37%  BONE MARROW ASPIRATE:  Myeloid and erythroid maturation shows M:E ratio at 1.4:1. Dysgranulopoiesis and occasional dyserythropoiesis is evident. Stainable iron is decreased without increased ringed sideroblasts.  Megakaryocytes are seen.  BONE MARROW CLOT:  Cellularity is  % with trilineage hematopoiesis. No abnormal infiltrates are seen. Megakaryocytes are increased in number with dysplastic morphology. Stainable iron is decreased.  BONE MARROW CORE BIOPSY:  Cellularity is  %. No abnormal infiltrates are seen. Stainable iron is not identified. Megakaryocytes are increased in number with dysplastic morphology.       Last Vidaza was cycle 5 completed on 3/3/17.  He was evaluated for an allogeneic transplant but decided against it.  He subsequently got admitted to the hospital with an empyema.  His MDS therapy was on hold until he recovered from his empyema. He developed a drug rash while on Augmentin and was switched to Clindamycin.  However he was having increasing dyspnea on excursion and orthopnea.  He also has 2+ lower extremity edema.  He was treated with diuretics for congestive heart failure.  His symptoms improved.  He decreased his diuretic and has been on observation.He has been receiving Aranesp.The plan per  was to watch his counts and only if there decreased significantly restart his Vidaza.   He has been transfusion independent.    Interval History: Mr. Kwon returns for follow up. He is slightly fatigued but otherwise doing well.    Review of Systems   Constitutional: Positive for malaise/fatigue. Negative for chills, fever and weight loss.   HENT: Negative for ear discharge and nosebleeds.    Respiratory: Negative for cough and shortness of breath.    Cardiovascular: Negative for chest pain, claudication and leg swelling.   Gastrointestinal: Negative for abdominal pain, diarrhea and heartburn.   Genitourinary: Negative for dysuria and frequency.   Musculoskeletal: Negative for back pain and myalgias.   Skin: Negative for rash.   Neurological: Negative for sensory change and headaches.   Psychiatric/Behavioral: Negative for depression. The patient is nervous/anxious.        Current  Outpatient Prescriptions   Medication Sig    citalopram (CELEXA) 40 MG tablet Take 1 tablet (40 mg total) by mouth once daily.    diltiaZEM (CARDIZEM CD) 300 MG 24 hr capsule TAKE ONE CAPSULE BY MOUTH EVERY DAY    food supplemt, lactose-reduced (BOOST) Liqd Take by mouth once daily.    furosemide (LASIX) 20 MG tablet     furosemide (LASIX) 40 MG tablet Take Lasix 40 mg twice a day and an additional 40 mg in the am for weight gain of 3 lbs in one day or 5 lbs in one week.    metoprolol succinate (TOPROL-XL) 100 MG 24 hr tablet     metoprolol succinate (TOPROL-XL) 50 MG 24 hr tablet Take 3 tablets (150 mg total) by mouth once daily.    potassium chloride (KLOR-CON) 10 MEQ TbSR Take 1 tablet (10 mEq total) by mouth once daily.    trazodone (DESYREL) 100 MG tablet Take 1 tablet (100 mg total) by mouth every evening. (Patient taking differently: Take  mg by mouth every evening. )    valsartan (DIOVAN) 40 MG tablet Take 1 tablet (40 mg total) by mouth once daily.    warfarin (COUMADIN) 3 MG tablet Take 1 tablet (3 mg total) by mouth Daily.     No current facility-administered medications for this visit.        Vitals:    11/01/17 1511   BP: (!) 158/70   Pulse: 66   Temp: 98 °F (36.7 °C)       Physical Exam   Constitutional: He is oriented to person, place, and time. He appears well-developed. No distress.   HENT:   Head: Normocephalic and atraumatic.   Mouth/Throat: No oropharyngeal exudate.   Eyes: Pupils are equal, round, and reactive to light. No scleral icterus.   Neck: Normal range of motion.   Cardiovascular:   Murmur heard.  Pulmonary/Chest: Effort normal and breath sounds normal. No respiratory distress. He has no wheezes. He has no rales.   Abdominal: Soft. He exhibits no distension.   Musculoskeletal: He exhibits no edema.   Lymphadenopathy:     He has no cervical adenopathy.   Neurological: He is alert and oriented to person, place, and time. No cranial nerve deficit.   Skin: Skin is warm.    Psychiatric: He has a normal mood and affect.       Component      Latest Ref Rng & Units 11/1/2017   WBC      3.90 - 12.70 K/uL 3.25 (L)   RBC      4.60 - 6.20 M/uL 3.05 (L)   Hemoglobin      14.0 - 18.0 g/dL 9.3 (L)   Hematocrit      40.0 - 54.0 % 29.3 (L)   MCV      82 - 98 fL 96   MCH      27.0 - 31.0 pg 30.5   MCHC      32.0 - 36.0 g/dL 31.7 (L)   RDW      11.5 - 14.5 % 18.2 (H)   Platelets      150 - 350 K/uL 95 (L)   MPV      9.2 - 12.9 fL 11.9   Immature Granulocytes      0.0 - 0.5 % 1.2 (H)   Gran #      1.8 - 7.7 K/uL 1.6 (L)   Immature Grans (Abs)      0.00 - 0.04 K/uL 0.04   Lymph #      1.0 - 4.8 K/uL 1.5   Mono #      0.3 - 1.0 K/uL 0.1 (L)   Eos #      0.0 - 0.5 K/uL 0.0   Baso #      0.00 - 0.20 K/uL 0.00   nRBC      0 /100 WBC 0   Gran%      38.0 - 73.0 % 49.9   Lymph%      18.0 - 48.0 % 47.4   Mono%      4.0 - 15.0 % 1.5 (L)   Eosinophil%      0.0 - 8.0 % 0.0   Basophil%      0.0 - 1.9 % 0.0   Differential Method       Automated   Sodium      136 - 145 mmol/L 139   Potassium      3.5 - 5.1 mmol/L 4.0   Chloride      95 - 110 mmol/L 105   CO2      23 - 29 mmol/L 28   Glucose      70 - 110 mg/dL 96   BUN, Bld      8 - 23 mg/dL 24 (H)   Creatinine      0.5 - 1.4 mg/dL 1.0   Calcium      8.7 - 10.5 mg/dL 9.2   Total Protein      6.0 - 8.4 g/dL 8.2   Albumin      3.5 - 5.2 g/dL 3.4 (L)   Total Bilirubin      0.1 - 1.0 mg/dL 0.6   Alkaline Phosphatase      55 - 135 U/L 92   AST      10 - 40 U/L 29   ALT      10 - 44 U/L 18   Anion Gap      8 - 16 mmol/L 6 (L)   eGFR if African American      >60 mL/min/1.73 m:2 >60.0   eGFR if non African American      >60 mL/min/1.73 m:2 >60.0   Uric Acid      3.4 - 7.0 mg/dL 7.4 (H)   LD      110 - 260 U/L 262 (H)       Assessment:     1. MDS: Bone marrow biopsy on 2/2/17 (after 3 cycles of Azacytidine) showed MDS RAEB-1. There was no significant change in the bone marrow blast percentage between August 2016 and February 2017. RUNX-1 re-arrangements were noted both in  August 2016 and February 2017.Vidaza was stopped after March 2017 due to fatigue. He is not transfusion dependent. He is on intermittent Aranesp.He has mild pancytopenia today. WBC count appears to be trending slightly down.  No indication for repeat bone marrow biopsy at this time. He has CHF and does not appear to be able to tolerate induction chemotherapy, if he transforms to AML.  He will continue CBC every 2 weeks and Aranesp for hemoglobin < 10.      2. Hyperuricemia: Mild, asymptomatic. Will monitor

## 2017-11-01 ENCOUNTER — LAB VISIT (OUTPATIENT)
Dept: LAB | Facility: HOSPITAL | Age: 72
End: 2017-11-01
Attending: INTERNAL MEDICINE
Payer: MEDICARE

## 2017-11-01 ENCOUNTER — OFFICE VISIT (OUTPATIENT)
Dept: HEMATOLOGY/ONCOLOGY | Facility: CLINIC | Age: 72
End: 2017-11-01
Payer: MEDICARE

## 2017-11-01 VITALS
TEMPERATURE: 98 F | HEART RATE: 66 BPM | HEIGHT: 71 IN | BODY MASS INDEX: 24.45 KG/M2 | SYSTOLIC BLOOD PRESSURE: 158 MMHG | DIASTOLIC BLOOD PRESSURE: 70 MMHG | WEIGHT: 174.63 LBS

## 2017-11-01 DIAGNOSIS — I48.91 ATRIAL FIBRILLATION WITH RVR: ICD-10-CM

## 2017-11-01 DIAGNOSIS — D63.8 ANEMIA, CHRONIC DISEASE: ICD-10-CM

## 2017-11-01 DIAGNOSIS — D69.6 THROMBOCYTOPENIA: ICD-10-CM

## 2017-11-01 DIAGNOSIS — D46.9 MDS (MYELODYSPLASTIC SYNDROME): ICD-10-CM

## 2017-11-01 DIAGNOSIS — D46.9 MDS (MYELODYSPLASTIC SYNDROME): Primary | ICD-10-CM

## 2017-11-01 LAB
ALBUMIN SERPL BCP-MCNC: 3.4 G/DL
ALP SERPL-CCNC: 92 U/L
ALT SERPL W/O P-5'-P-CCNC: 18 U/L
ANION GAP SERPL CALC-SCNC: 6 MMOL/L
AST SERPL-CCNC: 29 U/L
BASOPHILS # BLD AUTO: 0 K/UL
BASOPHILS NFR BLD: 0 %
BILIRUB SERPL-MCNC: 0.6 MG/DL
BUN SERPL-MCNC: 24 MG/DL
CALCIUM SERPL-MCNC: 9.2 MG/DL
CHLORIDE SERPL-SCNC: 105 MMOL/L
CO2 SERPL-SCNC: 28 MMOL/L
CREAT SERPL-MCNC: 1 MG/DL
DIFFERENTIAL METHOD: ABNORMAL
EOSINOPHIL # BLD AUTO: 0 K/UL
EOSINOPHIL NFR BLD: 0 %
ERYTHROCYTE [DISTWIDTH] IN BLOOD BY AUTOMATED COUNT: 18.2 %
EST. GFR  (AFRICAN AMERICAN): >60 ML/MIN/1.73 M^2
EST. GFR  (NON AFRICAN AMERICAN): >60 ML/MIN/1.73 M^2
GLUCOSE SERPL-MCNC: 96 MG/DL
HCT VFR BLD AUTO: 29.3 %
HGB BLD-MCNC: 9.3 G/DL
IMM GRANULOCYTES # BLD AUTO: 0.04 K/UL
IMM GRANULOCYTES NFR BLD AUTO: 1.2 %
LDH SERPL L TO P-CCNC: 262 U/L
LYMPHOCYTES # BLD AUTO: 1.5 K/UL
LYMPHOCYTES NFR BLD: 47.4 %
MCH RBC QN AUTO: 30.5 PG
MCHC RBC AUTO-ENTMCNC: 31.7 G/DL
MCV RBC AUTO: 96 FL
MONOCYTES # BLD AUTO: 0.1 K/UL
MONOCYTES NFR BLD: 1.5 %
NEUTROPHILS # BLD AUTO: 1.6 K/UL
NEUTROPHILS NFR BLD: 49.9 %
NRBC BLD-RTO: 0 /100 WBC
PLATELET # BLD AUTO: 95 K/UL
PMV BLD AUTO: 11.9 FL
POTASSIUM SERPL-SCNC: 4 MMOL/L
PROT SERPL-MCNC: 8.2 G/DL
RBC # BLD AUTO: 3.05 M/UL
SODIUM SERPL-SCNC: 139 MMOL/L
URATE SERPL-MCNC: 7.4 MG/DL
WBC # BLD AUTO: 3.25 K/UL

## 2017-11-01 PROCEDURE — 85025 COMPLETE CBC W/AUTO DIFF WBC: CPT

## 2017-11-01 PROCEDURE — 80053 COMPREHEN METABOLIC PANEL: CPT

## 2017-11-01 PROCEDURE — 36415 COLL VENOUS BLD VENIPUNCTURE: CPT

## 2017-11-01 PROCEDURE — 99213 OFFICE O/P EST LOW 20 MIN: CPT | Mod: S$GLB,,, | Performed by: INTERNAL MEDICINE

## 2017-11-01 PROCEDURE — 99999 PR PBB SHADOW E&M-EST. PATIENT-LVL III: CPT | Mod: PBBFAC,,, | Performed by: INTERNAL MEDICINE

## 2017-11-01 PROCEDURE — 84550 ASSAY OF BLOOD/URIC ACID: CPT

## 2017-11-01 PROCEDURE — 99499 UNLISTED E&M SERVICE: CPT | Mod: S$PBB,,, | Performed by: INTERNAL MEDICINE

## 2017-11-01 PROCEDURE — 83615 LACTATE (LD) (LDH) ENZYME: CPT

## 2017-11-02 ENCOUNTER — INFUSION (OUTPATIENT)
Dept: INFUSION THERAPY | Facility: HOSPITAL | Age: 72
End: 2017-11-02
Attending: INTERNAL MEDICINE
Payer: MEDICARE

## 2017-11-02 VITALS — DIASTOLIC BLOOD PRESSURE: 56 MMHG | SYSTOLIC BLOOD PRESSURE: 115 MMHG | HEART RATE: 67 BPM | RESPIRATION RATE: 18 BRPM

## 2017-11-02 DIAGNOSIS — D46.9 MDS (MYELODYSPLASTIC SYNDROME): Primary | ICD-10-CM

## 2017-11-02 PROCEDURE — 96372 THER/PROPH/DIAG INJ SC/IM: CPT

## 2017-11-02 PROCEDURE — 63600175 PHARM REV CODE 636 W HCPCS: Mod: AE | Performed by: INTERNAL MEDICINE

## 2017-11-02 RX ADMIN — DARBEPOETIN ALFA 200 MCG: 200 INJECTION, SOLUTION INTRAVENOUS; SUBCUTANEOUS at 12:11

## 2017-11-08 ENCOUNTER — OFFICE VISIT (OUTPATIENT)
Dept: CARDIOLOGY | Facility: CLINIC | Age: 72
End: 2017-11-08
Payer: MEDICARE

## 2017-11-08 VITALS
HEIGHT: 71 IN | HEART RATE: 80 BPM | WEIGHT: 175.69 LBS | BODY MASS INDEX: 24.6 KG/M2 | DIASTOLIC BLOOD PRESSURE: 57 MMHG | SYSTOLIC BLOOD PRESSURE: 118 MMHG

## 2017-11-08 DIAGNOSIS — E78.5 DYSLIPIDEMIA: ICD-10-CM

## 2017-11-08 DIAGNOSIS — I10 ESSENTIAL HYPERTENSION: Primary | ICD-10-CM

## 2017-11-08 DIAGNOSIS — I48.0 PAROXYSMAL ATRIAL FIBRILLATION: ICD-10-CM

## 2017-11-08 DIAGNOSIS — I35.0 AORTIC VALVE STENOSIS, SEVERE: ICD-10-CM

## 2017-11-08 PROCEDURE — 99999 PR PBB SHADOW E&M-EST. PATIENT-LVL IV: CPT | Mod: PBBFAC,GC,, | Performed by: HOSPITALIST

## 2017-11-08 PROCEDURE — 99214 OFFICE O/P EST MOD 30 MIN: CPT | Mod: GC,S$GLB,, | Performed by: HOSPITALIST

## 2017-11-08 NOTE — PROGRESS NOTES
Subjective:    Patient ID:  Wil Kwon Jr. is a 72 y.o. male who presents for follow-up of Congestive Heart Failure (3 month f/u ); Fall (yesterday ); and Fatigue    HPI  71 Y/O M with PMH signfincant for MDS failed chemotherapy and currently on palliative therapy on observation and aranesp after receiving 3 cycles of Vidaza. His therapy was stopped due to development of an Empyema, currently checks CBC every 2 weeks and Aranesp for hemoglobin < 10. He was has progressive Mod-Severe Aortic stenosis with JEFF = 0.88 cm2, peak velocity = 3.48 m/s, mean gradient = 32 mmHg. That progressed over 4 months period from mod AS ( same LVOT diameter used). He has worsening Mod to severe MR and worsening severe TR. He has chronic persistant AF on coumadin used to follow INR with Dr Rader, currently doesn't follow level. Patient was seen first after admission for decompensated heart failure in 7/2017. Patient is currently on lasix 40 daily. Denied orthopnea or PND, denied LE edema. His gained 3 Lbs over course of 3 months.   Yesterday while talking at his house, he reported an episode of near syncope while walking when he fell, didn't hit his head and denied loss of consciousness. Denied prodrome or previous similar episodes.      ROS    Constitutional: negative for chills, fevers and night sweats  Ears, nose, mouth, throat, and face: negative for nasal congestion, sore throat and tinnitus  Respiratory: negative for cough, dyspnea on exertion, pleurisy/chest pain, sputum and wheezing  Cardiovascular: See HPI  Gastrointestinal: negative  Genitourinary:negative for dysuria, frequency, hematuria, hesitancy and nocturia  Hematologic/lymphatic: negative for bleeding and easy bruising  Musculoskeletal:negative for muscle weakness and myalgias  Neurological: negative for dizziness, headaches, paresthesia and weakness  Behavioral/Psych: negative for anxiety and bad mood    Objective:    Physical Exam    General: Patient in no  acute distress or discomfort  HEENT: No JVD, moist mucous membranes  Cardiac: S1S2 WILLIAM 3/6 R parasternum radiating to carotids and to apex.  Chest: CTABL, no wheezing or rales  Abd:Soft NTND  Ext: No Edema No swelling  Neuro: A and O X 3, non focal    Assessment:       1. Essential hypertension    2. Dyslipidemia    3. Paroxysmal atrial fibrillation    4. Aortic valve stenosis, severe         Plan:   Valvular heart disease  -Moderate to Severe AS  JEFF = 0.88 cm2, peak velocity = 3.48 m/s, mean gradient = 32 mmHg  -Moderate to Severe MR  -Severe TR  He is unlikely to be candidate for definitive therapy given his MDS and life expectancy.  In the mean while with single heart failure admission and compensated heart failure, if had recurrent syncope will referred to heart valve clinic for evaluation for BAV  Will repeat TTE on next visit.    Presyncope episode  Concern for hypotension, will discontinue valsartan  Will order 30 days event monitor     Chronic heart failure with preserved EF  secondary to valvular heart disease and Chronic AF  Will resume Toprol to 150 daily for heart rate control  Resume lasix 40 Daily, check daily weight and double AM dose if gain 3 Lbs in 1 day or 5 Lbs      Chronic persisatant AF: CHADS VASC 5, HAS Bled 3  Resume Toprol 150 and Diltizem 300 for rate control  His INR goal is 2-2.5, will refer to Coumadin clinic  He is at increased risk for CVA given his subtherpeutic INR, and at increased risk for bleeding given his thrombocytopenia and MDS.  Discussed with patient the risk and benefit.      RTC in 4 weeks     Jenni Rhodes MD  Cardiology Fellow

## 2017-11-09 ENCOUNTER — CLINICAL SUPPORT (OUTPATIENT)
Dept: ELECTROPHYSIOLOGY | Facility: CLINIC | Age: 72
End: 2017-11-09
Attending: HOSPITALIST
Payer: MEDICARE

## 2017-11-09 ENCOUNTER — ANTI-COAG VISIT (OUTPATIENT)
Dept: CARDIOLOGY | Facility: CLINIC | Age: 72
End: 2017-11-09

## 2017-11-09 DIAGNOSIS — I48.0 PAROXYSMAL ATRIAL FIBRILLATION: ICD-10-CM

## 2017-11-09 DIAGNOSIS — I10 ESSENTIAL HYPERTENSION: ICD-10-CM

## 2017-11-09 DIAGNOSIS — I35.0 AORTIC VALVE STENOSIS, SEVERE: ICD-10-CM

## 2017-11-09 DIAGNOSIS — Z79.01 LONG-TERM (CURRENT) USE OF ANTICOAGULANTS: Primary | ICD-10-CM

## 2017-11-09 PROCEDURE — 93268 ECG RECORD/REVIEW: CPT | Mod: S$GLB,,, | Performed by: INTERNAL MEDICINE

## 2017-11-09 NOTE — PROGRESS NOTES
"73yo M with PMHx: Paroxysmal Afib, MDS, thrombocytopenia, HTN, HLD, aortic valve stenosis, CHF, moderate to severe MR, CAD, depression, DJD, hx of psychiatric hospitalization, insomnia, bilateral carotid bruits, occlusion/stenosis of carotid artery and hx of syncope/fall.  The pt was previously on Coumadin and his INRs were previously followed by his oncologist.  Per cardiology note on 11/8: His INR goal is 2-2.5 . . . He is at increased risk for CVA given his subtherpeutic INR, and at increased risk for bleeding given his thrombocytopenia and MDS.      I was able to reach the pt in the AM on 11/9.  Per the pt and his med card, he was Warfarin 3mg tablets.  He takes 1 pill daily in the mornings, but will change to evening administration on 11/10.  He reports being on coumadin for a few years and never having a hx of bleeding or bruising.  He cannot recall the exact date of his last INR, but thinks it was "a couple of months ago."  He reports that his past oncologist wanted him on "low-dose" warfarin due to his bleed risk (low platelets and low H/H).  I explained that his range is lower/thicker than most.  He did take Xarelto in the past and reports a "reaction" to it.  He does not need a new pt education session and will get his first INR with other labs next week.  Afterwards, he will be seen for regular monitoring at the [facility].  "

## 2017-11-14 NOTE — PROGRESS NOTES
"MDS     Oncologic History:  Mr. 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 He was 's patient. He has a history of PAF followed by our electrophysiology cardiology department and is chronically anticoagulated. He has moderate aortic stenosis. He was noted to be progressively anemic . Hematology workup revealed a normal B12 and folate. His iron stores were normal. His reticulocyte count was slightly elevated at 4.2. His WBC count was low and was  progressively dropping over the last 3 years with monocytosis. He also has developed mild progressive thrombocytopenia. He is moderately symptomatic with the fatigue. He has no history of extrinsic GI bleeding. He also has no history of previous transfusions.He underwent a marrow biopsy in Aug 2016. Results revealed:     "46,XY,t(2;21)(p23;q22)[18]/46,XY[2]  Comments: The result is abnormal. Of 20 metaphases, 2 metaphases were normal and 18 metaphases had a 2;21 translocation. 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 Clinical and pathologic correlation is recommended."     FINAL PATHOLOGIC DIAGNOSIS     CBC DATA 8/24/16:     RBC: 3.45 M/UL, H/H : 9.9/32.1, MCV : 93 FL, WBC: 3.1 K/UL, Gran 1.2 k/ul %, Platelet: 200 K/UL.  No peripheral blood smear was submitted for evaluation.  BONE MARROW ASPIRATE, BONE MARROW CLOT, AND BONE MARROW CORE BIOPSY WITH:  CELLULARITY= 95%, TRILINEAGE HEMATOPOIETIC ACTIVITY (M/E= 2:1) AND INCREASED IMMATURE CELLS (9%). SEE COMMENT.  LEFT SHIFTED MATURATION OF GRANULOPOIESIS.  ADEQUATE STORAGE IRON.  ADEQUATE NUMBER OF MEGAKARYOCYTES.  COMMENT: Flow cytometry analysis of bone marrow aspirate shows lymph gate (4.2 %) containing T and B cells.  No B cell clonality or T-cell aberrancy is evident. Blast gate is increased " (7.9%) with cells expression of CD13 and  CD34. Immunohistochemical studies were performed on the clot and core biopsy for further architecture evaluation with  adequate positive and negative controls. Scattered mixed predominantly T cells (CD3 positive) and B cells (CD20  positive) are evident. About 6-7 % plasma cells ( positive) and 9% CD34 positive cells are noted. Findings  are nondiagnostic for lymphoma or plasma cell dyscrasia.  Microscopic Examination  BONE MARROW ASPIRATE DIFFERENTIAL:  Blasts----------------------------------------------5%  Promyelocytes---------------------------------2%  Myelocytes--------------------------------------11%  Metamyelocytes------------------------------ 19%  Bands----------------------------------------------5%  PMN Neutrophils------------------------------15%  Eosinophils------------------------------------------2%  Basophils---------------------------------------0%  Monocytes------------------------------------2%  Lymphocytes-------------------------------------6%  Plasma cells------------------------------------------2%  Erythroid precursors--------------------------31%  BONE MARROW ASPIRATE:  Myeloid and erythroid maturation shows M:E ratio at 2:1. No significant dysplasia is evident. Left shifted maturation of  granulopoiesis is noted. Stainable iron is present without increased ringed sideroblasts. Megakaryocytes are seen.  BONE MARROW CLOT:  Cellularity is 95 % with trilineage hematopoiesis. No abnormal infiltrates are seen. Megakaryocytes are adequate in  number. Stainable iron is present.  BONE MARROW CORE BIOPSY:  Cellularity is 95 %. No abnormal infiltrates are seen. Stainable iron is not identified. Megakaryocytes are adequate in  No significantly increased reticular fibrosis is evident by special stain (reticulin) with adequate positive and  negative controls.      His marrow result was C/W evolving MDS. He did not meet criteria for AML. He was started on  Vidaza and Aranesp and completed 3 cycles when a repeat bone marrow biopsy revealed:      2/2/17 bone marrow biopsy     BONE MARROW ASPIRATE, BONE MARROW CLOT, AND BONE MARROW CORE BIOPSY WITH:  CELLULARITY= %, TRILINEAGE HEMATOPOIETIC ACTIVITY (M/E= 1.4:1).  CONSISTENT WITH REFRACTORY ANEMIA WITH EXCESS BLASTS -1. SEE COMMENT.  DECREASED STORAGE IRON.  INCREASED NUMBER OF MEGAKARYOCYTES WITH DYSPLASTIC MORPHOLOGY.  COMMENT: Flow cytometry analysis of bone marrow aspirate shows lymph gate (15.9 %) containing T and B cells.  No B cell clonality or T-cell aberrancy is evident. Blast gate is slightly increased (7.1%).  Immunohistochemical studies were performed on the clot and core biopsy for further architecture evaluation with adequate positive and negative controls. About 6-7% CD34 positive cells are noted. CD61 highlights increased in  number of megakaryocytes with dysplastic morphology.  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 Correlate clinically and with a cytogenetics  report.  Microscopic Examination  BONE MARROW ASPIRATE DIFFERENTIAL:  Blasts---------------------------------------------- 7 %  Promyelocytes---------------------------------1%  Myelocytes--------------------------------------10%  Metamyelocytes------------------------------ 8%  Bands----------------------------------------------9%  PMN Neutrophils------------------------------15%  Eosinophils------------------------------------------1%  Basophils---------------------------------------1%  Monocytes------------------------------------1%  Lymphocytes-------------------------------------9%  Plasma cells------------------------------------------1%  Erythroid precursors--------------------------37%  BONE MARROW ASPIRATE:  Myeloid and erythroid maturation shows M:E ratio at 1.4:1. Dysgranulopoiesis and occasional dyserythropoiesis is evident. Stainable iron is decreased without increased ringed sideroblasts.  Megakaryocytes are seen.  BONE MARROW CLOT:  Cellularity is  % with trilineage hematopoiesis. No abnormal infiltrates are seen. Megakaryocytes are increased in number with dysplastic morphology. Stainable iron is decreased.  BONE MARROW CORE BIOPSY:  Cellularity is  %. No abnormal infiltrates are seen. Stainable iron is not identified. Megakaryocytes are increased in number with dysplastic morphology.       Last Vidaza was cycle 5 completed on 3/3/17.  He was evaluated for an allogeneic transplant but decided against it.  He subsequently got admitted to the hospital with an empyema.  His MDS therapy was on hold until he recovered from his empyema. He developed a drug rash while on Augmentin and was switched to Clindamycin.  However he was having increasing dyspnea on excursion and orthopnea.  He also has 2+ lower extremity edema.  He was treated with diuretics for congestive heart failure.  His symptoms improved.  He decreased his diuretic and has been on observation.He has been receiving Aranesp.The plan per  was to watch his counts and only if there decreased significantly restart his Vidaza.   He has been transfusion independent.     Interval History: Mr. Kwon returns for follow up. He is slightly fatigued but otherwise doing well.    Review of Systems   Constitutional: Positive for malaise/fatigue. Negative for chills, fever and weight loss.   HENT: Negative for nosebleeds.    Eyes: Negative for blurred vision and double vision.   Respiratory: Negative for cough and shortness of breath.    Cardiovascular: Negative for chest pain, palpitations and leg swelling.   Gastrointestinal: Negative for abdominal pain, diarrhea, heartburn, nausea and vomiting.   Genitourinary: Negative for frequency and hematuria.   Musculoskeletal: Negative for back pain.   Skin: Negative for rash.   Neurological: Negative for sensory change and headaches.   Endo/Heme/Allergies: Does not bruise/bleed easily.    Psychiatric/Behavioral: The patient is nervous/anxious.    All other systems reviewed and are negative.        Current Outpatient Prescriptions   Medication Sig    citalopram (CELEXA) 40 MG tablet Take 1 tablet (40 mg total) by mouth once daily.    diltiaZEM (CARDIZEM CD) 300 MG 24 hr capsule TAKE ONE CAPSULE BY MOUTH EVERY DAY    food supplemt, lactose-reduced (BOOST) Liqd Take by mouth once daily.    furosemide (LASIX) 40 MG tablet Take Lasix 40 mg twice a day and an additional 40 mg in the am for weight gain of 3 lbs in one day or 5 lbs in one week.    metoprolol succinate (TOPROL-XL) 50 MG 24 hr tablet Take 3 tablets (150 mg total) by mouth once daily.    trazodone (DESYREL) 100 MG tablet Take 1 tablet (100 mg total) by mouth every evening. (Patient taking differently: Take  mg by mouth every evening. )    warfarin (COUMADIN) 3 MG tablet Take 1 tablet (3 mg total) by mouth Daily.     No current facility-administered medications for this visit.        Vitals:    11/15/17 1335   BP: (!) 152/67   Pulse: 71   Resp: 18   Temp: 98.5 °F (36.9 °C)       Physical Exam   Constitutional: He is oriented to person, place, and time. He appears well-developed and well-nourished. No distress.   HENT:   Head: Normocephalic and atraumatic.   Mouth/Throat: No oropharyngeal exudate.   Eyes: Pupils are equal, round, and reactive to light. No scleral icterus.   Neck: Normal range of motion.   Cardiovascular: Normal rate and regular rhythm.    Murmur heard.  Pulmonary/Chest: Effort normal and breath sounds normal. No respiratory distress. He has no wheezes. He has no rales.   Abdominal: Soft. Bowel sounds are normal. He exhibits no distension. There is no rebound and no guarding.   Musculoskeletal: Normal range of motion. He exhibits no edema.   Lymphadenopathy:     He has no cervical adenopathy.   Neurological: He is alert and oriented to person, place, and time. No cranial nerve deficit.   Skin: Skin is warm.        Assessment:     1. MDS: Bone marrow biopsy on 2/2/17 (after 3 cycles of Azacytidine) showed MDS RAEB-1. There was no significant change in the bone marrow blast percentage between August 2016 and February 2017. RUNX-1 re-arrangements were noted both in August 2016 and February 2017.Vidaza was stopped after March 2017 due to fatigue. He is not transfusion dependent. He is on Aranesp 200mcg every 2 weeks.He has mild pancytopenia today.   No indication for repeat bone marrow biopsy at this time. He has CHF and does not appear to be able to tolerate induction chemotherapy, if he transforms to AML.  He will continue CBC every 2 weeks and Aranesp for hemoglobin < 10.      2. Hyperuricemia: Mild, asymptomatic. Will monitor        Distress Screening Results: Psychosocial Distress screening score of Distress Score: 0 noted and reviewed. No intervention indicated.

## 2017-11-15 ENCOUNTER — OFFICE VISIT (OUTPATIENT)
Dept: HEMATOLOGY/ONCOLOGY | Facility: CLINIC | Age: 72
End: 2017-11-15
Payer: MEDICARE

## 2017-11-15 ENCOUNTER — ANTI-COAG VISIT (OUTPATIENT)
Dept: CARDIOLOGY | Facility: CLINIC | Age: 72
End: 2017-11-15

## 2017-11-15 ENCOUNTER — INFUSION (OUTPATIENT)
Dept: INFUSION THERAPY | Facility: HOSPITAL | Age: 72
End: 2017-11-15
Attending: INTERNAL MEDICINE
Payer: MEDICARE

## 2017-11-15 VITALS
TEMPERATURE: 99 F | BODY MASS INDEX: 24.72 KG/M2 | HEIGHT: 71 IN | SYSTOLIC BLOOD PRESSURE: 152 MMHG | WEIGHT: 176.56 LBS | HEART RATE: 71 BPM | OXYGEN SATURATION: 99 % | RESPIRATION RATE: 18 BRPM | DIASTOLIC BLOOD PRESSURE: 67 MMHG

## 2017-11-15 DIAGNOSIS — D46.9 MDS (MYELODYSPLASTIC SYNDROME): Primary | ICD-10-CM

## 2017-11-15 DIAGNOSIS — D46.9 MDS (MYELODYSPLASTIC SYNDROME): ICD-10-CM

## 2017-11-15 DIAGNOSIS — I48.0 PAROXYSMAL ATRIAL FIBRILLATION: ICD-10-CM

## 2017-11-15 DIAGNOSIS — D64.9 ANEMIA, UNSPECIFIED TYPE: ICD-10-CM

## 2017-11-15 DIAGNOSIS — I35.0 AORTIC STENOSIS, MODERATE: ICD-10-CM

## 2017-11-15 DIAGNOSIS — I50.33 ACUTE ON CHRONIC DIASTOLIC CONGESTIVE HEART FAILURE: Primary | ICD-10-CM

## 2017-11-15 DIAGNOSIS — Z79.01 LONG-TERM (CURRENT) USE OF ANTICOAGULANTS: ICD-10-CM

## 2017-11-15 PROCEDURE — 99999 PR PBB SHADOW E&M-EST. PATIENT-LVL III: CPT | Mod: PBBFAC,,, | Performed by: INTERNAL MEDICINE

## 2017-11-15 PROCEDURE — 99499 UNLISTED E&M SERVICE: CPT | Mod: S$PBB,,, | Performed by: INTERNAL MEDICINE

## 2017-11-15 PROCEDURE — 99214 OFFICE O/P EST MOD 30 MIN: CPT | Mod: S$GLB,,, | Performed by: INTERNAL MEDICINE

## 2017-11-15 PROCEDURE — 63600175 PHARM REV CODE 636 W HCPCS: Mod: AE | Performed by: INTERNAL MEDICINE

## 2017-11-15 PROCEDURE — 96372 THER/PROPH/DIAG INJ SC/IM: CPT

## 2017-11-15 RX ADMIN — DARBEPOETIN ALFA 200 MCG: 200 INJECTION, SOLUTION INTRAVENOUS; SUBCUTANEOUS at 02:11

## 2017-11-15 NOTE — Clinical Note
Aranesp today and in 2 wks Cbc, cmp in 2wks, f/u in 2wks with retic count, LDH, iron, ferritin, tibc

## 2017-11-15 NOTE — PROGRESS NOTES
Questioned and confirmed correct dose .  Reports eating hamburger with regular lettuce on today .  Also reports bilateral arm bruising .  Patient stated he has Myelodysplastic syndrome  Platelets count  was 95 today.  No other changes reported.

## 2017-11-20 ENCOUNTER — ANTI-COAG VISIT (OUTPATIENT)
Dept: CARDIOLOGY | Facility: CLINIC | Age: 72
End: 2017-11-20
Payer: MEDICARE

## 2017-11-20 DIAGNOSIS — I48.0 PAROXYSMAL ATRIAL FIBRILLATION: ICD-10-CM

## 2017-11-20 DIAGNOSIS — Z79.01 LONG-TERM (CURRENT) USE OF ANTICOAGULANTS: Primary | ICD-10-CM

## 2017-11-20 LAB — INR PPP: 1.4 (ref 2–2.5)

## 2017-11-20 PROCEDURE — 99211 OFF/OP EST MAY X REQ PHY/QHP: CPT | Mod: 25,S$GLB,, | Performed by: PHARMACIST

## 2017-11-20 PROCEDURE — 85610 PROTHROMBIN TIME: CPT | Mod: QW,S$GLB,, | Performed by: PHARMACIST

## 2017-11-20 NOTE — PROGRESS NOTES
Patient is here for a quick follow up status post low INR on 11/15. He reports taking coumadin dose as prescribed with no missed doses. He denies bleeding or bruising issues. Patient reports no new medications since last appointment. INR improved but very slightly. We will continue to increase his dose but watch closely due to his risk of bleeding as well. Patient was re-educated on situations that would require placing a call to the coumadin clinic, including bleeding or unusual bruising issues, changes in health, diet or medications, upcoming procedures that require warfarin interruption, and missed coumadin dose(s). Patient expressed understanding that avoidance of consistency with these parameters could cause fluctuations in INR, leading to more frequent visits and increase risk of adverse events.

## 2017-11-24 ENCOUNTER — ANTI-COAG VISIT (OUTPATIENT)
Dept: CARDIOLOGY | Facility: CLINIC | Age: 72
End: 2017-11-24
Payer: MEDICARE

## 2017-11-24 DIAGNOSIS — Z79.01 LONG-TERM (CURRENT) USE OF ANTICOAGULANTS: Primary | ICD-10-CM

## 2017-11-24 DIAGNOSIS — I48.0 PAROXYSMAL ATRIAL FIBRILLATION: ICD-10-CM

## 2017-11-24 LAB — INR PPP: 1.4 (ref 2–3)

## 2017-11-24 PROCEDURE — 85610 PROTHROMBIN TIME: CPT | Mod: QW,S$GLB,, | Performed by: INTERNAL MEDICINE

## 2017-11-24 NOTE — PROGRESS NOTES
Quick follow-up from low INR 11/20. INR still low today despite increased dose. Patient with bruises on arms from use. Denies any bleeding or other changes. Will increase weekly dose again and monitor closely with follow-up INR in lab in 5 days. Advised him to call with any changes or concerns.

## 2017-11-29 ENCOUNTER — ANTI-COAG VISIT (OUTPATIENT)
Dept: CARDIOLOGY | Facility: CLINIC | Age: 72
End: 2017-11-29

## 2017-11-29 ENCOUNTER — TELEPHONE (OUTPATIENT)
Dept: HEMATOLOGY/ONCOLOGY | Facility: CLINIC | Age: 72
End: 2017-11-29

## 2017-11-29 ENCOUNTER — OFFICE VISIT (OUTPATIENT)
Dept: HEMATOLOGY/ONCOLOGY | Facility: CLINIC | Age: 72
End: 2017-11-29
Payer: MEDICARE

## 2017-11-29 ENCOUNTER — INFUSION (OUTPATIENT)
Dept: INFUSION THERAPY | Facility: HOSPITAL | Age: 72
End: 2017-11-29
Attending: INTERNAL MEDICINE
Payer: MEDICARE

## 2017-11-29 VITALS
BODY MASS INDEX: 24.39 KG/M2 | SYSTOLIC BLOOD PRESSURE: 134 MMHG | HEIGHT: 71 IN | RESPIRATION RATE: 18 BRPM | TEMPERATURE: 98 F | WEIGHT: 174.19 LBS | OXYGEN SATURATION: 99 % | DIASTOLIC BLOOD PRESSURE: 62 MMHG | HEART RATE: 66 BPM

## 2017-11-29 VITALS — HEART RATE: 67 BPM | SYSTOLIC BLOOD PRESSURE: 120 MMHG | DIASTOLIC BLOOD PRESSURE: 60 MMHG | RESPIRATION RATE: 18 BRPM

## 2017-11-29 DIAGNOSIS — I48.0 PAROXYSMAL ATRIAL FIBRILLATION: ICD-10-CM

## 2017-11-29 DIAGNOSIS — D46.9 MDS (MYELODYSPLASTIC SYNDROME): Primary | ICD-10-CM

## 2017-11-29 DIAGNOSIS — D63.8 ANEMIA, CHRONIC DISEASE: ICD-10-CM

## 2017-11-29 DIAGNOSIS — D69.6 THROMBOCYTOPENIA: ICD-10-CM

## 2017-11-29 DIAGNOSIS — Z79.01 LONG-TERM (CURRENT) USE OF ANTICOAGULANTS: ICD-10-CM

## 2017-11-29 PROCEDURE — 63600175 PHARM REV CODE 636 W HCPCS: Performed by: INTERNAL MEDICINE

## 2017-11-29 PROCEDURE — 96372 THER/PROPH/DIAG INJ SC/IM: CPT

## 2017-11-29 PROCEDURE — 99999 PR PBB SHADOW E&M-EST. PATIENT-LVL IV: CPT | Mod: PBBFAC,,, | Performed by: INTERNAL MEDICINE

## 2017-11-29 PROCEDURE — 99499 UNLISTED E&M SERVICE: CPT | Mod: S$PBB,,, | Performed by: INTERNAL MEDICINE

## 2017-11-29 PROCEDURE — 99214 OFFICE O/P EST MOD 30 MIN: CPT | Mod: S$GLB,,, | Performed by: INTERNAL MEDICINE

## 2017-11-29 RX ADMIN — DARBEPOETIN ALFA 200 MCG: 200 INJECTION, SOLUTION INTRAVENOUS; SUBCUTANEOUS at 12:11

## 2017-11-29 NOTE — PROGRESS NOTES
"CC:  MDS     Oncologic History:   Mr. 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 He was 's patient. He has a history of PAF followed by our electrophysiology cardiology department and is chronically anticoagulated. He has moderate aortic stenosis. He was noted to be progressively anemic . Hematology workup revealed a normal B12 and folate. His iron stores were normal. His reticulocyte count was slightly elevated at 4.2. His WBC count was low and was progressively dropping over the last 3 years with monocytosis. He also has developed mild progressive thrombocytopenia. He is moderately symptomatic with the fatigue. He has no history of extrinsic GI bleeding. He also has no history of previous transfusions.He underwent a marrow biopsy in Aug 2016. 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 Of 20 metaphases, 2 metaphases were normal and 18 metaphases had a 2;21 translocation. 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 Clinical and pathologic correlation is recommended."     FINAL PATHOLOGIC DIAGNOSIS   CBC DATA 8/24/16:   RBC: 3.45 M/UL, H/H : 9.9/32.1, MCV : 93 FL, WBC: 3.1 K/UL, Gran 1.2 k/ul %, Platelet: 200 K/UL.   No peripheral blood smear was submitted for evaluation.   BONE MARROW ASPIRATE, BONE MARROW CLOT, AND BONE MARROW CORE BIOPSY WITH:   CELLULARITY= 95%, TRILINEAGE HEMATOPOIETIC ACTIVITY (M/E= 2:1) AND INCREASED IMMATURE CELLS (9%). SEE COMMENT.   LEFT SHIFTED MATURATION OF GRANULOPOIESIS.   ADEQUATE STORAGE IRON.   ADEQUATE NUMBER OF MEGAKARYOCYTES.   COMMENT: Flow cytometry analysis of bone marrow aspirate shows lymph gate (4.2 %) containing T and B cells.   No B cell clonality or T-cell aberrancy is evident. Blast gate is " increased (7.9%) with cells expression of CD13 and   CD34. Immunohistochemical studies were performed on the clot and core biopsy for further architecture evaluation with   adequate positive and negative controls. Scattered mixed predominantly T cells (CD3 positive) and B cells (CD20   positive) are evident. About 6-7 % plasma cells ( positive) and 9% CD34 positive cells are noted. Findings   are nondiagnostic for lymphoma or plasma cell dyscrasia.   Microscopic Examination   BONE MARROW ASPIRATE DIFFERENTIAL:   Blasts----------------------------------------------5%   Promyelocytes---------------------------------2%   Myelocytes--------------------------------------11%   Metamyelocytes------------------------------ 19%   Bands----------------------------------------------5%   PMN Neutrophils------------------------------15%   Eosinophils------------------------------------------2%   Basophils---------------------------------------0%   Monocytes------------------------------------2%   Lymphocytes-------------------------------------6%   Plasma cells------------------------------------------2%   Erythroid precursors--------------------------31%   BONE MARROW ASPIRATE:   Myeloid and erythroid maturation shows M:E ratio at 2:1. No significant dysplasia is evident. Left shifted maturation of   granulopoiesis is noted. Stainable iron is present without increased ringed sideroblasts. Megakaryocytes are seen.   BONE MARROW CLOT:   Cellularity is 95 % with trilineage hematopoiesis. No abnormal infiltrates are seen. Megakaryocytes are adequate in   number. Stainable iron is present.   BONE MARROW CORE BIOPSY:   Cellularity is 95 %. No abnormal infiltrates are seen. Stainable iron is not identified. Megakaryocytes are adequate in   No significantly increased reticular fibrosis is evident by special stain (reticulin) with adequate positive and   negative controls.   His marrow result was C/W evolving MDS. He did not meet  criteria for AML. He was started on Vidaza and Aranesp and completed 3 cycles when a repeat bone marrow biopsy revealed:   2/2/17 bone marrow biopsy   BONE MARROW ASPIRATE, BONE MARROW CLOT, AND BONE MARROW CORE BIOPSY WITH:   CELLULARITY= %, TRILINEAGE HEMATOPOIETIC ACTIVITY (M/E= 1.4:1).   CONSISTENT WITH REFRACTORY ANEMIA WITH EXCESS BLASTS -1. SEE COMMENT.   DECREASED STORAGE IRON.   INCREASED NUMBER OF MEGAKARYOCYTES WITH DYSPLASTIC MORPHOLOGY.   COMMENT: Flow cytometry analysis of bone marrow aspirate shows lymph gate (15.9 %) containing T and B cells.   No B cell clonality or T-cell aberrancy is evident. Blast gate is slightly increased (7.1%).   Immunohistochemical studies were performed on the clot and core biopsy for further architecture evaluation with adequate positive and negative controls. About 6-7% CD34 positive cells are noted. CD61 highlights increased in   number of megakaryocytes with dysplastic morphology.   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 Correlate clinically and with a cytogenetics   report.   Microscopic Examination   BONE MARROW ASPIRATE DIFFERENTIAL:   Blasts---------------------------------------------- 7 %   Promyelocytes---------------------------------1%   Myelocytes--------------------------------------10%   Metamyelocytes------------------------------ 8%   Bands----------------------------------------------9%   PMN Neutrophils------------------------------15%   Eosinophils------------------------------------------1%   Basophils---------------------------------------1%   Monocytes------------------------------------1%   Lymphocytes-------------------------------------9%   Plasma cells------------------------------------------1%   Erythroid precursors--------------------------37%   BONE MARROW ASPIRATE:   Myeloid and erythroid maturation shows M:E ratio at 1.4:1. Dysgranulopoiesis and occasional dyserythropoiesis is evident. Stainable iron is  decreased without increased ringed sideroblasts. Megakaryocytes are seen.   BONE MARROW CLOT:   Cellularity is  % with trilineage hematopoiesis. No abnormal infiltrates are seen. Megakaryocytes are increased in number with dysplastic morphology. Stainable iron is decreased.   BONE MARROW CORE BIOPSY:   Cellularity is  %. No abnormal infiltrates are seen. Stainable iron is not identified. Megakaryocytes are increased in number with dysplastic morphology.   Last Vidaza was cycle 5 completed on 3/3/17. He was evaluated for an allogeneic transplant but decided against it. He subsequently got admitted to the hospital with an empyema. His MDS therapy was on hold until he recovered from his empyema. He developed a drug rash while on Augmentin and was switched to Clindamycin. However he was having increasing dyspnea on excursion and orthopnea. He also has 2+ lower extremity edema. He was treated with diuretics for congestive heart failure. His symptoms improved. He decreased his diuretic and has been on observation.He has been receiving Aranesp.The plan per  was to watch his counts and only if there decreased significantly restart his Vidaza.   He has been transfusion independent.     Interval History: Mr. Kwon returns for follow up. He is slightly fatigued but otherwise doing well.      Review of Systems   Respiratory: Negative for cough and shortness of breath.    Cardiovascular: Negative for chest pain.   Gastrointestinal: Negative for abdominal pain and diarrhea.   Genitourinary: Negative for frequency.   Musculoskeletal: Negative for back pain.   Skin: Negative for rash.   Neurological: Negative for headaches.   Psychiatric/Behavioral: The patient is nervous/anxious.          Current Outpatient Prescriptions   Medication Sig    citalopram (CELEXA) 40 MG tablet Take 1 tablet (40 mg total) by mouth once daily.    diltiaZEM (CARDIZEM CD) 300 MG 24 hr capsule TAKE ONE CAPSULE BY MOUTH EVERY DAY     food supplemt, lactose-reduced (BOOST) Liqd Take by mouth once daily.    furosemide (LASIX) 40 MG tablet Take Lasix 40 mg twice a day and an additional 40 mg in the am for weight gain of 3 lbs in one day or 5 lbs in one week.    metoprolol succinate (TOPROL-XL) 50 MG 24 hr tablet Take 3 tablets (150 mg total) by mouth once daily.    trazodone (DESYREL) 100 MG tablet Take 1 tablet (100 mg total) by mouth every evening. (Patient taking differently: Take  mg by mouth every evening. )    warfarin (COUMADIN) 3 MG tablet Take 1 tablet (3 mg total) by mouth Daily.     No current facility-administered medications for this visit.      Vitals:    11/29/17 1129   BP: 134/62   Pulse: 66   Resp: 18   Temp: 98.2 °F (36.8 °C)       Physical Exam   Constitutional: He is oriented to person, place, and time. He appears well-developed. No distress.   HENT:   Head: Normocephalic and atraumatic.   Mouth/Throat: No oropharyngeal exudate.   Eyes: Pupils are equal, round, and reactive to light. No scleral icterus.   Neck: Normal range of motion.   Cardiovascular: Normal rate and regular rhythm.    Murmur heard.  Pulmonary/Chest: Effort normal and breath sounds normal. No respiratory distress. He has no wheezes. He exhibits no tenderness.   Abdominal: Soft. He exhibits no distension and no mass. There is no tenderness. There is no guarding.   Musculoskeletal: Normal range of motion. He exhibits no edema.   Lymphadenopathy:     He has no cervical adenopathy.   Neurological: He is alert and oriented to person, place, and time. No cranial nerve deficit.   Skin: Skin is warm.   Has multiple ecchymoses on UEs   Psychiatric: He has a normal mood and affect.       Component      Latest Ref Rng & Units 11/29/2017   WBC      3.90 - 12.70 K/uL 3.68 (L)   RBC      4.60 - 6.20 M/uL 2.75 (L)   Hemoglobin      14.0 - 18.0 g/dL 8.5 (L)   Hematocrit      40.0 - 54.0 % 27.5 (L)   MCV      82 - 98 fL 100 (H)   MCH      27.0 - 31.0 pg 30.9    MCHC      32.0 - 36.0 g/dL 30.9 (L)   RDW      11.5 - 14.5 % 19.9 (H)   Platelets      150 - 350 K/uL 90 (L)   MPV      9.2 - 12.9 fL 11.7   Immature Granulocytes      0.0 - 0.5 % 1.6 (H)   Gran #      1.8 - 7.7 K/uL 2.1   Immature Grans (Abs)      0.00 - 0.04 K/uL 0.06 (H)   Lymph #      1.0 - 4.8 K/uL 1.5   Mono #      0.3 - 1.0 K/uL 0.1 (L)   Eos #      0.0 - 0.5 K/uL 0.0   Baso #      0.00 - 0.20 K/uL 0.00   nRBC      0 /100 WBC 0   Gran%      38.0 - 73.0 % 57.1   Lymph%      18.0 - 48.0 % 39.4   Mono%      4.0 - 15.0 % 1.9 (L)   Eosinophil%      0.0 - 8.0 % 0.0   Basophil%      0.0 - 1.9 % 0.0   Differential Method       Automated   Retic      0.4 - 2.0 % 4.1 (H)     Component      Latest Ref Rng & Units 11/29/2017   Iron      45 - 160 ug/dL 173 (H)   Transferrin      200 - 375 mg/dL 248   TIBC      250 - 450 ug/dL 367   Saturated Iron      20 - 50 % 47   Ferritin      20.0 - 300.0 ng/mL 416 (H)         Assessment:     1. MDS: Bone marrow biopsy on 2/2/17 (after 3 cycles of Azacytidine) showed MDS RAEB-1. There was no significant change in the bone marrow blast percentage between August 2016 and February 2017. RUNX-1 re-arrangements were noted both in August 2016 and February 2017.Vidaza was stopped after March 2017 due to fatigue. He is not transfusion dependent. He is on Aranesp 200mcg every 2 weeks.He has mild pancytopenia today.   No indication for repeat bone marrow biopsy at this time. He has CHF and does not appear to be able to tolerate induction chemotherapy, if he transforms to AML.  He will have Aranesp changed to once WEEKLY (200mcg) for hemoglobin < 10. His hemoglobin has been declining. He does not have any active bleeding, although he is anti-coagulated and is mildly thrombocytopenic.    If he continues to be refractory to increased Aranesp dose, he will have another bone marrow biopsy.     2. Hyperuricemia: Mild, asymptomatic. Will monitor      3. Thrombocytopenia: Platelet count 90k today. He  has occasional epistaxis. He remains on Warfarin for atrial fibrillation.       4. Leukopenia: Secondary to MDS. Mild,asymptoamtic.        Answers for HPI/ROS submitted by the patient on 11/27/2017   appetite change : No  unexpected weight change: No  visual disturbance: No  adenopathy: No

## 2017-11-29 NOTE — PROGRESS NOTES
Distress Screening Results: Psychosocial Distress screening score of Distress Score: 0 noted and reviewed. No intervention indicated.  Answers for HPI/ROS submitted by the patient on 11/27/2017   appetite change : No  unexpected weight change: No  visual disturbance: No  cough: No  shortness of breath: No  chest pain: No  abdominal pain: No  diarrhea: No  frequency: No  back pain: No  rash: No  headaches: No  adenopathy: No  nervous/ anxious: Yes

## 2017-11-29 NOTE — NURSING
Pt arrived for [facility] following MD appt.  Labs reviewed with pt.  Pt tolerated injection SQ to right arm.  Discharged to home.

## 2017-11-29 NOTE — TELEPHONE ENCOUNTER
----- Message from Milo Slater MD sent at 11/29/2017 12:01 PM CST -----  --Aranesp every week  --clinic visit in 2 weeks

## 2017-12-04 ENCOUNTER — ANTI-COAG VISIT (OUTPATIENT)
Dept: CARDIOLOGY | Facility: CLINIC | Age: 72
End: 2017-12-04
Payer: MEDICARE

## 2017-12-04 DIAGNOSIS — I48.0 PAROXYSMAL ATRIAL FIBRILLATION: ICD-10-CM

## 2017-12-04 DIAGNOSIS — Z79.01 LONG-TERM (CURRENT) USE OF ANTICOAGULANTS: Primary | ICD-10-CM

## 2017-12-04 DIAGNOSIS — D46.9 MDS (MYELODYSPLASTIC SYNDROME): Primary | ICD-10-CM

## 2017-12-04 LAB — INR PPP: 2 (ref 2–3)

## 2017-12-04 PROCEDURE — 85610 PROTHROMBIN TIME: CPT | Mod: QW,S$GLB,,

## 2017-12-04 PROCEDURE — 99211 OFF/OP EST MAY X REQ PHY/QHP: CPT | Mod: 25,S$GLB,,

## 2017-12-04 NOTE — PROGRESS NOTES
Quick follow-up for low INR 11/29. INR within normal range today. Patient with bruises on his arms and hands from use. States that he has had some small amount of blood from his nose this past week. Denies any other bleeding or changes. States he has an upcoming port removal procedure 12/12 and he was told to hold coumadin for 5 days. Verbal and written procedure instructions given to him. Will boost dose after procedure and follow-up 1 week after procedure. Will maintain weekly dose till 12/7 then resume weekly dose 12/13. Advised him to call with any changes or concerns.

## 2017-12-04 NOTE — PROGRESS NOTES
Pt seen by Trupti PHELPS. I have reviewed her initial findings and agree with her assessment and plan.  The pt is CHADs = 2 (CHF, HTN) and I am not recommending lovenox while he holds coumadin for his upcoming procedure.  I have sent Dr. Daly this recommendation.

## 2017-12-06 ENCOUNTER — INFUSION (OUTPATIENT)
Dept: INFUSION THERAPY | Facility: HOSPITAL | Age: 72
End: 2017-12-06
Attending: INTERNAL MEDICINE
Payer: MEDICARE

## 2017-12-06 VITALS — SYSTOLIC BLOOD PRESSURE: 125 MMHG | HEART RATE: 65 BPM | DIASTOLIC BLOOD PRESSURE: 59 MMHG

## 2017-12-06 DIAGNOSIS — D46.9 MDS (MYELODYSPLASTIC SYNDROME): Primary | ICD-10-CM

## 2017-12-06 PROCEDURE — 63600175 PHARM REV CODE 636 W HCPCS: Performed by: INTERNAL MEDICINE

## 2017-12-06 PROCEDURE — 96372 THER/PROPH/DIAG INJ SC/IM: CPT

## 2017-12-06 RX ADMIN — DARBEPOETIN ALFA 200 MCG: 200 INJECTION, SOLUTION INTRAVENOUS; SUBCUTANEOUS at 10:12

## 2017-12-06 NOTE — NURSING
Patient here for injection-complains of being weak and tired-states works out at gym-tolerated injection well.

## 2017-12-11 DIAGNOSIS — D46.9 MDS (MYELODYSPLASTIC SYNDROME): Primary | ICD-10-CM

## 2017-12-12 ENCOUNTER — SURGERY (OUTPATIENT)
Age: 72
End: 2017-12-12

## 2017-12-12 ENCOUNTER — HOSPITAL ENCOUNTER (OUTPATIENT)
Facility: HOSPITAL | Age: 72
Discharge: HOME | End: 2017-12-12
Attending: INTERNAL MEDICINE | Admitting: INTERNAL MEDICINE
Payer: MEDICARE

## 2017-12-12 VITALS
RESPIRATION RATE: 18 BRPM | BODY MASS INDEX: 24.08 KG/M2 | DIASTOLIC BLOOD PRESSURE: 51 MMHG | WEIGHT: 172 LBS | HEIGHT: 71 IN | OXYGEN SATURATION: 99 % | HEART RATE: 70 BPM | TEMPERATURE: 98 F | SYSTOLIC BLOOD PRESSURE: 117 MMHG

## 2017-12-12 DIAGNOSIS — D46.9 MDS (MYELODYSPLASTIC SYNDROME): ICD-10-CM

## 2017-12-12 PROCEDURE — 25000003 PHARM REV CODE 250: Performed by: FAMILY MEDICINE

## 2017-12-12 PROCEDURE — 25000003 PHARM REV CODE 250: Performed by: INTERNAL MEDICINE

## 2017-12-12 PROCEDURE — 63600175 PHARM REV CODE 636 W HCPCS: Performed by: FAMILY MEDICINE

## 2017-12-12 PROCEDURE — 63600175 PHARM REV CODE 636 W HCPCS: Performed by: STUDENT IN AN ORGANIZED HEALTH CARE EDUCATION/TRAINING PROGRAM

## 2017-12-12 RX ORDER — FENTANYL CITRATE 50 UG/ML
INJECTION, SOLUTION INTRAMUSCULAR; INTRAVENOUS CODE/TRAUMA/SEDATION MEDICATION
Status: COMPLETED | OUTPATIENT
Start: 2017-12-12 | End: 2017-12-12

## 2017-12-12 RX ORDER — LIDOCAINE HYDROCHLORIDE 10 MG/ML
1 INJECTION, SOLUTION EPIDURAL; INFILTRATION; INTRACAUDAL; PERINEURAL ONCE
Status: COMPLETED | OUTPATIENT
Start: 2017-12-12 | End: 2017-12-12

## 2017-12-12 RX ORDER — MIDAZOLAM HYDROCHLORIDE 1 MG/ML
1 INJECTION INTRAMUSCULAR; INTRAVENOUS
Status: DISCONTINUED | OUTPATIENT
Start: 2017-12-12 | End: 2017-12-12 | Stop reason: HOSPADM

## 2017-12-12 RX ORDER — SODIUM CHLORIDE 9 MG/ML
500 INJECTION, SOLUTION INTRAVENOUS ONCE
Status: COMPLETED | OUTPATIENT
Start: 2017-12-12 | End: 2017-12-12

## 2017-12-12 RX ORDER — FENTANYL CITRATE 50 UG/ML
50 INJECTION, SOLUTION INTRAMUSCULAR; INTRAVENOUS
Status: DISCONTINUED | OUTPATIENT
Start: 2017-12-12 | End: 2017-12-12 | Stop reason: HOSPADM

## 2017-12-12 RX ORDER — MIDAZOLAM HYDROCHLORIDE 1 MG/ML
INJECTION INTRAMUSCULAR; INTRAVENOUS CODE/TRAUMA/SEDATION MEDICATION
Status: COMPLETED | OUTPATIENT
Start: 2017-12-12 | End: 2017-12-12

## 2017-12-12 RX ADMIN — SODIUM CHLORIDE 500 ML: 0.9 INJECTION, SOLUTION INTRAVENOUS at 07:12

## 2017-12-12 RX ADMIN — MIDAZOLAM HYDROCHLORIDE 1 MG: 1 INJECTION, SOLUTION INTRAMUSCULAR; INTRAVENOUS at 08:12

## 2017-12-12 RX ADMIN — VANCOMYCIN HYDROCHLORIDE 1000 MG: 1 INJECTION, POWDER, LYOPHILIZED, FOR SOLUTION INTRAVENOUS at 07:12

## 2017-12-12 RX ADMIN — LIDOCAINE HYDROCHLORIDE 0.5 MG: 10 INJECTION, SOLUTION EPIDURAL; INFILTRATION; INTRACAUDAL; PERINEURAL at 07:12

## 2017-12-12 RX ADMIN — FENTANYL CITRATE 50 MCG: 50 INJECTION, SOLUTION INTRAMUSCULAR; INTRAVENOUS at 08:12

## 2017-12-12 NOTE — PROGRESS NOTES
Patient awake, alert and oriented resting quietly in stretcher.  Respirations even and unlabored, no apparent distress.  Stretcher locked, in lowest position, side rail up.  Patient remains on continuous monitoring.  Family at bedside.  Denies needs at this time.  Will continue to monitor.

## 2017-12-12 NOTE — H&P
Radiology History & Physical      SUBJECTIVE:     Chief Complaint: Port no longer needed, s/p some chemo for MDS, but without ability to tolerate induction chem.    History of Present Illness:  Wil Kwon Jr. is a 72 y.o. male who presents for port removal  Past Medical History:   Diagnosis Date    Aortic valve stenosis, mild     secondary to bicuspid aortic alve    Atrial fibrillation     Carotid artery disease     Left CEA 4/9/13    Depression     DJD (degenerative joint disease)     H/O echocardiogram 04/13/2016    EF 55-60%. Diastolic dysfunction. PASP 39. mod AS with JEFF 1.18    History of psychiatric hospitalization     [facility] 2014, [facility] 1993    Hyperlipidemia     Hypertension     MDS (myelodysplastic syndrome) 2013    s/p Chemo     Therapy     [facility]      Past Surgical History:   Procedure Laterality Date    BONE MARROW BIOPSY  08/29/2016    CAROTID ENDARTERECTOMY Left     Inguinal hernia      Left    KNEE SURGERY      Right x2    neck fusion      TONSILLECTOMY         Home Meds:   Prior to Admission medications    Medication Sig Start Date End Date Taking? Authorizing Provider   citalopram (CELEXA) 40 MG tablet Take 1 tablet (40 mg total) by mouth once daily. 10/11/17  Yes Milo Slater MD   diltiaZEM (CARDIZEM CD) 300 MG 24 hr capsule TAKE ONE CAPSULE BY MOUTH EVERY DAY 7/10/17  Yes SAMMY Serra MD   furosemide (LASIX) 40 MG tablet Take Lasix 40 mg twice a day and an additional 40 mg in the am for weight gain of 3 lbs in one day or 5 lbs in one week. 7/6/17  Yes Jenni Rhodes MD   metoprolol succinate (TOPROL-XL) 50 MG 24 hr tablet Take 3 tablets (150 mg total) by mouth once daily. 8/3/17  Yes Jenni Rhodes MD   trazodone (DESYREL) 100 MG tablet Take 1 tablet (100 mg total) by mouth every evening.  Patient taking differently: Take  mg by mouth every evening.  4/19/17 4/19/18 Yes Laura Rader MD   food supplemt,  lactose-reduced (BOOST) Liqd Take by mouth once daily.    Historical Provider, MD   warfarin (COUMADIN) 3 MG tablet Take 1 tablet (3 mg total) by mouth Daily. 10/20/17 10/20/18  Milo Slater MD     Anticoagulants/Antiplatelets: Coumadin held, now with INR 1.1    Allergies:   Review of patient's allergies indicates:   Allergen Reactions    Lipitor [atorvastatin]      Muscle pain    Lisinopril Edema     Cheek swelling    Augmentin [amoxicillin-pot clavulanate] Rash     Sedation History:  no adverse reactions    Review of Systems:   Hematological: no known coagulopathies  Respiratory: no shortness of breath  Cardiovascular: no chest pain  Gastrointestinal: no abdominal pain  Genito-Urinary: no dysuria  Musculoskeletal: negative  Neurological: no TIA or stroke symptoms         OBJECTIVE:     Vital Signs (Most Recent)       Physical Exam:  ASA: 3  Mallampati: 3    General: no acute distress  Mental Status: alert and oriented to person, place and time  HEENT: normocephalic, atraumatic  Chest: unlabored breathing  Heart: regular heart rate  Abdomen: nondistended  Extremity: moves all extremities    Laboratory  Lab Results   Component Value Date    INR 1.1 12/12/2017       Lab Results   Component Value Date    WBC 4.51 12/06/2017    HGB 9.1 (L) 12/06/2017    HCT 29.9 (L) 12/06/2017     (H) 12/06/2017     12/06/2017      Lab Results   Component Value Date    GLU 96 11/29/2017     11/29/2017    K 4.2 11/29/2017     11/29/2017    CO2 28 11/29/2017    BUN 19 11/29/2017    CREATININE 1.1 11/29/2017    CALCIUM 9.0 11/29/2017    MG 1.6 07/03/2017    ALT 18 11/29/2017    AST 29 11/29/2017    ALBUMIN 3.4 (L) 11/29/2017    BILITOT 0.6 11/29/2017       ASSESSMENT/PLAN:     Sedation Plan: moderate  Patient will undergo port removal.    Juan Carlos Bustos MD  Radiology

## 2017-12-12 NOTE — PROGRESS NOTES
Port removal complete. Patient tolerated without any acute distress noted. Patient to recover in ROCU. Report to be given at the bedside.

## 2017-12-12 NOTE — DISCHARGE SUMMARY
Radiology Discharge Summary      Hospital Course: No complications    Admit Date: 12/12/2017  Discharge Date: 12/12/2017     Instructions Given to Patient: Yes  Diet: Resume prior diet  Activity: activity as tolerated    Description of Condition on Discharge: Stable  Vital Signs (Most Recent): Temp: 98.1 °F (36.7 °C) (12/12/17 0945)  Pulse: 70 (12/12/17 1014)  Resp: 18 (12/12/17 1014)  BP: (!) 117/51 (12/12/17 1014)  SpO2: 99 % (12/12/17 1014)    Discharge Disposition: Home    Discharge Diagnosis: s/p port removal.    Followup: No dedicated follow up with radiology is required. Patient instructed to call if there is any complication, post procedural pain, or signs of infection.     Juan Carlos Bustos MD  Radiology

## 2017-12-12 NOTE — PROCEDURES
Radiology Post-Procedure Note    Pre Op Diagnosis: port removal for port no longer needed    Post Op Diagnosis: Same    Procedure: Port removal    Procedure performed by: Raymond Alex    Written Informed Consent Obtained: Yes    Specimen Removed: No    Estimated Blood Loss: less than 50     Findings:   Port catheter successfully removed from R chest wall. Skin sutured closed and hemostasis achieved    Juan Carlos Bustos MD  Radiology

## 2017-12-12 NOTE — DISCHARGE INSTRUCTIONS
"For scheduling: Call Sarah at [phone]    For questions or concerns call: DEIRDRE MON-FRI 8 AM- 5PM: [phone].   **After hours and weekends: Call [phone] and ask for "Radiology Resident on call".    For immediate concerns that are not emergent, you may call our radiology clinic at: [phone]    "

## 2017-12-12 NOTE — PROGRESS NOTES
Patient to . Pt oriented to unit and staff. Comfort measures utilized. Pt safely transferred from stretcher to procedural table. Safety strap applied. Blankets applied. Pt prepped and draped utilizing standard sterile technique. Pt resting comfortably. Denies pain/discomfort. Will continue to monitor. See flow sheets for monitoring. Orders, labs, and allergies reviewed

## 2017-12-12 NOTE — PROGRESS NOTES
"CC:  MDS      Oncologic History:   Mr. 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 He was 's patient. He has a history of PAF followed by our electrophysiology cardiology department and is chronically anticoagulated. He has moderate aortic stenosis. He was noted to be progressively anemic . Hematology workup revealed a normal B12 and folate. His iron stores were normal. His reticulocyte count was slightly elevated at 4.2. His WBC count was low and was progressively dropping over the last 3 years with monocytosis. He also has developed mild progressive thrombocytopenia. He is moderately symptomatic with the fatigue. He has no history of extrinsic GI bleeding. He also has no history of previous transfusions.He underwent a marrow biopsy in Aug 2016. 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 Of 20 metaphases, 2 metaphases were normal and 18 metaphases had a 2;21 translocation. 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 Clinical and pathologic correlation is recommended."      FINAL PATHOLOGIC DIAGNOSIS   CBC DATA 8/24/16:   RBC: 3.45 M/UL, H/H : 9.9/32.1, MCV : 93 FL, WBC: 3.1 K/UL, Gran 1.2 k/ul %, Platelet: 200 K/UL.   No peripheral blood smear was submitted for evaluation.   BONE MARROW ASPIRATE, BONE MARROW CLOT, AND BONE MARROW CORE BIOPSY WITH:   CELLULARITY= 95%, TRILINEAGE HEMATOPOIETIC ACTIVITY (M/E= 2:1) AND INCREASED IMMATURE CELLS (9%). SEE COMMENT.   LEFT SHIFTED MATURATION OF GRANULOPOIESIS.   ADEQUATE STORAGE IRON.   ADEQUATE NUMBER OF MEGAKARYOCYTES.   COMMENT: Flow cytometry analysis of bone marrow aspirate shows lymph gate (4.2 %) containing T and B cells.   No B cell clonality or T-cell aberrancy is evident. Blast gate is " increased (7.9%) with cells expression of CD13 and   CD34. Immunohistochemical studies were performed on the clot and core biopsy for further architecture evaluation with   adequate positive and negative controls. Scattered mixed predominantly T cells (CD3 positive) and B cells (CD20   positive) are evident. About 6-7 % plasma cells ( positive) and 9% CD34 positive cells are noted. Findings   are nondiagnostic for lymphoma or plasma cell dyscrasia.   Microscopic Examination   BONE MARROW ASPIRATE DIFFERENTIAL:   Blasts----------------------------------------------5%   Promyelocytes---------------------------------2%   Myelocytes--------------------------------------11%   Metamyelocytes------------------------------ 19%   Bands----------------------------------------------5%   PMN Neutrophils------------------------------15%   Eosinophils------------------------------------------2%   Basophils---------------------------------------0%   Monocytes------------------------------------2%   Lymphocytes-------------------------------------6%   Plasma cells------------------------------------------2%   Erythroid precursors--------------------------31%   BONE MARROW ASPIRATE:   Myeloid and erythroid maturation shows M:E ratio at 2:1. No significant dysplasia is evident. Left shifted maturation of   granulopoiesis is noted. Stainable iron is present without increased ringed sideroblasts. Megakaryocytes are seen.   BONE MARROW CLOT:   Cellularity is 95 % with trilineage hematopoiesis. No abnormal infiltrates are seen. Megakaryocytes are adequate in   number. Stainable iron is present.   BONE MARROW CORE BIOPSY:   Cellularity is 95 %. No abnormal infiltrates are seen. Stainable iron is not identified. Megakaryocytes are adequate in   No significantly increased reticular fibrosis is evident by special stain (reticulin) with adequate positive and   negative controls.   His marrow result was C/W evolving MDS. He did not meet  criteria for AML. He was started on Vidaza and Aranesp and completed 3 cycles when a repeat bone marrow biopsy revealed:   2/2/17 bone marrow biopsy   BONE MARROW ASPIRATE, BONE MARROW CLOT, AND BONE MARROW CORE BIOPSY WITH:   CELLULARITY= %, TRILINEAGE HEMATOPOIETIC ACTIVITY (M/E= 1.4:1).   CONSISTENT WITH REFRACTORY ANEMIA WITH EXCESS BLASTS -1. SEE COMMENT.   DECREASED STORAGE IRON.   INCREASED NUMBER OF MEGAKARYOCYTES WITH DYSPLASTIC MORPHOLOGY.   COMMENT: Flow cytometry analysis of bone marrow aspirate shows lymph gate (15.9 %) containing T and B cells.   No B cell clonality or T-cell aberrancy is evident. Blast gate is slightly increased (7.1%).   Immunohistochemical studies were performed on the clot and core biopsy for further architecture evaluation with adequate positive and negative controls. About 6-7% CD34 positive cells are noted. CD61 highlights increased in   number of megakaryocytes with dysplastic morphology.   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 Correlate clinically and with a cytogenetics   report.   Microscopic Examination   BONE MARROW ASPIRATE DIFFERENTIAL:   Blasts---------------------------------------------- 7 %   Promyelocytes---------------------------------1%   Myelocytes--------------------------------------10%   Metamyelocytes------------------------------ 8%   Bands----------------------------------------------9%   PMN Neutrophils------------------------------15%   Eosinophils------------------------------------------1%   Basophils---------------------------------------1%   Monocytes------------------------------------1%   Lymphocytes-------------------------------------9%   Plasma cells------------------------------------------1%   Erythroid precursors--------------------------37%   BONE MARROW ASPIRATE:   Myeloid and erythroid maturation shows M:E ratio at 1.4:1. Dysgranulopoiesis and occasional dyserythropoiesis is evident. Stainable iron is  decreased without increased ringed sideroblasts. Megakaryocytes are seen.   BONE MARROW CLOT:   Cellularity is  % with trilineage hematopoiesis. No abnormal infiltrates are seen. Megakaryocytes are increased in number with dysplastic morphology. Stainable iron is decreased.   BONE MARROW CORE BIOPSY:   Cellularity is  %. No abnormal infiltrates are seen. Stainable iron is not identified. Megakaryocytes are increased in number with dysplastic morphology.   Last Vidaza was cycle 5 completed on 3/3/17. He was evaluated for an allogeneic transplant but decided against it. He subsequently got admitted to the hospital with an empyema. His MDS therapy was on hold until he recovered from his empyema. He developed a drug rash while on Augmentin and was switched to Clindamycin. However he was having increasing dyspnea on excursion and orthopnea. He also has 2+ lower extremity edema. He was treated with diuretics for congestive heart failure. His symptoms improved. He decreased his diuretic and has been on observation.He has been receiving Aranesp.The plan per  was to watch his counts and only if they decreased significantly to restart his Vidaza.   He has been transfusion independent.      Interval History: Mr. Kwon returns for follow up. He is slightly fatigued but otherwise doing well.    Review of Systems   Constitutional: Negative for fever, malaise/fatigue and weight loss.   HENT: Negative for congestion and nosebleeds.    Eyes: Negative for blurred vision.   Respiratory: Negative for cough and shortness of breath.    Cardiovascular: Negative for chest pain, palpitations and leg swelling.   Gastrointestinal: Negative for abdominal pain, diarrhea and heartburn.   Genitourinary: Negative for frequency.   Musculoskeletal: Negative for back pain.   Skin: Negative for rash.   Neurological: Negative for tingling, sensory change and headaches.   Endo/Heme/Allergies: Does not bruise/bleed easily.    Psychiatric/Behavioral: Negative for depression. The patient is nervous/anxious.        Current Outpatient Prescriptions   Medication Sig    citalopram (CELEXA) 40 MG tablet Take 1 tablet (40 mg total) by mouth once daily.    diltiaZEM (CARDIZEM CD) 300 MG 24 hr capsule TAKE ONE CAPSULE BY MOUTH EVERY DAY    food supplemt, lactose-reduced (BOOST) Liqd Take by mouth once daily.    furosemide (LASIX) 40 MG tablet Take Lasix 40 mg twice a day and an additional 40 mg in the am for weight gain of 3 lbs in one day or 5 lbs in one week.    metoprolol succinate (TOPROL-XL) 50 MG 24 hr tablet Take 3 tablets (150 mg total) by mouth once daily.    trazodone (DESYREL) 100 MG tablet Take 1 tablet (100 mg total) by mouth every evening. (Patient taking differently: Take  mg by mouth every evening. )    warfarin (COUMADIN) 3 MG tablet Take 1 tablet (3 mg total) by mouth Daily.     No current facility-administered medications for this visit.        Vitals:    12/13/17 1103   BP: (!) 150/67   Pulse: 67   Resp: 18   Temp: 98 °F (36.7 °C)         Physical Exam   Constitutional: He is oriented to person, place, and time. He appears well-developed. No distress.   HENT:   Head: Normocephalic and atraumatic.   Mouth/Throat: No oropharyngeal exudate.   Eyes: Pupils are equal, round, and reactive to light. No scleral icterus.   Neck: Normal range of motion.   He has a few non-tender palpable nodes in his left anterior superior cervical chain   Cardiovascular: Normal rate, regular rhythm and normal heart sounds.    No murmur heard.  Pulmonary/Chest: Effort normal. No respiratory distress. He has no rales.   Abdominal: Soft. He exhibits no distension. There is no rebound and no guarding.   Musculoskeletal: Normal range of motion. He exhibits no edema.   Lymphadenopathy:     He has cervical adenopathy.   Neurological: He is alert and oriented to person, place, and time. No cranial nerve deficit.   Skin: Skin is warm.    Psychiatric: He has a normal mood and affect.     Component      Latest Ref Rng & Units 12/13/2017 12/12/2017   WBC      3.90 - 12.70 K/uL 4.38    RBC      4.60 - 6.20 M/uL 2.85 (L)    Hemoglobin      14.0 - 18.0 g/dL 9.1 (L)    Hematocrit      40.0 - 54.0 % 29.6 (L)    MCV      82 - 98 fL 104 (H)    MCH      27.0 - 31.0 pg 31.9 (H)    MCHC      32.0 - 36.0 g/dL 30.7 (L)    RDW      11.5 - 14.5 % 19.5 (H)    Platelets      150 - 350 K/uL 111 (L)    MPV      9.2 - 12.9 fL 12.6    Protime      9.0 - 12.5 sec  11.8   Coumadin Monitoring INR      0.8 - 1.2  1.1     Component      Latest Ref Rng & Units 11/29/2017   WBC      3.90 - 12.70 K/uL    RBC      4.60 - 6.20 M/uL    Hemoglobin      14.0 - 18.0 g/dL    Hematocrit      40.0 - 54.0 %    MCV      82 - 98 fL    MCH      27.0 - 31.0 pg    MCHC      32.0 - 36.0 g/dL    RDW      11.5 - 14.5 %    Platelets      150 - 350 K/uL    MPV      9.2 - 12.9 fL    Iron      45 - 160 ug/dL 173 (H)   Transferrin      200 - 375 mg/dL 248   TIBC      250 - 450 ug/dL 367   Saturated Iron      20 - 50 % 47   Protime      9.0 - 12.5 sec    Coumadin Monitoring INR      0.8 - 1.2    Ferritin      20.0 - 300.0 ng/mL 416 (H)   Retic      0.4 - 2.0 % 4.1 (H)       Assessment:     1. MDS: Bone marrow biopsy on 2/2/17 (after 3 cycles of Azacytidine) showed MDS RAEB-1. There was no significant change in the bone marrow blast percentage between August 2016 and February 2017. RUNX-1 re-arrangements were noted both in August 2016 and February 2017.Vidaza was stopped after March 2017 due to fatigue. He is not transfusion dependent. He is on Aranesp 200mcg every 2 weeks.He has mild pancytopenia today.   No indication for repeat bone marrow biopsy at this time. He has CHF and does not appear to be able to tolerate induction chemotherapy, if he transforms to AML.   Aranesp changed to once WEEKLY (200mcg) for hemoglobin < 10.  He does not have any active bleeding, although he is anti-coagulated and is  mildly thrombocytopenic.    Hemoglobin 9.1 today. If he continues to be refractory to increased Aranesp dose, he will have another bone marrow biopsy.     2. Hyperuricemia: Mild, asymptomatic. Will monitor       3. Thrombocytopenia: Platelet count 111 k today. He has occasional epistaxis. He remains on Warfarin for atrial fibrillation.        4. Leukopenia: Secondary to MDS. Mild,asymptoamtic    5. Cervical adenopathy: Nodes are mildly enlarged, non-tender. Will monitor. If needed, will get US neck.          Distress Screening Results: Psychosocial Distress screening score of Distress Score: 0 noted and reviewed. No intervention indicated.

## 2017-12-12 NOTE — PROGRESS NOTES
Discharge instruction, and follow up care reviewed.  Patient verbalized understanding, and denies further questions.  Provided with ROCU and after hours number.  Patient aware awaiting for Vanc to be completed before D/C, verbalizes understanding.

## 2017-12-13 ENCOUNTER — OFFICE VISIT (OUTPATIENT)
Dept: CARDIOLOGY | Facility: CLINIC | Age: 72
End: 2017-12-13
Payer: MEDICARE

## 2017-12-13 ENCOUNTER — OFFICE VISIT (OUTPATIENT)
Dept: HEMATOLOGY/ONCOLOGY | Facility: CLINIC | Age: 72
End: 2017-12-13
Payer: MEDICARE

## 2017-12-13 ENCOUNTER — INFUSION (OUTPATIENT)
Dept: INFUSION THERAPY | Facility: HOSPITAL | Age: 72
End: 2017-12-13
Attending: INTERNAL MEDICINE
Payer: MEDICARE

## 2017-12-13 VITALS
HEIGHT: 71 IN | DIASTOLIC BLOOD PRESSURE: 67 MMHG | TEMPERATURE: 98 F | BODY MASS INDEX: 25.2 KG/M2 | HEART RATE: 67 BPM | OXYGEN SATURATION: 99 % | SYSTOLIC BLOOD PRESSURE: 150 MMHG | WEIGHT: 180 LBS | RESPIRATION RATE: 18 BRPM

## 2017-12-13 VITALS
HEIGHT: 71 IN | WEIGHT: 178.81 LBS | SYSTOLIC BLOOD PRESSURE: 157 MMHG | HEART RATE: 66 BPM | BODY MASS INDEX: 25.03 KG/M2 | DIASTOLIC BLOOD PRESSURE: 71 MMHG

## 2017-12-13 DIAGNOSIS — I38 CHRONIC DIASTOLIC HEART FAILURE DUE TO VALVULAR DISEASE: Primary | ICD-10-CM

## 2017-12-13 DIAGNOSIS — I50.32 CHRONIC DIASTOLIC HEART FAILURE DUE TO VALVULAR DISEASE: Primary | ICD-10-CM

## 2017-12-13 DIAGNOSIS — E78.5 DYSLIPIDEMIA: ICD-10-CM

## 2017-12-13 DIAGNOSIS — I10 ESSENTIAL HYPERTENSION: ICD-10-CM

## 2017-12-13 DIAGNOSIS — I35.0 AORTIC STENOSIS, MODERATE: ICD-10-CM

## 2017-12-13 DIAGNOSIS — D46.9 MDS (MYELODYSPLASTIC SYNDROME): Primary | ICD-10-CM

## 2017-12-13 DIAGNOSIS — D69.6 THROMBOCYTOPENIA: ICD-10-CM

## 2017-12-13 DIAGNOSIS — I48.19 PERSISTENT ATRIAL FIBRILLATION: ICD-10-CM

## 2017-12-13 DIAGNOSIS — R43.2 DYSGEUSIA: ICD-10-CM

## 2017-12-13 PROBLEM — I50.9 CHF, ACUTE ON CHRONIC: Status: RESOLVED | Noted: 2017-06-21 | Resolved: 2017-12-13

## 2017-12-13 PROCEDURE — 99499 UNLISTED E&M SERVICE: CPT | Mod: S$PBB,,, | Performed by: INTERNAL MEDICINE

## 2017-12-13 PROCEDURE — 99214 OFFICE O/P EST MOD 30 MIN: CPT | Mod: S$GLB,,, | Performed by: INTERNAL MEDICINE

## 2017-12-13 PROCEDURE — 63600175 PHARM REV CODE 636 W HCPCS: Performed by: INTERNAL MEDICINE

## 2017-12-13 PROCEDURE — 99214 OFFICE O/P EST MOD 30 MIN: CPT | Mod: GC,S$GLB,, | Performed by: INTERNAL MEDICINE

## 2017-12-13 PROCEDURE — 99999 PR PBB SHADOW E&M-EST. PATIENT-LVL III: CPT | Mod: PBBFAC,,, | Performed by: INTERNAL MEDICINE

## 2017-12-13 PROCEDURE — 99999 PR PBB SHADOW E&M-EST. PATIENT-LVL IV: CPT | Mod: PBBFAC,GC,, | Performed by: INTERNAL MEDICINE

## 2017-12-13 PROCEDURE — 96372 THER/PROPH/DIAG INJ SC/IM: CPT

## 2017-12-13 RX ADMIN — DARBEPOETIN ALFA 200 MCG: 200 INJECTION, SOLUTION INTRAVENOUS; SUBCUTANEOUS at 11:12

## 2017-12-13 NOTE — Clinical Note
--Aranesp every week ( for hemoglobin < 10) --CBC weekly x 3 weeks Cbc, cmp in 3 weeks --F/U in 3 weeks

## 2017-12-13 NOTE — PROGRESS NOTES
"Subjective:       Patient ID: Wil Kwon Jr. is a 72 y.o. male.    Chief Complaint: Hypertension (1 month f/u )    HPI   Mr. Kwon is a 72 year old gentleman with a past medical history of diastolic heart failure due to valvular abnormality, moderate-severe AS (JEFF 0.88cm2, PV 3.48, MG 32), MDS (s/p failed chemo and on palliative therapy/monitoring with intermittent aranesp), and persistent AFib on coumadin whopresents for follow up. He states that he has been doing well since he last saw Dr. Rhodes in clinic. He has no heart failure symptoms, but states that he's been having an issue with dysgeusia where strawberry icecream tastes like wax, which is concerning to him. He states that he is active with exercise and lifting weights. He denies PND, NASH, chest discomforts, and has 1 pillow orthopnea. He has noted some mandibular lymph node swelling and itchiness in his mouth.    Review of Systems   Constitutional: Negative for appetite change, chills, fatigue and fever.   HENT: Negative.    Eyes: Negative.    Respiratory: Negative for cough and shortness of breath.    Cardiovascular: Negative for chest pain, palpitations and leg swelling.   Gastrointestinal: Negative for abdominal pain, nausea and vomiting.   Genitourinary: Negative for dysuria and urgency.   Musculoskeletal: Negative for arthralgias and myalgias.   Skin: Negative for color change and rash.   Neurological: Negative for dizziness, light-headedness and headaches.   Psychiatric/Behavioral: Negative.        Objective:     Vitals:    12/13/17 1310   BP: (!) 157/71   BP Location: Left arm   Patient Position: Sitting   BP Method: Medium (Automatic)   Pulse: 66   Weight: 81.1 kg (178 lb 12.7 oz)   Height: 5' 11" (1.803 m)       Physical Exam   Constitutional: Vital signs are normal. He appears well-developed and well-nourished. He is active and cooperative. No distress.   HENT:   Head: Normocephalic and atraumatic.   Right Ear: Hearing and external " ear normal.   Left Ear: Hearing and external ear normal.   Nose: Nose normal.   Mouth/Throat: Mucous membranes are normal.   Some small white spots on hard palate.   Neck: Trachea normal. No JVD present. No tracheal tenderness present. No thyroid mass and no thyromegaly present.   Cardiovascular: Normal rate, regular rhythm, S1 normal and S2 normal.  Exam reveals no gallop.    Murmur heard.   Systolic murmur is present with a grade of 3/6    Diastolic murmur is present with a grade of 2/6   Pulses:       Carotid pulses are 2+ on the right side, and 2+ on the left side.       Radial pulses are 2+ on the right side, and 2+ on the left side.        Popliteal pulses are 2+ on the right side, and 2+ on the left side.        Dorsalis pedis pulses are 2+ on the right side, and 2+ on the left side.   Pulmonary/Chest: Effort normal and breath sounds normal. No respiratory distress. He has no decreased breath sounds. He has no wheezes. He has no rhonchi. He has no rales.   Abdominal: Soft. Normal appearance.   Skin: Skin is warm, dry and intact.       Assessment:       1. Chronic diastolic heart failure due to valvular disease    2. Persistent atrial fibrillation    3. Aortic stenosis, moderate    4. Dyslipidemia    5. Essential hypertension    6. Dysgeusia        Plan:     #Valvular Heart Failure  #Moderate-severe Aortic stenosis  --continue current medications, weight is stable  --will monitor    #Persistent AFib  --HR well controlled at 66bpm today  --continue current medications  --anticoagulated on coumadin, but complicated by MDS with thrombocytopenia    #HLD  --last lipid panel 07.2016  --has been well controlled  --will continue to monitor for now    #HTN  --elevated today, but seems to be an off reading today, prior clinic visits with readings around 110.  --will monitor for now given his comorbidities.    #Dysgeusia  --after looking up every medication he is on, warfarin and celexa are the only drugs that document  dysgeusia  --advised that warfarin is an important medication, but he could talk to his psychiatrist about changing the celexa if the dysgeusia is particularly concerning to him.    Staff addendum to follow. Follow up with Dr. Rhodes in 2 months.    Marina Fuentes MD  Cardiology PGY5

## 2017-12-13 NOTE — PROGRESS NOTES
I have personally interviewed and examined the patient. I have reviewed the notes, assessments and plans performed by Dr Fuentes and I CONCUR with her documentation of Wil Kwon Jr..

## 2017-12-15 RX ORDER — METOPROLOL SUCCINATE 100 MG/1
100 TABLET, EXTENDED RELEASE ORAL DAILY
Qty: 30 TABLET | Refills: 3 | Status: ON HOLD | OUTPATIENT
Start: 2017-12-15 | End: 2018-04-25 | Stop reason: HOSPADM

## 2017-12-19 ENCOUNTER — ANTI-COAG VISIT (OUTPATIENT)
Dept: CARDIOLOGY | Facility: CLINIC | Age: 72
End: 2017-12-19
Payer: MEDICARE

## 2017-12-19 DIAGNOSIS — Z79.01 LONG-TERM (CURRENT) USE OF ANTICOAGULANTS: Primary | ICD-10-CM

## 2017-12-19 LAB — INR PPP: 1.7 (ref 2–3)

## 2017-12-19 PROCEDURE — 85610 PROTHROMBIN TIME: CPT | Mod: QW,S$GLB,, | Performed by: PHARMACIST

## 2017-12-19 PROCEDURE — 99211 OFF/OP EST MAY X REQ PHY/QHP: CPT | Mod: 25,S$GLB,, | Performed by: PHARMACIST

## 2017-12-19 NOTE — PROGRESS NOTES
Patient reports no bleeding or bruising, no new medications and no diet changes.  His INR is improving, as he was recently holding coumadin for a port procedure.  I am boosting him today, but looking at recent INRs and doses, I am lowering his current weekly dose.  We will monitor closely with repeat labs next week.  I reminded the patient to call with any problems, changes or questions before the next visit.

## 2017-12-20 ENCOUNTER — INFUSION (OUTPATIENT)
Dept: INFUSION THERAPY | Facility: HOSPITAL | Age: 72
End: 2017-12-20
Attending: INTERNAL MEDICINE
Payer: MEDICARE

## 2017-12-20 VITALS — HEART RATE: 65 BPM | SYSTOLIC BLOOD PRESSURE: 125 MMHG | DIASTOLIC BLOOD PRESSURE: 60 MMHG

## 2017-12-20 DIAGNOSIS — D46.9 MDS (MYELODYSPLASTIC SYNDROME): Primary | ICD-10-CM

## 2017-12-20 PROCEDURE — 96372 THER/PROPH/DIAG INJ SC/IM: CPT

## 2017-12-20 PROCEDURE — 63600175 PHARM REV CODE 636 W HCPCS: Performed by: INTERNAL MEDICINE

## 2017-12-20 RX ADMIN — DARBEPOETIN ALFA 200 MCG: 200 INJECTION, SOLUTION INTRAVENOUS; SUBCUTANEOUS at 11:12

## 2017-12-27 ENCOUNTER — INFUSION (OUTPATIENT)
Dept: INFUSION THERAPY | Facility: HOSPITAL | Age: 72
End: 2017-12-27
Attending: INTERNAL MEDICINE
Payer: MEDICARE

## 2017-12-27 ENCOUNTER — ANTI-COAG VISIT (OUTPATIENT)
Dept: CARDIOLOGY | Facility: CLINIC | Age: 72
End: 2017-12-27

## 2017-12-27 VITALS — DIASTOLIC BLOOD PRESSURE: 60 MMHG | HEART RATE: 68 BPM | SYSTOLIC BLOOD PRESSURE: 122 MMHG

## 2017-12-27 DIAGNOSIS — Z79.01 LONG-TERM (CURRENT) USE OF ANTICOAGULANTS: ICD-10-CM

## 2017-12-27 DIAGNOSIS — D46.9 MDS (MYELODYSPLASTIC SYNDROME): Primary | ICD-10-CM

## 2017-12-27 PROCEDURE — 96372 THER/PROPH/DIAG INJ SC/IM: CPT

## 2017-12-27 PROCEDURE — 63600175 PHARM REV CODE 636 W HCPCS: Performed by: INTERNAL MEDICINE

## 2017-12-27 RX ADMIN — DARBEPOETIN ALFA 200 MCG: 200 INJECTION, SOLUTION INTRAVENOUS; SUBCUTANEOUS at 11:12

## 2017-12-27 NOTE — PROGRESS NOTES
Questioned and confirmed correct dose .  Reports eating eating cranberries and having sore throat .  Reports no other changes.

## 2018-01-03 ENCOUNTER — ANTI-COAG VISIT (OUTPATIENT)
Dept: CARDIOLOGY | Facility: CLINIC | Age: 73
End: 2018-01-03

## 2018-01-03 ENCOUNTER — INFUSION (OUTPATIENT)
Dept: INFUSION THERAPY | Facility: HOSPITAL | Age: 73
End: 2018-01-03
Attending: INTERNAL MEDICINE
Payer: MEDICARE

## 2018-01-03 DIAGNOSIS — D46.9 MDS (MYELODYSPLASTIC SYNDROME): Primary | ICD-10-CM

## 2018-01-03 DIAGNOSIS — Z79.01 LONG-TERM (CURRENT) USE OF ANTICOAGULANTS: ICD-10-CM

## 2018-01-03 PROCEDURE — 63600175 PHARM REV CODE 636 W HCPCS: Mod: JG | Performed by: INTERNAL MEDICINE

## 2018-01-03 PROCEDURE — 96372 THER/PROPH/DIAG INJ SC/IM: CPT

## 2018-01-03 RX ADMIN — DARBEPOETIN ALFA 200 MCG: 200 INJECTION, SOLUTION INTRAVENOUS; SUBCUTANEOUS at 11:01

## 2018-01-03 NOTE — PROGRESS NOTES
Verified coumadin: 6mg last Wednesday and Saturday and 4.5mg all other days, nose bleed over night, occasionally SOB, had riccola cough drop,  Drinks 1 boost daily--not new,

## 2018-01-07 DIAGNOSIS — F32.0 MILD SINGLE CURRENT EPISODE OF MAJOR DEPRESSIVE DISORDER: ICD-10-CM

## 2018-01-08 RX ORDER — CITALOPRAM 40 MG/1
40 TABLET, FILM COATED ORAL DAILY
Qty: 90 TABLET | Refills: 0 | Status: SHIPPED | OUTPATIENT
Start: 2018-01-08 | End: 2018-04-02 | Stop reason: SDUPTHER

## 2018-01-10 ENCOUNTER — ANTI-COAG VISIT (OUTPATIENT)
Dept: CARDIOLOGY | Facility: CLINIC | Age: 73
End: 2018-01-10

## 2018-01-10 ENCOUNTER — PATIENT MESSAGE (OUTPATIENT)
Dept: HEMATOLOGY/ONCOLOGY | Facility: CLINIC | Age: 73
End: 2018-01-10

## 2018-01-10 ENCOUNTER — INFUSION (OUTPATIENT)
Dept: INFUSION THERAPY | Facility: HOSPITAL | Age: 73
End: 2018-01-10
Attending: INTERNAL MEDICINE
Payer: MEDICARE

## 2018-01-10 VITALS — HEART RATE: 61 BPM | DIASTOLIC BLOOD PRESSURE: 61 MMHG | SYSTOLIC BLOOD PRESSURE: 127 MMHG

## 2018-01-10 DIAGNOSIS — D46.9 MDS (MYELODYSPLASTIC SYNDROME): Primary | ICD-10-CM

## 2018-01-10 PROCEDURE — 63600175 PHARM REV CODE 636 W HCPCS: Mod: JG | Performed by: INTERNAL MEDICINE

## 2018-01-10 PROCEDURE — 96372 THER/PROPH/DIAG INJ SC/IM: CPT

## 2018-01-10 RX ADMIN — DARBEPOETIN ALFA 200 MCG: 200 INJECTION, SOLUTION INTRAVENOUS; SUBCUTANEOUS at 10:01

## 2018-01-10 NOTE — NURSING
Patient here for aranesp injection-complains of no appetite-things taste funny-tolerated injection well.

## 2018-01-17 ENCOUNTER — INFUSION (OUTPATIENT)
Dept: INFUSION THERAPY | Facility: HOSPITAL | Age: 73
End: 2018-01-17
Attending: INTERNAL MEDICINE
Payer: MEDICARE

## 2018-01-17 ENCOUNTER — ANTI-COAG VISIT (OUTPATIENT)
Dept: CARDIOLOGY | Facility: CLINIC | Age: 73
End: 2018-01-17

## 2018-01-17 VITALS — DIASTOLIC BLOOD PRESSURE: 55 MMHG | SYSTOLIC BLOOD PRESSURE: 116 MMHG | HEART RATE: 64 BPM

## 2018-01-17 DIAGNOSIS — D46.9 MDS (MYELODYSPLASTIC SYNDROME): Primary | ICD-10-CM

## 2018-01-17 PROCEDURE — 63600175 PHARM REV CODE 636 W HCPCS: Mod: JG | Performed by: INTERNAL MEDICINE

## 2018-01-17 PROCEDURE — 96372 THER/PROPH/DIAG INJ SC/IM: CPT

## 2018-01-17 RX ADMIN — DARBEPOETIN ALFA 200 MCG: 200 INJECTION, SOLUTION INTRAVENOUS; SUBCUTANEOUS at 10:01

## 2018-01-17 NOTE — NURSING
"Patient here for aranesp injection-still not eating well-"things taste funny"-tolerated injection well.  "

## 2018-01-18 ENCOUNTER — OFFICE VISIT (OUTPATIENT)
Dept: INTERNAL MEDICINE | Facility: CLINIC | Age: 73
End: 2018-01-18
Payer: MEDICARE

## 2018-01-18 VITALS
WEIGHT: 177.5 LBS | RESPIRATION RATE: 16 BRPM | DIASTOLIC BLOOD PRESSURE: 58 MMHG | SYSTOLIC BLOOD PRESSURE: 150 MMHG | BODY MASS INDEX: 24.85 KG/M2 | HEIGHT: 71 IN | TEMPERATURE: 99 F | HEART RATE: 71 BPM

## 2018-01-18 DIAGNOSIS — R22.0 MASS OF SOFT PALATE: Primary | ICD-10-CM

## 2018-01-18 DIAGNOSIS — D46.9 MDS (MYELODYSPLASTIC SYNDROME): ICD-10-CM

## 2018-01-18 DIAGNOSIS — K13.79 ORAL PAIN: ICD-10-CM

## 2018-01-18 DIAGNOSIS — I10 ESSENTIAL HYPERTENSION: ICD-10-CM

## 2018-01-18 PROCEDURE — 99999 PR PBB SHADOW E&M-EST. PATIENT-LVL IV: CPT | Mod: PBBFAC,,, | Performed by: INTERNAL MEDICINE

## 2018-01-18 PROCEDURE — 99214 OFFICE O/P EST MOD 30 MIN: CPT | Mod: S$GLB,,, | Performed by: INTERNAL MEDICINE

## 2018-01-18 RX ORDER — NYSTATIN 100000 [USP'U]/ML
6 SUSPENSION ORAL 4 TIMES DAILY
Qty: 473 ML | Refills: 1 | Status: SHIPPED | OUTPATIENT
Start: 2018-01-18 | End: 2018-01-28

## 2018-01-22 RX ORDER — DILTIAZEM HYDROCHLORIDE 300 MG/1
CAPSULE, COATED, EXTENDED RELEASE ORAL
Qty: 90 CAPSULE | Refills: 1 | Status: ON HOLD | OUTPATIENT
Start: 2018-01-22 | End: 2018-04-25 | Stop reason: HOSPADM

## 2018-01-23 ENCOUNTER — INITIAL CONSULT (OUTPATIENT)
Dept: OTOLARYNGOLOGY | Facility: CLINIC | Age: 73
End: 2018-01-23
Payer: MEDICARE

## 2018-01-23 VITALS
TEMPERATURE: 98 F | HEART RATE: 102 BPM | WEIGHT: 171.31 LBS | DIASTOLIC BLOOD PRESSURE: 65 MMHG | BODY MASS INDEX: 23.89 KG/M2 | SYSTOLIC BLOOD PRESSURE: 141 MMHG

## 2018-01-23 DIAGNOSIS — R22.0 MASS OF SOFT PALATE: Primary | ICD-10-CM

## 2018-01-23 PROCEDURE — 40810 EXCISION OF MOUTH LESION: CPT | Mod: S$GLB,,, | Performed by: OTOLARYNGOLOGY

## 2018-01-23 PROCEDURE — 88305 TISSUE EXAM BY PATHOLOGIST: CPT | Performed by: PATHOLOGY

## 2018-01-23 PROCEDURE — 99204 OFFICE O/P NEW MOD 45 MIN: CPT | Mod: 25,S$GLB,, | Performed by: OTOLARYNGOLOGY

## 2018-01-23 PROCEDURE — 99999 PR PBB SHADOW E&M-EST. PATIENT-LVL III: CPT | Mod: PBBFAC,,, | Performed by: OTOLARYNGOLOGY

## 2018-01-23 PROCEDURE — 88305 TISSUE EXAM BY PATHOLOGIST: CPT | Mod: 26,,, | Performed by: PATHOLOGY

## 2018-01-23 NOTE — PROGRESS NOTES
This office note has been dictated.  HISTORY OF PRESENT ILLNESS:  This is a 72-year-old gentleman with hypertension,   myelodysplastic syndrome, aortic stenosis, atrial fibrillation and others who is   in today with a couple of issues.  He states in the last month or so, he has   had some slight discomfort in his oral cavity, tongue and other.  He states it   feels just slightly irritated.  He has not been on recent corticosteroid   therapy.  He is on Aranesp due to his chronic anemia.  It has been quite some   time since he has actually had chemotherapy.  No fever.  No chills.  No   generalized body aches.  He denies any dysphagia.    He reports blood pressures have been reasonable.    CURRENT MEDICATIONS:  All medications are noted and reviewed in our electronic   medical record medication list.    REVIEW OF SYSTEMS:  CONSTITUTIONAL:  No fever, chills or generalized body aches.  CARDIOVASCULAR:  Denies chest pain, palpitations or syncope.  GASTROINTESTINAL:  No nausea or vomiting.  No abdominal pain.  No diarrhea.    PAST MEDICAL HISTORY, PAST SURGICAL HISTORY, FAMILY MEDICAL HISTORY AND SOCIAL   HISTORY:  All noted and reviewed in our electronic medical record history   sections.    PHYSICAL EXAMINATION:  GENERAL:  Pleasant, alert, appropriately groomed gentleman, in no acute   distress.  VITAL SIGNS:  All noted and reviewed.  EYES:  Sclerae are white and nonicteric.  HEENT:  Normocephalic.  NECK:  Supple.  No masses.  No thyromegaly.  MOUTH AND PHARYNX:  Oropharynx is without any overt edema.  There is a perhaps 1   cm nodule in the right soft palate posteriorly near the uvula.  The tongue   appears very minimally beefy.  There is no exudate.    IMPRESSION:  1.  Soft palate nodule.  2.  Possible thrush versus other.  3.  Hypertension, probably reasonably controlled.  4.  Myelodysplasia.  5.  Other chronic medical conditions as noted in the electronic medical record   history sections.    PLAN:  1.  He is treated  empirically with nystatin swish and expectorate for 1 to 2   weeks and advise.  2.  He is referred to ENT Department to evaluate the palate nodule and if the   oral symptoms persist.      PB/IN  dd: 01/22/2018 19:41:13 (CST)  td: 01/23/2018 12:08:11 (CST)  Doc ID   #[id number]  Job ID #[id number]    CC:

## 2018-01-23 NOTE — LETTER
January 23, 2018      SAMMY Serra MD  [address] [facility] [address]  [place] [address]           [facility]  [address]  [facility] [address]  Phone: [phone]  Fax: [phone]          Patient: Wil Kwon Jr.   MR Number: [id number]   YOB: 1945   Date of Visit: 1/23/2018       Dear Dr. SAMMY Serra:    Thank you for referring Wil Kwon to me for evaluation. Attached you will find relevant portions of my assessment and plan of care.    If you have questions, please do not hesitate to call me. I look forward to following Wil Kwon along with you.    Sincerely,    Gala Wynn MD    Enclosure  CC:  No Recipients    If you would like to receive this communication electronically, please contact [email or web address] or [phone] to request more information on [facility] access.    For providers and/or their staff who would like to refer a patient to [facility], please contact us through our one-stop-shop provider referral line, [facility], at [phone].    If you feel you have received this communication in error or would no longer like to receive these types of communications, please e-mail [email or web address]

## 2018-01-23 NOTE — PROGRESS NOTES
Chief Complaint   Patient presents with    consult/ sore throat, inflammed tongue and burning feeling i         72 y.o. male presents for follow up after PCP noted a soft palate mass. The patient recently noted some tongue irritation as well as decreased taste and appetite and was started on Nystatin swish and spit with improvement. No noted pain or bleeding at soft palate mass site.  He denies voice changes.      Review of Systems     Constitutional: Negative for fatigue and unexpected weight change.   HENT: per HPI.  Eyes: Negative for visual disturbance.   Respiratory: Negative for shortness of breath, hemoptysis  Cardiovascular: Negative for chest pain and palpitations.   Genitourinary: Negative for dysuria and difficulty urinating.   Musculoskeletal: Negative for decreased ROM, back pain.   Skin: Negative for rash, sunburn, itching.   Neurological: Negative for dizziness and seizures.   Hematological: Negative for adenopathy. Does not bruise/bleed easily.   Psychiatric/Behavioral: Negative for agitation. The patient is not nervous/anxious.   Endocrine: Negative for rapid weight loss/weight gain, heat/cold intolerance.     Past Medical History:   Diagnosis Date    Aortic valve stenosis, mild     secondary to bicuspid aortic alve    Atrial fibrillation     Carotid artery disease     Left CEA 4/9/13    Depression     DJD (degenerative joint disease)     H/O echocardiogram 04/13/2016    EF 55-60%. Diastolic dysfunction. PASP 39. mod AS with JEFF 1.18    History of psychiatric hospitalization     [facility] 2014, [facility] 1993    Hyperlipidemia     Hypertension     MDS (myelodysplastic syndrome) 2013    s/p Chemo     Therapy     [facility]        Past Surgical History:   Procedure Laterality Date    BONE MARROW BIOPSY  08/29/2016    CAROTID ENDARTERECTOMY Left     Inguinal hernia      Left    KNEE SURGERY      Right x2    neck fusion      TONSILLECTOMY         family  history includes Hyperlipidemia in his brother.    Pt  reports that he has never smoked. He has never used smokeless tobacco. He reports that he does not drink alcohol or use drugs.    Review of patient's allergies indicates:   Allergen Reactions    Lipitor [atorvastatin]      Muscle pain    Lisinopril Edema     Cheek swelling    Augmentin [amoxicillin-pot clavulanate] Rash        Physical Exam    Vitals:    01/23/18 0926   BP: (!) 141/65   Pulse: 102   Temp: 98.3 °F (36.8 °C)     Body mass index is 23.89 kg/m².    Physical Exam   Constitutional: He is oriented to person, place, and time. He appears well-developed and well-nourished. No distress.   HENT:   Head: Normocephalic and atraumatic.   Right Ear: Hearing, tympanic membrane, external ear and ear canal normal. Tympanic membrane mobility is normal. No middle ear effusion. No decreased hearing is noted.   Left Ear: Hearing, tympanic membrane, external ear and ear canal normal. Tympanic membrane mobility is normal.  No middle ear effusion. No decreased hearing is noted.   Nose: Nose normal.   Mouth/Throat: Oropharynx is clear and moist.       Eyes: Conjunctivae, EOM and lids are normal. Pupils are equal, round, and reactive to light. Right eye exhibits no discharge. Left eye exhibits no discharge.   Neck: Trachea normal, normal range of motion and phonation normal. Neck supple. No tracheal tenderness present. No tracheal deviation, no edema and no erythema present. No thyroid mass and no thyromegaly present.   Cardiovascular: Normal heart sounds.    Pulmonary/Chest: Breath sounds normal. No stridor.   Abdominal: Soft.   Lymphadenopathy:     He has no cervical adenopathy.   Neurological: He is alert and oriented to person, place, and time.   Skin: Skin is warm and dry. No rash noted. He is not diaphoretic. No erythema. No pallor.   Psychiatric: He has a normal mood and affect.   Nursing note and vitals reviewed.    Procedure Note:  Excisional biopsy of right  soft palate mass:  After informed consent was obtained, 1cc of 1% lidocaine with epinephrine 1:100,000 was injected submucosally adjacent to the lesion. A 15 blade scalpel was used to excise the mass at the base and sent for permanent pathology. Hemostasis was achieved with silver nitrate. He tolerated the procedure well.    Assessment     1. Mass of soft palate          Plan  In summary, Mr. Kwon is a 72 year old male with right soft palate mass. Biopsy done today in clinic. I will call him with the results and further recommendations. All questions were answered and he is in agreement with the plan.

## 2018-01-24 ENCOUNTER — ANTI-COAG VISIT (OUTPATIENT)
Dept: CARDIOLOGY | Facility: CLINIC | Age: 73
End: 2018-01-24

## 2018-01-24 ENCOUNTER — INFUSION (OUTPATIENT)
Dept: INFUSION THERAPY | Facility: HOSPITAL | Age: 73
End: 2018-01-24
Attending: INTERNAL MEDICINE
Payer: MEDICARE

## 2018-01-24 VITALS — SYSTOLIC BLOOD PRESSURE: 133 MMHG | RESPIRATION RATE: 18 BRPM | HEART RATE: 80 BPM | DIASTOLIC BLOOD PRESSURE: 62 MMHG

## 2018-01-24 DIAGNOSIS — D46.9 MDS (MYELODYSPLASTIC SYNDROME): Primary | ICD-10-CM

## 2018-01-24 PROCEDURE — 63600175 PHARM REV CODE 636 W HCPCS: Mod: JG | Performed by: INTERNAL MEDICINE

## 2018-01-24 PROCEDURE — 96372 THER/PROPH/DIAG INJ SC/IM: CPT

## 2018-01-24 RX ADMIN — DARBEPOETIN ALFA 200 MCG: 200 INJECTION, SOLUTION INTRAVENOUS; SUBCUTANEOUS at 11:01

## 2018-01-24 NOTE — NURSING
Pt arrived for [facility].  Labs reviewed with pt..  Pt tolerated injection SQ to right arm.  Discharged to home.

## 2018-01-24 NOTE — PROGRESS NOTES
Wife questioned and confirmed dose.  Reports patient has been using Nystatin since 1/19 due to yeast on tongue.  Denies ant other changes.

## 2018-01-29 ENCOUNTER — PATIENT MESSAGE (OUTPATIENT)
Dept: HEMATOLOGY/ONCOLOGY | Facility: CLINIC | Age: 73
End: 2018-01-29

## 2018-01-29 NOTE — TELEPHONE ENCOUNTER
"Please contact patient to coordinate his appointments.  "--Aranesp every week ( for hemoglobin < 10)  --CBC weekly x 3 weeks  Cbc, cmp in 3 weeks  --F/U in 3 weeks "  "

## 2018-01-30 NOTE — PROGRESS NOTES
"Oncologic History:   Mr. 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 He was 's patient. He has a history of PAF followed by our electrophysiology cardiology department and is chronically anticoagulated. He has moderate aortic stenosis. He was noted to be progressively anemic . Hematology workup revealed a normal B12 and folate. His iron stores were normal. His reticulocyte count was slightly elevated at 4.2. His WBC count was low and was progressively dropping over the last 3 years with monocytosis. He also has developed mild progressive thrombocytopenia. He is moderately symptomatic with the fatigue. He has no history of extrinsic GI bleeding. He also has no history of previous transfusions.He underwent a marrow biopsy in Aug 2016. 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 Of 20 metaphases, 2 metaphases were normal and 18 metaphases had a 2;21 translocation. 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 Clinical and pathologic correlation is recommended."      FINAL PATHOLOGIC DIAGNOSIS   CBC DATA 8/24/16:   RBC: 3.45 M/UL, H/H : 9.9/32.1, MCV : 93 FL, WBC: 3.1 K/UL, Gran 1.2 k/ul %, Platelet: 200 K/UL.   No peripheral blood smear was submitted for evaluation.   BONE MARROW ASPIRATE, BONE MARROW CLOT, AND BONE MARROW CORE BIOPSY WITH:   CELLULARITY= 95%, TRILINEAGE HEMATOPOIETIC ACTIVITY (M/E= 2:1) AND INCREASED IMMATURE CELLS (9%). SEE COMMENT.   LEFT SHIFTED MATURATION OF GRANULOPOIESIS.   ADEQUATE STORAGE IRON.   ADEQUATE NUMBER OF MEGAKARYOCYTES.   COMMENT: Flow cytometry analysis of bone marrow aspirate shows lymph gate (4.2 %) containing T and B cells.   No B cell clonality or T-cell aberrancy is evident. Blast gate is increased " (7.9%) with cells expression of CD13 and   CD34. Immunohistochemical studies were performed on the clot and core biopsy for further architecture evaluation with   adequate positive and negative controls. Scattered mixed predominantly T cells (CD3 positive) and B cells (CD20   positive) are evident. About 6-7 % plasma cells ( positive) and 9% CD34 positive cells are noted. Findings   are nondiagnostic for lymphoma or plasma cell dyscrasia.   Microscopic Examination   BONE MARROW ASPIRATE DIFFERENTIAL:   Blasts----------------------------------------------5%   Promyelocytes---------------------------------2%   Myelocytes--------------------------------------11%   Metamyelocytes------------------------------ 19%   Bands----------------------------------------------5%   PMN Neutrophils------------------------------15%   Eosinophils------------------------------------------2%   Basophils---------------------------------------0%   Monocytes------------------------------------2%   Lymphocytes-------------------------------------6%   Plasma cells------------------------------------------2%   Erythroid precursors--------------------------31%   BONE MARROW ASPIRATE:   Myeloid and erythroid maturation shows M:E ratio at 2:1. No significant dysplasia is evident. Left shifted maturation of   granulopoiesis is noted. Stainable iron is present without increased ringed sideroblasts. Megakaryocytes are seen.   BONE MARROW CLOT:   Cellularity is 95 % with trilineage hematopoiesis. No abnormal infiltrates are seen. Megakaryocytes are adequate in   number. Stainable iron is present.   BONE MARROW CORE BIOPSY:   Cellularity is 95 %. No abnormal infiltrates are seen. Stainable iron is not identified. Megakaryocytes are adequate in   No significantly increased reticular fibrosis is evident by special stain (reticulin) with adequate positive and   negative controls.   His marrow result was C/W evolving MDS. He did not meet criteria  for AML. He was started on Vidaza and Aranesp and completed 3 cycles when a repeat bone marrow biopsy revealed:   2/2/17 bone marrow biopsy   BONE MARROW ASPIRATE, BONE MARROW CLOT, AND BONE MARROW CORE BIOPSY WITH:   CELLULARITY= %, TRILINEAGE HEMATOPOIETIC ACTIVITY (M/E= 1.4:1).   CONSISTENT WITH REFRACTORY ANEMIA WITH EXCESS BLASTS -1. SEE COMMENT.   DECREASED STORAGE IRON.   INCREASED NUMBER OF MEGAKARYOCYTES WITH DYSPLASTIC MORPHOLOGY.   COMMENT: Flow cytometry analysis of bone marrow aspirate shows lymph gate (15.9 %) containing T and B cells.   No B cell clonality or T-cell aberrancy is evident. Blast gate is slightly increased (7.1%).   Immunohistochemical studies were performed on the clot and core biopsy for further architecture evaluation with adequate positive and negative controls. About 6-7% CD34 positive cells are noted. CD61 highlights increased in   number of megakaryocytes with dysplastic morphology.   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 Correlate clinically and with a cytogenetics   report.   Microscopic Examination   BONE MARROW ASPIRATE DIFFERENTIAL:   Blasts---------------------------------------------- 7 %   Promyelocytes---------------------------------1%   Myelocytes--------------------------------------10%   Metamyelocytes------------------------------ 8%   Bands----------------------------------------------9%   PMN Neutrophils------------------------------15%   Eosinophils------------------------------------------1%   Basophils---------------------------------------1%   Monocytes------------------------------------1%   Lymphocytes-------------------------------------9%   Plasma cells------------------------------------------1%   Erythroid precursors--------------------------37%   BONE MARROW ASPIRATE:   Myeloid and erythroid maturation shows M:E ratio at 1.4:1. Dysgranulopoiesis and occasional dyserythropoiesis is evident. Stainable iron is decreased  without increased ringed sideroblasts. Megakaryocytes are seen.   BONE MARROW CLOT:   Cellularity is  % with trilineage hematopoiesis. No abnormal infiltrates are seen. Megakaryocytes are increased in number with dysplastic morphology. Stainable iron is decreased.   BONE MARROW CORE BIOPSY:   Cellularity is  %. No abnormal infiltrates are seen. Stainable iron is not identified. Megakaryocytes are increased in number with dysplastic morphology.   Last Vidaza was cycle 5 completed on 3/3/17. He was evaluated for an allogeneic transplant but decided against it. He subsequently got admitted to the hospital with an empyema. His MDS therapy was on hold until he recovered from his empyema. He developed a drug rash while on Augmentin and was switched to Clindamycin. However he was having increasing dyspnea on excursion and orthopnea. He also has 2+ lower extremity edema. He was treated with diuretics for congestive heart failure. His symptoms improved. He decreased his diuretic and has been on observation.He has been receiving Aranesp.The plan per  was to watch his counts and only if they decreased significantly to restart his Vidaza.   He has been transfusion independent.      Interval History: Mr. Kwon returns for follow up. He has fatigue. No weight loss, fever.     Review of Systems   Constitutional: Positive for malaise/fatigue and weight loss. Negative for chills and fever.   HENT: Negative for hearing loss and nosebleeds.    Eyes: Negative for blurred vision and double vision.   Respiratory: Negative for cough, hemoptysis and shortness of breath.    Cardiovascular: Negative for chest pain, leg swelling and PND.   Gastrointestinal: Negative for constipation, diarrhea, heartburn, nausea and vomiting.   Genitourinary: Negative for dysuria and urgency.   Musculoskeletal: Negative for myalgias.   Skin: Negative for rash.   Neurological: Negative for dizziness, weakness and headaches.    Endo/Heme/Allergies: Does not bruise/bleed easily.   Psychiatric/Behavioral: Negative for depression.       Current Outpatient Prescriptions   Medication Sig    citalopram (CELEXA) 40 MG tablet TAKE 1 TABLET (40 MG TOTAL) BY MOUTH ONCE DAILY.    diltiaZEM (CARDIZEM CD) 300 MG 24 hr capsule TAKE ONE CAPSULE BY MOUTH EVERY DAY    food supplemt, lactose-reduced (BOOST) Liqd Take by mouth once daily.    furosemide (LASIX) 40 MG tablet Take Lasix 40 mg twice a day and an additional 40 mg in the am for weight gain of 3 lbs in one day or 5 lbs in one week.    metoprolol succinate (TOPROL-XL) 100 MG 24 hr tablet TAKE 1 TABLET (100 MG TOTAL) BY MOUTH ONCE DAILY.    metoprolol succinate (TOPROL-XL) 50 MG 24 hr tablet Take 3 tablets (150 mg total) by mouth once daily.    trazodone (DESYREL) 100 MG tablet Take 1 tablet (100 mg total) by mouth every evening. (Patient taking differently: Take  mg by mouth every evening. )    warfarin (COUMADIN) 3 MG tablet Take 1 tablet (3 mg total) by mouth Daily.     No current facility-administered medications for this visit.        Vitals:    01/31/18 1322   BP: 129/62   Pulse: 61   Resp: 18   Temp: 98.3 °F (36.8 °C)       Physical Exam   Constitutional: He is oriented to person, place, and time. He appears well-developed. No distress.   HENT:   Head: Normocephalic and atraumatic.   Mouth/Throat: No oropharyngeal exudate.   Eyes: Pupils are equal, round, and reactive to light. No scleral icterus.   Neck: Normal range of motion.   Cardiovascular: Normal rate and normal heart sounds.    Pulmonary/Chest: Effort normal and breath sounds normal. No respiratory distress. He has no rales.   Abdominal: Soft. He exhibits no distension. There is no tenderness. There is no rebound.   Musculoskeletal: He exhibits no edema.   Lymphadenopathy:     He has no cervical adenopathy.   Neurological: He is alert and oriented to person, place, and time. No cranial nerve deficit.   Skin: Skin is  warm.   Psychiatric: He has a normal mood and affect.     Component      Latest Ref Rng & Units 1/31/2018   WBC      3.90 - 12.70 K/uL 3.20 (L)   RBC      4.60 - 6.20 M/uL 2.40 (L)   Hemoglobin      14.0 - 18.0 g/dL 7.6 (L)   Hematocrit      40.0 - 54.0 % 25.5 (L)   MCV      82 - 98 fL 106 (H)   MCH      27.0 - 31.0 pg 31.7 (H)   MCHC      32.0 - 36.0 g/dL 29.8 (L)   RDW      11.5 - 14.5 % 18.8 (H)   Platelets      150 - 350 K/uL 120 (L)   MPV      9.2 - 12.9 fL 12.6   Immature Granulocytes      0.0 - 0.5 % 1.9 (H)   Gran # (ANC)      1.8 - 7.7 K/uL 1.5 (L)   Immature Grans (Abs)      0.00 - 0.04 K/uL 0.06 (H)   Lymph #      1.0 - 4.8 K/uL 1.6   Mono #      0.3 - 1.0 K/uL 0.1 (L)   Eos #      0.0 - 0.5 K/uL 0.0   Baso #      0.00 - 0.20 K/uL 0.00   nRBC      0 /100 WBC 1 (A)   Gran%      38.0 - 73.0 % 45.9   Lymph%      18.0 - 48.0 % 49.1 (H)   Mono%      4.0 - 15.0 % 3.1 (L)   Eosinophil%      0.0 - 8.0 % 0.0   Basophil%      0.0 - 1.9 % 0.0   Differential Method       Automated   Sodium      136 - 145 mmol/L 139   Potassium      3.5 - 5.1 mmol/L 4.5   Chloride      95 - 110 mmol/L 105   CO2      23 - 29 mmol/L 27   Glucose      70 - 110 mg/dL 139 (H)   BUN, Bld      8 - 23 mg/dL 20   Creatinine      0.5 - 1.4 mg/dL 1.1   Calcium      8.7 - 10.5 mg/dL 8.9   Total Protein      6.0 - 8.4 g/dL 8.6 (H)   Albumin      3.5 - 5.2 g/dL 3.3 (L)   Total Bilirubin      0.1 - 1.0 mg/dL 0.7   Alkaline Phosphatase      55 - 135 U/L 82   AST      10 - 40 U/L 31   ALT      10 - 44 U/L 20   Anion Gap      8 - 16 mmol/L 7 (L)   eGFR if African American      >60 mL/min/1.73 m:2 >60.0   eGFR if non African American      >60 mL/min/1.73 m:2 >60.0   LD      110 - 260 U/L 360 (H)     Assessment:     1. MDS: Bone marrow biopsy on 2/2/17 (after 3 cycles of Azacytidine) showed MDS RAEB-1. There was no significant change in the bone marrow blast percentage between August 2016 and February 2017. RUNX-1 re-arrangements were noted both in  August 2016 and February 2017.Vidaza was stopped after March 2017 due to fatigue. He is not transfusion dependent. He is on Aranesp 200mcg every week.He has mild pancytopenia today. He has CHF and does not appear to be able to tolerate induction chemotherapy, if he transforms to AML. Aranesp changed to once WEEKLY (200mcg) for hemoglobin < 10.  He does not have any active bleeding, although he is anti-coagulated and is mildly thrombocytopenic.  Hemoglobin 7.6 today. We amee increase Aranesp to 300mcg weekly. If he continues to be refractory to increased Aranesp dose, he will have another bone marrow biopsy.     2. Hyperuricemia: Mild, asymptomatic. Will monitor       3. Thrombocytopenia: Platelet count 120 k today. He has occasional epistaxis. He remains on Warfarin for atrial fibrillation.        4. Leukopenia and neutropenia: Secondary to MDS. Mild,asymptoamtic     Plan:  1. Weekly Aranesp 300mcg for Hgb < 10  2. CBC, CMP weekly    Answers for HPI/ROS submitted by the patient on 1/29/2018   appetite change : Yes  unexpected weight change: Yes  visual disturbance: No  cough: No  shortness of breath: Yes  chest pain: No  abdominal pain: No  diarrhea: No  frequency: No  back pain: No  rash: No  headaches: No  adenopathy: No  nervous/ anxious: Yes      Distress Screening Results: Psychosocial Distress screening score of Distress Score: 0 noted and reviewed. No intervention indicated.

## 2018-01-31 ENCOUNTER — INFUSION (OUTPATIENT)
Dept: INFUSION THERAPY | Facility: HOSPITAL | Age: 73
End: 2018-01-31
Attending: INTERNAL MEDICINE
Payer: MEDICARE

## 2018-01-31 ENCOUNTER — OFFICE VISIT (OUTPATIENT)
Dept: HEMATOLOGY/ONCOLOGY | Facility: CLINIC | Age: 73
End: 2018-01-31
Payer: MEDICARE

## 2018-01-31 VITALS
RESPIRATION RATE: 18 BRPM | WEIGHT: 171.94 LBS | BODY MASS INDEX: 24.07 KG/M2 | HEIGHT: 71 IN | OXYGEN SATURATION: 99 % | DIASTOLIC BLOOD PRESSURE: 62 MMHG | HEART RATE: 61 BPM | TEMPERATURE: 98 F | SYSTOLIC BLOOD PRESSURE: 129 MMHG

## 2018-01-31 DIAGNOSIS — D46.9 MDS (MYELODYSPLASTIC SYNDROME): ICD-10-CM

## 2018-01-31 DIAGNOSIS — D63.8 ANEMIA, CHRONIC DISEASE: ICD-10-CM

## 2018-01-31 DIAGNOSIS — D46.9 MDS (MYELODYSPLASTIC SYNDROME): Primary | ICD-10-CM

## 2018-01-31 DIAGNOSIS — D69.6 THROMBOCYTOPENIA: Primary | ICD-10-CM

## 2018-01-31 PROCEDURE — 63600175 PHARM REV CODE 636 W HCPCS: Mod: JG | Performed by: INTERNAL MEDICINE

## 2018-01-31 PROCEDURE — 1126F AMNT PAIN NOTED NONE PRSNT: CPT | Mod: S$GLB,,, | Performed by: INTERNAL MEDICINE

## 2018-01-31 PROCEDURE — 99499 UNLISTED E&M SERVICE: CPT | Mod: S$GLB,,, | Performed by: INTERNAL MEDICINE

## 2018-01-31 PROCEDURE — 99214 OFFICE O/P EST MOD 30 MIN: CPT | Mod: S$GLB,,, | Performed by: INTERNAL MEDICINE

## 2018-01-31 PROCEDURE — 3008F BODY MASS INDEX DOCD: CPT | Mod: S$GLB,,, | Performed by: INTERNAL MEDICINE

## 2018-01-31 PROCEDURE — 96372 THER/PROPH/DIAG INJ SC/IM: CPT

## 2018-01-31 PROCEDURE — 1159F MED LIST DOCD IN RCRD: CPT | Mod: S$GLB,,, | Performed by: INTERNAL MEDICINE

## 2018-01-31 PROCEDURE — 99999 PR PBB SHADOW E&M-EST. PATIENT-LVL III: CPT | Mod: PBBFAC,,, | Performed by: INTERNAL MEDICINE

## 2018-01-31 RX ADMIN — DARBEPOETIN ALFA 200 MCG: 200 INJECTION, SOLUTION INTRAVENOUS; SUBCUTANEOUS at 02:01

## 2018-01-31 NOTE — Clinical Note
Aranesp today Aranesp 300mcg weekly from next week ( dose changed from 200 to 300). CBC, CMP weekly. F/u on 2/12

## 2018-02-05 ENCOUNTER — ANTI-COAG VISIT (OUTPATIENT)
Dept: CARDIOLOGY | Facility: CLINIC | Age: 73
End: 2018-02-05
Payer: MEDICARE

## 2018-02-05 DIAGNOSIS — Z79.01 LONG TERM (CURRENT) USE OF ANTICOAGULANTS: Primary | ICD-10-CM

## 2018-02-05 LAB — INR PPP: 3 (ref 2–2.5)

## 2018-02-05 PROCEDURE — 99211 OFF/OP EST MAY X REQ PHY/QHP: CPT | Mod: 25,S$GLB,, | Performed by: PHARMACIST

## 2018-02-05 PROCEDURE — 85610 PROTHROMBIN TIME: CPT | Mod: QW,S$GLB,, | Performed by: PHARMACIST

## 2018-02-05 NOTE — PROGRESS NOTES
Patient reports taking 4.5mg daily except 6mg only twice per week. Otherwise, Patient denies any changes in diet, medications, or health that would effect warfarin therapy. I will lower his dose of coumadin further. Patient was re-educated on situations that would require placing a call to the coumadin clinic, including bleeding or unusual bruising issues, changes in health, diet or medications, upcoming procedures that require warfarin interruption, and missed coumadin dose(s). Patient expressed understanding that avoidance of consistency with these parameters could cause fluctuations in INR, leading to more frequent visits and increase risk of adverse events.

## 2018-02-07 ENCOUNTER — INFUSION (OUTPATIENT)
Dept: INFUSION THERAPY | Facility: HOSPITAL | Age: 73
End: 2018-02-07
Attending: INTERNAL MEDICINE
Payer: MEDICARE

## 2018-02-07 ENCOUNTER — LAB VISIT (OUTPATIENT)
Dept: LAB | Facility: HOSPITAL | Age: 73
End: 2018-02-07
Attending: INTERNAL MEDICINE
Payer: MEDICARE

## 2018-02-07 VITALS — DIASTOLIC BLOOD PRESSURE: 60 MMHG | OXYGEN SATURATION: 98 % | SYSTOLIC BLOOD PRESSURE: 123 MMHG | HEART RATE: 69 BPM

## 2018-02-07 DIAGNOSIS — D46.9 MDS (MYELODYSPLASTIC SYNDROME): ICD-10-CM

## 2018-02-07 DIAGNOSIS — D46.9 MDS (MYELODYSPLASTIC SYNDROME): Primary | ICD-10-CM

## 2018-02-07 LAB
ANISOCYTOSIS BLD QL SMEAR: SLIGHT
BASOPHILS # BLD AUTO: 0.01 K/UL
BASOPHILS NFR BLD: 0.2 %
DIFFERENTIAL METHOD: ABNORMAL
EOSINOPHIL # BLD AUTO: 0 K/UL
EOSINOPHIL NFR BLD: 0 %
ERYTHROCYTE [DISTWIDTH] IN BLOOD BY AUTOMATED COUNT: 18.8 %
HCT VFR BLD AUTO: 23.3 %
HGB BLD-MCNC: 7.2 G/DL
IMM GRANULOCYTES # BLD AUTO: 0.06 K/UL
IMM GRANULOCYTES NFR BLD AUTO: 1.4 %
LYMPHOCYTES # BLD AUTO: 1.5 K/UL
LYMPHOCYTES NFR BLD: 35.2 %
MCH RBC QN AUTO: 32.6 PG
MCHC RBC AUTO-ENTMCNC: 30.9 G/DL
MCV RBC AUTO: 105 FL
MONOCYTES # BLD AUTO: 0.1 K/UL
MONOCYTES NFR BLD: 2.1 %
NEUTROPHILS # BLD AUTO: 2.7 K/UL
NEUTROPHILS NFR BLD: 61.1 %
NRBC BLD-RTO: 1 /100 WBC
OVALOCYTES BLD QL SMEAR: ABNORMAL
PLATELET # BLD AUTO: 158 K/UL
PLATELET BLD QL SMEAR: ABNORMAL
PMV BLD AUTO: 12.3 FL
POIKILOCYTOSIS BLD QL SMEAR: SLIGHT
POLYCHROMASIA BLD QL SMEAR: ABNORMAL
RBC # BLD AUTO: 2.21 M/UL
WBC # BLD AUTO: 4.38 K/UL

## 2018-02-07 PROCEDURE — 96372 THER/PROPH/DIAG INJ SC/IM: CPT

## 2018-02-07 PROCEDURE — 36415 COLL VENOUS BLD VENIPUNCTURE: CPT

## 2018-02-07 PROCEDURE — 85025 COMPLETE CBC W/AUTO DIFF WBC: CPT

## 2018-02-07 PROCEDURE — 63600175 PHARM REV CODE 636 W HCPCS: Mod: JG | Performed by: INTERNAL MEDICINE

## 2018-02-07 RX ADMIN — DARBEPOETIN ALFA 200 MCG: 200 INJECTION, SOLUTION INTRAVENOUS; SUBCUTANEOUS at 12:02

## 2018-02-12 ENCOUNTER — LAB VISIT (OUTPATIENT)
Dept: LAB | Facility: HOSPITAL | Age: 73
End: 2018-02-12
Attending: INTERNAL MEDICINE
Payer: MEDICARE

## 2018-02-12 ENCOUNTER — OFFICE VISIT (OUTPATIENT)
Dept: HEMATOLOGY/ONCOLOGY | Facility: CLINIC | Age: 73
End: 2018-02-12
Payer: MEDICARE

## 2018-02-12 VITALS
DIASTOLIC BLOOD PRESSURE: 63 MMHG | RESPIRATION RATE: 18 BRPM | SYSTOLIC BLOOD PRESSURE: 136 MMHG | BODY MASS INDEX: 24.39 KG/M2 | HEIGHT: 71 IN | OXYGEN SATURATION: 100 % | WEIGHT: 174.19 LBS | TEMPERATURE: 98 F | HEART RATE: 60 BPM

## 2018-02-12 DIAGNOSIS — D46.9 MDS (MYELODYSPLASTIC SYNDROME): ICD-10-CM

## 2018-02-12 DIAGNOSIS — I48.19 PERSISTENT ATRIAL FIBRILLATION: Primary | ICD-10-CM

## 2018-02-12 LAB
ALBUMIN SERPL BCP-MCNC: 3.4 G/DL
ALP SERPL-CCNC: 80 U/L
ALT SERPL W/O P-5'-P-CCNC: 11 U/L
ANION GAP SERPL CALC-SCNC: 8 MMOL/L
AST SERPL-CCNC: 25 U/L
BASOPHILS # BLD AUTO: 0.01 K/UL
BASOPHILS NFR BLD: 0.2 %
BILIRUB SERPL-MCNC: 0.6 MG/DL
BUN SERPL-MCNC: 20 MG/DL
CALCIUM SERPL-MCNC: 9.1 MG/DL
CHLORIDE SERPL-SCNC: 103 MMOL/L
CO2 SERPL-SCNC: 28 MMOL/L
CREAT SERPL-MCNC: 1.1 MG/DL
DIFFERENTIAL METHOD: ABNORMAL
EOSINOPHIL # BLD AUTO: 0 K/UL
EOSINOPHIL NFR BLD: 0 %
ERYTHROCYTE [DISTWIDTH] IN BLOOD BY AUTOMATED COUNT: 19.8 %
EST. GFR  (AFRICAN AMERICAN): >60 ML/MIN/1.73 M^2
EST. GFR  (NON AFRICAN AMERICAN): >60 ML/MIN/1.73 M^2
GLUCOSE SERPL-MCNC: 121 MG/DL
HCT VFR BLD AUTO: 23.2 %
HGB BLD-MCNC: 7 G/DL
IMM GRANULOCYTES # BLD AUTO: 0.06 K/UL
IMM GRANULOCYTES NFR BLD AUTO: 1.4 %
LYMPHOCYTES # BLD AUTO: 1.9 K/UL
LYMPHOCYTES NFR BLD: 44.3 %
MCH RBC QN AUTO: 32.4 PG
MCHC RBC AUTO-ENTMCNC: 30.2 G/DL
MCV RBC AUTO: 107 FL
MONOCYTES # BLD AUTO: 0.2 K/UL
MONOCYTES NFR BLD: 4.3 %
NEUTROPHILS # BLD AUTO: 2.2 K/UL
NEUTROPHILS NFR BLD: 49.8 %
NRBC BLD-RTO: 1 /100 WBC
PLATELET # BLD AUTO: 182 K/UL
PMV BLD AUTO: 12.4 FL
POTASSIUM SERPL-SCNC: 4.3 MMOL/L
PROT SERPL-MCNC: 8.8 G/DL
RBC # BLD AUTO: 2.16 M/UL
SODIUM SERPL-SCNC: 139 MMOL/L
WBC # BLD AUTO: 4.38 K/UL

## 2018-02-12 PROCEDURE — 1126F AMNT PAIN NOTED NONE PRSNT: CPT | Mod: S$GLB,,, | Performed by: INTERNAL MEDICINE

## 2018-02-12 PROCEDURE — 85025 COMPLETE CBC W/AUTO DIFF WBC: CPT

## 2018-02-12 PROCEDURE — 99499 UNLISTED E&M SERVICE: CPT | Mod: S$GLB,,, | Performed by: INTERNAL MEDICINE

## 2018-02-12 PROCEDURE — 99999 PR PBB SHADOW E&M-EST. PATIENT-LVL III: CPT | Mod: PBBFAC,,, | Performed by: INTERNAL MEDICINE

## 2018-02-12 PROCEDURE — 99214 OFFICE O/P EST MOD 30 MIN: CPT | Mod: S$GLB,,, | Performed by: INTERNAL MEDICINE

## 2018-02-12 PROCEDURE — 1159F MED LIST DOCD IN RCRD: CPT | Mod: S$GLB,,, | Performed by: INTERNAL MEDICINE

## 2018-02-12 PROCEDURE — 36415 COLL VENOUS BLD VENIPUNCTURE: CPT

## 2018-02-12 PROCEDURE — 3008F BODY MASS INDEX DOCD: CPT | Mod: S$GLB,,, | Performed by: INTERNAL MEDICINE

## 2018-02-12 PROCEDURE — 80053 COMPREHEN METABOLIC PANEL: CPT

## 2018-02-12 RX ORDER — HYDROCODONE BITARTRATE AND ACETAMINOPHEN 500; 5 MG/1; MG/1
TABLET ORAL ONCE
Status: CANCELLED | OUTPATIENT
Start: 2018-02-12 | End: 2018-02-12

## 2018-02-12 NOTE — Clinical Note
Cbc, cmp, type and screen weekly Aranesp increased to 300units weekly from 2/21 No Aranesp this week 1 unit PRBC on Wed, with type and screen; pt consented F/u in 4 wks

## 2018-02-12 NOTE — PROGRESS NOTES
"    CC: MDS, follow up    Oncologic History:   Mr. 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 He was 's patient. He has a history of PAF followed by our electrophysiology cardiology department and is chronically anticoagulated. He has moderate aortic stenosis. He was noted to be progressively anemic . Hematology workup revealed a normal B12 and folate. His iron stores were normal. His reticulocyte count was slightly elevated at 4.2. His WBC count was low and was progressively dropping over the last 3 years with monocytosis. He also has developed mild progressive thrombocytopenia. He is moderately symptomatic with the fatigue. He has no history of extrinsic GI bleeding. He also has no history of previous transfusions.He underwent a marrow biopsy in Aug 2016. 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 Of 20 metaphases, 2 metaphases were normal and 18 metaphases had a 2;21 translocation. 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 Clinical and pathologic correlation is recommended."      FINAL PATHOLOGIC DIAGNOSIS   CBC DATA 8/24/16:   RBC: 3.45 M/UL, H/H : 9.9/32.1, MCV : 93 FL, WBC: 3.1 K/UL, Gran 1.2 k/ul %, Platelet: 200 K/UL.   No peripheral blood smear was submitted for evaluation.   BONE MARROW ASPIRATE, BONE MARROW CLOT, AND BONE MARROW CORE BIOPSY WITH:   CELLULARITY= 95%, TRILINEAGE HEMATOPOIETIC ACTIVITY (M/E= 2:1) AND INCREASED IMMATURE CELLS (9%). SEE COMMENT.   LEFT SHIFTED MATURATION OF GRANULOPOIESIS.   ADEQUATE STORAGE IRON.   ADEQUATE NUMBER OF MEGAKARYOCYTES.   COMMENT: Flow cytometry analysis of bone marrow aspirate shows lymph gate (4.2 %) containing T and B cells.   No B cell clonality or T-cell aberrancy is evident. " Blast gate is increased (7.9%) with cells expression of CD13 and   CD34. Immunohistochemical studies were performed on the clot and core biopsy for further architecture evaluation with   adequate positive and negative controls. Scattered mixed predominantly T cells (CD3 positive) and B cells (CD20   positive) are evident. About 6-7 % plasma cells ( positive) and 9% CD34 positive cells are noted. Findings   are nondiagnostic for lymphoma or plasma cell dyscrasia.   Microscopic Examination   BONE MARROW ASPIRATE DIFFERENTIAL:   Blasts----------------------------------------------5%   Promyelocytes---------------------------------2%   Myelocytes--------------------------------------11%   Metamyelocytes------------------------------ 19%   Bands----------------------------------------------5%   PMN Neutrophils------------------------------15%   Eosinophils------------------------------------------2%   Basophils---------------------------------------0%   Monocytes------------------------------------2%   Lymphocytes-------------------------------------6%   Plasma cells------------------------------------------2%   Erythroid precursors--------------------------31%   BONE MARROW ASPIRATE:   Myeloid and erythroid maturation shows M:E ratio at 2:1. No significant dysplasia is evident. Left shifted maturation of   granulopoiesis is noted. Stainable iron is present without increased ringed sideroblasts. Megakaryocytes are seen.   BONE MARROW CLOT:   Cellularity is 95 % with trilineage hematopoiesis. No abnormal infiltrates are seen. Megakaryocytes are adequate in   number. Stainable iron is present.   BONE MARROW CORE BIOPSY:   Cellularity is 95 %. No abnormal infiltrates are seen. Stainable iron is not identified. Megakaryocytes are adequate in   No significantly increased reticular fibrosis is evident by special stain (reticulin) with adequate positive and   negative controls.   His marrow result was C/W evolving MDS. He  did not meet criteria for AML. He was started on Vidaza and Aranesp and completed 3 cycles when a repeat bone marrow biopsy revealed:   2/2/17 bone marrow biopsy   BONE MARROW ASPIRATE, BONE MARROW CLOT, AND BONE MARROW CORE BIOPSY WITH:   CELLULARITY= %, TRILINEAGE HEMATOPOIETIC ACTIVITY (M/E= 1.4:1).   CONSISTENT WITH REFRACTORY ANEMIA WITH EXCESS BLASTS -1. SEE COMMENT.   DECREASED STORAGE IRON.   INCREASED NUMBER OF MEGAKARYOCYTES WITH DYSPLASTIC MORPHOLOGY.   COMMENT: Flow cytometry analysis of bone marrow aspirate shows lymph gate (15.9 %) containing T and B cells.   No B cell clonality or T-cell aberrancy is evident. Blast gate is slightly increased (7.1%).   Immunohistochemical studies were performed on the clot and core biopsy for further architecture evaluation with adequate positive and negative controls. About 6-7% CD34 positive cells are noted. CD61 highlights increased in   number of megakaryocytes with dysplastic morphology.   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 Correlate clinically and with a cytogenetics   report.   Microscopic Examination   BONE MARROW ASPIRATE DIFFERENTIAL:   Blasts---------------------------------------------- 7 %   Promyelocytes---------------------------------1%   Myelocytes--------------------------------------10%   Metamyelocytes------------------------------ 8%   Bands----------------------------------------------9%   PMN Neutrophils------------------------------15%   Eosinophils------------------------------------------1%   Basophils---------------------------------------1%   Monocytes------------------------------------1%   Lymphocytes-------------------------------------9%   Plasma cells------------------------------------------1%   Erythroid precursors--------------------------37%   BONE MARROW ASPIRATE:   Myeloid and erythroid maturation shows M:E ratio at 1.4:1. Dysgranulopoiesis and occasional dyserythropoiesis is evident.  Stainable iron is decreased without increased ringed sideroblasts. Megakaryocytes are seen.   BONE MARROW CLOT:   Cellularity is  % with trilineage hematopoiesis. No abnormal infiltrates are seen. Megakaryocytes are increased in number with dysplastic morphology. Stainable iron is decreased.   BONE MARROW CORE BIOPSY:   Cellularity is  %. No abnormal infiltrates are seen. Stainable iron is not identified. Megakaryocytes are increased in number with dysplastic morphology.   Last Vidaza was cycle 5 completed on 3/3/17. He was evaluated for an allogeneic transplant but decided against it. He subsequently got admitted to the hospital with an empyema. His MDS therapy was on hold until he recovered from his empyema. He developed a drug rash while on Augmentin and was switched to Clindamycin. However he was having increasing dyspnea on excursion and orthopnea. He also has 2+ lower extremity edema. He was treated with diuretics for congestive heart failure. His symptoms improved. He decreased his diuretic and has been on observation.He has been receiving Aranesp.The plan per  was to watch his counts and only if they decreased significantly to restart his Vidaza.   He has been transfusion independent.      Interval History: Mr. Kwon returns for follow up. He has fatigue. No weight loss, fever.     Review of Systems   Constitutional: Positive for malaise/fatigue. Negative for fever and weight loss.   HENT: Negative for congestion and sinus pain.    Eyes: Negative for blurred vision and double vision.   Respiratory: Positive for shortness of breath. Negative for cough.    Cardiovascular: Negative for chest pain.   Gastrointestinal: Negative for abdominal pain and diarrhea.   Genitourinary: Negative for frequency.   Musculoskeletal: Negative for back pain.   Skin: Negative for rash.   Neurological: Negative for sensory change, speech change, weakness and headaches.   Endo/Heme/Allergies: Does not  bruise/bleed easily.   Psychiatric/Behavioral: Negative for depression. The patient is nervous/anxious.        Current Outpatient Prescriptions   Medication Sig    citalopram (CELEXA) 40 MG tablet TAKE 1 TABLET (40 MG TOTAL) BY MOUTH ONCE DAILY.    diltiaZEM (CARDIZEM CD) 300 MG 24 hr capsule TAKE ONE CAPSULE BY MOUTH EVERY DAY    food supplemt, lactose-reduced (BOOST) Liqd Take by mouth once daily.    furosemide (LASIX) 40 MG tablet Take Lasix 40 mg twice a day and an additional 40 mg in the am for weight gain of 3 lbs in one day or 5 lbs in one week.    metoprolol succinate (TOPROL-XL) 100 MG 24 hr tablet TAKE 1 TABLET (100 MG TOTAL) BY MOUTH ONCE DAILY.    metoprolol succinate (TOPROL-XL) 50 MG 24 hr tablet Take 3 tablets (150 mg total) by mouth once daily.    trazodone (DESYREL) 100 MG tablet Take 1 tablet (100 mg total) by mouth every evening. (Patient taking differently: Take  mg by mouth every evening. )    warfarin (COUMADIN) 3 MG tablet Take 1 tablet (3 mg total) by mouth Daily.     No current facility-administered medications for this visit.          Vitals:    02/12/18 1603   BP: 136/63   Pulse: 60   Resp: 18   Temp: 98.1 °F (36.7 °C)     Physical Exam   Constitutional: He appears well-developed. No distress.   HENT:   Head: Normocephalic and atraumatic.   Mouth/Throat: No oropharyngeal exudate.   Eyes: Pupils are equal, round, and reactive to light. No scleral icterus.   Neck: Normal range of motion.   Cardiovascular: Normal rate, regular rhythm and normal heart sounds.    No murmur heard.  Pulmonary/Chest: Effort normal. No respiratory distress. He has no wheezes.   Abdominal: Soft. He exhibits no distension. There is no tenderness. There is no guarding.   Musculoskeletal: He exhibits no edema.   Lymphadenopathy:     He has no cervical adenopathy.   Neurological: No cranial nerve deficit.   Skin: Skin is warm.   Psychiatric: He has a normal mood and affect.       Component      Latest Ref  Rng & Units 2/12/2018   WBC      3.90 - 12.70 K/uL 4.38   RBC      4.60 - 6.20 M/uL 2.16 (L)   Hemoglobin      14.0 - 18.0 g/dL 7.0 (L)   Hematocrit      40.0 - 54.0 % 23.2 (L)   MCV      82 - 98 fL 107 (H)   MCH      27.0 - 31.0 pg 32.4 (H)   MCHC      32.0 - 36.0 g/dL 30.2 (L)   RDW      11.5 - 14.5 % 19.8 (H)   Platelets      150 - 350 K/uL 182   MPV      9.2 - 12.9 fL 12.4   Immature Granulocytes      0.0 - 0.5 % 1.4 (H)   Gran # (ANC)      1.8 - 7.7 K/uL 2.2   Immature Grans (Abs)      0.00 - 0.04 K/uL 0.06 (H)   Lymph #      1.0 - 4.8 K/uL 1.9   Mono #      0.3 - 1.0 K/uL 0.2 (L)   Eos #      0.0 - 0.5 K/uL 0.0   Baso #      0.00 - 0.20 K/uL 0.01   nRBC      0 /100 WBC 1 (A)   Gran%      38.0 - 73.0 % 49.8   Lymph%      18.0 - 48.0 % 44.3   Mono%      4.0 - 15.0 % 4.3   Eosinophil%      0.0 - 8.0 % 0.0   Basophil%      0.0 - 1.9 % 0.2   Differential Method       Automated   Sodium      136 - 145 mmol/L 139   Potassium      3.5 - 5.1 mmol/L 4.3   Chloride      95 - 110 mmol/L 103   CO2      23 - 29 mmol/L 28   Glucose      70 - 110 mg/dL 121 (H)   BUN, Bld      8 - 23 mg/dL 20   Creatinine      0.5 - 1.4 mg/dL 1.1   Calcium      8.7 - 10.5 mg/dL 9.1   Total Protein      6.0 - 8.4 g/dL 8.8 (H)   Albumin      3.5 - 5.2 g/dL 3.4 (L)   Total Bilirubin      0.1 - 1.0 mg/dL 0.6   Alkaline Phosphatase      55 - 135 U/L 80   AST      10 - 40 U/L 25   ALT      10 - 44 U/L 11   Anion Gap      8 - 16 mmol/L 8   eGFR if African American      >60 mL/min/1.73 m:2 >60.0   eGFR if non African American      >60 mL/min/1.73 m:2 >60.0   Assessment:     1. MDS: Bone marrow biopsy on 2/2/17 (after 3 cycles of Azacytidine) showed MDS RAEB-1. There was no significant change in the bone marrow blast percentage between August 2016 and February 2017. RUNX-1 re-arrangements were noted both in August 2016 and February 2017.Vidaza was stopped after March 2017 due to fatigue. He is not transfusion dependent. He is on Aranesp 200mcg every  week.He has mild pancytopenia today. He has CHF and does not appear to be able to tolerate induction chemotherapy, if he transforms to AML. Aranesp changed to once WEEKLY (200mcg) for hemoglobin < 10.  He does not have any active bleeding, although he is anti-coagulated and is mildly thrombocytopenic.  Aranesp increased to 300mcg weekly from 2/21/18. If he continues to be refractory to increased Aranesp dose, he will have another bone marrow biopsy.Hg 7 today. 1 unit PRBC on Wed.      2. Hyperuricemia: Mild, asymptomatic. Will monitor       3. Thrombocytopenia: Platelet count 182 k today. He has occasional epistaxis. He remains on Warfarin for atrial fibrillation.        4. Leukopenia and neutropenia: Secondary to MDS. Mild,asymptomatic. WBC count 4.38 today.     Plan:  1. Weekly Aranesp 300mcg for Hgb < 10  2. CBC, CMP weekly    Answers for HPI/ROS submitted by the patient on 2/7/2018   appetite change : Yes  unexpected weight change: Yes  visual disturbance: No  adenopathy: No

## 2018-02-14 ENCOUNTER — PATIENT MESSAGE (OUTPATIENT)
Dept: TRANSPLANT | Facility: HOSPITAL | Age: 73
End: 2018-02-14

## 2018-02-14 ENCOUNTER — INFUSION (OUTPATIENT)
Dept: INFUSION THERAPY | Facility: OTHER | Age: 73
End: 2018-02-14
Attending: INTERNAL MEDICINE
Payer: MEDICARE

## 2018-02-14 ENCOUNTER — ANTI-COAG VISIT (OUTPATIENT)
Dept: CARDIOLOGY | Facility: CLINIC | Age: 73
End: 2018-02-14

## 2018-02-14 VITALS
BODY MASS INDEX: 24.53 KG/M2 | HEIGHT: 71 IN | WEIGHT: 175.25 LBS | TEMPERATURE: 98 F | RESPIRATION RATE: 17 BRPM | DIASTOLIC BLOOD PRESSURE: 63 MMHG | SYSTOLIC BLOOD PRESSURE: 137 MMHG | HEART RATE: 68 BPM | OXYGEN SATURATION: 99 %

## 2018-02-14 DIAGNOSIS — D46.9 MDS (MYELODYSPLASTIC SYNDROME): ICD-10-CM

## 2018-02-14 LAB
BLD PROD TYP BPU: NORMAL
BLOOD UNIT EXPIRATION DATE: NORMAL
BLOOD UNIT TYPE CODE: 5100
BLOOD UNIT TYPE: NORMAL
CODING SYSTEM: NORMAL
DISPENSE STATUS: NORMAL
NUM UNITS TRANS PACKED RBC: NORMAL

## 2018-02-14 PROCEDURE — 86920 COMPATIBILITY TEST SPIN: CPT

## 2018-02-14 PROCEDURE — P9038 RBC IRRADIATED: HCPCS

## 2018-02-14 PROCEDURE — 25000003 PHARM REV CODE 250: Performed by: INTERNAL MEDICINE

## 2018-02-14 PROCEDURE — 27201040 HC RC 50 FILTER

## 2018-02-14 PROCEDURE — 36430 TRANSFUSION BLD/BLD COMPNT: CPT

## 2018-02-14 RX ORDER — HYDROCODONE BITARTRATE AND ACETAMINOPHEN 500; 5 MG/1; MG/1
TABLET ORAL ONCE
Status: COMPLETED | OUTPATIENT
Start: 2018-02-14 | End: 2018-02-14

## 2018-02-14 RX ADMIN — SODIUM CHLORIDE: 0.9 INJECTION, SOLUTION INTRAVENOUS at 12:02

## 2018-02-14 NOTE — PLAN OF CARE
Problem: Patient Care Overview (Adult)  Goal: Plan of Care Review  Outcome: Ongoing (interventions implemented as appropriate)  1 unit PRBCs administered, patient tolerated well. VSS, NAD. Dicussed possible side effects and reaction symptoms. Education provided. D/C'd home with wife.

## 2018-02-15 NOTE — TELEPHONE ENCOUNTER
Spoke with the patient and informed him about the negative event monitor result.   He informed me that he had anemia requiring blood transfusion today related to his MDS.  Will see patient next apt Feb 28th    Jenni Rhodes MD

## 2018-02-20 ENCOUNTER — PATIENT MESSAGE (OUTPATIENT)
Dept: HEMATOLOGY/ONCOLOGY | Facility: CLINIC | Age: 73
End: 2018-02-20

## 2018-02-21 ENCOUNTER — INFUSION (OUTPATIENT)
Dept: INFUSION THERAPY | Facility: HOSPITAL | Age: 73
End: 2018-02-21
Attending: INTERNAL MEDICINE
Payer: MEDICARE

## 2018-02-21 ENCOUNTER — ANTI-COAG VISIT (OUTPATIENT)
Dept: CARDIOLOGY | Facility: CLINIC | Age: 73
End: 2018-02-21

## 2018-02-21 VITALS — HEART RATE: 65 BPM | SYSTOLIC BLOOD PRESSURE: 114 MMHG | DIASTOLIC BLOOD PRESSURE: 58 MMHG

## 2018-02-21 DIAGNOSIS — D46.9 MDS (MYELODYSPLASTIC SYNDROME): Primary | ICD-10-CM

## 2018-02-21 PROCEDURE — 96372 THER/PROPH/DIAG INJ SC/IM: CPT

## 2018-02-21 PROCEDURE — 63600175 PHARM REV CODE 636 W HCPCS: Mod: JG | Performed by: INTERNAL MEDICINE

## 2018-02-21 RX ADMIN — DARBEPOETIN ALFA 300 MCG: 300 INJECTION, SOLUTION INTRAVENOUS; SUBCUTANEOUS at 10:02

## 2018-02-21 NOTE — NURSING
Patient here for aranesp injection-complains of weakness and SOB on exertion-tolerated injection well.

## 2018-02-21 NOTE — PROGRESS NOTES
Questioned confirmed dose  Pt reports nose bleed on 2/14 and a blood transfusion was done.  Increase on darbepoetin magalie in polysorbat injection 300 mcg  Also reports bruising on bilateral arms   Peanuts on 2/20

## 2018-02-28 ENCOUNTER — OFFICE VISIT (OUTPATIENT)
Dept: CARDIOLOGY | Facility: CLINIC | Age: 73
End: 2018-02-28
Payer: MEDICARE

## 2018-02-28 ENCOUNTER — INFUSION (OUTPATIENT)
Dept: INFUSION THERAPY | Facility: HOSPITAL | Age: 73
End: 2018-02-28
Attending: INTERNAL MEDICINE
Payer: MEDICARE

## 2018-02-28 ENCOUNTER — ANTI-COAG VISIT (OUTPATIENT)
Dept: CARDIOLOGY | Facility: CLINIC | Age: 73
End: 2018-02-28

## 2018-02-28 ENCOUNTER — OFFICE VISIT (OUTPATIENT)
Dept: HEMATOLOGY/ONCOLOGY | Facility: CLINIC | Age: 73
End: 2018-02-28
Payer: MEDICARE

## 2018-02-28 VITALS
HEART RATE: 63 BPM | SYSTOLIC BLOOD PRESSURE: 135 MMHG | OXYGEN SATURATION: 99 % | BODY MASS INDEX: 24.88 KG/M2 | RESPIRATION RATE: 17 BRPM | TEMPERATURE: 98 F | HEIGHT: 71 IN | DIASTOLIC BLOOD PRESSURE: 63 MMHG | WEIGHT: 177.69 LBS

## 2018-02-28 VITALS
HEIGHT: 71 IN | HEART RATE: 68 BPM | BODY MASS INDEX: 24.88 KG/M2 | WEIGHT: 177.69 LBS | SYSTOLIC BLOOD PRESSURE: 122 MMHG | DIASTOLIC BLOOD PRESSURE: 57 MMHG

## 2018-02-28 DIAGNOSIS — Z98.890 H/O CAROTID ENDARTERECTOMY: ICD-10-CM

## 2018-02-28 DIAGNOSIS — I35.0 AORTIC VALVE STENOSIS, SEVERE: ICD-10-CM

## 2018-02-28 DIAGNOSIS — Z79.01 LONG TERM (CURRENT) USE OF ANTICOAGULANTS: Primary | ICD-10-CM

## 2018-02-28 DIAGNOSIS — I50.32 CHRONIC DIASTOLIC HEART FAILURE DUE TO VALVULAR DISEASE: ICD-10-CM

## 2018-02-28 DIAGNOSIS — D46.9 MDS (MYELODYSPLASTIC SYNDROME): Primary | ICD-10-CM

## 2018-02-28 DIAGNOSIS — E78.5 DYSLIPIDEMIA: ICD-10-CM

## 2018-02-28 DIAGNOSIS — D46.9 MDS (MYELODYSPLASTIC SYNDROME): ICD-10-CM

## 2018-02-28 DIAGNOSIS — I48.0 PAROXYSMAL A-FIB: ICD-10-CM

## 2018-02-28 DIAGNOSIS — I38 CHRONIC DIASTOLIC HEART FAILURE DUE TO VALVULAR DISEASE: ICD-10-CM

## 2018-02-28 DIAGNOSIS — R53.81 MALAISE: ICD-10-CM

## 2018-02-28 DIAGNOSIS — I10 ESSENTIAL HYPERTENSION: Primary | ICD-10-CM

## 2018-02-28 DIAGNOSIS — I35.0 AORTIC STENOSIS, MODERATE: ICD-10-CM

## 2018-02-28 DIAGNOSIS — D64.9 SYMPTOMATIC ANEMIA: Primary | ICD-10-CM

## 2018-02-28 PROCEDURE — 99213 OFFICE O/P EST LOW 20 MIN: CPT | Mod: GC,S$GLB,, | Performed by: HOSPITALIST

## 2018-02-28 PROCEDURE — 3078F DIAST BP <80 MM HG: CPT | Mod: S$GLB,,, | Performed by: INTERNAL MEDICINE

## 2018-02-28 PROCEDURE — 99499 UNLISTED E&M SERVICE: CPT | Mod: S$GLB,,, | Performed by: INTERNAL MEDICINE

## 2018-02-28 PROCEDURE — 63600175 PHARM REV CODE 636 W HCPCS: Mod: JG | Performed by: INTERNAL MEDICINE

## 2018-02-28 PROCEDURE — 99999 PR PBB SHADOW E&M-EST. PATIENT-LVL IV: CPT | Mod: PBBFAC,GC,, | Performed by: HOSPITALIST

## 2018-02-28 PROCEDURE — 99999 PR PBB SHADOW E&M-EST. PATIENT-LVL III: CPT | Mod: PBBFAC,,, | Performed by: INTERNAL MEDICINE

## 2018-02-28 PROCEDURE — 3075F SYST BP GE 130 - 139MM HG: CPT | Mod: S$GLB,,, | Performed by: INTERNAL MEDICINE

## 2018-02-28 PROCEDURE — 96372 THER/PROPH/DIAG INJ SC/IM: CPT

## 2018-02-28 PROCEDURE — 99214 OFFICE O/P EST MOD 30 MIN: CPT | Mod: S$GLB,,, | Performed by: INTERNAL MEDICINE

## 2018-02-28 RX ORDER — DIPHENHYDRAMINE HCL 25 MG
25 CAPSULE ORAL
Status: CANCELLED | OUTPATIENT
Start: 2018-02-28

## 2018-02-28 RX ORDER — HYDROCODONE BITARTRATE AND ACETAMINOPHEN 500; 5 MG/1; MG/1
TABLET ORAL ONCE
Status: CANCELLED | OUTPATIENT
Start: 2018-02-28 | End: 2018-02-28

## 2018-02-28 RX ORDER — ACETAMINOPHEN 325 MG/1
650 TABLET ORAL
Status: CANCELLED | OUTPATIENT
Start: 2018-02-28

## 2018-02-28 RX ADMIN — DARBEPOETIN ALFA 300 MCG: 300 INJECTION, SOLUTION INTRAVENOUS; SUBCUTANEOUS at 03:02

## 2018-02-28 NOTE — PROGRESS NOTES
Spoke with patient, pending biopsy. No scheduled date as of yet. Advised to call clinic once scheduled. Of note, ongoing anemia with MDS.

## 2018-02-28 NOTE — PROGRESS NOTES
Called patient to be advised. Patient and Dr. Mixon (hematogist) reports that patient will need to hold coumadin for a total of three days.  Patient will be having a bone marrow procedure on 3/6 . Would like to speak with pharm d . Patient is currently at Doctors office.

## 2018-02-28 NOTE — NURSING
Patient here for aranesp injection-complains of extreme fatigue and SOB-to have transfusion tomorrow-tolerated injection well.

## 2018-02-28 NOTE — PROGRESS NOTES
Confirmed correct dose .Reports having vegetable soup that included cabbage over the weekend. Pt drinks one boost daily.

## 2018-02-28 NOTE — PROGRESS NOTES
Subjective:    Patient ID:  Wil Kwon Jr. is a 73 y.o. male who presents for follow-up of Congestive Heart Failure (2 month f/u ); Fatigue; Shortness of Breath; and Dizziness      HPI  71 Y/O M with PMH signfincant for MDS failed chemotherapy and currently on palliative therapy on observation and aranesp after receiving 3 cycles of Vidaza. His therapy was stopped due to development of an Empyema, currently checks CBC every 2 weeks and Aranesp for hemoglobin < 10 that was increased lately because of worsening Hgb. He was has progressive Mod-Severe Aortic stenosis with JEFF = 0.88 cm2, peak velocity = 3.48 m/s, mean gradient = 32 mmHg. That progressed over 4 months period from mod AS ( same LVOT diameter used). He has worsening Mod to severe MR and worsening severe TR. Paroxsysmal AF on coumadin used to follow INR followed by Coumadin clinic. Patient was seen first after admission for decompensated heart failure in 7/2017. Patient is currently on lasix 40 daily. He underwent Event monitor 1/4/18 till 1/2518 after an episode of syncope after getting off the bed. His event monitor revealed sinus rhythm with rare PACs and PVCs.  He required 1 units of PRBCs 2 weeks ago for severe anemia with no improvement in his Hgb. Today he is going to see his oncologist. His HGB is 6.6.  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 Today he came to the clinic in wheel chair.    ROS    Constitutional: negative for chills, fevers and night sweats  Ears, nose, mouth, throat, and face: negative for nasal congestion, sore throat and tinnitus  Respiratory: negative for cough, sputum and wheezing  Cardiovascular: See HPI  Gastrointestinal: negative  Genitourinary:negative for dysuria, frequency, hematuria, hesitancy and nocturia  Hematologic/lymphatic: positive for bruising  Musculoskeletal:negative for muscle weakness and myalgias  Neurological: negative for dizziness, headaches,  paresthesia and weakness  Behavioral/Psych: negative for anxiety and bad mood    Objective:    Physical Exam    General: Patient in no acute distress or discomfort  HEENT: No JVD, moist mucous membranes  Cardiac: WILLIAM R parasternum with parvus et tardus of the carotid pulse. Pansystolic murmur on the apex.   Chest: CTABL, no wheezing or rales  Abd:Soft NTND  Ext: trace Edema No swelling  Neuro: A and O X 3, non focal    Assessment:       1. Essential hypertension    2. Dyslipidemia    3. H/O carotid endarterectomy    4. Aortic stenosis, moderate    5. Aortic valve stenosis, severe    6. Chronic diastolic heart failure due to valvular disease    7. Paroxysmal A-fib         Plan:   Valvular heart disease  -Moderate to Severe AS  JEFF = 0.88 cm2, peak velocity = 3.48 m/s, mean gradient = 32 mmHg  -Moderate to Severe MR  -Severe TR  Unfortunately he is not a candidate for definitive therapy given his endstage MDS that will limit his life expectancy.  His valvular heart disease can be contributing to his symptoms but he in not in decompensated heart failure.   His NASH is more likely related to his severe anemia with Hgb 6.6     Chronic heart failure with preserved EF  Continue with Toprol to 150 daily   Continue with lasix 40 Daily, check daily weight and double AM dose if gain 3 Lbs in 1 day or 5 Lbs      Paroxsysmal AF: CHADS VASC 5, HAS Bled 3  Currently in sinus rhythm on exam and on event monitor in January 2018  Continue with Toprol 150 and Diltizem 300 for rate control  His INR goal 2, unless there is bleeding issue. Follow with Coumadin clinic  He is at increased risk for CVA given his subtherpeutic INR, and at increased risk for bleeding given his thrombocytopenia and MDS.  Discussed with patient the risk and benefit.      RTC in 3 months for follow up..    Jenni Rhodes MD  Cardiology Fellow

## 2018-03-01 ENCOUNTER — INFUSION (OUTPATIENT)
Dept: INFUSION THERAPY | Facility: HOSPITAL | Age: 73
End: 2018-03-01
Attending: INTERNAL MEDICINE
Payer: MEDICARE

## 2018-03-01 VITALS
TEMPERATURE: 98 F | RESPIRATION RATE: 16 BRPM | DIASTOLIC BLOOD PRESSURE: 55 MMHG | HEART RATE: 60 BPM | SYSTOLIC BLOOD PRESSURE: 110 MMHG

## 2018-03-01 DIAGNOSIS — D64.9 SYMPTOMATIC ANEMIA: ICD-10-CM

## 2018-03-01 PROCEDURE — 27201040 HC RC 50 FILTER

## 2018-03-01 PROCEDURE — P9038 RBC IRRADIATED: HCPCS

## 2018-03-01 PROCEDURE — 36430 TRANSFUSION BLD/BLD COMPNT: CPT

## 2018-03-01 PROCEDURE — 86920 COMPATIBILITY TEST SPIN: CPT

## 2018-03-01 PROCEDURE — 25000003 PHARM REV CODE 250: Performed by: INTERNAL MEDICINE

## 2018-03-01 RX ORDER — ACETAMINOPHEN 325 MG/1
650 TABLET ORAL
Status: COMPLETED | OUTPATIENT
Start: 2018-03-01 | End: 2018-03-01

## 2018-03-01 RX ORDER — HYDROCODONE BITARTRATE AND ACETAMINOPHEN 500; 5 MG/1; MG/1
TABLET ORAL ONCE
Status: COMPLETED | OUTPATIENT
Start: 2018-03-01 | End: 2018-03-01

## 2018-03-01 RX ORDER — DIPHENHYDRAMINE HCL 25 MG
25 CAPSULE ORAL
Status: COMPLETED | OUTPATIENT
Start: 2018-03-01 | End: 2018-03-01

## 2018-03-01 RX ADMIN — SODIUM CHLORIDE: 9 INJECTION, SOLUTION INTRAVENOUS at 01:03

## 2018-03-01 RX ADMIN — ACETAMINOPHEN 650 MG: 325 TABLET, FILM COATED ORAL at 01:03

## 2018-03-01 RX ADMIN — DIPHENHYDRAMINE HYDROCHLORIDE 25 MG: 25 CAPSULE ORAL at 01:03

## 2018-03-01 NOTE — PLAN OF CARE
Problem: Patient Care Overview (Adult)  Goal: Plan of Care Review  Outcome: Ongoing (interventions implemented as appropriate)  Patient tolerated 1 unit of prbcs. NAD noted upon discharge. Verbalized understanding to call MD for any questions/concerns. PIV removed, catheter tip intact. AVS given. Discharged home, escorted in wheelchair by wife.

## 2018-03-02 ENCOUNTER — TELEPHONE (OUTPATIENT)
Dept: HEMATOLOGY/ONCOLOGY | Facility: CLINIC | Age: 73
End: 2018-03-02

## 2018-03-02 NOTE — TELEPHONE ENCOUNTER
----- Message from Jhonny Mxion MD sent at 2/28/2018  3:47 PM CST -----  I'm comfortable with this. We should try to honor patient preference as a matter of providing good service.    For the record, I just saw him today and don't think that this was in any way related to a perception of poor care by Dr. Slater. He seemed quite comfortable with the quality of care he has had to this point.    I will put scheduling instructions in through the clinic visit as I work through it.   ----- Message -----  From: Janna Krueger MA  Sent: 2/28/2018   3:38 PM  To: Zee Coffey RN, Milo Slater MD, #    The patient listed above stated that he no longer would like to see Dr. Slater and would like to see Dr. Mixon for all future appointments.    Dr. Mixon would like to accept this patient at this time?    Thanks  Janna Krueger

## 2018-03-05 ENCOUNTER — PATIENT MESSAGE (OUTPATIENT)
Dept: HEMATOLOGY/ONCOLOGY | Facility: CLINIC | Age: 73
End: 2018-03-05

## 2018-03-05 NOTE — PROGRESS NOTES
"HEMATOLOGIC MALIGNANCIES PROGRESS NOTE    IDENTIFYING STATEMENT   Wil Kwon Jr. (Wil) is a 73 y.o. male with a  of 1945 from [place] with the diagnosis of MDS.      ONCOLOGY HISTORY:    From Dr. Slater's note    Mr. 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 He was 's patient. He has a history of PAF followed by our electrophysiology cardiology department and is chronically anticoagulated. He has moderate aortic stenosis. He was noted to be progressively anemic . Hematology workup revealed a normal B12 and folate. His iron stores were normal. His reticulocyte count was slightly elevated at 4.2. His WBC count was low and was progressively dropping over the last 3 years with monocytosis. He also has developed mild progressive thrombocytopenia. He is moderately symptomatic with the fatigue. He has no history of extrinsic GI bleeding. He also has no history of previous transfusions.He underwent a marrow biopsy in Aug 2016. 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 Of 20 metaphases, 2 metaphases were normal and 18 metaphases had a 2;21 translocation. 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 Clinical and pathologic correlation is recommended."      FINAL PATHOLOGIC DIAGNOSIS   CBC DATA 16:   RBC: 3.45 M/UL, H/H : 9.9/32.1, MCV : 93 FL, WBC: 3.1 K/UL, Gran 1.2 k/ul %, Platelet: 200 K/UL.   No peripheral blood smear was submitted for evaluation.   BONE MARROW ASPIRATE, BONE MARROW CLOT, AND BONE MARROW CORE BIOPSY WITH:   CELLULARITY= 95%, TRILINEAGE HEMATOPOIETIC ACTIVITY (M/E= 2:1) AND INCREASED IMMATURE CELLS (9%). SEE COMMENT.   LEFT SHIFTED MATURATION OF GRANULOPOIESIS.   ADEQUATE STORAGE IRON.   ADEQUATE " NUMBER OF MEGAKARYOCYTES.   COMMENT: Flow cytometry analysis of bone marrow aspirate shows lymph gate (4.2 %) containing T and B cells.   No B cell clonality or T-cell aberrancy is evident. Blast gate is increased (7.9%) with cells expression of CD13 and   CD34. Immunohistochemical studies were performed on the clot and core biopsy for further architecture evaluation with   adequate positive and negative controls. Scattered mixed predominantly T cells (CD3 positive) and B cells (CD20   positive) are evident. About 6-7 % plasma cells ( positive) and 9% CD34 positive cells are noted. Findings   are nondiagnostic for lymphoma or plasma cell dyscrasia.   Microscopic Examination   BONE MARROW ASPIRATE DIFFERENTIAL:   Blasts----------------------------------------------5%   Promyelocytes---------------------------------2%   Myelocytes--------------------------------------11%   Metamyelocytes------------------------------ 19%   Bands----------------------------------------------5%   PMN Neutrophils------------------------------15%   Eosinophils------------------------------------------2%   Basophils---------------------------------------0%   Monocytes------------------------------------2%   Lymphocytes-------------------------------------6%   Plasma cells------------------------------------------2%   Erythroid precursors--------------------------31%   BONE MARROW ASPIRATE:   Myeloid and erythroid maturation shows M:E ratio at 2:1. No significant dysplasia is evident. Left shifted maturation of   granulopoiesis is noted. Stainable iron is present without increased ringed sideroblasts. Megakaryocytes are seen.   BONE MARROW CLOT:   Cellularity is 95 % with trilineage hematopoiesis. No abnormal infiltrates are seen. Megakaryocytes are adequate in   number. Stainable iron is present.   BONE MARROW CORE BIOPSY:   Cellularity is 95 %. No abnormal infiltrates are seen. Stainable iron is not identified. Megakaryocytes are  adequate in   No significantly increased reticular fibrosis is evident by special stain (reticulin) with adequate positive and   negative controls.   His marrow result was C/W evolving MDS. He did not meet criteria for AML. He was started on Vidaza and Aranesp and completed 3 cycles when a repeat bone marrow biopsy revealed:   2/2/17 bone marrow biopsy   BONE MARROW ASPIRATE, BONE MARROW CLOT, AND BONE MARROW CORE BIOPSY WITH:   CELLULARITY= %, TRILINEAGE HEMATOPOIETIC ACTIVITY (M/E= 1.4:1).   CONSISTENT WITH REFRACTORY ANEMIA WITH EXCESS BLASTS -1. SEE COMMENT.   DECREASED STORAGE IRON.   INCREASED NUMBER OF MEGAKARYOCYTES WITH DYSPLASTIC MORPHOLOGY.   COMMENT: Flow cytometry analysis of bone marrow aspirate shows lymph gate (15.9 %) containing T and B cells.   No B cell clonality or T-cell aberrancy is evident. Blast gate is slightly increased (7.1%).   Immunohistochemical studies were performed on the clot and core biopsy for further architecture evaluation with adequate positive and negative controls. About 6-7% CD34 positive cells are noted. CD61 highlights increased in   number of megakaryocytes with dysplastic morphology.   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 Correlate clinically and with a cytogenetics   report.   Microscopic Examination   BONE MARROW ASPIRATE DIFFERENTIAL:   Blasts---------------------------------------------- 7 %   Promyelocytes---------------------------------1%   Myelocytes--------------------------------------10%   Metamyelocytes------------------------------ 8%   Bands----------------------------------------------9%   PMN Neutrophils------------------------------15%   Eosinophils------------------------------------------1%   Basophils---------------------------------------1%   Monocytes------------------------------------1%   Lymphocytes-------------------------------------9%   Plasma cells------------------------------------------1%   Erythroid  precursors--------------------------37%   BONE MARROW ASPIRATE:   Myeloid and erythroid maturation shows M:E ratio at 1.4:1. Dysgranulopoiesis and occasional dyserythropoiesis is evident. Stainable iron is decreased without increased ringed sideroblasts. Megakaryocytes are seen.   BONE MARROW CLOT:   Cellularity is  % with trilineage hematopoiesis. No abnormal infiltrates are seen. Megakaryocytes are increased in number with dysplastic morphology. Stainable iron is decreased.   BONE MARROW CORE BIOPSY:   Cellularity is  %. No abnormal infiltrates are seen. Stainable iron is not identified. Megakaryocytes are increased in number with dysplastic morphology.   Last Vidaza was cycle 5 completed on 3/3/17. He was evaluated for an allogeneic transplant but decided against it. He subsequently got admitted to the hospital with an empyema. His MDS therapy was on hold until he recovered from his empyema. He developed a drug rash while on Augmentin and was switched to Clindamycin. However he was having increasing dyspnea on excursion and orthopnea. He also has 2+ lower extremity edema. He was treated with diuretics for congestive heart failure. His symptoms improved. He decreased his diuretic and has been on observation.He has been receiving Aranesp.The plan per  was to watch his counts and only if they decreased significantly to restart his Vidaza.   He has been transfusion independent.     INTERVAL HISTORY:      Mr. Kwon returns to clinic for an unscheduled visit as he has not been feeling well lately. He had blood transfusion last week for symptomatic anemia. He feels very weak and lethargic. He is thinking he will need a blood transfusion again, even though none was scheduled for today. He called to schedule an appointment before his labs returned, which indeed do indicate a need for transfusion.    Past Medical History, Past Social History and Past Family History have been reviewed and are  unchanged except as noted in the interval history.    MEDICATIONS:     Current Outpatient Prescriptions on File Prior to Visit   Medication Sig Dispense Refill    citalopram (CELEXA) 40 MG tablet TAKE 1 TABLET (40 MG TOTAL) BY MOUTH ONCE DAILY. 90 tablet 0    diltiaZEM (CARDIZEM CD) 300 MG 24 hr capsule TAKE ONE CAPSULE BY MOUTH EVERY DAY 90 capsule 1    food supplemt, lactose-reduced (BOOST) Liqd Take by mouth once daily.      furosemide (LASIX) 40 MG tablet Take Lasix 40 mg twice a day and an additional 40 mg in the am for weight gain of 3 lbs in one day or 5 lbs in one week. 80 tablet 11    metoprolol succinate (TOPROL-XL) 100 MG 24 hr tablet TAKE 1 TABLET (100 MG TOTAL) BY MOUTH ONCE DAILY. 30 tablet 3    metoprolol succinate (TOPROL-XL) 50 MG 24 hr tablet Take 3 tablets (150 mg total) by mouth once daily. 90 tablet 11    trazodone (DESYREL) 100 MG tablet Take 1 tablet (100 mg total) by mouth every evening. (Patient taking differently: Take  mg by mouth every evening. ) 30 tablet 11    warfarin (COUMADIN) 3 MG tablet Take 1 tablet (3 mg total) by mouth Daily. 30 tablet 11     No current facility-administered medications on file prior to visit.        ALLERGIES:   Review of patient's allergies indicates:   Allergen Reactions    Lipitor [atorvastatin]      Muscle pain    Lisinopril Edema     Cheek swelling    Augmentin [amoxicillin-pot clavulanate] Rash        ROS:       Review of Systems   Constitutional: Positive for fatigue. Negative for diaphoresis, fever and unexpected weight change.   HENT:   Negative for lump/mass and sore throat.    Eyes: Negative for icterus.   Respiratory: Positive for shortness of breath. Negative for cough.    Cardiovascular: Negative for chest pain and palpitations.   Gastrointestinal: Negative for abdominal distention, constipation, diarrhea, nausea and vomiting.   Genitourinary: Negative for dysuria and frequency.    Musculoskeletal: Negative for arthralgias, gait  "problem and myalgias.   Skin: Negative for rash.   Neurological: Negative for dizziness, gait problem and headaches.   Hematological: Negative for adenopathy. Does not bruise/bleed easily.   Psychiatric/Behavioral: The patient is not nervous/anxious.        PHYSICAL EXAM:  Vitals:    02/28/18 1501   BP: 135/63   Pulse: 63   Resp: 17   Temp: 97.9 °F (36.6 °C)   TempSrc: Oral   SpO2: 99%   Weight: 80.6 kg (177 lb 11.1 oz)   Height: 5' 11" (1.803 m)   PainSc: 0-No pain       KARNOFSKY PERFORMANCE STATUS 70%  ECOG 2    Physical Exam   Constitutional: He is oriented to person, place, and time. He appears well-developed and well-nourished. No distress.   HENT:   Head: Normocephalic and atraumatic.   Mouth/Throat: Mucous membranes are normal. No oral lesions.   Eyes: Conjunctivae are normal.   Neck: No thyromegaly present.   Cardiovascular: Normal rate, regular rhythm and normal heart sounds.    No murmur heard.  Pulmonary/Chest: Breath sounds normal. He has no wheezes. He has no rales.   Abdominal: Soft. He exhibits no distension and no mass. There is no splenomegaly or hepatomegaly. There is no tenderness.   Lymphadenopathy:     He has no cervical adenopathy.        Right cervical: No deep cervical adenopathy present.       Left cervical: No deep cervical adenopathy present.     He has no axillary adenopathy.        Right: No inguinal adenopathy present.        Left: No inguinal adenopathy present.   Neurological: He is alert and oriented to person, place, and time. He has normal strength and normal reflexes. No cranial nerve deficit. Coordination normal.   Skin: No rash noted.       LAB:   Results for orders placed or performed in visit on 02/28/18   Protime-INR   Result Value Ref Range    Prothrombin Time 17.0 (H) 9.0 - 12.5 sec    INR 1.7 (H) 0.8 - 1.2   CBC auto differential   Result Value Ref Range    WBC 4.73 3.90 - 12.70 K/uL    RBC 2.00 (L) 4.60 - 6.20 M/uL    Hemoglobin 6.6 (L) 14.0 - 18.0 g/dL    Hematocrit " 20.6 (L) 40.0 - 54.0 %     (H) 82 - 98 fL    MCH 33.0 (H) 27.0 - 31.0 pg    MCHC 32.0 32.0 - 36.0 g/dL    RDW 21.0 (H) 11.5 - 14.5 %    Platelets 165 150 - 350 K/uL    MPV 12.9 9.2 - 12.9 fL    Immature Granulocytes 1.9 (H) 0.0 - 0.5 %    Gran # (ANC) 2.6 1.8 - 7.7 K/uL    Immature Grans (Abs) 0.09 (H) 0.00 - 0.04 K/uL    Lymph # 1.8 1.0 - 4.8 K/uL    Mono # 0.2 (L) 0.3 - 1.0 K/uL    Eos # 0.0 0.0 - 0.5 K/uL    Baso # 0.01 0.00 - 0.20 K/uL    nRBC 1 (A) 0 /100 WBC    Gran% 55.0 38.0 - 73.0 %    Lymph% 38.7 18.0 - 48.0 %    Mono% 4.2 4.0 - 15.0 %    Eosinophil% 0.0 0.0 - 8.0 %    Basophil% 0.2 0.0 - 1.9 %    Platelet Estimate Appears normal     Differential Method Automated    Comprehensive metabolic panel   Result Value Ref Range    Sodium 139 136 - 145 mmol/L    Potassium 4.4 3.5 - 5.1 mmol/L    Chloride 105 95 - 110 mmol/L    CO2 25 23 - 29 mmol/L    Glucose 116 (H) 70 - 110 mg/dL    BUN, Bld 24 (H) 8 - 23 mg/dL    Creatinine 1.3 0.5 - 1.4 mg/dL    Calcium 8.9 8.7 - 10.5 mg/dL    Total Protein 8.4 6.0 - 8.4 g/dL    Albumin 3.3 (L) 3.5 - 5.2 g/dL    Total Bilirubin 0.8 0.1 - 1.0 mg/dL    Alkaline Phosphatase 73 55 - 135 U/L    AST 27 10 - 40 U/L    ALT 13 10 - 44 U/L    Anion Gap 9 8 - 16 mmol/L    eGFR if African American >60.0 >60 mL/min/1.73 m^2    eGFR if non  54.1 (A) >60 mL/min/1.73 m^2   Type & Screen   Result Value Ref Range    Group & Rh O POS     Indirect Dora NEG        PROBLEMS ASSESSED THIS VISIT:    1. Symptomatic anemia    2. MDS (myelodysplastic syndrome)    3. Malaise        PLAN:       MDS (myelodysplastic syndrome)  Mr. Kwon has MDS, is on azacitidine, and he has symptomatic anemia requiring transfusion today. He has been refractory to azacitidine and darbepoetin with respect to improving the anemia. Per Dr. Slater's last note, bone marrow biopsy should be contemplated.    We will continue to have him get weekly labs with transfusion as needed. He has scheduled  follow-up with Dr. Slater on 3/14.      Jhonny Mixon MD  Hematology and Stem Cell Transplant

## 2018-03-05 NOTE — ASSESSMENT & PLAN NOTE
Mr. Kwon has MDS, is on azacitidine, and he has symptomatic anemia requiring transfusion today. He has been refractory to azacitidine and darbepoetin with respect to improving the anemia. Per Dr. Slater's last note, bone marrow biopsy should be contemplated.    We will continue to have him get weekly labs with transfusion as needed. He has scheduled follow-up with Dr. Slater on 3/14.

## 2018-03-07 ENCOUNTER — INFUSION (OUTPATIENT)
Dept: INFUSION THERAPY | Facility: HOSPITAL | Age: 73
End: 2018-03-07
Attending: INTERNAL MEDICINE
Payer: MEDICARE

## 2018-03-07 ENCOUNTER — PATIENT MESSAGE (OUTPATIENT)
Dept: HEMATOLOGY/ONCOLOGY | Facility: CLINIC | Age: 73
End: 2018-03-07

## 2018-03-07 ENCOUNTER — ANTI-COAG VISIT (OUTPATIENT)
Dept: CARDIOLOGY | Facility: CLINIC | Age: 73
End: 2018-03-07

## 2018-03-07 VITALS — HEART RATE: 69 BPM | SYSTOLIC BLOOD PRESSURE: 124 MMHG | DIASTOLIC BLOOD PRESSURE: 59 MMHG

## 2018-03-07 DIAGNOSIS — D46.9 MDS (MYELODYSPLASTIC SYNDROME): Primary | ICD-10-CM

## 2018-03-07 PROCEDURE — 96372 THER/PROPH/DIAG INJ SC/IM: CPT

## 2018-03-07 PROCEDURE — 63600175 PHARM REV CODE 636 W HCPCS: Mod: JG,EA | Performed by: INTERNAL MEDICINE

## 2018-03-07 RX ORDER — HYDROCODONE BITARTRATE AND ACETAMINOPHEN 500; 5 MG/1; MG/1
TABLET ORAL ONCE
Status: CANCELLED | OUTPATIENT
Start: 2018-03-07 | End: 2018-03-07

## 2018-03-07 RX ORDER — DIPHENHYDRAMINE HCL 25 MG
25 CAPSULE ORAL
Status: CANCELLED | OUTPATIENT
Start: 2018-03-07

## 2018-03-07 RX ORDER — ACETAMINOPHEN 325 MG/1
650 TABLET ORAL
Status: CANCELLED | OUTPATIENT
Start: 2018-03-07

## 2018-03-07 RX ADMIN — DARBEPOETIN ALFA 300 MCG: 300 INJECTION, SOLUTION INTRAVENOUS; SUBCUTANEOUS at 01:03

## 2018-03-07 NOTE — PROGRESS NOTES
Pt questioned and confirmed correct dose .  Blood clots from nose 3/3 and stopped 3/4.  Also reports hemoglobin level 6.6, might have a possible blood transfusion today he is unsure.  Also stated he will be having a bone marrow procedure 3/13 and will have told hold coumadin 3 days prior . Will hold 3 days per calendar, no lovenox with ongoing anemia.

## 2018-03-07 NOTE — NURSING
Patient here for aranesp injection-to have transfusion tomorrow-complains of SOB and severe weakness and fatigue-tolerated injection well.

## 2018-03-08 ENCOUNTER — INFUSION (OUTPATIENT)
Dept: INFUSION THERAPY | Facility: HOSPITAL | Age: 73
End: 2018-03-08
Attending: INTERNAL MEDICINE
Payer: MEDICARE

## 2018-03-08 VITALS
TEMPERATURE: 98 F | DIASTOLIC BLOOD PRESSURE: 72 MMHG | HEART RATE: 72 BPM | RESPIRATION RATE: 18 BRPM | SYSTOLIC BLOOD PRESSURE: 132 MMHG

## 2018-03-08 DIAGNOSIS — D46.9 MDS (MYELODYSPLASTIC SYNDROME): ICD-10-CM

## 2018-03-08 PROCEDURE — P9040 RBC LEUKOREDUCED IRRADIATED: HCPCS

## 2018-03-08 PROCEDURE — 36430 TRANSFUSION BLD/BLD COMPNT: CPT

## 2018-03-08 PROCEDURE — 25000003 PHARM REV CODE 250: Performed by: INTERNAL MEDICINE

## 2018-03-08 PROCEDURE — 86920 COMPATIBILITY TEST SPIN: CPT

## 2018-03-08 RX ORDER — HYDROCODONE BITARTRATE AND ACETAMINOPHEN 500; 5 MG/1; MG/1
TABLET ORAL ONCE
Status: COMPLETED | OUTPATIENT
Start: 2018-03-08 | End: 2018-03-08

## 2018-03-08 RX ORDER — ACETAMINOPHEN 325 MG/1
650 TABLET ORAL
Status: COMPLETED | OUTPATIENT
Start: 2018-03-08 | End: 2018-03-08

## 2018-03-08 RX ORDER — DIPHENHYDRAMINE HCL 25 MG
25 CAPSULE ORAL
Status: COMPLETED | OUTPATIENT
Start: 2018-03-08 | End: 2018-03-08

## 2018-03-08 RX ADMIN — ACETAMINOPHEN 650 MG: 325 TABLET, FILM COATED ORAL at 02:03

## 2018-03-08 RX ADMIN — SODIUM CHLORIDE: 0.9 INJECTION, SOLUTION INTRAVENOUS at 02:03

## 2018-03-08 RX ADMIN — DIPHENHYDRAMINE HYDROCHLORIDE 25 MG: 25 CAPSULE ORAL at 02:03

## 2018-03-08 NOTE — PLAN OF CARE
Problem: Anemia (Adult)  Goal: Identify Related Risk Factors and Signs and Symptoms  Related risk factors and signs and symptoms are identified upon initiation of Human Response Clinical Practice Guideline (CPG)   Outcome: Ongoing (interventions implemented as appropriate)  Pt here for 1 unit prc's, labs, hx, meds, allergies reviewed, pt with c/o feeling weak and shortness of breath on exertion, reclined inc hair, warm blanket provided, continue to monitor

## 2018-03-08 NOTE — PLAN OF CARE
Problem: Patient Care Overview  Goal: Plan of Care Review  Outcome: Ongoing (interventions implemented as appropriate)  Pt tolerated blood without issue, avs given, rtc 3/14/18, no distress noted upon d/c to home

## 2018-03-13 ENCOUNTER — OFFICE VISIT (OUTPATIENT)
Dept: HEMATOLOGY/ONCOLOGY | Facility: CLINIC | Age: 73
End: 2018-03-13
Payer: MEDICARE

## 2018-03-13 VITALS
HEART RATE: 91 BPM | RESPIRATION RATE: 20 BRPM | SYSTOLIC BLOOD PRESSURE: 113 MMHG | DIASTOLIC BLOOD PRESSURE: 79 MMHG | OXYGEN SATURATION: 92 % | TEMPERATURE: 98 F

## 2018-03-13 DIAGNOSIS — D46.9 MDS (MYELODYSPLASTIC SYNDROME): Primary | ICD-10-CM

## 2018-03-13 LAB
BONE MARROW IRON STAIN COMMENT: NORMAL
BONE MARROW WRIGHT STAIN COMMENT: NORMAL

## 2018-03-13 PROCEDURE — 38222 DX BONE MARROW BX & ASPIR: CPT | Mod: LT,S$GLB,, | Performed by: NURSE PRACTITIONER

## 2018-03-13 PROCEDURE — 99499 UNLISTED E&M SERVICE: CPT | Mod: S$GLB,,, | Performed by: NURSE PRACTITIONER

## 2018-03-13 PROCEDURE — 88313 SPECIAL STAINS GROUP 2: CPT

## 2018-03-13 PROCEDURE — 88271 CYTOGENETICS DNA PROBE: CPT | Mod: 59

## 2018-03-13 PROCEDURE — 88341 IMHCHEM/IMCYTCHM EA ADD ANTB: CPT | Mod: 26,59,, | Performed by: PATHOLOGY

## 2018-03-13 PROCEDURE — 88189 FLOWCYTOMETRY/READ 16 & >: CPT | Mod: ,,, | Performed by: PATHOLOGY

## 2018-03-13 PROCEDURE — 88305 TISSUE EXAM BY PATHOLOGIST: CPT | Mod: 26,,, | Performed by: PATHOLOGY

## 2018-03-13 PROCEDURE — 81450 HL NEO GSAP 5-50DNA/DNA&RNA: CPT

## 2018-03-13 PROCEDURE — 88185 FLOWCYTOMETRY/TC ADD-ON: CPT | Mod: 59 | Performed by: PATHOLOGY

## 2018-03-13 PROCEDURE — 88184 FLOWCYTOMETRY/ TC 1 MARKER: CPT | Performed by: PATHOLOGY

## 2018-03-13 PROCEDURE — 88275 CYTOGENETICS 100-300: CPT | Mod: 59

## 2018-03-13 PROCEDURE — 88237 TISSUE CULTURE BONE MARROW: CPT

## 2018-03-13 PROCEDURE — 85097 BONE MARROW INTERPRETATION: CPT | Mod: ,,, | Performed by: PATHOLOGY

## 2018-03-13 PROCEDURE — 88305 TISSUE EXAM BY PATHOLOGIST: CPT | Mod: 59 | Performed by: PATHOLOGY

## 2018-03-13 PROCEDURE — 88342 IMHCHEM/IMCYTCHM 1ST ANTB: CPT | Mod: 26,59,, | Performed by: PATHOLOGY

## 2018-03-13 PROCEDURE — 88313 SPECIAL STAINS GROUP 2: CPT | Mod: 26,,, | Performed by: PATHOLOGY

## 2018-03-13 PROCEDURE — 88264 CHROMOSOME ANALYSIS 20-25: CPT

## 2018-03-13 PROCEDURE — 88341 IMHCHEM/IMCYTCHM EA ADD ANTB: CPT | Performed by: PATHOLOGY

## 2018-03-13 PROCEDURE — 88271 CYTOGENETICS DNA PROBE: CPT

## 2018-03-13 PROCEDURE — 88311 DECALCIFY TISSUE: CPT | Mod: 26,,, | Performed by: PATHOLOGY

## 2018-03-13 PROCEDURE — 99999 PR PBB SHADOW E&M-EST. PATIENT-LVL III: CPT | Mod: PBBFAC,,, | Performed by: NURSE PRACTITIONER

## 2018-03-13 PROCEDURE — 88275 CYTOGENETICS 100-300: CPT

## 2018-03-13 NOTE — PROCEDURES
Bone marrow  Date/Time: 3/13/2018 8:54 AM  Performed by: ODALYS JARQUIN.  Authorized by: ODALYS JARQUIN       PROCEDURE NOTE:  Bone Marrow Biopsy  Indication: restaging MDS  Consent: Informed consent was obtained from patient.  Timeout: Done and documented.  Position: Prone  Site: Left posterior illiac crest.  Prep: Betadine.  Needle used: 11 gauge Jamshidi needle.  Anesthetic: 2% lidocaine 10 cc.  Biopsy: The biopsy needle was introduced into the marrow cavity and 10 cc of aspirate was obtained without complications and sent for flow, cytogenetics, FISH and Oncogene panel. Core biopsy obtained without difficulty and sent for routine histologic examination.   Complications: None.  EBL: minimal  Disposition: The patient was discharged home after lying flat for 15 minutes.    Odalys Jarquin NP  Hematology/BMT

## 2018-03-13 NOTE — PROGRESS NOTES
Patient presents today for a bone marrow aspiration and biopsy (see procedure note) for restaging of MDS. For full history please see Dr. Mixon's note from 2/28.     Medications, allergies and VS reviewed    Deyanira Jarquin NP  Hematology/BMT

## 2018-03-14 ENCOUNTER — OFFICE VISIT (OUTPATIENT)
Dept: HEMATOLOGY/ONCOLOGY | Facility: CLINIC | Age: 73
End: 2018-03-14
Payer: MEDICARE

## 2018-03-14 ENCOUNTER — INFUSION (OUTPATIENT)
Dept: INFUSION THERAPY | Facility: HOSPITAL | Age: 73
End: 2018-03-14
Attending: INTERNAL MEDICINE
Payer: MEDICARE

## 2018-03-14 VITALS
TEMPERATURE: 99 F | RESPIRATION RATE: 16 BRPM | DIASTOLIC BLOOD PRESSURE: 66 MMHG | OXYGEN SATURATION: 99 % | HEART RATE: 66 BPM | HEIGHT: 71 IN | WEIGHT: 174.38 LBS | SYSTOLIC BLOOD PRESSURE: 144 MMHG | BODY MASS INDEX: 24.41 KG/M2

## 2018-03-14 DIAGNOSIS — D46.9 MDS (MYELODYSPLASTIC SYNDROME): Primary | ICD-10-CM

## 2018-03-14 DIAGNOSIS — D69.6 THROMBOCYTOPENIA: ICD-10-CM

## 2018-03-14 DIAGNOSIS — Z79.01 LONG TERM CURRENT USE OF ANTICOAGULANT THERAPY: ICD-10-CM

## 2018-03-14 DIAGNOSIS — D63.0 ANEMIA IN NEOPLASTIC DISEASE: ICD-10-CM

## 2018-03-14 LAB
BODY SITE - BONE MARROW: NORMAL
CLINICAL DIAGNOSIS - BONE MARROW: NORMAL
FLOW CYTOMETRY ANTIBODIES ANALYZED - BONE MARROW: NORMAL
FLOW CYTOMETRY COMMENT - BONE MARROW: NORMAL
FLOW CYTOMETRY INTERPRETATION - BONE MARROW: NORMAL

## 2018-03-14 PROCEDURE — 99999 PR PBB SHADOW E&M-EST. PATIENT-LVL III: CPT | Mod: PBBFAC,,, | Performed by: INTERNAL MEDICINE

## 2018-03-14 PROCEDURE — 99499 UNLISTED E&M SERVICE: CPT | Mod: S$GLB,,, | Performed by: INTERNAL MEDICINE

## 2018-03-14 PROCEDURE — 3077F SYST BP >= 140 MM HG: CPT | Mod: CPTII,S$GLB,, | Performed by: INTERNAL MEDICINE

## 2018-03-14 PROCEDURE — 3078F DIAST BP <80 MM HG: CPT | Mod: CPTII,S$GLB,, | Performed by: INTERNAL MEDICINE

## 2018-03-14 PROCEDURE — 96372 THER/PROPH/DIAG INJ SC/IM: CPT

## 2018-03-14 PROCEDURE — 63600175 PHARM REV CODE 636 W HCPCS: Mod: JG | Performed by: INTERNAL MEDICINE

## 2018-03-14 PROCEDURE — 99215 OFFICE O/P EST HI 40 MIN: CPT | Mod: S$GLB,,, | Performed by: INTERNAL MEDICINE

## 2018-03-14 RX ADMIN — DARBEPOETIN ALFA 300 MCG: 300 INJECTION, SOLUTION INTRAVENOUS; SUBCUTANEOUS at 11:03

## 2018-03-14 NOTE — Clinical Note
Please schedule weekly labs (CBC, CMP, Type and screen) x 4 weeks. Needs chemo appt weekly for Aranesp and possible transfusion.    Return to see me in 4 weeks on same day as labs.

## 2018-03-15 LAB
CLINICAL CYTOGENETICIST REVIEW: NORMAL
FMDS SPECIMEN: NORMAL
MDS FISH ADDITIONAL INFORMATION: NORMAL
MDS FISH DISCLAIMER: NORMAL
MDS FISH REASON FOR REFERRAL (BM): NORMAL
MDS FISH RELEASED BY: NORMAL
MDS FISH RESULT (BM): NORMAL
MDS FISH RESULT SUMMARY: NORMAL
MDS FISH RESULT TABLE: NORMAL
REF LAB TEST METHOD: NORMAL
SPECIMEN SOURCE: NORMAL

## 2018-03-16 LAB
AML FISH ADDITIONAL INFORMATION (BM): NORMAL
AML FISH DISCLAIMER (BM): NORMAL
AML FISH REASON FOR REFERRAL (BM): NORMAL
AML FISH RELEASED BY (BM): NORMAL
AML FISH RESULT (BM): NORMAL
AML FISH RESULT SUMMARY (BM): NORMAL
AML FISH RESULT TABLE (BM): NORMAL
CLINICAL CYTOGENETICIST REVIEW: NORMAL
FAMLB SPECIMEN: NORMAL
REF LAB TEST METHOD: NORMAL
SPECIMEN SOURCE: NORMAL

## 2018-03-16 NOTE — PROGRESS NOTES
Pt was informed about possible risks . Pt reports not taking any coumadin since 3/9. Needs new dosing instructions.

## 2018-03-16 NOTE — PROGRESS NOTES
Patient should contact MD who performed procedure to verify why bleeding has not stopped, as he is putting himself at risk for a stroke if he does not resume coumadin soon.

## 2018-03-16 NOTE — PROGRESS NOTES
Patient called to report he has not restarted warfarin due to bleeding procedure site.  Bleeding stopped as of yesterday.

## 2018-03-19 ENCOUNTER — ANTI-COAG VISIT (OUTPATIENT)
Dept: CARDIOLOGY | Facility: CLINIC | Age: 73
End: 2018-03-19
Payer: MEDICARE

## 2018-03-19 ENCOUNTER — PATIENT MESSAGE (OUTPATIENT)
Dept: HEMATOLOGY/ONCOLOGY | Facility: CLINIC | Age: 73
End: 2018-03-19

## 2018-03-19 DIAGNOSIS — D46.9 MDS (MYELODYSPLASTIC SYNDROME): Primary | ICD-10-CM

## 2018-03-19 DIAGNOSIS — Z79.01 LONG TERM CURRENT USE OF ANTICOAGULANT THERAPY: Primary | ICD-10-CM

## 2018-03-19 LAB — INR PPP: 1.5 (ref 2–3)

## 2018-03-19 PROCEDURE — 99211 OFF/OP EST MAY X REQ PHY/QHP: CPT | Mod: 25,S$GLB,, | Performed by: PHARMACIST

## 2018-03-19 PROCEDURE — 85610 PROTHROMBIN TIME: CPT | Mod: QW,S$GLB,, | Performed by: PHARMACIST

## 2018-03-19 NOTE — PROGRESS NOTES
Quick follow-up from procedure hold 3/13, he had to hold an extra 3 days due to bleeding from the site. INR low today. Patient has bruises from use, denies anymore bleeding, he is scheduled for blood transfusions the next 3 weeks. Will resume weekly dose without anymore boosts due to bleeding history. Follow-up INR with labs on 3/23. Advised him to call with any changes or concerns.

## 2018-03-19 NOTE — PROGRESS NOTES
"HEMATOLOGIC MALIGNANCIES PROGRESS NOTE    IDENTIFYING STATEMENT   Wil Kwon Jr. (Wil) is a 73 y.o. male with a  of 1945 from [place] with the diagnosis of myelodysplastic syndrome.      ONCOLOGY HISTORY:    From Dr. Slater's note     Mr. 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 He was 's patient. He has a history of PAF followed by our electrophysiology cardiology department and is chronically anticoagulated. He has moderate aortic stenosis. He was noted to be progressively anemic . Hematology workup revealed a normal B12 and folate. His iron stores were normal. His reticulocyte count was slightly elevated at 4.2. His WBC count was low and was progressively dropping over the last 3 years with monocytosis. He also has developed mild progressive thrombocytopenia. He is moderately symptomatic with the fatigue. He has no history of extrinsic GI bleeding. He also has no history of previous transfusions.He underwent a marrow biopsy in Aug 2016. 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 Of 20 metaphases, 2 metaphases were normal and 18 metaphases had a 2;21 translocation. 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 Clinical and pathologic correlation is recommended."     INTERVAL HISTORY:      Mr. Kwon returns to clinic for follow-up of his myelodysplastic syndrome. He is feeling fatigued overall, but he is a bit better since his most recent blood transfusion. He is interested to learn what I think will be the next most appropriate step in management of his MDS.     Past Medical History, Past Social History and Past Family History have been reviewed and are unchanged except as noted in the interval " history.    MEDICATIONS:     Current Outpatient Prescriptions on File Prior to Visit   Medication Sig Dispense Refill    citalopram (CELEXA) 40 MG tablet TAKE 1 TABLET (40 MG TOTAL) BY MOUTH ONCE DAILY. 90 tablet 0    diltiaZEM (CARDIZEM CD) 300 MG 24 hr capsule TAKE ONE CAPSULE BY MOUTH EVERY DAY 90 capsule 1    food supplemt, lactose-reduced (BOOST) Liqd Take by mouth once daily.      furosemide (LASIX) 40 MG tablet Take Lasix 40 mg twice a day and an additional 40 mg in the am for weight gain of 3 lbs in one day or 5 lbs in one week. 80 tablet 11    metoprolol succinate (TOPROL-XL) 100 MG 24 hr tablet TAKE 1 TABLET (100 MG TOTAL) BY MOUTH ONCE DAILY. 30 tablet 3    metoprolol succinate (TOPROL-XL) 50 MG 24 hr tablet Take 3 tablets (150 mg total) by mouth once daily. 90 tablet 11    trazodone (DESYREL) 100 MG tablet Take 1 tablet (100 mg total) by mouth every evening. (Patient taking differently: Take  mg by mouth every evening. ) 30 tablet 11    warfarin (COUMADIN) 3 MG tablet Take 1 tablet (3 mg total) by mouth Daily. 30 tablet 11     No current facility-administered medications on file prior to visit.        ALLERGIES:   Review of patient's allergies indicates:   Allergen Reactions    Lipitor [atorvastatin]      Muscle pain    Lisinopril Edema     Cheek swelling    Augmentin [amoxicillin-pot clavulanate] Rash        ROS:       Review of Systems   Constitutional: Positive for fatigue. Negative for diaphoresis, fever and unexpected weight change.   HENT:   Negative for lump/mass and sore throat.    Eyes: Negative for icterus.   Respiratory: Positive for shortness of breath. Negative for cough.    Cardiovascular: Negative for chest pain and palpitations.   Gastrointestinal: Negative for abdominal distention, constipation, diarrhea, nausea and vomiting.   Genitourinary: Negative for dysuria and frequency.    Musculoskeletal: Negative for arthralgias, gait problem and myalgias.   Skin: Negative for  "rash.   Neurological: Negative for dizziness, gait problem and headaches.   Hematological: Negative for adenopathy. Does not bruise/bleed easily.   Psychiatric/Behavioral: The patient is not nervous/anxious.        PHYSICAL EXAM:  Vitals:    03/14/18 1117   BP: (!) 144/66   Pulse: 66   Resp: 16   Temp: 98.5 °F (36.9 °C)   TempSrc: Oral   SpO2: 99%   Weight: 79.1 kg (174 lb 6.1 oz)   Height: 5' 11" (1.803 m)   PainSc: 0-No pain       KARNOFSKY PERFORMANCE STATUS 70%  ECOG 1    Physical Exam   Constitutional: He is oriented to person, place, and time. He appears well-developed and well-nourished. No distress.   HENT:   Head: Normocephalic and atraumatic.   Mouth/Throat: Mucous membranes are normal. No oral lesions.   Eyes: Conjunctivae are normal.   Neck: No thyromegaly present.   Cardiovascular: Normal rate, regular rhythm and normal heart sounds.    No murmur heard.  Pulmonary/Chest: Breath sounds normal. He has no wheezes. He has no rales.   Abdominal: Soft. He exhibits no distension and no mass. There is no splenomegaly or hepatomegaly. There is no tenderness.   Lymphadenopathy:     He has no cervical adenopathy.        Right cervical: No deep cervical adenopathy present.       Left cervical: No deep cervical adenopathy present.     He has no axillary adenopathy.        Right: No inguinal adenopathy present.        Left: No inguinal adenopathy present.   Neurological: He is alert and oriented to person, place, and time. He has normal strength and normal reflexes. No cranial nerve deficit. Coordination normal.   Skin: No rash noted.       LAB:   Results for orders placed or performed in visit on 03/14/18   CBC auto differential   Result Value Ref Range    WBC 3.19 (L) 3.90 - 12.70 K/uL    RBC 2.44 (L) 4.60 - 6.20 M/uL    Hemoglobin 7.7 (L) 14.0 - 18.0 g/dL    Hematocrit 24.5 (L) 40.0 - 54.0 %     (H) 82 - 98 fL    MCH 31.6 (H) 27.0 - 31.0 pg    MCHC 31.4 (L) 32.0 - 36.0 g/dL    RDW 23.9 (H) 11.5 - 14.5 %    " Platelets 118 (L) 150 - 350 K/uL    MPV 12.8 9.2 - 12.9 fL    Immature Granulocytes 0.9 (H) 0.0 - 0.5 %    Gran # (ANC) 1.5 (L) 1.8 - 7.7 K/uL    Immature Grans (Abs) 0.03 0.00 - 0.04 K/uL    Lymph # 1.5 1.0 - 4.8 K/uL    Mono # 0.1 (L) 0.3 - 1.0 K/uL    Eos # 0.0 0.0 - 0.5 K/uL    Baso # 0.00 0.00 - 0.20 K/uL    nRBC 1 (A) 0 /100 WBC    Gran% 48.4 38.0 - 73.0 %    Lymph% 47.3 18.0 - 48.0 %    Mono% 3.4 (L) 4.0 - 15.0 %    Eosinophil% 0.0 0.0 - 8.0 %    Basophil% 0.0 0.0 - 1.9 %    Platelet Estimate Appears normal     Aniso Slight     Poik Slight     Poly Occasional     Hypo Occasional     Ovalocytes Occasional     Tear Drop Cells Occasional     Differential Method Automated    Comprehensive metabolic panel   Result Value Ref Range    Sodium 139 136 - 145 mmol/L    Potassium 4.5 3.5 - 5.1 mmol/L    Chloride 106 95 - 110 mmol/L    CO2 26 23 - 29 mmol/L    Glucose 104 70 - 110 mg/dL    BUN, Bld 22 8 - 23 mg/dL    Creatinine 1.3 0.5 - 1.4 mg/dL    Calcium 9.4 8.7 - 10.5 mg/dL    Total Protein 8.3 6.0 - 8.4 g/dL    Albumin 3.5 3.5 - 5.2 g/dL    Total Bilirubin 1.0 0.1 - 1.0 mg/dL    Alkaline Phosphatase 78 55 - 135 U/L    AST 26 10 - 40 U/L    ALT 13 10 - 44 U/L    Anion Gap 7 (L) 8 - 16 mmol/L    eGFR if African American >60.0 >60 mL/min/1.73 m^2    eGFR if non  54.1 (A) >60 mL/min/1.73 m^2   Ferritin   Result Value Ref Range    Ferritin 449 (H) 20.0 - 300.0 ng/mL   Iron and TIBC   Result Value Ref Range    Iron 149 45 - 160 ug/dL    Transferrin 243 200 - 375 mg/dL    TIBC 360 250 - 450 ug/dL    Saturated Iron 41 20 - 50 %   Type & Screen   Result Value Ref Range    Group & Rh O POS     Indirect Dora NEG        PROBLEMS ASSESSED THIS VISIT:    1. MDS (myelodysplastic syndrome)    2. Long term current use of anticoagulant therapy    3. 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 Kwon returns to clinic for follow-up of MDS. He is on  supportive therapy with weekly Aranesp, which appears to be stabilizing his hemoglobin somewhat though he remains transfusion dependent.     His bone marrow biopsy was performed the day before this visit and has resulted with only 4% blasts, which is consistent with MDS (rather than evolution to AML).    Unfortunately, he was refractory to hypomethylating agents; thus, therapeutic options in MDS are quite limited.     His WPSS score is 4 (2 points for MDS-Excess Blasts 1, 1 point for cytogenetics, 1 point for transfusion dependence). This correlates with high risk disease, with median overall survival of 26 months.    The only additional option that could impact the natural history of his MDS would be allogeneic transplant, though he would be unlikely to be considered eligible with his cardiac disorders (moderate to severe regurgitation and pulmonary hypertension). I therefore would not consider him for transplant.    There are novel therapies for AML that are of low intensity (e.g. Gemtuzumab, LoDAC + venetoclax), though these would not be appropriate in the setting of MDS at this time.    We will therefore continue with supportive care and monitor for needs for additional transfusions.     Follow-up in 4 weeks.     Jhonny Mixon MD  Hematology and Stem Cell Transplant

## 2018-03-19 NOTE — ASSESSMENT & PLAN NOTE
Mr. Kwon returns to clinic for follow-up of MDS. He is on supportive therapy with weekly Aranesp, which appears to be stabilizing his hemoglobin somewhat though he remains transfusion dependent.     His bone marrow biopsy was performed the day before this visit and has resulted with only 4% blasts, which is consistent with MDS (rather than evolution to AML).    Unfortunately, he was refractory to hypomethylating agents; thus, therapeutic options in MDS are quite limited.     His WPSS score is 4 (2 points for MDS-Excess Blasts 1, 1 point for cytogenetics, 1 point for transfusion dependence). This correlates with high risk disease, with median overall survival of 26 months.    The only additional option that could impact the natural history of his MDS would be allogeneic transplant, though he would be unlikely to be considered eligible with his cardiac disorders (moderate to severe regurgitation and pulmonary hypertension). I therefore would not consider him for transplant.    There are novel therapies for AML that are of low intensity (e.g. Gemtuzumab, LoDAC + venetoclax), though these would not be appropriate in the setting of MDS at this time.    We will therefore continue with supportive care and monitor for needs for additional transfusions.

## 2018-03-19 NOTE — PROGRESS NOTES
Pt seen by Trupti PHELPS. I have reviewed her documentation and agree with her assessment and plan.

## 2018-03-23 ENCOUNTER — ANTI-COAG VISIT (OUTPATIENT)
Dept: CARDIOLOGY | Facility: CLINIC | Age: 73
End: 2018-03-23

## 2018-03-23 ENCOUNTER — INFUSION (OUTPATIENT)
Dept: INFUSION THERAPY | Facility: HOSPITAL | Age: 73
End: 2018-03-23
Attending: INTERNAL MEDICINE
Payer: MEDICARE

## 2018-03-23 VITALS — HEART RATE: 70 BPM | SYSTOLIC BLOOD PRESSURE: 135 MMHG | DIASTOLIC BLOOD PRESSURE: 60 MMHG | RESPIRATION RATE: 17 BRPM

## 2018-03-23 DIAGNOSIS — D46.9 MDS (MYELODYSPLASTIC SYNDROME): Primary | ICD-10-CM

## 2018-03-23 DIAGNOSIS — Z79.01 LONG TERM CURRENT USE OF ANTICOAGULANT THERAPY: ICD-10-CM

## 2018-03-23 PROCEDURE — 96372 THER/PROPH/DIAG INJ SC/IM: CPT

## 2018-03-23 PROCEDURE — 63600175 PHARM REV CODE 636 W HCPCS: Mod: JG | Performed by: INTERNAL MEDICINE

## 2018-03-23 RX ADMIN — DARBEPOETIN ALFA 300 MCG: 300 INJECTION, SOLUTION INTRAVENOUS; SUBCUTANEOUS at 10:03

## 2018-03-23 NOTE — NURSING
1055:  Pt arrived for [facility].  Labs reviewed with pt.  Pt tolerated injection SQ to abd.  Pt now waiting to see if he needs a transfusion.

## 2018-03-29 ENCOUNTER — INFUSION (OUTPATIENT)
Dept: INFUSION THERAPY | Facility: HOSPITAL | Age: 73
End: 2018-03-29
Attending: INTERNAL MEDICINE
Payer: MEDICARE

## 2018-03-29 ENCOUNTER — PATIENT MESSAGE (OUTPATIENT)
Dept: HEMATOLOGY/ONCOLOGY | Facility: CLINIC | Age: 73
End: 2018-03-29

## 2018-03-29 ENCOUNTER — TELEPHONE (OUTPATIENT)
Dept: HEMATOLOGY/ONCOLOGY | Facility: CLINIC | Age: 73
End: 2018-03-29

## 2018-03-29 ENCOUNTER — ANTI-COAG VISIT (OUTPATIENT)
Dept: CARDIOLOGY | Facility: CLINIC | Age: 73
End: 2018-03-29

## 2018-03-29 VITALS — RESPIRATION RATE: 19 BRPM | HEART RATE: 63 BPM | SYSTOLIC BLOOD PRESSURE: 129 MMHG | DIASTOLIC BLOOD PRESSURE: 62 MMHG

## 2018-03-29 VITALS
TEMPERATURE: 99 F | HEART RATE: 54 BPM | DIASTOLIC BLOOD PRESSURE: 56 MMHG | SYSTOLIC BLOOD PRESSURE: 116 MMHG | RESPIRATION RATE: 16 BRPM

## 2018-03-29 DIAGNOSIS — D64.9 SYMPTOMATIC ANEMIA: Primary | ICD-10-CM

## 2018-03-29 DIAGNOSIS — D46.9 MDS (MYELODYSPLASTIC SYNDROME): Primary | ICD-10-CM

## 2018-03-29 DIAGNOSIS — D64.9 SYMPTOMATIC ANEMIA: ICD-10-CM

## 2018-03-29 DIAGNOSIS — Z79.01 LONG TERM CURRENT USE OF ANTICOAGULANT THERAPY: ICD-10-CM

## 2018-03-29 PROCEDURE — 86920 COMPATIBILITY TEST SPIN: CPT

## 2018-03-29 PROCEDURE — 63600175 PHARM REV CODE 636 W HCPCS: Mod: JG | Performed by: INTERNAL MEDICINE

## 2018-03-29 PROCEDURE — P9040 RBC LEUKOREDUCED IRRADIATED: HCPCS

## 2018-03-29 PROCEDURE — 36430 TRANSFUSION BLD/BLD COMPNT: CPT

## 2018-03-29 PROCEDURE — 25000003 PHARM REV CODE 250: Performed by: INTERNAL MEDICINE

## 2018-03-29 PROCEDURE — 96372 THER/PROPH/DIAG INJ SC/IM: CPT

## 2018-03-29 RX ORDER — DIPHENHYDRAMINE HCL 25 MG
25 CAPSULE ORAL
Status: COMPLETED | OUTPATIENT
Start: 2018-03-29 | End: 2018-03-29

## 2018-03-29 RX ORDER — HYDROCODONE BITARTRATE AND ACETAMINOPHEN 500; 5 MG/1; MG/1
TABLET ORAL ONCE
Status: COMPLETED | OUTPATIENT
Start: 2018-03-29 | End: 2018-03-29

## 2018-03-29 RX ORDER — ACETAMINOPHEN 325 MG/1
650 TABLET ORAL
Status: CANCELLED | OUTPATIENT
Start: 2018-03-29

## 2018-03-29 RX ORDER — ACETAMINOPHEN 325 MG/1
650 TABLET ORAL
Status: COMPLETED | OUTPATIENT
Start: 2018-03-29 | End: 2018-03-29

## 2018-03-29 RX ORDER — DIPHENHYDRAMINE HCL 25 MG
25 CAPSULE ORAL
Status: CANCELLED | OUTPATIENT
Start: 2018-03-29

## 2018-03-29 RX ORDER — HYDROCODONE BITARTRATE AND ACETAMINOPHEN 500; 5 MG/1; MG/1
TABLET ORAL ONCE
Status: CANCELLED | OUTPATIENT
Start: 2018-03-29 | End: 2018-03-29

## 2018-03-29 RX ADMIN — ACETAMINOPHEN 650 MG: 325 TABLET, FILM COATED ORAL at 12:03

## 2018-03-29 RX ADMIN — SODIUM CHLORIDE: 9 INJECTION, SOLUTION INTRAVENOUS at 12:03

## 2018-03-29 RX ADMIN — DARBEPOETIN ALFA 300 MCG: 300 INJECTION, SOLUTION INTRAVENOUS; SUBCUTANEOUS at 10:03

## 2018-03-29 RX ADMIN — DIPHENHYDRAMINE HYDROCHLORIDE 25 MG: 25 CAPSULE ORAL at 12:03

## 2018-03-29 NOTE — TELEPHONE ENCOUNTER
----- Message from Jhonny Mixon MD sent at 3/29/2018  9:41 AM CDT -----  Please ensure Mr. Kwon knows he will need to receive blood today. He has a standing appt. Hemoglobin was 7.2. His goal is > 8.     Thanks,    Jhonny    Patient was notified that he will have a blood transfusion today.

## 2018-03-29 NOTE — PLAN OF CARE
Problem: Patient Care Overview  Goal: Plan of Care Review  Outcome: Ongoing (interventions implemented as appropriate)  Patient tolerated 1 unit of prbc with nad noted upon discharge. PIV removed, catheter tip intact. Verbalized understanding to call MD for any questions/concerns. AVS given. Discharged home, ambulated independently with wife by side.

## 2018-04-02 DIAGNOSIS — F32.0 MILD SINGLE CURRENT EPISODE OF MAJOR DEPRESSIVE DISORDER: ICD-10-CM

## 2018-04-02 RX ORDER — CITALOPRAM 40 MG/1
40 TABLET, FILM COATED ORAL DAILY
Qty: 90 TABLET | Refills: 0 | Status: ON HOLD | OUTPATIENT
Start: 2018-04-02 | End: 2018-04-25 | Stop reason: HOSPADM

## 2018-04-06 ENCOUNTER — INFUSION (OUTPATIENT)
Dept: INFUSION THERAPY | Facility: HOSPITAL | Age: 73
End: 2018-04-06
Attending: INTERNAL MEDICINE
Payer: MEDICARE

## 2018-04-06 ENCOUNTER — ANTI-COAG VISIT (OUTPATIENT)
Dept: CARDIOLOGY | Facility: CLINIC | Age: 73
End: 2018-04-06

## 2018-04-06 VITALS — HEART RATE: 66 BPM | DIASTOLIC BLOOD PRESSURE: 50 MMHG | RESPIRATION RATE: 16 BRPM | SYSTOLIC BLOOD PRESSURE: 104 MMHG

## 2018-04-06 DIAGNOSIS — Z79.01 LONG TERM CURRENT USE OF ANTICOAGULANT THERAPY: ICD-10-CM

## 2018-04-06 DIAGNOSIS — D46.9 MDS (MYELODYSPLASTIC SYNDROME): Primary | ICD-10-CM

## 2018-04-06 PROCEDURE — 63600175 PHARM REV CODE 636 W HCPCS: Mod: JG,EA | Performed by: INTERNAL MEDICINE

## 2018-04-06 PROCEDURE — 96372 THER/PROPH/DIAG INJ SC/IM: CPT

## 2018-04-06 RX ADMIN — DARBEPOETIN ALFA 300 MCG: 300 INJECTION, SOLUTION INTRAVENOUS; SUBCUTANEOUS at 11:04

## 2018-04-06 NOTE — NURSING
Pt arrived for [facility].  Labs reviewed with pt.  Pt tolerated injection SQ to right arm. Discharged to home with appt calendar.

## 2018-04-13 ENCOUNTER — ANTI-COAG VISIT (OUTPATIENT)
Dept: CARDIOLOGY | Facility: CLINIC | Age: 73
End: 2018-04-13

## 2018-04-13 ENCOUNTER — INFUSION (OUTPATIENT)
Dept: INFUSION THERAPY | Facility: HOSPITAL | Age: 73
End: 2018-04-13
Attending: INTERNAL MEDICINE
Payer: MEDICARE

## 2018-04-13 ENCOUNTER — OFFICE VISIT (OUTPATIENT)
Dept: HEMATOLOGY/ONCOLOGY | Facility: CLINIC | Age: 73
End: 2018-04-13
Payer: MEDICARE

## 2018-04-13 VITALS
HEART RATE: 68 BPM | OXYGEN SATURATION: 97 % | WEIGHT: 171.5 LBS | BODY MASS INDEX: 24.01 KG/M2 | TEMPERATURE: 98 F | SYSTOLIC BLOOD PRESSURE: 119 MMHG | DIASTOLIC BLOOD PRESSURE: 58 MMHG | RESPIRATION RATE: 18 BRPM | HEIGHT: 71 IN

## 2018-04-13 DIAGNOSIS — I10 HTN (HYPERTENSION), BENIGN: ICD-10-CM

## 2018-04-13 DIAGNOSIS — D46.9 MDS (MYELODYSPLASTIC SYNDROME): Primary | ICD-10-CM

## 2018-04-13 DIAGNOSIS — I27.20 PULMONARY HYPERTENSION: ICD-10-CM

## 2018-04-13 DIAGNOSIS — I48.19 PERSISTENT ATRIAL FIBRILLATION: ICD-10-CM

## 2018-04-13 DIAGNOSIS — F32.89 OTHER DEPRESSION: ICD-10-CM

## 2018-04-13 DIAGNOSIS — I65.23 BILATERAL CAROTID ARTERY STENOSIS: ICD-10-CM

## 2018-04-13 DIAGNOSIS — E78.5 DYSLIPIDEMIA: ICD-10-CM

## 2018-04-13 DIAGNOSIS — Z79.01 LONG TERM CURRENT USE OF ANTICOAGULANT THERAPY: ICD-10-CM

## 2018-04-13 DIAGNOSIS — I48.91 ATRIAL FIBRILLATION WITH RVR: ICD-10-CM

## 2018-04-13 PROCEDURE — 63600175 PHARM REV CODE 636 W HCPCS: Mod: JG | Performed by: INTERNAL MEDICINE

## 2018-04-13 PROCEDURE — 96372 THER/PROPH/DIAG INJ SC/IM: CPT

## 2018-04-13 PROCEDURE — 3074F SYST BP LT 130 MM HG: CPT | Mod: CPTII,S$GLB,, | Performed by: INTERNAL MEDICINE

## 2018-04-13 PROCEDURE — 3078F DIAST BP <80 MM HG: CPT | Mod: CPTII,S$GLB,, | Performed by: INTERNAL MEDICINE

## 2018-04-13 PROCEDURE — 99215 OFFICE O/P EST HI 40 MIN: CPT | Mod: S$GLB,,, | Performed by: INTERNAL MEDICINE

## 2018-04-13 PROCEDURE — 99999 PR PBB SHADOW E&M-EST. PATIENT-LVL III: CPT | Mod: PBBFAC,,, | Performed by: INTERNAL MEDICINE

## 2018-04-13 PROCEDURE — 99499 UNLISTED E&M SERVICE: CPT | Mod: S$GLB,,, | Performed by: INTERNAL MEDICINE

## 2018-04-13 RX ORDER — FUROSEMIDE 20 MG/1
TABLET ORAL
Status: ON HOLD | COMMUNITY
Start: 2018-04-11 | End: 2018-04-25 | Stop reason: HOSPADM

## 2018-04-13 RX ORDER — WARFARIN 3 MG/1
3 TABLET ORAL DAILY
Qty: 90 TABLET | Refills: 3 | Status: SHIPPED | OUTPATIENT
Start: 2018-04-13 | End: 2018-04-18 | Stop reason: SDUPTHER

## 2018-04-13 RX ADMIN — DARBEPOETIN ALFA 300 MCG: 300 INJECTION, SOLUTION INTRAVENOUS; SUBCUTANEOUS at 02:04

## 2018-04-13 NOTE — NURSING
Pt arrived for [facility] following MD appt.  Labs reviewed.  Pt tolerated injection SQ to right arm.  Discharged to home with wife.

## 2018-04-13 NOTE — PROGRESS NOTES
"HEMATOLOGIC MALIGNANCIES PROGRESS NOTE    IDENTIFYING STATEMENT   Wil Kwon Jr. (Wil) is a 73 y.o. male with a  of 1945 from [place] with the diagnosis of myelodysplastic syndrome.      ONCOLOGY HISTORY:    From Dr. Slater's note     Mr. 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 He was 's patient. He has a history of PAF followed by our electrophysiology cardiology department and is chronically anticoagulated. He has moderate aortic stenosis. He was noted to be progressively anemic . Hematology workup revealed a normal B12 and folate. His iron stores were normal. His reticulocyte count was slightly elevated at 4.2. His WBC count was low and was progressively dropping over the last 3 years with monocytosis. He also has developed mild progressive thrombocytopenia. He is moderately symptomatic with the fatigue. He has no history of extrinsic GI bleeding. He also has no history of previous transfusions.He underwent a marrow biopsy in Aug 2016. 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 Of 20 metaphases, 2 metaphases were normal and 18 metaphases had a 2;21 translocation. 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 Clinical and pathologic correlation is recommended."     INTERVAL HISTORY:      Mr. Kwon returns to clinic for follow-up of his myelodysplastic syndrome. He is feeling better than he has previously. He reports less shortness of breath. He has some bruising. He is asking about changing anticoagulants today because he feels he is losing weight due to the diet he must maintain while on warfarin.     Past Medical History, Past Social History and Past Family History have been reviewed and are unchanged except " as noted in the interval history.    MEDICATIONS:     Current Outpatient Prescriptions on File Prior to Visit   Medication Sig Dispense Refill    citalopram (CELEXA) 40 MG tablet TAKE 1 TABLET (40 MG TOTAL) BY MOUTH ONCE DAILY. 90 tablet 0    diltiaZEM (CARDIZEM CD) 300 MG 24 hr capsule TAKE ONE CAPSULE BY MOUTH EVERY DAY 90 capsule 1    food supplemt, lactose-reduced (BOOST) Liqd Take by mouth once daily.      furosemide (LASIX) 40 MG tablet Take Lasix 40 mg twice a day and an additional 40 mg in the am for weight gain of 3 lbs in one day or 5 lbs in one week. 80 tablet 11    metoprolol succinate (TOPROL-XL) 100 MG 24 hr tablet TAKE 1 TABLET (100 MG TOTAL) BY MOUTH ONCE DAILY. 30 tablet 3    metoprolol succinate (TOPROL-XL) 50 MG 24 hr tablet Take 3 tablets (150 mg total) by mouth once daily. 90 tablet 11    trazodone (DESYREL) 100 MG tablet Take 1 tablet (100 mg total) by mouth every evening. (Patient taking differently: Take  mg by mouth every evening. ) 30 tablet 11     No current facility-administered medications on file prior to visit.        ALLERGIES:   Review of patient's allergies indicates:   Allergen Reactions    Lipitor [atorvastatin]      Muscle pain    Lisinopril Edema     Cheek swelling    Augmentin [amoxicillin-pot clavulanate] Rash        ROS:       Review of Systems   Constitutional: Positive for fatigue and unexpected weight change. Negative for diaphoresis and fever.   HENT:   Negative for lump/mass and sore throat.    Eyes: Negative for icterus.   Respiratory: Negative for cough and shortness of breath.    Cardiovascular: Negative for chest pain and palpitations.   Gastrointestinal: Negative for abdominal distention, constipation, diarrhea, nausea and vomiting.   Genitourinary: Negative for dysuria and frequency.    Musculoskeletal: Negative for arthralgias, gait problem and myalgias.   Skin: Negative for rash.   Neurological: Negative for dizziness, gait problem and headaches.  "  Hematological: Negative for adenopathy. Does not bruise/bleed easily.   Psychiatric/Behavioral: The patient is not nervous/anxious.        PHYSICAL EXAM:  Vitals:    04/13/18 1348   BP: (!) 119/58   Pulse: 68   Resp: 18   Temp: 98.4 °F (36.9 °C)   TempSrc: Oral   SpO2: 97%   Weight: 77.8 kg (171 lb 8.3 oz)   Height: 5' 11" (1.803 m)   PainSc: 0-No pain   Body mass index is 23.92 kg/m².    KARNOFSKY PERFORMANCE STATUS 70%  ECOG 1    Physical Exam   Constitutional: He is oriented to person, place, and time. He appears well-developed and well-nourished. No distress.   HENT:   Head: Normocephalic and atraumatic.   Mouth/Throat: Mucous membranes are normal. No oral lesions.   Eyes: Conjunctivae are normal.   Neck: No thyromegaly present.   Cardiovascular: Normal rate, regular rhythm and normal heart sounds.    No murmur heard.  Pulmonary/Chest: Breath sounds normal. He has no wheezes. He has no rales.   Abdominal: Soft. He exhibits no distension and no mass. There is no splenomegaly or hepatomegaly. There is no tenderness.   Lymphadenopathy:     He has no cervical adenopathy.        Right cervical: No deep cervical adenopathy present.       Left cervical: No deep cervical adenopathy present.     He has no axillary adenopathy.        Right: No inguinal adenopathy present.        Left: No inguinal adenopathy present.   Neurological: He is alert and oriented to person, place, and time. He has normal strength and normal reflexes. No cranial nerve deficit. Coordination normal.   Skin: No rash noted.       LAB:   Results for orders placed or performed in visit on 04/13/18   Protime-INR   Result Value Ref Range    Prothrombin Time 20.3 (H) 9.0 - 12.5 sec    INR 2.1 (H) 0.8 - 1.2   CBC auto differential   Result Value Ref Range    WBC 4.94 3.90 - 12.70 K/uL    RBC 2.62 (L) 4.60 - 6.20 M/uL    Hemoglobin 8.4 (L) 14.0 - 18.0 g/dL    Hematocrit 27.7 (L) 40.0 - 54.0 %     (H) 82 - 98 fL    MCH 32.1 (H) 27.0 - 31.0 pg    " MCHC 30.3 (L) 32.0 - 36.0 g/dL    RDW 22.8 (H) 11.5 - 14.5 %    Platelets 183 150 - 350 K/uL    MPV 12.4 9.2 - 12.9 fL    Immature Granulocytes 1.6 (H) 0.0 - 0.5 %    Gran # (ANC) 2.9 1.8 - 7.7 K/uL    Immature Grans (Abs) 0.08 (H) 0.00 - 0.04 K/uL    Lymph # 1.7 1.0 - 4.8 K/uL    Mono # 0.3 0.3 - 1.0 K/uL    Eos # 0.0 0.0 - 0.5 K/uL    Baso # 0.02 0.00 - 0.20 K/uL    nRBC 1 (A) 0 /100 WBC    Gran% 58.5 38.0 - 73.0 %    Lymph% 33.4 18.0 - 48.0 %    Mono% 5.3 4.0 - 15.0 %    Eosinophil% 0.8 0.0 - 8.0 %    Basophil% 0.4 0.0 - 1.9 %    Platelet Estimate Decreased (A)     Aniso Slight     Poik Slight     Poly Occasional     Hypo Occasional     Ovalocytes Occasional     Differential Method Automated    Comprehensive metabolic panel   Result Value Ref Range    Sodium 137 136 - 145 mmol/L    Potassium 4.0 3.5 - 5.1 mmol/L    Chloride 105 95 - 110 mmol/L    CO2 26 23 - 29 mmol/L    Glucose 136 (H) 70 - 110 mg/dL    BUN, Bld 18 8 - 23 mg/dL    Creatinine 1.3 0.5 - 1.4 mg/dL    Calcium 8.9 8.7 - 10.5 mg/dL    Total Protein 8.6 (H) 6.0 - 8.4 g/dL    Albumin 3.6 3.5 - 5.2 g/dL    Total Bilirubin 0.8 0.1 - 1.0 mg/dL    Alkaline Phosphatase 80 55 - 135 U/L    AST 31 10 - 40 U/L    ALT 15 10 - 44 U/L    Anion Gap 6 (L) 8 - 16 mmol/L    eGFR if African American >60.0 >60 mL/min/1.73 m^2    eGFR if non  54.1 (A) >60 mL/min/1.73 m^2   Type & Screen   Result Value Ref Range    Group & Rh O POS     Indirect Dora NEG        PROBLEMS ASSESSED THIS VISIT:    1. MDS (myelodysplastic syndrome)    2. Atrial fibrillation with RVR    3. Bilateral carotid artery stenosis    4. HTN (hypertension), benign    5. Pulmonary hypertension- April 2016- The estimated PA systolic pressure is 39 mmHg    6. Other depression    7. Dyslipidemia    8. Persistent atrial fibrillation        PLAN:       MDS (myelodysplastic syndrome)  Mr. Kwon's MDS is stable. His last bone marrow biopsy shows no progression to AML. His anemia is actually  improved from prior.     We will therefore continue his current plan of weekly darbepoetin for MDS and monitoring for transfusion needs.     Persistent atrial fibrillation  We will prescribe apixaban. Discussed that once he receives apixaban, he should stop warfarin. Once INR is less than 2, he may begin apixaban for atrial fibrillation.    This switch is being made to facilitate his diet, which he states he would like to include more foods that have high levels of Vitamin K (such as greens).     Follow-up in 8 weeks.     Jhonny Mixon MD  Hematology and Stem Cell Transplant

## 2018-04-16 ENCOUNTER — PATIENT MESSAGE (OUTPATIENT)
Dept: HEMATOLOGY/ONCOLOGY | Facility: CLINIC | Age: 73
End: 2018-04-16

## 2018-04-16 NOTE — ASSESSMENT & PLAN NOTE
We will prescribe apixaban. Discussed that once he receives apixaban, he should stop warfarin. Once INR is less than 2, he may begin apixaban for atrial fibrillation.    This switch is being made to facilitate his diet, which he states he would like to include more foods that have high levels of Vitamin K (such as greens).

## 2018-04-16 NOTE — ASSESSMENT & PLAN NOTE
Mr. Kwon's MDS is stable. His last bone marrow biopsy shows no progression to AML. His anemia is actually improved from prior.     We will therefore continue his current plan of weekly darbepoetin for MDS and monitoring for transfusion needs.

## 2018-04-17 ENCOUNTER — TELEPHONE (OUTPATIENT)
Dept: HEMATOLOGY/ONCOLOGY | Facility: CLINIC | Age: 73
End: 2018-04-17

## 2018-04-17 NOTE — TELEPHONE ENCOUNTER
Spoke to pharmacy on 4/16/18 at 415pm.  Clarified prescription          ----- Message from Serina Wilson sent at 4/16/2018  1:18 PM CDT -----  Contact: [facility] pharmacy  Gt calling regarding [place]. He needs clarification on directions and instructions      Gt call back number [phone]

## 2018-04-18 ENCOUNTER — PATIENT MESSAGE (OUTPATIENT)
Dept: HEMATOLOGY/ONCOLOGY | Facility: CLINIC | Age: 73
End: 2018-04-18

## 2018-04-18 DIAGNOSIS — D46.9 MDS (MYELODYSPLASTIC SYNDROME): ICD-10-CM

## 2018-04-18 DIAGNOSIS — I65.23 BILATERAL CAROTID ARTERY STENOSIS: ICD-10-CM

## 2018-04-18 DIAGNOSIS — I27.20 PULMONARY HYPERTENSION: ICD-10-CM

## 2018-04-18 DIAGNOSIS — E78.5 DYSLIPIDEMIA: ICD-10-CM

## 2018-04-18 DIAGNOSIS — I48.91 ATRIAL FIBRILLATION WITH RVR: ICD-10-CM

## 2018-04-18 DIAGNOSIS — F32.89 OTHER DEPRESSION: ICD-10-CM

## 2018-04-18 DIAGNOSIS — I10 HTN (HYPERTENSION), BENIGN: ICD-10-CM

## 2018-04-18 LAB
ANNOTATION COMMENT IMP: NORMAL
NGS CLINCIAL TRIALS: NORMAL
NGS INDICATION OF TEST: NORMAL
NGS INTERPRETATION: NORMAL
NGS ONCOHEME PANEL GENE LIST: NORMAL
NGS PATHOGENIC MUTATIONS DETECTED: NORMAL
NGS REVIEWED BY:: NORMAL
NGS VARIANTS OF UNKNOWN SIGNIFICANCE: NORMAL
REF LAB TEST METHOD: NORMAL
SPECIMEN SOURCE: NORMAL
TEST PERFORMANCE INFO SPEC: NORMAL

## 2018-04-18 RX ORDER — WARFARIN 3 MG/1
6 TABLET ORAL DAILY
Qty: 60 TABLET | Refills: 3 | Status: ON HOLD | OUTPATIENT
Start: 2018-04-18 | End: 2018-04-26

## 2018-04-20 ENCOUNTER — ANTI-COAG VISIT (OUTPATIENT)
Dept: CARDIOLOGY | Facility: CLINIC | Age: 73
End: 2018-04-20

## 2018-04-20 ENCOUNTER — INFUSION (OUTPATIENT)
Dept: INFUSION THERAPY | Facility: HOSPITAL | Age: 73
End: 2018-04-20
Attending: INTERNAL MEDICINE
Payer: MEDICARE

## 2018-04-20 VITALS
TEMPERATURE: 98 F | OXYGEN SATURATION: 99 % | RESPIRATION RATE: 18 BRPM | HEART RATE: 71 BPM | DIASTOLIC BLOOD PRESSURE: 60 MMHG | SYSTOLIC BLOOD PRESSURE: 135 MMHG

## 2018-04-20 DIAGNOSIS — Z79.01 LONG TERM CURRENT USE OF ANTICOAGULANT THERAPY: ICD-10-CM

## 2018-04-20 DIAGNOSIS — D46.9 MDS (MYELODYSPLASTIC SYNDROME): Primary | ICD-10-CM

## 2018-04-20 PROCEDURE — 63600175 PHARM REV CODE 636 W HCPCS: Mod: JG,EC | Performed by: INTERNAL MEDICINE

## 2018-04-20 PROCEDURE — 96372 THER/PROPH/DIAG INJ SC/IM: CPT

## 2018-04-20 RX ADMIN — DARBEPOETIN ALFA 300 MCG: 300 INJECTION, SOLUTION INTRAVENOUS; SUBCUTANEOUS at 01:04

## 2018-04-20 NOTE — NURSING
1310- Patient ambulated independently to the unit for injection.  Patient given injection to arm, tolerated well.  Patient aware he is in need of new insurance approval for treatment, verbalized understanding.  Patient discharged to home setting, instructed to contact MD for question or concerns.

## 2018-04-24 ENCOUNTER — HOSPITAL ENCOUNTER (INPATIENT)
Facility: HOSPITAL | Age: 73
LOS: 2 days | Discharge: HOME | DRG: 281 | End: 2018-04-26
Attending: EMERGENCY MEDICINE | Admitting: EMERGENCY MEDICINE
Payer: MEDICARE

## 2018-04-24 DIAGNOSIS — R55 SYNCOPE, UNSPECIFIED SYNCOPE TYPE: ICD-10-CM

## 2018-04-24 DIAGNOSIS — I48.91 ATRIAL FIBRILLATION WITH RVR: ICD-10-CM

## 2018-04-24 DIAGNOSIS — Z79.01 LONG TERM CURRENT USE OF ANTICOAGULANT THERAPY: ICD-10-CM

## 2018-04-24 DIAGNOSIS — E78.5 DYSLIPIDEMIA: ICD-10-CM

## 2018-04-24 DIAGNOSIS — I35.0 AORTIC VALVE STENOSIS, SEVERE: ICD-10-CM

## 2018-04-24 DIAGNOSIS — R65.21 SEPTIC SHOCK: ICD-10-CM

## 2018-04-24 DIAGNOSIS — I95.1 ORTHOSTATIC HYPOTENSION: ICD-10-CM

## 2018-04-24 DIAGNOSIS — R55 SYNCOPE: ICD-10-CM

## 2018-04-24 DIAGNOSIS — D46.9 MDS (MYELODYSPLASTIC SYNDROME): ICD-10-CM

## 2018-04-24 DIAGNOSIS — I38 CHRONIC DIASTOLIC HEART FAILURE DUE TO VALVULAR DISEASE: ICD-10-CM

## 2018-04-24 DIAGNOSIS — A41.9 SEPTIC SHOCK: ICD-10-CM

## 2018-04-24 DIAGNOSIS — I21.4 NSTEMI (NON-ST ELEVATED MYOCARDIAL INFARCTION): Primary | ICD-10-CM

## 2018-04-24 DIAGNOSIS — R79.89 ELEVATED TROPONIN: ICD-10-CM

## 2018-04-24 DIAGNOSIS — R07.9 CHEST PAIN: ICD-10-CM

## 2018-04-24 DIAGNOSIS — I27.20 PULMONARY HYPERTENSION: ICD-10-CM

## 2018-04-24 DIAGNOSIS — I65.23 BILATERAL CAROTID ARTERY STENOSIS: ICD-10-CM

## 2018-04-24 DIAGNOSIS — I35.0 AORTIC STENOSIS: ICD-10-CM

## 2018-04-24 DIAGNOSIS — I50.32 CHRONIC DIASTOLIC HEART FAILURE DUE TO VALVULAR DISEASE: ICD-10-CM

## 2018-04-24 DIAGNOSIS — I10 ESSENTIAL HYPERTENSION: ICD-10-CM

## 2018-04-24 DIAGNOSIS — I48.19 PERSISTENT ATRIAL FIBRILLATION: ICD-10-CM

## 2018-04-24 DIAGNOSIS — D64.9 SEVERE ANEMIA: ICD-10-CM

## 2018-04-24 DIAGNOSIS — F32.89 OTHER DEPRESSION: ICD-10-CM

## 2018-04-24 DIAGNOSIS — I10 HTN (HYPERTENSION), BENIGN: ICD-10-CM

## 2018-04-24 DIAGNOSIS — F32.4 MAJOR DEPRESSIVE DISORDER WITH SINGLE EPISODE, IN PARTIAL REMISSION: ICD-10-CM

## 2018-04-24 PROBLEM — I24.89 DEMAND ISCHEMIA: Status: ACTIVE | Noted: 2018-04-24

## 2018-04-24 PROBLEM — G47.00 INSOMNIA: Status: RESOLVED | Noted: 2017-03-09 | Resolved: 2018-04-24

## 2018-04-24 PROBLEM — L27.0 RASH, DRUG: Status: RESOLVED | Noted: 2017-03-22 | Resolved: 2018-04-24

## 2018-04-24 PROBLEM — I95.9 HYPOTENSION: Status: ACTIVE | Noted: 2018-04-24

## 2018-04-24 LAB
ABO + RH BLD: NORMAL
ALBUMIN SERPL BCP-MCNC: 3 G/DL
ALP SERPL-CCNC: 61 U/L
ALT SERPL W/O P-5'-P-CCNC: 11 U/L
AMORPH CRY UR QL COMP ASSIST: ABNORMAL
ANION GAP SERPL CALC-SCNC: 14 MMOL/L
ANISOCYTOSIS BLD QL SMEAR: SLIGHT
ANISOCYTOSIS BLD QL SMEAR: SLIGHT
AORTIC VALVE STENOSIS: ABNORMAL
AST SERPL-CCNC: 25 U/L
BACTERIA #/AREA URNS AUTO: ABNORMAL /HPF
BASOPHILS # BLD AUTO: 0.02 K/UL
BASOPHILS NFR BLD: 0.2 %
BILIRUB SERPL-MCNC: 0.9 MG/DL
BILIRUB UR QL STRIP: NEGATIVE
BILIRUB UR QL STRIP: NEGATIVE
BLD GP AB SCN CELLS X3 SERPL QL: NORMAL
BLD PROD TYP BPU: NORMAL
BLD PROD TYP BPU: NORMAL
BLOOD UNIT EXPIRATION DATE: NORMAL
BLOOD UNIT EXPIRATION DATE: NORMAL
BLOOD UNIT TYPE CODE: 5100
BLOOD UNIT TYPE CODE: 5100
BLOOD UNIT TYPE: NORMAL
BLOOD UNIT TYPE: NORMAL
BNP SERPL-MCNC: 1358 PG/ML
BUN SERPL-MCNC: 19 MG/DL
CALCIUM SERPL-MCNC: 8.2 MG/DL
CHLORIDE SERPL-SCNC: 106 MMOL/L
CLARITY UR REFRACT.AUTO: ABNORMAL
CLARITY UR REFRACT.AUTO: ABNORMAL
CO2 SERPL-SCNC: 17 MMOL/L
CODING SYSTEM: NORMAL
CODING SYSTEM: NORMAL
COLOR UR AUTO: YELLOW
COLOR UR AUTO: YELLOW
CREAT SERPL-MCNC: 1.4 MG/DL
DIFFERENTIAL METHOD: ABNORMAL
DISPENSE STATUS: NORMAL
DISPENSE STATUS: NORMAL
EOSINOPHIL # BLD AUTO: 0 K/UL
EOSINOPHIL NFR BLD: 0 %
ERYTHROCYTE [DISTWIDTH] IN BLOOD BY AUTOMATED COUNT: 22.1 %
ERYTHROCYTE [DISTWIDTH] IN BLOOD BY AUTOMATED COUNT: 22.5 %
ERYTHROCYTE [DISTWIDTH] IN BLOOD BY AUTOMATED COUNT: 23 %
EST. GFR  (AFRICAN AMERICAN): 57.2 ML/MIN/1.73 M^2
EST. GFR  (NON AFRICAN AMERICAN): 49.5 ML/MIN/1.73 M^2
ESTIMATED PA SYSTOLIC PRESSURE: 78.68
GLUCOSE SERPL-MCNC: 201 MG/DL
GLUCOSE UR QL STRIP: ABNORMAL
GLUCOSE UR QL STRIP: ABNORMAL
HCT VFR BLD AUTO: 22.3 %
HCT VFR BLD AUTO: 25.9 %
HCT VFR BLD AUTO: 26 %
HGB BLD-MCNC: 6.5 G/DL
HGB BLD-MCNC: 8 G/DL
HGB BLD-MCNC: 8.2 G/DL
HGB UR QL STRIP: ABNORMAL
HGB UR QL STRIP: ABNORMAL
HYALINE CASTS UR QL AUTO: 7 /LPF
HYPOCHROMIA BLD QL SMEAR: ABNORMAL
HYPOCHROMIA BLD QL SMEAR: ABNORMAL
IMM GRANULOCYTES # BLD AUTO: 0.2 K/UL
IMM GRANULOCYTES # BLD AUTO: 0.21 K/UL
IMM GRANULOCYTES # BLD AUTO: 0.25 K/UL
IMM GRANULOCYTES NFR BLD AUTO: 2 %
IMM GRANULOCYTES NFR BLD AUTO: 2.1 %
IMM GRANULOCYTES NFR BLD AUTO: 2.2 %
INR PPP: 2.8
KETONES UR QL STRIP: NEGATIVE
KETONES UR QL STRIP: NEGATIVE
LACTATE SERPL-SCNC: 1 MMOL/L
LACTATE SERPL-SCNC: 4.9 MMOL/L
LEUKOCYTE ESTERASE UR QL STRIP: NEGATIVE
LEUKOCYTE ESTERASE UR QL STRIP: NEGATIVE
LYMPHOCYTES # BLD AUTO: 1.4 K/UL
LYMPHOCYTES # BLD AUTO: 1.7 K/UL
LYMPHOCYTES # BLD AUTO: 3.3 K/UL
LYMPHOCYTES NFR BLD: 14.8 %
LYMPHOCYTES NFR BLD: 17.1 %
LYMPHOCYTES NFR BLD: 27.1 %
MCH RBC QN AUTO: 31.4 PG
MCH RBC QN AUTO: 31.7 PG
MCH RBC QN AUTO: 31.7 PG
MCHC RBC AUTO-ENTMCNC: 29.1 G/DL
MCHC RBC AUTO-ENTMCNC: 30.9 G/DL
MCHC RBC AUTO-ENTMCNC: 31.5 G/DL
MCV RBC AUTO: 100 FL
MCV RBC AUTO: 102 FL
MCV RBC AUTO: 109 FL
MICROSCOPIC COMMENT: ABNORMAL
MITRAL VALVE REGURGITATION: ABNORMAL
MONOCYTES # BLD AUTO: 0.4 K/UL
MONOCYTES NFR BLD: 3.3 %
MONOCYTES NFR BLD: 3.7 %
MONOCYTES NFR BLD: 4.3 %
NEUTROPHILS # BLD AUTO: 7.4 K/UL
NEUTROPHILS # BLD AUTO: 7.7 K/UL
NEUTROPHILS # BLD AUTO: 8.2 K/UL
NEUTROPHILS NFR BLD: 67.3 %
NEUTROPHILS NFR BLD: 77 %
NEUTROPHILS NFR BLD: 78.5 %
NITRITE UR QL STRIP: NEGATIVE
NITRITE UR QL STRIP: NEGATIVE
NRBC BLD-RTO: 3 /100 WBC
NRBC BLD-RTO: 3 /100 WBC
NRBC BLD-RTO: 4 /100 WBC
NUM UNITS TRANS PACKED RBC: NORMAL
NUM UNITS TRANS PACKED RBC: NORMAL
OVALOCYTES BLD QL SMEAR: ABNORMAL
OVALOCYTES BLD QL SMEAR: ABNORMAL
PH UR STRIP: 6 [PH] (ref 5–8)
PH UR STRIP: 6 [PH] (ref 5–8)
PLATELET # BLD AUTO: 129 K/UL
PLATELET # BLD AUTO: 161 K/UL
PLATELET # BLD AUTO: 165 K/UL
PLATELET BLD QL SMEAR: ABNORMAL
PLATELET BLD QL SMEAR: ABNORMAL
PMV BLD AUTO: 12.7 FL
PMV BLD AUTO: 12.9 FL
PMV BLD AUTO: 13.3 FL
POIKILOCYTOSIS BLD QL SMEAR: SLIGHT
POIKILOCYTOSIS BLD QL SMEAR: SLIGHT
POLYCHROMASIA BLD QL SMEAR: ABNORMAL
POLYCHROMASIA BLD QL SMEAR: ABNORMAL
POTASSIUM SERPL-SCNC: 3.5 MMOL/L
PROCALCITONIN SERPL IA-MCNC: 0.27 NG/ML
PROT SERPL-MCNC: 7.9 G/DL
PROT UR QL STRIP: ABNORMAL
PROT UR QL STRIP: ABNORMAL
PROTHROMBIN TIME: 26.6 SEC
RBC # BLD AUTO: 2.05 M/UL
RBC # BLD AUTO: 2.55 M/UL
RBC # BLD AUTO: 2.59 M/UL
RBC #/AREA URNS AUTO: 1 /HPF (ref 0–4)
RETIRED EF AND QEF - SEE NOTES: 60 (ref 55–65)
SODIUM SERPL-SCNC: 137 MMOL/L
SP GR UR STRIP: 1.01 (ref 1–1.03)
SP GR UR STRIP: 1.01 (ref 1–1.03)
TRICUSPID VALVE REGURGITATION: ABNORMAL
TROPONIN I SERPL DL<=0.01 NG/ML-MCNC: 0.03 NG/ML
TROPONIN I SERPL DL<=0.01 NG/ML-MCNC: 0.52 NG/ML
TROPONIN I SERPL DL<=0.01 NG/ML-MCNC: 10.23 NG/ML
TROPONIN I SERPL DL<=0.01 NG/ML-MCNC: 5.47 NG/ML
URN SPEC COLLECT METH UR: ABNORMAL
URN SPEC COLLECT METH UR: ABNORMAL
UROBILINOGEN UR STRIP-ACNC: NEGATIVE EU/DL
UROBILINOGEN UR STRIP-ACNC: NEGATIVE EU/DL
WBC # BLD AUTO: 10 K/UL
WBC # BLD AUTO: 12.16 K/UL
WBC # BLD AUTO: 9.41 K/UL
WBC #/AREA URNS AUTO: 7 /HPF (ref 0–5)

## 2018-04-24 PROCEDURE — 96366 THER/PROPH/DIAG IV INF ADDON: CPT

## 2018-04-24 PROCEDURE — 11000001 HC ACUTE MED/SURG PRIVATE ROOM

## 2018-04-24 PROCEDURE — 63600175 PHARM REV CODE 636 W HCPCS: Performed by: STUDENT IN AN ORGANIZED HEALTH CARE EDUCATION/TRAINING PROGRAM

## 2018-04-24 PROCEDURE — 83605 ASSAY OF LACTIC ACID: CPT | Mod: 91

## 2018-04-24 PROCEDURE — 96365 THER/PROPH/DIAG IV INF INIT: CPT

## 2018-04-24 PROCEDURE — 86850 RBC ANTIBODY SCREEN: CPT

## 2018-04-24 PROCEDURE — 84484 ASSAY OF TROPONIN QUANT: CPT | Mod: 91

## 2018-04-24 PROCEDURE — 99284 EMERGENCY DEPT VISIT MOD MDM: CPT | Mod: ,,, | Performed by: INTERNAL MEDICINE

## 2018-04-24 PROCEDURE — 96361 HYDRATE IV INFUSION ADD-ON: CPT

## 2018-04-24 PROCEDURE — 93010 ELECTROCARDIOGRAM REPORT: CPT | Mod: ,,, | Performed by: INTERNAL MEDICINE

## 2018-04-24 PROCEDURE — 25000003 PHARM REV CODE 250: Performed by: EMERGENCY MEDICINE

## 2018-04-24 PROCEDURE — 84145 PROCALCITONIN (PCT): CPT

## 2018-04-24 PROCEDURE — 86920 COMPATIBILITY TEST SPIN: CPT

## 2018-04-24 PROCEDURE — 25000003 PHARM REV CODE 250: Performed by: STUDENT IN AN ORGANIZED HEALTH CARE EDUCATION/TRAINING PROGRAM

## 2018-04-24 PROCEDURE — 81001 URINALYSIS AUTO W/SCOPE: CPT

## 2018-04-24 PROCEDURE — 85610 PROTHROMBIN TIME: CPT

## 2018-04-24 PROCEDURE — 85025 COMPLETE CBC W/AUTO DIFF WBC: CPT

## 2018-04-24 PROCEDURE — 83880 ASSAY OF NATRIURETIC PEPTIDE: CPT

## 2018-04-24 PROCEDURE — 93306 TTE W/DOPPLER COMPLETE: CPT | Mod: 26,,, | Performed by: INTERNAL MEDICINE

## 2018-04-24 PROCEDURE — P9040 RBC LEUKOREDUCED IRRADIATED: HCPCS

## 2018-04-24 PROCEDURE — 99223 1ST HOSP IP/OBS HIGH 75: CPT | Mod: AI,GC,, | Performed by: INTERNAL MEDICINE

## 2018-04-24 PROCEDURE — 63600175 PHARM REV CODE 636 W HCPCS: Performed by: EMERGENCY MEDICINE

## 2018-04-24 PROCEDURE — 94761 N-INVAS EAR/PLS OXIMETRY MLT: CPT

## 2018-04-24 PROCEDURE — 96375 TX/PRO/DX INJ NEW DRUG ADDON: CPT

## 2018-04-24 PROCEDURE — 83605 ASSAY OF LACTIC ACID: CPT

## 2018-04-24 PROCEDURE — 93010 ELECTROCARDIOGRAM REPORT: CPT | Mod: 76,,, | Performed by: INTERNAL MEDICINE

## 2018-04-24 PROCEDURE — 96368 THER/DIAG CONCURRENT INF: CPT

## 2018-04-24 PROCEDURE — 96367 TX/PROPH/DG ADDL SEQ IV INF: CPT

## 2018-04-24 PROCEDURE — 93306 TTE W/DOPPLER COMPLETE: CPT

## 2018-04-24 PROCEDURE — 36430 TRANSFUSION BLD/BLD COMPNT: CPT

## 2018-04-24 PROCEDURE — 99285 EMERGENCY DEPT VISIT HI MDM: CPT | Mod: 25

## 2018-04-24 PROCEDURE — 80053 COMPREHEN METABOLIC PANEL: CPT

## 2018-04-24 PROCEDURE — 99285 EMERGENCY DEPT VISIT HI MDM: CPT | Mod: ,,, | Performed by: EMERGENCY MEDICINE

## 2018-04-24 RX ORDER — ASPIRIN 325 MG
325 TABLET ORAL ONCE
Status: COMPLETED | OUTPATIENT
Start: 2018-04-24 | End: 2018-04-24

## 2018-04-24 RX ORDER — NITROGLYCERIN 0.4 MG/1
0.4 TABLET SUBLINGUAL EVERY 5 MIN PRN
Status: DISCONTINUED | OUTPATIENT
Start: 2018-04-24 | End: 2018-04-26 | Stop reason: HOSPADM

## 2018-04-24 RX ORDER — GLUCAGON 1 MG
1 KIT INJECTION
Status: DISCONTINUED | OUTPATIENT
Start: 2018-04-24 | End: 2018-04-26 | Stop reason: HOSPADM

## 2018-04-24 RX ORDER — ATORVASTATIN CALCIUM 20 MG/1
80 TABLET, FILM COATED ORAL ONCE
Status: DISCONTINUED | OUTPATIENT
Start: 2018-04-24 | End: 2018-04-24

## 2018-04-24 RX ORDER — CLOPIDOGREL BISULFATE 75 MG/1
75 TABLET ORAL DAILY
Status: DISCONTINUED | OUTPATIENT
Start: 2018-04-25 | End: 2018-04-25

## 2018-04-24 RX ORDER — ATORVASTATIN CALCIUM 20 MG/1
80 TABLET, FILM COATED ORAL DAILY
Status: DISCONTINUED | OUTPATIENT
Start: 2018-04-24 | End: 2018-04-26 | Stop reason: HOSPADM

## 2018-04-24 RX ORDER — IBUPROFEN 200 MG
16 TABLET ORAL
Status: DISCONTINUED | OUTPATIENT
Start: 2018-04-24 | End: 2018-04-26 | Stop reason: HOSPADM

## 2018-04-24 RX ORDER — CEFEPIME HYDROCHLORIDE 2 G/1
2 INJECTION, POWDER, FOR SOLUTION INTRAVENOUS
Status: COMPLETED | OUTPATIENT
Start: 2018-04-24 | End: 2018-04-24

## 2018-04-24 RX ORDER — CLOPIDOGREL 300 MG/1
300 TABLET, FILM COATED ORAL ONCE
Status: COMPLETED | OUTPATIENT
Start: 2018-04-24 | End: 2018-04-25

## 2018-04-24 RX ORDER — WARFARIN 3 MG/1
6 TABLET ORAL DAILY
Status: DISCONTINUED | OUTPATIENT
Start: 2018-04-24 | End: 2018-04-24

## 2018-04-24 RX ORDER — ASPIRIN 325 MG
325 TABLET ORAL ONCE
Status: CANCELLED | OUTPATIENT
Start: 2018-04-24

## 2018-04-24 RX ORDER — HYDROCODONE BITARTRATE AND ACETAMINOPHEN 500; 5 MG/1; MG/1
TABLET ORAL
Status: DISCONTINUED | OUTPATIENT
Start: 2018-04-24 | End: 2018-04-26 | Stop reason: HOSPADM

## 2018-04-24 RX ORDER — CIPROFLOXACIN 2 MG/ML
400 INJECTION, SOLUTION INTRAVENOUS
Status: COMPLETED | OUTPATIENT
Start: 2018-04-24 | End: 2018-04-24

## 2018-04-24 RX ORDER — RAMELTEON 8 MG/1
8 TABLET ORAL NIGHTLY PRN
Status: DISCONTINUED | OUTPATIENT
Start: 2018-04-24 | End: 2018-04-26 | Stop reason: HOSPADM

## 2018-04-24 RX ORDER — CLOPIDOGREL 300 MG/1
300 TABLET, FILM COATED ORAL ONCE
Status: DISCONTINUED | OUTPATIENT
Start: 2018-04-24 | End: 2018-04-24

## 2018-04-24 RX ORDER — IBUPROFEN 200 MG
24 TABLET ORAL
Status: DISCONTINUED | OUTPATIENT
Start: 2018-04-24 | End: 2018-04-26 | Stop reason: HOSPADM

## 2018-04-24 RX ORDER — HEPARIN SODIUM,PORCINE/D5W 25000/250
17 INTRAVENOUS SOLUTION INTRAVENOUS CONTINUOUS
Status: DISCONTINUED | OUTPATIENT
Start: 2018-04-24 | End: 2018-04-24

## 2018-04-24 RX ORDER — SODIUM CHLORIDE 0.9 % (FLUSH) 0.9 %
5 SYRINGE (ML) INJECTION
Status: DISCONTINUED | OUTPATIENT
Start: 2018-04-24 | End: 2018-04-26 | Stop reason: HOSPADM

## 2018-04-24 RX ADMIN — HEPARIN SODIUM 17 UNITS/KG/HR: 10000 INJECTION, SOLUTION INTRAVENOUS at 10:04

## 2018-04-24 RX ADMIN — SODIUM CHLORIDE 500 ML: 0.9 INJECTION, SOLUTION INTRAVENOUS at 03:04

## 2018-04-24 RX ADMIN — RAMELTEON 8 MG: 8 TABLET, FILM COATED ORAL at 11:04

## 2018-04-24 RX ADMIN — CEFTRIAXONE SODIUM 2 G: 2 INJECTION, POWDER, FOR SOLUTION INTRAMUSCULAR; INTRAVENOUS at 11:04

## 2018-04-24 RX ADMIN — VANCOMYCIN HYDROCHLORIDE 1500 MG: 1 INJECTION, POWDER, LYOPHILIZED, FOR SOLUTION INTRAVENOUS at 12:04

## 2018-04-24 RX ADMIN — WARFARIN SODIUM 6 MG: 5 TABLET ORAL at 06:04

## 2018-04-24 RX ADMIN — VANCOMYCIN HYDROCHLORIDE 1500 MG: 1 INJECTION, POWDER, LYOPHILIZED, FOR SOLUTION INTRAVENOUS at 11:04

## 2018-04-24 RX ADMIN — SODIUM CHLORIDE 1000 ML: 0.9 INJECTION, SOLUTION INTRAVENOUS at 03:04

## 2018-04-24 RX ADMIN — SODIUM CHLORIDE 500 ML: 9 INJECTION, SOLUTION INTRAVENOUS at 02:04

## 2018-04-24 RX ADMIN — METOPROLOL SUCCINATE 75 MG: 25 TABLET, EXTENDED RELEASE ORAL at 06:04

## 2018-04-24 RX ADMIN — CEFEPIME 2 G: 2 INJECTION, POWDER, FOR SOLUTION INTRAVENOUS at 03:04

## 2018-04-24 RX ADMIN — ASPIRIN 325 MG ORAL TABLET 325 MG: 325 PILL ORAL at 08:04

## 2018-04-24 RX ADMIN — SODIUM CHLORIDE 250 ML: 9 INJECTION, SOLUTION INTRAVENOUS at 03:04

## 2018-04-24 RX ADMIN — CIPROFLOXACIN 400 MG: 2 INJECTION, SOLUTION INTRAVENOUS at 03:04

## 2018-04-24 NOTE — ED NOTES
The patient is awake, alert and cooperative with a calm affect, patient is aware of environment. Airway is open and patent, respirations are spontaneous, normal respiratory effort and rate noted, skin warm and dry, moves all extremities well, appearance: no apparent distress noted. Side rails x 2. Call bell within reach. Will continue to monitor.      Patient/family member aware that they are being admitted to hospital. Awaiting bed assignment at this time. Informed patient/family that nurse will keep updating patient status. Will continue to monitor at this time

## 2018-04-24 NOTE — HOSPITAL COURSE
Following admission to hospital medicine team 4 the patient's troponin was noted to continue to rise. Cardiology was consulted and the patient was given aspirin, atorvastatin, and started on heparin gtt.   04/25/2018: Patient's troponin continued to rise overnight to 10 from 5, cardiology re-consulted with recommendations to start plavix. This morning the patient's troponin trended back down to 5. Patient had no complaints of chest pain overnight.

## 2018-04-24 NOTE — ED NOTES
Patient moved to recliner chair. Cardiac/pulse ox/blood pressure monitor on with alarms set. Call bell within reach. Patient offered crossword and cards for entertainment. Will continue to monitor

## 2018-04-24 NOTE — SUBJECTIVE & OBJECTIVE
Past Medical History:   Diagnosis Date    Aortic valve stenosis, mild     secondary to bicuspid aortic alve    Atrial fibrillation     Carotid artery disease     Left CEA 4/9/13    Depression     DJD (degenerative joint disease)     H/O echocardiogram 04/13/2016    EF 55-60%. Diastolic dysfunction. PASP 39. mod AS with JEFF 1.18    History of psychiatric hospitalization     [place] [address], [facility] 1993    Hyperlipidemia     Hypertension     MDS (myelodysplastic syndrome) 2013    s/p Chemo     Therapy     [facility]        Past Surgical History:   Procedure Laterality Date    BONE MARROW BIOPSY  08/29/2016    CAROTID ENDARTERECTOMY Left     Inguinal hernia      Left    KNEE SURGERY      Right x2    neck fusion      TONSILLECTOMY         Review of patient's allergies indicates:   Allergen Reactions    Lipitor [atorvastatin]      Muscle pain    Lisinopril Edema     Cheek swelling    Augmentin [amoxicillin-pot clavulanate] Rash       Family History     Problem Relation (Age of Onset)    Hyperlipidemia Brother        Social History Main Topics    Smoking status: Never Smoker    Smokeless tobacco: Never Used    Alcohol use No    Drug use: No    Sexual activity: Yes     Partners: Female      Review of Systems   Constitutional: Negative for chills, fatigue and fever.   HENT: Negative for congestion and rhinorrhea.    Eyes: Negative for pain and redness.   Respiratory: Negative for cough, chest tightness, shortness of breath and wheezing.    Cardiovascular: Negative for chest pain, palpitations and leg swelling.   Gastrointestinal: Negative for abdominal pain, constipation, diarrhea, nausea and vomiting.   Endocrine: Negative for cold intolerance, heat intolerance, polydipsia and polyuria.   Genitourinary: Negative for dysuria and hematuria.   Musculoskeletal: Positive for joint swelling. Negative for arthralgias and myalgias.   Allergic/Immunologic: Negative for  environmental allergies, food allergies and immunocompromised state.   Neurological: Positive for dizziness and light-headedness. Negative for headaches.   Hematological: Negative for adenopathy. Bruises/bleeds easily.   Psychiatric/Behavioral: Negative for agitation and confusion.     Objective:     Vital Signs (Most Recent):  Temp: 99.1 °F (37.3 °C) (04/24/18 0619)  Pulse: 88 (04/24/18 0619)  Resp: 17 (04/24/18 0619)  BP: 125/66 (04/24/18 0619)  SpO2: 97 % (04/24/18 0619) Vital Signs (24h Range):  Temp:  [98.7 °F (37.1 °C)-99.1 °F (37.3 °C)] 99.1 °F (37.3 °C)  Pulse:  [] 88  Resp:  [13-26] 17  SpO2:  [97 %-100 %] 97 %  BP: ()/(39-72) 125/66   Weight: 75.8 kg (167 lb)  Body mass index is 23.29 kg/m².    No intake or output data in the 24 hours ending 04/24/18 0641    Physical Exam   Constitutional: He is oriented to person, place, and time. He appears well-developed and well-nourished. No distress.   Pleasant WM, neurologically intact, able to give detailed PMHx, resting comfortably in stretcher receiving blood transfusion. Orthostatics performed from lying to sitting with no drop in SBP or increase in HR. Patient has received 2.5L of IVF.   HENT:   Head: Normocephalic and atraumatic.   Eyes: EOM are normal. Pupils are equal, round, and reactive to light.   Neck: Normal range of motion. Neck supple.   Cardiovascular: Normal rate, regular rhythm and intact distal pulses.  Exam reveals no gallop and no friction rub.    Murmur heard.  WILLIAM   Pulmonary/Chest: Effort normal and breath sounds normal. No respiratory distress. He exhibits no tenderness.   Abdominal: Soft. Bowel sounds are normal. He exhibits no distension. There is no tenderness.   Musculoskeletal: He exhibits no edema, tenderness or deformity.   Swollen R knee. Reports trauma suffered during fall with recent presyncopal episode.   Neurological: He is alert and oriented to person, place, and time. No cranial nerve deficit. Coordination normal.    Skin: Skin is warm and dry. No rash noted. He is not diaphoretic. No erythema. No pallor.   Ecchymoses on L arm   Psychiatric: He has a normal mood and affect. His behavior is normal. Judgment and thought content normal.   Nursing note and vitals reviewed.      Vents:     Lines/Drains/Airways     Peripheral Intravenous Line                 Peripheral IV - Single Lumen 04/24/18 0140 Right Forearm less than 1 day         Peripheral IV - Single Lumen 04/24/18 0300 Right Forearm less than 1 day              Significant Labs:    CBC/Anemia Profile:    Recent Labs  Lab 04/22/18  1758 04/24/18  0141   WBC 11.28 12.16   HGB 7.5* 6.5*   HCT 24.8* 22.3*    165   * 109*   RDW 22.1* 22.1*        Chemistries:    Recent Labs  Lab 04/22/18 1758 04/24/18  0141    137   K 3.9 3.5    106   CO2 24 17*   BUN 20 19   CREATININE 1.4 1.4   CALCIUM 8.5* 8.2*   ALBUMIN 3.3* 3.0*   PROT 8.8* 7.9   BILITOT 0.9 0.9   ALKPHOS 75 61   ALT 12 11   AST 26 25   MG 2.3  --          Significant Imaging: CT: I have reviewed all pertinent results/findings within the past 24 hours and my personal findings are:  no significant abnormalities

## 2018-04-24 NOTE — CONSULTS
"[facility]  Cardiology  Consult Note    Patient Name: Wil Kwon Jr.  MRN: [id number]  Admission Date: 4/24/2018  Hospital Length of Stay: 0 days  Code Status: Full Code   Attending Provider: Kaden Steven MD   Consulting Provider: Unique Middleton MD  Primary Care Physician: LAILA Serra MD  Principal Problem:Demand ischemia    Patient information was obtained from patient and ER records.     Inpatient consult to Cardiology  Consult performed by: UNIQUE MIDDLETON  Consult ordered by: RODO NAJERA  Reason for consult: Elevated troponins        Subjective:     Chief Complaint:  Chest pain with elevated troponins     HPI:   Mr. Kwon is a 73 year old male with a past medical history of myelodysplastic syndrome, moderate aortic stenosis, pAfib on warfarin presents to the ED following a presyncopal episode. Reports last night he was feeling lightheaded walking up the stairs to his bed room on the second level. He continued to feel light headed after going to the rest room where he had a syncopal episode on the toilet. He reports having slight non-radiating chest tightness while on the toilet and fell over against the wall. He denies any head trauma and is unaware how long he lost consciousness. He also reports a few episodes of diarrhea and poor PO intake over the past few days due to "not feeling well" with a subjective fever.     He was brought to the ED and found to be hypotensive and given a 2L bolus which improved his BP. He was also found to have a hemoglobin of 6.5 and a troponin of about ~5 without EKG changes. He was given 2 units of pRBCs which improved his chest pain and dizziness.     Past Medical History:   Diagnosis Date    Aortic valve stenosis, mild     secondary to bicuspid aortic alve    Arthralgia 12/28/2016    Atrial fibrillation     Carotid artery disease     Left CEA 4/9/13    Depression     DJD (degenerative joint disease)     H/O carotid endarterectomy " 1/16/2015    H/O echocardiogram 04/13/2016    EF 55-60%. Diastolic dysfunction. PASP 39. mod AS with JEFF 1.18    [facility] of [facility]     [facility], [facility]    Hyperlipidemia     Hypertension     Hyponatremia 4/22/2016    Insomnia 3/9/2017    MDS (myelodysplastic syndrome) 2013    s/p Chemo     Rash, drug 3/22/2017    Therapy     [facility]     Thrombocytopenia 4/1/2016       Past Surgical History:   Procedure Laterality Date    BONE MARROW BIOPSY  08/29/2016    CAROTID ENDARTERECTOMY Left     Inguinal hernia      Left    KNEE SURGERY      Right x2    neck fusion      TONSILLECTOMY         Review of patient's allergies indicates:   Allergen Reactions    Lisinopril Edema     Cheek swelling    Augmentin [amoxicillin-pot clavulanate] Rash    Lipitor [atorvastatin] Other (See Comments)     Severe Muscle pain that caused pt not to sleep for 72 hours.       No current facility-administered medications on file prior to encounter.      Current Outpatient Prescriptions on File Prior to Encounter   Medication Sig    diltiaZEM (CARDIZEM CD) 300 MG 24 hr capsule TAKE ONE CAPSULE BY MOUTH EVERY DAY    furosemide (LASIX) 40 MG tablet Take Lasix 40 mg twice a day and an additional 40 mg in the am for weight gain of 3 lbs in one day or 5 lbs in one week.    metoprolol succinate (TOPROL-XL) 50 MG 24 hr tablet Take 3 tablets (150 mg total) by mouth once daily.    warfarin (COUMADIN) 3 MG tablet Take 2 tablets (6 mg total) by mouth Daily.    apixaban 5 mg Tab Take 1 tablet (5 mg total) by mouth 2 (two) times daily.    citalopram (CELEXA) 40 MG tablet TAKE 1 TABLET (40 MG TOTAL) BY MOUTH ONCE DAILY.    food supplemt, lactose-reduced (BOOST) Liqd Take by mouth once daily.    furosemide (LASIX) 20 MG tablet     metoprolol succinate (TOPROL-XL) 100 MG 24 hr tablet TAKE 1 TABLET (100 MG TOTAL) BY MOUTH ONCE DAILY.    trazodone (DESYREL) 100 MG tablet Take  1 tablet (100 mg total) by mouth every evening. (Patient taking differently: Take  mg by mouth every evening. )     Family History     Problem Relation (Age of Onset)    Hyperlipidemia Brother        Social History Main Topics    Smoking status: Never Smoker    Smokeless tobacco: Never Used    Alcohol use No    Drug use: No    Sexual activity: Yes     Partners: Female     Review of Systems   Constitution: Positive for chills. Negative for decreased appetite, fever, malaise/fatigue and weight gain.   HENT: Negative for congestion.    Cardiovascular: Positive for chest pain. Negative for leg swelling, palpitations and syncope.   Respiratory: Negative for cough, shortness of breath and wheezing.    Skin: Negative for color change, dry skin and flushing.   Musculoskeletal: Negative for arthritis, back pain, joint swelling, neck pain and stiffness.   Gastrointestinal: Positive for diarrhea. Negative for constipation, dysphagia, nausea and vomiting.   Genitourinary: Negative for dysuria and flank pain.   Neurological: Negative for focal weakness and headaches.     Objective:     Vital Signs (Most Recent):  Temp: 98.5 °F (36.9 °C) (04/24/18 0802)  Pulse: 86 (04/24/18 1232)  Resp: 16 (04/24/18 1202)  BP: (!) 144/72 (04/24/18 1232)  SpO2: 96 % (04/24/18 1232) Vital Signs (24h Range):  Temp:  [98.4 °F (36.9 °C)-99.1 °F (37.3 °C)] 98.5 °F (36.9 °C)  Pulse:  [] 86  Resp:  [13-26] 16  SpO2:  [95 %-100 %] 96 %  BP: ()/(39-78) 144/72     Weight: 75.8 kg (167 lb)  Body mass index is 23.29 kg/m².    SpO2: 96 %  O2 Device (Oxygen Therapy): room air    No intake or output data in the 24 hours ending 04/24/18 1302    Lines/Drains/Airways     Peripheral Intravenous Line                 Peripheral IV - Single Lumen 04/24/18 0140 Right Forearm less than 1 day         Peripheral IV - Single Lumen 04/24/18 0300 Right Forearm less than 1 day              Physical Exam   Constitutional: He is oriented to person, place,  and time. He appears well-developed and well-nourished.   HENT:   Head: Normocephalic.   Eyes: Conjunctivae and EOM are normal. Pupils are equal, round, and reactive to light.   Neck: Normal range of motion. Neck supple. No thyromegaly present.   Cardiovascular: Normal rate, regular rhythm and normal heart sounds.    Pulmonary/Chest: Effort normal and breath sounds normal. No respiratory distress. He has no wheezes.   Abdominal: He exhibits no distension. There is no tenderness.   Musculoskeletal: He exhibits no edema.   Neurological: He is alert and oriented to person, place, and time. He has normal reflexes.     Significant Labs:   CMP   Recent Labs  Lab 04/22/18  1758 04/24/18  0141    137   K 3.9 3.5    106   CO2 24 17*    201*   BUN 20 19   CREATININE 1.4 1.4   CALCIUM 8.5* 8.2*   PROT 8.8* 7.9   ALBUMIN 3.3* 3.0*   BILITOT 0.9 0.9   ALKPHOS 75 61   AST 26 25   ALT 12 11   ANIONGAP 9 14   ESTGFRAFRICA 57.2* 57.2*   EGFRNONAA 49.5* 49.5*   , CBC   Recent Labs  Lab 04/22/18  1758 04/24/18  0141 04/24/18  0849   WBC 11.28 12.16 10.00   HGB 7.5* 6.5* 8.0*   HCT 24.8* 22.3* 25.9*    165 129*    and Troponin   Recent Labs  Lab 04/24/18  0141 04/24/18  0449 04/24/18  0849   TROPONINI 0.025 0.516* 5.466*     Assessment and Plan:     Orthostatic syncope with type II NSTEMI    Patient presenting with a 1 day history of dizziness associated with a syncopal episode  - Volume resuscitated with 2L bolus with improved BP   - EKG showing ischemic changes on initial presentation but improved after fluid resuscitation.   - Troponin of 5.466 with a hemoglobin of 6.5. Given 2 units of pRBCs.   - Elevated troponin likely 2/2 to demand ischemia from anemia vs hypovolemia.   - Ordered PT/INR   - Can d/c heparin drip if PT/INR 2-3.   - No PCI at this time.  - Can restart diet. Recommend PO fluid intake.          VTE Risk Mitigation         Ordered     warfarin tablet 6 mg  Daily      04/24/18 4746         Discussed findings with Cardiology Fellow and Dr. Daly.    Thank you for your consult. I will sign off. Please contact us if you have any additional questions.    Kareem Middleton MD PGY-1   Cardiology   [facility]

## 2018-04-24 NOTE — ED NOTES
Patient on tele box, tele room states that they can see patient on box #[id number], stated sinus rhythm 98

## 2018-04-24 NOTE — ED NOTES
Spoke with Dr. Sevilla with IM 4 and informed that pt refused atorvastatin due to severe muscle pain that caused him not to sleep x 72 hours and that medication would be readded to allergy list that was previous deleted. Okayed pt not to have medication, No further orders received.

## 2018-04-24 NOTE — HPI
"Mr. Kwon is a 73 year old male with a past medical history of myelodysplastic syndrome, moderate aortic stenosis, pAfib on warfarin presents to the ED following a presyncopal episode. Reports last night he was feeling lightheaded walking up the stairs to his bed room on the second level. He continued to feel light headed after going to the rest room where he had a syncopal episode on the toilet. He reports having slight non-radiating chest tightness while on the toilet and fell over against the wall. He denies any head trauma and is unaware how long he lost consciousness. He also reports a few episodes of diarrhea and poor PO intake over the past few days due to "not feeling well" with a subjective fever.     He was brought to the ED and found to be hypotensive and given a 2L bolus which improved his BP. He was also found to have a hemoglobin of 6.5 and a troponin of about ~5 without EKG changes. He was given 2 units of pRBCs which improved his chest pain and dizziness.   "

## 2018-04-24 NOTE — CONSULTS
[facility]  Critical Care Medicine  Consult Note    Patient Name: Wil Kwon Jr.  MRN: [id number]  Admission Date: 4/24/2018  Hospital Length of Stay: 0 days  Code Status: Prior  Attending Physician: Aly Toro MD   Primary Care Provider: LAILA Serra MD   Principal Problem: <principal problem not specified>    Consults  Subjective:     HPI:  73M with MDS, mod AS, pAFib on warfarin presents with presyncopal event. He was rising from the commode when he experienced severe lightheadedness which prompted him to visit ED. He has recently been constipated and taking milk of magnesia. As a result, he is experiencing frequent loose watery stools. He has had limited PO intake due to a lack of appetite associated with general malaise brought on by the MDS and persistent anemia. He continues to take lasix daily as advised by his cardiologist for CHF in the setting of mod AS. He was evaluated in the ED yesterday for fever (Tmax 101) but discharged home with plan for follow up in Oncology clinic after initial evaluation did not reveal a source of infection. On presentation today, noted to have Hg 6.5, downtrending from 7.5 despite dose of darbepoetin on 4/20/18, uptrending WBC 12, LA 4.3, orthostatically hypotensive with drop to 60s/40s after exertion of moving from gurney to CT scanner. [facility] consulted for concern of sepsis.    Hospital/ICU Course:  No notes on file    Past Medical History:   Diagnosis Date    Aortic valve stenosis, mild     secondary to bicuspid aortic alve    Atrial fibrillation     Carotid artery disease     Left CEA 4/9/13    Depression     DJD (degenerative joint disease)     H/O echocardiogram 04/13/2016    EF 55-60%. Diastolic dysfunction. PASP 39. mod AS with JEFF 1.18    History of psychiatric hospitalization     [facility] 2014, [facility]    Hyperlipidemia     Hypertension     MDS (myelodysplastic syndrome) 2013    s/p Chemo     Therapy     [facility]        Past Surgical History:   Procedure Laterality Date    BONE MARROW BIOPSY  08/29/2016    CAROTID ENDARTERECTOMY Left     Inguinal hernia      Left    KNEE SURGERY      Right x2    neck fusion      TONSILLECTOMY         Review of patient's allergies indicates:   Allergen Reactions    Lipitor [atorvastatin]      Muscle pain    Lisinopril Edema     Cheek swelling    Augmentin [amoxicillin-pot clavulanate] Rash       Family History     Problem Relation (Age of Onset)    Hyperlipidemia Brother        Social History Main Topics    Smoking status: Never Smoker    Smokeless tobacco: Never Used    Alcohol use No    Drug use: No    Sexual activity: Yes     Partners: Female      Review of Systems   Constitutional: Negative for chills, fatigue and fever.   HENT: Negative for congestion and rhinorrhea.    Eyes: Negative for pain and redness.   Respiratory: Negative for cough, chest tightness, shortness of breath and wheezing.    Cardiovascular: Negative for chest pain, palpitations and leg swelling.   Gastrointestinal: Negative for abdominal pain, constipation, diarrhea, nausea and vomiting.   Endocrine: Negative for cold intolerance, heat intolerance, polydipsia and polyuria.   Genitourinary: Negative for dysuria and hematuria.   Musculoskeletal: Positive for joint swelling. Negative for arthralgias and myalgias.   Allergic/Immunologic: Negative for environmental allergies, food allergies and immunocompromised state.   Neurological: Positive for dizziness and light-headedness. Negative for headaches.   Hematological: Negative for adenopathy. Bruises/bleeds easily.   Psychiatric/Behavioral: Negative for agitation and confusion.     Objective:     Vital Signs (Most Recent):  Temp: 99.1 °F (37.3 °C) (04/24/18 0619)  Pulse: 88 (04/24/18 0619)  Resp: 17 (04/24/18 0619)  BP: 125/66 (04/24/18 0619)  SpO2: 97 % (04/24/18 0619) Vital Signs (24h Range):  Temp:  [98.7 °F (37.1 °C)-99.1 °F (37.3 °C)]  99.1 °F (37.3 °C)  Pulse:  [] 88  Resp:  [13-26] 17  SpO2:  [97 %-100 %] 97 %  BP: ()/(39-72) 125/66   Weight: 75.8 kg (167 lb)  Body mass index is 23.29 kg/m².    No intake or output data in the 24 hours ending 04/24/18 0641    Physical Exam   Constitutional: He is oriented to person, place, and time. He appears well-developed and well-nourished. No distress.   Pleasant WM, neurologically intact, able to give detailed PMHx, resting comfortably in stretcher receiving blood transfusion. Orthostatics performed from lying to sitting with no drop in SBP or increase in HR. Patient has received 2.5L of IVF.   HENT:   Head: Normocephalic and atraumatic.   Eyes: EOM are normal. Pupils are equal, round, and reactive to light.   Neck: Normal range of motion. Neck supple.   Cardiovascular: Normal rate, regular rhythm and intact distal pulses.  Exam reveals no gallop and no friction rub.    Murmur heard.  WILLIAM   Pulmonary/Chest: Effort normal and breath sounds normal. No respiratory distress. He exhibits no tenderness.   Abdominal: Soft. Bowel sounds are normal. He exhibits no distension. There is no tenderness.   Musculoskeletal: He exhibits no edema, tenderness or deformity.   Swollen R knee. Reports trauma suffered during fall with recent presyncopal episode.   Neurological: He is alert and oriented to person, place, and time. No cranial nerve deficit. Coordination normal.   Skin: Skin is warm and dry. No rash noted. He is not diaphoretic. No erythema. No pallor.   Ecchymoses on L arm   Psychiatric: He has a normal mood and affect. His behavior is normal. Judgment and thought content normal.   Nursing note and vitals reviewed.      Vents:     Lines/Drains/Airways     Peripheral Intravenous Line                 Peripheral IV - Single Lumen 04/24/18 0140 Right Forearm less than 1 day         Peripheral IV - Single Lumen 04/24/18 0300 Right Forearm less than 1 day              Significant Labs:    CBC/Anemia  Profile:    Recent Labs  Lab 04/22/18  1758 04/24/18  0141   WBC 11.28 12.16   HGB 7.5* 6.5*   HCT 24.8* 22.3*    165   * 109*   RDW 22.1* 22.1*        Chemistries:    Recent Labs  Lab 04/22/18  1758 04/24/18  0141    137   K 3.9 3.5    106   CO2 24 17*   BUN 20 19   CREATININE 1.4 1.4   CALCIUM 8.5* 8.2*   ALBUMIN 3.3* 3.0*   PROT 8.8* 7.9   BILITOT 0.9 0.9   ALKPHOS 75 61   ALT 12 11   AST 26 25   MG 2.3  --          Significant Imaging: CT: I have reviewed all pertinent results/findings within the past 24 hours and my personal findings are:  no significant abnormalities    Assessment/Plan:     ID   Septic shock    qSOFA 2/3  SOFA 2 points    Differential for lactic acidosis led by volume loss from GI losses in the setting of severe anemia. Low suspicion for sepsis at this time.    - Repeat LA  - Obtain blood cultures x2  - Obtain CXR  - Confirm adequate volume repletion with full lying-to-standing orthostatic BP  - Patient is stable for admission to [facility]  - Consider f/u of hyperglycemia as outpatient with HgA1C  - Thank you for this interesting consult             Critical care was time spent personally by me on the following activities: development of treatment plan with patient or surrogate and bedside caregivers, discussions with consultants, evaluation of patient's response to treatment, examination of patient, ordering and performing treatments and interventions, ordering and review of laboratory studies, ordering and review of radiographic studies, pulse oximetry, re-evaluation of patient's condition. This critical care time did not overlap with that of any other provider or involve time for any procedures.    Thank you for your consult. I will sign off. Please contact us if you have any additional questions.     Sunni Musa MD  Critical Care Medicine  [facility]

## 2018-04-24 NOTE — ED NOTES
Pt requesting to ED, called [facility], stated to keep patient NPO. Pt updated, verbalized understanding. No new complaints voiced. Recliner offered to patient for comfort, stated not at this time, would let me know if he changes his mind. Will continue to monitor.

## 2018-04-24 NOTE — MEDICAL/APP STUDENT
History & Physical  4/24/18    Patient Name: Wil Kwon Jr.  Admit Date: 4/24/2018  LOS: 0  MRN: [id number]    Subjective     Principle Problem: Syncope    HPI:   Wil Kwon is a 73 y.o. male w/ pmh significant for MDS, Afib on Warfarin, HF with AS, HTN, HLD, carotid artery disease presenting for presyncopal episode. Patient was sitting on toilet when he became lightheaded and fell to the floor, hitting his L forearm. He denies loss of consciousness, head/neck/back trauma. On further questioning, he reports some chest tightness at the time, but is comfortable now. Patient denies palpitations or shortness of breath. Patient has also felt weak for the past few days and attributes it to anemia, which he has 2/2 MDS. He has had 4 transfusions recently with a baseline Hb around 7-8 for the past month (currently 6.5). He was seen yesterday for fever of 101.4 and discharged feeling well.      Review of Systems   Constitutional: Positive for diaphoresis. Negative for chills and fever.   HENT: Negative for congestion and sore throat.    Eyes: Negative for blurred vision and photophobia.   Respiratory: Negative for cough, hemoptysis and shortness of breath.    Cardiovascular: Negative for chest pain and palpitations.   Gastrointestinal: Negative for blood in stool, melena, nausea and vomiting.   Genitourinary: Negative for dysuria and frequency.   Musculoskeletal: Positive for falls.   Neurological: Negative for dizziness and headaches.   Endo/Heme/Allergies: Bruises/bleeds easily.     Past Medical History:   Diagnosis Date    Aortic valve stenosis, mild     secondary to bicuspid aortic alve    Arthralgia 12/28/2016    Atrial fibrillation     Carotid artery disease     Left CEA 4/9/13    Depression     DJD (degenerative joint disease)     H/O carotid endarterectomy 1/16/2015    H/O echocardiogram 04/13/2016    EF 55-60%. Diastolic dysfunction. PASP 39. mod AS with JEFF 1.18    History of psychiatric  hospitalization     [facility] care 2014, [facility] 1993    Hyperlipidemia     Hypertension     Hyponatremia 4/22/2016    Insomnia 3/9/2017    MDS (myelodysplastic syndrome) 2013    s/p Chemo     Rash, drug 3/22/2017    Therapy     [facility]     Thrombocytopenia 4/1/2016       Past Surgical History:   Procedure Laterality Date    BONE MARROW BIOPSY  08/29/2016    CAROTID ENDARTERECTOMY Left     Inguinal hernia      Left    KNEE SURGERY      Right x2    neck fusion      TONSILLECTOMY         Family History   Problem Relation Age of Onset    Hyperlipidemia Brother        No current facility-administered medications on file prior to encounter.      Current Outpatient Prescriptions on File Prior to Encounter   Medication Sig Dispense Refill    diltiaZEM (CARDIZEM CD) 300 MG 24 hr capsule TAKE ONE CAPSULE BY MOUTH EVERY DAY 90 capsule 1    furosemide (LASIX) 40 MG tablet Take Lasix 40 mg twice a day and an additional 40 mg in the am for weight gain of 3 lbs in one day or 5 lbs in one week. 80 tablet 11    metoprolol succinate (TOPROL-XL) 50 MG 24 hr tablet Take 3 tablets (150 mg total) by mouth once daily. 90 tablet 11    warfarin (COUMADIN) 3 MG tablet Take 2 tablets (6 mg total) by mouth Daily. 60 tablet 3    apixaban 5 mg Tab Take 1 tablet (5 mg total) by mouth 2 (two) times daily. 60 tablet 2    citalopram (CELEXA) 40 MG tablet TAKE 1 TABLET (40 MG TOTAL) BY MOUTH ONCE DAILY. 90 tablet 0    food supplemt, lactose-reduced (BOOST) Liqd Take by mouth once daily.      furosemide (LASIX) 20 MG tablet       metoprolol succinate (TOPROL-XL) 100 MG 24 hr tablet TAKE 1 TABLET (100 MG TOTAL) BY MOUTH ONCE DAILY. 30 tablet 3    trazodone (DESYREL) 100 MG tablet Take 1 tablet (100 mg total) by mouth every evening. (Patient taking differently: Take  mg by mouth every evening. ) 30 tablet 11       Review of patient's allergies indicates:   Allergen Reactions    Lisinopril Edema      Cheek swelling    Augmentin [amoxicillin-pot clavulanate] Rash    Lipitor [atorvastatin] Other (See Comments)     Severe Muscle pain that caused pt not to sleep for 72 hours.           Objective     Vitals  Temp:  [98.4 °F (36.9 °C)-99.1 °F (37.3 °C)]   Pulse:  []   Resp:  [13-26]   BP: ()/(39-78)   SpO2:  [97 %-100 %]      Physical Exam:  Physical Exam   Constitutional: He is oriented to person, place, and time. He appears distressed.   HENT:   Mouth/Throat: Oropharynx is clear and moist.   Neck: Normal range of motion. JVD present.   Cardiovascular: Normal rate.    Irregular. 3/6 systolic murmur radiating to carotids   Pulmonary/Chest: Effort normal. He has rales.   Abdominal: Soft. He exhibits no distension. There is no tenderness.   Musculoskeletal: He exhibits no edema.   Neurological: He is alert and oriented to person, place, and time.   Skin: He is diaphoretic.       Recent Results (from the past 24 hour(s))   CBC auto differential    Collection Time: 04/24/18  1:41 AM   Result Value Ref Range    WBC 12.16 3.90 - 12.70 K/uL    RBC 2.05 (L) 4.60 - 6.20 M/uL    Hemoglobin 6.5 (L) 14.0 - 18.0 g/dL    Hematocrit 22.3 (L) 40.0 - 54.0 %     (H) 82 - 98 fL    MCH 31.7 (H) 27.0 - 31.0 pg    MCHC 29.1 (L) 32.0 - 36.0 g/dL    RDW 22.1 (H) 11.5 - 14.5 %    Platelets 165 150 - 350 K/uL    MPV 12.9 9.2 - 12.9 fL    Immature Granulocytes 2.1 (H) 0.0 - 0.5 %    Gran # (ANC) 8.2 (H) 1.8 - 7.7 K/uL    Immature Grans (Abs) 0.25 (H) 0.00 - 0.04 K/uL    Lymph # 3.3 1.0 - 4.8 K/uL    Mono # 0.4 0.3 - 1.0 K/uL    Eos # 0.0 0.0 - 0.5 K/uL    Baso # 0.02 0.00 - 0.20 K/uL    nRBC 3 (A) 0 /100 WBC    Gran% 67.3 38.0 - 73.0 %    Lymph% 27.1 18.0 - 48.0 %    Mono% 3.3 (L) 4.0 - 15.0 %    Eosinophil% 0.0 0.0 - 8.0 %    Basophil% 0.2 0.0 - 1.9 %    Platelet Estimate Appears normal     Aniso Slight     Poik Slight     Poly Occasional     Hypo Occasional     Ovalocytes Occasional     Differential Method Automated     Comprehensive metabolic panel    Collection Time: 04/24/18  1:41 AM   Result Value Ref Range    Sodium 137 136 - 145 mmol/L    Potassium 3.5 3.5 - 5.1 mmol/L    Chloride 106 95 - 110 mmol/L    CO2 17 (L) 23 - 29 mmol/L    Glucose 201 (H) 70 - 110 mg/dL    BUN, Bld 19 8 - 23 mg/dL    Creatinine 1.4 0.5 - 1.4 mg/dL    Calcium 8.2 (L) 8.7 - 10.5 mg/dL    Total Protein 7.9 6.0 - 8.4 g/dL    Albumin 3.0 (L) 3.5 - 5.2 g/dL    Total Bilirubin 0.9 0.1 - 1.0 mg/dL    Alkaline Phosphatase 61 55 - 135 U/L    AST 25 10 - 40 U/L    ALT 11 10 - 44 U/L    Anion Gap 14 8 - 16 mmol/L    eGFR if African American 57.2 (A) >60 mL/min/1.73 m^2    eGFR if non African American 49.5 (A) >60 mL/min/1.73 m^2   Troponin I #1    Collection Time: 04/24/18  1:41 AM   Result Value Ref Range    Troponin I 0.025 0.000 - 0.026 ng/mL   Lactic acid, plasma #1    Collection Time: 04/24/18  2:04 AM   Result Value Ref Range    Lactate (Lactic Acid) 4.9 (HH) 0.5 - 2.2 mmol/L   Type & Screen    Collection Time: 04/24/18  2:17 AM   Result Value Ref Range    Group & Rh O POS     Indirect Dora NEG    Prepare RBC 2 Units; emergency    Collection Time: 04/24/18  2:17 AM   Result Value Ref Range    UNIT NUMBER [id number]     PRODUCT CODE S4273M38     DISPENSE STATUS ISSUED     CODING SYSTEM YPCM850     Unit Blood Type Code 5100     Unit Blood Type O POS     Unit Expiration [id number]     UNIT NUMBER [id number]     PRODUCT CODE B0887F51     DISPENSE STATUS ISSUED     CODING SYSTEM RQUS229     Unit Blood Type Code 5100     Unit Blood Type O POS     Unit Expiration [id number]    Urinalysis, Reflex to Urine Culture Urine, Clean Catch    Collection Time: 04/24/18  3:32 AM   Result Value Ref Range    Specimen UA Urine, Clean Catch     Color, UA Yellow Yellow, Straw, Sophia    Appearance, UA Hazy (A) Clear    pH, UA 6.0 5.0 - 8.0    Specific Gravity, UA 1.015 1.005 - 1.030    Protein, UA 2+ (A) Negative    Glucose, UA 1+ (A) Negative    Ketones, UA  Negative Negative    Bilirubin (UA) Negative Negative    Occult Blood UA 1+ (A) Negative    Nitrite, UA Negative Negative    Urobilinogen, UA Negative <2.0 EU/dL    Leukocytes, UA Negative Negative   Urinalysis, Reflex to Urine Culture Urine, Clean Catch    Collection Time: 18  3:32 AM   Result Value Ref Range    Specimen UA Urine, Clean Catch     Color, UA Yellow Yellow, Straw, Sophia    Appearance, UA Hazy (A) Clear    pH, UA 6.0 5.0 - 8.0    Specific Gravity, UA 1.015 1.005 - 1.030    Protein, UA 2+ (A) Negative    Glucose, UA 1+ (A) Negative    Ketones, UA Negative Negative    Bilirubin (UA) Negative Negative    Occult Blood UA 1+ (A) Negative    Nitrite, UA Negative Negative    Urobilinogen, UA Negative <2.0 EU/dL    Leukocytes, UA Negative Negative   Urinalysis Microscopic    Collection Time: 18  3:32 AM   Result Value Ref Range    RBC, UA 1 0 - 4 /hpf    WBC, UA 7 (H) 0 - 5 /hpf    Bacteria, UA Few (A) None-Occ /hpf    Hyaline Casts, UA 7 (A) 0-1/lpf /lpf    Amorphous, UA Occasional None-Moderate    Microscopic Comment SEE COMMENT    Troponin I #2    Collection Time: 18  4:49 AM   Result Value Ref Range    Troponin I 0.516 (H) 0.000 - 0.026 ng/mL   Lactic acid, plasma    Collection Time: 18  4:49 AM   Result Value Ref Range    Lactate (Lactic Acid) 1.0 0.5 - 2.2 mmol/L   Troponin I    Collection Time: 18  8:49 AM   Result Value Ref Range    Troponin I 5.466 (H) 0.000 - 0.026 ng/mL   Brain natriuretic peptide    Collection Time: 18  8:49 AM   Result Value Ref Range    BNP 1,358 (H) 0 - 99 pg/mL     Imaging  CXR:  Slightly increased ground-glass attenuation and reticular opacities within the lower lung zones suggesting edema versus a developing infectious or inflammatory process.  No pneumothorax or pleural effusions.    CT head  No acute intracranial abnormality    EK: Afib with RVR, ST depression, prolonged QT  0832: NSR with less ST depression    Assessment      Wil Kwon is a 73 y.o. male w/ pmh significant for MDS, Afib on Warfarin, HF with AS, HTN, HLD, carotid artery disease presenting for presyncopal episode, now with concern for ACS    Acute coronary syndrome  -Suspect due to findings: uptrending troponin (0.516 uptrending to 5.4), diaphoresis, possible ST depression on EKG, chest tightness, risk factors present CAD, HLD, HTN).  -Cardiology consulted, awaiting recs  -Stress test  -Start ASA, heparin (patient refuses atorvastatin due to prior adverse reaction)    Presyncopal episode  -Due to cardiogenic shock vs. Unstable afib vs. anemia  -Await 2D echo result     Chronic diastolic heart failure due to valvular disease  -Previous result 1 year ago showed AS, MR, TR  -Await 2D echo result    Pneumonia  -Suspected due to baseline immunosuppression  -Will treat empirically with ceftriaxone + vancomycin    Paroxysmal atrial fibrillation  -RVR on admission, now resolved to NSR    Myelodysplastic syndrome  -Follows Dr. Mixon in heme/onc  -Darvopoeitin weekly for anemia  -Anemic 6.5 on admission below baseline of 7-8  -2U pRBC given    Kang Escobedo  Medical Student, Year 3  Hospital Medicine

## 2018-04-24 NOTE — SUBJECTIVE & OBJECTIVE
Past Medical History:   Diagnosis Date    Aortic valve stenosis, mild     secondary to bicuspid aortic alve    Arthralgia 12/28/2016    Atrial fibrillation     Carotid artery disease     Left CEA 4/9/13    Depression     DJD (degenerative joint disease)     H/O carotid endarterectomy 1/16/2015    H/O echocardiogram 04/13/2016    EF 55-60%. Diastolic dysfunction. PASP 39. mod AS with JEFF 1.18    History of [facility] hospitalization     [facility], [facility]    Hyperlipidemia     Hypertension     Hyponatremia 4/22/2016    Insomnia 3/9/2017    MDS (myelodysplastic syndrome) 2013    s/p Chemo     Rash, drug 3/22/2017    [facility]     Thrombocytopenia 4/1/2016       Past Surgical History:   Procedure Laterality Date    BONE MARROW BIOPSY  08/29/2016    CAROTID ENDARTERECTOMY Left     Inguinal hernia      Left    KNEE SURGERY      Right x2    neck fusion      TONSILLECTOMY         Review of patient's allergies indicates:   Allergen Reactions    Lisinopril Edema     Cheek swelling    Augmentin [amoxicillin-pot clavulanate] Rash    Lipitor [atorvastatin] Other (See Comments)     Severe Muscle pain that caused pt not to sleep for 72 hours.       No current facility-administered medications on file prior to encounter.      Current Outpatient Prescriptions on File Prior to Encounter   Medication Sig    diltiaZEM (CARDIZEM CD) 300 MG 24 hr capsule TAKE ONE CAPSULE BY MOUTH EVERY DAY    furosemide (LASIX) 40 MG tablet Take Lasix 40 mg twice a day and an additional 40 mg in the am for weight gain of 3 lbs in one day or 5 lbs in one week.    metoprolol succinate (TOPROL-XL) 50 MG 24 hr tablet Take 3 tablets (150 mg total) by mouth once daily.    warfarin (COUMADIN) 3 MG tablet Take 2 tablets (6 mg total) by mouth Daily.    apixaban 5 mg Tab Take 1 tablet (5 mg total) by mouth 2 (two) times daily.    citalopram (CELEXA) 40 MG tablet TAKE 1 TABLET (40  MG TOTAL) BY MOUTH ONCE DAILY.    food supplemt, lactose-reduced (BOOST) Liqd Take by mouth once daily.    furosemide (LASIX) 20 MG tablet     metoprolol succinate (TOPROL-XL) 100 MG 24 hr tablet TAKE 1 TABLET (100 MG TOTAL) BY MOUTH ONCE DAILY.    trazodone (DESYREL) 100 MG tablet Take 1 tablet (100 mg total) by mouth every evening. (Patient taking differently: Take  mg by mouth every evening. )     Family History     Problem Relation (Age of Onset)    Hyperlipidemia Brother        Social History Main Topics    Smoking status: Never Smoker    Smokeless tobacco: Never Used    Alcohol use No    Drug use: No    Sexual activity: Yes     Partners: Female     Review of Systems   Constitution: Positive for chills. Negative for decreased appetite, fever, malaise/fatigue and weight gain.   HENT: Negative for congestion.    Cardiovascular: Positive for chest pain. Negative for leg swelling, palpitations and syncope.   Respiratory: Negative for cough, shortness of breath and wheezing.    Skin: Negative for color change, dry skin and flushing.   Musculoskeletal: Negative for arthritis, back pain, joint swelling, neck pain and stiffness.   Gastrointestinal: Positive for diarrhea. Negative for constipation, dysphagia, nausea and vomiting.   Genitourinary: Negative for dysuria and flank pain.   Neurological: Negative for focal weakness and headaches.     Objective:     Vital Signs (Most Recent):  Temp: 98.5 °F (36.9 °C) (04/24/18 0802)  Pulse: 86 (04/24/18 1232)  Resp: 16 (04/24/18 1202)  BP: (!) 144/72 (04/24/18 1232)  SpO2: 96 % (04/24/18 1232) Vital Signs (24h Range):  Temp:  [98.4 °F (36.9 °C)-99.1 °F (37.3 °C)] 98.5 °F (36.9 °C)  Pulse:  [] 86  Resp:  [13-26] 16  SpO2:  [95 %-100 %] 96 %  BP: ()/(39-78) 144/72     Weight: 75.8 kg (167 lb)  Body mass index is 23.29 kg/m².    SpO2: 96 %  O2 Device (Oxygen Therapy): room air    No intake or output data in the 24 hours ending 04/24/18  1302    Lines/Drains/Airways     Peripheral Intravenous Line                 Peripheral IV - Single Lumen 04/24/18 0140 Right Forearm less than 1 day         Peripheral IV - Single Lumen 04/24/18 0300 Right Forearm less than 1 day              Physical Exam   Constitutional: He is oriented to person, place, and time. He appears well-developed and well-nourished.   HENT:   Head: Normocephalic.   Eyes: Conjunctivae and EOM are normal. Pupils are equal, round, and reactive to light.   Neck: Normal range of motion. Neck supple. No thyromegaly present.   Cardiovascular: Normal rate, regular rhythm and normal heart sounds.    Pulmonary/Chest: Effort normal and breath sounds normal. No respiratory distress. He has no wheezes.   Abdominal: He exhibits no distension. There is no tenderness.   Musculoskeletal: He exhibits no edema.   Neurological: He is alert and oriented to person, place, and time. He has normal reflexes.     Significant Labs:   CMP   Recent Labs  Lab 04/22/18  1758 04/24/18  0141    137   K 3.9 3.5    106   CO2 24 17*    201*   BUN 20 19   CREATININE 1.4 1.4   CALCIUM 8.5* 8.2*   PROT 8.8* 7.9   ALBUMIN 3.3* 3.0*   BILITOT 0.9 0.9   ALKPHOS 75 61   AST 26 25   ALT 12 11   ANIONGAP 9 14   ESTGFRAFRICA 57.2* 57.2*   EGFRNONAA 49.5* 49.5*   , CBC   Recent Labs  Lab 04/22/18  1758 04/24/18  0141 04/24/18  0849   WBC 11.28 12.16 10.00   HGB 7.5* 6.5* 8.0*   HCT 24.8* 22.3* 25.9*    165 129*    and Troponin   Recent Labs  Lab 04/24/18  0141 04/24/18  0449 04/24/18  0849   TROPONINI 0.025 0.516* 5.466*

## 2018-04-24 NOTE — SUBJECTIVE & OBJECTIVE
Past Medical History:   Diagnosis Date    Aortic valve stenosis, mild     secondary to bicuspid aortic alve    Atrial fibrillation     Carotid artery disease     Left CEA 4/9/13    Depression     DJD (degenerative joint disease)     H/O echocardiogram 04/13/2016    EF 55-60%. Diastolic dysfunction. PASP 39. mod AS with JEFF 1.18    History of psychiatric hospitalization     [facility], [facility]    Hyperlipidemia     Hypertension     MDS (myelodysplastic syndrome) 2013    s/p Chemo     Therapy     [facility]        Past Surgical History:   Procedure Laterality Date    BONE MARROW BIOPSY  08/29/2016    CAROTID ENDARTERECTOMY Left     Inguinal hernia      Left    KNEE SURGERY      Right x2    neck fusion      TONSILLECTOMY         Review of patient's allergies indicates:   Allergen Reactions    Lipitor [atorvastatin]      Muscle pain    Lisinopril Edema     Cheek swelling    Augmentin [amoxicillin-pot clavulanate] Rash       No current facility-administered medications on file prior to encounter.      Current Outpatient Prescriptions on File Prior to Encounter   Medication Sig    diltiaZEM (CARDIZEM CD) 300 MG 24 hr capsule TAKE ONE CAPSULE BY MOUTH EVERY DAY    furosemide (LASIX) 40 MG tablet Take Lasix 40 mg twice a day and an additional 40 mg in the am for weight gain of 3 lbs in one day or 5 lbs in one week.    metoprolol succinate (TOPROL-XL) 50 MG 24 hr tablet Take 3 tablets (150 mg total) by mouth once daily.    warfarin (COUMADIN) 3 MG tablet Take 2 tablets (6 mg total) by mouth Daily.    apixaban 5 mg Tab Take 1 tablet (5 mg total) by mouth 2 (two) times daily.    citalopram (CELEXA) 40 MG tablet TAKE 1 TABLET (40 MG TOTAL) BY MOUTH ONCE DAILY.    food supplemt, lactose-reduced (BOOST) Liqd Take by mouth once daily.    furosemide (LASIX) 20 MG tablet     metoprolol succinate (TOPROL-XL) 100 MG 24 hr tablet TAKE 1 TABLET (100 MG TOTAL) BY MOUTH  ONCE DAILY.    trazodone (DESYREL) 100 MG tablet Take 1 tablet (100 mg total) by mouth every evening. (Patient taking differently: Take  mg by mouth every evening. )     Family History     Problem Relation (Age of Onset)    Hyperlipidemia Brother        Social History Main Topics    Smoking status: Never Smoker    Smokeless tobacco: Never Used    Alcohol use No    Drug use: No    Sexual activity: Yes     Partners: Female     Review of Systems   Constitutional: Positive for appetite change, diaphoresis, fatigue and fever. Negative for activity change and chills.   HENT: Negative for congestion, rhinorrhea, sore throat and trouble swallowing.    Eyes: Negative for photophobia, redness and visual disturbance.   Respiratory: Positive for chest tightness. Negative for cough and shortness of breath.    Cardiovascular: Positive for palpitations. Negative for chest pain.   Gastrointestinal: Positive for diarrhea. Negative for abdominal distention, abdominal pain, blood in stool, constipation, nausea and vomiting.   Genitourinary: Negative for dysuria and hematuria.   Musculoskeletal: Negative for arthralgias, myalgias, neck pain and neck stiffness.   Skin: Positive for pallor. Negative for rash and wound.   Neurological: Positive for syncope and light-headedness. Negative for dizziness, weakness, numbness and headaches.   Hematological: Bruises/bleeds easily.     Objective:     Vital Signs (Most Recent):  Temp: 98.5 °F (36.9 °C) (04/24/18 0802)  Pulse: 85 (04/24/18 0902)  Resp: 16 (04/24/18 0802)  BP: 135/65 (04/24/18 0902)  SpO2: 97 % (04/24/18 0902) Vital Signs (24h Range):  Temp:  [98.4 °F (36.9 °C)-99.1 °F (37.3 °C)] 98.5 °F (36.9 °C)  Pulse:  [] 85  Resp:  [13-26] 16  SpO2:  [97 %-100 %] 97 %  BP: ()/(39-78) 135/65     Weight: 75.8 kg (167 lb)  Body mass index is 23.29 kg/m².    Physical Exam   Constitutional: He is oriented to person, place, and time. He appears well-developed and  well-nourished. No distress.   HENT:   Head: Normocephalic and atraumatic.   Right Ear: External ear normal.   Left Ear: External ear normal.   Mouth/Throat: Oropharynx is clear and moist. No oropharyngeal exudate.   Eyes: EOM are normal. Pupils are equal, round, and reactive to light. Right eye exhibits no discharge. Left eye exhibits no discharge. No scleral icterus.   Neck: Normal range of motion. Neck supple. JVD present.   Cardiovascular: Normal rate and intact distal pulses.    Murmur heard.  Pulmonary/Chest: Effort normal. No respiratory distress. He has rales (bilateral, basal).   Abdominal: Soft. Bowel sounds are normal. He exhibits no distension. There is no tenderness. There is no guarding.   Musculoskeletal: He exhibits no edema or tenderness.   Neurological: He is alert and oriented to person, place, and time.   Skin: Skin is warm. He is diaphoretic.   Psychiatric: He has a normal mood and affect. His behavior is normal. Thought content normal.   Vitals reviewed.        CRANIAL NERVES     CN III, IV, VI   Pupils are equal, round, and reactive to light.  Extraocular motions are normal.        Significant Labs:   CBC:   Recent Labs  Lab 04/22/18 1758 04/24/18  0141   WBC 11.28 12.16   HGB 7.5* 6.5*   HCT 24.8* 22.3*    165     CMP:   Recent Labs  Lab 04/22/18 1758 04/24/18 0141    137   K 3.9 3.5    106   CO2 24 17*    201*   BUN 20 19   CREATININE 1.4 1.4   CALCIUM 8.5* 8.2*   PROT 8.8* 7.9   ALBUMIN 3.3* 3.0*   BILITOT 0.9 0.9   ALKPHOS 75 61   AST 26 25   ALT 12 11   ANIONGAP 9 14   EGFRNONAA 49.5* 49.5*     Cardiac Markers:   Recent Labs  Lab 04/22/18 1758   BNP 1,032*     Lactic Acid:   Recent Labs  Lab 04/22/18 1758 04/24/18  0204 04/24/18  0449   LACTATE 1.3 4.9* 1.0       Significant Imaging: I have reviewed all pertinent imaging results/findings within the past 24 hours.

## 2018-04-24 NOTE — H&P
[facility] Hospital Medicine  History & Physical    Patient Name: Wil Kwon Jr.  MRN: [id number]  Admission Date: 4/24/2018  Attending Physician: Kaden Steven MD   Primary Care Provider: LAILA Serra MD    [facility] Medicine Team: [facility] 4 Fernando Villareal MD     Patient information was obtained from patient, past medical records and ER records.     Subjective:     Principal Problem:NSTEMI (non-ST elevated myocardial infarction)    Chief Complaint:   Chief Complaint   Patient presents with    Loss of Consciousness     pt reports going to the bathroom and becoming weak and his wife walked in the found him on the floor; pt denies hitting head; pt is diaphoretic and weak        HPI: Mr. Kwon is a 73 year old male with a past medical history of myelodysplastic syndrome, moderate aortic stenosis, pAfib on warfarin presents to the ED following a presyncopal episode. The patient states that last night he was getting up from using the commode and he experienced significant lightheadedness. The patient and his wife became concerned following this episode and present to the ED. The patient denies actually falling during this episode or any trauma to his head. The patient was found to be severely hypotensive in the ED and was given volume resuscitation and treated with antibiotics. Of note the patient recently was seen in the ED two nights prior to this presentation for evaluation of fever. He reports that he had a fever at home of ~101F and came to the ED who worked it up and ultimately recommended follow up with his oncologist. The patient does endorse some chest tightness prior to the episode of syncope but denies any significant chest pain. The patient denies any infectious symptoms such as cough, abdominal pain, N/V, diarrhea or any sick contacts. Of not too the patient was found to be anemic on initial labs with a hemoglobin of 6.5. Lactate was initially elevated to 4.9 but  later returned to normal following the administration of fluids.       Past Medical History:   Diagnosis Date    Aortic valve stenosis, mild     secondary to bicuspid aortic alve    Atrial fibrillation     Carotid artery disease     Left CEA 4/9/13    Depression     DJD (degenerative joint disease)     H/O echocardiogram 04/13/2016    EF 55-60%. Diastolic dysfunction. PASP 39. mod AS with JEFF 1.18    History of psychiatric hospitalization     [facility] 2014, [facility] 1993    Hyperlipidemia     Hypertension     MDS (myelodysplastic syndrome) 2013    s/p Chemo     Therapy     [facility]        Past Surgical History:   Procedure Laterality Date    BONE MARROW BIOPSY  08/29/2016    CAROTID ENDARTERECTOMY Left     Inguinal hernia      Left    KNEE SURGERY      Right x2    neck fusion      TONSILLECTOMY         Review of patient's allergies indicates:   Allergen Reactions    Lipitor [atorvastatin]      Muscle pain    Lisinopril Edema     Cheek swelling    Augmentin [amoxicillin-pot clavulanate] Rash       No current facility-administered medications on file prior to encounter.      Current Outpatient Prescriptions on File Prior to Encounter   Medication Sig    diltiaZEM (CARDIZEM CD) 300 MG 24 hr capsule TAKE ONE CAPSULE BY MOUTH EVERY DAY    furosemide (LASIX) 40 MG tablet Take Lasix 40 mg twice a day and an additional 40 mg in the am for weight gain of 3 lbs in one day or 5 lbs in one week.    metoprolol succinate (TOPROL-XL) 50 MG 24 hr tablet Take 3 tablets (150 mg total) by mouth once daily.    warfarin (COUMADIN) 3 MG tablet Take 2 tablets (6 mg total) by mouth Daily.    apixaban 5 mg Tab Take 1 tablet (5 mg total) by mouth 2 (two) times daily.    citalopram (CELEXA) 40 MG tablet TAKE 1 TABLET (40 MG TOTAL) BY MOUTH ONCE DAILY.    food supplemt, lactose-reduced (BOOST) Liqd Take by mouth once daily.    furosemide (LASIX) 20 MG tablet     metoprolol succinate  (TOPROL-XL) 100 MG 24 hr tablet TAKE 1 TABLET (100 MG TOTAL) BY MOUTH ONCE DAILY.    trazodone (DESYREL) 100 MG tablet Take 1 tablet (100 mg total) by mouth every evening. (Patient taking differently: Take  mg by mouth every evening. )     Family History     Problem Relation (Age of Onset)    Hyperlipidemia Brother        Social History Main Topics    Smoking status: Never Smoker    Smokeless tobacco: Never Used    Alcohol use No    Drug use: No    Sexual activity: Yes     Partners: Female     Review of Systems   Constitutional: Positive for appetite change, diaphoresis, fatigue and fever. Negative for activity change and chills.   HENT: Negative for congestion, rhinorrhea, sore throat and trouble swallowing.    Eyes: Negative for photophobia, redness and visual disturbance.   Respiratory: Positive for chest tightness. Negative for cough and shortness of breath.    Cardiovascular: Positive for palpitations. Negative for chest pain.   Gastrointestinal: Positive for diarrhea. Negative for abdominal distention, abdominal pain, blood in stool, constipation, nausea and vomiting.   Genitourinary: Negative for dysuria and hematuria.   Musculoskeletal: Negative for arthralgias, myalgias, neck pain and neck stiffness.   Skin: Positive for pallor. Negative for rash and wound.   Neurological: Positive for syncope and light-headedness. Negative for dizziness, weakness, numbness and headaches.   Hematological: Bruises/bleeds easily.     Objective:     Vital Signs (Most Recent):  Temp: 98.5 °F (36.9 °C) (04/24/18 0802)  Pulse: 85 (04/24/18 0902)  Resp: 16 (04/24/18 0802)  BP: 135/65 (04/24/18 0902)  SpO2: 97 % (04/24/18 0902) Vital Signs (24h Range):  Temp:  [98.4 °F (36.9 °C)-99.1 °F (37.3 °C)] 98.5 °F (36.9 °C)  Pulse:  [] 85  Resp:  [13-26] 16  SpO2:  [97 %-100 %] 97 %  BP: ()/(39-78) 135/65     Weight: 75.8 kg (167 lb)  Body mass index is 23.29 kg/m².    Physical Exam   Constitutional: He is oriented  to person, place, and time. He appears well-developed and well-nourished. No distress.   HENT:   Head: Normocephalic and atraumatic.   Right Ear: External ear normal.   Left Ear: External ear normal.   Mouth/Throat: Oropharynx is clear and moist. No oropharyngeal exudate.   Eyes: EOM are normal. Pupils are equal, round, and reactive to light. Right eye exhibits no discharge. Left eye exhibits no discharge. No scleral icterus.   Neck: Normal range of motion. Neck supple. JVD present.   Cardiovascular: Normal rate and intact distal pulses.    Murmur heard.  Pulmonary/Chest: Effort normal. No respiratory distress. He has rales (bilateral, basal).   Abdominal: Soft. Bowel sounds are normal. He exhibits no distension. There is no tenderness. There is no guarding.   Musculoskeletal: He exhibits no edema or tenderness.   Neurological: He is alert and oriented to person, place, and time.   Skin: Skin is warm. He is diaphoretic.   Psychiatric: He has a normal mood and affect. His behavior is normal. Thought content normal.   Vitals reviewed.        CRANIAL NERVES     CN III, IV, VI   Pupils are equal, round, and reactive to light.  Extraocular motions are normal.        Significant Labs:   CBC:   Recent Labs  Lab 04/22/18 1758 04/24/18  0141   WBC 11.28 12.16   HGB 7.5* 6.5*   HCT 24.8* 22.3*    165     CMP:   Recent Labs  Lab 04/22/18 1758 04/24/18  0141    137   K 3.9 3.5    106   CO2 24 17*    201*   BUN 20 19   CREATININE 1.4 1.4   CALCIUM 8.5* 8.2*   PROT 8.8* 7.9   ALBUMIN 3.3* 3.0*   BILITOT 0.9 0.9   ALKPHOS 75 61   AST 26 25   ALT 12 11   ANIONGAP 9 14   EGFRNONAA 49.5* 49.5*     Cardiac Markers:   Recent Labs  Lab 04/22/18 1758   BNP 1,032*     Lactic Acid:   Recent Labs  Lab 04/22/18  1758 04/24/18  0204 04/24/18  0449   LACTATE 1.3 4.9* 1.0       Significant Imaging: I have reviewed all pertinent imaging results/findings within the past 24 hours.    Assessment/Plan:     * NSTEMI  (non-ST elevated myocardial infarction)    - initial troponin in ED normal   - subsequent troponin showed steady increase   - cardiology consulted, appreciate recommendations  - initially treated with heparin bolus and gtt, statin, ASA   - EKG concerning for ST segment depression in multiple leads  - continue to trend troponin         Elevated troponin    - troponin initially not elevated  - subsequent tests showed increase to 0.516 and the to 5.4  - cardiology consulted, appreciate help  - possible NSTEMI  - initially given heparin bolus, ASA, statin          Hypotension    - possible related to decompensated heart failure given valvular disease vs cardiogenic shock vs decreased volume status   - improved with fluid boluses in ED and 2U pRBCs  - lactate elevated to 4.9 on admission, improved to 1.0 following fluid and blood products           Syncope    - patient presents post pre-syncopal episode   - became light headed after standing from using commode  - differential includes syncope related to Afib with RVR vs ACS vs decompensated heart failure vs less likely sepsis   - given one time dose of cefepime and cipro in the ED, will continue to follow clinically and continue if needed  - troponin elevated on admission to 0.516, will order repeat and consider cardiology consult  - will repeat BNP levels  - EKG on admission concerning for ST depression in contiguous leads  - given one time dose of statin and ASA          Long term current use of anticoagulant therapy    - patient currently taking warfarin   - previous INR two days prior to admission 2.6          Aortic valve stenosis, severe    - followed out patient by cardiology, Dr. Barrera  - will order repeat echo to assess for acute change            Chronic diastolic heart failure due to valvular disease    - patient with noted severe valvular regurgitation   - previous BNP elevated  - will repeat   - possibly will need diuresis given fluid and blood products  received in ED           Persistent atrial fibrillation    - on long term warfarin  - followed by [facility]  - cardiology consulted, appreciate assistance           MDS (myelodysplastic syndrome)    - appears stable, followed by Dr. Mixon with oncology  - given darvopoeitin in past  - anemic on admission to 6.5, given 2U pRBCs             Major depressive disorder with single episode, in partial remission    - citalopram 40mg daily           Dyslipidemia    - will start patient on statin therapy  - given one time dose in the ED          Essential hypertension    - patient initially presented with hypotension and elevated lactate  - holding antihypertensive medications, will restart as appropriate             VTE Risk Mitigation     None             Fernando Villareal MD  Department of Hospital Medicine   [facility]

## 2018-04-24 NOTE — HPI
73M with MDS, mod AS, pAFib on warfarin presents with presyncopal event. He was rising from the commode when he experienced severe lightheadedness which prompted him to visit ED. He has recently been constipated and taking milk of magnesia. As a result, he is experiencing frequent loose watery stools. He has had limited PO intake due to a lack of appetite associated with general malaise brought on by the MDS and persistent anemia. He continues to take lasix daily as advised by his cardiologist for CHF in the setting of mod AS. He was evaluated in the ED yesterday for fever (Tmax 101) but discharged home with plan for follow up in Oncology clinic after initial evaluation did not reveal a source of infection. On presentation today, noted to have Hg 6.5, downtrending from 7.5 despite dose of darbepoetin on 4/20/18, uptrending WBC 12, LA 4.3, orthostatically hypotensive with drop to 60s/40s after exertion of moving from rBoon to CT scanner. CCM consulted for concern of sepsis.

## 2018-04-24 NOTE — ED PROVIDER NOTES
Encounter Date: 4/24/2018    SCRIBE #1 NOTE: I, Yulisa Cintron, am scribing for, and in the presence of,  Dr. Toro. I have scribed the entire note.       History     Chief Complaint   Patient presents with    Loss of Consciousness     pt reports going to the bathroom and becoming weak and his wife walked in the found him on the floor; pt denies hitting head; pt is diaphoretic and weak     Time patient was seen by the provider: 2:01 AM      The patient is a 73 y.o. male with co-morbidities including MDS who presents to the ED with a complaint of loss of consciousness. With each of these presyncopal and syncopal episodes,  he has had band like chest pressure immediately prior to each episode. Denies cp or pressure at this time. He has MDS which has acute on chrnoic anemia diagnosed in the last few weeks. He was seen here yesterday for fever of 101.       The history is provided by the patient and medical records.     Review of patient's allergies indicates:   Allergen Reactions    Lipitor [atorvastatin]      Muscle pain    Lisinopril Edema     Cheek swelling    Augmentin [amoxicillin-pot clavulanate] Rash     Past Medical History:   Diagnosis Date    Aortic valve stenosis, mild     secondary to bicuspid aortic alve    Atrial fibrillation     Carotid artery disease     Left CEA 4/9/13    Depression     DJD (degenerative joint disease)     H/O echocardiogram 04/13/2016    EF 55-60%. Diastolic dysfunction. PASP 39. mod AS with JEFF 1.18    History of psychiatric hospitalization     [facility] 2014, [facility] 1993    Hyperlipidemia     Hypertension     MDS (myelodysplastic syndrome) 2013    s/p Chemo     Therapy     [facility]      Past Surgical History:   Procedure Laterality Date    BONE MARROW BIOPSY  08/29/2016    CAROTID ENDARTERECTOMY Left     Inguinal hernia      Left    KNEE SURGERY      Right x2    neck fusion      TONSILLECTOMY       Family History   Problem  Relation Age of Onset    Hyperlipidemia Brother      Social History   Substance Use Topics    Smoking status: Never Smoker    Smokeless tobacco: Never Used    Alcohol use No     Review of Systems   Constitutional: Negative for fever.   HENT: Negative for sore throat.    Respiratory: Negative for shortness of breath.    Cardiovascular: Positive for chest pain.   Gastrointestinal: Negative for abdominal pain.   Genitourinary: Negative for dysuria.   Musculoskeletal: Negative for back pain.   Skin:        Superficial abrasions noted to left hand, wrist and elbow   Neurological: Positive for syncope. Negative for weakness.   Psychiatric/Behavioral: Negative for confusion.       Physical Exam     Initial Vitals [04/24/18 0129]   BP Pulse Resp Temp SpO2   94/72 (!) 122 18 98.8 °F (37.1 °C) 99 %      MAP       79.33         Physical Exam    Eyes:   Pale subconjunctiva   Cardiovascular: Tachycardia present.    Murmur heard.   Systolic murmur is present   Skin: Abrasion (He has some superficial abrasions to left elbow, left wrist, and left hand from a presyncopal fall that he took this morning) noted. There is pallor.         ED Course   Procedures  Labs Reviewed   CBC W/ AUTO DIFFERENTIAL - Abnormal; Notable for the following:        Result Value    RBC 2.05 (*)     Hemoglobin 6.5 (*)     Hematocrit 22.3 (*)      (*)     MCH 31.7 (*)     MCHC 29.1 (*)     RDW 22.1 (*)     Immature Granulocytes 2.1 (*)     Gran # (ANC) 8.2 (*)     Immature Grans (Abs) 0.25 (*)     nRBC 3 (*)     Mono% 3.3 (*)     All other components within normal limits   COMPREHENSIVE METABOLIC PANEL - Abnormal; Notable for the following:     CO2 17 (*)     Glucose 201 (*)     Calcium 8.2 (*)     Albumin 3.0 (*)     eGFR if  57.2 (*)     eGFR if non  49.5 (*)     All other components within normal limits   TROPONIN I - Abnormal; Notable for the following:     Troponin I 0.516 (*)     All other components within  normal limits   URINALYSIS, REFLEX TO URINE CULTURE - Abnormal; Notable for the following:     Appearance, UA Hazy (*)     Protein, UA 2+ (*)     Glucose, UA 1+ (*)     Occult Blood UA 1+ (*)     All other components within normal limits    Narrative:     Preferred Collection Type->Urine, Clean Catch   LACTIC ACID, PLASMA - Abnormal; Notable for the following:     Lactate (Lactic Acid) 4.9 (*)     All other components within normal limits   URINALYSIS, REFLEX TO URINE CULTURE - Abnormal; Notable for the following:     Appearance, UA Hazy (*)     Protein, UA 2+ (*)     Glucose, UA 1+ (*)     Occult Blood UA 1+ (*)     All other components within normal limits    Narrative:     Preferred Collection Type->Urine, Clean Catch   URINALYSIS MICROSCOPIC - Abnormal; Notable for the following:     WBC, UA 7 (*)     Bacteria, UA Few (*)     Hyaline Casts, UA 7 (*)     All other components within normal limits    Narrative:     Preferred Collection Type->Urine, Clean Catch   TROPONIN I   LACTIC ACID, PLASMA   TYPE & SCREEN   PREPARE RBC SOFT     EKG Readings: (Independently Interpreted)   Atrial fibrillation with rapid ventricular response. ST depression V3-V6 I, AVL, ST elevation AVR.          Medical Decision Making:   History:   Old Medical Records: I decided to obtain old medical records.  Initial Assessment:   Blood cultures from yesterday show no growth. Patient has a suspicious EKG or sub endocardial injury. I discussed case with cardiology fellow who will follow the patient during ER stay and asked me to keep him updated.   Independently Interpreted Test(s):   I have ordered and independently interpreted EKG Reading(s) - see prior notes  Clinical Tests:   Lab Tests: Ordered and Reviewed  Radiological Study: Ordered and Reviewed  Medical Tests: Ordered and Reviewed  ED Management:  Patient's lactic acid is 4.9. He had blood cultures drawn yesterday and is on antibiotics. Fluids were ordered to complete the fluid bolus as  well as antibiotics. EKG findings could be secondary to demand ischemia from his severe anemia and sepsis            Scribe Attestation:   Scribe #1: I performed the above scribed service and the documentation accurately describes the services I performed. I attest to the accuracy of the note.    Attending Attestation:           Physician Attestation for Scribe:      Comments: I, Dr. Toro, personally performed the services described in this documentation. All medical record entries made by the scribe were at my direction and in my presence.  I have reviewed the chart and agree that the record reflects my personal performance and is accurate and complete.                 Clinical Impression:   The primary encounter diagnosis was Septic shock. Diagnoses of Chest pain, Syncope, unspecified syncope type, and Severe anemia were also pertinent to this visit.                           Aly Toro MD  04/24/18 [id number]

## 2018-04-25 PROBLEM — I21.4 NSTEMI (NON-ST ELEVATED MYOCARDIAL INFARCTION): Status: ACTIVE | Noted: 2018-04-25

## 2018-04-25 PROBLEM — R93.89 ABNORMAL CXR: Status: ACTIVE | Noted: 2018-04-25

## 2018-04-25 LAB
ALBUMIN SERPL BCP-MCNC: 2.8 G/DL
ALP SERPL-CCNC: 59 U/L
ALT SERPL W/O P-5'-P-CCNC: 15 U/L
ANION GAP SERPL CALC-SCNC: 9 MMOL/L
ANISOCYTOSIS BLD QL SMEAR: SLIGHT
AST SERPL-CCNC: 51 U/L
BASOPHILS # BLD AUTO: 0.02 K/UL
BASOPHILS NFR BLD: 0.2 %
BILIRUB SERPL-MCNC: 0.9 MG/DL
BUN SERPL-MCNC: 16 MG/DL
CALCIUM SERPL-MCNC: 7.7 MG/DL
CHLORIDE SERPL-SCNC: 108 MMOL/L
CO2 SERPL-SCNC: 20 MMOL/L
CREAT SERPL-MCNC: 1.2 MG/DL
DIFFERENTIAL METHOD: ABNORMAL
EOSINOPHIL # BLD AUTO: 0 K/UL
EOSINOPHIL NFR BLD: 0 %
ERYTHROCYTE [DISTWIDTH] IN BLOOD BY AUTOMATED COUNT: 23 %
EST. GFR  (AFRICAN AMERICAN): >60 ML/MIN/1.73 M^2
EST. GFR  (NON AFRICAN AMERICAN): 59.6 ML/MIN/1.73 M^2
GLUCOSE SERPL-MCNC: 98 MG/DL
HCT VFR BLD AUTO: 25.5 %
HGB BLD-MCNC: 8.2 G/DL
HYPOCHROMIA BLD QL SMEAR: ABNORMAL
IMM GRANULOCYTES # BLD AUTO: 0.26 K/UL
IMM GRANULOCYTES NFR BLD AUTO: 2.6 %
INR PPP: 3.4
LYMPHOCYTES # BLD AUTO: 1.5 K/UL
LYMPHOCYTES NFR BLD: 15.2 %
MAGNESIUM SERPL-MCNC: 2.1 MG/DL
MCH RBC QN AUTO: 31.8 PG
MCHC RBC AUTO-ENTMCNC: 32.2 G/DL
MCV RBC AUTO: 99 FL
MONOCYTES # BLD AUTO: 0.4 K/UL
MONOCYTES NFR BLD: 4 %
NEUTROPHILS # BLD AUTO: 7.8 K/UL
NEUTROPHILS NFR BLD: 78 %
NRBC BLD-RTO: 3 /100 WBC
OVALOCYTES BLD QL SMEAR: ABNORMAL
PHOSPHATE SERPL-MCNC: 3 MG/DL
PLATELET # BLD AUTO: 143 K/UL
PMV BLD AUTO: 13.5 FL
POIKILOCYTOSIS BLD QL SMEAR: SLIGHT
POLYCHROMASIA BLD QL SMEAR: ABNORMAL
POTASSIUM SERPL-SCNC: 3.5 MMOL/L
PROT SERPL-MCNC: 7.4 G/DL
PROTHROMBIN TIME: 32.6 SEC
RBC # BLD AUTO: 2.58 M/UL
SODIUM SERPL-SCNC: 137 MMOL/L
TROPONIN I SERPL DL<=0.01 NG/ML-MCNC: 5.87 NG/ML
WBC # BLD AUTO: 10.02 K/UL

## 2018-04-25 PROCEDURE — 85610 PROTHROMBIN TIME: CPT

## 2018-04-25 PROCEDURE — 85025 COMPLETE CBC W/AUTO DIFF WBC: CPT

## 2018-04-25 PROCEDURE — 99232 SBSQ HOSP IP/OBS MODERATE 35: CPT | Mod: GC,,, | Performed by: INTERNAL MEDICINE

## 2018-04-25 PROCEDURE — 11000001 HC ACUTE MED/SURG PRIVATE ROOM

## 2018-04-25 PROCEDURE — 80053 COMPREHEN METABOLIC PANEL: CPT

## 2018-04-25 PROCEDURE — 83735 ASSAY OF MAGNESIUM: CPT

## 2018-04-25 PROCEDURE — 84100 ASSAY OF PHOSPHORUS: CPT

## 2018-04-25 PROCEDURE — 99233 SBSQ HOSP IP/OBS HIGH 50: CPT | Mod: GC,,, | Performed by: INTERNAL MEDICINE

## 2018-04-25 PROCEDURE — 36415 COLL VENOUS BLD VENIPUNCTURE: CPT

## 2018-04-25 PROCEDURE — 25000003 PHARM REV CODE 250: Performed by: STUDENT IN AN ORGANIZED HEALTH CARE EDUCATION/TRAINING PROGRAM

## 2018-04-25 PROCEDURE — 84484 ASSAY OF TROPONIN QUANT: CPT

## 2018-04-25 RX ORDER — DIPHENOXYLATE HYDROCHLORIDE AND ATROPINE SULFATE 2.5; .025 MG/1; MG/1
1 TABLET ORAL 4 TIMES DAILY PRN
Status: DISCONTINUED | OUTPATIENT
Start: 2018-04-25 | End: 2018-04-26 | Stop reason: HOSPADM

## 2018-04-25 RX ORDER — WARFARIN 3 MG/1
3 TABLET ORAL ONCE
Status: COMPLETED | OUTPATIENT
Start: 2018-04-25 | End: 2018-04-25

## 2018-04-25 RX ORDER — METOPROLOL SUCCINATE 25 MG/1
75 TABLET, EXTENDED RELEASE ORAL DAILY
Qty: 90 TABLET | Refills: 0
Start: 2018-04-26 | End: 2018-04-26

## 2018-04-25 RX ORDER — CLOPIDOGREL BISULFATE 75 MG/1
75 TABLET ORAL DAILY
Status: DISCONTINUED | OUTPATIENT
Start: 2018-04-25 | End: 2018-04-25

## 2018-04-25 RX ORDER — ATORVASTATIN CALCIUM 80 MG/1
40 TABLET, FILM COATED ORAL DAILY
Qty: 30 TABLET | Refills: 1
Start: 2018-04-26 | End: 2018-04-26

## 2018-04-25 RX ORDER — MOXIFLOXACIN HYDROCHLORIDE 400 MG/1
400 TABLET ORAL DAILY
Status: DISCONTINUED | OUTPATIENT
Start: 2018-04-25 | End: 2018-04-26 | Stop reason: HOSPADM

## 2018-04-25 RX ORDER — MOXIFLOXACIN HYDROCHLORIDE 400 MG/1
400 TABLET ORAL DAILY
Qty: 3 TABLET | Refills: 0
Start: 2018-04-26 | End: 2018-04-26

## 2018-04-25 RX ADMIN — MOXIFLOXACIN HYDROCHLORIDE 400 MG: 400 TABLET, FILM COATED ORAL at 10:04

## 2018-04-25 RX ADMIN — DIPHENOXYLATE HYDROCHLORIDE AND ATROPINE SULFATE 1 TABLET: 2.5; .025 TABLET ORAL at 10:04

## 2018-04-25 RX ADMIN — CLOPIDOGREL BISULFATE 300 MG: 300 TABLET, FILM COATED ORAL at 12:04

## 2018-04-25 RX ADMIN — WARFARIN SODIUM 3 MG: 3 TABLET ORAL at 05:04

## 2018-04-25 RX ADMIN — RAMELTEON 8 MG: 8 TABLET, FILM COATED ORAL at 10:04

## 2018-04-25 RX ADMIN — METOPROLOL SUCCINATE 75 MG: 25 TABLET, EXTENDED RELEASE ORAL at 08:04

## 2018-04-25 NOTE — NURSING
Second dose of IV vanc given tonight. Trough to be drawn at 1100 before third dose. Will continue to monitor. Delmy Andrews RN

## 2018-04-25 NOTE — PROGRESS NOTES
[facility] Hospital Medicine  Progress Note    Patient Name: Wil Kwon Jr.  MRN: [id number]  Patient Class: IP- Inpatient   Admission Date: 4/24/2018  Length of Stay: 1 days  Attending Physician: Kaden Steven MD  Primary Care Provider: LAILA Serra MD    [facility] Medicine Team: [facility] HOSP MED 4 Fernando Villareal MD    Subjective:     Principal Problem:NSTEMI (non-ST elevated myocardial infarction)    HPI:  Mr. Kwon is a 73 year old male with a past medical history of myelodysplastic syndrome, moderate aortic stenosis, pAfib on warfarin presents to the ED following a presyncopal episode. The patient states that last night he was getting up from using the commode and he experienced significant lightheadedness. The patient and his wife became concerned following this episode and present to the ED. The patient denies actually falling during this episode or any trauma to his head. The patient was found to be severely hypotensive in the ED and was given volume resuscitation and treated with antibiotics. Of note the patient recently was seen in the ED two nights prior to this presentation for evaluation of fever. He reports that he had a fever at home of ~101F and came to the ED who worked it up and ultimately recommended follow up with his oncologist. The patient does endorse some chest tightness prior to the episode of syncope but denies any significant chest pain. The patient denies any infectious symptoms such as cough, abdominal pain, N/V, diarrhea or any sick contacts. Of not too the patient was found to be anemic on initial labs with a hemoglobin of 6.5. Lactate was initially elevated to 4.9 but later returned to normal following the administration of fluids.       Hospital Course:  Following admission to hospital medicine team 4 the patient's troponin was noted to continue to rise. Cardiology was consulted and the patient was given aspirin, atorvastatin, and started on heparin  gtt.   04/25/2018: Patient's troponin continued to rise overnight to 10 from 5, cardiology re-consulted with recommendations to start plavix. This morning the patient's troponin trended back down to 5. Patient had no complaints of chest pain overnight.     Interval History:  Patient's troponin continued to rise overnight to 10 from 5, cardiology re-consulted with recommendations to start plavix. This morning the patient's troponin trended back down to 5. Patient had no complaints of chest pain overnight.     Review of Systems   Constitutional: Positive for fatigue. Negative for activity change, appetite change, chills, diaphoresis and fever.   HENT: Negative for congestion, rhinorrhea, sore throat and trouble swallowing.    Eyes: Negative for photophobia, redness and visual disturbance.   Respiratory: Negative for cough, chest tightness and shortness of breath.    Cardiovascular: Negative for chest pain and palpitations.   Gastrointestinal: Positive for diarrhea. Negative for abdominal distention, abdominal pain, blood in stool, constipation, nausea and vomiting.   Genitourinary: Negative for dysuria and hematuria.   Musculoskeletal: Negative for arthralgias, myalgias, neck pain and neck stiffness.   Skin: Positive for pallor. Negative for rash and wound.   Neurological: Negative for dizziness, syncope, weakness, light-headedness, numbness and headaches.   Hematological: Bruises/bleeds easily.     Objective:     Vital Signs (Most Recent):  Temp: 98.2 °F (36.8 °C) (04/24/18 2259)  Pulse: 85 (04/25/18 0800)  Resp: (!) 21 (04/25/18 0443)  BP: 135/73 (04/25/18 0501)  SpO2: (!) 94 % (04/25/18 0501) Vital Signs (24h Range):  Temp:  [98.2 °F (36.8 °C)] 98.2 °F (36.8 °C)  Pulse:  [73-98] 85  Resp:  [16-21] 21  SpO2:  [94 %-97 %] 94 %  BP: (125-166)/(62-83) 135/73     Weight: 75.8 kg (167 lb)  Body mass index is 23.29 kg/m².    Intake/Output Summary (Last 24 hours) at 04/25/18 0839  Last data filed at 04/25/18 0600   Gross  per 24 hour   Intake              490 ml   Output              250 ml   Net              240 ml      Physical Exam   Constitutional: He is oriented to person, place, and time. He appears well-developed and well-nourished. No distress.   HENT:   Head: Normocephalic and atraumatic.   Right Ear: External ear normal.   Left Ear: External ear normal.   Mouth/Throat: Oropharynx is clear and moist. No oropharyngeal exudate.   Eyes: EOM are normal. Pupils are equal, round, and reactive to light. Right eye exhibits no discharge. Left eye exhibits no discharge. No scleral icterus.   Neck: Normal range of motion. Neck supple. No JVD present.   Cardiovascular: Normal rate and intact distal pulses.    Murmur heard.  Pulmonary/Chest: Effort normal. No respiratory distress. He has rales (bilateral, basal).   Abdominal: Soft. Bowel sounds are normal. He exhibits no distension. There is no tenderness. There is no guarding.   Musculoskeletal: He exhibits no edema or tenderness.   Neurological: He is alert and oriented to person, place, and time.   Skin: Skin is warm. He is not diaphoretic.   Psychiatric: He has a normal mood and affect. His behavior is normal. Thought content normal.   Vitals reviewed.      Significant Labs:   BMP:   Recent Labs  Lab 04/25/18  0601   GLU 98      K 3.5      CO2 20*   BUN 16   CREATININE 1.2   CALCIUM 7.7*   MG 2.1     CBC:   Recent Labs  Lab 04/24/18  0849 04/24/18  1810 04/25/18  0601   WBC 10.00 9.41 10.02   HGB 8.0* 8.2* 8.2*   HCT 25.9* 26.0* 25.5*   * 161 143*     Troponin:   Recent Labs  Lab 04/24/18  0849 04/24/18  1810 04/25/18  0601   TROPONINI 5.466* 10.233* 5.869*       Significant Imaging: I have reviewed all pertinent imaging results/findings within the past 24 hours.    Assessment/Plan:      * NSTEMI (non-ST elevated myocardial infarction)    - initial troponin in ED normal   - subsequent troponin showed steady increase   - cardiology consulted, appreciate  recommendations  - initially treated with heparin bolus and gtt, statin, ASA   - EKG concerning for ST segment depression in multiple leads  - troponin increased overnight to 10 from 5, now back down to 5          Elevated troponin    - troponin initially not elevated  - subsequent tests showed increase to 0.516 and the to 5.4  - cardiology consulted, appreciate help  - possible NSTEMI  - initially given heparin bolus, ASA, statin  - troponin elevated overnight to 10 now trending back down to 5          Hypotension    - possible related to decompensated heart failure given valvular disease vs cardiogenic shock vs decreased volume status   - improved with fluid boluses in ED and 2U pRBCs  - lactate elevated to 4.9 on admission, improved to 1.0 following fluid and blood products           Syncope    - patient presents post pre-syncopal episode   - became light headed after standing from using commode  - differential includes syncope related to Afib with RVR vs ACS vs decompensated heart failure vs less likely sepsis   - given one time dose of cefepime and cipro in the ED, will continue to follow clinically and continue if needed  - troponin elevated on admission to 0.516, will order repeat and consider cardiology consult  - EKG on admission concerning for ST depression in contiguous leads  - given one time dose of statin and ASA          Long term current use of anticoagulant therapy    - patient currently taking warfarin   - previous INR two days prior to admission 2.6          Aortic valve stenosis, severe    - followed out patient by cardiology, Dr. Barrera  - repeat echo;    1 - Normal left ventricular systolic function (EF 60-65%).     2 - Concentric hypertrophy.     3 - No wall motion abnormalities.     4 - Indeterminate LV diastolic function.     5 - Biatrial enlargement.     6 - Right ventricle is upper limit of normal in size with normal systolic function.     7 - Severe aortic valve stenosis (JEFF 0.60 cm2,  AVAi 0.31 cm2/m2, peak aortic jet velocity 4.5 m/s,MG 66 mmHg, ).     8 - Moderate to moderate-severe (3+) mitral regurgitation.     9 - Trivial to mild tricuspid regurgitation.     10 - Increased central venous pressure.     11 - Pulmonary hypertension. The estimated PA systolic pressure is 79 mmHg.           Chronic diastolic heart failure due to valvular disease    - patient with noted severe valvular regurgitation   - previous BNP elevated  - will repeat   - possibly will need diuresis given fluid and blood products received in ED           Persistent atrial fibrillation    - on long term warfarin  - followed by [facility]  - cardiology consulted, appreciate assistance           MDS (myelodysplastic syndrome)    - appears stable, followed by Dr. Mixon with oncology  - given darvopoeitin in past  - anemic on admission to 6.5, given 2U pRBCs  - responded appropriately              Major depressive disorder with single episode, in partial remission    - citalopram 40mg daily           Dyslipidemia    - will start patient on statin therapy  - given one time dose in the ED          Essential hypertension    - patient initially presented with hypotension and elevated lactate  - holding antihypertensive medications, will restart as appropriate             VTE Risk Mitigation     None              Fernando Villareal MD  Department of Hospital Medicine   [facility]

## 2018-04-25 NOTE — PROGRESS NOTES
[facility]  Cardiology  Progress Note    Patient Name: Wil Kwon Jr.  MRN: [id number]  Admission Date: 4/24/2018  Hospital Length of Stay: 1 days  Code Status: Full Code   Attending Physician: Kaden Steven MD   Primary Care Physician: LAILA Serra MD  Expected Discharge Date: 4/27/2018  Principal Problem:Syncope due to orthostatic hypotension    Subjective:     Hospital Course:   See interval history below.     Interval History:     NAEON. Troponin peaked at ~10. Returned back to 5 with no episodes of chest pain, SOB or n/v.     Review of Systems   Constitution: Positive for chills. Negative for decreased appetite, fever, malaise/fatigue and weight gain.   HENT: Negative for congestion.    Cardiovascular: Positive for chest pain. Negative for leg swelling, palpitations and syncope.   Respiratory: Negative for cough, shortness of breath and wheezing.    Skin: Negative for color change, dry skin and flushing.   Musculoskeletal: Negative for arthritis, back pain, joint swelling, neck pain and stiffness.   Gastrointestinal: Positive for diarrhea. Negative for constipation, dysphagia, nausea and vomiting.   Genitourinary: Negative for dysuria and flank pain.   Neurological: Negative for focal weakness and headaches.     Objective:     Vital Signs (Most Recent):  Temp: 98 °F (36.7 °C) (04/25/18 0853)  Pulse: 75 (04/25/18 1000)  Resp: 18 (04/25/18 0853)  BP: (!) 153/83 (04/25/18 0853)  SpO2: 96 % (04/25/18 0853) Vital Signs (24h Range):  Temp:  [98 °F (36.7 °C)-98.2 °F (36.8 °C)] 98 °F (36.7 °C)  Pulse:  [73-98] 75  Resp:  [16-21] 18  SpO2:  [94 %-97 %] 96 %  BP: (125-166)/(62-83) 153/83     Weight: 81.2 kg (179 lb 0.2 oz)  Body mass index is 24.97 kg/m².     SpO2: 96 %  O2 Device (Oxygen Therapy): room air      Intake/Output Summary (Last 24 hours) at 04/25/18 1019  Last data filed at 04/25/18 0600   Gross per 24 hour   Intake              490 ml   Output              250 ml   Net               240 ml       Lines/Drains/Airways     Peripheral Intravenous Line                 Peripheral IV - Single Lumen 04/24/18 0140 Right Forearm 1 day         Peripheral IV - Single Lumen 04/24/18 0300 Right Forearm 1 day                Physical Exam   Constitutional: He is oriented to person, place, and time. He appears well-developed and well-nourished.   HENT:   Head: Normocephalic.   Eyes: Conjunctivae and EOM are normal. Pupils are equal, round, and reactive to light.   Neck: Normal range of motion. Neck supple. No thyromegaly present.   Cardiovascular: Normal rate and regular rhythm.    Murmur (holosystolic murmur 4/6 grade loudest in the right second intercostal space.) heard.  Pulmonary/Chest: Effort normal and breath sounds normal. No respiratory distress. He has no wheezes.   Abdominal: He exhibits no distension. There is no tenderness.   Musculoskeletal: He exhibits no edema.   Knee swelling. Worse on the right.    Neurological: He is alert and oriented to person, place, and time. He has normal reflexes.     Significant Labs:   BMP:   Recent Labs  Lab 04/24/18  0141 04/25/18  0601   * 98    137   K 3.5 3.5    108   CO2 17* 20*   BUN 19 16   CREATININE 1.4 1.2   CALCIUM 8.2* 7.7*   MG  --  2.1   , CBC   Recent Labs  Lab 04/24/18  0849 04/24/18  1810 04/25/18  0601   WBC 10.00 9.41 10.02   HGB 8.0* 8.2* 8.2*   HCT 25.9* 26.0* 25.5*   * 161 143*    and Troponin   Recent Labs  Lab 04/24/18  0849 04/24/18  1810 04/25/18  0601   TROPONINI 5.466* 10.233* 5.869*     Assessment and Plan:     * Syncope due to orthostatic hypotension with Type II NSTEMI    Patient presenting with a 1 day history of dizziness associated with a syncopal episode  - Volume resuscitated with 2L bolus with improved BP   - No Acute EKG changes in the ED  - Troponin of 5.466 with a hemoglobin of 6.5. Reived 2 units pRBCs.   - Elevated troponin likely 2/2 to demand ischemia from anemia vs hypovolemia.   - No PCI at this  time.  - Recommend PO fluid intake in the setting of diarrhea.   - Can restart home warfarin dose with goal INR 2-3.   - D/c ASA and Plavix.   - Had extensive discussion with patient about Eliquis and patient expressed the cost is too much and would like to stay on warfarin.             VTE Risk Mitigation         Ordered     warfarin (COUMADIN) tablet 3 mg  Once      04/25/18 0951        Discussed findings with the cardiology fellow and Dr. Daly. We will sign off at this time. Please contact us with any other questions or concerns.     Kareem Middleton MD  Cardiology  [facility]

## 2018-04-25 NOTE — SUBJECTIVE & OBJECTIVE
Interval History:     NAEON. Troponin peaked at ~10. Returned back to 5 with no episodes of chest pain, SOB or n/v.     Review of Systems   Constitution: Positive for chills. Negative for decreased appetite, fever, malaise/fatigue and weight gain.   HENT: Negative for congestion.    Cardiovascular: Positive for chest pain. Negative for leg swelling, palpitations and syncope.   Respiratory: Negative for cough, shortness of breath and wheezing.    Skin: Negative for color change, dry skin and flushing.   Musculoskeletal: Negative for arthritis, back pain, joint swelling, neck pain and stiffness.   Gastrointestinal: Positive for diarrhea. Negative for constipation, dysphagia, nausea and vomiting.   Genitourinary: Negative for dysuria and flank pain.   Neurological: Negative for focal weakness and headaches.     Objective:     Vital Signs (Most Recent):  Temp: 98 °F (36.7 °C) (04/25/18 0853)  Pulse: 75 (04/25/18 1000)  Resp: 18 (04/25/18 0853)  BP: (!) 153/83 (04/25/18 0853)  SpO2: 96 % (04/25/18 0853) Vital Signs (24h Range):  Temp:  [98 °F (36.7 °C)-98.2 °F (36.8 °C)] 98 °F (36.7 °C)  Pulse:  [73-98] 75  Resp:  [16-21] 18  SpO2:  [94 %-97 %] 96 %  BP: (125-166)/(62-83) 153/83     Weight: 81.2 kg (179 lb 0.2 oz)  Body mass index is 24.97 kg/m².     SpO2: 96 %  O2 Device (Oxygen Therapy): room air      Intake/Output Summary (Last 24 hours) at 04/25/18 1019  Last data filed at 04/25/18 0600   Gross per 24 hour   Intake              490 ml   Output              250 ml   Net              240 ml       Lines/Drains/Airways     Peripheral Intravenous Line                 Peripheral IV - Single Lumen 04/24/18 0140 Right Forearm 1 day         Peripheral IV - Single Lumen 04/24/18 0300 Right Forearm 1 day                Physical Exam   Constitutional: He is oriented to person, place, and time. He appears well-developed and well-nourished.   HENT:   Head: Normocephalic.   Eyes: Conjunctivae and EOM are normal. Pupils are equal,  round, and reactive to light.   Neck: Normal range of motion. Neck supple. No thyromegaly present.   Cardiovascular: Normal rate and regular rhythm.    Murmur (holosystolic murmur 4/6 grade loudest in the right second intercostal space.) heard.  Pulmonary/Chest: Effort normal and breath sounds normal. No respiratory distress. He has no wheezes.   Abdominal: He exhibits no distension. There is no tenderness.   Musculoskeletal: He exhibits no edema.   Knee swelling. Worse on the right.    Neurological: He is alert and oriented to person, place, and time. He has normal reflexes.     Significant Labs:   BMP:   Recent Labs  Lab 04/24/18  0141 04/25/18  0601   * 98    137   K 3.5 3.5    108   CO2 17* 20*   BUN 19 16   CREATININE 1.4 1.2   CALCIUM 8.2* 7.7*   MG  --  2.1   , CBC   Recent Labs  Lab 04/24/18  0849 04/24/18  1810 04/25/18  0601   WBC 10.00 9.41 10.02   HGB 8.0* 8.2* 8.2*   HCT 25.9* 26.0* 25.5*   * 161 143*    and Troponin   Recent Labs  Lab 04/24/18  0849 04/24/18  1810 04/25/18  0601   TROPONINI 5.466* 10.233* 5.869*

## 2018-04-25 NOTE — SIGNIFICANT EVENT
Discussed with Dr. Keane. Tn now 10 (coronary calcium score 376 in 2012). Recommend Eliquis 5 mg BID for pAF once INR <2 (would hold warfarin) and recommend Plavix load 300 mg followed by 75 mg daily. ASA can be discontinued to decrease bleeding risk. Will add back to the cardiology consult service.

## 2018-04-25 NOTE — PLAN OF CARE
"CM to bedside - pt provided assessment info. Pt w/ a cane in place, lives w/ spouse. Pt will likely d/c home w/ no needs.     CM provided patient anticipated [facility] which will be update by nursing staff. Patient provided a Blue "My Health Packet" for d/c planning and health tool. Patient verbalized understanding.     04/25/18 1231   Discharge Assessment   Assessment Type Discharge Planning Assessment   Confirmed/corrected address and phone number on facesheet? Yes   Assessment information obtained from? Patient   Expected Length of Stay (days) 2   Communicated expected length of stay with patient/caregiver yes   Prior to hospitilization cognitive status: Alert/Oriented   Prior to hospitalization functional status: Assistive Equipment   Current cognitive status: Alert/Oriented   Current Functional Status: Assistive Equipment   Facility Arrived From: N/A   Lives With spouse   Able to Return to Prior Arrangements yes   Is patient able to care for self after discharge? Yes   Who are your caregiver(s) and their phone number(s)? misael - Agatha [phone]   Patient's perception of discharge disposition home or selfcare   Readmission Within The Last 30 Days no previous admission in last 30 days   Patient currently being followed by outpatient case management? No   Patient currently receives any other outside agency services? No   Equipment Currently Used at Home cane, straight;other (see comments)  (bulit in shower seat)   Do you have any problems affording any of your prescribed medications? No   Is the patient taking medications as prescribed? yes   Does the patient have transportation home? Yes   Transportation Available car   Dialysis Name and Scheduled days N/A   Does the patient receive services at the Coumadin Clinic? Yes  ([facility] - monitored by Dr. Fried)   Discharge Plan A Home with family   Discharge Plan B Home with family;Home Health   Patient/Family In Agreement With Plan yes     "

## 2018-04-25 NOTE — PLAN OF CARE
Problem: Patient Care Overview  Goal: Plan of Care Review  Outcome: Ongoing (interventions implemented as appropriate)  Pt free of falls/trauma/injuries this shift. Pt ambulating with standby assist. IV abx for infection changed to P.O. moxifloxacin today per MD orders. Plan of care reviewed with patient at bedside, no new questions at this time. Patient tolerating well. VSS. Pt voices no complaints of chest pain, SOB, or dizziness. No acute distress noted. Bed in lowest position, side rails x2 up, and call light in reach. Will continue to monitor.

## 2018-04-25 NOTE — MEDICAL/APP STUDENT
Progress Note  4/25/18    Patient Name: Wil Kwon Jr.  Admit Date: 4/24/2018  LOS: 1  MRN: [id number]    Subjective     Follow-up For:  Syncope due to orthostatic hypotension    HPI:   Wil Kwon is a 73 y.o. male w/ pmh significant for MDS, Afib on Warfarin, HF with AS, HTN, HLD, carotid artery disease presenting for presyncopal episode. Patient was sitting on toilet when he became lightheaded and fell to the floor, hitting his L forearm. He denies loss of consciousness, head/neck/back trauma. On further questioning, he reports some chest tightness at the time, but is comfortable now. Patient denies palpitations or shortness of breath. Patient has also felt weak for the past few days and attributes it to anemia, which he has 2/2 MDS. He has had 4 transfusions recently with a baseline Hb around 7-8 for the past month (currently 6.5). He was seen yesterday for fever of 101.4 and discharged feeling well.     Overnight:  No acute events overnight. Patient feels better today with no chest pain, palpitations, lightheadedness. His troponin peaked at 10.2 and has started declining with his last reading 5.87. Patient's only complaint is diarrhea and lower abdominal cramping that has persisted since he took laxatives before admission.    Review of Systems   Constitutional: Negative for chills, diaphoresis, fever and malaise/fatigue.   HENT: Negative for congestion and sore throat.    Respiratory: Negative for cough, hemoptysis and shortness of breath.    Cardiovascular: Negative for chest pain, palpitations and leg swelling.   Gastrointestinal: Positive for diarrhea. Negative for abdominal pain, nausea and vomiting.   Genitourinary: Negative for dysuria and frequency.   Musculoskeletal: Negative for back pain and neck pain.   Neurological: Negative for dizziness and headaches.         Objective     Vitals  Temp:  [98 °F (36.7 °C)-98.2 °F (36.8 °C)]   Pulse:  [71-98]   Resp:  [6-21]   BP: (125-166)/(62-83)    SpO2:  [94 %-99 %]      I & O (Last 24H):  Intake/Output Summary (Last 24 hours) at 04/25/18 1202  Last data filed at 04/25/18 0600   Gross per 24 hour   Intake              490 ml   Output              250 ml   Net              240 ml       Physical Exam   Constitutional: He is oriented to person, place, and time. No distress.   HENT:   Mouth/Throat: Oropharynx is clear and moist.   Eyes: Conjunctivae and EOM are normal.   Neck: Normal range of motion. JVD present.   Cardiovascular: Normal rate and regular rhythm.    Murmur heard.  3/6 systolic murmur radiating to carotids   Pulmonary/Chest: Effort normal and breath sounds normal. He has no rales.   Abdominal: Soft. He exhibits no distension. There is no tenderness.   Musculoskeletal: He exhibits no edema or tenderness.   Bruise on L forearm   Neurological: He is alert and oriented to person, place, and time.   Skin: Skin is warm and dry. He is not diaphoretic.       Diagnostic Results:  Recent Results (from the past 24 hour(s))   Troponin I    Collection Time: 04/24/18  6:10 PM   Result Value Ref Range    Troponin I 10.233 (H) 0.000 - 0.026 ng/mL   CBC auto differential    Collection Time: 04/24/18  6:10 PM   Result Value Ref Range    WBC 9.41 3.90 - 12.70 K/uL    RBC 2.59 (L) 4.60 - 6.20 M/uL    Hemoglobin 8.2 (L) 14.0 - 18.0 g/dL    Hematocrit 26.0 (L) 40.0 - 54.0 %     (H) 82 - 98 fL    MCH 31.7 (H) 27.0 - 31.0 pg    MCHC 31.5 (L) 32.0 - 36.0 g/dL    RDW 23.0 (H) 11.5 - 14.5 %    Platelets 161 150 - 350 K/uL    MPV 13.3 (H) 9.2 - 12.9 fL    Immature Granulocytes 2.2 (H) 0.0 - 0.5 %    Gran # (ANC) 7.4 1.8 - 7.7 K/uL    Immature Grans (Abs) 0.21 (H) 0.00 - 0.04 K/uL    Lymph # 1.4 1.0 - 4.8 K/uL    Mono # 0.4 0.3 - 1.0 K/uL    Eos # 0.0 0.0 - 0.5 K/uL    Baso # 0.02 0.00 - 0.20 K/uL    nRBC 4 (A) 0 /100 WBC    Gran% 78.5 (H) 38.0 - 73.0 %    Lymph% 14.8 (L) 18.0 - 48.0 %    Mono% 4.3 4.0 - 15.0 %    Eosinophil% 0.0 0.0 - 8.0 %    Basophil% 0.2 0.0 - 1.9  %    Differential Method Automated    Comprehensive Metabolic Panel (CMP)    Collection Time: 04/25/18  6:01 AM   Result Value Ref Range    Sodium 137 136 - 145 mmol/L    Potassium 3.5 3.5 - 5.1 mmol/L    Chloride 108 95 - 110 mmol/L    CO2 20 (L) 23 - 29 mmol/L    Glucose 98 70 - 110 mg/dL    BUN, Bld 16 8 - 23 mg/dL    Creatinine 1.2 0.5 - 1.4 mg/dL    Calcium 7.7 (L) 8.7 - 10.5 mg/dL    Total Protein 7.4 6.0 - 8.4 g/dL    Albumin 2.8 (L) 3.5 - 5.2 g/dL    Total Bilirubin 0.9 0.1 - 1.0 mg/dL    Alkaline Phosphatase 59 55 - 135 U/L    AST 51 (H) 10 - 40 U/L    ALT 15 10 - 44 U/L    Anion Gap 9 8 - 16 mmol/L    eGFR if African American >60.0 >60 mL/min/1.73 m^2    eGFR if non  59.6 (A) >60 mL/min/1.73 m^2   Magnesium    Collection Time: 04/25/18  6:01 AM   Result Value Ref Range    Magnesium 2.1 1.6 - 2.6 mg/dL   Phosphorus    Collection Time: 04/25/18  6:01 AM   Result Value Ref Range    Phosphorus 3.0 2.7 - 4.5 mg/dL   CBC with Automated Differential    Collection Time: 04/25/18  6:01 AM   Result Value Ref Range    WBC 10.02 3.90 - 12.70 K/uL    RBC 2.58 (L) 4.60 - 6.20 M/uL    Hemoglobin 8.2 (L) 14.0 - 18.0 g/dL    Hematocrit 25.5 (L) 40.0 - 54.0 %    MCV 99 (H) 82 - 98 fL    MCH 31.8 (H) 27.0 - 31.0 pg    MCHC 32.2 32.0 - 36.0 g/dL    RDW 23.0 (H) 11.5 - 14.5 %    Platelets 143 (L) 150 - 350 K/uL    MPV 13.5 (H) 9.2 - 12.9 fL    Immature Granulocytes 2.6 (H) 0.0 - 0.5 %    Gran # (ANC) 7.8 (H) 1.8 - 7.7 K/uL    Immature Grans (Abs) 0.26 (H) 0.00 - 0.04 K/uL    Lymph # 1.5 1.0 - 4.8 K/uL    Mono # 0.4 0.3 - 1.0 K/uL    Eos # 0.0 0.0 - 0.5 K/uL    Baso # 0.02 0.00 - 0.20 K/uL    nRBC 3 (A) 0 /100 WBC    Gran% 78.0 (H) 38.0 - 73.0 %    Lymph% 15.2 (L) 18.0 - 48.0 %    Mono% 4.0 4.0 - 15.0 %    Eosinophil% 0.0 0.0 - 8.0 %    Basophil% 0.2 0.0 - 1.9 %    Aniso Slight     Poik Slight     Poly Occasional     Hypo Occasional     Ovalocytes Occasional     Differential Method Automated    Protime-INR     Collection Time: 04/25/18  6:01 AM   Result Value Ref Range    Prothrombin Time 32.6 (H) 9.0 - 12.5 sec    INR 3.4 (H) 0.8 - 1.2   Troponin I    Collection Time: 04/25/18  6:01 AM   Result Value Ref Range    Troponin I 5.869 (H) 0.000 - 0.026 ng/mL       Imaging  No new imaging. Previous results reviewed    Assessment/Plan     Wil Kwon is a 73 y.o. male w/ pmh significant for MDS, Afib on Warfarin, HF with AS, HTN, HLD, carotid artery disease presenting for presyncopal episode, now with concern for ACS     Acute coronary syndrome  -Suspected on admission due to findings: uptrending troponin (0.516 uptrending to 5.4), diaphoresis, possible ST depression on EKG, chest tightness, risk factors present CAD, HLD, HTN).  -Cardiology confirm, demand ischemia event  -Stress test outpatient  -Assessment for TAVR  -Patient adequately anticoagulated on warfarin (INR 3.4)     Presyncopal episode  -Due to AS, possibly exacerbated by anemia.  -Severe hypotension on arrival in ED  -2D echo confirms severe aortic stenosis, MR, TR (normal EF)  -Hemodynamically stable currently     Chronic diastolic heart failure due to valvular disease  -Metoprolol     Pneumonia  -Suspected due to baseline immunosuppression and CXR findings  -Will treat empirically with ceftriaxone + vancomycin     Paroxysmal atrial fibrillation  -RVR on admission, now resolved to NSR  -Continue warfarin     Myelodysplastic syndrome  -Hb currently 8.2  -Follows Dr. Mixon in heme/onc  -Darvopoeitin weekly for anemia  -Anemic 6.5 on admission below baseline of 7-8  -2U pRBC given     Kang Escobedo  Medical Student, Year 3  Hospital Medicine

## 2018-04-25 NOTE — ED NOTES
Bed: [facility]  Expected date: 4/24/18  Expected time: 6:32 PM  Means of arrival:   Comments:

## 2018-04-25 NOTE — SUBJECTIVE & OBJECTIVE
Interval History:  Patient's troponin continued to rise overnight to 10 from 5, cardiology re-consulted with recommendations to start plavix. This morning the patient's troponin trended back down to 5. Patient had no complaints of chest pain overnight.     Review of Systems   Constitutional: Positive for fatigue. Negative for activity change, appetite change, chills, diaphoresis and fever.   HENT: Negative for congestion, rhinorrhea, sore throat and trouble swallowing.    Eyes: Negative for photophobia, redness and visual disturbance.   Respiratory: Negative for cough, chest tightness and shortness of breath.    Cardiovascular: Negative for chest pain and palpitations.   Gastrointestinal: Positive for diarrhea. Negative for abdominal distention, abdominal pain, blood in stool, constipation, nausea and vomiting.   Genitourinary: Negative for dysuria and hematuria.   Musculoskeletal: Negative for arthralgias, myalgias, neck pain and neck stiffness.   Skin: Positive for pallor. Negative for rash and wound.   Neurological: Negative for dizziness, syncope, weakness, light-headedness, numbness and headaches.   Hematological: Bruises/bleeds easily.     Objective:     Vital Signs (Most Recent):  Temp: 98.2 °F (36.8 °C) (04/24/18 2259)  Pulse: 85 (04/25/18 0800)  Resp: (!) 21 (04/25/18 0443)  BP: 135/73 (04/25/18 0501)  SpO2: (!) 94 % (04/25/18 0501) Vital Signs (24h Range):  Temp:  [98.2 °F (36.8 °C)] 98.2 °F (36.8 °C)  Pulse:  [73-98] 85  Resp:  [16-21] 21  SpO2:  [94 %-97 %] 94 %  BP: (125-166)/(62-83) 135/73     Weight: 75.8 kg (167 lb)  Body mass index is 23.29 kg/m².    Intake/Output Summary (Last 24 hours) at 04/25/18 0839  Last data filed at 04/25/18 0600   Gross per 24 hour   Intake              490 ml   Output              250 ml   Net              240 ml      Physical Exam   Constitutional: He is oriented to person, place, and time. He appears well-developed and well-nourished. No distress.   HENT:   Head:  Normocephalic and atraumatic.   Right Ear: External ear normal.   Left Ear: External ear normal.   Mouth/Throat: Oropharynx is clear and moist. No oropharyngeal exudate.   Eyes: EOM are normal. Pupils are equal, round, and reactive to light. Right eye exhibits no discharge. Left eye exhibits no discharge. No scleral icterus.   Neck: Normal range of motion. Neck supple. No JVD present.   Cardiovascular: Normal rate and intact distal pulses.    Murmur heard.  Pulmonary/Chest: Effort normal. No respiratory distress. He has rales (bilateral, basal).   Abdominal: Soft. Bowel sounds are normal. He exhibits no distension. There is no tenderness. There is no guarding.   Musculoskeletal: He exhibits no edema or tenderness.   Neurological: He is alert and oriented to person, place, and time.   Skin: Skin is warm. He is not diaphoretic.   Psychiatric: He has a normal mood and affect. His behavior is normal. Thought content normal.   Vitals reviewed.      Significant Labs:   BMP:   Recent Labs  Lab 04/25/18  0601   GLU 98      K 3.5      CO2 20*   BUN 16   CREATININE 1.2   CALCIUM 7.7*   MG 2.1     CBC:   Recent Labs  Lab 04/24/18  0849 04/24/18  1810 04/25/18  0601   WBC 10.00 9.41 10.02   HGB 8.0* 8.2* 8.2*   HCT 25.9* 26.0* 25.5*   * 161 143*     Troponin:   Recent Labs  Lab 04/24/18  0849 04/24/18  1810 04/25/18  0601   TROPONINI 5.466* 10.233* 5.869*       Significant Imaging: I have reviewed all pertinent imaging results/findings within the past 24 hours.

## 2018-04-25 NOTE — NURSING
Pt arrived on floor from ED via wheelchair per transport. Telemetry resumed. VSS. NADN. Pt afebrile. No complaints of lightheadedness or dizziness. Pt with three positive set of troponin. Dr Triplett notified. MD stated that pt does not need to be treated for ACS. Orders hong carried out. Pt denies of any CP. Bed locked in lowest position, side rails up x2, call bell within reach. Pt notified wife of admission. Will continue to monitor. Delmy Andrews RN

## 2018-04-26 VITALS
TEMPERATURE: 98 F | SYSTOLIC BLOOD PRESSURE: 137 MMHG | WEIGHT: 179.88 LBS | HEART RATE: 74 BPM | RESPIRATION RATE: 19 BRPM | DIASTOLIC BLOOD PRESSURE: 79 MMHG | HEIGHT: 71 IN | BODY MASS INDEX: 25.18 KG/M2 | OXYGEN SATURATION: 98 %

## 2018-04-26 LAB
ALBUMIN SERPL BCP-MCNC: 2.8 G/DL
ALP SERPL-CCNC: 62 U/L
ALT SERPL W/O P-5'-P-CCNC: 17 U/L
ANION GAP SERPL CALC-SCNC: 8 MMOL/L
AST SERPL-CCNC: 42 U/L
BASOPHILS # BLD AUTO: 0.02 K/UL
BASOPHILS NFR BLD: 0.2 %
BILIRUB SERPL-MCNC: 1 MG/DL
BUN SERPL-MCNC: 11 MG/DL
CALCIUM SERPL-MCNC: 8.1 MG/DL
CHLORIDE SERPL-SCNC: 105 MMOL/L
CO2 SERPL-SCNC: 24 MMOL/L
CREAT SERPL-MCNC: 1 MG/DL
DIFFERENTIAL METHOD: ABNORMAL
EOSINOPHIL # BLD AUTO: 0 K/UL
EOSINOPHIL NFR BLD: 0.1 %
ERYTHROCYTE [DISTWIDTH] IN BLOOD BY AUTOMATED COUNT: 22 %
EST. GFR  (AFRICAN AMERICAN): >60 ML/MIN/1.73 M^2
EST. GFR  (NON AFRICAN AMERICAN): >60 ML/MIN/1.73 M^2
GLUCOSE SERPL-MCNC: 85 MG/DL
HCT VFR BLD AUTO: 27.7 %
HGB BLD-MCNC: 8.8 G/DL
IMM GRANULOCYTES # BLD AUTO: 0.32 K/UL
IMM GRANULOCYTES NFR BLD AUTO: 3.1 %
INR PPP: 3.9
LYMPHOCYTES # BLD AUTO: 1.9 K/UL
LYMPHOCYTES NFR BLD: 18.2 %
MAGNESIUM SERPL-MCNC: 2 MG/DL
MCH RBC QN AUTO: 31.5 PG
MCHC RBC AUTO-ENTMCNC: 31.8 G/DL
MCV RBC AUTO: 99 FL
MONOCYTES # BLD AUTO: 0.4 K/UL
MONOCYTES NFR BLD: 4.1 %
NEUTROPHILS # BLD AUTO: 7.7 K/UL
NEUTROPHILS NFR BLD: 74.3 %
NRBC BLD-RTO: 4 /100 WBC
PHOSPHATE SERPL-MCNC: 3.3 MG/DL
PLATELET # BLD AUTO: 150 K/UL
PMV BLD AUTO: 12.5 FL
POTASSIUM SERPL-SCNC: 3.3 MMOL/L
PROT SERPL-MCNC: 7.4 G/DL
PROTHROMBIN TIME: 37.8 SEC
RBC # BLD AUTO: 2.79 M/UL
SODIUM SERPL-SCNC: 137 MMOL/L
TSH SERPL DL<=0.005 MIU/L-ACNC: 2.19 UIU/ML
WBC # BLD AUTO: 10.37 K/UL

## 2018-04-26 PROCEDURE — 25000003 PHARM REV CODE 250: Performed by: STUDENT IN AN ORGANIZED HEALTH CARE EDUCATION/TRAINING PROGRAM

## 2018-04-26 PROCEDURE — 85610 PROTHROMBIN TIME: CPT

## 2018-04-26 PROCEDURE — 99239 HOSP IP/OBS DSCHRG MGMT >30: CPT | Mod: GC,,, | Performed by: INTERNAL MEDICINE

## 2018-04-26 PROCEDURE — 84443 ASSAY THYROID STIM HORMONE: CPT

## 2018-04-26 PROCEDURE — 85025 COMPLETE CBC W/AUTO DIFF WBC: CPT

## 2018-04-26 PROCEDURE — 83735 ASSAY OF MAGNESIUM: CPT

## 2018-04-26 PROCEDURE — 84100 ASSAY OF PHOSPHORUS: CPT

## 2018-04-26 PROCEDURE — 36415 COLL VENOUS BLD VENIPUNCTURE: CPT

## 2018-04-26 PROCEDURE — 25000003 PHARM REV CODE 250: Performed by: HOSPITALIST

## 2018-04-26 PROCEDURE — 80053 COMPREHEN METABOLIC PANEL: CPT

## 2018-04-26 RX ORDER — METOPROLOL SUCCINATE 100 MG/1
100 TABLET, EXTENDED RELEASE ORAL DAILY
Qty: 30 TABLET | Refills: 1 | Status: SHIPPED | OUTPATIENT
Start: 2018-04-27 | End: 2018-05-02 | Stop reason: SDUPTHER

## 2018-04-26 RX ORDER — AMIODARONE HYDROCHLORIDE 200 MG/1
400 TABLET ORAL 2 TIMES DAILY
Status: DISCONTINUED | OUTPATIENT
Start: 2018-04-26 | End: 2018-04-26 | Stop reason: HOSPADM

## 2018-04-26 RX ORDER — WARFARIN 3 MG/1
3 TABLET ORAL DAILY
Status: ON HOLD
Start: 2018-04-27 | End: 2018-07-02 | Stop reason: HOSPADM

## 2018-04-26 RX ORDER — MOXIFLOXACIN HYDROCHLORIDE 400 MG/1
400 TABLET ORAL DAILY
Qty: 2 TABLET | Refills: 0 | Status: SHIPPED | OUTPATIENT
Start: 2018-04-27 | End: 2018-05-02

## 2018-04-26 RX ORDER — AMIODARONE HYDROCHLORIDE 200 MG/1
200 TABLET ORAL DAILY
Status: DISCONTINUED | OUTPATIENT
Start: 2018-05-10 | End: 2018-04-26 | Stop reason: HOSPADM

## 2018-04-26 RX ORDER — AMIODARONE HYDROCHLORIDE 400 MG/1
400 TABLET ORAL 2 TIMES DAILY
Qty: 28 TABLET | Refills: 0 | Status: SHIPPED | OUTPATIENT
Start: 2018-04-26 | End: 2018-05-11 | Stop reason: DRUGHIGH

## 2018-04-26 RX ORDER — AMIODARONE HYDROCHLORIDE 200 MG/1
200 TABLET ORAL DAILY
Qty: 30 TABLET | Refills: 11 | Status: SHIPPED | OUTPATIENT
Start: 2018-05-10 | End: 2019-05-10

## 2018-04-26 RX ORDER — METOPROLOL SUCCINATE 25 MG/1
75 TABLET, EXTENDED RELEASE ORAL DAILY
Qty: 90 TABLET | Refills: 0 | Status: SHIPPED | OUTPATIENT
Start: 2018-04-26 | End: 2018-05-02

## 2018-04-26 RX ORDER — METOPROLOL SUCCINATE 100 MG/1
100 TABLET, EXTENDED RELEASE ORAL DAILY
Status: DISCONTINUED | OUTPATIENT
Start: 2018-04-26 | End: 2018-04-26 | Stop reason: HOSPADM

## 2018-04-26 RX ORDER — ATORVASTATIN CALCIUM 80 MG/1
40 TABLET, FILM COATED ORAL DAILY
Qty: 30 TABLET | Refills: 1 | Status: SHIPPED | OUTPATIENT
Start: 2018-04-26 | End: 2018-06-01 | Stop reason: SINTOL

## 2018-04-26 RX ADMIN — ATORVASTATIN CALCIUM 80 MG: 20 TABLET, FILM COATED ORAL at 09:04

## 2018-04-26 RX ADMIN — DIPHENOXYLATE HYDROCHLORIDE AND ATROPINE SULFATE 1 TABLET: 2.5; .025 TABLET ORAL at 09:04

## 2018-04-26 RX ADMIN — MOXIFLOXACIN HYDROCHLORIDE 400 MG: 400 TABLET, FILM COATED ORAL at 09:04

## 2018-04-26 RX ADMIN — AMIODARONE HYDROCHLORIDE 400 MG: 200 TABLET ORAL at 09:04

## 2018-04-26 RX ADMIN — METOPROLOL SUCCINATE 100 MG: 100 TABLET, EXTENDED RELEASE ORAL at 09:04

## 2018-04-26 NOTE — DISCHARGE SUMMARY
DISCHARGE SUMMARY  Hospital Medicine    Team: [facility] HOSP MED 4    Patient Name: Wil Kwon Jr.  YOB: 1945    Admit Date: 4/24/2018    Discharge Date: 04/26/2018    Discharge Attending Physician: Dr CHRISTIAN Steven    Resident on Service:  Joann Washington MD    Chief Complaint: syncope    Princilpal Diagnoses:  Active Hospital Problems    Diagnosis  POA    *Syncope due to orthostatic hypotension with Type II NSTEMI [I95.1]  Yes    Abnormal CXR [R93.8]  Yes    NSTEMI (non-ST elevated myocardial infarction) [I21.4]  Yes    Syncope [R55]  Yes    Hypotension [I95.9]  Yes    Elevated troponin [R74.8]  Yes    Long term current use of anticoagulant therapy [Z79.01]  Not Applicable    Aortic valve stenosis, severe [I35.0]  Yes     4-24-18   1 - Normal left ventricular systolic function (EF 60-65%).     2 - Concentric hypertrophy.     3 - No wall motion abnormalities.     4 - Indeterminate LV diastolic function.     5 - Biatrial enlargement.     6 - Right ventricle is upper limit of normal in size with normal systolic function.     7 - Severe aortic valve stenosis (JEFF 0.60 cm2, AVAi 0.31 cm2/m2, peak aortic jet velocity 4.5 m/s,MG 66 mmHg, ).     8 - Moderate to moderate-severe (3+) mitral regurgitation.     9 - Trivial to mild tricuspid regurgitation.     10 - Increased central venous pressure.     11 - Pulmonary hypertension. The estimated PA systolic pressure is 79 mmHg.       Chronic diastolic heart failure due to valvular disease [I50.32, I38]  Yes    Persistent atrial fibrillation [I48.1]  Yes    MDS (myelodysplastic syndrome) [D46.9]  Yes    Major depressive disorder with single episode, in partial remission [F32.4]  Yes    Pulmonary hypertension- April 2016- The estimated PA systolic pressure is 39 mmHg [I27.20]  Yes    Essential hypertension [I10]  Yes    Dyslipidemia [E78.5]  Yes      Resolved Hospital Problems    Diagnosis Date Resolved POA   No resolved problems to display.  "      Discharged Condition: Admit problems have stabilized      HOSPITAL COURSE:      Initial Presentation:    73 year old male with a past medical history of myelodysplastic syndrome, moderate aortic stenosis, pAfib on warfarin presents to the ED following a presyncopal episode. The patient stateed that  he was getting up from using the commode and he experienced significant lightheadedness. The patient and his wife became concerned following this episode and present to the ED. He continued to feel light headed after going to the rest room where he had a syncopal episode on the toilet. He reported having slight non-radiating chest tightness while on the toilet and fell over against the wall. He denies any head trauma and is unaware how long he lost consciousness. He also reports a few episodes of diarrhea and poor PO intake over the past few days due to "not feeling well" with a subjective fever.     The patient was found to be severely hypotensive in the ED and was given volume resuscitation and treated with antibiotics. Of note the patient recently was seen in the ED two nights prior to this presentation for evaluation of fever. He reports that he had a subjective fever and came to the ED who worked it up and ultimately recommended follow up with his oncologist. The patient did endorse some chest tightness prior to the episode of syncope. The patient denied  any infectious symptoms such as cough, abdominal pain, N/V, diarrhea or any sick contacts. Of not too the patient was found to be anemic on initial labs with a hemoglobin of 6.5 (baseline of 8). Lactate was initially elevated to 4.9 but later returned to normal following the administration of fluids    Course of Principle Problem for Admission:    NSTEMI (non-ST elevated myocardial infarction)/ Elevated troponin  Hypotension  Syncope  Aortic valve stenosis, severe  Chronic diastolic heart failure due to valvular disease    Patient presented with 2 episodes of " syncope/ pre syncope. Was hypotensive in  80s/60s in the ED with LA of 5, likely 2/2 dehydration./ volume depletion from diarrhea and some nausea and vomiting. Septic etiology was not high on ddx, not septic shock.  His metoprolol was recent increased to 150mg, and he has also been on diltiazem 300mg and Lasix 40mg BID. Received 2 L IVF and 2 U pRBCs in the ED.  Initial EKG on presentation showed lateral ST depressions (new) and A fib with RVR. S/p resuscitation, pt's symptoms resolved, LA resolved, ST depressions resolved.     Initial troponin was normal, repeat was 0.5 --> increased to 5 (with no further chest pain or EKG changes)-- > increased to 10 --> decreased to 5. Cardiology was consulted. Given pt's presentation and lack of typical chest pain, patient was deemed to have type 2 demand NSTEMI (from hypovolemia./ hypotension./ anti-hypertensive meds/ anemia),. Full ACS treatment was not initiated.     Echo showed worsening in pt's aortic stenosis with new JEFF 0.60 cm2, AVAi 0.31 cm2/m2, peak aortic jet velocity 4.5 m/s,MG 66 mmHg, prior echo noted JEFF 0.8 mod-severe AS. Worsening of AS very likely contributed to pt's symptoms as he is pre-load dependent. Given hypovolemia and significant anti-hypertensives, pt experienced syncope given this critical Aortic stenosis . Clinic referral with Dr JAMEEL Altman for possible TAVR w/u given symptomatic critical AS.    Initially, On presentation, BNP was 1000 (700 prior values) with crackles at bases of lungs, pulm congestion at lung bases on CXR. Given pt's low grade fevers and immunosuppressed status, moxifloxacin was started for 5 days total to cover for infectious etiology.     His meds were adjusted- metoprolol decreased to 75 --> then increased to 100mg , diltiazem and lasix stopped. On the day of d/c, pt went back into Afib with RVR in 140s while walking to the bathroom. Was asymptomatic, stable BPs, no CP, no palpitations or SOB. Amiodarone PO was started, with  "resolution of Afib with RVR . BP remained stable with no symptoms,.       Interval History 4/26:  On the day of d/c, pt went back into Afib with RVR in 140s while walking to the bathroom. Was asymptomatic, stable BPs, no CP, no palpitations or SOB. Amiodarone PO (400mg BID for 2 weeks --> then 200mg daily) was started, with resolution of Afib with RVR . Metoprolol was increased to 100 (prior 150mg at home). Was already appropriately anticoagulation with INR of 3.9    Physical Exam 4/26  /79   Pulse 74   Temp 98.2 °F (36.8 °C) (Oral)   Resp 19   Ht 5' 11" (1.803 m)   Wt 81.6 kg (179 lb 14.3 oz)   SpO2 98%   BMI 25.09 kg/m²     Constitutional: He is oriented to person, place, and time. He appears well-developed and well-nourished. No distress.   HENT: Head: Normocephalic and atraumatic. Pale conjunctiva   Mouth/Throat: Oropharynx is clear and moist. No oropharyngeal exudate.   Eyes: EOM are normal. Pupils are equal, round, and reactive to light. Right eye exhibits no discharge. Left eye exhibits no discharge. No scleral icterus.   Neck: Normal range of motion. Neck supple. No JVD present.   Cardiovascular: Normal rate and intact distal pulses.    3/4 systolic ejection Murmur heard at second R intercostal space.  Pulmonary/Chest: Effort normal. No respiratory distress. No crackles  Abdominal: Soft. Bowel sounds are normal. He exhibits no distension. There is no tenderness. There is no guarding.   Musculoskeletal: He exhibits no edema or tenderness.   Neurological: He is alert and oriented to person, place, and time.   Skin: Skin is warm. He is not diaphoretic.   Psychiatric: He has a normal mood and affect. His behavior is normal. Thought content normal.     Other Medical Problems Addressed in the Hospital:    Persistent atrial fibrillation  Long term current use of anticoagulant therapy  At home, on metoprolol 150mg and diltiazem 300mg, recent increase in metoprolol from 100 to 150. diltiazem and lasix " stopped.Amiodarone PO was started. Metoprolol decreased to 100mg. Continued to warfarin, dose decreased from 6 mg to 3mg daily. Coumadin clinic notified regarding amiodarone initiation given that it may interact with future warfarin dosing.    MDS (myelodysplastic syndrome)  Sees Dr. Mixon with oncology. given darvopoeitin in past. anemic on admission to ., given 2U pRBCs. responded appropriately . Clinic f/u with Oncology     CONSULTS: ICU, cardiology     PROCEDURES: none    Labs:    Cardiac Enzymes: Ejection Fractions:    Recent Labs      04/24/18   0849  04/24/18   1810  04/25/18   0601   TROPONINI  5.466*  10.233*  5.869*    EF   Date Value Ref Range Status   04/24/2018 60 55 - 65    06/23/2017 53 55 - 65         Chemistries:     Recent Labs  Lab 04/22/18  1758 04/24/18  0141 04/25/18  0601 04/26/18  0611    137 137 137   K 3.9 3.5 3.5 3.3*    106 108 105   CO2 24 17* 20* 24   BUN 20 19 16 11   CREATININE 1.4 1.4 1.2 1.0   CALCIUM 8.5* 8.2* 7.7* 8.1*   PROT 8.8* 7.9 7.4 7.4   BILITOT 0.9 0.9 0.9 1.0   ALKPHOS 75 61 59 62   ALT 12 11 15 17   AST 26 25 51* 42*   MG 2.3  --  2.1 2.0   PHOS  --   --  3.0 3.3        WBC:     Recent Labs  Lab 04/24/18  0141 04/24/18  0849 04/24/18  1810 04/25/18  0601 04/26/18  0611   WBC 12.16 10.00 9.41 10.02 10.37     Bands:     CBC/Anemia Labs: Coags:      Recent Labs  Lab 04/24/18  1810 04/25/18  0601 04/26/18  0611   WBC 9.41 10.02 10.37   HGB 8.2* 8.2* 8.8*   HCT 26.0* 25.5* 27.7*    143* 150   * 99* 99*   RDW 23.0* 23.0* 22.0*      Recent Labs  Lab 04/22/18  1758 04/24/18  0849 04/25/18  0601 04/26/18  0611   INR 2.6* 2.8* 3.4* 3.9*   APTT 37.5*  --   --   --         POCT Glucose: HbA1c:    No results for input(s): POCTGLUCOSE in the last 168 hours. No results found for: HGBA1C         Special Treatments/Procedures:    2D ECHO  CONCLUSIONS     1 - Normal left ventricular systolic function (EF 60-65%).     2 - Concentric hypertrophy.     3 - No wall  motion abnormalities.     4 - Indeterminate LV diastolic function.     5 - Biatrial enlargement.     6 - Right ventricle is upper limit of normal in size with normal systolic function.     7 - Severe aortic valve stenosis (JEFF 0.60 cm2, AVAi 0.31 cm2/m2, peak aortic jet velocity 4.5 m/s,MG 66 mmHg, ).     8 - Moderate to moderate-severe (3+) mitral regurgitation.     9 - Trivial to mild tricuspid regurgitation.     10 - Increased central venous pressure.     11 - Pulmonary hypertension. The estimated PA systolic pressure is 79 mmHg.     Disposition:  Home        Future Scheduled Appointments:  Future Appointments  Date Time Provider Department Center   4/27/2018 1:30 PM LAB, HEMONC SAME DAY [facility]   4/27/2018 2:30 PM INJECTION, [facility]   5/2/2018 9:00 AM Kaden Steven MD [facility]   5/4/2018 1:30 PM LAB, HEMON SAME DAY [facility]   5/4/2018 2:30 PM INJECTION, [facility]   5/11/2018 1:30 PM LAB, HEMONC SAME DAY [facility]   5/11/2018 2:30 PM INJECTION, [facility]   5/18/2018 8:00 AM LAB, HEMONC SAME DAY [facility]   5/18/2018 9:00 AM INJECTION, [facility]   5/25/2018 1:30 PM LAB, HEMONC SAME DAY [facility]   5/25/2018 2:30 PM INJECTION, [facility]   6/1/2018 1:30 PM LAB, HEMONC SAME DAY [facility]   6/1/2018 2:30 PM INJECTION, [facility]   6/8/2018 1:00 PM LAB, HEMONC SAME DAY [facility]   6/8/2018 2:00 PM Jhonny Mixon MD [facility] BM CERVANTES [facility]   6/8/2018 2:30 PM INJECTION, [facility] INF[facility]ION [facility]       Follow-up Plans from This Hospitalization:      Discharge Medication List:       Wil Kwon Jr.   Home Medication Instructions MELVIN:[id number]    Printed on:04/26/18 5424   Medication Information                       amiodarone (PACERONE) 200 MG Tab  Take 1 tablet (200 mg total) by mouth once daily.             amiodarone (PACERONE) 400 MG tablet  Take 1 tablet (400 mg total) by mouth 2 (two) times daily. X 2 weeks, then 200mg amio             atorvastatin (LIPITOR) 80 MG tablet  Take 0.5 tablets (40 mg total) by mouth once daily.             food supplemt, lactose-reduced (BOOST) Liqd  Take by mouth once daily.             metoprolol succinate (TOPROL-XL) 100 MG 24 hr tablet  Take 1 tablet (100 mg total) by mouth once daily.             moxifloxacin (AVELOX) 400 mg tablet  Take 1 tablet (400 mg total) by mouth once daily. X3 more days             trazodone (DESYREL) 100 MG tablet  Take 1 tablet (100 mg total) by mouth every evening.             warfarin (COUMADIN) 3 MG tablet  Take 1 tablet (3 mg total) by mouth Daily.                 Patient Instructions:    Discharge Procedure Orders  Ambulatory Referral to Cardiology   Referral Priority: Routine Referral Type: Consultation   Referral Reason: Specialty Services Required    Referred to Provider: AZAEL PHAN Requested Specialty: Cardiology   Number of Visits Requested: 1      Ambulatory Referral to  [facility]   Referral Priority: Routine Referral Type: Consultation   Referral Reason: Specialty Services Required    Number of Visits Requested: 1      Diet Cardiac     Notify your health care provider if you experience any of the following:  temperature >100.4     Notify your health care provider if you experience any of the following:  persistent nausea and vomiting or diarrhea     Notify your health care provider if you experience any of the following:  difficulty breathing or increased cough     Notify your health care provider if you experience any of the following:  severe persistent headache     Notify your health care provider if you experience any of the following:  persistent dizziness, light-headedness, or visual disturbances     Notify your health  care provider if you experience any of the following:  increased confusion or weakness     Notify your health care provider if you experience any of the following:   Order Comments: Loss of consciousness         At the time of discharge patient was told to take all medications as prescribed, to keep all followup appointments, and to call their primary care physician or return to the emergency room if they have any worsening or concerning symptoms.    Signing Physician:  Joann Washington MD

## 2018-04-26 NOTE — MEDICAL/APP STUDENT
"Progress Note  4/26/18    Patient Name: Wil Kwon Jr.  Admit Date: 4/24/2018  LOS: 2  MRN: [id number]    Subjective     Follow-up For:  Syncope due to orthostatic hypotension    HPI:  Wil Kwon is a 73 y.o. male w/ pmh significant for MDS, Afib on Warfarin, HF with AS, HTN, HLD, carotid artery disease presenting for presyncopal episode. Patient was sitting on toilet when he became lightheaded and fell to the floor, hitting his L forearm. He denies loss of consciousness, head/neck/back trauma. On further questioning, he reports some chest tightness at the time, but is comfortable now. Patient denies palpitations or shortness of breath. Patient has also felt weak for the past few days and attributes it to anemia, which he has 2/2 MDS. He has had 4 transfusions recently with a baseline Hb around 7-8 for the past month (currently 6.5). He was seen yesterday for fever of 101.4 and discharged feeling well.    4/25/28:  No acute events overnight. Patient feels better today with no chest pain, palpitations, lightheadedness. His troponin peaked at 10.2 and has started declining with his last reading 5.87. Patient's only complaint is diarrhea and lower abdominal cramping that has persisted since he took laxatives before admission.    Overnight:  Patient back in Afib this morning, with rate of 120-140 after going to the bathroom. He reports feeling a little "off" but no presyncope, no chest pain/palpitations, no shortness of breath. Blood pressure was 134/82 and patient denied any other symptoms. Otherwise no events since yesterday    Review of Systems   Constitutional: Positive for diaphoresis. Negative for chills and fever.   HENT: Negative for congestion and sore throat.    Respiratory: Negative for cough, sputum production and shortness of breath.    Cardiovascular: Positive for leg swelling. Negative for chest pain and palpitations.   Gastrointestinal: Negative for nausea and vomiting.        Diarrhea " improving   Genitourinary: Negative for dysuria and frequency.   Musculoskeletal: Negative for back pain and neck pain.   Neurological: Negative for loss of consciousness and headaches.   Endo/Heme/Allergies: Does not bruise/bleed easily.         Objective     Vitals  Temp:  [98.2 °F (36.8 °C)-98.3 °F (36.8 °C)]   Pulse:  []   Resp:  [6-25]   BP: (107-146)/(57-80)   SpO2:  [93 %-100 %]      I & O (Last 24H):  Intake/Output Summary (Last 24 hours) at 04/26/18 1039  Last data filed at 04/26/18 0955   Gross per 24 hour   Intake             1620 ml   Output                0 ml   Net             1620 ml       Physical Exam   Constitutional: He is oriented to person, place, and time. No distress.   HENT:   Mouth/Throat: Oropharynx is clear and moist.   Eyes: Conjunctivae and EOM are normal.   Neck: Normal range of motion. No JVD present.   Cardiovascular:   Murmur heard.  Irregular, tachycardic. 3/6 systolic murmur radiating to carotids   Pulmonary/Chest: Effort normal. No respiratory distress. He has no rales.   Abdominal: Soft. He exhibits no distension. There is no tenderness.   Musculoskeletal: He exhibits edema.   + LLE edema to ankle   Neurological: He is alert and oriented to person, place, and time.   Skin: He is diaphoretic. No pallor.       Diagnostic Results:  CBC  Lab Results   Component Value Date    WBC 10.37 04/26/2018    HGB 8.8 (L) 04/26/2018    HCT 27.7 (L) 04/26/2018    MCV 99 (H) 04/26/2018     04/26/2018       BMP  Lab Results   Component Value Date     04/26/2018    K 3.3 (L) 04/26/2018     04/26/2018    CO2 24 04/26/2018    BUN 11 04/26/2018    CREATININE 1.0 04/26/2018    CALCIUM 8.1 (L) 04/26/2018    ANIONGAP 8 04/26/2018    ESTGFRAFRICA >60.0 04/26/2018    EGFRNONAA >60.0 04/26/2018       Imaging  No new imaging. Previous results reviewed    Assessment/Plan     Wil Kwon is a 73 y.o. male w/ pmh significant for MDS, Afib on Warfarin, HF with AS, HTN, HLD, carotid  artery disease presenting for presyncopal episode due to HFpEF with aortic stenosis causing type 2 NSTEMI     NSTEMI type 2  -Demand ischemia due to severe aortic stenosis, anemia, and possible mild hypovolemia.  -Suspected on admission due to findings: uptrending troponin (peaked at 10.2), diaphoresis, possible ST depression on EKG (resolved), chest tightness, risk factors present CAD, HLD, HTN)  -Troponin downtrended to 5.87  -No chest pain, no presyncope since admission  -Most recent EKG NSR with no ST depression or elevation  -Patient adequately anticoagulated on warfarin (INR 3.4)  -Atorvastatin (benefit outweighs risk of myopathy currently)     Presyncopal episode  -Due to AS, possibly exacerbated by anemia and Afib  -Severe hypotension on arrival in ED, blood pressure stable since admission       Chronic diastolic heart failure due to aortic stenosis (severe)  -Continue metoprolol  -2D echo confirms severe aortic stenosis, MR, TR (normal EF)     Paroxysmal atrial fibrillation  -RVR on admission, resolved to NSR until this morning, Afib with rate 120-140  -Continue warfarin  -Increase metoprolol to 100mg  -Concern for hypotension with diltiazem, start amiodarone instead for rate control  -Check TSH    Pneumonia  -Suspected due to baseline immunosuppression and CXR findings  -Will treat empirically with ceftriaxone and DC vancomycin for moxifloxacin instead    Myelodysplastic syndrome  -Hb currently 8.5  -Follows Dr. Mixon in heme/onc  -Darvopoeitin weekly for anemia (Fridays)  -Anemic 6.5 on admission below baseline of 7-8  -2U pRBC given     Kang Escobedo  Medical Student, Year 3  Hospital Medicine

## 2018-04-26 NOTE — PLAN OF CARE
Problem: Patient Care Overview  Goal: Plan of Care Review  Outcome: Outcome(s) achieved Date Met: 04/26/18  Safety:  call light in reach, patient oriented to room & instructed how to notify nurse if assistance is needed, questions & concerns addressed, bed in lowest position with wheels locked & side rails up X 2, fall precautions followed, patient free from fall & injury thus far this shift;  VTE/bleeding precautions maintained.  Activity:  patient up with assistance, weight shifted at least every other hour.  Neurological:  patient A&O X 4, follows commands, equal  strength & dorsi/plantarflexion, neuro checks performed as ordered & WDL.  Respiratory:  patient tolerates room air without distress, denies SOB.  Cardiac:  Denies chest pain, BP stable, HR stable, NSR to ST on telemetry.  Afebrile this shift.  GI:  Patient tolerates PO intake well, denies nausea, LBM 4/26/2018.  :  patient voids clear yellow urine without foul odor spontaneously & without difficulty, adequate output for shift.  Skin:  CDI.  Devices:  PIV X 2 CDI, negative for s/sx of infection & infiltration.  Pain:  Patient denied pain.  Patient being DCd home with family (wife).    Road Test  Oxygen- Patient tolerates room air without distress  Ambulation-Patient tolerates ambulation without assistance, wheelchair ordered  Devices- Patient going home without any devices, PIV X 2 removed, catheter intact, bleeding stopped, site covered with dry gauze and co-band    Tolerating- Patient tolerates cardiac diet  Elimination- Patient voids without difficulty or distress  Self care- Patient tolerates self care without assistance, pt going home with family  Teaching- Patient given written and verbal discharge instructions, all questions addressed

## 2018-04-26 NOTE — PLAN OF CARE
Pt d/c to home.     04/26/18 1425   Final Note   Assessment Type Final Discharge Note   Discharge Disposition Home   What phone number can be called within the next 1-3 days to see how you are doing after discharge? [phone]   Hospital Follow Up  Appt(s) scheduled? Yes   Right Care Referral Info   Post Acute Recommendation No Care

## 2018-04-26 NOTE — PLAN OF CARE
Problem: Fall Risk (Adult)  Goal: Identify Related Risk Factors and Signs and Symptoms  Related risk factors and signs and symptoms are identified upon initiation of Human Response Clinical Practice Guideline (CPG)   Outcome: Ongoing (interventions implemented as appropriate)  Pt free of falls/trauma/injuries. Encouraged to call for assistance. Fall bundle in place- Pt ambulating with standby assist. IV abx for infection changed to P.O. moxifloxacin. Plan of care reviewed with patient at bedside, very positive patient,  Patient tolerating well. VSS. Pt voices no complaints of chest pain, SOB, or dizziness. No acute distress noted. Bed in lowest position, side rails x2 up, and call light in reach. Will continue to monitor.

## 2018-04-27 ENCOUNTER — ANTI-COAG VISIT (OUTPATIENT)
Dept: CARDIOLOGY | Facility: CLINIC | Age: 73
End: 2018-04-27

## 2018-04-27 ENCOUNTER — INFUSION (OUTPATIENT)
Dept: INFUSION THERAPY | Facility: HOSPITAL | Age: 73
End: 2018-04-27
Attending: INTERNAL MEDICINE
Payer: MEDICARE

## 2018-04-27 DIAGNOSIS — D46.9 MDS (MYELODYSPLASTIC SYNDROME): Primary | ICD-10-CM

## 2018-04-27 DIAGNOSIS — Z79.01 LONG TERM CURRENT USE OF ANTICOAGULANT THERAPY: ICD-10-CM

## 2018-04-27 PROCEDURE — 63600175 PHARM REV CODE 636 W HCPCS: Mod: JG,EC | Performed by: INTERNAL MEDICINE

## 2018-04-27 PROCEDURE — 96372 THER/PROPH/DIAG INJ SC/IM: CPT

## 2018-04-27 RX ADMIN — DARBEPOETIN ALFA 300 MCG: 300 INJECTION, SOLUTION INTRAVENOUS; SUBCUTANEOUS at 01:04

## 2018-04-27 NOTE — PROGRESS NOTES
Per note: Mr. Kwon was admitted for NSTEMI 2/2 supply demand mismatch (no ASA or Plavix per Cardiology). His INR is supratherapeutic today so plan is to hold for 2 days and start at 3 mg daily. He will need follow up whenever possible. Please let me know if there are any questions about his stay.   Per Patient Pt questioned, states he was d/c from ER on 4/26 and took 3mg of coumadin  Reports drinking a boost on 4/26 and 4/27  Pt states ER  advised pt to take 3mg everyday  Started new meds (avelox 400mg) on 4/27 and (lipitor 80mg) on 4/27  No other changes

## 2018-04-30 ENCOUNTER — ANTI-COAG VISIT (OUTPATIENT)
Dept: CARDIOLOGY | Facility: CLINIC | Age: 73
End: 2018-04-30

## 2018-04-30 ENCOUNTER — LAB VISIT (OUTPATIENT)
Dept: LAB | Facility: HOSPITAL | Age: 73
End: 2018-04-30
Attending: INTERNAL MEDICINE
Payer: MEDICARE

## 2018-04-30 DIAGNOSIS — Z79.01 LONG TERM (CURRENT) USE OF ANTICOAGULANTS: ICD-10-CM

## 2018-04-30 DIAGNOSIS — Z79.01 LONG TERM CURRENT USE OF ANTICOAGULANT THERAPY: ICD-10-CM

## 2018-04-30 LAB
INR PPP: 2
PROTHROMBIN TIME: 19 SEC

## 2018-04-30 PROCEDURE — 36415 COLL VENOUS BLD VENIPUNCTURE: CPT

## 2018-04-30 PROCEDURE — 85610 PROTHROMBIN TIME: CPT

## 2018-04-30 NOTE — PROGRESS NOTES
Pt confirmed coumadin dose  Pt states a procedure will be discussed at nxt DR leblanc for new atrial fib. Pt will update CC after appt on 5/2  Pt did not start meds (amiodarone 200mg) as of yet; a decrease w/ meds will take place on 5/10  No other changes

## 2018-05-02 ENCOUNTER — OFFICE VISIT (OUTPATIENT)
Dept: PRIMARY CARE CLINIC | Facility: CLINIC | Age: 73
End: 2018-05-02
Payer: MEDICARE

## 2018-05-02 VITALS
WEIGHT: 173.75 LBS | BODY MASS INDEX: 24.32 KG/M2 | OXYGEN SATURATION: 99 % | SYSTOLIC BLOOD PRESSURE: 128 MMHG | DIASTOLIC BLOOD PRESSURE: 60 MMHG | HEIGHT: 71 IN | HEART RATE: 80 BPM

## 2018-05-02 DIAGNOSIS — E78.5 DYSLIPIDEMIA: ICD-10-CM

## 2018-05-02 DIAGNOSIS — I38 CHRONIC DIASTOLIC HEART FAILURE DUE TO VALVULAR DISEASE: ICD-10-CM

## 2018-05-02 DIAGNOSIS — I35.0 AORTIC VALVE STENOSIS, SEVERE: Primary | ICD-10-CM

## 2018-05-02 DIAGNOSIS — Z79.01 LONG TERM CURRENT USE OF ANTICOAGULANT THERAPY: ICD-10-CM

## 2018-05-02 DIAGNOSIS — I48.19 PERSISTENT ATRIAL FIBRILLATION: ICD-10-CM

## 2018-05-02 DIAGNOSIS — F51.01 PRIMARY INSOMNIA: ICD-10-CM

## 2018-05-02 DIAGNOSIS — F32.4 MAJOR DEPRESSIVE DISORDER WITH SINGLE EPISODE, IN PARTIAL REMISSION: ICD-10-CM

## 2018-05-02 DIAGNOSIS — I50.32 CHRONIC DIASTOLIC HEART FAILURE DUE TO VALVULAR DISEASE: ICD-10-CM

## 2018-05-02 DIAGNOSIS — D46.9 MDS (MYELODYSPLASTIC SYNDROME): ICD-10-CM

## 2018-05-02 DIAGNOSIS — I27.20 PULMONARY HYPERTENSION: ICD-10-CM

## 2018-05-02 DIAGNOSIS — I10 ESSENTIAL HYPERTENSION: ICD-10-CM

## 2018-05-02 PROBLEM — R79.89 ELEVATED TROPONIN: Status: RESOLVED | Noted: 2018-04-24 | Resolved: 2018-05-02

## 2018-05-02 PROBLEM — R93.89 ABNORMAL CXR: Status: RESOLVED | Noted: 2018-04-25 | Resolved: 2018-05-02

## 2018-05-02 PROBLEM — I95.9 HYPOTENSION: Status: RESOLVED | Noted: 2018-04-24 | Resolved: 2018-05-02

## 2018-05-02 PROBLEM — R55 SYNCOPE: Status: RESOLVED | Noted: 2018-04-24 | Resolved: 2018-05-02

## 2018-05-02 PROBLEM — I21.4 NSTEMI (NON-ST ELEVATED MYOCARDIAL INFARCTION): Status: RESOLVED | Noted: 2018-04-25 | Resolved: 2018-05-02

## 2018-05-02 PROBLEM — I95.1 SYNCOPE DUE TO ORTHOSTATIC HYPOTENSION: Status: RESOLVED | Noted: 2018-04-24 | Resolved: 2018-05-02

## 2018-05-02 PROCEDURE — 99499 UNLISTED E&M SERVICE: CPT | Mod: S$GLB,,, | Performed by: INTERNAL MEDICINE

## 2018-05-02 PROCEDURE — 3074F SYST BP LT 130 MM HG: CPT | Mod: CPTII,S$GLB,, | Performed by: INTERNAL MEDICINE

## 2018-05-02 PROCEDURE — 99999 PR PBB SHADOW E&M-EST. PATIENT-LVL III: CPT | Mod: PBBFAC,,, | Performed by: INTERNAL MEDICINE

## 2018-05-02 PROCEDURE — 3078F DIAST BP <80 MM HG: CPT | Mod: CPTII,S$GLB,, | Performed by: INTERNAL MEDICINE

## 2018-05-02 PROCEDURE — 99215 OFFICE O/P EST HI 40 MIN: CPT | Mod: S$GLB,,, | Performed by: INTERNAL MEDICINE

## 2018-05-02 RX ORDER — METOPROLOL SUCCINATE 100 MG/1
100 TABLET, EXTENDED RELEASE ORAL DAILY
Qty: 90 TABLET | Refills: 4 | Status: SHIPPED | OUTPATIENT
Start: 2018-05-02

## 2018-05-02 RX ORDER — VENLAFAXINE HYDROCHLORIDE 75 MG/1
75 CAPSULE, EXTENDED RELEASE ORAL DAILY
Qty: 30 CAPSULE | Refills: 11 | Status: SHIPPED | OUTPATIENT
Start: 2018-05-02 | End: 2019-05-02

## 2018-05-02 RX ORDER — CLONAZEPAM 2 MG/1
2 TABLET, ORALLY DISINTEGRATING ORAL NIGHTLY PRN
Qty: 90 TABLET | Refills: 0 | Status: SHIPPED | OUTPATIENT
Start: 2018-05-02

## 2018-05-02 NOTE — Clinical Note
[facility] Visit (Post Discharge Follow-up) Today:  - Our clinic physicians and nurses plan to follow the patient up for any medical issues in the [facility] for 30 days post discharge.  Future Appointments:   5/7/2018   11:00 AM   Juan Carlos Altman MD       [facility]  6/5/2018   11:00 AM   SAMMY Serra MD       [facility]        [place]  6/8/2018   2:00 PM    Jhonny Mixon MD        [facility] [place]

## 2018-05-02 NOTE — PROGRESS NOTES
[facility]  New Visit Progress Note   Recent Hospital Discharge     PRESENTING HISTORY     Chief Complaint/Reason for Visit:  Follow up Hospital Discharge   Chief Complaint   Patient presents with    Hospital Follow Up     PCP: LAILA Serra MD    History of Present Illness: Mr. Wil Kwon Jr. is a 73 y.o. male who was recently admitted to the hospital.    Admit Date: 4/24/2018  Discharge Date: 04/26/2018  Discharge Attending Physician: Dr CHRISTIAN Steven  Resident on Service:  Joann Washington MD  Chief Complaint: syncope     Princilpal Diagnoses:         Active Hospital Problems     Diagnosis   POA    *Syncope due to orthostatic hypotension with Type II NSTEMI [I95.1]   Yes    Abnormal CXR [R93.8]   Yes    NSTEMI (non-ST elevated myocardial infarction) [I21.4]   Yes    Syncope [R55]   Yes    Hypotension [I95.9]   Yes    Elevated troponin [R74.8]   Yes    Long term current use of anticoagulant therapy [Z79.01]   Not Applicable    Aortic valve stenosis, severe [I35.0]   Yes       4-24-18   1 - Normal left ventricular systolic function (EF 60-65%).     2 - Concentric hypertrophy.     3 - No wall motion abnormalities.     4 - Indeterminate LV diastolic function.     5 - Biatrial enlargement.     6 - Right ventricle is upper limit of normal in size with normal systolic function.     7 - Severe aortic valve stenosis (JEFF 0.60 cm2, AVAi 0.31 cm2/m2, peak aortic jet velocity 4.5 m/s,MG 66 mmHg, ).     8 - Moderate to moderate-severe (3+) mitral regurgitation.     9 - Trivial to mild tricuspid regurgitation.     10 - Increased central venous pressure.     11 - Pulmonary hypertension. The estimated PA systolic pressure is 79 mmHg.        Chronic diastolic heart failure due to valvular disease [I50.32, I38]   Yes    Persistent atrial fibrillation [I48.1]   Yes    MDS (myelodysplastic syndrome) [D46.9]   Yes    Major depressive disorder with single episode, in partial remission [F32.4]   Yes    Pulmonary  "hypertension- April 2016- The estimated PA systolic pressure is 39 mmHg [I27.20]   Yes    Essential hypertension [I10]   Yes    Dyslipidemia [E78.5]   Yes       Resolved Hospital Problems     Diagnosis Date Resolved POA   No resolved problems to display.         Discharged Condition: Admit problems have stabilized       HOSPITAL COURSE:       Initial Presentation:     73 year old male with a past medical history of myelodysplastic syndrome, moderate aortic stenosis, pAfib on warfarin presents to the ED following a presyncopal episode. The patient stateed that  he was getting up from using the commode and he experienced significant lightheadedness. The patient and his wife became concerned following this episode and present to the ED. He continued to feel light headed after going to the rest room where he had a syncopal episode on the toilet. He reported having slight non-radiating chest tightness while on the toilet and fell over against the wall. He denies any head trauma and is unaware how long he lost consciousness. He also reports a few episodes of diarrhea and poor PO intake over the past few days due to "not feeling well" with a subjective fever.      The patient was found to be severely hypotensive in the ED and was given volume resuscitation and treated with antibiotics. Of note the patient recently was seen in the ED two nights prior to this presentation for evaluation of fever. He reports that he had a subjective fever and came to the ED who worked it up and ultimately recommended follow up with his oncologist. The patient did endorse some chest tightness prior to the episode of syncope. The patient denied  any infectious symptoms such as cough, abdominal pain, N/V, diarrhea or any sick contacts. Of not too the patient was found to be anemic on initial labs with a hemoglobin of 6.5 (baseline of 8). Lactate was initially elevated to 4.9 but later returned to normal following the administration of " fluids     Course of Principle Problem for Admission:     NSTEMI (non-ST elevated myocardial infarction)/ Elevated troponin  Hypotension  Syncope  Aortic valve stenosis, severe  Chronic diastolic heart failure due to valvular disease     Patient presented with 2 episodes of syncope/ pre syncope. Was hypotensive in  80s/60s in the ED with LA of 5, likely 2/2 dehydration./ volume depletion from diarrhea and some nausea and vomiting. Septic etiology was not high on ddx, not septic shock.  His metoprolol was recent increased to 150mg, and he has also been on diltiazem 300mg and Lasix 40mg BID. Received 2 L IVF and 2 U pRBCs in the ED.  Initial EKG on presentation showed lateral ST depressions (new) and A fib with RVR. S/p resuscitation, pt's symptoms resolved, LA resolved, ST depressions resolved.      Initial troponin was normal, repeat was 0.5 --> increased to 5 (with no further chest pain or EKG changes)-- > increased to 10 --> decreased to 5. Cardiology was consulted. Given pt's presentation and lack of typical chest pain, patient was deemed to have type 2 demand NSTEMI (from hypovolemia./ hypotension./ anti-hypertensive meds/ anemia),. Full ACS treatment was not initiated.      Echo showed worsening in pt's aortic stenosis with new JEFF 0.60 cm2, AVAi 0.31 cm2/m2, peak aortic jet velocity 4.5 m/s,MG 66 mmHg, prior echo noted JEFF 0.8 mod-severe AS. Worsening of AS very likely contributed to pt's symptoms as he is pre-load dependent. Given hypovolemia and significant anti-hypertensives, pt experienced syncope given this critical Aortic stenosis . Clinic referral with Dr JAMEEL Altman for possible TAVR w/u given symptomatic critical AS.     Initially, On presentation, BNP was 1000 (700 prior values) with crackles at bases of lungs, pulm congestion at lung bases on CXR. Given pt's low grade fevers and immunosuppressed status, moxifloxacin was started for 5 days total to cover for infectious etiology.      His meds were  "adjusted- metoprolol decreased to 75 --> then increased to 100mg , diltiazem and lasix stopped. On the day of d/c, pt went back into Afib with RVR in 140s while walking to the bathroom. Was asymptomatic, stable BPs, no CP, no palpitations or SOB. Amiodarone PO was started, with resolution of Afib with RVR . BP remained stable with no symptoms,.      Interval History 4/26:  On the day of d/c, pt went back into Afib with RVR in 140s while walking to the bathroom. Was asymptomatic, stable BPs, no CP, no palpitations or SOB. Amiodarone PO (400mg BID for 2 weeks --> then 200mg daily) was started, with resolution of Afib with RVR . Metoprolol was increased to 100 (prior 150mg at home). Was already appropriately anticoagulation with INR of 3.9     Physical Exam 4/26  /79   Pulse 74   Temp 98.2 °F (36.8 °C) (Oral)   Resp 19   Ht 5' 11" (1.803 m)   Wt 81.6 kg (179 lb 14.3 oz)   SpO2 98%   BMI 25.09 kg/m²      Other Medical Problems Addressed in the Hospital:     Persistent atrial fibrillation  Long term current use of anticoagulant therapy  At home, on metoprolol 150mg and diltiazem 300mg, recent increase in metoprolol from 100 to 150. diltiazem and lasix stopped.Amiodarone PO was started. Metoprolol decreased to 100mg. Continued to warfarin, dose decreased from 6 mg to 3mg daily. Coumadin clinic notified regarding amiodarone initiation given that it may interact with future warfarin dosing.     MDS (myelodysplastic syndrome)  Sees Dr. Mixon with oncology. given darvopoeitin in past. anemic on admission to 6.5, given 2U pRBCs. responded appropriately . Clinic f/u with Oncology   ___________________________________________________________________    Today:    He feels weak.   No chest pain. No LE edema.   He has lost 30 lbs since MDS.  Was on citalopram 40 mg daily for depression.  Stopped because of potential amiodarone interaction.    Wife, present, is currently dealing with cancer.    He cannot sleep well " without sleep aid.    Not suicidal.    PAST HISTORY:     Past Medical History:   Diagnosis Date    Aortic valve stenosis, severe 8/3/2017    4-24-18  1 - Normal left ventricular systolic function (EF 60-65%).    2 - Concentric hypertrophy.    3 - No wall motion abnormalities.    4 - Indeterminate LV diastolic function.    5 - Biatrial enlargement.    6 - Right ventricle is upper limit of normal in size with normal systolic function.    7 - Severe aortic valve stenosis (JEFF 0.60 cm2, AVAi 0.31 cm2/m2, peak aortic jet velocity 4.5 m/s,MG 66 mmHg, ).    8 - Moderate to moderate-severe (3+) mitral regurgitation.    9 - Trivial to mild tricuspid regurgitation.    10 - Increased central venous pressure.    11 - Pulmonary hypertension. The estimated PA systolic pressure is 79 mmHg.     DJD (degenerative joint disease)     Dyslipidemia 10/17/2012    Essential hypertension 10/17/2012    History of psychiatric hospitalization     [facility] 2014, [facility] 1993    Hyponatremia 4/22/2016    Long term current use of anticoagulant therapy 11/9/2017    Major depressive disorder with single episode, in partial remission 8/17/2016    MDS (myelodysplastic syndrome) 2013    s/p Chemo     NSTEMI (non-ST elevated myocardial infarction) 4/25/2018    Persistent atrial fibrillation 3/11/2017    Pulmonary hypertension- April 2016- The estimated PA systolic pressure is 39 mmHg 8/17/2016    Rash, drug 3/22/2017    Syncope due to orthostatic hypotension with Type II NSTEMI 4/24/2018    Therapy     [facility]     Thrombocytopenia 4/1/2016       Past Surgical History:   Procedure Laterality Date    BONE MARROW BIOPSY  08/29/2016    CAROTID ENDARTERECTOMY Left 04/09/2013    Inguinal hernia      Left    KNEE SURGERY      Right x2    neck fusion      TONSILLECTOMY         Family History   Problem Relation Age of Onset    Hyperlipidemia Brother        Social History     Social History    Marital  status:      Spouse name: Agatha    Number of children: N/A    Years of education: N/A     Social History Main Topics    Smoking status: Never Smoker    Smokeless tobacco: Never Used    Alcohol use No    Drug use: No    Sexual activity: Yes     Partners: Female     Other Topics Concern    Patient Feels They Ought To Cut Down On Drinking/Drug Use No    Patient Annoyed By Others Criticizing Their Drinking/Drug Use No    Patient Has Felt Bad Or Guilty About Drinking/Drug Use No    Patient Has Had A Drink/Used Drugs As An Eye Opener In The [place] No     Social History Narrative    40+ years , no children, 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; good social support       MEDICATIONS & ALLERGIES:     Current Outpatient Prescriptions on File Prior to Visit   Medication Sig Dispense Refill    [START ON 5/10/2018] amiodarone (PACERONE) 200 MG Tab Take 1 tablet (200 mg total) by mouth once daily. 30 tablet 11    amiodarone (PACERONE) 400 MG tablet Take 1 tablet (400 mg total) by mouth 2 (two) times daily. 28 tablet 0    atorvastatin (LIPITOR) 80 MG tablet Take 0.5 tablets (40 mg total) by mouth once daily. 30 tablet 1    food supplemt, lactose-reduced (BOOST) Liqd Take by mouth once daily.      warfarin (COUMADIN) 3 MG tablet Take 1 tablet (3 mg total) by mouth Daily.       metoprolol succinate (TOPROL-XL) 100 MG 24 hr tablet Take 1 tablet (100 mg total) by mouth once daily. 30 tablet 1                   trazodone (DESYREL) 100 MG tablet Take 1 tablet (100 mg total) by mouth every evening. (Patient taking differently: Take  mg by mouth every evening. ) 30 tablet 11     No current facility-administered medications on file prior to visit.         Review of patient's allergies indicates:   Allergen Reactions    Lisinopril Edema     Cheek swelling    Augmentin [amoxicillin-pot clavulanate] Rash    Lipitor [atorvastatin] Other (See Comments)     Severe Muscle pain that caused pt not to  sleep for 72 hours.       OBJECTIVE:     Vital Signs:  Vitals:    05/02/18 0904   BP: 128/60   Pulse: 80     Wt Readings from Last 1 Encounters:   05/02/18 0904 78.8 kg (173 lb 11.6 oz)     Body mass index is 24.23 kg/m².     Physical Exam:  General: Well developed, well nourished. No distress.  HEENT: Head is normocephalic, atraumatic   Eyes: Clear conjunctiva.  Neck: Supple, symmetrical neck; trachea midline.  Lungs: Clear to auscultation bilaterally and normal respiratory effort.  Cardiovascular: Heart with regular rate and rhythm.  3/6 rg shape systolic murmur with radiation to carotids.  Extremities: No LE edema.   Abdomen: Abdomen is soft, non-tender non-distended with normal bowel sounds.  Skin: Skin color, texture, turgor normal. No rashes.  Musculoskeletal: Normal gait.   Neurologic: Normal strength and tone. No focal numbness or weakness.   Psychiatric: Tired looking. Positive anxiety and depressed mood.    Laboratory  Lab Results   Component Value Date    WBC 10.37 04/26/2018    HGB 8.8 (L) 04/26/2018    HCT 27.7 (L) 04/26/2018    MCV 99 (H) 04/26/2018     04/26/2018     BMP  Lab Results   Component Value Date     04/26/2018    K 3.3 (L) 04/26/2018     04/26/2018    CO2 24 04/26/2018    BUN 11 04/26/2018    CREATININE 1.0 04/26/2018    CALCIUM 8.1 (L) 04/26/2018    ANIONGAP 8 04/26/2018    ESTGFRAFRICA >60.0 04/26/2018    EGFRNONAA >60.0 04/26/2018     Lab Results   Component Value Date    ALT 17 04/26/2018    AST 42 (H) 04/26/2018    ALKPHOS 62 04/26/2018    BILITOT 1.0 04/26/2018     Lab Results   Component Value Date    INR 2.0 (H) 04/30/2018    INR 3.9 (H) 04/27/2018    INR 3.9 (H) 04/26/2018       TRANSITION OF CARE:     Transition of Care Visit:     I have reviewed and updated the history and problem list.  I have reconciled the medication list.  I have discussed the hospitalization and current medical issues, prognosis and plans with the patient/family.  I  spent more than  50% of time discussing the care with the patient/family.  Total Encounter in the [facility]: 60 minutes.    Medications Reconciliation:   I have reconciled the patient's home medications and discharge medications with the patient/family. I have updated all changes.  Refer to After-Visit Medication List.    ASSESSMENT & PLAN:     Aortic valve stenosis, severe  - Will refer to Dr. DENYS Altman for TAVR workup  - May 7.    Chronic diastolic heart failure due to valvular disease  Pulmonary hypertension- April 2016- The estimated PA systolic pressure is 39 mmHg  Essential hypertension  - Currently compensated.    BP controlled. No edema.    Refilled:  -     metoprolol succinate (TOPROL-XL) 100 MG 24 hr tablet; Take 1 tablet (100 mg total) by mouth once daily.  Dispense: 90 tablet; Refill: 4    Long term current use of anticoagulant therapy - Warfarin  Persistent atrial fibrillation  - Was previously on Toprol and Cardizem but presented with hypotension.    Cardizem was discontinued during hospitalization.      Amiodarone started for rate control.    - Plan:  Continue Toprol  mg daily.               Amiodarone (will be on 200 mg daily starting 5-10-18)      MDS (myelodysplastic syndrome)  - Per Hematology.    Dyslipidemia  - On atorvastatin.    Major depressive disorder with single episode, in partial remission  Primary insomnia  - Previously on citalopram. Due to potential interaction with amiodarone, will change to:    Rx:  -     venlafaxine (EFFEXOR-XR) 75 MG 24 hr capsule; Take 1 capsule (75 mg total) by mouth once daily.  Dispense: 30 capsule; Refill: 11  -     clonazePAM (KLONOPIN) 2 MG disintegrating tablet; Take 1 tablet (2 mg total) by mouth nightly as needed.  Dispense: 90 tablet; Refill: 0  Take 1/2 - 1 tablet nightly as needed.    Scheduled Follow-up :  Future Appointments  Date Time Provider Department Center   5/4/2018 1:30 PM LAB, HEMONC SAME DAY [facility] LAB [facility] [place]   5/4/2018 2:30 PM INJECTION,  [facility] [place]   5/7/2018 11:00 AM Juan Carlos Altman MD [facility]   5/11/2018 1:30 PM LAB, [facility]EMON [facility] DAY [facility] [place]   5/11/2018 2:30 PM INJECTION, [facility] [place]   5/18/2018 8:00 AM LAB, [facility] [place]   5/18/2018 9:00 AM INJECTION, [facility] [place]   5/25/2018 1:30 PM LAB, [facility]ON [facility] [place]   5/25/2018 2:30 PM INJECTION, [facility] [place]   6/1/2018 1:30 PM LAB, [facility]ON [facility] DAY [facility] [place]   6/1/2018 2:30 PM INJECTION, [facility] [place]   6/5/2018 11:00 AM SAMMY Serra MD [facility] IM [place]   6/8/2018 1:00 PM LAB, [facility] DAY [facility] [place]   6/8/2018 2:00 PM Jhonny Mixon MD [facility] [place]   6/8/2018 2:30 PM INJECTION, [facility] [place]       After Visit Medication List :     Medication List          Accurate as of 5/2/18  9:45 AM. If you have any questions, ask your nurse or doctor.               START taking these medications    clonazePAM 2 MG disintegrating tablet  Commonly known as:  KLONOPIN  Take 1 tablet (2 mg total) by mouth nightly as needed.  Started by:  Kaden Steven MD     venlafaxine 75 MG 24 hr capsule  Commonly known as:  EFFEXOR-XR  Take 1 capsule (75 mg total) by mouth once daily.  Started by:  Kaden Steven MD        CHANGE how you take these medications    metoprolol succinate 100 MG 24 hr tablet  Commonly known as:  TOPROL-XL  Take 1 tablet (100 mg total) by mouth once daily.  What changed:  Another medication with the same name was removed. Continue taking this medication, and follow the directions you see here.  Changed by:  Kaden Steven MD        CONTINUE taking these medications    * amiodarone 400 MG tablet  Commonly known as:  PACERONE  Take 1 tablet (400 mg total) by mouth 2 (two) times daily.     * amiodarone 200 MG Tab  Commonly known as:   PACERONE  Take 1 tablet (200 mg total) by mouth once daily.  Start taking on:  5/10/2018     atorvastatin 80 MG tablet  Commonly known as:  LIPITOR  Take 0.5 tablets (40 mg total) by mouth once daily.     BOOST Liqd  Generic drug:  food supplemt, lactose-reduced     warfarin 3 MG tablet  Commonly known as:  COUMADIN  Take 1 tablet (3 mg total) by mouth Daily.        * This list has 2 medication(s) that are the same as other medications prescribed for you. Read the directions carefully, and ask your doctor or other care provider to review them with you.            STOP taking these medications    moxifloxacin 400 mg tablet  Commonly known as:  AVELOX  Stopped by:  Kaden Steven MD     traZODone 100 MG tablet  Commonly known as:  DESYREL  Stopped by:  Kaden Steven MD           Where to Get Your Medications      These medications were sent to [facility] #[id number] - [place] - [address]  [address] [facility] [place] [address]    Phone:  [phone]   · clonazePAM 2 MG disintegrating tablet  · metoprolol succinate 100 MG 24 hr tablet  · venlafaxine 75 MG 24 hr capsule           Signing Physician:  Kaden Steven MD

## 2018-05-04 ENCOUNTER — INFUSION (OUTPATIENT)
Dept: INFUSION THERAPY | Facility: HOSPITAL | Age: 73
End: 2018-05-04
Attending: INTERNAL MEDICINE
Payer: MEDICARE

## 2018-05-04 ENCOUNTER — ANTI-COAG VISIT (OUTPATIENT)
Dept: CARDIOLOGY | Facility: CLINIC | Age: 73
End: 2018-05-04

## 2018-05-04 VITALS — DIASTOLIC BLOOD PRESSURE: 55 MMHG | SYSTOLIC BLOOD PRESSURE: 113 MMHG | HEART RATE: 71 BPM | RESPIRATION RATE: 18 BRPM

## 2018-05-04 DIAGNOSIS — Z79.01 LONG TERM CURRENT USE OF ANTICOAGULANT THERAPY: ICD-10-CM

## 2018-05-04 DIAGNOSIS — D46.9 MDS (MYELODYSPLASTIC SYNDROME): Primary | ICD-10-CM

## 2018-05-04 PROCEDURE — 63600175 PHARM REV CODE 636 W HCPCS: Mod: JG,EC | Performed by: INTERNAL MEDICINE

## 2018-05-04 PROCEDURE — 96372 THER/PROPH/DIAG INJ SC/IM: CPT

## 2018-05-04 RX ADMIN — DARBEPOETIN ALFA 300 MCG: 300 INJECTION, SOLUTION INTRAVENOUS; SUBCUTANEOUS at 01:05

## 2018-05-04 NOTE — PROGRESS NOTES
Wife questioned confirmed pts correct dose of coumadin  Reports pt starting new meds (moxifloxacine 400mg 2x daily) on 5/2; on 5/3 pt took a lower dose of same meds (200mg 2x). Pt wife unsure of end date  No other changes.

## 2018-05-04 NOTE — NURSING
Pt arrived for [facility].  Labs reviewed with pt.  Pt tolerated injection SQ to right arm.  Discharged to home using his cane

## 2018-05-07 ENCOUNTER — LAB VISIT (OUTPATIENT)
Dept: LAB | Facility: HOSPITAL | Age: 73
End: 2018-05-07
Attending: INTERNAL MEDICINE
Payer: MEDICARE

## 2018-05-07 ENCOUNTER — ANTI-COAG VISIT (OUTPATIENT)
Dept: CARDIOLOGY | Facility: CLINIC | Age: 73
End: 2018-05-07

## 2018-05-07 ENCOUNTER — OFFICE VISIT (OUTPATIENT)
Dept: CARDIOLOGY | Facility: CLINIC | Age: 73
End: 2018-05-07
Payer: MEDICARE

## 2018-05-07 VITALS
HEIGHT: 70 IN | WEIGHT: 170.44 LBS | OXYGEN SATURATION: 100 % | SYSTOLIC BLOOD PRESSURE: 150 MMHG | DIASTOLIC BLOOD PRESSURE: 67 MMHG | HEART RATE: 68 BPM | BODY MASS INDEX: 24.4 KG/M2

## 2018-05-07 DIAGNOSIS — I34.0 MITRAL VALVE INSUFFICIENCY, UNSPECIFIED ETIOLOGY: ICD-10-CM

## 2018-05-07 DIAGNOSIS — Z79.01 LONG TERM CURRENT USE OF ANTICOAGULANT THERAPY: ICD-10-CM

## 2018-05-07 DIAGNOSIS — Z79.01 LONG TERM (CURRENT) USE OF ANTICOAGULANTS: ICD-10-CM

## 2018-05-07 DIAGNOSIS — I35.0 AORTIC VALVE STENOSIS, SEVERE: ICD-10-CM

## 2018-05-07 DIAGNOSIS — I38 CHRONIC DIASTOLIC HEART FAILURE DUE TO VALVULAR DISEASE: ICD-10-CM

## 2018-05-07 DIAGNOSIS — D46.9 MDS (MYELODYSPLASTIC SYNDROME): ICD-10-CM

## 2018-05-07 DIAGNOSIS — I10 ESSENTIAL HYPERTENSION: ICD-10-CM

## 2018-05-07 DIAGNOSIS — I48.19 PERSISTENT ATRIAL FIBRILLATION: ICD-10-CM

## 2018-05-07 DIAGNOSIS — E78.5 DYSLIPIDEMIA: Primary | ICD-10-CM

## 2018-05-07 DIAGNOSIS — I50.32 CHRONIC DIASTOLIC HEART FAILURE DUE TO VALVULAR DISEASE: ICD-10-CM

## 2018-05-07 LAB
INR PPP: 1.8
PROTHROMBIN TIME: 17.4 SEC

## 2018-05-07 PROCEDURE — 3078F DIAST BP <80 MM HG: CPT | Mod: CPTII,S$GLB,, | Performed by: INTERNAL MEDICINE

## 2018-05-07 PROCEDURE — 85610 PROTHROMBIN TIME: CPT

## 2018-05-07 PROCEDURE — 99214 OFFICE O/P EST MOD 30 MIN: CPT | Mod: 25,S$GLB,, | Performed by: INTERNAL MEDICINE

## 2018-05-07 PROCEDURE — 99999 PR PBB SHADOW E&M-EST. PATIENT-LVL III: CPT | Mod: PBBFAC,,, | Performed by: INTERNAL MEDICINE

## 2018-05-07 PROCEDURE — 36415 COLL VENOUS BLD VENIPUNCTURE: CPT

## 2018-05-07 PROCEDURE — 3077F SYST BP >= 140 MM HG: CPT | Mod: CPTII,S$GLB,, | Performed by: INTERNAL MEDICINE

## 2018-05-07 NOTE — ASSESSMENT & PLAN NOTE
Wil Aritasalima Savage is a 73 y.o. male referred by Dr Rhodes for evaluation of severe AS (NYHA Class I sx). Unable to determine if tricuspid     he has undergone the following TAVR work-up:   ECHO (Date 4/24/2018): JEFF 0.60 cm2, AVAi 0.31 cm2/m2, peak velocity 4.5 m/s,MG 66 mmHg, EF= 65%.   LHC (Date): Needs   STS: 1.72% -pending cath  Frailty: 2/4   Iliacs are pending   LVOT area by CTA is pending   CT Incidental Findings: pending   CTS risk assessment: pending   Rhythm Issues: A Fib   PFTs: Pending

## 2018-05-07 NOTE — PROGRESS NOTES
Questioned pt confirmed correct dose; also confirmed 4.5mg on 5/4  Pt states stopped taking meds (Avelox) on 5/4  No other changes

## 2018-05-07 NOTE — ASSESSMENT & PLAN NOTE
Follows with Dr. Hobbs   S/p chemotherapy  Has required recent transfusions   Most recent H&H 8.9/29.1

## 2018-05-07 NOTE — PROGRESS NOTES
Subjective:    Patient ID:  Wil Kwon Jr. is a 73 y.o. male who presents for evaluation of Aortic Stenosis  Referring: Dr. Rhodes,  Dr. Geovany Serra is PCP      HPI   Mr. Kwon is a 74 yo gentleman presenting for evaluation of AS. He has a PMHx of myelodysplastic syndrome s/p chemotherapy, aortic stenosis, pAfib on warfarin, L CEA in 2013. He has lost 30 lbs since being diagnosed with MDS a few years ago. He was recently admitted following a syncopal episode. He says he was getting up from using the commode and he experienced significant lightheadedness and fell off the toilet though he wife states he never lost consciousness.  It was believed to be due to dehydration from recent diarrhea and symptomatic anemia as he responded to fluids (though his BNP at this time was 1,358). Also his H&H was 6.5/22.3 and he was transfused 2 un prbc with resolution of symptoms. He was also given antibiotics as he was febrile two nights prior. We were not consulted when he was an in-patient but instead he was scheduled for this clinic appointment. While hospitalized troponin were trended with peak of 10.2. He has never had a coronary angiogram. He denies ever having chest pain but does get SOB on occasion. He reports that when he experiences SOB it is with minimal exertion and when his hemoglobin is 7.5 or below. He denies ever having SOB when he did not require a blood transfusion. Per patient his Hgb was ~10 and now is typically ~ 8/9. He has never had a endoscopy or colonoscopy. He denies orthopnea, palpitations, LE edema. He was on Lasix daily for LE edema in the past but this was d/c'd at his last hospitalization. He denies SOB, light headedness, pre-syncope since.     Wil Kwon Jr. is a 73 y.o. male referred by Dr Rhodes for evaluation of severe AS (NYHA Class I sx).    he has undergone the following TAVR work-up:   ECHO (Date 4/24/2018): JEFF 0.60 cm2, AVAi 0.31 cm2/m2, peak velocity 4.5 m/s, MG  66 mmHg, EF= 65%.   [facility] (Date): Needs   STS: 2.42% -pending cath  Frailty: 2/4   Iliacs are pending   LVOT area by CTA is pending   CT Incidental Findings: pending   CTS risk assessment: pending   Rhythm Issues: A Fib   PFTs: Pending       Review of Systems   Constitution: Negative for chills, diaphoresis, fever, weakness, weight gain and weight loss.   HENT: Negative for sore throat.    Eyes: Negative for blurred vision, vision loss in left eye, vision loss in right eye and visual disturbance.   Cardiovascular: Negative for chest pain, claudication, dyspnea on exertion, leg swelling, near-syncope, orthopnea, palpitations, paroxysmal nocturnal dyspnea and syncope.   Respiratory: Positive for shortness of breath. Negative for cough, hemoptysis, sputum production and wheezing.    Endocrine: Negative for cold intolerance and heat intolerance.   Hematologic/Lymphatic: Negative for adenopathy. Does not bruise/bleed easily.   Skin: Negative for rash.   Musculoskeletal: Negative for falls, muscle weakness and myalgias.   Gastrointestinal: Negative for abdominal pain, change in bowel habit, constipation, diarrhea, melena and nausea.   Genitourinary: Negative for bladder incontinence.   Neurological: Negative for dizziness, focal weakness, headaches, light-headedness and numbness.   Psychiatric/Behavioral: Negative for altered mental status.        Past Medical History:   Diagnosis Date    Aortic valve stenosis, severe 8/3/2017    4-24-18  1 - Normal left ventricular systolic function (EF 60-65%).    2 - Concentric hypertrophy.    3 - No wall motion abnormalities.    4 - Indeterminate LV diastolic function.    5 - Biatrial enlargement.    6 - Right ventricle is upper limit of normal in size with normal systolic function.    7 - Severe aortic valve stenosis (JEFF 0.60 cm2, AVAi 0.31 cm2/m2, peak aortic jet velocity 4.5 m/s,MG 66 mmHg, ).    8 - Moderate to moderate-severe (3+) mitral regurgitation.    9 - Trivial to mild  tricuspid regurgitation.    10 - Increased central venous pressure.    11 - Pulmonary hypertension. The estimated PA systolic pressure is 79 mmHg.     DJD (degenerative joint disease)     Dyslipidemia 10/17/2012    Essential hypertension 10/17/2012    History of psychiatric hospitalization     [facility] 2014, [facility] 1993    Hyponatremia 4/22/2016    Long term current use of anticoagulant therapy 11/9/2017    Major depressive disorder with single episode, in partial remission 8/17/2016    MDS (myelodysplastic syndrome) 2013    s/p Chemo     NSTEMI (non-ST elevated myocardial infarction) 4/25/2018    Persistent atrial fibrillation 3/11/2017    Pulmonary hypertension- April 2016- The estimated PA systolic pressure is 39 mmHg 8/17/2016    Rash, drug 3/22/2017    Syncope due to orthostatic hypotension with Type II NSTEMI 4/24/2018    Therapy     [facility]     Thrombocytopenia 4/1/2016     Current Outpatient Prescriptions on File Prior to Visit   Medication Sig Dispense Refill    amiodarone (PACERONE) 400 MG tablet Take 1 tablet (400 mg total) by mouth 2 (two) times daily. 28 tablet 0    atorvastatin (LIPITOR) 80 MG tablet Take 0.5 tablets (40 mg total) by mouth once daily. 30 tablet 1    clonazePAM (KLONOPIN) 2 MG disintegrating tablet Take 1 tablet (2 mg total) by mouth nightly as needed. 90 tablet 0    food supplemt, lactose-reduced (BOOST) Liqd Take by mouth once daily.      metoprolol succinate (TOPROL-XL) 100 MG 24 hr tablet Take 1 tablet (100 mg total) by mouth once daily. 90 tablet 4    venlafaxine (EFFEXOR-XR) 75 MG 24 hr capsule Take 1 capsule (75 mg total) by mouth once daily. 30 capsule 11    warfarin (COUMADIN) 3 MG tablet Take 1 tablet (3 mg total) by mouth Daily.      [START ON 5/10/2018] amiodarone (PACERONE) 200 MG Tab Take 1 tablet (200 mg total) by mouth once daily. 30 tablet 11     No current facility-administered medications on file prior to visit.   "    Vitals:    05/07/18 0957 05/07/18 0959   BP: (!) 157/69 (!) 150/67   BP Location: Right arm Left arm   Patient Position: Sitting Sitting   BP Method: Large (Automatic) Large (Automatic)   Pulse: 68 68   SpO2: 100%    Weight: 77.3 kg (170 lb 6.7 oz)    Height: 5' 9.69" (1.77 m)      Body mass index is 24.67 kg/m².         Objective:    Physical Exam   Constitutional: He is oriented to person, place, and time. He appears well-developed and well-nourished.   HENT:   Head: Normocephalic and atraumatic.   Eyes: EOM are normal. Pupils are equal, round, and reactive to light.   Neck: Neck supple. No JVD present. No tracheal deviation present. No thyromegaly present.   Cardiovascular: S1 normal, S2 normal, intact distal pulses and normal pulses.  PMI is not displaced.  Exam reveals no gallop and no friction rub.    Murmur heard.  Irregularly irregular rhythm    Pulmonary/Chest: Effort normal and breath sounds normal. No respiratory distress. He has no wheezes. He has no rales. He exhibits no tenderness.   Abdominal: Soft. Bowel sounds are normal. He exhibits no distension and no mass. There is no hepatosplenomegaly. There is no tenderness.   Musculoskeletal: Normal range of motion. He exhibits no edema or tenderness.   Neurological: He is alert and oriented to person, place, and time.   Skin: Skin is warm and dry. No rash noted.   Psychiatric: He has a normal mood and affect. His behavior is normal.         Assessment:         Persistent atrial fibrillation  Rate controlled on Toprol XL, on amio  On coumadin     Dyslipidemia  Stable on statin     Essential hypertension  Controlled on Toprol XL    MDS (myelodysplastic syndrome)  Follows with Dr. Hobbs   S/p chemotherapy  Has required recent transfusions   Most recent H&H 8.9/29.1       Chronic diastolic heart failure due to valvular disease  Clinically well-compensated   Not on Lasix since his recent hospitalization     Mitral regurgitation  3+ on recent Echo "     Aortic valve stenosis, severe  Wil Kwon Jr. is a 73 y.o. male referred by Dr Rhodes for evaluation of severe AS (NYHA Class I sx). Unable to determine if tricuspid     he has undergone the following TAVR work-up:   ECHO (Date 4/24/2018): JEFF 0.60 cm2, AVAi 0.31 cm2/m2, peak velocity 4.5 m/s,MG 66 mmHg, EF= 65%.   [facility] (Date): Needs   STS: 1.72% -pending cath  Frailty: 2/4   Iliacs are pending   LVOT area by CTA is pending   CT Incidental Findings: pending   CTS risk assessment: pending   Rhythm Issues: A Fib   PFTs: Pending     Plan:       Patient and wife educated on TAVR  He has decided to be Full Code for the foreseeable future.   Recommend GI evaluation for worsening anemia with Endoscopy and Colonoscopy (prior to starting ASA/plavix)  After GI work-up, will schedule coronary angiogram, TAVR CTA, CTS consult, PFTs to complete work-up      Staff:  I have personally taken the history and examined this patient and agree with the fellow's note as stated above and amended it accordingly :-) This patient is symptomatic every time he gets anemic.  Basically, a low HbG stress test.   Because of severe anemia we must R/O griffiths syndrome or other causes of GIB before we commit him to ASA or DAP Rx.  Will ask his PCP to do upper and lower endoscopy before we do the rest of the TAVR evaluation.

## 2018-05-09 NOTE — PROGRESS NOTES
Pt reports that his left nostril has been bleeding for a hour non stop.  Pt stated he can be reached at [phone]

## 2018-05-11 ENCOUNTER — ANTI-COAG VISIT (OUTPATIENT)
Dept: CARDIOLOGY | Facility: CLINIC | Age: 73
End: 2018-05-11

## 2018-05-11 ENCOUNTER — INFUSION (OUTPATIENT)
Dept: INFUSION THERAPY | Facility: HOSPITAL | Age: 73
End: 2018-05-11
Attending: INTERNAL MEDICINE
Payer: MEDICARE

## 2018-05-11 VITALS — RESPIRATION RATE: 18 BRPM | DIASTOLIC BLOOD PRESSURE: 56 MMHG | SYSTOLIC BLOOD PRESSURE: 118 MMHG | HEART RATE: 68 BPM

## 2018-05-11 DIAGNOSIS — D46.9 MDS (MYELODYSPLASTIC SYNDROME): Primary | ICD-10-CM

## 2018-05-11 DIAGNOSIS — Z79.01 LONG TERM CURRENT USE OF ANTICOAGULANT THERAPY: ICD-10-CM

## 2018-05-11 PROCEDURE — 63600175 PHARM REV CODE 636 W HCPCS: Mod: JG | Performed by: INTERNAL MEDICINE

## 2018-05-11 PROCEDURE — 96372 THER/PROPH/DIAG INJ SC/IM: CPT

## 2018-05-11 RX ORDER — FUROSEMIDE 20 MG/1
TABLET ORAL
COMMUNITY
Start: 2018-05-09 | End: 2018-05-11

## 2018-05-11 RX ORDER — CLONAZEPAM 2 MG/1
2 TABLET ORAL NIGHTLY PRN
Refills: 0 | COMMUNITY
Start: 2018-05-02 | End: 2018-05-11 | Stop reason: SDUPTHER

## 2018-05-11 RX ADMIN — DARBEPOETIN ALFA 300 MCG: 300 INJECTION, SOLUTION INTRAVENOUS; SUBCUTANEOUS at 11:05

## 2018-05-11 NOTE — NURSING
Pt arrived for [facility].  Labs reviewed.  Pt tolerated injection SQ to right arm.  Discharged to home with wife

## 2018-05-12 ENCOUNTER — OFFICE VISIT (OUTPATIENT)
Dept: URGENT CARE | Facility: CLINIC | Age: 73
End: 2018-05-12
Payer: MEDICARE

## 2018-05-12 VITALS
RESPIRATION RATE: 18 BRPM | WEIGHT: 170 LBS | HEIGHT: 71 IN | HEART RATE: 69 BPM | BODY MASS INDEX: 23.8 KG/M2 | DIASTOLIC BLOOD PRESSURE: 71 MMHG | TEMPERATURE: 97 F | OXYGEN SATURATION: 99 % | SYSTOLIC BLOOD PRESSURE: 154 MMHG

## 2018-05-12 DIAGNOSIS — L50.9 HIVES: ICD-10-CM

## 2018-05-12 DIAGNOSIS — R21 RASH: Primary | ICD-10-CM

## 2018-05-12 PROCEDURE — 3077F SYST BP >= 140 MM HG: CPT | Mod: CPTII,S$GLB,, | Performed by: NURSE PRACTITIONER

## 2018-05-12 PROCEDURE — 99214 OFFICE O/P EST MOD 30 MIN: CPT | Mod: 25,S$GLB,, | Performed by: NURSE PRACTITIONER

## 2018-05-12 PROCEDURE — 3078F DIAST BP <80 MM HG: CPT | Mod: CPTII,S$GLB,, | Performed by: NURSE PRACTITIONER

## 2018-05-12 PROCEDURE — 96372 THER/PROPH/DIAG INJ SC/IM: CPT | Mod: S$GLB,,, | Performed by: FAMILY MEDICINE

## 2018-05-12 RX ORDER — LORATADINE 10 MG/1
10 TABLET ORAL DAILY
Qty: 30 TABLET | Refills: 2 | Status: SHIPPED | OUTPATIENT
Start: 2018-05-12 | End: 2018-05-29 | Stop reason: SDUPTHER

## 2018-05-12 RX ORDER — BETAMETHASONE SODIUM PHOSPHATE AND BETAMETHASONE ACETATE 3; 3 MG/ML; MG/ML
6 INJECTION, SUSPENSION INTRA-ARTICULAR; INTRALESIONAL; INTRAMUSCULAR; SOFT TISSUE
Status: COMPLETED | OUTPATIENT
Start: 2018-05-12 | End: 2018-05-12

## 2018-05-12 RX ORDER — PREDNISONE 20 MG/1
40 TABLET ORAL DAILY
Qty: 10 TABLET | Refills: 0 | Status: SHIPPED | OUTPATIENT
Start: 2018-05-12 | End: 2018-05-17

## 2018-05-12 RX ADMIN — BETAMETHASONE SODIUM PHOSPHATE AND BETAMETHASONE ACETATE 6 MG: 3; 3 INJECTION, SUSPENSION INTRA-ARTICULAR; INTRALESIONAL; INTRAMUSCULAR; SOFT TISSUE at 01:05

## 2018-05-12 NOTE — PROGRESS NOTES
"Subjective:       Patient ID: Wil Kwon Jr. is a 73 y.o. male.    Vitals:  height is 5' 11" (1.803 m) and weight is 77.1 kg (170 lb). His temperature is 97 °F (36.1 °C). His blood pressure is 154/71 (abnormal) and his pulse is 69. His respiration is 18 and oxygen saturation is 99%.     Chief Complaint: Rash (Started yesterday on upper body )    Patient presents with complaints of a circumferential rash from neck to waist, and to bilateral upper extremities. Patient reports that he was started on new medications this week. Patient denies any fevers, chills, SOB, or any change in soaps or detergents. Patient reports taking benadryl for the itching which provided him with mild relief.       Rash   This is a new problem. The current episode started today. The problem is unchanged. The rash is diffuse. He was exposed to a new medication. Pertinent negatives include no fever, joint pain, shortness of breath or sore throat. Past treatments include anti-itch cream. The treatment provided no relief.     Review of Systems   Constitution: Negative for chills and fever.   HENT: Negative for sore throat.    Respiratory: Negative for shortness of breath.    Skin: Positive for color change, itching and rash.   Musculoskeletal: Negative for joint pain.       Objective:      Physical Exam   Constitutional: He is oriented to person, place, and time. He appears well-developed and well-nourished.   HENT:   Head: Normocephalic and atraumatic. Head is without abrasion, without contusion and without laceration.   Right Ear: External ear normal.   Left Ear: External ear normal.   Nose: Nose normal.   Mouth/Throat: Oropharynx is clear and moist.   Eyes: Conjunctivae, EOM and lids are normal. Pupils are equal, round, and reactive to light.   Neck: Trachea normal, full passive range of motion without pain and phonation normal. Neck supple.   Cardiovascular: Normal rate, regular rhythm and normal heart sounds.    Pulmonary/Chest: " Effort normal and breath sounds normal. No stridor. No respiratory distress.   Musculoskeletal: Normal range of motion.   Neurological: He is alert and oriented to person, place, and time.   Skin: Skin is warm, dry and intact. Capillary refill takes less than 2 seconds. Rash noted. No abrasion, no bruising, no burn, no ecchymosis, no laceration, no lesion and no petechiae noted. Rash is urticarial. Rash is not nodular, not pustular and not vesicular. No erythema.        Round, circumscribed, raised, erythematous rash    Psychiatric: He has a normal mood and affect. His speech is normal and behavior is normal. Judgment and thought content normal. Cognition and memory are normal.   Nursing note and vitals reviewed.      Assessment:       1. Rash    2. Hives        Plan:         Rash  -     betamethasone acetate-betamethasone sodium phosphate injection 6 mg; Inject 1 mL (6 mg total) into the muscle one time.  -     predniSONE (DELTASONE) 20 MG tablet; Take 2 tablets (40 mg total) by mouth once daily.  Dispense: 10 tablet; Refill: 0  -     loratadine (CLARITIN) 10 mg tablet; Take 1 tablet (10 mg total) by mouth once daily.  Dispense: 30 tablet; Refill: 2    Hives      Patient Instructions     Continue Benadryl at bedtime.  May take Claritin during the daytime.  Please notify PCP of possible reaction to new medications.  Follow-up with PCP or return to Urgent Care for worsening of symptoms.  Hives (Adult)  Hives are pink or red bumps on the skin. These bumps are also known as wheals. The bumps can itch, burn, or sting. Hives can occur anywhere on the body. They vary in size and shape and can form in clusters. Individual hives can appear and go away quickly. New hives may develop as old ones fade. Hives are common and usually harmless. Occasionally hives are a sign of a serious allergy.  Hives are often caused by an allergic reaction. It may be an allergic reaction to foods such as fruit, shellfish, chocolate, nuts, or  tomatoes. It may be a reaction to pollens, animal fur, or mold spores. Medicines, chemicals, and insect bites can also cause hives. And hives can be caused by hot sun or cold air. The cause of hives can be difficult to find.  You may be given medicines to relieve swelling and itching. Follow all instructions when using these medicines. The hives will usually fade in a few days, but can last up to 2 weeks.  Home care  Follow these tips:  · Try to find the cause of the hives and eliminate it. Discuss possible causes with your healthcare provider. Future reactions to the same allergen may be worse.  · Dont scratch the hives. Scratching will delay healing. To reduce itching, apply cool, wet compresses to the skin.  · Dress in soft, loose cotton clothing.  · Dont bathe in hot water. This can make the itching worse.  · Apply an ice pack or cool pack wrapped in a thin towel to your skin. This will help reduce redness and itching. But if your hives were caused by exposure to cold, then do not apply more cold to them.  · You may use over-the counter antihistamines to reduce itching. Some older antihistamines, such as diphenhydramine and chlorpheniramine, are inexpensive. But they need to be taken often and may make you sleepy. They are best used at bedtime. Dont use diphenhydramine if you have glaucoma or have trouble urinating because of an enlarged prostate. Newer antihistamines, such as loratadine, cetirizine, and fexofenadine, are generally more expensive. But they tend to have fewer side effects, such as drowsiness. They can be taken less often.  · Another type of antihistamine is used to treat heartburn. This type includes ranitidine, nizatidine, famotidine, and cimetidine. These are sometimes used along with the above antihistamines if a single medicine is not working.  Follow-up care  Follow up with your healthcare provider if your symptoms don't get better in 2 days. Ask your provider about allergy testing if you  have had a severe reaction, or have had several episodes of hives. He or she can use the allergy testing to find out what you are allergic to.  When to seek medical advice  Call your healthcare provider right away if any of these occur:  · Fever of 100.4°F (38.0°C) or higher, or as directed by your healthcare provider  · Redness, swelling, or pain  · Foul-smelling fluid coming from the rash  Call 911  Call 911 if any of the following occur:  · Swelling of the face, throat, or tongue  · Trouble breathing or swallowing  · Dizziness, weakness, or fainting  Date Last Reviewed: 9/1/2016 © 2000-2017 [employer] [address] [place] [address], [place] [id number]. All rights reserved. This information is not intended as a substitute for professional medical care. Always follow your healthcare professional's instructions.    Please return here or go to the Emergency Department for any concerns or worsening of condition.  If you were prescribed antibiotics, please take them to completion.  If you were prescribed a narcotic medication, do not drive or operate heavy equipment or machinery while taking these medications.  Please follow up with your primary care doctor or specialist as needed.    If you  smoke, please stop smoking.    General Allergic Reactions  An allergic reaction is a set of symptoms caused by an allergen. An allergen is something that causes a persons immune system to react. When a person comes in contact with an allergen, it causes the body to release chemicals. These include the chemical histamine. Histamine causes swelling and itching. It may affect the entire body. This is called a general allergic reaction. Often symptoms affect only 1 part of the body. This is called a local allergic reaction.  You are having an allergic reaction. Almost anything can cause one. Different people are allergic to different things. It is usually something that you ate or swallowed, came into contact with by getting  or putting it on your skin or clothes, or something you breathed in the air. This can be very annoying and sometimes scary.  Most of us think of allergic reactions when we have a rash or itchy skin. Symptoms can include:  · Itching of the eyes, nose, and roof of the mouth  · Runny or stuffy nose  · Watery eyes   · Sneezing or coughing   · A blocked feeling in the ear  · Red, itchy rash called hives  · Red and purple spots  · Rash, redness, welts, blisters  · Itching, burning, stinging, pain  · Dry, flaky, cracking, scaly skin  Severe symptoms include:  · Swelling of the face, lips, or other parts of the body  · Hoarse voice  · Trouble swallowing, feeling like your throat is closing  · Trouble breathing, wheezing  · Nausea, vomiting, diarrhea, stomach cramps  · Feeling faint or lightheaded, rapid heart rate  Sometimes the cause may be obvious. But there are so many things that can cause a reaction that you may not be able to figure out. The most important things to help find your allergen are:  · Remembering when it started  · What you were doing at the time or just before that  · Any activities you were involved in  · Any new products or contacts  Below are some common causes. But remember that almost anything can cause a reaction. You may not even be aware that you came into contact with one of these things:  · Dust, mold, pollen  · Plants (common ones are poison ivy and poison oak, but there are many others)   · Animals  · Foods such as shrimp, shellfish, peanuts, milk products, gluten, and eggs. Also food colorings, flavorings, and additives.  · Insect bites or stings such as bees, mosquitos, fleas, ticks  · Medicines such as penicillin, sulfa medicines, amoxicillin, aspirin, and ibuprofen. But any medicine can cause a reaction.  · Jewelry such as nickel or gold. This can be new, or something youve worn for a while, including zippers and buttons.  · Latex such as in gloves, clothes, toys, balloons, or some  tapes. Some people allergic to latex may also have problems with foods like bananas, avocados, kiwi, papaya, or chestnuts.  · Lotions, perfumes, cosmetics, soaps, shampoos, skincare products, nail products  · Chemicals or dyes in clothing, linen, , hair dyes, soaps, iodine  Many viruses and common colds can cause a rash that is not an allergic reaction. Sometimes it is hard to tell the difference between allergies, sensitivity, or an intolerance to something. This is especially true with food. Many things can cause diarrhea, vomiting, stomach cramps, and skin irritation.  Home care    The goal of treatment is to help relieve the symptoms and get you feeling better. The rash will usually fade over several days. But it can sometimes last a couple of weeks. Over the next couple of days, there may be times when it is gets a little worse, and then better again. Here are some things to do:  · If you know what you are allergic to, stay away from it. Future reactions could be worse than this one.  · Avoid tight clothing and anything that heats up your skin (hot showers or baths, direct sunlight). Heat will make itching worse.  · An ice pack will relieve local areas of intense itching and redness. To make an ice pack, put ice cubes in a plastic bag that seals at the top. Wrap it in a thin, clean towel. Dont put the ice directly on the skin because it can damage the skin.  · Oral diphenhydramine is an over-the-counter antihistamine sold at pharmacy and grocery stores. Unless a prescription antihistamine was given, diphenhydramine may be used to reduce itching if large areas of the skin are involved. It may make you sleepy. So be careful using it in the daytime or when going to school, working, or driving. Note: Dont use diphenhydramine if you have glaucoma or if you are a man with trouble urinating due to an enlarged prostate. There are other antihistamines that wont make you so sleepy. These are good choices for  daytime use. Ask your pharmacist for suggestions.  · Dont use diphenhydramine cream on your skin. It can cause a further reaction in some people.  · To help prevent an infection, don't scratch the affected area. Scratching may worsen the reaction and damage your skin. It can also lead to an infection. Always check the affected for signs of an infection.  · Call your healthcare provider and ask what you can use to help decrease the itching.  · To decrease allergic reactions, try the following:    · Use heat-steam to clean your home  · Use high-efficiency particulate (HEPA) vacuums and filters  · Stay away from food and pet triggers  · Kill any cockroaches  · Clean your house often  Follow-up care  Follow up with your healthcare provider, or as advised. If you had a severe reaction today, or if you have had several mild to medium allergic reactions in the past, ask your provider about allergy testing. This can help you find out what you are allergic to. If your reaction included dizziness, fainting, or trouble breathing or swallowing, ask your provider about carrying auto-injectable epinephrine.  Call 911  Call 911 if any of these occur:  · Trouble breathing or swallowing, wheezing  · Cool, moist, pale skin  · Shortness of breath  · Hoarse voice or trouble speaking  · Confused   · Very drowsy or trouble awakening  · Fainting or loss of consciousness  · Rapid heart rate  · Feeling of dizziness or weakness or a sudden drop in blood pressure  · Feeling of doom  · Feeling lightheaded  · Severe nausea or vomiting, or diarrhea  · Seizure  · Swelling in the face, eyelids, lips, mouth, throat or tongue  · Drooling  When to seek medical advice  Call your healthcare provider right away if any of these occur:  · Spreading areas of itching, redness or swelling  · Nausea or stomach cramps or abdominal pain  · Continuing or recurring symptoms  · Spreading areas of redness, swelling, or itching  · Signs of infection at the affected  site:  ¨ Spreading redness  ¨ Increased pain or swelling  ¨ Fluid or colored drainage from the site  ¨ Fever of 100.4°F (38°C) or above lasting for 24 to 48 hours, or as directed by your provider  Date Last Reviewed: 3/1/2017  © 2001-1574 [employer] [address] [place] [address]. All rights reserved. This information is not intended as a substitute for professional medical care. Always follow your healthcare professional's instructions.

## 2018-05-12 NOTE — PATIENT INSTRUCTIONS
Continue Benadryl at bedtime.  May take Claritin during the daytime.  Please notify PCP of possible reaction to new medications.  Follow-up with PCP or return to Urgent Care for worsening of symptoms.  Hives (Adult)  Hives are pink or red bumps on the skin. These bumps are also known as wheals. The bumps can itch, burn, or sting. Hives can occur anywhere on the body. They vary in size and shape and can form in clusters. Individual hives can appear and go away quickly. New hives may develop as old ones fade. Hives are common and usually harmless. Occasionally hives are a sign of a serious allergy.  Hives are often caused by an allergic reaction. It may be an allergic reaction to foods such as fruit, shellfish, chocolate, nuts, or tomatoes. It may be a reaction to pollens, animal fur, or mold spores. Medicines, chemicals, and insect bites can also cause hives. And hives can be caused by hot sun or cold air. The cause of hives can be difficult to find.  You may be given medicines to relieve swelling and itching. Follow all instructions when using these medicines. The hives will usually fade in a few days, but can last up to 2 weeks.  Home care  Follow these tips:  · Try to find the cause of the hives and eliminate it. Discuss possible causes with your healthcare provider. Future reactions to the same allergen may be worse.  · Dont scratch the hives. Scratching will delay healing. To reduce itching, apply cool, wet compresses to the skin.  · Dress in soft, loose cotton clothing.  · Dont bathe in hot water. This can make the itching worse.  · Apply an ice pack or cool pack wrapped in a thin towel to your skin. This will help reduce redness and itching. But if your hives were caused by exposure to cold, then do not apply more cold to them.  · You may use over-the counter antihistamines to reduce itching. Some older antihistamines, such as diphenhydramine and chlorpheniramine, are inexpensive. But they need to be taken  often and may make you sleepy. They are best used at bedtime. Dont use diphenhydramine if you have glaucoma or have trouble urinating because of an enlarged prostate. Newer antihistamines, such as loratadine, cetirizine, and fexofenadine, are generally more expensive. But they tend to have fewer side effects, such as drowsiness. They can be taken less often.  · Another type of antihistamine is used to treat heartburn. This type includes ranitidine, nizatidine, famotidine, and cimetidine. These are sometimes used along with the above antihistamines if a single medicine is not working.  Follow-up care  Follow up with your healthcare provider if your symptoms don't get better in 2 days. Ask your provider about allergy testing if you have had a severe reaction, or have had several episodes of hives. He or she can use the allergy testing to find out what you are allergic to.  When to seek medical advice  Call your healthcare provider right away if any of these occur:  · Fever of 100.4°F (38.0°C) or higher, or as directed by your healthcare provider  · Redness, swelling, or pain  · Foul-smelling fluid coming from the rash  Call 911  Call 911 if any of the following occur:  · Swelling of the face, throat, or tongue  · Trouble breathing or swallowing  · Dizziness, weakness, or fainting  Date Last Reviewed: 9/1/2016  © 7821-9063 [employer]. [address] [place] [address]. All rights reserved. This information is not intended as a substitute for professional medical care. Always follow your healthcare professional's instructions.    Please return here or go to the Emergency Department for any concerns or worsening of condition.  If you were prescribed antibiotics, please take them to completion.  If you were prescribed a narcotic medication, do not drive or operate heavy equipment or machinery while taking these medications.  Please follow up with your primary care doctor or specialist as  needed.    If you  smoke, please stop smoking.    General Allergic Reactions  An allergic reaction is a set of symptoms caused by an allergen. An allergen is something that causes a persons immune system to react. When a person comes in contact with an allergen, it causes the body to release chemicals. These include the chemical histamine. Histamine causes swelling and itching. It may affect the entire body. This is called a general allergic reaction. Often symptoms affect only 1 part of the body. This is called a local allergic reaction.  You are having an allergic reaction. Almost anything can cause one. Different people are allergic to different things. It is usually something that you ate or swallowed, came into contact with by getting or putting it on your skin or clothes, or something you breathed in the air. This can be very annoying and sometimes scary.  Most of us think of allergic reactions when we have a rash or itchy skin. Symptoms can include:  · Itching of the eyes, nose, and roof of the mouth  · Runny or stuffy nose  · Watery eyes   · Sneezing or coughing   · A blocked feeling in the ear  · Red, itchy rash called hives  · Red and purple spots  · Rash, redness, welts, blisters  · Itching, burning, stinging, pain  · Dry, flaky, cracking, scaly skin  Severe symptoms include:  · Swelling of the face, lips, or other parts of the body  · Hoarse voice  · Trouble swallowing, feeling like your throat is closing  · Trouble breathing, wheezing  · Nausea, vomiting, diarrhea, stomach cramps  · Feeling faint or lightheaded, rapid heart rate  Sometimes the cause may be obvious. But there are so many things that can cause a reaction that you may not be able to figure out. The most important things to help find your allergen are:  · Remembering when it started  · What you were doing at the time or just before that  · Any activities you were involved in  · Any new products or contacts  Below are some common causes.  But remember that almost anything can cause a reaction. You may not even be aware that you came into contact with one of these things:  · Dust, mold, pollen  · Plants (common ones are poison ivy and poison oak, but there are many others)   · Animals  · Foods such as shrimp, shellfish, peanuts, milk products, gluten, and eggs. Also food colorings, flavorings, and additives.  · Insect bites or stings such as bees, mosquitos, fleas, ticks  · Medicines such as penicillin, sulfa medicines, amoxicillin, aspirin, and ibuprofen. But any medicine can cause a reaction.  · Jewelry such as nickel or gold. This can be new, or something youve worn for a while, including zippers and buttons.  · Latex such as in gloves, clothes, toys, balloons, or some tapes. Some people allergic to latex may also have problems with foods like bananas, avocados, kiwi, papaya, or chestnuts.  · Lotions, perfumes, cosmetics, soaps, shampoos, skincare products, nail products  · Chemicals or dyes in clothing, linen, , hair dyes, soaps, iodine  Many viruses and common colds can cause a rash that is not an allergic reaction. Sometimes it is hard to tell the difference between allergies, sensitivity, or an intolerance to something. This is especially true with food. Many things can cause diarrhea, vomiting, stomach cramps, and skin irritation.  Home care    The goal of treatment is to help relieve the symptoms and get you feeling better. The rash will usually fade over several days. But it can sometimes last a couple of weeks. Over the next couple of days, there may be times when it is gets a little worse, and then better again. Here are some things to do:  · If you know what you are allergic to, stay away from it. Future reactions could be worse than this one.  · Avoid tight clothing and anything that heats up your skin (hot showers or baths, direct sunlight). Heat will make itching worse.  · An ice pack will relieve local areas of intense  itching and redness. To make an ice pack, put ice cubes in a plastic bag that seals at the top. Wrap it in a thin, clean towel. Dont put the ice directly on the skin because it can damage the skin.  · Oral diphenhydramine is an over-the-counter antihistamine sold at pharmacy and grocery stores. Unless a prescription antihistamine was given, diphenhydramine may be used to reduce itching if large areas of the skin are involved. It may make you sleepy. So be careful using it in the daytime or when going to school, working, or driving. Note: Dont use diphenhydramine if you have glaucoma or if you are a man with trouble urinating due to an enlarged prostate. There are other antihistamines that wont make you so sleepy. These are good choices for daytime use. Ask your pharmacist for suggestions.  · Dont use diphenhydramine cream on your skin. It can cause a further reaction in some people.  · To help prevent an infection, don't scratch the affected area. Scratching may worsen the reaction and damage your skin. It can also lead to an infection. Always check the affected for signs of an infection.  · Call your healthcare provider and ask what you can use to help decrease the itching.  · To decrease allergic reactions, try the following:    · Use heat-steam to clean your home  · Use high-efficiency particulate (HEPA) vacuums and filters  · Stay away from food and pet triggers  · Kill any cockroaches  · Clean your house often  Follow-up care  Follow up with your healthcare provider, or as advised. If you had a severe reaction today, or if you have had several mild to medium allergic reactions in the past, ask your provider about allergy testing. This can help you find out what you are allergic to. If your reaction included dizziness, fainting, or trouble breathing or swallowing, ask your provider about carrying auto-injectable epinephrine.  Call 911  Call 911 if any of these occur:  · Trouble breathing or swallowing,  wheezing  · Cool, moist, pale skin  · Shortness of breath  · Hoarse voice or trouble speaking  · Confused   · Very drowsy or trouble awakening  · Fainting or loss of consciousness  · Rapid heart rate  · Feeling of dizziness or weakness or a sudden drop in blood pressure  · Feeling of doom  · Feeling lightheaded  · Severe nausea or vomiting, or diarrhea  · Seizure  · Swelling in the face, eyelids, lips, mouth, throat or tongue  · Drooling  When to seek medical advice  Call your healthcare provider right away if any of these occur:  · Spreading areas of itching, redness or swelling  · Nausea or stomach cramps or abdominal pain  · Continuing or recurring symptoms  · Spreading areas of redness, swelling, or itching  · Signs of infection at the affected site:  ¨ Spreading redness  ¨ Increased pain or swelling  ¨ Fluid or colored drainage from the site  ¨ Fever of 100.4°F (38°C) or above lasting for 24 to 48 hours, or as directed by your provider  Date Last Reviewed: 3/1/2017  © 8973-7542 [employer]. [address] [place] [address], [place] [address]. All rights reserved. This information is not intended as a substitute for professional medical care. Always follow your healthcare professional's instructions.

## 2018-05-14 ENCOUNTER — PATIENT MESSAGE (OUTPATIENT)
Dept: PRIMARY CARE CLINIC | Facility: CLINIC | Age: 73
End: 2018-05-14

## 2018-05-15 ENCOUNTER — LAB VISIT (OUTPATIENT)
Dept: LAB | Facility: HOSPITAL | Age: 73
End: 2018-05-15
Attending: INTERNAL MEDICINE
Payer: MEDICARE

## 2018-05-15 ENCOUNTER — ANTI-COAG VISIT (OUTPATIENT)
Dept: CARDIOLOGY | Facility: CLINIC | Age: 73
End: 2018-05-15

## 2018-05-15 DIAGNOSIS — Z79.01 LONG TERM (CURRENT) USE OF ANTICOAGULANTS: ICD-10-CM

## 2018-05-15 DIAGNOSIS — Z79.01 LONG TERM CURRENT USE OF ANTICOAGULANT THERAPY: ICD-10-CM

## 2018-05-15 LAB
INR PPP: 4.5
PROTHROMBIN TIME: 43.5 SEC

## 2018-05-15 PROCEDURE — 85610 PROTHROMBIN TIME: CPT

## 2018-05-15 PROCEDURE — 36415 COLL VENOUS BLD VENIPUNCTURE: CPT

## 2018-05-15 NOTE — PROGRESS NOTES
Pt confirmed taking correct dose   Reports starting new meds (prednisone) on 5/12 for 7 days  No other changes

## 2018-05-18 ENCOUNTER — INFUSION (OUTPATIENT)
Dept: INFUSION THERAPY | Facility: HOSPITAL | Age: 73
End: 2018-05-18
Attending: INTERNAL MEDICINE
Payer: MEDICARE

## 2018-05-18 ENCOUNTER — ANTI-COAG VISIT (OUTPATIENT)
Dept: CARDIOLOGY | Facility: CLINIC | Age: 73
End: 2018-05-18
Payer: MEDICARE

## 2018-05-18 VITALS — SYSTOLIC BLOOD PRESSURE: 118 MMHG | RESPIRATION RATE: 18 BRPM | DIASTOLIC BLOOD PRESSURE: 55 MMHG | HEART RATE: 65 BPM

## 2018-05-18 DIAGNOSIS — D46.9 MDS (MYELODYSPLASTIC SYNDROME): Primary | ICD-10-CM

## 2018-05-18 DIAGNOSIS — Z79.01 LONG TERM CURRENT USE OF ANTICOAGULANT THERAPY: Primary | ICD-10-CM

## 2018-05-18 LAB — INR PPP: 2.6 (ref 2–3)

## 2018-05-18 PROCEDURE — 85610 PROTHROMBIN TIME: CPT | Mod: QW,S$GLB,, | Performed by: INTERNAL MEDICINE

## 2018-05-18 PROCEDURE — 96372 THER/PROPH/DIAG INJ SC/IM: CPT

## 2018-05-18 PROCEDURE — 63600175 PHARM REV CODE 636 W HCPCS: Mod: JG | Performed by: INTERNAL MEDICINE

## 2018-05-18 RX ADMIN — DARBEPOETIN ALFA 300 MCG: 300 INJECTION, SOLUTION INTRAVENOUS; SUBCUTANEOUS at 10:05

## 2018-05-18 NOTE — PROGRESS NOTES
Pt seen by Elisabeth PHELPS. I have reviewed her initial findings and agree with her assessment and plan

## 2018-05-18 NOTE — PROGRESS NOTES
Quick follow up for elevated INR on 5/15. INR still elevated today. Patient reports taking last dose of prednisone yesterday. He denies any bleeding, bruising or other changes that may affect warfarin therapy. We advised a decreased dose today then resume. Patient reminded to call coumadin clinic with any changes or concerns.

## 2018-05-25 ENCOUNTER — INFUSION (OUTPATIENT)
Dept: INFUSION THERAPY | Facility: HOSPITAL | Age: 73
End: 2018-05-25
Attending: INTERNAL MEDICINE
Payer: MEDICARE

## 2018-05-25 ENCOUNTER — ANTI-COAG VISIT (OUTPATIENT)
Dept: CARDIOLOGY | Facility: CLINIC | Age: 73
End: 2018-05-25
Payer: MEDICARE

## 2018-05-25 VITALS — RESPIRATION RATE: 16 BRPM | SYSTOLIC BLOOD PRESSURE: 130 MMHG | HEART RATE: 78 BPM | DIASTOLIC BLOOD PRESSURE: 61 MMHG

## 2018-05-25 DIAGNOSIS — D46.9 MDS (MYELODYSPLASTIC SYNDROME): Primary | ICD-10-CM

## 2018-05-25 DIAGNOSIS — Z79.01 LONG TERM CURRENT USE OF ANTICOAGULANT THERAPY: Primary | ICD-10-CM

## 2018-05-25 LAB — INR PPP: 4.1 (ref 2–3)

## 2018-05-25 PROCEDURE — 96372 THER/PROPH/DIAG INJ SC/IM: CPT

## 2018-05-25 PROCEDURE — 85610 PROTHROMBIN TIME: CPT | Mod: QW,S$GLB,, | Performed by: INTERNAL MEDICINE

## 2018-05-25 PROCEDURE — 63600175 PHARM REV CODE 636 W HCPCS: Mod: JG,EC | Performed by: INTERNAL MEDICINE

## 2018-05-25 RX ADMIN — DARBEPOETIN ALFA 300 MCG: 300 INJECTION, SOLUTION INTRAVENOUS; SUBCUTANEOUS at 02:05

## 2018-05-25 NOTE — NURSING
Patient here for [facility].  Assessment complete and labs reviewed.  VSS.  Administered injection in right arm.  No questions or concerns.  AVS given to patient.  Patient ambulated off unit unassisted.

## 2018-05-25 NOTE — PROGRESS NOTES
Pt seen by Elisabeth PHELPS . I have reviewed her initial findings and agree with her assessment and plan

## 2018-05-25 NOTE — PROGRESS NOTES
Quick follow up for elevated INR on 5/18. INR elevated today. Patient was started on Amiodarone on 4/26/18. Elevated INR likely due to DDI. Patient reports some nosebleeds not lasting longer than 1 minute. Bruising also reported. Patient denies any other changes. Will hold coumadin today and aggressively decrease weekly dose. Will closely monitor INR. Patient advised to call coumadin clinic with any changes or concerns.

## 2018-05-29 ENCOUNTER — PATIENT MESSAGE (OUTPATIENT)
Dept: HEMATOLOGY/ONCOLOGY | Facility: CLINIC | Age: 73
End: 2018-05-29

## 2018-05-29 ENCOUNTER — OFFICE VISIT (OUTPATIENT)
Dept: INTERNAL MEDICINE | Facility: CLINIC | Age: 73
End: 2018-05-29
Payer: MEDICARE

## 2018-05-29 VITALS
HEIGHT: 71 IN | DIASTOLIC BLOOD PRESSURE: 56 MMHG | WEIGHT: 171.31 LBS | SYSTOLIC BLOOD PRESSURE: 128 MMHG | BODY MASS INDEX: 23.98 KG/M2 | HEART RATE: 80 BPM | TEMPERATURE: 98 F | RESPIRATION RATE: 18 BRPM

## 2018-05-29 DIAGNOSIS — T49.95XA ADVERSE EFFECT OF DRUG THAT ACTS PRIMARILY ON SKIN, INITIAL ENCOUNTER: Primary | ICD-10-CM

## 2018-05-29 PROCEDURE — 96372 THER/PROPH/DIAG INJ SC/IM: CPT | Mod: S$GLB,,, | Performed by: INTERNAL MEDICINE

## 2018-05-29 PROCEDURE — 3078F DIAST BP <80 MM HG: CPT | Mod: CPTII,S$GLB,, | Performed by: INTERNAL MEDICINE

## 2018-05-29 PROCEDURE — 99999 PR PBB SHADOW E&M-EST. PATIENT-LVL III: CPT | Mod: PBBFAC,,, | Performed by: INTERNAL MEDICINE

## 2018-05-29 PROCEDURE — 99214 OFFICE O/P EST MOD 30 MIN: CPT | Mod: 25,S$GLB,, | Performed by: INTERNAL MEDICINE

## 2018-05-29 PROCEDURE — 3074F SYST BP LT 130 MM HG: CPT | Mod: CPTII,S$GLB,, | Performed by: INTERNAL MEDICINE

## 2018-05-29 RX ORDER — TRIAMCINOLONE ACETONIDE 40 MG/ML
40 INJECTION, SUSPENSION INTRA-ARTICULAR; INTRAMUSCULAR
Status: COMPLETED | OUTPATIENT
Start: 2018-05-29 | End: 2018-05-29

## 2018-05-29 RX ORDER — LORATADINE 10 MG/1
10 TABLET ORAL 2 TIMES DAILY
Qty: 60 TABLET | Refills: 2 | Status: SHIPPED | OUTPATIENT
Start: 2018-05-29 | End: 2018-06-29

## 2018-05-29 RX ORDER — METHYLPREDNISOLONE 4 MG/1
TABLET ORAL
Qty: 1 PACKAGE | Refills: 0 | Status: SHIPPED | OUTPATIENT
Start: 2018-05-29 | End: 2018-06-25 | Stop reason: ALTCHOICE

## 2018-05-29 RX ADMIN — TRIAMCINOLONE ACETONIDE 40 MG: 40 INJECTION, SUSPENSION INTRA-ARTICULAR; INTRAMUSCULAR at 09:05

## 2018-05-29 NOTE — PROGRESS NOTES
Subjective:       Patient ID: Wil Kwon Jr. is a 73 y.o. male.    Chief Complaint: Fever and Rash (all over)    HPI   Pt with MDS, PAF on coumadin, MDD, HTN, HLD is here for evaluation of a patchy erythematous rash which started on Saturday(pt received darbepoetin magalie injection on Friday) located on his right arm where he received the injection which then spread the the other arm and his trunk. No S/S of anaphylaxis. He denies any additional new medications. Pt had a similar reaction 2 weeks ago to the same medication and got a steroid shot/prednisone taper which resolved it.   Review of Systems   Constitutional: Positive for fatigue. Negative for activity change, appetite change, chills, diaphoresis, fever and unexpected weight change.   HENT: Negative for congestion, postnasal drip, rhinorrhea, sinus pressure, sneezing, sore throat, trouble swallowing and voice change.    Eyes: Negative for pain.   Respiratory: Negative for cough, shortness of breath and wheezing.    Cardiovascular: Negative for chest pain, palpitations and leg swelling.   Gastrointestinal: Negative for abdominal pain, blood in stool, constipation, diarrhea, nausea and vomiting.   Genitourinary: Negative for dysuria.   Musculoskeletal: Negative for arthralgias and myalgias.   Skin: Positive for rash. Negative for wound.   Allergic/Immunologic: Negative for environmental allergies and food allergies.   Hematological: Negative for adenopathy. Does not bruise/bleed easily.       Objective:      Physical Exam   Constitutional: He is oriented to person, place, and time. He appears well-developed and well-nourished. No distress.   HENT:   Head: Normocephalic and atraumatic.   Right Ear: External ear normal.   Left Ear: External ear normal.   Nose: Nose normal.   Mouth/Throat: Oropharynx is clear and moist. No oropharyngeal exudate.   Eyes: Conjunctivae and EOM are normal. Pupils are equal, round, and reactive to light. Right eye exhibits no  discharge. Left eye exhibits no discharge. No scleral icterus.   Neck: Normal range of motion. Neck supple. No JVD present.   Cardiovascular: Normal rate, regular rhythm and normal heart sounds.    No murmur heard.  Pulmonary/Chest: Effort normal and breath sounds normal. No respiratory distress. He has no wheezes. He has no rales.   Abdominal: Soft. Bowel sounds are normal. There is no tenderness.   Musculoskeletal: He exhibits no edema.   Lymphadenopathy:     He has no cervical adenopathy.   Neurological: He is alert and oriented to person, place, and time.   Skin: Skin is warm and dry. Rash (diffuse patchy on the trunk and mostly right arm ) noted. Rash is not pustular. He is not diaphoretic. There is erythema. No pallor.       Assessment:       1. Adverse effect of drug that acts primarily on skin, initial encounter        Plan:    1. Rx Medrol Pack, Kenalog 40 mg IM and restart Claritin BID       Pt will contact Hematology as this is the 2nd time in a row he has had a skin reaction after receiving darbepoetin magalie     Instructed to Get Prompt Medical Attention /ED  if any of the following occur:   · Trouble breathing or swallowing, wheezing, face or lip swelling, drooling, vomiting, or explosive diarrhea (CALL 911)  · Fever greater than 100.4°F (38°C)  Continuing or recurring symptoms

## 2018-05-31 ENCOUNTER — ANTI-COAG VISIT (OUTPATIENT)
Dept: CARDIOLOGY | Facility: CLINIC | Age: 73
End: 2018-05-31

## 2018-05-31 ENCOUNTER — LAB VISIT (OUTPATIENT)
Dept: LAB | Facility: HOSPITAL | Age: 73
End: 2018-05-31
Attending: INTERNAL MEDICINE
Payer: MEDICARE

## 2018-05-31 DIAGNOSIS — Z79.01 LONG TERM (CURRENT) USE OF ANTICOAGULANTS: ICD-10-CM

## 2018-05-31 DIAGNOSIS — Z79.01 LONG TERM CURRENT USE OF ANTICOAGULANT THERAPY: ICD-10-CM

## 2018-05-31 LAB
INR PPP: 1.9
PROTHROMBIN TIME: 18.8 SEC

## 2018-05-31 PROCEDURE — 36415 COLL VENOUS BLD VENIPUNCTURE: CPT | Mod: PO

## 2018-05-31 PROCEDURE — 85610 PROTHROMBIN TIME: CPT

## 2018-06-01 ENCOUNTER — INFUSION (OUTPATIENT)
Dept: INFUSION THERAPY | Facility: HOSPITAL | Age: 73
End: 2018-06-01
Attending: INTERNAL MEDICINE
Payer: MEDICARE

## 2018-06-01 ENCOUNTER — OFFICE VISIT (OUTPATIENT)
Dept: HEMATOLOGY/ONCOLOGY | Facility: CLINIC | Age: 73
End: 2018-06-01
Payer: MEDICARE

## 2018-06-01 VITALS — RESPIRATION RATE: 18 BRPM | SYSTOLIC BLOOD PRESSURE: 166 MMHG | HEART RATE: 77 BPM | DIASTOLIC BLOOD PRESSURE: 73 MMHG

## 2018-06-01 VITALS
BODY MASS INDEX: 23.83 KG/M2 | OXYGEN SATURATION: 96 % | HEIGHT: 71 IN | HEART RATE: 82 BPM | DIASTOLIC BLOOD PRESSURE: 78 MMHG | RESPIRATION RATE: 18 BRPM | TEMPERATURE: 99 F | SYSTOLIC BLOOD PRESSURE: 179 MMHG | WEIGHT: 170.19 LBS

## 2018-06-01 DIAGNOSIS — D46.9 MDS (MYELODYSPLASTIC SYNDROME): Primary | ICD-10-CM

## 2018-06-01 DIAGNOSIS — I35.0 AORTIC VALVE STENOSIS, SEVERE: ICD-10-CM

## 2018-06-01 DIAGNOSIS — Z79.01 LONG TERM CURRENT USE OF ANTICOAGULANT THERAPY: ICD-10-CM

## 2018-06-01 DIAGNOSIS — D63.0 ANEMIA IN NEOPLASTIC DISEASE: ICD-10-CM

## 2018-06-01 PROCEDURE — 63600175 PHARM REV CODE 636 W HCPCS: Mod: JG | Performed by: INTERNAL MEDICINE

## 2018-06-01 PROCEDURE — 3077F SYST BP >= 140 MM HG: CPT | Mod: CPTII,S$GLB,, | Performed by: INTERNAL MEDICINE

## 2018-06-01 PROCEDURE — 3078F DIAST BP <80 MM HG: CPT | Mod: CPTII,S$GLB,, | Performed by: INTERNAL MEDICINE

## 2018-06-01 PROCEDURE — 99215 OFFICE O/P EST HI 40 MIN: CPT | Mod: S$GLB,,, | Performed by: INTERNAL MEDICINE

## 2018-06-01 PROCEDURE — 96372 THER/PROPH/DIAG INJ SC/IM: CPT

## 2018-06-01 PROCEDURE — 99999 PR PBB SHADOW E&M-EST. PATIENT-LVL III: CPT | Mod: PBBFAC,,, | Performed by: INTERNAL MEDICINE

## 2018-06-01 RX ADMIN — DARBEPOETIN ALFA 300 MCG: 300 INJECTION, SOLUTION INTRAVENOUS; SUBCUTANEOUS at 03:06

## 2018-06-01 NOTE — Clinical Note
Please schedule weekly labs (CBC, CMP, type and screen) and chemo appts for Aranesp injection for the next 10 weeks.  Follow-up with me in 10 weeks.

## 2018-06-01 NOTE — PROGRESS NOTES
"HEMATOLOGIC MALIGNANCIES PROGRESS NOTE    IDENTIFYING STATEMENT   Wil Kwon Jr. (Wil) is a 73 y.o. male with a  of 1945 from [place] with the diagnosis of myelodysplastic syndrome.      ONCOLOGY HISTORY:    From Dr. Slater's note     Mr. 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 He was 's patient. He has a history of PAF followed by our electrophysiology cardiology department and is chronically anticoagulated. He has moderate aortic stenosis. He was noted to be progressively anemic . Hematology workup revealed a normal B12 and folate. His iron stores were normal. His reticulocyte count was slightly elevated at 4.2. His WBC count was low and was progressively dropping over the last 3 years with monocytosis. He also has developed mild progressive thrombocytopenia. He is moderately symptomatic with the fatigue. He has no history of extrinsic GI bleeding. He also has no history of previous transfusions.He underwent a marrow biopsy in Aug 2016. 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 Of 20 metaphases, 2 metaphases were normal and 18 metaphases had a 2;21 translocation. 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 Clinical and pathologic correlation is recommended."     INTERVAL HISTORY:      Mr. Kwon returns to clinic for follow-up of his myelodysplastic syndrome.     He has recently had reactions to his darbepoetin injections with redness and swelling of his skin beginning the following day at the site of injection and spreading across his body. He has been given Medrol dose packs twice and is currently completing one. He is worried about these reactions and wants to know if he can continue with " darbepoetin.    Past Medical History, Past Social History and Past Family History have been reviewed and are unchanged except as noted in the interval history.    MEDICATIONS:     Current Outpatient Prescriptions on File Prior to Visit   Medication Sig Dispense Refill    amiodarone (PACERONE) 200 MG Tab Take 1 tablet (200 mg total) by mouth once daily. 30 tablet 11    clonazePAM (KLONOPIN) 2 MG disintegrating tablet Take 1 tablet (2 mg total) by mouth nightly as needed. 90 tablet 0    food supplemt, lactose-reduced (BOOST) Liqd Take by mouth once daily.      loratadine (CLARITIN) 10 mg tablet Take 1 tablet (10 mg total) by mouth 2 (two) times daily. 60 tablet 2    methylPREDNISolone (MEDROL, KEERTHI,) 4 mg tablet use as directed 1 Package 0    metoprolol succinate (TOPROL-XL) 100 MG 24 hr tablet Take 1 tablet (100 mg total) by mouth once daily. 90 tablet 4    venlafaxine (EFFEXOR-XR) 75 MG 24 hr capsule Take 1 capsule (75 mg total) by mouth once daily. 30 capsule 11    warfarin (COUMADIN) 3 MG tablet Take 1 tablet (3 mg total) by mouth Daily.       No current facility-administered medications on file prior to visit.        ALLERGIES:   Review of patient's allergies indicates:   Allergen Reactions    Lipitor [atorvastatin]      Muscle pain    Lisinopril Edema     Cheek swelling    Augmentin [amoxicillin-pot clavulanate] Rash        ROS:       Review of Systems   Constitutional: Positive for fatigue and unexpected weight change. Negative for diaphoresis and fever.   HENT:   Negative for lump/mass and sore throat.    Eyes: Negative for icterus.   Respiratory: Negative for cough and shortness of breath.    Cardiovascular: Negative for chest pain and palpitations.   Gastrointestinal: Negative for abdominal distention, constipation, diarrhea, nausea and vomiting.   Genitourinary: Negative for dysuria and frequency.    Musculoskeletal: Negative for arthralgias, gait problem and myalgias.   Skin: Positive for rash.  "  Neurological: Negative for dizziness, gait problem and headaches.   Hematological: Negative for adenopathy. Does not bruise/bleed easily.   Psychiatric/Behavioral: The patient is not nervous/anxious.        PHYSICAL EXAM:  Vitals:    06/01/18 1410   BP: (!) 179/78   Pulse: 82   Resp: 18   Temp: 98.7 °F (37.1 °C)   TempSrc: Oral   SpO2: 96%   Weight: 77.2 kg (170 lb 3.1 oz)   Height: 5' 11" (1.803 m)   PainSc: 0-No pain   Body mass index is 23.74 kg/m².    KARNOFSKY PERFORMANCE STATUS 70%  ECOG 1    Physical Exam   Constitutional: He is oriented to person, place, and time. He appears well-developed and well-nourished. No distress.   HENT:   Head: Normocephalic and atraumatic.   Mouth/Throat: Mucous membranes are normal. No oral lesions.   Eyes: Conjunctivae are normal.   Neck: No thyromegaly present.   Cardiovascular: Normal rate, regular rhythm and normal heart sounds.    No murmur heard.  Pulmonary/Chest: Breath sounds normal. He has no wheezes. He has no rales.   Abdominal: Soft. He exhibits no distension and no mass. There is no splenomegaly or hepatomegaly. There is no tenderness.   Lymphadenopathy:     He has no cervical adenopathy.        Right cervical: No deep cervical adenopathy present.       Left cervical: No deep cervical adenopathy present.     He has no axillary adenopathy.        Right: No inguinal adenopathy present.        Left: No inguinal adenopathy present.   Neurological: He is alert and oriented to person, place, and time. He has normal strength and normal reflexes. No cranial nerve deficit. Coordination normal.   Skin: Rash noted.   Erythematous papular rash on the arms near injection sites.        LAB:   Results for orders placed or performed in visit on 06/01/18   CBC auto differential   Result Value Ref Range    WBC 9.06 3.90 - 12.70 K/uL    RBC 3.12 (L) 4.60 - 6.20 M/uL    Hemoglobin 9.9 (L) 14.0 - 18.0 g/dL    Hematocrit 32.9 (L) 40.0 - 54.0 %     (H) 82 - 98 fL    MCH 31.7 (H) " 27.0 - 31.0 pg    MCHC 30.1 (L) 32.0 - 36.0 g/dL    RDW 19.9 (H) 11.5 - 14.5 %    Platelets 123 (L) 150 - 350 K/uL    MPV 13.1 (H) 9.2 - 12.9 fL    Immature Granulocytes 4.5 (H) 0.0 - 0.5 %    Gran # (ANC) 6.2 1.8 - 7.7 K/uL    Immature Grans (Abs) 0.41 (H) 0.00 - 0.04 K/uL    Lymph # 1.8 1.0 - 4.8 K/uL    Mono # 0.6 0.3 - 1.0 K/uL    Eos # 0.0 0.0 - 0.5 K/uL    Baso # 0.01 0.00 - 0.20 K/uL    nRBC 2 (A) 0 /100 WBC    Gran% 68.7 38.0 - 73.0 %    Lymph% 20.2 18.0 - 48.0 %    Mono% 6.5 4.0 - 15.0 %    Eosinophil% 0.0 0.0 - 8.0 %    Basophil% 0.1 0.0 - 1.9 %    Platelet Estimate Decreased (A)     Aniso Slight     Poik Slight     Poly Occasional     Hypo Occasional     Ovalocytes Occasional     Differential Method Automated    Comprehensive metabolic panel   Result Value Ref Range    Sodium 135 (L) 136 - 145 mmol/L    Potassium 4.3 3.5 - 5.1 mmol/L    Chloride 102 95 - 110 mmol/L    CO2 23 23 - 29 mmol/L    Glucose 227 (H) 70 - 110 mg/dL    BUN, Bld 27 (H) 8 - 23 mg/dL    Creatinine 1.4 0.5 - 1.4 mg/dL    Calcium 9.4 8.7 - 10.5 mg/dL    Total Protein 9.1 (H) 6.0 - 8.4 g/dL    Albumin 3.4 (L) 3.5 - 5.2 g/dL    Total Bilirubin 0.8 0.1 - 1.0 mg/dL    Alkaline Phosphatase 88 55 - 135 U/L    AST 39 10 - 40 U/L    ALT 32 10 - 44 U/L    Anion Gap 10 8 - 16 mmol/L    eGFR if African American 57.2 (A) >60 mL/min/1.73 m^2    eGFR if non African American 49.5 (A) >60 mL/min/1.73 m^2   Type & Screen   Result Value Ref Range    Group & Rh O POS     Indirect Dora NEG        PROBLEMS ASSESSED THIS VISIT:    1. MDS (myelodysplastic syndrome)    2. Anemia in neoplastic disease    3. Long term current use of anticoagulant therapy    4. Aortic valve stenosis, severe        PLAN:       MDS (myelodysplastic syndrome)  Mr. Kwon is seen in follow-up for MDS. From a disease standpoint, he is doing well in that he has achieved transfusion independence with darbpoetin.    We discussed the risk sand benefits of continuing this therapy.  While I believe the risk of a severe reaction is low, I did recommend we consider alternative agents, such as epoietin magalie (Procrit); however, after extensive discussion, Mr. Kwon elected to continue with darbepoetin. We will therefore attempt this today. If he reacts again, we will change therapies.     Long term current use of anticoagulant therapy  Continue warfarin. Stable. No bleeding. He will continue to follow-up with [facility] clinic.     Aortic valve stenosis, severe  He has symptomatic severe AS. He needs valve replacement, likely through TAVR. His cardiologist wanted endoscopic eval, but I think this is unnecessary as his anemia is related to MDS. I will contact Dr. Altman in cardiology to try to help expedite his eval for TAVR.     Follow-up in 8 weeks (discussed during this visit)    Jhonny Mixon MD  Hematology and Stem Cell Transplant

## 2018-06-05 ENCOUNTER — ANTI-COAG VISIT (OUTPATIENT)
Dept: CARDIOLOGY | Facility: CLINIC | Age: 73
End: 2018-06-05

## 2018-06-05 ENCOUNTER — OFFICE VISIT (OUTPATIENT)
Dept: INTERNAL MEDICINE | Facility: CLINIC | Age: 73
End: 2018-06-05
Payer: MEDICARE

## 2018-06-05 ENCOUNTER — LAB VISIT (OUTPATIENT)
Dept: LAB | Facility: HOSPITAL | Age: 73
End: 2018-06-05
Attending: INTERNAL MEDICINE
Payer: MEDICARE

## 2018-06-05 VITALS
SYSTOLIC BLOOD PRESSURE: 112 MMHG | RESPIRATION RATE: 16 BRPM | TEMPERATURE: 99 F | HEART RATE: 80 BPM | OXYGEN SATURATION: 98 % | HEIGHT: 71 IN | DIASTOLIC BLOOD PRESSURE: 72 MMHG | WEIGHT: 165.81 LBS | BODY MASS INDEX: 23.21 KG/M2

## 2018-06-05 DIAGNOSIS — Z79.01 LONG TERM (CURRENT) USE OF ANTICOAGULANTS: ICD-10-CM

## 2018-06-05 DIAGNOSIS — I35.0 AORTIC VALVE STENOSIS, SEVERE: ICD-10-CM

## 2018-06-05 DIAGNOSIS — Z79.01 LONG TERM CURRENT USE OF ANTICOAGULANT THERAPY: ICD-10-CM

## 2018-06-05 DIAGNOSIS — D63.0 ANEMIA IN NEOPLASTIC DISEASE: ICD-10-CM

## 2018-06-05 DIAGNOSIS — D46.9 MDS (MYELODYSPLASTIC SYNDROME): ICD-10-CM

## 2018-06-05 DIAGNOSIS — R49.0 HOARSENESS: Primary | ICD-10-CM

## 2018-06-05 LAB
INR PPP: 1.3
PROTHROMBIN TIME: 13.1 SEC

## 2018-06-05 PROCEDURE — 3078F DIAST BP <80 MM HG: CPT | Mod: CPTII,S$GLB,, | Performed by: INTERNAL MEDICINE

## 2018-06-05 PROCEDURE — 3074F SYST BP LT 130 MM HG: CPT | Mod: CPTII,S$GLB,, | Performed by: INTERNAL MEDICINE

## 2018-06-05 PROCEDURE — 36415 COLL VENOUS BLD VENIPUNCTURE: CPT | Mod: PO

## 2018-06-05 PROCEDURE — 85610 PROTHROMBIN TIME: CPT

## 2018-06-05 PROCEDURE — 99999 PR PBB SHADOW E&M-EST. PATIENT-LVL III: CPT | Mod: PBBFAC,,, | Performed by: INTERNAL MEDICINE

## 2018-06-05 PROCEDURE — 99214 OFFICE O/P EST MOD 30 MIN: CPT | Mod: S$GLB,,, | Performed by: INTERNAL MEDICINE

## 2018-06-05 PROCEDURE — 99499 UNLISTED E&M SERVICE: CPT | Mod: S$GLB,,, | Performed by: INTERNAL MEDICINE

## 2018-06-06 ENCOUNTER — DOCUMENTATION ONLY (OUTPATIENT)
Dept: CARDIOLOGY | Facility: CLINIC | Age: 73
End: 2018-06-06

## 2018-06-06 ENCOUNTER — EDUCATION (OUTPATIENT)
Dept: CARDIOLOGY | Facility: CLINIC | Age: 73
End: 2018-06-06

## 2018-06-06 DIAGNOSIS — N18.9 CHRONIC KIDNEY DISEASE, UNSPECIFIED CKD STAGE: ICD-10-CM

## 2018-06-06 DIAGNOSIS — I35.0 NODULAR CALCIFIC AORTIC VALVE STENOSIS: Primary | ICD-10-CM

## 2018-06-06 RX ORDER — DEXTROSE MONOHYDRATE AND SODIUM CHLORIDE 5; .45 G/100ML; G/100ML
INJECTION, SOLUTION INTRAVENOUS CONTINUOUS
Status: CANCELLED | OUTPATIENT
Start: 2018-06-06

## 2018-06-06 RX ORDER — DIPHENHYDRAMINE HCL 25 MG
50 CAPSULE ORAL ONCE
Status: CANCELLED | OUTPATIENT
Start: 2018-06-06 | End: 2018-06-06

## 2018-06-06 NOTE — ASSESSMENT & PLAN NOTE
Mr. Kwon is seen in follow-up for MDS. From a disease standpoint, he is doing well in that he has achieved transfusion independence with darbpoetin.    We discussed the risk sand benefits of continuing this therapy. While I believe the risk of a severe reaction is low, I did recommend we consider alternative agents, such as epoietin magalie (Procrit); however, after extensive discussion, Mr. Kwon elected to continue with darbepoetin. We will therefore attempt this today. If he reacts again, we will change therapies.

## 2018-06-06 NOTE — PROGRESS NOTES
OUTPATIENT CATHETERIZATION INSTRUCTIONS    You have been scheduled for a procedure in the catheterization lab on Wednesday, June 13, 2018.     Please report to the Cardiology Waiting Area on the Third floor of the hospital and check in at 9 AM.   You will then be taken to the SSCU (Short Stay Cardiac Unit) and prepared for your procedure. Please be aware that this is not the time of your procedure but the time you are to arrive. The procedures are scheduled on an hourly basis; however, emergency cases take precedence over all other cases.       You may not have anything to eat or drink after midnight the night before your test. You may take your regular morning medications with water. If there are any medications that you should not take you will be instructed to hold them that morning. If you are diabetic and on Metformin (Glucophage) do not take it the day before, the day of, and for 2 days after your procedure.      The procedure will take 1-2 hours to perform. After the procedure, you will return to SSCU on the third floor of the hospital. You will need to lie still (or keep your arm still) for the next 4 to 6 hours to minimize bleeding from the puncture site. Your family may remain in the room with you during this time.       You may be able to be discharged home that same afternoon if there is someone to drive you home and there were no complications. If you have one of the balloon, stent, or device procedures you may spend the night in the hospital. Your doctor will determine, based on your progress, the date and time of your discharge. The results of your procedure will be discussed with you before you are discharged. Any further testing or procedures will be scheduled for you either before you leave or you will be called with these appointments.       If you should have any questions, concerns, or need to change the date of your procedure, please call  RUBY Lyles @ [phone]    Special  Instructions:  Hold coumadin 3 days before        THE ABOVE INSTRUCTIONS WERE GIVEN TO THE PATIENT VERBALLY AND THEY VERBALIZED UNDERSTANDING.  THEY DO NOT REQUIRE ANY SPECIAL NEEDS AND DO NOT HAVE ANY LEARNING BARRIERS.          Directions for Reporting to Cardiology Waiting Area in the Hospital  If you park in the Parking Garage:  Take elevators to the1st floor of the parking garage.  Continue past the gift shop, coffee shop, and piano.  Take a right and go to the gold elevators. (Elevator B)  Take the elevator to the 3rd floor.  Follow the arrow on the sign on the wall that says Cath Lab Registration/EP Lab Registration.  Follow the long hallway all the way around until you come to a big open area.  This is the registration area.  Check in at Reception Desk.    OR    If family is dropping you off:  Have them drop you off at the front of the Hospital under the green overhang.  Enter through the doors and take a right.  Take the E elevators to the 3rd floor Cardiology Waiting Area.  Check in at the Reception Desk in the waiting room.

## 2018-06-06 NOTE — PROGRESS NOTES
Mr. MALAGON has Myelodysplastic Syndrome with a high likelihood of progression to AML.  His prognosis from his hematologic problem is probably worse than his prognosis from his AS (12 months).  Hence, he is currently Cohort C. After speaking with Dr. Barry and meeting with the valve team, we have decided to offer him BAV as palliation or possible bridge to TAVR if his MDS can be controlled and leukemia avoided.  The patient is agreeable to this.  Will not do CT scan or coronary angiogram because of the risk of contrast nephropathy and lack of angina.  Hopefully, BAV will help his NASH and make his life with anemia a bit more tolerable. If his MDS is controlled and his prognosis improves to a 2 year life expectancy, then we will be able to offer him TAVR at that time.    Will consent him at the time of BAV.

## 2018-06-06 NOTE — ASSESSMENT & PLAN NOTE
He has symptomatic severe AS. He needs valve replacement, likely through TAVR. His cardiologist wanted endoscopic eval, but I think this is unnecessary as his anemia is related to MDS. I will contact Dr. Altman in cardiology to try to help expedite his eval for TAVR.

## 2018-06-06 NOTE — PROGRESS NOTES
Dr. Barry, his hematologist, called to say that he is confident that the patient's low Hgb is from his myelodysplastic syndrome.  Furthermore, he is not sure of his prognosis but said he will recalculate it.  If his life expectancy is greater than 2 years, will do TAVR evaluation.  If less than 2 years, will off BAV as palliation or possible bridge to TAVR.  I will await Dr. Barry's opinion.  We will not do EGD and colonoscopy at his recommendation.

## 2018-06-08 ENCOUNTER — INFUSION (OUTPATIENT)
Dept: INFUSION THERAPY | Facility: HOSPITAL | Age: 73
End: 2018-06-08
Attending: INTERNAL MEDICINE
Payer: MEDICARE

## 2018-06-08 VITALS
RESPIRATION RATE: 20 BRPM | DIASTOLIC BLOOD PRESSURE: 61 MMHG | HEART RATE: 90 BPM | SYSTOLIC BLOOD PRESSURE: 131 MMHG | TEMPERATURE: 99 F

## 2018-06-08 DIAGNOSIS — D46.9 MDS (MYELODYSPLASTIC SYNDROME): Primary | ICD-10-CM

## 2018-06-08 PROCEDURE — 63600175 PHARM REV CODE 636 W HCPCS: Mod: JG,EC,EA | Performed by: INTERNAL MEDICINE

## 2018-06-08 PROCEDURE — 96372 THER/PROPH/DIAG INJ SC/IM: CPT

## 2018-06-08 RX ADMIN — DARBEPOETIN ALFA 300 MCG: 300 INJECTION, SOLUTION INTRAVENOUS; SUBCUTANEOUS at 12:06

## 2018-06-08 NOTE — PROGRESS NOTES
Patient reports he will be having a aortic valve  procedure by Dr. Altman on 6/13  & will be holding  coumadin 3 days prior.

## 2018-06-11 NOTE — PROGRESS NOTES
History of present illness:  73-year-old gentleman with several chronic medical conditions most prominently myelodysplastic syndrome, severe anemia secondary, severe aortic stenosis, hypertension.  The patient was recently briefly admitted with hypotension with a consequent minimal  NSTEMI.  Symptoms felt related to medication with his significant anemia and aortic stenosis.  Medications adjusted placed on amiodarone.  Diltiazem discontinued.  Considered for possible TAVR depending on prognosis regarding MDS.  Today the patient reports feeling fine with no chest pain, palpitations, syncope presyncope.  No significant edema. His primary complaint today is that of hoarseness which he has had for 6-8 weeks.  He states that it started with a slight sore throat which has resolved with the hoarseness has persisted    Current medications:  All medications are noted reviewed the electronic medical record medication list    Review of systems:  Constitutional:  No fever no chills  Cardiovascular:  No chest pain palpitations syncope edema.  Respiratory:  No cough no change in breathing status.  GI:  No nausea vomiting abdominal pain diarrhea or changes in bowel habits.    Past medical history, family medical history, past surgical history social history is are all noted reviewed the electronic medical record history sections.    Examination:  General:  Alert pleasant appropriately groomed male no acute distress.  Vital signs:  Blood pressure taken manually by this examiner is 120/60.  Pulse 80 regular  HEENT:  Normocephalic.  Neck supple no masses no thyromegaly.  Mouth and pharynx normal.  Lungs:  Clear to auscultation.  Cardiovascular:  2-3/6 systolic murmur.  No JVD. No peripheral extremity edema.  Mental status:  Alert oriented affect and mood are all appropriate.    Data:  Recent hospital summary is noted reviewed along with lab data.    Impression:  Recent pre syncopal episode related to hypertension related to  medication and anemia now resolved with medication adjustments  Myelodysplasia syndrome with severe anemia.  Severe aortic stenosis.  Atrial fibrillation  Persistent hoarseness for the last 8 weeks.    Plan:  Referral to ENT to evaluate the persistent hoarseness.  Continue other current regimens and follow up with Hematology-Oncology and Cardiology.

## 2018-06-12 ENCOUNTER — TELEPHONE (OUTPATIENT)
Dept: CARDIOLOGY | Facility: CLINIC | Age: 73
End: 2018-06-12

## 2018-06-12 ENCOUNTER — DOCUMENTATION ONLY (OUTPATIENT)
Dept: CARDIOLOGY | Facility: CLINIC | Age: 73
End: 2018-06-12

## 2018-06-12 NOTE — TELEPHONE ENCOUNTER
Called and cancelled his BAV for tomorrow since [employer] did not approve the procedure.  Started a formal appeal and instructed him to go the the ER for chest pain or SOB associated with heart failure.

## 2018-06-12 NOTE — PROGRESS NOTES
I spoke with Mr. Kwon  this morning. He has severe, symptomatic AS (NYHA Class III).     He continues to be weak all of the time and SOB with minimal activity since 2018.  His Hgb is 9 currently and he will not be transfused until it drops to 6.5.  He is currently weak and has dyspnea with any activity inside includin. Walking up one flight of stairs in his house.  He walks up slowly sideways facing the railing in case he passes out (which he has done once on the stairs) and is short-winded while doing so.  2. Sweeping or mopping floors.  3. He cannot go outside at all because the heat makes him feel even worse.    His insurance company has denied BAV which the valve team has decided to offer him as palliation for his severe, symptomatic AS.  I have appealed twice and will do a final appeal when they call today.

## 2018-06-13 ENCOUNTER — PATIENT MESSAGE (OUTPATIENT)
Dept: HEMATOLOGY/ONCOLOGY | Facility: CLINIC | Age: 73
End: 2018-06-13

## 2018-06-13 NOTE — PROGRESS NOTES
Patient called to report that 6/13 procedure was rescheduled to 6/15, Patient held coumadin but since it was rescheduled he went ahead and took coumadin last night -6/12, states he was called today and told procedure has been approved and rescheduled to 6/15 and to hold coumadin till then, wants to know if to resume the coumadin 6/15 or when, call back # [phone]

## 2018-06-15 ENCOUNTER — HOSPITAL ENCOUNTER (OUTPATIENT)
Facility: HOSPITAL | Age: 73
Discharge: HOME | End: 2018-06-16
Attending: INTERNAL MEDICINE | Admitting: INTERNAL MEDICINE
Payer: MEDICARE

## 2018-06-15 DIAGNOSIS — I10 ESSENTIAL (PRIMARY) HYPERTENSION: ICD-10-CM

## 2018-06-15 DIAGNOSIS — N18.9 CHRONIC KIDNEY DISEASE, UNSPECIFIED CKD STAGE: ICD-10-CM

## 2018-06-15 DIAGNOSIS — I35.0 NODULAR CALCIFIC AORTIC VALVE STENOSIS: ICD-10-CM

## 2018-06-15 LAB
ABO + RH BLD: NORMAL
ANION GAP SERPL CALC-SCNC: 7 MMOL/L
APTT BLDCRRT: 22 SEC
BLD GP AB SCN CELLS X3 SERPL QL: NORMAL
BUN SERPL-MCNC: 21 MG/DL
CALCIUM SERPL-MCNC: 8.4 MG/DL
CHLORIDE SERPL-SCNC: 103 MMOL/L
CO2 SERPL-SCNC: 24 MMOL/L
CREAT SERPL-MCNC: 1.2 MG/DL
ERYTHROCYTE [DISTWIDTH] IN BLOOD BY AUTOMATED COUNT: 19.3 %
EST. GFR  (AFRICAN AMERICAN): >60 ML/MIN/1.73 M^2
EST. GFR  (NON AFRICAN AMERICAN): 59.6 ML/MIN/1.73 M^2
GLUCOSE SERPL-MCNC: 90 MG/DL
HCT VFR BLD AUTO: 29.5 %
HGB BLD-MCNC: 8.9 G/DL
INR PPP: 1.1
MCH RBC QN AUTO: 31.3 PG
MCHC RBC AUTO-ENTMCNC: 30.2 G/DL
MCV RBC AUTO: 104 FL
PLATELET # BLD AUTO: 99 K/UL
PLATELET BLD QL SMEAR: ABNORMAL
PMV BLD AUTO: ABNORMAL FL
POTASSIUM SERPL-SCNC: 4.7 MMOL/L
PROTHROMBIN TIME: 11.7 SEC
RBC # BLD AUTO: 2.84 M/UL
SODIUM SERPL-SCNC: 134 MMOL/L
WBC # BLD AUTO: 18.55 K/UL

## 2018-06-15 PROCEDURE — 85610 PROTHROMBIN TIME: CPT

## 2018-06-15 PROCEDURE — 63600175 PHARM REV CODE 636 W HCPCS

## 2018-06-15 PROCEDURE — S0077 INJECTION, CLINDAMYCIN PHOSP: HCPCS

## 2018-06-15 PROCEDURE — 93010 ELECTROCARDIOGRAM REPORT: CPT | Mod: 76,,, | Performed by: INTERNAL MEDICINE

## 2018-06-15 PROCEDURE — 92986 REVISION OF AORTIC VALVE: CPT

## 2018-06-15 PROCEDURE — 25000003 PHARM REV CODE 250

## 2018-06-15 PROCEDURE — C1894 INTRO/SHEATH, NON-LASER: HCPCS

## 2018-06-15 PROCEDURE — 92986 REVISION OF AORTIC VALVE: CPT | Mod: ,,, | Performed by: INTERNAL MEDICINE

## 2018-06-15 PROCEDURE — 25000003 PHARM REV CODE 250: Performed by: INTERNAL MEDICINE

## 2018-06-15 PROCEDURE — 80048 BASIC METABOLIC PNL TOTAL CA: CPT

## 2018-06-15 PROCEDURE — 86850 RBC ANTIBODY SCREEN: CPT

## 2018-06-15 PROCEDURE — S5010 5% DEXTROSE AND 0.45% SALINE: HCPCS | Performed by: INTERNAL MEDICINE

## 2018-06-15 PROCEDURE — 99152 MOD SED SAME PHYS/QHP 5/>YRS: CPT | Mod: ,,, | Performed by: INTERNAL MEDICINE

## 2018-06-15 PROCEDURE — 93005 ELECTROCARDIOGRAM TRACING: CPT

## 2018-06-15 PROCEDURE — 93010 ELECTROCARDIOGRAM REPORT: CPT | Mod: ,,, | Performed by: INTERNAL MEDICINE

## 2018-06-15 PROCEDURE — 85027 COMPLETE CBC AUTOMATED: CPT

## 2018-06-15 PROCEDURE — 33210 INSERT ELECTRD/PM CATH SNGL: CPT | Mod: 59,,, | Performed by: INTERNAL MEDICINE

## 2018-06-15 PROCEDURE — 99152 MOD SED SAME PHYS/QHP 5/>YRS: CPT

## 2018-06-15 PROCEDURE — 85730 THROMBOPLASTIN TIME PARTIAL: CPT

## 2018-06-15 RX ORDER — AMIODARONE HYDROCHLORIDE 200 MG/1
200 TABLET ORAL DAILY
Status: DISCONTINUED | OUTPATIENT
Start: 2018-06-16 | End: 2018-06-16 | Stop reason: HOSPADM

## 2018-06-15 RX ORDER — ACETAMINOPHEN 325 MG/1
650 TABLET ORAL EVERY 4 HOURS PRN
Status: DISCONTINUED | OUTPATIENT
Start: 2018-06-15 | End: 2018-06-16 | Stop reason: HOSPADM

## 2018-06-15 RX ORDER — VENLAFAXINE HYDROCHLORIDE 75 MG/1
75 CAPSULE, EXTENDED RELEASE ORAL DAILY
Status: DISCONTINUED | OUTPATIENT
Start: 2018-06-16 | End: 2018-06-16 | Stop reason: HOSPADM

## 2018-06-15 RX ORDER — DIPHENHYDRAMINE HCL 50 MG
50 CAPSULE ORAL ONCE
Status: COMPLETED | OUTPATIENT
Start: 2018-06-15 | End: 2018-06-15

## 2018-06-15 RX ORDER — DEXTROSE MONOHYDRATE AND SODIUM CHLORIDE 5; .45 G/100ML; G/100ML
INJECTION, SOLUTION INTRAVENOUS CONTINUOUS
Status: DISCONTINUED | OUTPATIENT
Start: 2018-06-15 | End: 2018-06-16

## 2018-06-15 RX ADMIN — DEXTROSE AND SODIUM CHLORIDE: 5; .45 INJECTION, SOLUTION INTRAVENOUS at 08:06

## 2018-06-15 RX ADMIN — DIPHENHYDRAMINE HYDROCHLORIDE 50 MG: 50 CAPSULE ORAL at 08:06

## 2018-06-15 NOTE — H&P
Interventional Cardiology Admission Note    6/15/2018    Referred by: Dr. Rhodes    SUBJECTIVE  Mr. Kwon is a 72 yo gentleman presenting for evaluation of AS. He has a PMHx of myelodysplastic syndrome s/p chemotherapy, aortic stenosis, pAfib on warfarin, L CEA in 2013. He has lost 30 lbs since being diagnosed with MDS a few years ago. He was recently admitted following a syncopal episode.     He walks up a flight of stairs at home with dyspnea, has to walk sideways in case he passes out.  He gets dyspneic with mopping floors or doing any normal housework, and struggles to go outside because heat worsens his fatigue and dyspnea.     He does have MDS with high risk features for progression to leukemia, followed by oncology.      iWl Kwon Jr. is a 73 y.o. male referred by Dr Rhodes for evaluation of severe AS (NYHA Class III sx).     he has undergone the following TAVR work-up.  Not planning to proceed with angiogram or CTA at this time as risk of CI-BERNICE and he needs to have MDS controlled.     · ECHO (Date 4/24/2018): JEFF 0.60 cm2, AVAi 0.31 cm2/m2, peak velocity 4.5 m/s, MG 66 mmHg, EF= 65%.   · LHC (Date): Needs  · STS: 2.42% -pending cath  · Frailty: 2/4   · Iliacs are pending   · LVOT area by CTA is pending   · CT Incidental Findings: pending   · CTS risk assessment: pending   · Rhythm Issues: A Fib   · PFTs: Pending   ?  OBJECTIVE  Review of Systems   Positive for NASH, syncopal spells, fatigue.   Otherwise negative.       Past Medical History:   Diagnosis Date    Aortic valve stenosis, severe 8/3/2017    4-24-18  1 - Normal left ventricular systolic function (EF 60-65%).    2 - Concentric hypertrophy.    3 - No wall motion abnormalities.    4 - Indeterminate LV diastolic function.    5 - Biatrial enlargement.    6 - Right ventricle is upper limit of normal in size with normal systolic function.    7 - Severe aortic valve stenosis (JEFF 0.60 cm2, AVAi 0.31 cm2/m2, peak aortic jet velocity 4.5  m/s,MG 66 mmHg, ).    8 - Moderate to moderate-severe (3+) mitral regurgitation.    9 - Trivial to mild tricuspid regurgitation.    10 - Increased central venous pressure.    11 - Pulmonary hypertension. The estimated PA systolic pressure is 79 mmHg.     DJD (degenerative joint disease)     Dyslipidemia 10/17/2012    Essential hypertension 10/17/2012    History of psychiatric hospitalization     [facility] 2014, [facility] 1993    Hyponatremia 4/22/2016    Long term current use of anticoagulant therapy 11/9/2017    Major depressive disorder with single episode, in partial remission 8/17/2016    MDS (myelodysplastic syndrome) 2013    s/p Chemo     NSTEMI (non-ST elevated myocardial infarction) 4/25/2018    Persistent atrial fibrillation 3/11/2017    Pulmonary hypertension- April 2016- The estimated PA systolic pressure is 39 mmHg 8/17/2016    Rash, drug 3/22/2017    Syncope due to orthostatic hypotension with Type II NSTEMI 4/24/2018    Therapy     [facility]     Thrombocytopenia 4/1/2016       Past Surgical History:   Procedure Laterality Date    BONE MARROW BIOPSY  08/29/2016    CAROTID ENDARTERECTOMY Left 04/09/2013    Inguinal hernia      Left    KNEE SURGERY      Right x2    neck fusion      TONSILLECTOMY         Review of patient's allergies indicates:   Allergen Reactions    Lisinopril Edema     Cheek swelling    Augmentin [amoxicillin-pot clavulanate] Rash    Lipitor [atorvastatin] Other (See Comments)     Severe Muscle pain that caused pt not to sleep for 72 hours.       Family History   Problem Relation Age of Onset    Hyperlipidemia Brother        Social History     Social History    Marital status:      Spouse name: Agatha    Number of children: N/A    Years of education: N/A     Social History Main Topics    Smoking status: Never Smoker    Smokeless tobacco: Never Used    Alcohol use No    Drug use: No    Sexual activity: Yes     Partners:  "Female     Other Topics Concern    Patient Feels They Ought To Cut Down On Drinking/Drug Use No    Patient Annoyed By Others Criticizing Their Drinking/Drug Use No    Patient Has Felt Bad Or Guilty About Drinking/Drug Use No    Patient Has Had A Drink/Used Drugs As An Eye Opener In The [place] No     Social History Narrative    40+ years , no children, 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; good social support       Physical Exam  Vitals: BP (!) 142/63 (BP Location: Left arm, Patient Position: Lying)   Pulse 99   Temp 97.7 °F (36.5 °C) (Oral)   Resp 18   Ht 5' 11" (1.803 m)   Wt 74.8 kg (165 lb)   SpO2 98%   BMI 23.01 kg/m²    Gen: NAD  Head/Eyes/Ears/Nose: NCAT, MMM   Neck: Soft, supple  Lung: CTAB, eupneic  Heart: Irregular, systolic crescendo-decrescendo murmur auscultated  Abdomen: Soft, NT/ND, NABS  Extremities: Warm   Skin: Normal color and turgor  Neuro: AAOx3      Recent Labs  Lab 06/08/18  1135 06/15/18  0821   * 134*   K 4.7 4.7   CO2 23 24    103   BUN 21 21   CREATININE 1.3 1.2   * 90   CALCIUM 8.2* 8.4*   ALT 20  --    AST 31  --    ALKPHOS 66  --    BILITOT 1.0  --    PROT 8.8*  --    ALBUMIN 2.7*  --          Recent Labs  Lab 06/08/18  1135 06/15/18  0821   WBC 14.13* 18.55*   HGB 9.7* 8.9*   HCT 32.8* 29.5*   PLT 67* 99*   * 104*       Recent Labs  Lab 06/15/18  0821   APTT 22.0   INR 1.1     No results for input(s): CPK, CPKMB, TROPONINI, MB in the last 168 hours.     Results  TTE 4/2018   1 - Normal left ventricular systolic function (EF 60-65%).     2 - Concentric hypertrophy.     3 - No wall motion abnormalities.     4 - Indeterminate LV diastolic function.     5 - Biatrial enlargement.     6 - Right ventricle is upper limit of normal in size with normal systolic function.     7 - Severe aortic valve stenosis (JEFF 0.60 cm2, AVAi 0.31 cm2/m2, peak aortic jet velocity 4.5 m/s,MG 66 mmHg, ).     8 - Moderate to moderate-severe (3+) mitral regurgitation. "     9 - Trivial to mild tricuspid regurgitation.     10 - Increased central venous pressure.     11 - Pulmonary hypertension. The estimated PA systolic pressure is 79 mmHg.   ?  ASSESSMENT AND PLAN  Wil Kwon Jr. is a 73 y.o. male referred by Dr Rhodes for evaluation of severe AS (NYHA Class III sx).    Will proceed with palliative BAV today.       he has undergone the following TAVR work-up.  Not planning to proceed with angiogram or CTA at this time as risk of CI-BERNICE and he needs to have MDS controlled.     · ECHO (Date 4/24/2018): JEFF 0.60 cm2, AVAi 0.31 cm2/m2, peak velocity 4.5 m/s, MG 66 mmHg, EF= 65%.   · LHC (Date): Needs  · STS: 2.42% -pending cath  · Frailty: 2/4   · Iliacs are pending   · LVOT area by CTA is pending   · CT Incidental Findings: pending   · CTS risk assessment: pending   · Rhythm Issues: A Fib   · PFTs: Pending     Narciso Ruiz M.D.  Cardiology Fellow PGY-6  Pager [phone]

## 2018-06-15 NOTE — PLAN OF CARE
Slight ooze noted to r groin. Pt brought back to bed. Pressure applied x 10 minutes. New dressing applied. And pt sat up in chair per md gilmore.

## 2018-06-15 NOTE — PLAN OF CARE
amb with nurseaddison well. Slight ooze to r groin. Pressure held x 10 minutes. New dressing applied. Pt up to chair.

## 2018-06-15 NOTE — DISCHARGE SUMMARY
[facility]  Interventional Cardiology  Discharge Summary      Patient Name: Wil Kwon Jr.  MRN: [id number]  Admission Date: 6/15/2018  Hospital Length of Stay: 0 days  Discharge Date and Time:  06/16/2018  Attending Physician: Juan Carlos Altman MD  Discharging Provider: Bella Kessler MD  Primary Care Physician: LAILA Serra MD    HPI: Mr. Kwon is a 72 yo gentleman presenting for evaluation of AS. He has a PMHx of myelodysplastic syndrome s/p chemotherapy, aortic stenosis, pAfib on warfarin, L CEA in 2013. He has lost 30 lbs since being diagnosed with MDS a few years ago. He was recently admitted following a syncopal episode.      He walks up a flight of stairs at home with dyspnea, has to walk sideways in case he passes out.  He gets dyspneic with mopping floors or doing any normal housework, and struggles to go outside because heat worsens his fatigue and dyspnea.      He does have MDS with high risk features for progression to leukemia, followed by oncology.      Wil Kwon Jr. is a 73 y.o. male referred by Dr Rhodes for evaluation of severe AS (NYHA Class III sx).     he has undergone the following TAVR work-up.  Not planning to proceed with angiogram or CTA at this time as risk of CI-BERNICE and he needs to have MDS controlled. Who presents today for palliative BAV.       Procedure(s) (LRB):  Valve study-aortic (N/A)     Indwelling Lines/Drains at time of discharge:  Lines/Drains/Airways          No matching active lines, drains, or airways          Hospital Course Patient is s/p BAV with 24 mm balloon.  See cath report for full details.      Significant Diagnostic Studies: Labs:   BMP:   Recent Labs  Lab 06/15/18  0821   GLU 90   *   K 4.7      CO2 24   BUN 21   CREATININE 1.2   CALCIUM 8.4*       Pending Diagnostic Studies:     Procedure Component Value Units Date/Time    EKG 12-LEAD starting tomorrow [[id number]]     Order Status:  Sent Lab Status:  No result          Final Active Diagnoses:    Diagnosis Date Noted POA    PRINCIPAL PROBLEM:  Nodular calcific aortic valve stenosis [I35.0] 06/15/2018 Yes      Problems Resolved During this Admission:    Diagnosis Date Noted Date Resolved POA       Discharged Condition: good    Follow Up: F/u with home cardiologist in [place].  Cardiac diet.  No heavy lfiting x 1 week.    Patient Instructions:   No discharge procedures on file.  Medications:  Reconciled Home Medications:      Medication List      CONTINUE taking these medications    amiodarone 200 MG Tab  Commonly known as:  PACERONE  Take 1 tablet (200 mg total) by mouth once daily.     BOOST Liqd  Generic drug:  food supplemt, lactose-reduced  Take by mouth once daily.     clonazePAM 2 MG disintegrating tablet  Commonly known as:  KLONOPIN  Take 1 tablet (2 mg total) by mouth nightly as needed.     loratadine 10 mg tablet  Commonly known as:  CLARITIN  Take 1 tablet (10 mg total) by mouth 2 (two) times daily.     methylPREDNISolone 4 mg tablet  Commonly known as:  MEDROL (KEERTHI)  use as directed     metoprolol succinate 100 MG 24 hr tablet  Commonly known as:  TOPROL-XL  Take 1 tablet (100 mg total) by mouth once daily.     venlafaxine 75 MG 24 hr capsule  Commonly known as:  EFFEXOR-XR  Take 1 capsule (75 mg total) by mouth once daily.     warfarin 3 MG tablet  Commonly known as:  COUMADIN  Take 1 tablet (3 mg total) by mouth Daily.            Time spent on the discharge of patient: 35 minutes    Bella Kessler MD  Interventional Cardiology  [facility]

## 2018-06-15 NOTE — PLAN OF CARE
Received report from RUBY Ramirez. Patient s/p BAV, AAOx3. VSS, no c/o pain or discomfort at this time, resp even and unlabored. Gauze/tegaderm dressing to R groin is CDI. No active bleeding. No hematoma noted. Post procedure protocol reviewed with patient and patient's family. Understanding verbalized. Family members at bedside. Nurse call bell within reach. Will continue to monitor per post procedure protocol.

## 2018-06-16 VITALS
TEMPERATURE: 98 F | RESPIRATION RATE: 18 BRPM | DIASTOLIC BLOOD PRESSURE: 59 MMHG | HEIGHT: 71 IN | SYSTOLIC BLOOD PRESSURE: 134 MMHG | BODY MASS INDEX: 23.1 KG/M2 | HEART RATE: 82 BPM | OXYGEN SATURATION: 98 % | WEIGHT: 165 LBS

## 2018-06-16 LAB
ANION GAP SERPL CALC-SCNC: 7 MMOL/L
ANISOCYTOSIS BLD QL SMEAR: SLIGHT
BASOPHILS # BLD AUTO: 0.04 K/UL
BASOPHILS NFR BLD: 0.3 %
BUN SERPL-MCNC: 20 MG/DL
CALCIUM SERPL-MCNC: 7.6 MG/DL
CHLORIDE SERPL-SCNC: 101 MMOL/L
CO2 SERPL-SCNC: 22 MMOL/L
CREAT SERPL-MCNC: 1.3 MG/DL
DACRYOCYTES BLD QL SMEAR: ABNORMAL
DIFFERENTIAL METHOD: ABNORMAL
EOSINOPHIL # BLD AUTO: 0 K/UL
EOSINOPHIL NFR BLD: 0.1 %
ERYTHROCYTE [DISTWIDTH] IN BLOOD BY AUTOMATED COUNT: 19.2 %
EST. GFR  (AFRICAN AMERICAN): >60 ML/MIN/1.73 M^2
EST. GFR  (NON AFRICAN AMERICAN): 54.1 ML/MIN/1.73 M^2
GLUCOSE SERPL-MCNC: 87 MG/DL
HCT VFR BLD AUTO: 24.4 %
HGB BLD-MCNC: 7.4 G/DL
HYPOCHROMIA BLD QL SMEAR: ABNORMAL
IMM GRANULOCYTES # BLD AUTO: 0.35 K/UL
IMM GRANULOCYTES NFR BLD AUTO: 2.7 %
LYMPHOCYTES # BLD AUTO: 2.8 K/UL
LYMPHOCYTES NFR BLD: 21.2 %
MAGNESIUM SERPL-MCNC: 1.8 MG/DL
MCH RBC QN AUTO: 31.5 PG
MCHC RBC AUTO-ENTMCNC: 30.3 G/DL
MCV RBC AUTO: 104 FL
MONOCYTES # BLD AUTO: 0.8 K/UL
MONOCYTES NFR BLD: 5.8 %
NEUTROPHILS # BLD AUTO: 9.1 K/UL
NEUTROPHILS NFR BLD: 69.9 %
NRBC BLD-RTO: 1 /100 WBC
OVALOCYTES BLD QL SMEAR: ABNORMAL
PLATELET # BLD AUTO: 75 K/UL
PLATELET BLD QL SMEAR: ABNORMAL
PMV BLD AUTO: 13.9 FL
POIKILOCYTOSIS BLD QL SMEAR: SLIGHT
POLYCHROMASIA BLD QL SMEAR: ABNORMAL
POTASSIUM SERPL-SCNC: 3.9 MMOL/L
RBC # BLD AUTO: 2.35 M/UL
SCHISTOCYTES BLD QL SMEAR: ABNORMAL
SCHISTOCYTES BLD QL SMEAR: PRESENT
SODIUM SERPL-SCNC: 130 MMOL/L
STOMATOCYTES BLD QL SMEAR: PRESENT
WBC # BLD AUTO: 13 K/UL

## 2018-06-16 PROCEDURE — 80048 BASIC METABOLIC PNL TOTAL CA: CPT

## 2018-06-16 PROCEDURE — 85025 COMPLETE CBC W/AUTO DIFF WBC: CPT

## 2018-06-16 PROCEDURE — 83735 ASSAY OF MAGNESIUM: CPT

## 2018-06-16 PROCEDURE — 93010 ELECTROCARDIOGRAM REPORT: CPT | Mod: ,,, | Performed by: INTERNAL MEDICINE

## 2018-06-16 PROCEDURE — 36415 COLL VENOUS BLD VENIPUNCTURE: CPT

## 2018-06-16 RX ORDER — FUROSEMIDE 20 MG/1
20 TABLET ORAL DAILY PRN
Qty: 30 TABLET | Refills: 11 | Status: ON HOLD | OUTPATIENT
Start: 2018-06-16 | End: 2018-07-02 | Stop reason: HOSPADM

## 2018-06-16 NOTE — PLAN OF CARE
Problem: Patient Care Overview  Goal: Plan of Care Review  Outcome: Outcome(s) achieved Date Met: 06/16/18  Patient discharged per MD orders. Instructions given on medications, wound care, activity, signs of infection, when to call MD, and follow up appointments. Pt verbalized understanding.  Gauze dressing to right groin is c/d/i. No active bleeding. No hematoma noted. Patient AAOx3, VSS, no c/o pain or discomfort at this time. Telemetry and PIV removed. Patient left unit via wheelchair with transport and family.

## 2018-06-16 NOTE — PLAN OF CARE
Problem: Patient Care Overview  Goal: Plan of Care Review  Outcome: Ongoing (interventions implemented as appropriate)  Pt is aaox3. Vss. resp even and unlabored. Bed locked and in low position. Side rails raised x 2. Nurse call bell within reach. Pt denies any complaints at this time. Will continue to monitor.

## 2018-06-18 ENCOUNTER — INFUSION (OUTPATIENT)
Dept: INFUSION THERAPY | Facility: HOSPITAL | Age: 73
End: 2018-06-18
Attending: INTERNAL MEDICINE
Payer: MEDICARE

## 2018-06-18 DIAGNOSIS — D46.9 MDS (MYELODYSPLASTIC SYNDROME): Primary | ICD-10-CM

## 2018-06-18 NOTE — PROGRESS NOTES
Patient reports  He held his dosage for 3 days instead of 2 days. He had a serious (Nose Bleed) on 6/16/18. He reports holding 6/15 and 6/16. He did resume 6/17. Advised to continue, INR as planned.

## 2018-06-18 NOTE — NURSING
"Patient states he wants to wait until Friday to get his aranesp shot so he can keep his "weekly aranesp Friday schedule".  Latrice Burden RN notified and per Dr Mixon patient ok to wait to get injection on Friday.  Patient and wife updated on plan of care, verbalized understanding.  Discharged to home, nad  "

## 2018-06-19 ENCOUNTER — ANTI-COAG VISIT (OUTPATIENT)
Dept: CARDIOLOGY | Facility: CLINIC | Age: 73
End: 2018-06-19
Payer: MEDICARE

## 2018-06-19 DIAGNOSIS — Z79.01 LONG TERM CURRENT USE OF ANTICOAGULANT THERAPY: Primary | ICD-10-CM

## 2018-06-19 LAB — INR PPP: 1.3 (ref 2–3)

## 2018-06-19 PROCEDURE — 85610 PROTHROMBIN TIME: CPT | Mod: QW,S$GLB,, | Performed by: INTERNAL MEDICINE

## 2018-06-19 NOTE — PROGRESS NOTES
Quick follow up post aortic valve procedure and coumadin hold. Patient had reported a nose bleed on 6/16 and held coumadin. He resumed weekly dose on 6/17. INR subtherapeutic today but increasing. Bruising noted to arms from use. Patient only resumed 3 days so we will maintain this current dose and follow up on 6/22. Patient reminded to call coumadin clinic with any changes or concerns.

## 2018-06-22 ENCOUNTER — PATIENT MESSAGE (OUTPATIENT)
Dept: CARDIOLOGY | Facility: CLINIC | Age: 73
End: 2018-06-22

## 2018-06-22 ENCOUNTER — ANTI-COAG VISIT (OUTPATIENT)
Dept: CARDIOLOGY | Facility: CLINIC | Age: 73
End: 2018-06-22
Payer: MEDICARE

## 2018-06-22 ENCOUNTER — INFUSION (OUTPATIENT)
Dept: INFUSION THERAPY | Facility: HOSPITAL | Age: 73
End: 2018-06-22
Attending: INTERNAL MEDICINE
Payer: MEDICARE

## 2018-06-22 VITALS — SYSTOLIC BLOOD PRESSURE: 137 MMHG | DIASTOLIC BLOOD PRESSURE: 65 MMHG | RESPIRATION RATE: 18 BRPM | HEART RATE: 75 BPM

## 2018-06-22 DIAGNOSIS — Z79.01 LONG TERM CURRENT USE OF ANTICOAGULANT THERAPY: Primary | ICD-10-CM

## 2018-06-22 DIAGNOSIS — D46.9 MDS (MYELODYSPLASTIC SYNDROME): Primary | ICD-10-CM

## 2018-06-22 LAB — INR PPP: 1.5 (ref 2–3)

## 2018-06-22 PROCEDURE — 96372 THER/PROPH/DIAG INJ SC/IM: CPT

## 2018-06-22 PROCEDURE — 63600175 PHARM REV CODE 636 W HCPCS: Mod: JG,EA | Performed by: INTERNAL MEDICINE

## 2018-06-22 PROCEDURE — 85610 PROTHROMBIN TIME: CPT | Mod: QW,S$GLB,, | Performed by: INTERNAL MEDICINE

## 2018-06-22 RX ADMIN — DARBEPOETIN ALFA 300 MCG: 300 INJECTION, SOLUTION INTRAVENOUS; SUBCUTANEOUS at 02:06

## 2018-06-22 NOTE — NURSING
1440- Patient arrived ambulatory to the unit for injection today.  Labs were drawn earlier, reviewed by MD and orders signed.  Patient tolerated injection to abdomen and was discharged to home setting.  AVS given on discharge, patient to return next week for labs and injection.

## 2018-06-22 NOTE — LETTER
[facility]  [address]  [facility] [address]  Phone: [phone]  Fax: [phone]     2019         Wil Kwon Jr.  [address] [facility] [address]    Patient: Wil Kwon Jr.  MRN: [id number]  : 1945    Dear Mr Wil Kwon Jr.:    Our records indicate an appointment was missed to have a PT/INR checked.  We have made multiple attempts to contact you by phone and were unsuccessful.  It is extremely important that our clinic is contacted immediately in order to reschedule the missed appointment, as it is important that you are monitored regularly while taking Coumadin (warfarin).  If monitoring by our clinic is no longer required, please call and inform our clinic so that we may update our records and discontinue further attempts to reach you.      Please call the Coumadin Clinic immediately upon receiving this letter, failure to do so may result in discharge from the clinic.  No response to this letter within 7 days will result in discharge from our clinic.      Contact information for the Coumadin Clinic:    Phone: [phone]    Fax: [phone]      Thank You,    [facility] Staff

## 2018-06-25 ENCOUNTER — OFFICE VISIT (OUTPATIENT)
Dept: INTERNAL MEDICINE | Facility: CLINIC | Age: 73
End: 2018-06-25
Payer: MEDICARE

## 2018-06-25 ENCOUNTER — PATIENT MESSAGE (OUTPATIENT)
Dept: HEMATOLOGY/ONCOLOGY | Facility: CLINIC | Age: 73
End: 2018-06-25

## 2018-06-25 VITALS
WEIGHT: 161.19 LBS | BODY MASS INDEX: 22.56 KG/M2 | TEMPERATURE: 98 F | HEIGHT: 71 IN | HEART RATE: 79 BPM | RESPIRATION RATE: 18 BRPM | SYSTOLIC BLOOD PRESSURE: 137 MMHG | DIASTOLIC BLOOD PRESSURE: 62 MMHG

## 2018-06-25 DIAGNOSIS — J30.9 ALLERGIC SINUSITIS: ICD-10-CM

## 2018-06-25 DIAGNOSIS — J02.9 PHARYNGITIS, UNSPECIFIED ETIOLOGY: Primary | ICD-10-CM

## 2018-06-25 PROCEDURE — 3078F DIAST BP <80 MM HG: CPT | Mod: CPTII,S$GLB,, | Performed by: INTERNAL MEDICINE

## 2018-06-25 PROCEDURE — 3075F SYST BP GE 130 - 139MM HG: CPT | Mod: CPTII,S$GLB,, | Performed by: INTERNAL MEDICINE

## 2018-06-25 PROCEDURE — 99999 PR PBB SHADOW E&M-EST. PATIENT-LVL III: CPT | Mod: PBBFAC,,, | Performed by: INTERNAL MEDICINE

## 2018-06-25 PROCEDURE — 99214 OFFICE O/P EST MOD 30 MIN: CPT | Mod: S$GLB,,, | Performed by: INTERNAL MEDICINE

## 2018-06-25 RX ORDER — LEVOCETIRIZINE DIHYDROCHLORIDE 5 MG/1
5 TABLET, FILM COATED ORAL NIGHTLY
Qty: 30 TABLET | Refills: 3 | Status: SHIPPED | OUTPATIENT
Start: 2018-06-25 | End: 2019-06-25

## 2018-06-25 RX ORDER — DOXYCYCLINE 100 MG/1
100 CAPSULE ORAL 2 TIMES DAILY
Qty: 14 CAPSULE | Refills: 0 | Status: ON HOLD | OUTPATIENT
Start: 2018-06-25 | End: 2018-07-02 | Stop reason: HOSPADM

## 2018-06-25 RX ORDER — FLUTICASONE PROPIONATE 50 MCG
2 SPRAY, SUSPENSION (ML) NASAL DAILY
Qty: 16 G | Refills: 2 | Status: SHIPPED | OUTPATIENT
Start: 2018-06-25 | End: 2018-07-25

## 2018-06-25 NOTE — PROGRESS NOTES
I have reviewed the nurse's initial findings and agree with their assessment.  I have  assessed the patient in clinic to devise care plan.

## 2018-06-25 NOTE — PROGRESS NOTES
Quick follow up for subtherapeutic INR on 6/19. INR subtherapeutic today but slowly increasing. Patient denies any bleeding, bruising or other changes that may affect warfarin therapy. Since INR is increasing we will maintain this current dose and follow up in 1 week. Patient reminded to call coumadin clinic with any changes or concerns.

## 2018-06-25 NOTE — PROGRESS NOTES
Subjective:       Patient ID: Wil Kwon Jr. is a 73 y.o. male.    Chief Complaint: Sore Throat (x1week) and Swollen Neck (x1day)    HPI   Pt here for evaluation of 1 week of slowly worsening sore throat associated with anterior neck tenderness/swelling. A/s of sinus congestion, post nasal drip, cough. No fevers/chills.   Review of Systems   Constitutional: Negative for activity change, appetite change, chills, diaphoresis, fatigue, fever and unexpected weight change.   HENT: Positive for sore throat. Negative for congestion, drooling, ear discharge, ear pain, postnasal drip, rhinorrhea, sinus pressure, sneezing, trouble swallowing and voice change.    Respiratory: Positive for cough. Negative for shortness of breath, wheezing and stridor.    Cardiovascular: Negative for chest pain, palpitations and leg swelling.   Gastrointestinal: Negative for abdominal pain, blood in stool, constipation, diarrhea, nausea and vomiting.   Genitourinary: Negative for dysuria.   Musculoskeletal: Positive for neck pain. Negative for arthralgias and myalgias.   Skin: Negative for rash and wound.   Allergic/Immunologic: Negative for environmental allergies and food allergies.   Neurological: Negative for headaches.   Hematological: Negative for adenopathy. Does not bruise/bleed easily.       Objective:      Physical Exam   Constitutional: He is oriented to person, place, and time. He appears well-developed and well-nourished. No distress.   HENT:   Head: Normocephalic and atraumatic.   Right Ear: External ear normal.   Left Ear: External ear normal.   Nose: Mucosal edema and rhinorrhea present.   Mouth/Throat: Posterior oropharyngeal edema and posterior oropharyngeal erythema present. No oropharyngeal exudate or tonsillar abscesses.   Eyes: Conjunctivae and EOM are normal. Pupils are equal, round, and reactive to light. Right eye exhibits no discharge. Left eye exhibits no discharge. No scleral icterus.   Neck: Normal range of  motion. Neck supple. No JVD present.   Cardiovascular: Normal rate, regular rhythm and normal heart sounds.    No murmur heard.  Pulmonary/Chest: Effort normal and breath sounds normal. No respiratory distress. He has no wheezes. He has no rales.   Musculoskeletal: He exhibits no edema.   Lymphadenopathy:     He has cervical adenopathy (B/L).   Neurological: He is alert and oriented to person, place, and time.   Skin: Skin is warm and dry. No rash noted. He is not diaphoretic. No pallor.       Assessment:       1. Pharyngitis, unspecified etiology    2. Allergic sinusitis        Plan:    1. Rx Doxycycline 100 mg BID x 7 days   2. Rx Xyzal/Flonase

## 2018-06-28 ENCOUNTER — TELEPHONE (OUTPATIENT)
Dept: HEMATOLOGY/ONCOLOGY | Facility: CLINIC | Age: 73
End: 2018-06-28

## 2018-06-28 NOTE — TELEPHONE ENCOUNTER
Spoke to patient. Scheduled appointment for tomorrow.  Patient wanted to wait till tomorrow for appointment.  He states he is feeling weak and tired. Encouraged him to make sure he was drinking fluids and eating.  To call if symptoms worsen. Verbalized understanding  ----- Message from Chidi Mixon sent at 6/28/2018  9:11 AM CDT -----  Contact: Spouse-Agatha  Will like to be seen on today or tomorrow due to pt feeling weak and not too well     Contact::[phone]

## 2018-06-29 ENCOUNTER — OFFICE VISIT (OUTPATIENT)
Dept: HEMATOLOGY/ONCOLOGY | Facility: CLINIC | Age: 73
End: 2018-06-29
Payer: MEDICARE

## 2018-06-29 ENCOUNTER — TELEPHONE (OUTPATIENT)
Dept: HEMATOLOGY/ONCOLOGY | Facility: CLINIC | Age: 73
End: 2018-06-29

## 2018-06-29 ENCOUNTER — HOSPITAL ENCOUNTER (INPATIENT)
Facility: HOSPITAL | Age: 73
LOS: 4 days | Discharge: HOSPICE | DRG: 834 | End: 2018-07-03
Attending: INTERNAL MEDICINE | Admitting: INTERNAL MEDICINE
Payer: MEDICARE

## 2018-06-29 VITALS
HEIGHT: 71 IN | SYSTOLIC BLOOD PRESSURE: 116 MMHG | OXYGEN SATURATION: 97 % | HEART RATE: 89 BPM | WEIGHT: 161 LBS | TEMPERATURE: 98 F | BODY MASS INDEX: 22.54 KG/M2 | DIASTOLIC BLOOD PRESSURE: 56 MMHG

## 2018-06-29 DIAGNOSIS — D46.9 MDS (MYELODYSPLASTIC SYNDROME): ICD-10-CM

## 2018-06-29 DIAGNOSIS — R22.1 NECK MASS: ICD-10-CM

## 2018-06-29 DIAGNOSIS — N17.9 ACUTE KIDNEY INJURY: ICD-10-CM

## 2018-06-29 DIAGNOSIS — D72.829 LEUKOCYTOSIS, UNSPECIFIED TYPE: ICD-10-CM

## 2018-06-29 DIAGNOSIS — D63.0 ANEMIA IN NEOPLASTIC DISEASE: ICD-10-CM

## 2018-06-29 DIAGNOSIS — D46.9 MDS (MYELODYSPLASTIC SYNDROME): Primary | ICD-10-CM

## 2018-06-29 DIAGNOSIS — D69.6 THROMBOCYTOPENIA: ICD-10-CM

## 2018-06-29 LAB
ANION GAP SERPL CALC-SCNC: 10 MMOL/L
ANISOCYTOSIS BLD QL SMEAR: ABNORMAL
BASOPHILS # BLD AUTO: ABNORMAL K/UL
BASOPHILS NFR BLD: 0 %
BLD PROD TYP BPU: NORMAL
BLOOD UNIT EXPIRATION DATE: NORMAL
BLOOD UNIT TYPE CODE: 9500
BLOOD UNIT TYPE: NORMAL
BUN SERPL-MCNC: 48 MG/DL
CALCIUM SERPL-MCNC: 8.1 MG/DL
CHLORIDE SERPL-SCNC: 99 MMOL/L
CO2 SERPL-SCNC: 22 MMOL/L
CODING SYSTEM: NORMAL
CREAT SERPL-MCNC: 3.8 MG/DL
DIFFERENTIAL METHOD: ABNORMAL
DISPENSE STATUS: NORMAL
EOSINOPHIL # BLD AUTO: ABNORMAL K/UL
EOSINOPHIL NFR BLD: 0 %
ERYTHROCYTE [DISTWIDTH] IN BLOOD BY AUTOMATED COUNT: 20.9 %
EST. GFR  (AFRICAN AMERICAN): 17.1 ML/MIN/1.73 M^2
EST. GFR  (NON AFRICAN AMERICAN): 14.8 ML/MIN/1.73 M^2
FIBRINOGEN PPP-MCNC: 252 MG/DL
GLUCOSE SERPL-MCNC: 136 MG/DL
HCT VFR BLD AUTO: 23.2 %
HGB BLD-MCNC: 7.1 G/DL
HYPOCHROMIA BLD QL SMEAR: ABNORMAL
IMM GRANULOCYTES # BLD AUTO: ABNORMAL K/UL
IMM GRANULOCYTES NFR BLD AUTO: ABNORMAL %
INR PPP: 2.1
LDH SERPL L TO P-CCNC: 918 U/L
LYMPHOCYTES # BLD AUTO: ABNORMAL K/UL
LYMPHOCYTES NFR BLD: 12 %
MAGNESIUM SERPL-MCNC: 1.4 MG/DL
MCH RBC QN AUTO: 33 PG
MCHC RBC AUTO-ENTMCNC: 30.6 G/DL
MCV RBC AUTO: 108 FL
METAMYELOCYTES NFR BLD MANUAL: 3 %
MONOCYTES # BLD AUTO: ABNORMAL K/UL
MONOCYTES NFR BLD: 12 %
NEUTROPHILS NFR BLD: 53 %
NEUTS BAND NFR BLD MANUAL: 4 %
NRBC BLD-RTO: 2 /100 WBC
NUM UNITS TRANS WBC-POOR PLATPHERESIS: NORMAL
OVALOCYTES BLD QL SMEAR: ABNORMAL
PHOSPHATE SERPL-MCNC: 3.2 MG/DL
PLATELET # BLD AUTO: 8 K/UL
PLATELET BLD QL SMEAR: ABNORMAL
PMV BLD AUTO: ABNORMAL FL
POIKILOCYTOSIS BLD QL SMEAR: SLIGHT
POLYCHROMASIA BLD QL SMEAR: ABNORMAL
POTASSIUM SERPL-SCNC: 4.3 MMOL/L
PROMYELOCYTES NFR BLD MANUAL: 1 %
PROTHROMBIN TIME: 20.6 SEC
RBC # BLD AUTO: 2.15 M/UL
SODIUM SERPL-SCNC: 131 MMOL/L
SPHEROCYTES BLD QL SMEAR: ABNORMAL
URATE SERPL-MCNC: 14.2 MG/DL
WBC # BLD AUTO: 74.99 K/UL
WBC OTHER NFR BLD MANUAL: 15 %

## 2018-06-29 PROCEDURE — 84550 ASSAY OF BLOOD/URIC ACID: CPT

## 2018-06-29 PROCEDURE — 88237 TISSUE CULTURE BONE MARROW: CPT

## 2018-06-29 PROCEDURE — 81479 UNLISTED MOLECULAR PATHOLOGY: CPT

## 2018-06-29 PROCEDURE — 99223 1ST HOSP IP/OBS HIGH 75: CPT | Mod: ,,, | Performed by: INTERNAL MEDICINE

## 2018-06-29 PROCEDURE — 81450 HL NEO GSAP 5-50DNA/DNA&RNA: CPT

## 2018-06-29 PROCEDURE — 88275 CYTOGENETICS 100-300: CPT

## 2018-06-29 PROCEDURE — 80048 BASIC METABOLIC PNL TOTAL CA: CPT

## 2018-06-29 PROCEDURE — 88271 CYTOGENETICS DNA PROBE: CPT

## 2018-06-29 PROCEDURE — 84100 ASSAY OF PHOSPHORUS: CPT

## 2018-06-29 PROCEDURE — 99499 UNLISTED E&M SERVICE: CPT | Mod: S$GLB,,, | Performed by: INTERNAL MEDICINE

## 2018-06-29 PROCEDURE — 63600175 PHARM REV CODE 636 W HCPCS: Performed by: INTERNAL MEDICINE

## 2018-06-29 PROCEDURE — 85027 COMPLETE CBC AUTOMATED: CPT | Mod: 91

## 2018-06-29 PROCEDURE — 88313 SPECIAL STAINS GROUP 2: CPT | Mod: 26,,, | Performed by: PATHOLOGY

## 2018-06-29 PROCEDURE — 36430 TRANSFUSION BLD/BLD COMPNT: CPT

## 2018-06-29 PROCEDURE — 83615 LACTATE (LD) (LDH) ENZYME: CPT

## 2018-06-29 PROCEDURE — 99999 PR PBB SHADOW E&M-EST. PATIENT-LVL III: CPT | Mod: PBBFAC,,, | Performed by: INTERNAL MEDICINE

## 2018-06-29 PROCEDURE — 85097 BONE MARROW INTERPRETATION: CPT | Mod: ,,, | Performed by: PATHOLOGY

## 2018-06-29 PROCEDURE — 88299 UNLISTED CYTOGENETIC STUDY: CPT

## 2018-06-29 PROCEDURE — 38221 DX BONE MARROW BIOPSIES: CPT

## 2018-06-29 PROCEDURE — 20600001 HC STEP DOWN PRIVATE ROOM

## 2018-06-29 PROCEDURE — 25000003 PHARM REV CODE 250: Performed by: INTERNAL MEDICINE

## 2018-06-29 PROCEDURE — 25000003 PHARM REV CODE 250: Performed by: NURSE PRACTITIONER

## 2018-06-29 PROCEDURE — 88311 DECALCIFY TISSUE: CPT | Mod: 26,,, | Performed by: PATHOLOGY

## 2018-06-29 PROCEDURE — 85007 BL SMEAR W/DIFF WBC COUNT: CPT | Mod: 91

## 2018-06-29 PROCEDURE — 3074F SYST BP LT 130 MM HG: CPT | Mod: CPTII,S$GLB,, | Performed by: INTERNAL MEDICINE

## 2018-06-29 PROCEDURE — 81001 URINALYSIS AUTO W/SCOPE: CPT

## 2018-06-29 PROCEDURE — 88305 TISSUE EXAM BY PATHOLOGIST: CPT | Performed by: PATHOLOGY

## 2018-06-29 PROCEDURE — P9037 PLATE PHERES LEUKOREDU IRRAD: HCPCS

## 2018-06-29 PROCEDURE — 88313 SPECIAL STAINS GROUP 2: CPT

## 2018-06-29 PROCEDURE — 3078F DIAST BP <80 MM HG: CPT | Mod: CPTII,S$GLB,, | Performed by: INTERNAL MEDICINE

## 2018-06-29 PROCEDURE — 88184 FLOWCYTOMETRY/ TC 1 MARKER: CPT | Performed by: PATHOLOGY

## 2018-06-29 PROCEDURE — 85610 PROTHROMBIN TIME: CPT

## 2018-06-29 PROCEDURE — 85384 FIBRINOGEN ACTIVITY: CPT

## 2018-06-29 PROCEDURE — 07DR3ZX EXTRACTION OF ILIAC BONE MARROW, PERCUTANEOUS APPROACH, DIAGNOSTIC: ICD-10-PCS | Performed by: INTERNAL MEDICINE

## 2018-06-29 PROCEDURE — 88305 TISSUE EXAM BY PATHOLOGIST: CPT | Mod: 26,,, | Performed by: PATHOLOGY

## 2018-06-29 PROCEDURE — 87040 BLOOD CULTURE FOR BACTERIA: CPT

## 2018-06-29 PROCEDURE — 83735 ASSAY OF MAGNESIUM: CPT

## 2018-06-29 PROCEDURE — 38222 DX BONE MARROW BX & ASPIR: CPT | Mod: RT,,, | Performed by: NURSE PRACTITIONER

## 2018-06-29 PROCEDURE — 99215 OFFICE O/P EST HI 40 MIN: CPT | Mod: S$GLB,,, | Performed by: INTERNAL MEDICINE

## 2018-06-29 PROCEDURE — 88185 FLOWCYTOMETRY/TC ADD-ON: CPT | Mod: 59 | Performed by: PATHOLOGY

## 2018-06-29 PROCEDURE — 36415 COLL VENOUS BLD VENIPUNCTURE: CPT

## 2018-06-29 PROCEDURE — 88189 FLOWCYTOMETRY/READ 16 & >: CPT | Mod: ,,, | Performed by: PATHOLOGY

## 2018-06-29 PROCEDURE — 88264 CHROMOSOME ANALYSIS 20-25: CPT

## 2018-06-29 RX ORDER — METOPROLOL SUCCINATE 50 MG/1
100 TABLET, EXTENDED RELEASE ORAL DAILY
Status: DISCONTINUED | OUTPATIENT
Start: 2018-06-30 | End: 2018-07-03 | Stop reason: HOSPADM

## 2018-06-29 RX ORDER — ACETAMINOPHEN 325 MG/1
650 TABLET ORAL EVERY 6 HOURS PRN
Status: DISCONTINUED | OUTPATIENT
Start: 2018-06-29 | End: 2018-07-03 | Stop reason: HOSPADM

## 2018-06-29 RX ORDER — CETIRIZINE HYDROCHLORIDE 5 MG/1
5 TABLET ORAL NIGHTLY
Status: DISCONTINUED | OUTPATIENT
Start: 2018-06-29 | End: 2018-07-03 | Stop reason: HOSPADM

## 2018-06-29 RX ORDER — AMIODARONE HYDROCHLORIDE 200 MG/1
200 TABLET ORAL DAILY
Status: DISCONTINUED | OUTPATIENT
Start: 2018-06-30 | End: 2018-07-03 | Stop reason: HOSPADM

## 2018-06-29 RX ORDER — ACETAMINOPHEN 325 MG/1
650 TABLET ORAL ONCE
Status: DISCONTINUED | OUTPATIENT
Start: 2018-06-29 | End: 2018-06-29

## 2018-06-29 RX ORDER — LIDOCAINE HYDROCHLORIDE 20 MG/ML
20 INJECTION, SOLUTION INFILTRATION; PERINEURAL ONCE
Status: COMPLETED | OUTPATIENT
Start: 2018-06-29 | End: 2018-06-29

## 2018-06-29 RX ORDER — CLONAZEPAM 0.25 MG/1
2 TABLET, ORALLY DISINTEGRATING ORAL NIGHTLY PRN
Status: DISCONTINUED | OUTPATIENT
Start: 2018-06-29 | End: 2018-07-03 | Stop reason: HOSPADM

## 2018-06-29 RX ORDER — MAGNESIUM SULFATE HEPTAHYDRATE 40 MG/ML
2 INJECTION, SOLUTION INTRAVENOUS ONCE
Status: COMPLETED | OUTPATIENT
Start: 2018-06-29 | End: 2018-06-30

## 2018-06-29 RX ORDER — HYDROCODONE BITARTRATE AND ACETAMINOPHEN 500; 5 MG/1; MG/1
TABLET ORAL
Status: DISCONTINUED | OUTPATIENT
Start: 2018-06-29 | End: 2018-06-29

## 2018-06-29 RX ORDER — DIPHENHYDRAMINE HCL 25 MG
25 CAPSULE ORAL ONCE
Status: DISCONTINUED | OUTPATIENT
Start: 2018-06-29 | End: 2018-06-29

## 2018-06-29 RX ORDER — SODIUM CHLORIDE 9 MG/ML
INJECTION, SOLUTION INTRAVENOUS CONTINUOUS
Status: DISCONTINUED | OUTPATIENT
Start: 2018-06-29 | End: 2018-06-30

## 2018-06-29 RX ORDER — HYDROCODONE BITARTRATE AND ACETAMINOPHEN 500; 5 MG/1; MG/1
TABLET ORAL
Status: DISCONTINUED | OUTPATIENT
Start: 2018-06-29 | End: 2018-07-02

## 2018-06-29 RX ORDER — HYDROXYUREA 500 MG/1
1000 CAPSULE ORAL 2 TIMES DAILY
Status: DISCONTINUED | OUTPATIENT
Start: 2018-06-29 | End: 2018-07-02

## 2018-06-29 RX ORDER — DIPHENHYDRAMINE HCL 25 MG
25 CAPSULE ORAL EVERY 6 HOURS PRN
Status: DISCONTINUED | OUTPATIENT
Start: 2018-06-29 | End: 2018-07-03 | Stop reason: HOSPADM

## 2018-06-29 RX ORDER — SODIUM CHLORIDE 0.9 % (FLUSH) 0.9 %
5 SYRINGE (ML) INJECTION
Status: DISCONTINUED | OUTPATIENT
Start: 2018-06-29 | End: 2018-07-03 | Stop reason: HOSPADM

## 2018-06-29 RX ORDER — VENLAFAXINE HYDROCHLORIDE 37.5 MG/1
37.5 CAPSULE, EXTENDED RELEASE ORAL DAILY
Status: DISCONTINUED | OUTPATIENT
Start: 2018-06-30 | End: 2018-07-01

## 2018-06-29 RX ORDER — ALLOPURINOL 100 MG/1
300 TABLET ORAL DAILY
Status: DISCONTINUED | OUTPATIENT
Start: 2018-06-29 | End: 2018-07-01

## 2018-06-29 RX ORDER — VENLAFAXINE HYDROCHLORIDE 37.5 MG/1
75 CAPSULE, EXTENDED RELEASE ORAL DAILY
Status: DISCONTINUED | OUTPATIENT
Start: 2018-06-30 | End: 2018-06-29

## 2018-06-29 RX ADMIN — ACETAMINOPHEN 650 MG: 325 TABLET ORAL at 04:06

## 2018-06-29 RX ADMIN — DIPHENHYDRAMINE HYDROCHLORIDE 25 MG: 25 CAPSULE ORAL at 04:06

## 2018-06-29 RX ADMIN — Medication 5 MG: at 11:06

## 2018-06-29 RX ADMIN — SODIUM CHLORIDE: 0.9 INJECTION, SOLUTION INTRAVENOUS at 09:06

## 2018-06-29 RX ADMIN — LIDOCAINE HYDROCHLORIDE 20 MG: 20 INJECTION, SOLUTION INFILTRATION; PERINEURAL at 03:06

## 2018-06-29 RX ADMIN — SODIUM CHLORIDE 6 MG: 9 INJECTION, SOLUTION INTRAVENOUS at 11:06

## 2018-06-29 RX ADMIN — CETIRIZINE HYDROCHLORIDE 5 MG: 5 TABLET, FILM COATED ORAL at 09:06

## 2018-06-29 RX ADMIN — HYDROXYUREA 1000 MG: 500 CAPSULE ORAL at 11:06

## 2018-06-29 RX ADMIN — CLONAZEPAM 2 MG: 0.25 TABLET, ORALLY DISINTEGRATING ORAL at 09:06

## 2018-06-29 RX ADMIN — ALLOPURINOL 300 MG: 100 TABLET ORAL at 11:06

## 2018-06-29 RX ADMIN — AMPICILLIN SODIUM AND SULBACTAM SODIUM 3 G: 2; 1 INJECTION, POWDER, FOR SOLUTION INTRAMUSCULAR; INTRAVENOUS at 09:06

## 2018-06-29 NOTE — PLAN OF CARE
Problem: Patient Care Overview  Goal: Plan of Care Review  Outcome: Ongoing (interventions implemented as appropriate)  Patient AAOx4, VSS, afebrile, and without injury. Fall precautions maintained. Patient oriented to the unit and instructed on how to contact the nurse. No complaints of pain or nausea. Patient on room air without distress; on regular diet with moderate appetite. Bone marrow biopsy performed on patient's right. Dressing is clean, dry, intact. Platelets administered--one unit; tolerated well. Patient to go down for ultrasound of mass on left neck. Questions and concerns have been addressed; will continue to monitor.

## 2018-06-29 NOTE — PROCEDURES
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  Timeout: Done and documented.  Site: Right posterior illiac crest.  Position: Prone  Prep: Betadine.  Needle used: 11 gauge Jamshidi needle.  Anesthetic: 2% lidocaine 8 cc.  Biopsy: The biopsy needle was introduced into the marrow cavity and an aspirate was obtained without complications and sent for flow cytometry, AML FISH, FLT3, NGS, DNA/RNA hold, and cytogenetics. Core biopsy obtained without difficulty and sent for routine histologic examination.  Complications: None.  Disposition: Inpatient. RN to assess BMBX site for bleeding. Pt to lie flat on back x30 minutes after procedure.   Minimal blood loss    Faby Liu DNP, FNP  Hematology/Bone Marrow Transplant

## 2018-06-29 NOTE — PROGRESS NOTES
"HEMATOLOGIC MALIGNANCIES PROGRESS NOTE    IDENTIFYING STATEMENT   Wil Kwon Jr. (Wil) is a 73 y.o. male with a  of 1945 from [place] with the diagnosis of myelodysplastic syndrome.      ONCOLOGY HISTORY:    From Dr. Slater's note     Mr. 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 He was 's patient. He has a history of PAF followed by our electrophysiology cardiology department and is chronically anticoagulated. He has moderate aortic stenosis. He was noted to be progressively anemic . Hematology workup revealed a normal B12 and folate. His iron stores were normal. His reticulocyte count was slightly elevated at 4.2. His WBC count was low and was progressively dropping over the last 3 years with monocytosis. He also has developed mild progressive thrombocytopenia. He is moderately symptomatic with the fatigue. He has no history of extrinsic GI bleeding. He also has no history of previous transfusions.He underwent a marrow biopsy in Aug 2016. 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 Of 20 metaphases, 2 metaphases were normal and 18 metaphases had a 2;21 translocation. 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 Clinical and pathologic correlation is recommended."     INTERVAL HISTORY:      Mr. Kwon returns to clinic for follow-up of his myelodysplastic syndrome. He is feeling very weak and poorly overall. He had aortic valvuloplasty on 6/15 for his aortic stenosis.    Recently, he started to have a febrile illness. He saw his PCP on  and was prescribed antibiotics for left neck swelling/tenderness that was new. He reported sore throat and cough at the time. He sent me a [employer] message after this asking for " advice, and I advised him to follow his PCPs advice and call back if he failed to improve.    He continued to get weaker and feel worse. He has ongoing pain. He called yesterday and requested an appointment today. He is hardly able to walk, even with a walker or cane.    Past Medical History, Past Social History and Past Family History have been reviewed and are unchanged except as noted in the interval history.    MEDICATIONS:     Current Outpatient Prescriptions on File Prior to Visit   Medication Sig Dispense Refill    amiodarone (PACERONE) 200 MG Tab Take 1 tablet (200 mg total) by mouth once daily. 30 tablet 11    clonazePAM (KLONOPIN) 2 MG disintegrating tablet Take 1 tablet (2 mg total) by mouth nightly as needed. 90 tablet 0    doxycycline (MONODOX) 100 MG capsule Take 1 capsule (100 mg total) by mouth 2 (two) times daily. 14 capsule 0    fluticasone (FLONASE) 50 mcg/actuation nasal spray 2 sprays (100 mcg total) by Each Nare route once daily. 16 g 2    food supplemt, lactose-reduced (BOOST) Liqd Take by mouth once daily.      furosemide (LASIX) 20 MG tablet Take 1 tablet (20 mg total) by mouth daily as needed. Take 1 tablet for 3-5 lb weight gain 30 tablet 11    levocetirizine (XYZAL) 5 MG tablet Take 1 tablet (5 mg total) by mouth every evening. 30 tablet 3    metoprolol succinate (TOPROL-XL) 100 MG 24 hr tablet Take 1 tablet (100 mg total) by mouth once daily. 90 tablet 4    venlafaxine (EFFEXOR-XR) 75 MG 24 hr capsule Take 1 capsule (75 mg total) by mouth once daily. 30 capsule 11    warfarin (COUMADIN) 3 MG tablet Take 1 tablet (3 mg total) by mouth Daily.      [DISCONTINUED] loratadine (CLARITIN) 10 mg tablet Take 1 tablet (10 mg total) by mouth 2 (two) times daily. 60 tablet 2     No current facility-administered medications on file prior to visit.        ALLERGIES:   Review of patient's allergies indicates:   Allergen Reactions    Lipitor [atorvastatin]      Muscle pain    Lisinopril  "Edema     Cheek swelling    Augmentin [amoxicillin-pot clavulanate] Rash        ROS:       Review of Systems   Constitutional: Positive for fatigue, fever and unexpected weight change. Negative for diaphoresis.   HENT:   Positive for lump/mass. Negative for sore throat.    Eyes: Negative for icterus.   Respiratory: Positive for cough. Negative for shortness of breath.    Cardiovascular: Negative for chest pain and palpitations.   Gastrointestinal: Negative for abdominal distention, constipation, diarrhea, nausea and vomiting.   Genitourinary: Negative for dysuria and frequency.    Musculoskeletal: Negative for arthralgias, gait problem and myalgias.   Skin: Positive for rash.   Neurological: Negative for dizziness, gait problem and headaches.   Hematological: Negative for adenopathy. Does not bruise/bleed easily.   Psychiatric/Behavioral: The patient is not nervous/anxious.        PHYSICAL EXAM:  Vitals:    06/29/18 0953   BP: (!) 116/56   Pulse: 89   Temp: 98.3 °F (36.8 °C)   TempSrc: Oral   SpO2: 97%   Weight: 73 kg (161 lb)   Height: 5' 11" (1.803 m)   PainSc: 0-No pain   Body mass index is 22.45 kg/m².    KARNOFSKY PERFORMANCE STATUS 50%  ECOG 3    Physical Exam   Constitutional: He is oriented to person, place, and time. He appears well-developed and well-nourished. No distress.   HENT:   Head: Normocephalic and atraumatic.   Mouth/Throat: Mucous membranes are normal. No oral lesions.   The oral mucosa has many petechia. The lips are dry.    Eyes: Conjunctivae are normal.   Neck: No thyromegaly present.   There is a right-sided neck mass that is firm. It is nontender to touch, though it is painful internally per patient. There is no surrounding erythema.    Cardiovascular: Normal rate, regular rhythm and normal heart sounds.    No murmur heard.  Pulmonary/Chest: Breath sounds normal. He has no wheezes. He has no rales.   Abdominal: Soft. He exhibits no distension and no mass. There is no splenomegaly or " hepatomegaly. There is no tenderness.   Lymphadenopathy:     He has no cervical adenopathy.        Right cervical: No deep cervical adenopathy present.       Left cervical: No deep cervical adenopathy present.     He has no axillary adenopathy.        Right: No inguinal adenopathy present.        Left: No inguinal adenopathy present.   Neurological: He is alert and oriented to person, place, and time. He has normal strength and normal reflexes. No cranial nerve deficit. Coordination normal.   Skin: No rash noted.       LAB:   Results for orders placed or performed in visit on 06/29/18   Rapid BMT CBC with Diff   Result Value Ref Range    WBC 58.27 (HH) 3.90 - 12.70 K/uL    RBC 2.53 (L) 4.60 - 6.20 M/uL    Hemoglobin 8.4 (L) 14.0 - 18.0 g/dL    Hematocrit 27.4 (L) 40.0 - 54.0 %     (H) 82 - 98 fL    MCH 33.2 (H) 27.0 - 31.0 pg    MCHC 30.7 (L) 32.0 - 36.0 g/dL    RDW 21.3 (H) 11.5 - 14.5 %    Platelets 6 (LL) 150 - 350 K/uL    MPV SEE COMMENT 9.2 - 12.9 fL    Immature Granulocytes 11.3 (H) 0.0 - 0.5 %    Gran # (ANC) 28.7 (H) 1.8 - 7.7 K/uL    Immature Grans (Abs) 6.56 (H) 0.00 - 0.04 K/uL    Lymph # 8.2 (H) 1.0 - 4.8 K/uL    Mono # 14.5 (H) 0.3 - 1.0 K/uL    Eos # 0.2 0.0 - 0.5 K/uL    Baso # 0.15 0.00 - 0.20 K/uL    nRBC 2 (A) 0 /100 WBC    Gran% 49.2 38.0 - 73.0 %    Lymph% 14.1 (L) 18.0 - 48.0 %    Mono% 24.8 (H) 4.0 - 15.0 %    Eosinophil% 0.3 0.0 - 8.0 %    Basophil% 0.3 0.0 - 1.9 %   Comprehensive metabolic panel   Result Value Ref Range    Sodium 130 (L) 136 - 145 mmol/L    Potassium 4.0 3.5 - 5.1 mmol/L    Chloride 99 95 - 110 mmol/L    CO2 21 (L) 23 - 29 mmol/L    Glucose 170 (H) 70 - 110 mg/dL    BUN, Bld 35 (H) 8 - 23 mg/dL    Creatinine 2.9 (H) 0.5 - 1.4 mg/dL    Calcium 8.2 (L) 8.7 - 10.5 mg/dL    Total Protein 8.3 6.0 - 8.4 g/dL    Albumin 2.1 (L) 3.5 - 5.2 g/dL    Total Bilirubin 0.8 0.1 - 1.0 mg/dL    Alkaline Phosphatase 81 55 - 135 U/L    AST 45 (H) 10 - 40 U/L    ALT 14 10 - 44 U/L    Anion  Gap 10 8 - 16 mmol/L    eGFR if African American 23.7 (A) >60 mL/min/1.73 m^2    eGFR if non African American 20.5 (A) >60 mL/min/1.73 m^2   Type & Screen   Result Value Ref Range    Group & Rh O POS     Indirect Dora POS        PROBLEMS ASSESSED THIS VISIT:    1. MDS (myelodysplastic syndrome)    2. Anemia in neoplastic disease    3. Thrombocytopenia    4. Leukocytosis, unspecified type    5. Neck mass        PLAN:       Mr. Kwon presents with acute decompensation, new and rapidly progressive leukocytosis, acute kidney, and weakness. I am concerned that he either has a focal infection in the R neck, a new malignancy, or has progressed to acute myeloid leukemia.    He requires inpatient management of the above problems to resolve his BERNICE and obtain a definitive diagnosis about his leukocytosis. He will likely need the following:  - Imaging of the neck, ENT consult  - Bone marrow biopsy to evaluate for transformation to AML  - Empiric antibiotic therapy (would recommend anaerobic coverage, so pip/tazo would be appropriate)  - Platelet transfusion for thrombocytopenia  - Stop warfarin given severe thrombocytopenia. reverse INR with Vitamin K. If bleeding, give PCCs.   - After imaging, consider diagnostic procedure on neck mass    If he has progression to AML or a new malignancy, his prognosis is very poor overall. He should be considered appropriate for hospice. He is previously refractory to hypomethylating therapy for MDS.     Follow-up after hospitalization    Jhonny Mixon MD  Hematology and Stem Cell Transplant

## 2018-06-30 PROBLEM — E88.3 TUMOR LYSIS SYNDROME: Status: ACTIVE | Noted: 2018-06-30

## 2018-06-30 PROBLEM — Z91.89 AT RISK FOR FLUID VOLUME OVERLOAD: Status: ACTIVE | Noted: 2018-06-30

## 2018-06-30 LAB
ALBUMIN SERPL BCP-MCNC: 2.1 G/DL
ALBUMIN SERPL BCP-MCNC: 2.3 G/DL
ALP SERPL-CCNC: 86 U/L
ALT SERPL W/O P-5'-P-CCNC: 14 U/L
ANION GAP SERPL CALC-SCNC: 12 MMOL/L
ANION GAP SERPL CALC-SCNC: 13 MMOL/L
ANISOCYTOSIS BLD QL SMEAR: SLIGHT
AST SERPL-CCNC: 49 U/L
BACTERIA #/AREA URNS AUTO: ABNORMAL /HPF
BASOPHILS # BLD AUTO: ABNORMAL K/UL
BASOPHILS NFR BLD: 0 %
BILIRUB SERPL-MCNC: 0.9 MG/DL
BILIRUB UR QL STRIP: NEGATIVE
BLD PROD TYP BPU: NORMAL
BLD PROD TYP BPU: NORMAL
BLOOD UNIT EXPIRATION DATE: NORMAL
BLOOD UNIT EXPIRATION DATE: NORMAL
BLOOD UNIT TYPE CODE: 6200
BLOOD UNIT TYPE CODE: 9500
BLOOD UNIT TYPE: NORMAL
BLOOD UNIT TYPE: NORMAL
BODY SITE - BONE MARROW: NORMAL
BUN SERPL-MCNC: 56 MG/DL
BUN SERPL-MCNC: 67 MG/DL
CALCIUM SERPL-MCNC: 7.7 MG/DL
CALCIUM SERPL-MCNC: 7.9 MG/DL
CHLORIDE SERPL-SCNC: 97 MMOL/L
CHLORIDE SERPL-SCNC: 97 MMOL/L
CLARITY UR REFRACT.AUTO: ABNORMAL
CLINICAL DIAGNOSIS - BONE MARROW: NORMAL
CO2 SERPL-SCNC: 20 MMOL/L
CO2 SERPL-SCNC: 20 MMOL/L
CODING SYSTEM: NORMAL
CODING SYSTEM: NORMAL
COLOR UR AUTO: ABNORMAL
CREAT SERPL-MCNC: 4.7 MG/DL
CREAT SERPL-MCNC: 5.5 MG/DL
CREAT UR-MCNC: 63 MG/DL
DIFFERENTIAL METHOD: ABNORMAL
DISPENSE STATUS: NORMAL
DISPENSE STATUS: NORMAL
EOSINOPHIL # BLD AUTO: ABNORMAL K/UL
EOSINOPHIL NFR BLD: 0 %
ERYTHROCYTE [DISTWIDTH] IN BLOOD BY AUTOMATED COUNT: 20.4 %
EST. GFR  (AFRICAN AMERICAN): 10.9 ML/MIN/1.73 M^2
EST. GFR  (AFRICAN AMERICAN): 13.2 ML/MIN/1.73 M^2
EST. GFR  (NON AFRICAN AMERICAN): 11.4 ML/MIN/1.73 M^2
EST. GFR  (NON AFRICAN AMERICAN): 9.5 ML/MIN/1.73 M^2
FIBRINOGEN PPP-MCNC: 244 MG/DL
FLOW CYTOMETRY ANTIBODIES ANALYZED - BONE MARROW: NORMAL
FLOW CYTOMETRY COMMENT - BONE MARROW: NORMAL
FLOW CYTOMETRY INTERPRETATION - BONE MARROW: NORMAL
GLUCOSE SERPL-MCNC: 127 MG/DL
GLUCOSE SERPL-MCNC: 128 MG/DL
GLUCOSE UR QL STRIP: ABNORMAL
HCT VFR BLD AUTO: 24.3 %
HGB BLD-MCNC: 7.5 G/DL
HGB UR QL STRIP: ABNORMAL
HYALINE CASTS UR QL AUTO: 52 /LPF
HYPOCHROMIA BLD QL SMEAR: ABNORMAL
IMM GRANULOCYTES # BLD AUTO: ABNORMAL K/UL
IMM GRANULOCYTES NFR BLD AUTO: ABNORMAL %
INR PPP: 1.8
KETONES UR QL STRIP: NEGATIVE
LEUKOCYTE ESTERASE UR QL STRIP: NEGATIVE
LYMPHOCYTES # BLD AUTO: ABNORMAL K/UL
LYMPHOCYTES NFR BLD: 14 %
MAGNESIUM SERPL-MCNC: 2.3 MG/DL
MCH RBC QN AUTO: 32.8 PG
MCHC RBC AUTO-ENTMCNC: 30.9 G/DL
MCV RBC AUTO: 106 FL
METAMYELOCYTES NFR BLD MANUAL: 1 %
MICROSCOPIC COMMENT: ABNORMAL
MONOCYTES # BLD AUTO: ABNORMAL K/UL
MONOCYTES NFR BLD: 15 %
NEUTROPHILS NFR BLD: 63 %
NEUTS BAND NFR BLD MANUAL: 1 %
NITRITE UR QL STRIP: NEGATIVE
NON-SQ EPI CELLS #/AREA URNS AUTO: 2 /HPF
NRBC BLD-RTO: 2 /100 WBC
NUM UNITS TRANS WBC-POOR PLATPHERESIS: NORMAL
NUM UNITS TRANS WBC-POOR PLATPHERESIS: NORMAL
OSMOLALITY SERPL: 302 MOSM/KG
OSMOLALITY UR: <50 MOSM/KG
OVALOCYTES BLD QL SMEAR: ABNORMAL
PATH REV BLD -IMP: NORMAL
PH UR STRIP: 5 [PH] (ref 5–8)
PHOSPHATE SERPL-MCNC: 3.2 MG/DL
PHOSPHATE SERPL-MCNC: 3.8 MG/DL
PLATELET # BLD AUTO: 5 K/UL
PLATELET BLD QL SMEAR: ABNORMAL
PMV BLD AUTO: ABNORMAL FL
POIKILOCYTOSIS BLD QL SMEAR: SLIGHT
POLYCHROMASIA BLD QL SMEAR: ABNORMAL
POTASSIUM SERPL-SCNC: 4 MMOL/L
POTASSIUM SERPL-SCNC: 4.2 MMOL/L
PROT SERPL-MCNC: 7.5 G/DL
PROT UR QL STRIP: ABNORMAL
PROT UR-MCNC: 1352 MG/DL
PROT/CREAT RATIO, UR: 21.46
PROTHROMBIN TIME: 17.8 SEC
RBC # BLD AUTO: 2.29 M/UL
RBC #/AREA URNS AUTO: 29 /HPF (ref 0–4)
SODIUM SERPL-SCNC: 129 MMOL/L
SODIUM SERPL-SCNC: 130 MMOL/L
SODIUM UR-SCNC: <20 MMOL/L
SP GR UR STRIP: 1.01 (ref 1–1.03)
SQUAMOUS #/AREA URNS AUTO: 1 /HPF
URATE SERPL-MCNC: 8 MG/DL
URN SPEC COLLECT METH UR: ABNORMAL
UROBILINOGEN UR STRIP-ACNC: NEGATIVE EU/DL
WBC # BLD AUTO: 52.82 K/UL
WBC #/AREA URNS AUTO: 15 /HPF (ref 0–5)
WBC OTHER NFR BLD MANUAL: 6 %

## 2018-06-30 PROCEDURE — 84100 ASSAY OF PHOSPHORUS: CPT

## 2018-06-30 PROCEDURE — 82570 ASSAY OF URINE CREATININE: CPT

## 2018-06-30 PROCEDURE — 85007 BL SMEAR W/DIFF WBC COUNT: CPT

## 2018-06-30 PROCEDURE — 63600175 PHARM REV CODE 636 W HCPCS: Performed by: STUDENT IN AN ORGANIZED HEALTH CARE EDUCATION/TRAINING PROGRAM

## 2018-06-30 PROCEDURE — 83935 ASSAY OF URINE OSMOLALITY: CPT

## 2018-06-30 PROCEDURE — 85610 PROTHROMBIN TIME: CPT

## 2018-06-30 PROCEDURE — 84300 ASSAY OF URINE SODIUM: CPT

## 2018-06-30 PROCEDURE — 99233 SBSQ HOSP IP/OBS HIGH 50: CPT | Mod: ,,, | Performed by: INTERNAL MEDICINE

## 2018-06-30 PROCEDURE — 99223 1ST HOSP IP/OBS HIGH 75: CPT | Mod: ,,, | Performed by: INTERNAL MEDICINE

## 2018-06-30 PROCEDURE — P9037 PLATE PHERES LEUKOREDU IRRAD: HCPCS

## 2018-06-30 PROCEDURE — 20600001 HC STEP DOWN PRIVATE ROOM

## 2018-06-30 PROCEDURE — 83930 ASSAY OF BLOOD OSMOLALITY: CPT

## 2018-06-30 PROCEDURE — 84550 ASSAY OF BLOOD/URIC ACID: CPT

## 2018-06-30 PROCEDURE — 36415 COLL VENOUS BLD VENIPUNCTURE: CPT

## 2018-06-30 PROCEDURE — 83735 ASSAY OF MAGNESIUM: CPT

## 2018-06-30 PROCEDURE — 80069 RENAL FUNCTION PANEL: CPT

## 2018-06-30 PROCEDURE — 85384 FIBRINOGEN ACTIVITY: CPT

## 2018-06-30 PROCEDURE — 99232 SBSQ HOSP IP/OBS MODERATE 35: CPT | Mod: ,,, | Performed by: OTOLARYNGOLOGY

## 2018-06-30 PROCEDURE — 85060 BLOOD SMEAR INTERPRETATION: CPT | Mod: ,,, | Performed by: PATHOLOGY

## 2018-06-30 PROCEDURE — 80053 COMPREHEN METABOLIC PANEL: CPT

## 2018-06-30 PROCEDURE — 63600175 PHARM REV CODE 636 W HCPCS: Performed by: INTERNAL MEDICINE

## 2018-06-30 PROCEDURE — 25000003 PHARM REV CODE 250: Performed by: NURSE PRACTITIONER

## 2018-06-30 PROCEDURE — 86644 CMV ANTIBODY: CPT

## 2018-06-30 PROCEDURE — 25000003 PHARM REV CODE 250: Performed by: INTERNAL MEDICINE

## 2018-06-30 PROCEDURE — 85027 COMPLETE CBC AUTOMATED: CPT

## 2018-06-30 RX ORDER — SODIUM CHLORIDE 9 MG/ML
INJECTION, SOLUTION INTRAVENOUS CONTINUOUS
Status: DISCONTINUED | OUTPATIENT
Start: 2018-06-30 | End: 2018-06-30

## 2018-06-30 RX ORDER — HYDROCODONE BITARTRATE AND ACETAMINOPHEN 500; 5 MG/1; MG/1
TABLET ORAL
Status: DISCONTINUED | OUTPATIENT
Start: 2018-06-30 | End: 2018-07-01

## 2018-06-30 RX ORDER — FUROSEMIDE 10 MG/ML
120 INJECTION INTRAMUSCULAR; INTRAVENOUS ONCE
Status: COMPLETED | OUTPATIENT
Start: 2018-06-30 | End: 2018-06-30

## 2018-06-30 RX ADMIN — AMPICILLIN SODIUM AND SULBACTAM SODIUM 3 G: 2; 1 INJECTION, POWDER, FOR SOLUTION INTRAMUSCULAR; INTRAVENOUS at 08:06

## 2018-06-30 RX ADMIN — MAGNESIUM SULFATE IN WATER 2 G: 40 INJECTION, SOLUTION INTRAVENOUS at 01:06

## 2018-06-30 RX ADMIN — CETIRIZINE HYDROCHLORIDE 5 MG: 5 TABLET, FILM COATED ORAL at 08:06

## 2018-06-30 RX ADMIN — VENLAFAXINE HYDROCHLORIDE 37.5 MG: 37.5 CAPSULE, EXTENDED RELEASE ORAL at 08:06

## 2018-06-30 RX ADMIN — METOPROLOL SUCCINATE 100 MG: 50 TABLET, EXTENDED RELEASE ORAL at 08:06

## 2018-06-30 RX ADMIN — AMIODARONE HYDROCHLORIDE 200 MG: 200 TABLET ORAL at 08:06

## 2018-06-30 RX ADMIN — HYDROXYUREA 1000 MG: 500 CAPSULE ORAL at 08:06

## 2018-06-30 RX ADMIN — ACETAMINOPHEN 650 MG: 325 TABLET ORAL at 10:06

## 2018-06-30 RX ADMIN — ALLOPURINOL 300 MG: 100 TABLET ORAL at 08:06

## 2018-06-30 RX ADMIN — DIPHENHYDRAMINE HYDROCHLORIDE 25 MG: 25 CAPSULE ORAL at 10:06

## 2018-06-30 RX ADMIN — CLONAZEPAM 0.5 MG: 0.25 TABLET, ORALLY DISINTEGRATING ORAL at 08:06

## 2018-06-30 RX ADMIN — FUROSEMIDE 120 MG: 10 INJECTION, SOLUTION INTRAMUSCULAR; INTRAVENOUS at 03:06

## 2018-06-30 NOTE — HPI
"73 year old male with history of myelodysplastic syndrome presents with 1 week of acute sore throat, left neck swelling and soreness, weakness and fatigue.  ENT is consulted for left neck swelling.  He did undergo a bone marrow biopsy yesterday.  There is concern from the heme-onc team that he may have undergone transforation to AML.    At time of interview, Mr. Kwon is awake and pleasant, but reports extreme weakness, "I cannot even walk 5 steps".  He is short of breath, but does not feel that his neck swelling is making it hard to breathe.  He is able to swallow.  The neck swelling began 1 week ago along with a sore throat.  It never was red, extremely painful, or tender to touch but he does report a deep soreness in the area.  He actually feels that the neck mass has gone down in size since it began 1 week ago.    "

## 2018-06-30 NOTE — ASSESSMENT & PLAN NOTE
Rate controlled with Metoprolol Succinate and on anticoagulation with Coumadin.  - Given low platelets and concern for acute leukemia will reverse INR w/ Vitamin K 5 mg PO x 1

## 2018-06-30 NOTE — PLAN OF CARE
Problem: Patient Care Overview  Goal: Plan of Care Review  Outcome: Ongoing (interventions implemented as appropriate)  Pt is aa&ox4. Up with assist. Patient had BMB done today to right hip awaiting results. Patient also received 1 unit of platelets, patient had U/S of head and neck to evaluate swelling to left side of neck, patient reports soreness with swallowing but is able to swallow his meds and fluids in upright position, did not want pain meds. Patient is [place]. U/a obtained as ordered. Lab work done when patient returned from ultrasound and platelets were at 8,000 and WBC count elevated more from earlier draw, MD made aware and orders placed to give another platelets which has been done, also receiving magnesium IV, received IV resburicase and Vitamin K given PO. Patient also given allpurinol and hydroxurea, bruising noted to arms which patient reports is not new. Patient started on IVF's of ns @100cc/hr. No reports of nausea. No falls or injuries thus far my shift.

## 2018-06-30 NOTE — SUBJECTIVE & OBJECTIVE
Patient information was obtained from patient and past medical records.     Oncology History:   Dx: MDS    Treatments:  1. Azacitidine (10/17/16 - 3/3/17)  2. Darbepoetin (3/24/17 - 2/7/18)    08-  Initial Referral to [facility] for Anemia w/ normal B12, Folate  08-  Bone Marrow Biopsy - Hypercellular Marrow w/ increased immature cells. RUNX mutation.   10-  Started on Azacitidine  02-  Bone Marrow Biopsy - Hypercellular Marrow. Refractory Anemia w/ Excess Blasts-1. Increased Megakaryocytes w/ Myelodysplasia. t(2:21)(p23;q22)  03-  Last Dose of Azacitidine  03-  Started on Darbepoetin  03-  Bone Marrow Biopsy - Hypercellular Marrow w/ Myelodysplastic Syndrome w/ Excess Blasts. t(2:21)(p23;q22)      Prescriptions Prior to Admission   Medication Sig Dispense Refill Last Dose    amiodarone (PACERONE) 200 MG Tab Take 1 tablet (200 mg total) by mouth once daily. 30 tablet 11 6/29/2018 at Unknown time    clonazePAM (KLONOPIN) 2 MG disintegrating tablet Take 1 tablet (2 mg total) by mouth nightly as needed. 90 tablet 0 6/28/2018 at Unknown time    doxycycline (MONODOX) 100 MG capsule Take 1 capsule (100 mg total) by mouth 2 (two) times daily. 14 capsule 0 6/29/2018 at Unknown time    fluticasone (FLONASE) 50 mcg/actuation nasal spray 2 sprays (100 mcg total) by Each Nare route once daily. 16 g 2 6/29/2018 at Unknown time    food supplemt, lactose-reduced (BOOST) Liqd Take by mouth once daily.   6/29/2018 at Unknown time    furosemide (LASIX) 20 MG tablet Take 1 tablet (20 mg total) by mouth daily as needed. Take 1 tablet for 3-5 lb weight gain 30 tablet 11 Past Month at Unknown time    levocetirizine (XYZAL) 5 MG tablet Take 1 tablet (5 mg total) by mouth every evening. 30 tablet 3 6/29/2018 at Unknown time    metoprolol succinate (TOPROL-XL) 100 MG 24 hr tablet Take 1 tablet (100 mg total) by mouth once daily. 90 tablet 4 6/29/2018 at Unknown time    venlafaxine  (EFFEXOR-XR) 75 MG 24 hr capsule Take 1 capsule (75 mg total) by mouth once daily. 30 capsule 11 6/29/2018 at Unknown time    warfarin (COUMADIN) 3 MG tablet Take 1 tablet (3 mg total) by mouth Daily.   6/29/2018 at Unknown time       Lisinopril; Augmentin [amoxicillin-pot clavulanate]; and Lipitor [atorvastatin]     Past Medical History:   Diagnosis Date    Aortic valve stenosis, severe 8/3/2017    4-24-18  1 - Normal left ventricular systolic function (EF 60-65%).    2 - Concentric hypertrophy.    3 - No wall motion abnormalities.    4 - Indeterminate LV diastolic function.    5 - Biatrial enlargement.    6 - Right ventricle is upper limit of normal in size with normal systolic function.    7 - Severe aortic valve stenosis (JEFF 0.60 cm2, AVAi 0.31 cm2/m2, peak aortic jet velocity 4.5 m/s,MG 66 mmHg, ).    8 - Moderate to moderate-severe (3+) mitral regurgitation.    9 - Trivial to mild tricuspid regurgitation.    10 - Increased central venous pressure.    11 - Pulmonary hypertension. The estimated PA systolic pressure is 79 mmHg.     DJD (degenerative joint disease)     Dyslipidemia 10/17/2012    Essential hypertension 10/17/2012    [facility]     [facility], [facility]    Hyponatremia 4/22/2016    Long term current use of anticoagulant therapy 11/9/2017    Major depressive disorder with single episode, in partial remission 8/17/2016    MDS (myelodysplastic syndrome) 2013    s/p Chemo     NSTEMI (non-ST elevated myocardial infarction) 4/25/2018    Persistent atrial fibrillation 3/11/2017    Pulmonary hypertension- April 2016- The estimated PA systolic pressure is 39 mmHg 8/17/2016    Rash, drug 3/22/2017    Syncope due to orthostatic hypotension with Type II NSTEMI 4/24/2018    Therapy     [facility]     Thrombocytopenia 4/1/2016     Past Surgical History:   Procedure Laterality Date    BONE MARROW BIOPSY  08/29/2016    CAROTID  ENDARTERECTOMY Left 04/09/2013    Inguinal hernia      Left    KNEE SURGERY      Right x2    neck fusion      TONSILLECTOMY       Family History     Problem Relation (Age of Onset)    Hyperlipidemia Brother        Social History Main Topics    Smoking status: Never Smoker    Smokeless tobacco: Never Used    Alcohol use No    Drug use: No    Sexual activity: Yes     Partners: Female       Review of Systems   Constitutional: Positive for activity change, appetite change, fatigue and unexpected weight change. Negative for chills, diaphoresis and fever.   HENT: Positive for sore throat. Negative for postnasal drip, trouble swallowing and voice change.         + neck swelling   Eyes: Negative for visual disturbance.   Respiratory: Positive for cough. Negative for choking, chest tightness, shortness of breath and wheezing.    Cardiovascular: Negative for chest pain, palpitations and leg swelling.   Gastrointestinal: Positive for nausea. Negative for abdominal distention, abdominal pain, blood in stool, diarrhea and vomiting.   Genitourinary: Negative for dysuria.   Musculoskeletal: Negative for neck pain.   Neurological: Negative for numbness and headaches.     Objective:     Vital Signs (Most Recent):  Temp: 98.3 °F (36.8 °C) (06/29/18 1957)  Pulse: 80 (06/29/18 1957)  Resp: 18 (06/29/18 1957)  BP: 125/60 (06/29/18 1957)  SpO2: (!) 92 % (06/29/18 1957) Vital Signs (24h Range):  Temp:  [97.9 °F (36.6 °C)-98.5 °F (36.9 °C)] 98.3 °F (36.8 °C)  Pulse:  [77-89] 80  Resp:  [18] 18  SpO2:  [92 %-97 %] 92 %  BP: (116-150)/(56-60) 125/60            Lines/Drains/Airways     Peripheral Intravenous Line                 Peripheral IV - Single Lumen 06/29/18 1750 Left Forearm less than 1 day                Physical Exam   Constitutional: He appears well-developed and well-nourished.   Appears fatigued   HENT:   + erythema and petechial spots on soft pallate   Eyes: Pupils are equal, round, and reactive to light.   Neck:   +  left sided, firm soft tissue swelling that is not painful to palpation occurring over the left sternocleidomastoid muscle w/o associated rash. Lying just under the site of his previous carotid endarterectomy   Cardiovascular: Normal rate and regular rhythm.    Murmur heard.  Pulmonary/Chest: Effort normal and breath sounds normal. He has no wheezes.   Abdominal: Soft. Bowel sounds are normal. He exhibits no distension. There is no tenderness.   Musculoskeletal: He exhibits no edema.   Skin: Skin is warm and dry. Capillary refill takes less than 2 seconds. No rash noted.   Vitals reviewed.      Significant Labs:   CBC:   Recent Labs  Lab 06/29/18  0853   WBC 58.27*   HGB 8.4*   HCT 27.4*   PLT 6*    and CMP:   Recent Labs  Lab 06/29/18  0853   *   K 4.0   CL 99   CO2 21*   *   BUN 35*   CREATININE 2.9*   CALCIUM 8.2*   PROT 8.3   ALBUMIN 2.1*   BILITOT 0.8   ALKPHOS 81   AST 45*   ALT 14   ANIONGAP 10   EGFRNONAA 20.5*       Diagnostic Results:  I have reviewed all pertinent imaging results/findings within the past 24 hours.

## 2018-06-30 NOTE — ASSESSMENT & PLAN NOTE
-BERNICE superimposed on CKD stage III which could be multifactorial from pre-renal due to poor oral intake, TLS and TMA  -Serum creatinine has been progressively being getting worse  -Has not had UOP since 9:00 AM, even with administration of IVFs, has been presenting with slightly more fatigue and has been requiring O2 via nasal canula. Chest x ray with bilateral edema.  -Blood pressures have been stable     Plan:  - Serum creatinine currently worsening from 3.8-->4.7   - Will require lasix to see if UOP picks up   - Serial Renal panel evaluation   - Renal ultrasound for evaluation if any obstruction, mass or nephrolithiasis  - Once able to urinate will need urine sample for urine sediment evaluation  - Proteinuria: will order UPCR  - Monitor intake and output   - Avoid nephrotoxic medication   - No need for RRT currently but will have low threshold if volume overload, worsening acidosis, electrolyte abnormalities. Concern with severe thrombocytopenia in which will be difficult to place trialysis line at current moment.

## 2018-06-30 NOTE — ASSESSMENT & PLAN NOTE
The etiology of his neck mass is unclear at this time. He did report having an antecedent febrile illness in clinic. This was followed by a sore throat and then by neck swelling. He currently reports being afebrile and does not appear toxic. Given the nature and location of the swelling, there is a concern for possible anaerobic infection. Also of concern is possible for malignancy (H&N cancer, ?lymphoma)   - will get ultrasound of left neck. CT scan of neck w/ contrast is relatively contraindicated due to BERNICE  - will consult ENT for possible panendoscopy and/or biopsy  - will treat empirically with Unasyn

## 2018-06-30 NOTE — ASSESSMENT & PLAN NOTE
Platelets are 6k today on admission. Given his use of coumadin and possible new leukemia he is at high risk for bleeding.  - s/p 2 untis of platelets 6/29, will repeat this morning.

## 2018-06-30 NOTE — HPI
Wil Kwon is a 73 year old male with past medical history of CKD stage III since 2018 February with a sCr baseline 1.1-1.4, Proteinuria, MDS which was diagnosed when he underwent a marrow biopsy in Aug 2016 (treated with chemotherapy 5 cycles of Azacitidine which was not tolerated and recently he has been being treated with Darbopoetin), aortic stenosis, paroxysmal A-fib on warfarin.  Went yesterday 6/29/18/ to hem/onc clinic and presented with new and rapidly progressive leukocytosis, acute kidney, and weakness. Oncology was concerned that he either has a focal infection  in the right neck (received Doxycycline 100 mg BID per Internist) or which could be a new malignancy or has progressed to acute myeloid leukemia. Has been complaint of several weeks for progressive fatigue and 1 week of neck swelling. He reports significant dyspnea on minimal exertion. On arrival on 6/29/18 has leukocytosis 74, thrombocytopenia 8, sCr 3.8, Uric acid 14.2, bicarbonate 22, sodium 131. Was started on Rasburicase and Allopurinol for suspected TLS, Abx Ampicillin- sulbactam, , IVFs with NS, chest x ray with cardiomegaly with bilateral edema. Nephrology was consulted for BERNICE, decreased in UOP and probable need for dialysis.

## 2018-06-30 NOTE — ASSESSMENT & PLAN NOTE
Platelets are 6k today on presentation to clinic. Given his use of coumadin and possible new leukemia he is at high risk for bleeding.  - will transfuse 1 unit platelets, repeat CBC Afterward.

## 2018-06-30 NOTE — ASSESSMENT & PLAN NOTE
He underwent recent balloon valvuplasty via Cardiology. Still with systolic murmur.  He has previously had severe AS and resultant pulmonary hypertension.  - will need to hydrate due to BERNICE, but will proceed with gentle hydration given risk for decompensation  - monitor respiratory status closely

## 2018-06-30 NOTE — ASSESSMENT & PLAN NOTE
He was originally diagnosed with MDS in August of 2016. He has been treated with 5 cycles of Azacitidine without improvement in his MDS. More recently he has been being treated with Darbopoetin and has obtained some degree of transfusion independence. 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  - Will obtain bone marrow biopsy today  - check LDH, Uric Acid, Fibrinogen, PT/INR and repeat CBC

## 2018-06-30 NOTE — ASSESSMENT & PLAN NOTE
- Differential includes lymphoma, lymphadenitis, hemorrhage, second primary malignancy.    - Discussed with the patient that his options for the neck mass include: observation, surgical excision, and image-guided needle biopsy.      Given his extreme thrombocytopenia and weakness, I would not recommend excisional biopsy.  Observation is an option, but would not give a diagnosis.      My recommendation would be to wait for the results of the bone marrow biopsy, then consult radiology for an image-guided diagnostic needle biopsy if he wishes to proceed with that.      If he develops progressive neck swelling, shortness or breath, or stridor, please call ENT.  Otherwise, will sign off.  Patient seen with staff Dr. Chun.

## 2018-06-30 NOTE — PLAN OF CARE
Problem: Patient Care Overview  Goal: Plan of Care Review  Outcome: Ongoing (interventions implemented as appropriate)  Plan of care reviewed with patient and spouse. Patient received one unit of platelets this shift for continues low platelets count . Patient also received dose of lasix 120 mg for fluid volume overload.  Patient remains on 2 liters NC for sat of 93- 95 percent.. Urine sent for labs . Ultrasound  for Kidney pending.  Swallow evaluation pending  . Bed remains in low locked position, call light with in reach. Wife remains at bedside thru out shift. patient encouraged to call with any and all needs, .Hourly rounding continues

## 2018-06-30 NOTE — PROGRESS NOTES
"[facility]  Hematology  Bone Marrow Transplant  Progress Note    Patient Name: Wil Kwon Jr.  Admission Date: 6/29/2018  Hospital Length of Stay: 1 days  Code Status: Full Code    Subjective:     Interval History: SOB overnight.  Fluids decreased to 50 cc.  UOP decreased however inaccurate I/Os.  Otherwise feels "horrible" but unchanged.         Objective:     Vital Signs (Most Recent):  Temp: 99.4 °F (37.4 °C) (06/30/18 0659)  Pulse: 91 (06/30/18 0659)  Resp: 14 (06/30/18 0659)  BP: 133/63 (06/30/18 0659)  SpO2: 95 % (06/30/18 0659) Vital Signs (24h Range):  Temp:  [97.9 °F (36.6 °C)-99.4 °F (37.4 °C)] 99.4 °F (37.4 °C)  Pulse:  [77-91] 91  Resp:  [14-22] 14  SpO2:  [85 %-97 %] 95 %  BP: (116-150)/(56-68) 133/63     Weight: 73 kg (161 lb) (weighed in clinic)  Body mass index is 22.45 kg/m².  Body surface area is 1.91 meters squared.    ECOG SCORE         [unfilled]    Intake/Output - Last 3 Shifts       06/28 0700 - 06/29 0659 06/29 0700 - 06/30 0659 06/30 0700 - 07/01 0659    P.O.  740     I.V. (mL/kg)  678.3 (9.3)     Blood  734     IV Piggyback  150     Total Intake(mL/kg)  2302.3 (31.5)     Urine (mL/kg/hr)  100     Total Output   100      Net   +2202.3             Urine Occurrence  1 x     Stool Occurrence  1 x           Physical Exam    Significant Labs:   CBC:   Recent Labs  Lab 06/29/18  0853 06/29/18 2058 06/30/18  0507   WBC 58.27* 74.99* 52.82*   HGB 8.4* 7.1* 7.5*   HCT 27.4* 23.2* 24.3*   PLT 6* 8* 5*   , CMP:   Recent Labs  Lab 06/29/18  0853 06/29/18 2058 06/30/18  0507   * 131* 130*   K 4.0 4.3 4.0   CL 99 99 97   CO2 21* 22* 20*   * 136* 127*   BUN 35* 48* 56*   CREATININE 2.9* 3.8* 4.7*   CALCIUM 8.2* 8.1* 7.9*   PROT 8.3  --  7.5   ALBUMIN 2.1*  --  2.3*   BILITOT 0.8  --  0.9   ALKPHOS 81  --  86   AST 45*  --  49*   ALT 14  --  14   ANIONGAP 10 10 13   EGFRNONAA 20.5* 14.8* 11.4*   , Urine Studies:   Recent Labs  Lab 06/29/18  2343   COLORU Sophia "   APPEARANCEUA Cloudy*   PHUR 5.0   SPECGRAV 1.015   PROTEINUA 3+*   GLUCUA 1+*   KETONESU Negative   BILIRUBINUA Negative   OCCULTUA 2+*   NITRITE Negative   UROBILINOGEN Negative   LEUKOCYTESUR Negative   RBCUA 29*   WBCUA 15*   BACTERIA Many*   SQUAMEPITHEL 1   HYALINECASTS 52*    and All pertinent labs from the last 24 hours have been reviewed.    Diagnostic Results:  I have reviewed all pertinent imaging results/findings within the past 24 hours.  CXr shows bilateral edema    Assessment/Plan:     MDS (myelodysplastic syndrome)    He was originally diagnosed with MDS in August of 2016. He has been treated with 5 cycles of Azacitidine without improvement in his MDS. More recently he has been being treated with Darbopoetin and has obtained some degree of transfusion independence. 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  - Bone marrow pending  - TLS and DIC labs  - s/p rasburicase, continue allopurinol        At risk for fluid volume overload    BERNICE, monitor closely, consider lasix         Tumor lysis syndrome    Continue to trend TLS labs  - s/p rasburicase and continue allopurinol        Neck mass    The etiology of his neck mass is unclear at this time. He did report having an antecedent febrile illness in clinic. This was followed by a sore throat and then by neck swelling. He currently reports being afebrile and does not appear toxic. Given the nature and location of the swelling, there is a concern for possible anaerobic infection. Also of concern is possible for malignancy (H&N cancer, ?lymphoma)   - will get ultrasound of left neck. CT scan of neck w/ contrast is relatively contraindicated due to BERNICE  - will consult ENT for possible panendoscopy and/or biopsy  - will treat empirically with Unasyn         Aortic valve stenosis, severe    He underwent recent balloon valvuplasty via Cardiology. Still with systolic  murmur.  He has previously had severe AS and resultant pulmonary hypertension.  - will need to hydrate due to BERNICE, but will proceed with gentle hydration given risk for decompensation  - monitor respiratory status closely        Persistent atrial fibrillation    Rate controlled with Metoprolol Succinate and on anticoagulation with Coumadin.  - Given low platelets and concern for acute leukemia will reverse INR w/ Vitamin K 5 mg PO x 1        Acute kidney injury    Baseline Cr is 1.3. On admission 2.4. Unclear etiology, likely not ATN.  Previously poor PO intake, found to be in TLS.  Creatine continues to rise despite fluids and UOP has decreased   - Dced fluids   - monitor serum Cr closely, avoid nephrotix agents   - Given volume status consider lasix         Thrombocytopenia    Platelets are 6k today on admission. Given his use of coumadin and possible new leukemia he is at high risk for bleeding.  - s/p 2 untis of platelets 6/29, will repeat this morning.              VTE Risk Mitigation     None      SCDs    Disposition: Continue inpatient care, await bone marrow results.  Monitor TLS labs.  Transfusion PRN     Roddy Garner MD  Bone Marrow Transplant  [facility]

## 2018-06-30 NOTE — SUBJECTIVE & OBJECTIVE
Past Medical History:   Diagnosis Date    Aortic valve stenosis, severe 8/3/2017    4-24-18  1 - Normal left ventricular systolic function (EF 60-65%).    2 - Concentric hypertrophy.    3 - No wall motion abnormalities.    4 - Indeterminate LV diastolic function.    5 - Biatrial enlargement.    6 - Right ventricle is upper limit of normal in size with normal systolic function.    7 - Severe aortic valve stenosis (JEFF 0.60 cm2, AVAi 0.31 cm2/m2, peak aortic jet velocity 4.5 m/s,MG 66 mmHg, ).    8 - Moderate to moderate-severe (3+) mitral regurgitation.    9 - Trivial to mild tricuspid regurgitation.    10 - Increased central venous pressure.    11 - Pulmonary hypertension. The estimated PA systolic pressure is 79 mmHg.     DJD (degenerative joint disease)     Dyslipidemia 10/17/2012    Essential hypertension 10/17/2012    History of psychiatric hospitalization     [facility] 2014, [facility] 1993    Hyponatremia 4/22/2016    Long term current use of anticoagulant therapy 11/9/2017    Major depressive disorder with single episode, in partial remission 8/17/2016    MDS (myelodysplastic syndrome) 2013    s/p Chemo     NSTEMI (non-ST elevated myocardial infarction) 4/25/2018    Persistent atrial fibrillation 3/11/2017    Pulmonary hypertension- April 2016- The estimated PA systolic pressure is 39 mmHg 8/17/2016    Rash, drug 3/22/2017    Syncope due to orthostatic hypotension with Type II NSTEMI 4/24/2018    Therapy     [facility]     Thrombocytopenia 4/1/2016       Past Surgical History:   Procedure Laterality Date    BONE MARROW BIOPSY  08/29/2016    CAROTID ENDARTERECTOMY Left 04/09/2013    Inguinal hernia      Left    KNEE SURGERY      Right x2    neck fusion      TONSILLECTOMY         Review of patient's allergies indicates:   Allergen Reactions    Lisinopril Edema     Cheek swelling    Augmentin [amoxicillin-pot clavulanate] Rash    Lipitor [atorvastatin] Other (See  Comments)     Severe Muscle pain that caused pt not to sleep for 72 hours.     Current Facility-Administered Medications   Medication Frequency    0.9%  NaCl infusion (for blood administration) Q24H PRN    0.9%  NaCl infusion (for blood administration) Q24H PRN    acetaminophen tablet 650 mg Q6H PRN    allopurinol tablet 300 mg Daily    amiodarone tablet 200 mg Daily    ampicillin-sulbactam 3 g in sodium chloride 0.9 % 100 mL IVPB (ready to mix system) Q12H    cetirizine tablet 5 mg QHS    clonazePAM disintegrating tablet 2 mg Nightly PRN    diphenhydrAMINE capsule 25 mg Q6H PRN    furosemide injection 120 mg Once    hydroxyurea capsule 1,000 mg BID    metoprolol succinate (TOPROL-XL) 24 hr tablet 100 mg Daily    sodium chloride 0.9% flush 5 mL PRN    venlafaxine 24 hr capsule 37.5 mg Daily     Family History     Problem Relation (Age of Onset)    Hyperlipidemia Brother        Social History Main Topics    Smoking status: Never Smoker    Smokeless tobacco: Never Used    Alcohol use No    Drug use: No    Sexual activity: Yes     Partners: Female     Review of Systems   Constitutional: Positive for activity change, appetite change, fatigue and unexpected weight change. Negative for chills, diaphoresis and fever.   HENT: Positive for sore throat. Negative for postnasal drip, trouble swallowing and voice change.         + neck swelling   Eyes: Negative for visual disturbance.   Respiratory: Positive for cough. Negative for choking, chest tightness, shortness of breath and wheezing.    Cardiovascular: Negative for chest pain, palpitations and leg swelling.   Gastrointestinal: Positive for nausea. Negative for abdominal distention, abdominal pain, blood in stool, diarrhea and vomiting.   Genitourinary: Negative for dysuria.   Musculoskeletal: Negative for neck pain.   Neurological: Negative for numbness and headaches.     Objective:     Vital Signs (Most Recent):  Temp: 98.2 °F (36.8 °C) (06/30/18  1044)  Pulse: 89 (06/30/18 1044)  Resp: 15 (06/30/18 1044)  BP: (!) 117/58 (117/58) (06/30/18 1000)  SpO2: (!) 93 % (06/30/18 1044)  O2 Device (Oxygen Therapy): nasal cannula (06/30/18 1044) Vital Signs (24h Range):  Temp:  [97.9 °F (36.6 °C)-99.4 °F (37.4 °C)] 98.2 °F (36.8 °C)  Pulse:  [77-91] 89  Resp:  [14-22] 15  SpO2:  [85 %-95 %] 93 %  BP: (117-150)/(58-68) 117/58     Weight: 73 kg (161 lb) (weighed in clinic) (06/29/18 1957)  Body mass index is 22.45 kg/m².  Body surface area is 1.91 meters squared.    I/O last 3 completed shifts:  In: 2302.3 [P.O.:740; I.V.:678.3; Blood:734; IV Piggyback:150]  Out: 100 [Urine:100]    Physical Exam   Constitutional: He is oriented to person, place, and time. He has a sickly appearance. Nasal cannula in place.   HENT:   Head: Normocephalic and atraumatic.   Eyes: Right eye exhibits no discharge. Left eye exhibits no discharge.   Cardiovascular: Normal rate and regular rhythm.    Pulmonary/Chest: He has rales in the right lower field and the left lower field.   Some difficulty breathing    Abdominal: Soft.   Musculoskeletal: He exhibits edema.   General weakness    Neurological: He is alert and oriented to person, place, and time.       Significant Labs:  ABGs: No results for input(s): PH, PCO2, HCO3, POCSATURATED, BE in the last 168 hours.  BMP:   Recent Labs  Lab 06/30/18  0507   *   CL 97   CO2 20*   BUN 56*   CREATININE 4.7*   CALCIUM 7.9*   MG 2.3     CBC:   Recent Labs  Lab 06/30/18  0507   WBC 52.82*   RBC 2.29*   HGB 7.5*   HCT 24.3*   PLT 5*   *   MCH 32.8*   MCHC 30.9*     CMP:   Recent Labs  Lab 06/30/18  0507   *   CALCIUM 7.9*   ALBUMIN 2.3*   PROT 7.5   *   K 4.0   CO2 20*   CL 97   BUN 56*   CREATININE 4.7*   ALKPHOS 86   ALT 14   AST 49*   BILITOT 0.9     All labs within the past 24 hours have been reviewed.

## 2018-06-30 NOTE — H&P
[facility]  Hematology  Bone Marrow Transplant  H&P    Subjective:     Principal Problem: <principal problem not specified>    HPI: Mr. Kwon is a 73 y.o. male who presented initially to clinic with a chief complaint of several weeks for progressive fatigue and 1 week of neck swelling. He reports first noticing fatigue about 1 month ago. He reports significant dyspnea on minimal exertion. He reports that his fatigue has gradually been increasing to be the point he is largely homebound.     About 1 week ago he reports developing a sore throat which progressed to include a left sided neck swelling. His left neck swelling has been gradually increasing in size. He does not report that it is painful or associated with any particular difficulty swallowing. He was seen by his PCP for this recently and prescribed a course of Doxycycline, but he has not seen any improvement.    Patient information was obtained from patient and past medical records.     Oncology History:   Dx: MDS    Treatments:  1. Azacitidine (10/17/16 - 3/3/17)  2. 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   10-  Started on Azacitidine  02-  Bone Marrow Biopsy - Hypercellular Marrow. Refractory Anemia w/ Excess Blasts-1. Increased Megakaryocytes w/ Myelodysplasia. t(2:21)(p23;q22)  03-  Last Dose of Azacitidine  03-  Started on Darbepoetin  03-  Bone Marrow Biopsy - Hypercellular Marrow w/ Myelodysplastic Syndrome w/ Excess Blasts. t(2:21)(p23;q22)      Prescriptions Prior to Admission   Medication Sig Dispense Refill Last Dose    amiodarone (PACERONE) 200 MG Tab Take 1 tablet (200 mg total) by mouth once daily. 30 tablet 11 6/29/2018 at Unknown time    clonazePAM (KLONOPIN) 2 MG disintegrating tablet Take 1 tablet (2 mg total) by mouth  nightly as needed. 90 tablet 0 6/28/2018 at Unknown time    doxycycline (MONODOX) 100 MG capsule Take 1 capsule (100 mg total) by mouth 2 (two) times daily. 14 capsule 0 6/29/2018 at Unknown time    fluticasone (FLONASE) 50 mcg/actuation nasal spray 2 sprays (100 mcg total) by Each Nare route once daily. 16 g 2 6/29/2018 at Unknown time    food supplemt, lactose-reduced (BOOST) Liqd Take by mouth once daily.   6/29/2018 at Unknown time    furosemide (LASIX) 20 MG tablet Take 1 tablet (20 mg total) by mouth daily as needed. Take 1 tablet for 3-5 lb weight gain 30 tablet 11 Past Month at Unknown time    levocetirizine (XYZAL) 5 MG tablet Take 1 tablet (5 mg total) by mouth every evening. 30 tablet 3 6/29/2018 at Unknown time    metoprolol succinate (TOPROL-XL) 100 MG 24 hr tablet Take 1 tablet (100 mg total) by mouth once daily. 90 tablet 4 6/29/2018 at Unknown time    venlafaxine (EFFEXOR-XR) 75 MG 24 hr capsule Take 1 capsule (75 mg total) by mouth once daily. 30 capsule 11 6/29/2018 at Unknown time    warfarin (COUMADIN) 3 MG tablet Take 1 tablet (3 mg total) by mouth Daily.   6/29/2018 at Unknown time       Lisinopril; Augmentin [amoxicillin-pot clavulanate]; and Lipitor [atorvastatin]     Past Medical History:   Diagnosis Date    Aortic valve stenosis, severe 8/3/2017    4-24-18  1 - Normal left ventricular systolic function (EF 60-65%).    2 - Concentric hypertrophy.    3 - No wall motion abnormalities.    4 - Indeterminate LV diastolic function.    5 - Biatrial enlargement.    6 - Right ventricle is upper limit of normal in size with normal systolic function.    7 - Severe aortic valve stenosis (JEFF 0.60 cm2, AVAi 0.31 cm2/m2, peak aortic jet velocity 4.5 m/s,MG 66 mmHg, ).    8 - Moderate to moderate-severe (3+) mitral regurgitation.    9 - Trivial to mild tricuspid regurgitation.    10 - Increased central venous pressure.    11 - Pulmonary hypertension. The estimated PA systolic pressure is 79 mmHg.      DJD (degenerative joint disease)     Dyslipidemia 10/17/2012    Essential hypertension 10/17/2012    History of psychiatric hospitalization     [facility] 2014, [facility] 1993    Hyponatremia 4/22/2016    Long term current use of anticoagulant therapy 11/9/2017    Major depressive disorder with single episode, in partial remission 8/17/2016    MDS (myelodysplastic syndrome) 2013    s/p Chemo     NSTEMI (non-ST elevated myocardial infarction) 4/25/2018    Persistent atrial fibrillation 3/11/2017    Pulmonary hypertension- April 2016- The estimated PA systolic pressure is 39 mmHg 8/17/2016    Rash, drug 3/22/2017    Syncope due to orthostatic hypotension with Type II NSTEMI 4/24/2018    Therapy     [facility]     Thrombocytopenia 4/1/2016     Past Surgical History:   Procedure Laterality Date    BONE MARROW BIOPSY  08/29/2016    CAROTID ENDARTERECTOMY Left 04/09/2013    Inguinal hernia      Left    KNEE SURGERY      Right x2    neck fusion      TONSILLECTOMY       Family History     Problem Relation (Age of Onset)    Hyperlipidemia Brother        Social History Main Topics    Smoking status: Never Smoker    Smokeless tobacco: Never Used    Alcohol use No    Drug use: No    Sexual activity: Yes     Partners: Female       Review of Systems   Constitutional: Positive for activity change, appetite change, fatigue and unexpected weight change. Negative for chills, diaphoresis and fever.   HENT: Positive for sore throat. Negative for postnasal drip, trouble swallowing and voice change.         + neck swelling   Eyes: Negative for visual disturbance.   Respiratory: Positive for cough. Negative for choking, chest tightness, shortness of breath and wheezing.    Cardiovascular: Negative for chest pain, palpitations and leg swelling.   Gastrointestinal: Positive for nausea. Negative for abdominal distention, abdominal pain, blood in stool, diarrhea and vomiting.    Genitourinary: Negative for dysuria.   Musculoskeletal: Negative for neck pain.   Neurological: Negative for numbness and headaches.     Objective:     Vital Signs (Most Recent):  Temp: 98.3 °F (36.8 °C) (06/29/18 1957)  Pulse: 80 (06/29/18 1957)  Resp: 18 (06/29/18 1957)  BP: 125/60 (06/29/18 1957)  SpO2: (!) 92 % (06/29/18 1957) Vital Signs (24h Range):  Temp:  [97.9 °F (36.6 °C)-98.5 °F (36.9 °C)] 98.3 °F (36.8 °C)  Pulse:  [77-89] 80  Resp:  [18] 18  SpO2:  [92 %-97 %] 92 %  BP: (116-150)/(56-60) 125/60            Lines/Drains/Airways     Peripheral Intravenous Line                 Peripheral IV - Single Lumen 06/29/18 1750 Left Forearm less than 1 day                Physical Exam   Constitutional: He appears well-developed and well-nourished.   Appears fatigued   HENT:   + erythema and petechial spots on soft pallate   Eyes: Pupils are equal, round, and reactive to light.   Neck:   + left sided, firm soft tissue swelling that is not painful to palpation occurring over the left sternocleidomastoid muscle w/o associated rash. Lying just under the site of his previous carotid endarterectomy   Cardiovascular: Normal rate and regular rhythm.    Murmur heard.  Pulmonary/Chest: Effort normal and breath sounds normal. He has no wheezes.   Abdominal: Soft. Bowel sounds are normal. He exhibits no distension. There is no tenderness.   Musculoskeletal: He exhibits no edema.   Skin: Skin is warm and dry. Capillary refill takes less than 2 seconds. No rash noted.   Vitals reviewed.      Significant Labs:   CBC:   Recent Labs  Lab 06/29/18  0853   WBC 58.27*   HGB 8.4*   HCT 27.4*   PLT 6*    and CMP:   Recent Labs  Lab 06/29/18  0853   *   K 4.0   CL 99   CO2 21*   *   BUN 35*   CREATININE 2.9*   CALCIUM 8.2*   PROT 8.3   ALBUMIN 2.1*   BILITOT 0.8   ALKPHOS 81   AST 45*   ALT 14   ANIONGAP 10   EGFRNONAA 20.5*       Diagnostic Results:  I have reviewed all pertinent imaging results/findings within the past 24  hours.    Assessment/Plan:     Neck mass    The etiology of his neck mass is unclear at this time. He did report having an antecedent febrile illness in clinic. This was followed by a sore throat and then by neck swelling. He currently reports being afebrile and does not appear toxic. Given the nature and location of the swelling, there is a concern for possible anaerobic infection. Also of concern is possible for malignancy (H&N cancer, ?lymphoma)   - will get ultrasound of left neck. CT scan of neck w/ contrast is relatively contraindicated due to BERNICE  - will consult ENT for possible panendoscopy and/or biopsy  - will treat empirically with Unasyn         Aortic valve stenosis, severe    He underwent recent balloon valvuplasty via Cardiology. Still with systolic murmur.  He has previously had severe AS and resultant pulmonary hypertension.  - will need to hydrate due to BERNICE, but will proceed with gentle hydration given risk for decompensation  - monitor respiratory status closely        Persistent atrial fibrillation    Rate controlled with Metoprolol Succinate and on anticoagulation with Coumadin.  - Given low platelets and concern for acute leukemia will reverse INR w/ Vitamin K 5 mg PO x 1        Acute kidney injury    Baseline Cr is 1.3. Serum Cr this AM noted to be 2.4. Unclear etiology. Possible related to poor PO intake or recent cardiac cath for balloon valvuloplasty.   - Normal saline at 150 cc/hr (caution with fluid due to history of aortic valve disease)  - monitor serum Cr closely        MDS (myelodysplastic syndrome)    He was originally diagnosed with MDS in August of 2016. He has been treated with 5 cycles of Azacitidine without improvement in his MDS. More recently he has been being treated with Darbopoetin and has obtained some degree of transfusion independence. 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  -  Will obtain bone marrow biopsy today  - check LDH, Uric Acid, Fibrinogen, PT/INR and repeat CBC        Thrombocytopenia    Platelets are 6k today on presentation to clinic. Given his use of coumadin and possible new leukemia he is at high risk for bleeding.  - will transfuse 1 unit platelets, repeat CBC Afterward.             VTE Risk Mitigation     None          Disposition: Acutely ill. Concern for AML. Will admit to hospital to obtain bone marrow biopsy and proceed to work-up of new neck mass. Hydration for Acute Renal Insufficiency.     Aquiles Solomon MD  Bone Marrow Transplant  Hematology  [facility]

## 2018-06-30 NOTE — CONSULTS
"[facility]  Otorhinolaryngology-Head & Neck Surgery  Consult Note    Patient Name: Wil Kwon Jr.  MRN: [id number]  Code Status: Full Code  Admission Date: 6/29/2018  Hospital Length of Stay: 1 days  Attending Physician: Milo Slater MD  Primary Care Provider: LAILA Serra MD    Patient information was obtained from patient and past medical records.     Inpatient consult to ENT  Consult performed by: NIKO CAIN  Consult ordered by: VINAY MANRIQUE        Subjective:     Chief Complaint/Reason for Admission: Weakness, neck mass.    History of Present Illness: 73 year old male with history of myelodysplastic syndrome presents with 1 week of acute sore throat, left neck swelling and soreness, weakness and fatigue.  ENT is consulted for left neck swelling.  He did undergo a bone marrow biopsy yesterday.  There is concern from the heme-onc team that he may have undergone transforation to AML.    At time of interview, Mr. Kwon is awake and pleasant, but reports extreme weakness, "I cannot even walk 5 steps".  He is short of breath, but does not feel that his neck swelling is making it hard to breathe.  He is able to swallow.  The neck swelling began 1 week ago along with a sore throat.  It never was red, extremely painful, or tender to touch but he does report a deep soreness in the area.  He actually feels that the neck mass has gone down in size since it began 1 week ago.      Medications:  Continuous Infusions:  Scheduled Meds:   allopurinol  300 mg Oral Daily    amiodarone  200 mg Oral Daily    ampicillin-sulbactim (UNASYN) IVPB  3 g Intravenous Q12H    cetirizine  5 mg Oral QHS    hydroxyurea  1,000 mg Oral BID    metoprolol succinate  100 mg Oral Daily    venlafaxine  37.5 mg Oral Daily     PRN Meds:sodium chloride, sodium chloride, acetaminophen, clonazePAM, diphenhydrAMINE, sodium chloride 0.9%     No current facility-administered medications on file prior to " encounter.      Current Outpatient Prescriptions on File Prior to Encounter   Medication Sig    amiodarone (PACERONE) 200 MG Tab Take 1 tablet (200 mg total) by mouth once daily.    clonazePAM (KLONOPIN) 2 MG disintegrating tablet Take 1 tablet (2 mg total) by mouth nightly as needed.    doxycycline (MONODOX) 100 MG capsule Take 1 capsule (100 mg total) by mouth 2 (two) times daily.    fluticasone (FLONASE) 50 mcg/actuation nasal spray 2 sprays (100 mcg total) by Each Nare route once daily.    food supplemt, lactose-reduced (BOOST) Liqd Take by mouth once daily.    furosemide (LASIX) 20 MG tablet Take 1 tablet (20 mg total) by mouth daily as needed. Take 1 tablet for 3-5 lb weight gain    levocetirizine (XYZAL) 5 MG tablet Take 1 tablet (5 mg total) by mouth every evening.    metoprolol succinate (TOPROL-XL) 100 MG 24 hr tablet Take 1 tablet (100 mg total) by mouth once daily.    venlafaxine (EFFEXOR-XR) 75 MG 24 hr capsule Take 1 capsule (75 mg total) by mouth once daily.    warfarin (COUMADIN) 3 MG tablet Take 1 tablet (3 mg total) by mouth Daily.       Review of patient's allergies indicates:   Allergen Reactions    Lisinopril Edema     Cheek swelling    Augmentin [amoxicillin-pot clavulanate] Rash    Lipitor [atorvastatin] Other (See Comments)     Severe Muscle pain that caused pt not to sleep for 72 hours.       Past Medical History:   Diagnosis Date    Aortic valve stenosis, severe 8/3/2017    4-24-18  1 - Normal left ventricular systolic function (EF 60-65%).    2 - Concentric hypertrophy.    3 - No wall motion abnormalities.    4 - Indeterminate LV diastolic function.    5 - Biatrial enlargement.    6 - Right ventricle is upper limit of normal in size with normal systolic function.    7 - Severe aortic valve stenosis (JEFF 0.60 cm2, AVAi 0.31 cm2/m2, peak aortic jet velocity 4.5 m/s,MG 66 mmHg, ).    8 - Moderate to moderate-severe (3+) mitral regurgitation.    9 - Trivial to mild tricuspid  regurgitation.    10 - Increased central venous pressure.    11 - Pulmonary hypertension. The estimated PA systolic pressure is 79 mmHg.     DJD (degenerative joint disease)     Dyslipidemia 10/17/2012    Essential hypertension 10/17/2012    History of psychiatric hospitalization     [facility] 2014, [facility] 1993    Hyponatremia 4/22/2016    Long term current use of anticoagulant therapy 11/9/2017    Major depressive disorder with single episode, in partial remission 8/17/2016    MDS (myelodysplastic syndrome) 2013    s/p Chemo     NSTEMI (non-ST elevated myocardial infarction) 4/25/2018    Persistent atrial fibrillation 3/11/2017    Pulmonary hypertension- April 2016- The estimated PA systolic pressure is 39 mmHg 8/17/2016    Rash, drug 3/22/2017    Syncope due to orthostatic hypotension with Type II NSTEMI 4/24/2018    Therapy     [facility]     Thrombocytopenia 4/1/2016     Past Surgical History:   Procedure Laterality Date    BONE MARROW BIOPSY  08/29/2016    CAROTID ENDARTERECTOMY Left 04/09/2013    Inguinal hernia      Left    KNEE SURGERY      Right x2    neck fusion      TONSILLECTOMY       Family History     Problem Relation (Age of Onset)    Hyperlipidemia Brother        Social History Main Topics    Smoking status: Never Smoker    Smokeless tobacco: Never Used    Alcohol use No    Drug use: No    Sexual activity: Yes     Partners: Female     Review of Systems  Objective:     Vital Signs (Most Recent):  Temp: 99.4 °F (37.4 °C) (06/30/18 0659)  Pulse: 91 (06/30/18 0659)  Resp: 14 (06/30/18 0659)  BP: 133/63 (06/30/18 0659)  SpO2: 95 % (06/30/18 0659) Vital Signs (24h Range):  Temp:  [97.9 °F (36.6 °C)-99.4 °F (37.4 °C)] 99.4 °F (37.4 °C)  Pulse:  [77-91] 91  Resp:  [14-22] 14  SpO2:  [85 %-95 %] 95 %  BP: (120-150)/(59-68) 133/63     Weight: 73 kg (161 lb) (weighed in clinic)  Body mass index is 22.45 kg/m².       Physical Exam   Awake, alert, pleasant  and cooperative  Appears very weak and pale  EOMI  Nasal cannula in place  Oral cavity with palatal petechiae  Neck with minimally tender firm mass level II-IV on the left, feels matted.  Not fixed to overlying skin.  Skin has no edema or erythema overlying mass.  No stridor      Significant Labs:  BMP:   Recent Labs  Lab 06/30/18  0507   *   CL 97   CO2 20*   BUN 56*   CREATININE 4.7*   CALCIUM 7.9*   MG 2.3     CBC:   Recent Labs  Lab 06/30/18  0507   WBC 52.82*   RBC 2.29*   HGB 7.5*   HCT 24.3*   PLT 5*   *   MCH 32.8*   MCHC 30.9*       Significant Diagnostics:  US: I have reviewed all pertinent results/findings within the past 24 hours and my personal findings are:  Awaiting report, seems to be multiloculated mass on my interpretation of the ultrasound, but my interpretation of the results is limited by nature of study.    Assessment/Plan:     Neck mass    - Differential includes lymphoma, lymphadenitis, hemorrhage, second primary malignancy.    - Discussed with the patient that his options for the neck mass include: observation, surgical excision, and image-guided needle biopsy.      Given his extreme thrombocytopenia and weakness, I would not recommend excisional biopsy.  Observation is an option, but would not give a diagnosis.      My recommendation would be to wait for the results of the bone marrow biopsy, then consult radiology for an image-guided diagnostic needle biopsy if he wishes to proceed with that.      If he develops progressive neck swelling, shortness or breath, or stridor, please call ENT.  Otherwise, will sign off.  Patient seen with staff Dr. Chun.          VTE Risk Mitigation     None          Thank you for your consult. I will sign off. Please contact us if you have any additional questions.    Leo Rockwell MD  Otorhinolaryngology-Head & Neck Surgery  [facility]

## 2018-06-30 NOTE — PROGRESS NOTES
"Notified Dr. Lisa on call that patient after ambulating to restroom was NASH, RR22. Lungs sound anteriorly equal, lateral and posterior sounds diminished, patient stated "this is how I was at home with the littlest of exercise and what brought me here", vitals taken and noted to be 85% on room air, informed MD patient currently receiving continous IVF's, and received platelets earlier, oxygen order given for 2L NC and CXR is being ordered. Patient at present is resting. Call bell in reach.  "

## 2018-06-30 NOTE — PROGRESS NOTES
[facility]  Nephrology  Progress Note    Patient Name: Wil Kwon Jr.  MRN: [id number]  Admission Date: 6/29/2018  Hospital Length of Stay: 1 days  Attending Provider: Milo Slater MD   Primary Care Physician: LAILA Serra MD  Principal Problem:<principal problem not specified>    Subjective:     HPI: Wil Kwon is a 73 year old male with past medical history of CKD stage III since 2018 February with a sCr baseline 1.1-1.4, Proteinuria, MDS which was diagnosed when he underwent a marrow biopsy in Aug 2016 (treated with chemotherapy 5 cycles of Azacitidine which was not tolerated and recently he has been being treated with Darbopoetin), aortic stenosis, paroxysmal A-fib on warfarin.  Went yesterday 6/29/18/ to hem/onc clinic and presented with new and rapidly progressive leukocytosis, acute kidney, and weakness. Oncology was concerned that he either has a focal infection  in the right neck (received Doxycycline 100 mg BID per Internist) or which could be a new malignancy or has progressed to acute myeloid leukemia. Has been complaint of several weeks for progressive fatigue and 1 week of neck swelling. He reports significant dyspnea on minimal exertion. On arrival on 6/29/18 has leukocytosis 74, thrombocytopenia 8, sCr 3.8, Uric acid 14.2, bicarbonate 22, sodium 131. Was started on Rasburicase and Allopurinol for suspected TLS, Abx Ampicillin- sulbactam, , IVFs with NS, chest x ray with cardiomegaly with bilateral edema. Nephrology was consulted for BERNICE, decreased in UOP and probable need for dialysis.      Past Medical History:   Diagnosis Date    Aortic valve stenosis, severe 8/3/2017    4-24-18  1 - Normal left ventricular systolic function (EF 60-65%).    2 - Concentric hypertrophy.    3 - No wall motion abnormalities.    4 - Indeterminate LV diastolic function.    5 - Biatrial enlargement.    6 - Right ventricle is upper limit of normal in size with normal systolic function.     7 - Severe aortic valve stenosis (JEFF 0.60 cm2, AVAi 0.31 cm2/m2, peak aortic jet velocity 4.5 m/s,MG 66 mmHg, ).    8 - Moderate to moderate-severe (3+) mitral regurgitation.    9 - Trivial to mild tricuspid regurgitation.    10 - Increased central venous pressure.    11 - Pulmonary hypertension. The estimated PA systolic pressure is 79 mmHg.     DJD (degenerative joint disease)     Dyslipidemia 10/17/2012    Essential hypertension 10/17/2012    History of psychiatric hospitalization     [facility] 2014, [facility] 1993    Hyponatremia 4/22/2016    Long term current use of anticoagulant therapy 11/9/2017    Major depressive disorder with single episode, in partial remission 8/17/2016    MDS (myelodysplastic syndrome) 2013    s/p Chemo     NSTEMI (non-ST elevated myocardial infarction) 4/25/2018    Persistent atrial fibrillation 3/11/2017    Pulmonary hypertension- April 2016- The estimated PA systolic pressure is 39 mmHg 8/17/2016    Rash, drug 3/22/2017    Syncope due to orthostatic hypotension with Type II NSTEMI 4/24/2018    [facility]     Thrombocytopenia 4/1/2016       Past Surgical History:   Procedure Laterality Date    BONE MARROW BIOPSY  08/29/2016    CAROTID ENDARTERECTOMY Left 04/09/2013    Inguinal hernia      Left    KNEE SURGERY      Right x2    neck fusion      TONSILLECTOMY         Review of patient's allergies indicates:   Allergen Reactions    Lisinopril Edema     Cheek swelling    Augmentin [amoxicillin-pot clavulanate] Rash    Lipitor [atorvastatin] Other (See Comments)     Severe Muscle pain that caused pt not to sleep for 72 hours.     Current Facility-Administered Medications   Medication Frequency    0.9%  NaCl infusion (for blood administration) Q24H PRN    0.9%  NaCl infusion (for blood administration) Q24H PRN    acetaminophen tablet 650 mg Q6H PRN    allopurinol tablet 300 mg Daily    amiodarone tablet 200 mg Daily     ampicillin-sulbactam 3 g in sodium chloride 0.9 % 100 mL IVPB (ready to mix system) Q12H    cetirizine tablet 5 mg QHS    clonazePAM disintegrating tablet 2 mg Nightly PRN    diphenhydrAMINE capsule 25 mg Q6H PRN    furosemide injection 120 mg Once    hydroxyurea capsule 1,000 mg BID    metoprolol succinate (TOPROL-XL) 24 hr tablet 100 mg Daily    sodium chloride 0.9% flush 5 mL PRN    venlafaxine 24 hr capsule 37.5 mg Daily     Family History     Problem Relation (Age of Onset)    Hyperlipidemia Brother        Social History Main Topics    Smoking status: Never Smoker    Smokeless tobacco: Never Used    Alcohol use No    Drug use: No    Sexual activity: Yes     Partners: Female     Review of Systems   Constitutional: Positive for activity change, appetite change, fatigue and unexpected weight change. Negative for chills, diaphoresis and fever.   HENT: Positive for sore throat. Negative for postnasal drip, trouble swallowing and voice change.         + neck swelling   Eyes: Negative for visual disturbance.   Respiratory: Positive for cough. Negative for choking, chest tightness, shortness of breath and wheezing.    Cardiovascular: Negative for chest pain, palpitations and leg swelling.   Gastrointestinal: Positive for nausea. Negative for abdominal distention, abdominal pain, blood in stool, diarrhea and vomiting.   Genitourinary: Negative for dysuria.   Musculoskeletal: Negative for neck pain.   Neurological: Negative for numbness and headaches.     Objective:     Vital Signs (Most Recent):  Temp: 98.2 °F (36.8 °C) (06/30/18 1044)  Pulse: 89 (06/30/18 1044)  Resp: 15 (06/30/18 1044)  BP: (!) 117/58 (117/58) (06/30/18 1000)  SpO2: (!) 93 % (06/30/18 1044)  O2 Device (Oxygen Therapy): nasal cannula (06/30/18 1044) Vital Signs (24h Range):  Temp:  [97.9 °F (36.6 °C)-99.4 °F (37.4 °C)] 98.2 °F (36.8 °C)  Pulse:  [77-91] 89  Resp:  [14-22] 15  SpO2:  [85 %-95 %] 93 %  BP: (117-150)/(58-68) 117/58      Weight: 73 kg (161 lb) (weighed in clinic) (06/29/18 1957)  Body mass index is 22.45 kg/m².  Body surface area is 1.91 meters squared.    I/O last 3 completed shifts:  In: 2302.3 [P.O.:740; I.V.:678.3; Blood:734; IV Piggyback:150]  Out: 100 [Urine:100]    Physical Exam   Constitutional: He is oriented to person, place, and time. He has a sickly appearance. Nasal cannula in place.   HENT:   Head: Normocephalic and atraumatic.   Eyes: Right eye exhibits no discharge. Left eye exhibits no discharge.   Cardiovascular: Normal rate and regular rhythm.    Pulmonary/Chest: He has rales in the right lower field and the left lower field.   Some difficulty breathing    Abdominal: Soft.   Musculoskeletal: He exhibits edema.   General weakness    Neurological: He is alert and oriented to person, place, and time.       Significant Labs:  ABGs: No results for input(s): PH, PCO2, HCO3, POCSATURATED, BE in the last 168 hours.  BMP:   Recent Labs  Lab 06/30/18  0507   *   CL 97   CO2 20*   BUN 56*   CREATININE 4.7*   CALCIUM 7.9*   MG 2.3     CBC:   Recent Labs  Lab 06/30/18  0507   WBC 52.82*   RBC 2.29*   HGB 7.5*   HCT 24.3*   PLT 5*   *   MCH 32.8*   MCHC 30.9*     CMP:   Recent Labs  Lab 06/30/18  0507   *   CALCIUM 7.9*   ALBUMIN 2.3*   PROT 7.5   *   K 4.0   CO2 20*   CL 97   BUN 56*   CREATININE 4.7*   ALKPHOS 86   ALT 14   AST 49*   BILITOT 0.9     All labs within the past 24 hours have been reviewed.      Assessment/Plan:     Tumor lysis syndrome    -S/P Rasburicase 6/29/18  - On allopurinol  100 mg q         Acute kidney injury    -BERNICE superimposed on CKD stage III which could be multifactorial from pre-renal due to poor oral intake, TLS and TMA  -Serum creatinine has been progressively being getting worse  -Has not had UOP since 9:00 AM, even with administration of IVFs, has been presenting with slightly more fatigue and has been requiring O2 via nasal canula. Chest x ray with bilateral  edema.  -Blood pressures have been stable     Plan:  - Serum creatinine currently worsening from 3.8-->4.7   - Will require lasix to see if UOP picks up   - Serial Renal panel evaluation   - Renal ultrasound for evaluation if any obstruction, mass or nephrolithiasis  - Once able to urinate will need urine sample for urine sediment evaluation  - Proteinuria: will order UPCR  - Monitor intake and output   - Avoid nephrotoxic medication   - No need for RRT currently but will have low threshold if volume overload, worsening acidosis, electrolyte abnormalities. Concern with severe thrombocytopenia in which will be difficult to place trialysis line at current moment.            MDS (myelodysplastic syndrome)    -Pending bone aceves biopsy  -Will follow bone aceves recommendations             Jigar Ace  Nephrology  Fellow  [facility] [address]    Pager [phone]

## 2018-06-30 NOTE — SUBJECTIVE & OBJECTIVE
Medications:  Continuous Infusions:  Scheduled Meds:   allopurinol  300 mg Oral Daily    amiodarone  200 mg Oral Daily    ampicillin-sulbactim (UNASYN) IVPB  3 g Intravenous Q12H    cetirizine  5 mg Oral QHS    hydroxyurea  1,000 mg Oral BID    metoprolol succinate  100 mg Oral Daily    venlafaxine  37.5 mg Oral Daily     PRN Meds:sodium chloride, sodium chloride, acetaminophen, clonazePAM, diphenhydrAMINE, sodium chloride 0.9%     No current facility-administered medications on file prior to encounter.      Current Outpatient Prescriptions on File Prior to Encounter   Medication Sig    amiodarone (PACERONE) 200 MG Tab Take 1 tablet (200 mg total) by mouth once daily.    clonazePAM (KLONOPIN) 2 MG disintegrating tablet Take 1 tablet (2 mg total) by mouth nightly as needed.    doxycycline (MONODOX) 100 MG capsule Take 1 capsule (100 mg total) by mouth 2 (two) times daily.    fluticasone (FLONASE) 50 mcg/actuation nasal spray 2 sprays (100 mcg total) by Each Nare route once daily.    food supplemt, lactose-reduced (BOOST) Liqd Take by mouth once daily.    furosemide (LASIX) 20 MG tablet Take 1 tablet (20 mg total) by mouth daily as needed. Take 1 tablet for 3-5 lb weight gain    levocetirizine (XYZAL) 5 MG tablet Take 1 tablet (5 mg total) by mouth every evening.    metoprolol succinate (TOPROL-XL) 100 MG 24 hr tablet Take 1 tablet (100 mg total) by mouth once daily.    venlafaxine (EFFEXOR-XR) 75 MG 24 hr capsule Take 1 capsule (75 mg total) by mouth once daily.    warfarin (COUMADIN) 3 MG tablet Take 1 tablet (3 mg total) by mouth Daily.       Review of patient's allergies indicates:   Allergen Reactions    Lisinopril Edema     Cheek swelling    Augmentin [amoxicillin-pot clavulanate] Rash    Lipitor [atorvastatin] Other (See Comments)     Severe Muscle pain that caused pt not to sleep for 72 hours.       Past Medical History:   Diagnosis Date    Aortic valve stenosis, severe 8/3/2017    4-24-18   1 - Normal left ventricular systolic function (EF 60-65%).    2 - Concentric hypertrophy.    3 - No wall motion abnormalities.    4 - Indeterminate LV diastolic function.    5 - Biatrial enlargement.    6 - Right ventricle is upper limit of normal in size with normal systolic function.    7 - Severe aortic valve stenosis (JEFF 0.60 cm2, AVAi 0.31 cm2/m2, peak aortic jet velocity 4.5 m/s,MG 66 mmHg, ).    8 - Moderate to moderate-severe (3+) mitral regurgitation.    9 - Trivial to mild tricuspid regurgitation.    10 - Increased central venous pressure.    11 - Pulmonary hypertension. The estimated PA systolic pressure is 79 mmHg.     DJD (degenerative joint disease)     Dyslipidemia 10/17/2012    Essential hypertension 10/17/2012    History of psychiatric hospitalization     [facility] 2014, [facility]    Hyponatremia 4/22/2016    Long term current use of anticoagulant therapy 11/9/2017    Major depressive disorder with single episode, in partial remission 8/17/2016    MDS (myelodysplastic syndrome) 2013    s/p Chemo     NSTEMI (non-ST elevated myocardial infarction) 4/25/2018    Persistent atrial fibrillation 3/11/2017    Pulmonary hypertension- April 2016- The estimated PA systolic pressure is 39 mmHg 8/17/2016    Rash, drug 3/22/2017    Syncope due to orthostatic hypotension with Type II NSTEMI 4/24/2018    [facility]     Thrombocytopenia 4/1/2016     Past Surgical History:   Procedure Laterality Date    BONE MARROW BIOPSY  08/29/2016    CAROTID ENDARTERECTOMY Left 04/09/2013    Inguinal hernia      Left    KNEE SURGERY      Right x2    neck fusion      TONSILLECTOMY       Family History     Problem Relation (Age of Onset)    Hyperlipidemia Brother        Social History Main Topics    Smoking status: Never Smoker    Smokeless tobacco: Never Used    Alcohol use No    Drug use: No    Sexual activity: Yes     Partners: Female     Review of  Systems  Objective:     Vital Signs (Most Recent):  Temp: 99.4 °F (37.4 °C) (06/30/18 0659)  Pulse: 91 (06/30/18 0659)  Resp: 14 (06/30/18 0659)  BP: 133/63 (06/30/18 0659)  SpO2: 95 % (06/30/18 0659) Vital Signs (24h Range):  Temp:  [97.9 °F (36.6 °C)-99.4 °F (37.4 °C)] 99.4 °F (37.4 °C)  Pulse:  [77-91] 91  Resp:  [14-22] 14  SpO2:  [85 %-95 %] 95 %  BP: (120-150)/(59-68) 133/63     Weight: 73 kg (161 lb) (weighed in clinic)  Body mass index is 22.45 kg/m².       Physical Exam   Awake, alert, pleasant and cooperative  Appears very weak and pale  EOMI  Nasal cannula in place  Oral cavity with palatal petechiae  Neck with minimally tender firm mass level II-IV on the left, feels matted.  Not fixed to overlying skin.  Skin has no edema or erythema overlying mass.  No stridor      Significant Labs:  BMP:   Recent Labs  Lab 06/30/18  0507   *   CL 97   CO2 20*   BUN 56*   CREATININE 4.7*   CALCIUM 7.9*   MG 2.3     CBC:   Recent Labs  Lab 06/30/18  0507   WBC 52.82*   RBC 2.29*   HGB 7.5*   HCT 24.3*   PLT 5*   *   MCH 32.8*   MCHC 30.9*       Significant Diagnostics:  US: I have reviewed all pertinent results/findings within the past 24 hours and my personal findings are:  Awaiting report, seems to be multiloculated mass on my interpretation of the ultrasound, but my interpretation of the results is limited by nature of study.

## 2018-06-30 NOTE — ASSESSMENT & PLAN NOTE
He was originally diagnosed with MDS in August of 2016. He has been treated with 5 cycles of Azacitidine without improvement in his MDS. More recently he has been being treated with Darbopoetin and has obtained some degree of transfusion independence. 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  - Bone marrow pending  - TLS and DIC labs  - s/p rasburicase, continue allopurinol

## 2018-06-30 NOTE — SUBJECTIVE & OBJECTIVE
"  Subjective:     Interval History: SOB overnight.  Fluids decreased to 50 cc.  UOP decreased however inaccurate I/Os.  Otherwise feels "horrible" but unchanged.         Objective:     Vital Signs (Most Recent):  Temp: 99.4 °F (37.4 °C) (06/30/18 0659)  Pulse: 91 (06/30/18 0659)  Resp: 14 (06/30/18 0659)  BP: 133/63 (06/30/18 0659)  SpO2: 95 % (06/30/18 0659) Vital Signs (24h Range):  Temp:  [97.9 °F (36.6 °C)-99.4 °F (37.4 °C)] 99.4 °F (37.4 °C)  Pulse:  [77-91] 91  Resp:  [14-22] 14  SpO2:  [85 %-97 %] 95 %  BP: (116-150)/(56-68) 133/63     Weight: 73 kg (161 lb) (weighed in clinic)  Body mass index is 22.45 kg/m².  Body surface area is 1.91 meters squared.    ECOG SCORE         [unfilled]    Intake/Output - Last 3 Shifts       06/28 0700 - 06/29 0659 06/29 0700 - 06/30 0659 06/30 0700 - 07/01 0659    P.O.  740     I.V. (mL/kg)  678.3 (9.3)     Blood  734     IV Piggyback  150     Total Intake(mL/kg)  2302.3 (31.5)     Urine (mL/kg/hr)  100     Total Output   100      Net   +2202.3             Urine Occurrence  1 x     Stool Occurrence  1 x           Physical Exam    Significant Labs:   CBC:   Recent Labs  Lab 06/29/18  0853 06/29/18 2058 06/30/18  0507   WBC 58.27* 74.99* 52.82*   HGB 8.4* 7.1* 7.5*   HCT 27.4* 23.2* 24.3*   PLT 6* 8* 5*   , CMP:   Recent Labs  Lab 06/29/18  0853 06/29/18 2058 06/30/18  0507   * 131* 130*   K 4.0 4.3 4.0   CL 99 99 97   CO2 21* 22* 20*   * 136* 127*   BUN 35* 48* 56*   CREATININE 2.9* 3.8* 4.7*   CALCIUM 8.2* 8.1* 7.9*   PROT 8.3  --  7.5   ALBUMIN 2.1*  --  2.3*   BILITOT 0.8  --  0.9   ALKPHOS 81  --  86   AST 45*  --  49*   ALT 14  --  14   ANIONGAP 10 10 13   EGFRNONAA 20.5* 14.8* 11.4*   , Urine Studies:   Recent Labs  Lab 06/29/18  2343   COLORU Sophia   APPEARANCEUA Cloudy*   PHUR 5.0   SPECGRAV 1.015   PROTEINUA 3+*   GLUCUA 1+*   KETONESU Negative   BILIRUBINUA Negative   OCCULTUA 2+*   NITRITE Negative   UROBILINOGEN Negative   LEUKOCYTESUR Negative   RBCUA " 29*   WBCUA 15*   BACTERIA Many*   SQUAMEPITHEL 1   HYALINECASTS 52*    and All pertinent labs from the last 24 hours have been reviewed.    Diagnostic Results:  I have reviewed all pertinent imaging results/findings within the past 24 hours.  CXr shows bilateral edema

## 2018-06-30 NOTE — CONSULTS
[facility]  Nephrology  Consult Note    Patient Name: Wil Kwon Jr.  MRN: [id number]  Admission Date: 6/29/2018  Hospital Length of Stay: 1 days  Attending Provider: Milo Slater MD   Primary Care Physician: LAILA Serra MD  Principal Problem:<principal problem not specified>    Inpatient consult to Nephrology  Consult performed by: REX GONZALEZ  Consult ordered by: CONNIE CANALES  Reason for consult: BERNICE on CKD         Subjective:     HPI: Wil Kwon is a 73 year old male with past medical history of CKD stage III since 2018 February with a sCr baseline 1.1-1.4, Proteinuria, MDS which was diagnosed when he underwent a marrow biopsy in Aug 2016 (treated with chemotherapy 5 cycles of Azacitidine which was not tolerated and recently he has been being treated with Darbopoetin), aortic stenosis, paroxysmal A-fib on warfarin.  Went yesterday 6/29/18/ to hem/onc clinic and presented with new and rapidly progressive leukocytosis, acute kidney, and weakness. Oncology was concerned that he either has a focal infection  in the right neck (received Doxycycline 100 mg BID per Internist) or which could be a new malignancy or has progressed to acute myeloid leukemia. Has been complaint of several weeks for progressive fatigue and 1 week of neck swelling. He reports significant dyspnea on minimal exertion. On arrival on 6/29/18 has leukocytosis 74, thrombocytopenia 8, sCr 3.8, Uric acid 14.2, bicarbonate 22, sodium 131. Was started on Rasburicase and Allopurinol for suspected TLS, Abx Ampicillin- sulbactam, , IVFs with NS, chest x ray with cardiomegaly with bilateral edema. Nephrology was consulted for BERNICE, decreased in UOP and probable need for dialysis.      Past Medical History:   Diagnosis Date    Aortic valve stenosis, severe 8/3/2017    4-24-18  1 - Normal left ventricular systolic function (EF 60-65%).    2 - Concentric hypertrophy.    3 - No wall motion abnormalities.     4 - Indeterminate LV diastolic function.    5 - Biatrial enlargement.    6 - Right ventricle is upper limit of normal in size with normal systolic function.    7 - Severe aortic valve stenosis (JEFF 0.60 cm2, AVAi 0.31 cm2/m2, peak aortic jet velocity 4.5 m/s,MG 66 mmHg, ).    8 - Moderate to moderate-severe (3+) mitral regurgitation.    9 - Trivial to mild tricuspid regurgitation.    10 - Increased central venous pressure.    11 - Pulmonary hypertension. The estimated PA systolic pressure is 79 mmHg.     DJD (degenerative joint disease)     Dyslipidemia 10/17/2012    Essential hypertension 10/17/2012    History of psychiatric hospitalization     Pending sale to [facility] 2014, [facility]    Hyponatremia 4/22/2016    Long term current use of anticoagulant therapy 11/9/2017    Major depressive disorder with single episode, in partial remission 8/17/2016    MDS (myelodysplastic syndrome) 2013    s/p Chemo     NSTEMI (non-ST elevated myocardial infarction) 4/25/2018    Persistent atrial fibrillation 3/11/2017    Pulmonary hypertension- April 2016- The estimated PA systolic pressure is 39 mmHg 8/17/2016    Rash, drug 3/22/2017    Syncope due to orthostatic hypotension with Type II NSTEMI 4/24/2018    Therapy     [facility]     Thrombocytopenia 4/1/2016       Past Surgical History:   Procedure Laterality Date    BONE MARROW BIOPSY  08/29/2016    CAROTID ENDARTERECTOMY Left 04/09/2013    Inguinal hernia      Left    KNEE SURGERY      Right x2    neck fusion      TONSILLECTOMY         Review of patient's allergies indicates:   Allergen Reactions    Lisinopril Edema     Cheek swelling    Augmentin [amoxicillin-pot clavulanate] Rash    Lipitor [atorvastatin] Other (See Comments)     Severe Muscle pain that caused pt not to sleep for 72 hours.     Current Facility-Administered Medications   Medication Frequency    0.9%  NaCl infusion (for blood administration) Q24H PRN    0.9%  NaCl  infusion (for blood administration) Q24H PRN    acetaminophen tablet 650 mg Q6H PRN    allopurinol tablet 300 mg Daily    amiodarone tablet 200 mg Daily    ampicillin-sulbactam 3 g in sodium chloride 0.9 % 100 mL IVPB (ready to mix system) Q12H    cetirizine tablet 5 mg QHS    clonazePAM disintegrating tablet 2 mg Nightly PRN    diphenhydrAMINE capsule 25 mg Q6H PRN    furosemide injection 120 mg Once    hydroxyurea capsule 1,000 mg BID    metoprolol succinate (TOPROL-XL) 24 hr tablet 100 mg Daily    sodium chloride 0.9% flush 5 mL PRN    venlafaxine 24 hr capsule 37.5 mg Daily     Family History     Problem Relation (Age of Onset)    Hyperlipidemia Brother        Social History Main Topics    Smoking status: Never Smoker    Smokeless tobacco: Never Used    Alcohol use No    Drug use: No    Sexual activity: Yes     Partners: Female     Review of Systems   Constitutional: Positive for activity change, appetite change, fatigue and unexpected weight change. Negative for chills, diaphoresis and fever.   HENT: Positive for sore throat. Negative for postnasal drip, trouble swallowing and voice change.         + neck swelling   Eyes: Negative for visual disturbance.   Respiratory: Positive for cough. Negative for choking, chest tightness, shortness of breath and wheezing.    Cardiovascular: Negative for chest pain, palpitations and leg swelling.   Gastrointestinal: Positive for nausea. Negative for abdominal distention, abdominal pain, blood in stool, diarrhea and vomiting.   Genitourinary: Negative for dysuria.   Musculoskeletal: Negative for neck pain.   Neurological: Negative for numbness and headaches.     Objective:     Vital Signs (Most Recent):  Temp: 98.2 °F (36.8 °C) (06/30/18 1044)  Pulse: 89 (06/30/18 1044)  Resp: 15 (06/30/18 1044)  BP: (!) 117/58 (117/58) (06/30/18 1000)  SpO2: (!) 93 % (06/30/18 1044)  O2 Device (Oxygen Therapy): nasal cannula (06/30/18 1044) Vital Signs (24h Range):  Temp:   [97.9 °F (36.6 °C)-99.4 °F (37.4 °C)] 98.2 °F (36.8 °C)  Pulse:  [77-91] 89  Resp:  [14-22] 15  SpO2:  [85 %-95 %] 93 %  BP: (117-150)/(58-68) 117/58     Weight: 73 kg (161 lb) (weighed in clinic) (06/29/18 1957)  Body mass index is 22.45 kg/m².  Body surface area is 1.91 meters squared.    I/O last 3 completed shifts:  In: 2302.3 [P.O.:740; I.V.:678.3; Blood:734; IV Piggyback:150]  Out: 100 [Urine:100]    Physical Exam   Constitutional: He is oriented to person, place, and time. He has a sickly appearance. Nasal cannula in place.   HENT:   Head: Normocephalic and atraumatic.   Eyes: Right eye exhibits no discharge. Left eye exhibits no discharge.   Cardiovascular: Normal rate and regular rhythm.    Pulmonary/Chest: He has rales in the right lower field and the left lower field.   Some difficulty breathing    Abdominal: Soft.   Musculoskeletal: He exhibits edema.   General weakness    Neurological: He is alert and oriented to person, place, and time.       Significant Labs:  ABGs: No results for input(s): PH, PCO2, HCO3, POCSATURATED, BE in the last 168 hours.  BMP:   Recent Labs  Lab 06/30/18  0507   *   CL 97   CO2 20*   BUN 56*   CREATININE 4.7*   CALCIUM 7.9*   MG 2.3     CBC:   Recent Labs  Lab 06/30/18  0507   WBC 52.82*   RBC 2.29*   HGB 7.5*   HCT 24.3*   PLT 5*   *   MCH 32.8*   MCHC 30.9*     CMP:   Recent Labs  Lab 06/30/18  0507   *   CALCIUM 7.9*   ALBUMIN 2.3*   PROT 7.5   *   K 4.0   CO2 20*   CL 97   BUN 56*   CREATININE 4.7*   ALKPHOS 86   ALT 14   AST 49*   BILITOT 0.9     All labs within the past 24 hours have been reviewed.      Assessment/Plan:     Tumor lysis syndrome    -S/P Rasburicase 6/29/18  - On allopurinol  100 mg q         Acute kidney injury    -BERNICE superimposed on CKD stage III which could be multifactorial from pre-renal due to poor oral intake, TLS and TMA  -Serum creatinine has been progressively being getting worse  -Has not had UOP since 9:00 AM, even  with administration of IVFs, has been presenting with slightly more fatigue and has been requiring O2 via nasal canula. Chest x ray with bilateral edema.  -Blood pressures have been stable     Plan:  - Serum creatinine currently worsening from 3.8-->4.7   - Will require lasix to see if UOP picks up   - Serial Renal panel evaluation   - Renal ultrasound for evaluation if any obstruction, mass or nephrolithiasis  - Once able to urinate will need urine sample for urine sediment evaluation  - Proteinuria: will order UPCR  - Monitor intake and output   - Avoid nephrotoxic medication   - No need for RRT currently but will have low threshold if volume overload, worsening acidosis, electrolyte abnormalities. Concern with severe thrombocytopenia in which will be difficult to place trialysis line at current moment.            MDS (myelodysplastic syndrome)    -Pending bone aceves biopsy  -Will follow bone aceves recommendations         Hyponatremia:  - Ordered urine sodium, urine osmolality and serum osmolality.  -Patient on Venlafaxine which could lead to hyponatremia, SIADH. Would hold.    Jigar Ace  Nephrology  Fellow  [facility]    Pager [phone]

## 2018-06-30 NOTE — ASSESSMENT & PLAN NOTE
Baseline Cr is 1.3. On admission 2.4. Unclear etiology, likely not ATN.  Previously poor PO intake, found to be in TLS.  Creatine continues to rise despite fluids and UOP has decreased   - Dced fluids   - monitor serum Cr closely, avoid nephrotix agents   - Given volume status consider lasix

## 2018-06-30 NOTE — ASSESSMENT & PLAN NOTE
Baseline Cr is 1.3. Serum Cr this AM noted to be 2.4. Unclear etiology. Possible related to poor PO intake or recent cardiac cath for balloon valvuloplasty.   - Normal saline at 150 cc/hr (caution with fluid due to history of aortic valve disease)  - monitor serum Cr closely

## 2018-06-30 NOTE — HPI
Mr. Kwon is a 73 y.o. male who presented initially to clinic with a chief complaint of several weeks for progressive fatigue and 1 week of neck swelling. He reports first noticing fatigue about 1 month ago. He reports significant dyspnea on minimal exertion. He reports that his fatigue has gradually been increasing to be the point he is largely homebound.     About 1 week ago he reports developing a sore throat which progressed to include a left sided neck swelling. His left neck swelling has been gradually increasing in size. He does not report that it is painful or associated with any particular difficulty swallowing. He was seen by his PCP for this recently and prescribed a course of Doxycycline, but he has not seen any improvement.

## 2018-07-01 PROBLEM — E87.1 HYPONATREMIA: Status: ACTIVE | Noted: 2018-07-01

## 2018-07-01 LAB
ABO + RH BLD: NORMAL
ALBUMIN SERPL BCP-MCNC: 2 G/DL
ALBUMIN SERPL BCP-MCNC: 2.1 G/DL
ALP SERPL-CCNC: 89 U/L
ALT SERPL W/O P-5'-P-CCNC: 15 U/L
ANION GAP SERPL CALC-SCNC: 15 MMOL/L
ANISOCYTOSIS BLD QL SMEAR: SLIGHT
AST SERPL-CCNC: 46 U/L
BASOPHILS # BLD AUTO: ABNORMAL K/UL
BASOPHILS NFR BLD: 0 %
BASOPHILS NFR BLD: 1 %
BILIRUB SERPL-MCNC: 0.8 MG/DL
BLD GP AB SCN CELLS X3 SERPL QL: NORMAL
BLD PROD TYP BPU: NORMAL
BLOOD UNIT EXPIRATION DATE: NORMAL
BLOOD UNIT TYPE CODE: 5100
BLOOD UNIT TYPE: NORMAL
BUN SERPL-MCNC: 74 MG/DL
BUN SERPL-MCNC: 85 MG/DL
BUN SERPL-MCNC: 87 MG/DL
CALCIUM SERPL-MCNC: 7.7 MG/DL
CALCIUM SERPL-MCNC: 8 MG/DL
CALCIUM SERPL-MCNC: 8 MG/DL
CHLORIDE SERPL-SCNC: 97 MMOL/L
CO2 SERPL-SCNC: 17 MMOL/L
CODING SYSTEM: NORMAL
CREAT SERPL-MCNC: 6.4 MG/DL
CREAT SERPL-MCNC: 7 MG/DL
CREAT SERPL-MCNC: 7.1 MG/DL
DACRYOCYTES BLD QL SMEAR: ABNORMAL
DIFFERENTIAL METHOD: ABNORMAL
DIFFERENTIAL METHOD: ABNORMAL
DISPENSE STATUS: NORMAL
EOSINOPHIL # BLD AUTO: ABNORMAL K/UL
EOSINOPHIL NFR BLD: 0 %
EOSINOPHIL NFR BLD: 0 %
ERYTHROCYTE [DISTWIDTH] IN BLOOD BY AUTOMATED COUNT: 20.8 %
ERYTHROCYTE [DISTWIDTH] IN BLOOD BY AUTOMATED COUNT: 21.7 %
EST. GFR  (AFRICAN AMERICAN): 8 ML/MIN/1.73 M^2
EST. GFR  (AFRICAN AMERICAN): 8.2 ML/MIN/1.73 M^2
EST. GFR  (AFRICAN AMERICAN): 9.1 ML/MIN/1.73 M^2
EST. GFR  (NON AFRICAN AMERICAN): 7 ML/MIN/1.73 M^2
EST. GFR  (NON AFRICAN AMERICAN): 7.1 ML/MIN/1.73 M^2
EST. GFR  (NON AFRICAN AMERICAN): 7.9 ML/MIN/1.73 M^2
GLUCOSE SERPL-MCNC: 103 MG/DL
GLUCOSE SERPL-MCNC: 104 MG/DL
GLUCOSE SERPL-MCNC: 86 MG/DL
HCT VFR BLD AUTO: 19.7 %
HCT VFR BLD AUTO: 25.6 %
HGB BLD-MCNC: 6.1 G/DL
HGB BLD-MCNC: 8 G/DL
HYPOCHROMIA BLD QL SMEAR: ABNORMAL
IMM GRANULOCYTES # BLD AUTO: ABNORMAL K/UL
IMM GRANULOCYTES # BLD AUTO: ABNORMAL K/UL
IMM GRANULOCYTES NFR BLD AUTO: ABNORMAL %
IMM GRANULOCYTES NFR BLD AUTO: ABNORMAL %
INR PPP: 1.4
LYMPHOCYTES # BLD AUTO: ABNORMAL K/UL
LYMPHOCYTES NFR BLD: 14 %
LYMPHOCYTES NFR BLD: 18 %
MAGNESIUM SERPL-MCNC: 1.9 MG/DL
MCH RBC QN AUTO: 31.7 PG
MCH RBC QN AUTO: 34.1 PG
MCHC RBC AUTO-ENTMCNC: 31 G/DL
MCHC RBC AUTO-ENTMCNC: 31.3 G/DL
MCV RBC AUTO: 102 FL
MCV RBC AUTO: 110 FL
METAMYELOCYTES NFR BLD MANUAL: 1 %
MONOCYTES # BLD AUTO: ABNORMAL K/UL
MONOCYTES NFR BLD: 29 %
MONOCYTES NFR BLD: 31 %
MYELOCYTES NFR BLD MANUAL: 1 %
NEUTROPHILS NFR BLD: 44 %
NEUTROPHILS NFR BLD: 46 %
NEUTS BAND NFR BLD MANUAL: 5 %
NEUTS BAND NFR BLD MANUAL: 5 %
NRBC BLD-RTO: 1 /100 WBC
NRBC BLD-RTO: 1 /100 WBC
NUM UNITS TRANS PACKED RBC: NORMAL
NUM UNITS TRANS WBC-POOR PLATPHERESIS: NORMAL
NUM UNITS TRANS WBC-POOR PLATPHERESIS: NORMAL
OVALOCYTES BLD QL SMEAR: ABNORMAL
PHOSPHATE SERPL-MCNC: 4.4 MG/DL
PHOSPHATE SERPL-MCNC: 5.1 MG/DL
PLATELET # BLD AUTO: 6 K/UL
PLATELET # BLD AUTO: 7 K/UL
PLATELET BLD QL SMEAR: ABNORMAL
PMV BLD AUTO: ABNORMAL FL
PMV BLD AUTO: ABNORMAL FL
POIKILOCYTOSIS BLD QL SMEAR: SLIGHT
POLYCHROMASIA BLD QL SMEAR: ABNORMAL
POTASSIUM SERPL-SCNC: 4.3 MMOL/L
POTASSIUM SERPL-SCNC: 4.5 MMOL/L
POTASSIUM SERPL-SCNC: 4.5 MMOL/L
PROT SERPL-MCNC: 7 G/DL
PROTHROMBIN TIME: 13.8 SEC
RBC # BLD AUTO: 1.79 M/UL
RBC # BLD AUTO: 2.52 M/UL
SODIUM SERPL-SCNC: 129 MMOL/L
URATE SERPL-MCNC: 2.3 MG/DL
WBC # BLD AUTO: 69.73 K/UL
WBC # BLD AUTO: 76.67 K/UL
WBC OTHER NFR BLD MANUAL: 5 %

## 2018-07-01 PROCEDURE — 36415 COLL VENOUS BLD VENIPUNCTURE: CPT

## 2018-07-01 PROCEDURE — 80069 RENAL FUNCTION PANEL: CPT

## 2018-07-01 PROCEDURE — 80048 BASIC METABOLIC PNL TOTAL CA: CPT

## 2018-07-01 PROCEDURE — 85610 PROTHROMBIN TIME: CPT

## 2018-07-01 PROCEDURE — 84100 ASSAY OF PHOSPHORUS: CPT

## 2018-07-01 PROCEDURE — 99233 SBSQ HOSP IP/OBS HIGH 50: CPT | Mod: ,,, | Performed by: INTERNAL MEDICINE

## 2018-07-01 PROCEDURE — P9037 PLATE PHERES LEUKOREDU IRRAD: HCPCS

## 2018-07-01 PROCEDURE — 80053 COMPREHEN METABOLIC PANEL: CPT

## 2018-07-01 PROCEDURE — P9040 RBC LEUKOREDUCED IRRADIATED: HCPCS

## 2018-07-01 PROCEDURE — 92610 EVALUATE SWALLOWING FUNCTION: CPT

## 2018-07-01 PROCEDURE — 25000003 PHARM REV CODE 250: Performed by: INTERNAL MEDICINE

## 2018-07-01 PROCEDURE — 86902 BLOOD TYPE ANTIGEN DONOR EA: CPT

## 2018-07-01 PROCEDURE — G8996 SWALLOW CURRENT STATUS: HCPCS | Mod: CK

## 2018-07-01 PROCEDURE — 20600001 HC STEP DOWN PRIVATE ROOM

## 2018-07-01 PROCEDURE — 86901 BLOOD TYPING SEROLOGIC RH(D): CPT

## 2018-07-01 PROCEDURE — G8997 SWALLOW GOAL STATUS: HCPCS | Mod: CJ

## 2018-07-01 PROCEDURE — 25000003 PHARM REV CODE 250: Performed by: HOSPITALIST

## 2018-07-01 PROCEDURE — 85007 BL SMEAR W/DIFF WBC COUNT: CPT | Mod: 91

## 2018-07-01 PROCEDURE — 86922 COMPATIBILITY TEST ANTIGLOB: CPT

## 2018-07-01 PROCEDURE — 85027 COMPLETE CBC AUTOMATED: CPT | Mod: 91

## 2018-07-01 PROCEDURE — 25000003 PHARM REV CODE 250: Performed by: NURSE PRACTITIONER

## 2018-07-01 PROCEDURE — 84550 ASSAY OF BLOOD/URIC ACID: CPT

## 2018-07-01 PROCEDURE — 63600175 PHARM REV CODE 636 W HCPCS: Performed by: INTERNAL MEDICINE

## 2018-07-01 PROCEDURE — 83735 ASSAY OF MAGNESIUM: CPT

## 2018-07-01 PROCEDURE — 97535 SELF CARE MNGMENT TRAINING: CPT

## 2018-07-01 RX ORDER — CLONAZEPAM 0.25 MG/1
TABLET, ORALLY DISINTEGRATING ORAL
Status: DISPENSED
Start: 2018-07-01 | End: 2018-07-02

## 2018-07-01 RX ORDER — SODIUM BICARBONATE 650 MG/1
1300 TABLET ORAL 3 TIMES DAILY
Status: DISCONTINUED | OUTPATIENT
Start: 2018-07-01 | End: 2018-07-03 | Stop reason: HOSPADM

## 2018-07-01 RX ORDER — FUROSEMIDE 10 MG/ML
120 INJECTION INTRAMUSCULAR; INTRAVENOUS ONCE
Status: COMPLETED | OUTPATIENT
Start: 2018-07-01 | End: 2018-07-01

## 2018-07-01 RX ORDER — ALLOPURINOL 100 MG/1
200 TABLET ORAL DAILY
Status: DISCONTINUED | OUTPATIENT
Start: 2018-07-02 | End: 2018-07-03 | Stop reason: HOSPADM

## 2018-07-01 RX ORDER — HYDROCODONE BITARTRATE AND ACETAMINOPHEN 500; 5 MG/1; MG/1
TABLET ORAL
Status: DISCONTINUED | OUTPATIENT
Start: 2018-07-01 | End: 2018-07-02

## 2018-07-01 RX ADMIN — AMPICILLIN SODIUM AND SULBACTAM SODIUM 3 G: 2; 1 INJECTION, POWDER, FOR SOLUTION INTRAMUSCULAR; INTRAVENOUS at 08:07

## 2018-07-01 RX ADMIN — DIPHENHYDRAMINE HYDROCHLORIDE 25 MG: 25 CAPSULE ORAL at 11:07

## 2018-07-01 RX ADMIN — CLONAZEPAM 0.5 MG: 0.25 TABLET, ORALLY DISINTEGRATING ORAL at 08:07

## 2018-07-01 RX ADMIN — METOPROLOL SUCCINATE 100 MG: 50 TABLET, EXTENDED RELEASE ORAL at 08:07

## 2018-07-01 RX ADMIN — SODIUM BICARBONATE 650 MG TABLET 1300 MG: at 08:07

## 2018-07-01 RX ADMIN — AMIODARONE HYDROCHLORIDE 200 MG: 200 TABLET ORAL at 08:07

## 2018-07-01 RX ADMIN — CETIRIZINE HYDROCHLORIDE 5 MG: 5 TABLET, FILM COATED ORAL at 08:07

## 2018-07-01 RX ADMIN — SODIUM BICARBONATE 650 MG TABLET 1300 MG: at 03:07

## 2018-07-01 RX ADMIN — HYDROXYUREA 1000 MG: 500 CAPSULE ORAL at 08:07

## 2018-07-01 RX ADMIN — ACETAMINOPHEN 650 MG: 325 TABLET ORAL at 11:07

## 2018-07-01 RX ADMIN — ALLOPURINOL 300 MG: 100 TABLET ORAL at 08:07

## 2018-07-01 RX ADMIN — Medication 10 ML: at 09:07

## 2018-07-01 RX ADMIN — VENLAFAXINE HYDROCHLORIDE 37.5 MG: 37.5 CAPSULE, EXTENDED RELEASE ORAL at 08:07

## 2018-07-01 RX ADMIN — FUROSEMIDE 120 MG: 10 INJECTION, SOLUTION INTRAMUSCULAR; INTRAVENOUS at 11:07

## 2018-07-01 NOTE — ASSESSMENT & PLAN NOTE
He was originally diagnosed with MDS in August of 2016. He has been treated with 5 cycles of Azacitidine without improvement in his MDS. More recently he has been being treated with Darbopoetin and has obtained some degree of transfusion independence. 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  - Bone marrow biopsy pending  - follow TLS and DIC labs  - s/p rasburicase, continue allopurinol  - required PRBC and platelet transfusion(s).

## 2018-07-01 NOTE — PROGRESS NOTES
[facility]  Nephrology  Progress Note    Patient Name: Wil Kwon Jr.  MRN: [id number]  Admission Date: 6/29/2018  Hospital Length of Stay: 2 days  Attending Provider: Milo Slater MD   Primary Care Physician: LAILA Serra MD  Principal Problem:<principal problem not specified>    Subjective:     HPI: Wil Kwon is a 73 year old male with past medical history of CKD stage III since 2018 February with a sCr baseline 1.1-1.4, Proteinuria, MDS which was diagnosed when he underwent a marrow biopsy in Aug 2016 (treated with chemotherapy 5 cycles of Azacitidine which was not tolerated and recently he has been being treated with Darbopoetin), aortic stenosis, paroxysmal A-fib on warfarin.  Went yesterday 6/29/18/ to hem/onc clinic and presented with new and rapidly progressive leukocytosis, acute kidney, and weakness. Oncology was concerned that he either has a focal infection  in the right neck (received Doxycycline 100 mg BID per Internist) or which could be a new malignancy or has progressed to acute myeloid leukemia. Has been complaint of several weeks for progressive fatigue and 1 week of neck swelling. He reports significant dyspnea on minimal exertion. On arrival on 6/29/18 has leukocytosis 74, thrombocytopenia 8, sCr 3.8, Uric acid 14.2, bicarbonate 22, sodium 131. Was started on Rasburicase and Allopurinol for suspected TLS, Abx Ampicillin- sulbactam, , IVFs with NS, chest x ray with cardiomegaly with bilateral edema. Nephrology was consulted for BERNICE, decreased in UOP and probable need for dialysis.      Interval History:   Patient evaluated at bedside, still continues feeling weakness, no worsening fatigue, blood pressures still stable, UOP  cc + one unmeasured void.     Review of patient's allergies indicates:   Allergen Reactions    Lisinopril Edema     Cheek swelling    Augmentin [amoxicillin-pot clavulanate] Rash    Lipitor [atorvastatin] Other (See Comments)      Severe Muscle pain that caused pt not to sleep for 72 hours.     Current Facility-Administered Medications   Medication Frequency    0.9%  NaCl infusion (for blood administration) Q24H PRN    0.9%  NaCl infusion (for blood administration) Q24H PRN    acetaminophen tablet 650 mg Q6H PRN    allopurinol tablet 300 mg Daily    amiodarone tablet 200 mg Daily    ampicillin-sulbactam 3 g in sodium chloride 0.9 % 100 mL IVPB (ready to mix system) Q12H    cetirizine tablet 5 mg QHS    clonazePAM disintegrating tablet 2 mg Nightly PRN    diphenhydrAMINE capsule 25 mg Q6H PRN    furosemide injection 120 mg Once    hydroxyurea capsule 1,000 mg BID    metoprolol succinate (TOPROL-XL) 24 hr tablet 100 mg Daily    sodium bicarbonate tablet 1,300 mg TID    sodium chloride 0.9% flush 5 mL PRN    venlafaxine 24 hr capsule 37.5 mg Daily       Objective:     Vital Signs (Most Recent):  Temp: 97 °F (36.1 °C) (07/01/18 0741)  Pulse: 78 (07/01/18 0741)  Resp: 19 (07/01/18 0741)  BP: 128/61 (07/01/18 0741)  SpO2: 95 % (07/01/18 0741)  O2 Device (Oxygen Therapy): nasal cannula (07/01/18 0409) Vital Signs (24h Range):  Temp:  [96.3 °F (35.7 °C)-98.2 °F (36.8 °C)] 97 °F (36.1 °C)  Pulse:  [72-89] 78  Resp:  [15-22] 19  SpO2:  [93 %-98 %] 95 %  BP: (103-145)/(55-65) 128/61     Weight: 73 kg (161 lb) (weighed in clinic) (06/29/18 1957)  Body mass index is 22.45 kg/m².  Body surface area is 1.91 meters squared.    I/O last 3 completed shifts:  In: 87399.4 [P.O.:700; I.V.:678.3; Blood:34052.1; IV Piggyback:350]  Out: 220 [Urine:220]    Physical Exam   Constitutional: He is oriented to person, place, and time. He has a sickly appearance. Nasal cannula in place.   HENT:   Head: Normocephalic and atraumatic.   Eyes: Right eye exhibits no discharge. Left eye exhibits no discharge.   Cardiovascular: Normal rate and regular rhythm.    Pulmonary/Chest: He has rales in the right lower field and the left lower field.   Some difficulty  breathing    Abdominal: Soft.   Musculoskeletal: He exhibits edema.   General weakness    Neurological: He is alert and oriented to person, place, and time.       Significant Labs:  ABGs: No results for input(s): PH, PCO2, HCO3, POCSATURATED, BE in the last 168 hours.  BMP:   Recent Labs  Lab 07/01/18  0445   GLU 86   CL 97   CO2 17*   BUN 74*   CREATININE 6.4*   CALCIUM 7.7*   MG 1.9     CBC:   Recent Labs  Lab 07/01/18  0445   WBC 76.67*   RBC 1.79*   HGB 6.1*   HCT 19.7*   PLT 6*   *   MCH 34.1*   MCHC 31.0*     CMP:   Recent Labs  Lab 07/01/18  0445   GLU 86   CALCIUM 7.7*   ALBUMIN 2.0*   PROT 7.0   *   K 4.3   CO2 17*   CL 97   BUN 74*   CREATININE 6.4*   ALKPHOS 89   ALT 15   AST 46*   BILITOT 0.8     All labs within the past 24 hours have been reviewed.       Assessment/Plan:     Hyponatremia    - Fluid restriction   - Hold on Venlafaxine which could lead to hyponatremia and SIADH  - Continue to monitor sodium level         Tumor lysis syndrome    -S/P Rasburicase 6/29/18  - On allopurinol  100 mg q   - Last uric acid 2.3         Acute kidney injury    -BERNICE superimposed on CKD stage III which could be multifactorial from pre-renal due to poor oral intake, TLS and TMA  -Serum creatinine has been progressively being getting worse  -Has not had UOP since 9:00 AM, even with administration of IVFs, has been presenting with slightly more fatigue and has been requiring O2 via nasal canula. Chest x ray with bilateral edema.  -Blood pressures have been stable     Plan:  - Serum creatinine currently worsening from 4.7 -->6.4   - Will have another dose of lasix to 120 mg IV x1 dose today   - Acid/base: started on Sodium bicarbonate 1,300 mg q TID   - Renal ultrasound bilateral nephromegaly compared to the prior CT of 08/29/2016 which this could be seen in MDS, no hydronephrosis or masses   - Urine sediment evaluation  - Proteinuria: UPCR=21  - Monitor intake and output   - Avoid nephrotoxic medication   -  No need for RRT currently but will have low threshold if volume overload, worsening acidosis, electrolyte abnormalities. Concern with severe thrombocytopenia in which will be difficult to place trialysis line at current moment.            MDS (myelodysplastic syndrome)    -Pending bone aceves biopsy  -Will follow bone aceves recommendations             Jigar Ace  Nephrology  Fellow  [facility]    Pager [phone]

## 2018-07-01 NOTE — PLAN OF CARE
Problem: SLP Goal  Goal: SLP Goal  Outcome: Ongoing (interventions implemented as appropriate)  Clinical evaluation of swallow complete. Recommend pureed diet and nectar thickened liquids. Pt requires assistance to thicken liquids. NO straws. PO meds buried whole/ crushed in pureed. Strict aspiration precautions.     KARINA Drake, CCC-SLP  [phone]  7/1/2018

## 2018-07-01 NOTE — ASSESSMENT & PLAN NOTE
Baseline Cr is 1.3. On admission 2.4. Unclear etiology, likely not ATN.  Previously poor PO intake, found to be in TLS.  Creatine continues to rise despite fluids and UOP has decreased   - renally adjusted medications per pharmacy recs except for hydroxyurea (we wanted to increase but to renally dose would mean to decrease from current 200 daily dose)  - failed lasix trial on 6/30, nephrology ordered another lasix dose 7/1, will monitor UOP, however there's a very good chance he will need HD 7/2, hesitant to put in a line with current thrombocytopenia.

## 2018-07-01 NOTE — PLAN OF CARE
Problem: Patient Care Overview  Goal: Individualization & Mutuality  Outcome: Ongoing (interventions implemented as appropriate)  Cluster care for rest

## 2018-07-01 NOTE — ASSESSMENT & PLAN NOTE
- Fluid restriction   - Hold on Venlafaxine which could lead to hyponatremia and SIADH  - Continue to monitor sodium level

## 2018-07-01 NOTE — PT/OT/SLP EVAL
Speech Language Pathology Evaluation  Bedside Swallow    Patient Name:  Wil Kwon Jr.   MRN:  [id number]   801/[id number]    Admitting Diagnosis: MDS    Recommendations:                 General Recommendations:  Dysphagia therapy  Diet recommendations:  Puree, Nectar Thick   Aspiration Precautions:   · Thicken all liquids to nectar thick consistency- 6oz: 1 packet thickener  · Pt requires assistance to thicken liquids  · PO meds buried/ crushed in pureed  · NO straws   · Fully awake and alert for PO intake  · Fully upright position for PO intake  · Small bites/ sips  · Slow rate of eating/ drinking  · 1 bite/ sip @ a time  · Refrain from talking prior to swallow completion   · Remain upright for 20-30 min post PO intake  · Avoid consistencies that melt to thin liquid (i.e., ice chips, hard candy, ice cream, sherbert, jello, etc.)  · Discontinue PO upon: coughing, throat clearing, choking, wet vocal quality, wet breath sounds, watery eyes, reddened facial features  General Precautions: Standard, aspiration, fall, respiratory, pureed diet, nectar thick    History:     Past Medical History:   Diagnosis Date    Aortic valve stenosis, severe 8/3/2017    4-24-18  1 - Normal left ventricular systolic function (EF 60-65%).    2 - Concentric hypertrophy.    3 - No wall motion abnormalities.    4 - Indeterminate LV diastolic function.    5 - Biatrial enlargement.    6 - Right ventricle is upper limit of normal in size with normal systolic function.    7 - Severe aortic valve stenosis (JEFF 0.60 cm2, AVAi 0.31 cm2/m2, peak aortic jet velocity 4.5 m/s,MG 66 mmHg, ).    8 - Moderate to moderate-severe (3+) mitral regurgitation.    9 - Trivial to mild tricuspid regurgitation.    10 - Increased central venous pressure.    11 - Pulmonary hypertension. The estimated PA systolic pressure is 79 mmHg.     DJD (degenerative joint disease)     Dyslipidemia 10/17/2012    Essential hypertension 10/17/2012    History of psychiatric  "hospitalization     [facility] 2014, [facility] 1993    Hyponatremia 4/22/2016    Long term current use of anticoagulant therapy 11/9/2017    Major depressive disorder with single episode, in partial remission 8/17/2016    MDS (myelodysplastic syndrome) 2013    s/p Chemo     NSTEMI (non-ST elevated myocardial infarction) 4/25/2018    Persistent atrial fibrillation 3/11/2017    Pulmonary hypertension- April 2016- The estimated PA systolic pressure is 39 mmHg 8/17/2016    Rash, drug 3/22/2017    Syncope due to orthostatic hypotension with Type II NSTEMI 4/24/2018    Therapy     [facility]     Thrombocytopenia 4/1/2016       Past Surgical History:   Procedure Laterality Date    BONE MARROW BIOPSY  08/29/2016    CAROTID ENDARTERECTOMY Left 04/09/2013    Inguinal hernia      Left    KNEE SURGERY      Right x2    neck fusion      TONSILLECTOMY       Prior diet: Per pt and his wife, regular consistency diet and thin liquids. However pt reported "It was hard." Additionally, pt and his wife reported intermittent coughing/ throat clearing with solids and liquids. Medical team noted to have ordered pt regular consistency solid diet and thin liquids prior to this evaluation.     Subjective     "That all looks too hard." Pt re: regular consistency solid meal tray ordered by medical team prior to this evaluation.     Pain/Comfort:  · Pain Rating 1: 0/10  · Pain Rating Post-Intervention 1: 0/10    Objective:     Oral Musculature Evaluation  · Oral Musculature: WFL  · Dentition: present and adequate  · Secretion Management: adequate  · Mucosal Quality: good  · Oral Labial Strength and Mobility: WFL  · Lingual Strength and Mobility: WFL  · Velar Elevation: WFL  · Buccal Strength and Mobility: WFL  · Volitional Cough: WFL  · Volitional Swallow: WFL  · Voice Prior to PO Intake: Clear, dry     Bedside Swallow Eval:   Consistencies Assessed:  · Thin water- ~5oz via multiple cup sips  · Nectar " thickened water- ~6oz via multiple cup sips   · Dental soft bread with crust removed- bite x1  · Pureed apple sauce- ~1/2 container via multiple full tsp bites   · PO meds x~8 administered by NSG, 1 @ a time, with cup sip nectar thickened water     Oral Phase:   · Prolonged mastication observed with dental soft solid   · Appeared WFL with thin and nectar thickened water and pureed     Pharyngeal Phase:   · Intermittent coughing/ throat clearing observed with thin water  · Coughing/ throat clearing with PO med 1 @ a time with cup sip nectar thickened water x2  · Delayed throat clear following completion of nectar thickened liquid trials in isolation x1. Unable to determine if related to nectar thickened liquid vs spontaneous as pt reported occasional spontaneous coughing throughout the day in the absence of PO intake     Treatment:   Pt awake and alert upon entry with wife and NSG present at bedside. Pt observed holding tissue to roof of mouth 2/2 bleeding. NSG aware. Once bleeding resolved, pt provided with water rinse x2 prior to initiation of PO trials. Education provided to pt and wife re: role of SLP, definition/ risks/ overt clinical signs of aspiration, consistent implementation of all aspiration precautions listed above, thickened liquids, and SLP POC. Pt and wife verbalized understanding of education provided and agreement with SLP POC. White board updated. Thickening supplies left at bedside. No further questions.     Results discussed with Dr. Berman who arrived at pt's bedside near termination of this evaluation.     Assessment:     Wil Kwon Jr. is a 73 y.o. male with an SLP diagnosis of dysphagia.     Goals:    SLP Goals        Problem: SLP Goal    Goal Priority Disciplines Outcome   SLP Goal     SLP Ongoing (interventions implemented as appropriate)   Description:  Speech Language Pathology  Goals expected to be met by 7/8  1. Pt will tolerate pureed diet and nectar thickened liquids with no  overt clinical signs of aspiration.   2. Pt will tolerate PO trials of dental soft solids with timely mastication and A-P transit and no overt clinical signs of aspiration.   3. Pt will tolerate PO trials of thin liquids with no overt clinical signs of aspiration.   4. Pt will complete dysphagia exercises x10 each with good ability.                     Plan:     · Patient to be seen:  4 x/week   · Plan of Care expires:  07/30/18  · Plan of Care reviewed with:  patient, spouse   · SLP Follow-Up:  Yes       Discharge recommendations:  other (see comments) (Pending progress)     Time Tracking:     SLP Treatment Date:   07/01/18  Speech Start Time:  0836  Speech Stop Time:  0915     Speech Total Time (min):  39 min    Billable Minutes: Eval Swallow and Oral Function 23 and Seld Care/Home Management Training 16    KARINA Drake, CCC-SLP  [phone]  7/1/2018

## 2018-07-01 NOTE — CONSULTS
visited patient in order to respond to spiritual care consult.   provided a compassionate presence, reflective listening and explored patient's wife's concerns.

## 2018-07-01 NOTE — PLAN OF CARE
Problem: Patient Care Overview  Goal: Plan of Care Review  Outcome: Ongoing (interventions implemented as appropriate)  Plan of care reviewed with patient and spouse. Patient received one unit platelets and one unit RBC this shift. Patient also received 120 mg of Lasix this shift. Urine output 250 this shift.  Bleeding gums noted early this shift, notified physician. Bleeding subsiding this afternoon.  Patient continues on antibiotics. Oxygen still in use. Patient swallow evaluation completed. Purred diet recommended, and thickener for liquids. Patient made DNR this shift. Considering hospice support after speaking with physician tomorrow. VSS stable ,  Bed remains in low locked position, call light with in reach. Wife remains at bedside thru out shift. patient encouraged to call with any and all needs, .Hourly rounding continues  Thru out shift.

## 2018-07-01 NOTE — ASSESSMENT & PLAN NOTE
The etiology of his neck mass is unclear at this time. He did report having an antecedent febrile illness in clinic. This was followed by a sore throat and then by neck swelling. He currently reports being afebrile and does not appear toxic. Given the nature and location of the swelling, there is a concern for possible anaerobic infection. Also of concern is possible for malignancy (H&N cancer, ?lymphoma)   - US neck reveals LAD  - ENT: no inpatient procedure while thrombocytopenic  - Unasyn

## 2018-07-01 NOTE — ASSESSMENT & PLAN NOTE
Platelets are 6k today on admission. Given his use of coumadin and possible new leukemia he is at high risk for bleeding.  -- s/p multiple units of platelets, no major bleeds but has lip bleeding

## 2018-07-01 NOTE — ASSESSMENT & PLAN NOTE
-BERNICE superimposed on CKD stage III which could be multifactorial from pre-renal due to poor oral intake, TLS and TMA  -Serum creatinine has been progressively being getting worse  -Has not had UOP since 9:00 AM, even with administration of IVFs, has been presenting with slightly more fatigue and has been requiring O2 via nasal canula. Chest x ray with bilateral edema.  -Blood pressures have been stable     Plan:  - Serum creatinine currently worsening from 4.7 -->6.4   - Will have another dose of lasix to 120 mg IV x1 dose today   - Acid/base: started on Sodium bicarbonate 1,300 mg q TID   - Renal ultrasound bilateral nephromegaly compared to the prior CT of 08/29/2016 which this could be seen in MDS, no hydronephrosis or masses   - Urine sediment evaluation  - Proteinuria: UPCR=21  - Monitor intake and output   - Avoid nephrotoxic medication   - No need for RRT currently but will have low threshold if volume overload, worsening acidosis, electrolyte abnormalities. Concern with severe thrombocytopenia in which will be difficult to place trialysis line at current moment.

## 2018-07-01 NOTE — SUBJECTIVE & OBJECTIVE
Interval History:   Patient evaluated at bedside, still continues feeling weakness, no worsening fatigue, blood pressures still stable, UOP  cc + one unmeasured void.     Review of patient's allergies indicates:   Allergen Reactions    Lisinopril Edema     Cheek swelling    Augmentin [amoxicillin-pot clavulanate] Rash    Lipitor [atorvastatin] Other (See Comments)     Severe Muscle pain that caused pt not to sleep for 72 hours.     Current Facility-Administered Medications   Medication Frequency    0.9%  NaCl infusion (for blood administration) Q24H PRN    0.9%  NaCl infusion (for blood administration) Q24H PRN    acetaminophen tablet 650 mg Q6H PRN    allopurinol tablet 300 mg Daily    amiodarone tablet 200 mg Daily    ampicillin-sulbactam 3 g in sodium chloride 0.9 % 100 mL IVPB (ready to mix system) Q12H    cetirizine tablet 5 mg QHS    clonazePAM disintegrating tablet 2 mg Nightly PRN    diphenhydrAMINE capsule 25 mg Q6H PRN    furosemide injection 120 mg Once    hydroxyurea capsule 1,000 mg BID    metoprolol succinate (TOPROL-XL) 24 hr tablet 100 mg Daily    sodium bicarbonate tablet 1,300 mg TID    sodium chloride 0.9% flush 5 mL PRN    venlafaxine 24 hr capsule 37.5 mg Daily       Objective:     Vital Signs (Most Recent):  Temp: 97 °F (36.1 °C) (07/01/18 0741)  Pulse: 78 (07/01/18 0741)  Resp: 19 (07/01/18 0741)  BP: 128/61 (07/01/18 0741)  SpO2: 95 % (07/01/18 0741)  O2 Device (Oxygen Therapy): nasal cannula (07/01/18 0409) Vital Signs (24h Range):  Temp:  [96.3 °F (35.7 °C)-98.2 °F (36.8 °C)] 97 °F (36.1 °C)  Pulse:  [72-89] 78  Resp:  [15-22] 19  SpO2:  [93 %-98 %] 95 %  BP: (103-145)/(55-65) 128/61     Weight: 73 kg (161 lb) (weighed in clinic) (06/29/18 1957)  Body mass index is 22.45 kg/m².  Body surface area is 1.91 meters squared.    I/O last 3 completed shifts:  In: 20657.4 [P.O.:700; I.V.:678.3; Blood:10653.1; IV Piggyback:350]  Out: 220 [Urine:220]    Physical Exam    Constitutional: He is oriented to person, place, and time. He has a sickly appearance. Nasal cannula in place.   HENT:   Head: Normocephalic and atraumatic.   Eyes: Right eye exhibits no discharge. Left eye exhibits no discharge.   Cardiovascular: Normal rate and regular rhythm.    Pulmonary/Chest: He has rales in the right lower field and the left lower field.   Some difficulty breathing    Abdominal: Soft.   Musculoskeletal: He exhibits edema.   General weakness    Neurological: He is alert and oriented to person, place, and time.       Significant Labs:  ABGs: No results for input(s): PH, PCO2, HCO3, POCSATURATED, BE in the last 168 hours.  BMP:   Recent Labs  Lab 07/01/18  0445   GLU 86   CL 97   CO2 17*   BUN 74*   CREATININE 6.4*   CALCIUM 7.7*   MG 1.9     CBC:   Recent Labs  Lab 07/01/18  0445   WBC 76.67*   RBC 1.79*   HGB 6.1*   HCT 19.7*   PLT 6*   *   MCH 34.1*   MCHC 31.0*     CMP:   Recent Labs  Lab 07/01/18  0445   GLU 86   CALCIUM 7.7*   ALBUMIN 2.0*   PROT 7.0   *   K 4.3   CO2 17*   CL 97   BUN 74*   CREATININE 6.4*   ALKPHOS 89   ALT 15   AST 46*   BILITOT 0.8     All labs within the past 24 hours have been reviewed.

## 2018-07-01 NOTE — PROGRESS NOTES
[facility]  Hematology  Bone Marrow Transplant  Progress Note    Patient Name: Wil Kwon Jr.  Admission Date: 6/29/2018  Hospital Length of Stay: 2 days  Code Status: DNR    Subjective:     Interval History: significantly worsening renal function, hospice discussions initiated today, will request  to see          Objective:     Vital Signs (Most Recent):  Temp: (P) 97.8 °F (36.6 °C) (07/01/18 1215)  Pulse: (P) 79 (07/01/18 1215)  Resp: (P) 18 (07/01/18 1215)  BP: (P) 136/60 (07/01/18 1215)  SpO2: (P) 98 % (07/01/18 1215) Vital Signs (24h Range):  Temp:  [96.3 °F (35.7 °C)-98.2 °F (36.8 °C)] (P) 97.8 °F (36.6 °C)  Pulse:  [72-80] (P) 79  Resp:  [17-22] (P) 18  SpO2:  [94 %-98 %] (P) 98 %  BP: (103-145)/(55-65) (P) 136/60     Weight: 73 kg (161 lb) (weighed in clinic)  Body mass index is 22.45 kg/m².  Body surface area is 1.91 meters squared.    ECOG SCORE         [unfilled]    Intake/Output - Last 3 Shifts       06/29 0700 - 06/30 0659 06/30 0700 - 07/01 0659 07/01 0700 - 07/02 0659    P.O. 740 200     I.V. (mL/kg) 678.3 (9.3)      Blood 734 01468.1 749.3    IV Piggyback 150 200     Total Intake(mL/kg) 2302.3 (31.5) 32665.1 (144.7) 749.3 (10.3)    Urine (mL/kg/hr) 100 120 (0.1)     Total Output 100 120      Net +2202.3 +24669.1 +749.3           Urine Occurrence 1 x 1 x     Stool Occurrence 1 x            Physical Exam    Significant Labs:   CBC:     Recent Labs  Lab 06/29/18  2058 06/30/18  0507 07/01/18  0445   WBC 74.99* 52.82* 76.67*   HGB 7.1* 7.5* 6.1*   HCT 23.2* 24.3* 19.7*   PLT 8* 5* 6*   , CMP:     Recent Labs  Lab 06/30/18  0507 06/30/18  1619 07/01/18  0445   * 129* 129*   K 4.0 4.2 4.3   CL 97 97 97   CO2 20* 20* 17*   * 128* 86   BUN 56* 67* 74*   CREATININE 4.7* 5.5* 6.4*   CALCIUM 7.9* 7.7* 7.7*   PROT 7.5  --  7.0   ALBUMIN 2.3* 2.1* 2.0*   BILITOT 0.9  --  0.8   ALKPHOS 86  --  89   AST 49*  --  46*   ALT 14  --  15   ANIONGAP 13 12 15   EGFRNONAA  11.4* 9.5* 7.9*   , Urine Studies:     Recent Labs  Lab 06/29/18  2343   COLORU Sophia   APPEARANCEUA Cloudy*   PHUR 5.0   SPECGRAV 1.015   PROTEINUA 3+*   GLUCUA 1+*   KETONESU Negative   BILIRUBINUA Negative   OCCULTUA 2+*   NITRITE Negative   UROBILINOGEN Negative   LEUKOCYTESUR Negative   RBCUA 29*   WBCUA 15*   BACTERIA Many*   SQUAMEPITHEL 1   HYALINECASTS 52*    and All pertinent labs from the last 24 hours have been reviewed.    Diagnostic Results:  I have reviewed all pertinent imaging results/findings within the past 24 hours.  CXr shows bilateral edema    Objective:     Vital Signs (Most Recent):  Temp: (P) 97.8 °F (36.6 °C) (07/01/18 1215)  Pulse: (P) 79 (07/01/18 1215)  Resp: (P) 18 (07/01/18 1215)  BP: (P) 136/60 (07/01/18 1215)  SpO2: (P) 98 % (07/01/18 1215) Vital Signs (24h Range):  Temp:  [96.3 °F (35.7 °C)-98.2 °F (36.8 °C)] (P) 97.8 °F (36.6 °C)  Pulse:  [72-80] (P) 79  Resp:  [17-22] (P) 18  SpO2:  [94 %-98 %] (P) 98 %  BP: (103-145)/(55-65) (P) 136/60     Weight: 73 kg (161 lb) (weighed in clinic)  Body mass index is 22.45 kg/m².  Body surface area is 1.91 meters squared.    ECOG SCORE         [unfilled]    Intake/Output - Last 3 Shifts       06/29 0700 - 06/30 0659 06/30 0700 - 07/01 0659 07/01 0700 - 07/02 0659    P.O. 740 200     I.V. (mL/kg) 678.3 (9.3)      Blood 734 04669.1     IV Piggyback 150 200     Total Intake(mL/kg) 2302.3 (31.5) 93317.1 (144.7)     Urine (mL/kg/hr) 100 120 (0.1)     Total Output 100 120      Net +2202.3 +13468.1             Urine Occurrence 1 x 1 x     Stool Occurrence 1 x            Physical Exam    Significant Labs:   CBC:     Recent Labs  Lab 06/29/18  2058 06/30/18  0507 07/01/18  0445   WBC 74.99* 52.82* 76.67*   HGB 7.1* 7.5* 6.1*   HCT 23.2* 24.3* 19.7*   PLT 8* 5* 6*   , CMP:     Recent Labs  Lab 06/30/18  0507 06/30/18  1619 07/01/18  0445   * 129* 129*   K 4.0 4.2 4.3   CL 97 97 97   CO2 20* 20* 17*   * 128* 86   BUN 56* 67* 74*   CREATININE  4.7* 5.5* 6.4*   CALCIUM 7.9* 7.7* 7.7*   PROT 7.5  --  7.0   ALBUMIN 2.3* 2.1* 2.0*   BILITOT 0.9  --  0.8   ALKPHOS 86  --  89   AST 49*  --  46*   ALT 14  --  15   ANIONGAP 13 12 15   EGFRNONAA 11.4* 9.5* 7.9*   , Urine Studies:     Recent Labs  Lab 06/29/18  2343   COLORU Sophia   APPEARANCEUA Cloudy*   PHUR 5.0   SPECGRAV 1.015   PROTEINUA 3+*   GLUCUA 1+*   KETONESU Negative   BILIRUBINUA Negative   OCCULTUA 2+*   NITRITE Negative   UROBILINOGEN Negative   LEUKOCYTESUR Negative   RBCUA 29*   WBCUA 15*   BACTERIA Many*   SQUAMEPITHEL 1   HYALINECASTS 52*    and All pertinent labs from the last 24 hours have been reviewed.    Diagnostic Results:  I have reviewed all pertinent imaging results/findings within the past 24 hours.  CXr shows bilateral edema    Objective:     Vital Signs (Most Recent):  Temp: (P) 97.8 °F (36.6 °C) (07/01/18 1215)  Pulse: (P) 79 (07/01/18 1215)  Resp: (P) 18 (07/01/18 1215)  BP: (P) 136/60 (07/01/18 1215)  SpO2: (P) 98 % (07/01/18 1215) Vital Signs (24h Range):  Temp:  [96.3 °F (35.7 °C)-98.2 °F (36.8 °C)] (P) 97.8 °F (36.6 °C)  Pulse:  [72-80] (P) 79  Resp:  [17-22] (P) 18  SpO2:  [94 %-98 %] (P) 98 %  BP: (103-145)/(55-65) (P) 136/60     Weight: 73 kg (161 lb) (weighed in clinic)  Body mass index is 22.45 kg/m².  Body surface area is 1.91 meters squared.    ECOG SCORE             Intake/Output - Last 3 Shifts       06/29 0700 - 06/30 0659 06/30 0700 - 07/01 0659 07/01 0700 - 07/02 0659    P.O. 740 200     I.V. (mL/kg) 678.3 (9.3)      Blood 734 90971.1     IV Piggyback 150 200     Total Intake(mL/kg) 2302.3 (31.5) 11813.1 (144.7)     Urine (mL/kg/hr) 100 120 (0.1)     Total Output 100 120      Net +2202.3 +07733.1             Urine Occurrence 1 x 1 x     Stool Occurrence 1 x            Physical Exam   Constitutional: He appears well-developed.   HENT:   Head: Normocephalic and atraumatic.   Oral mucosa crusted with blood   Eyes: Conjunctivae and EOM are normal. Right eye exhibits no  discharge. Left eye exhibits no discharge.   Neck: Normal range of motion.   Cardiovascular: Normal rate.    No murmur heard.  Pulmonary/Chest: Effort normal. No respiratory distress.   Abdominal: Soft. Bowel sounds are normal.   Musculoskeletal: Normal range of motion.   Neurological: He is alert.   Skin: Skin is warm and dry.       Significant Labs:   CBC:     Recent Labs  Lab 06/29/18 2058 06/30/18  0507 07/01/18  0445   WBC 74.99* 52.82* 76.67*   HGB 7.1* 7.5* 6.1*   HCT 23.2* 24.3* 19.7*   PLT 8* 5* 6*   , CMP:     Recent Labs  Lab 06/30/18  0507 06/30/18  1619 07/01/18  0445   * 129* 129*   K 4.0 4.2 4.3   CL 97 97 97   CO2 20* 20* 17*   * 128* 86   BUN 56* 67* 74*   CREATININE 4.7* 5.5* 6.4*   CALCIUM 7.9* 7.7* 7.7*   PROT 7.5  --  7.0   ALBUMIN 2.3* 2.1* 2.0*   BILITOT 0.9  --  0.8   ALKPHOS 86  --  89   AST 49*  --  46*   ALT 14  --  15   ANIONGAP 13 12 15   EGFRNONAA 11.4* 9.5* 7.9*   , Urine Studies:     Recent Labs  Lab 06/29/18  2343   COLORU Sophia   APPEARANCEUA Cloudy*   PHUR 5.0   SPECGRAV 1.015   PROTEINUA 3+*   GLUCUA 1+*   KETONESU Negative   BILIRUBINUA Negative   OCCULTUA 2+*   NITRITE Negative   UROBILINOGEN Negative   LEUKOCYTESUR Negative   RBCUA 29*   WBCUA 15*   BACTERIA Many*   SQUAMEPITHEL 1   HYALINECASTS 52*    and All pertinent labs from the last 24 hours have been reviewed.    Diagnostic Results:  I have reviewed all pertinent imaging results/findings within the past 24 hours.  CXr shows bilateral edema    Assessment/Plan:     Tumor lysis syndrome    Continue to trend TLS labs  - s/p rasburicase and continue allopurinol        Neck mass    The etiology of his neck mass is unclear at this time. He did report having an antecedent febrile illness in clinic. This was followed by a sore throat and then by neck swelling. He currently reports being afebrile and does not appear toxic. Given the nature and location of the swelling, there is a concern for possible anaerobic  infection. Also of concern is possible for malignancy (H&N cancer, ?lymphoma)   - US neck reveals LAD  - ENT: no inpatient procedure while thrombocytopenic  - Unasyn         Aortic valve stenosis, severe    He underwent recent balloon valvuplasty via Cardiology. Still with systolic murmur.  He has previously had severe AS and resultant pulmonary hypertension.  - will need to hydrate due to BERNICE, but will proceed with gentle hydration given risk for decompensation  - monitor respiratory status closely        Persistent atrial fibrillation    Rate controlled with Metoprolol Succinate and on anticoagulation with Coumadin at home  - Given low platelets and concern for acute leukemia INR was reversed w/ Vitamin K 5 mg PO x 1  --coumadin held while inpatient        Acute kidney injury    Baseline Cr is 1.3. On admission 2.4. Unclear etiology, likely not ATN.  Previously poor PO intake, found to be in TLS.  Creatine continues to rise despite fluids and UOP has decreased   - renally adjusted medications per pharmacy recs except for hydroxyurea (we wanted to increase but to renally dose would mean to decrease from current 200 daily dose)  - failed lasix trial on 6/30, nephrology ordered another lasix dose 7/1, will monitor UOP, however there's a very good chance he will need HD 7/2, hesitant to put in a line with current thrombocytopenia.        MDS (myelodysplastic syndrome)    He was originally diagnosed with MDS in August of 2016. He has been treated with 5 cycles of Azacitidine without improvement in his MDS. More recently he has been being treated with Darbopoetin and has obtained some degree of transfusion independence. 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  - Bone marrow biopsy pending  - follow TLS and DIC labs  - s/p rasburicase, continue allopurinol  - required PRBC and platelet transfusion(s).        Major depressive  disorder with single episode, in partial remission    --venlafaxine held on 7/1 due to concern it could be worsening hyponatremia per nephrology recs        Thrombocytopenia    Platelets are 6k today on admission. Given his use of coumadin and possible new leukemia he is at high risk for bleeding.  -- s/p multiple units of platelets, no major bleeds but has lip bleeding            VTE Risk Mitigation     None          Disposition:     Andree Berman MD  Bone Marrow Transplant  [facility]

## 2018-07-01 NOTE — PLAN OF CARE
Problem: Patient Care Overview  Goal: Plan of Care Review  Outcome: Ongoing (interventions implemented as appropriate)  Pt is aa&ox4. Up with assist. Still awaiting BMB results. 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 Patient with decreased urine output even after receiving 120mg lasix, patient had kidney ultrasound which was abnormal showing nephromegaly, notified dr. Lisa of this whom contacted nephrology following patient and orders for blood and urine written and collected, all came back abnormal.  Patient is [place]. Patient remains on oxygen, patient also receiving iv ampcillin. No reports of nausea. No falls or injuries thus far my shift.

## 2018-07-01 NOTE — ASSESSMENT & PLAN NOTE
Rate controlled with Metoprolol Succinate and on anticoagulation with Coumadin at home  - Given low platelets and concern for acute leukemia INR was reversed w/ Vitamin K 5 mg PO x 1  --coumadin held while inpatient

## 2018-07-02 ENCOUNTER — DOCUMENTATION ONLY (OUTPATIENT)
Dept: HEMATOLOGY/ONCOLOGY | Facility: CLINIC | Age: 73
End: 2018-07-02

## 2018-07-02 PROBLEM — E88.3 TUMOR LYSIS SYNDROME: Status: ACTIVE | Noted: 2018-07-02

## 2018-07-02 PROBLEM — E87.1 HYPONATREMIA: Status: ACTIVE | Noted: 2018-07-02

## 2018-07-02 LAB
ALBUMIN SERPL BCP-MCNC: 2.1 G/DL
ALBUMIN SERPL BCP-MCNC: 2.3 G/DL
ALP SERPL-CCNC: 96 U/L
ALT SERPL W/O P-5'-P-CCNC: 16 U/L
ANION GAP SERPL CALC-SCNC: 16 MMOL/L
ANION GAP SERPL CALC-SCNC: 18 MMOL/L
ANISOCYTOSIS BLD QL SMEAR: SLIGHT
AST SERPL-CCNC: 50 U/L
BASOPHILS # BLD AUTO: ABNORMAL K/UL
BASOPHILS NFR BLD: 0 %
BILIRUB SERPL-MCNC: 1 MG/DL
BLD PROD TYP BPU: NORMAL
BLD PROD TYP BPU: NORMAL
BLOOD UNIT EXPIRATION DATE: NORMAL
BLOOD UNIT EXPIRATION DATE: NORMAL
BLOOD UNIT TYPE CODE: 5100
BLOOD UNIT TYPE CODE: 6200
BLOOD UNIT TYPE: NORMAL
BLOOD UNIT TYPE: NORMAL
BONE MARROW IRON STAIN COMMENT: NORMAL
BONE MARROW WRIGHT STAIN COMMENT: NORMAL
BUN SERPL-MCNC: 105 MG/DL
BUN SERPL-MCNC: 97 MG/DL
CALCIUM SERPL-MCNC: 7.8 MG/DL
CALCIUM SERPL-MCNC: 8 MG/DL
CHLORIDE SERPL-SCNC: 95 MMOL/L
CHLORIDE SERPL-SCNC: 97 MMOL/L
CO2 SERPL-SCNC: 17 MMOL/L
CO2 SERPL-SCNC: 19 MMOL/L
CODING SYSTEM: NORMAL
CODING SYSTEM: NORMAL
CREAT SERPL-MCNC: 7.4 MG/DL
CREAT SERPL-MCNC: 7.4 MG/DL
DIFFERENTIAL METHOD: ABNORMAL
DISPENSE STATUS: NORMAL
DISPENSE STATUS: NORMAL
EOSINOPHIL # BLD AUTO: ABNORMAL K/UL
EOSINOPHIL NFR BLD: 3 %
ERYTHROCYTE [DISTWIDTH] IN BLOOD BY AUTOMATED COUNT: 22.5 %
EST. GFR  (AFRICAN AMERICAN): 7.6 ML/MIN/1.73 M^2
EST. GFR  (AFRICAN AMERICAN): 7.6 ML/MIN/1.73 M^2
EST. GFR  (NON AFRICAN AMERICAN): 6.6 ML/MIN/1.73 M^2
EST. GFR  (NON AFRICAN AMERICAN): 6.6 ML/MIN/1.73 M^2
GLUCOSE SERPL-MCNC: 112 MG/DL
GLUCOSE SERPL-MCNC: 72 MG/DL
HCT VFR BLD AUTO: 23.2 %
HGB BLD-MCNC: 7.5 G/DL
HYPOCHROMIA BLD QL SMEAR: ABNORMAL
IMM GRANULOCYTES # BLD AUTO: ABNORMAL K/UL
IMM GRANULOCYTES NFR BLD AUTO: ABNORMAL %
INR PPP: 1.4
LYMPHOCYTES # BLD AUTO: ABNORMAL K/UL
LYMPHOCYTES NFR BLD: 13 %
MAGNESIUM SERPL-MCNC: 2.1 MG/DL
MCH RBC QN AUTO: 32.8 PG
MCHC RBC AUTO-ENTMCNC: 32.3 G/DL
MCV RBC AUTO: 101 FL
MONOCYTES # BLD AUTO: ABNORMAL K/UL
MONOCYTES NFR BLD: 10 %
NEUTROPHILS # BLD AUTO: ABNORMAL K/UL
NEUTROPHILS NFR BLD: 68 %
NEUTS BAND NFR BLD MANUAL: 1 %
NRBC BLD-RTO: 1 /100 WBC
NUM UNITS TRANS PACKED RBC: NORMAL
NUM UNITS TRANS WBC-POOR PLATPHERESIS: NORMAL
PHOSPHATE SERPL-MCNC: 6 MG/DL
PHOSPHATE SERPL-MCNC: 6.9 MG/DL
PLATELET # BLD AUTO: 5 K/UL
PLATELET BLD QL SMEAR: ABNORMAL
PMV BLD AUTO: ABNORMAL FL
POTASSIUM SERPL-SCNC: 4.2 MMOL/L
POTASSIUM SERPL-SCNC: 4.6 MMOL/L
PROT SERPL-MCNC: 7.4 G/DL
PROTHROMBIN TIME: 14.2 SEC
RBC # BLD AUTO: 2.29 M/UL
SODIUM SERPL-SCNC: 130 MMOL/L
SODIUM SERPL-SCNC: 132 MMOL/L
URATE SERPL-MCNC: 3.1 MG/DL
WBC # BLD AUTO: 60.85 K/UL
WBC OTHER NFR BLD MANUAL: 5 %

## 2018-07-02 PROCEDURE — P9037 PLATE PHERES LEUKOREDU IRRAD: HCPCS

## 2018-07-02 PROCEDURE — 20600001 HC STEP DOWN PRIVATE ROOM

## 2018-07-02 PROCEDURE — 25000003 PHARM REV CODE 250: Performed by: HOSPITALIST

## 2018-07-02 PROCEDURE — 80069 RENAL FUNCTION PANEL: CPT

## 2018-07-02 PROCEDURE — 99233 SBSQ HOSP IP/OBS HIGH 50: CPT | Mod: ,,, | Performed by: INTERNAL MEDICINE

## 2018-07-02 PROCEDURE — 36415 COLL VENOUS BLD VENIPUNCTURE: CPT

## 2018-07-02 PROCEDURE — 25000003 PHARM REV CODE 250: Performed by: NURSE PRACTITIONER

## 2018-07-02 PROCEDURE — 85007 BL SMEAR W/DIFF WBC COUNT: CPT

## 2018-07-02 PROCEDURE — 84550 ASSAY OF BLOOD/URIC ACID: CPT

## 2018-07-02 PROCEDURE — P9040 RBC LEUKOREDUCED IRRADIATED: HCPCS

## 2018-07-02 PROCEDURE — 84100 ASSAY OF PHOSPHORUS: CPT

## 2018-07-02 PROCEDURE — 25000003 PHARM REV CODE 250: Performed by: INTERNAL MEDICINE

## 2018-07-02 PROCEDURE — 85610 PROTHROMBIN TIME: CPT

## 2018-07-02 PROCEDURE — 83735 ASSAY OF MAGNESIUM: CPT

## 2018-07-02 PROCEDURE — 36430 TRANSFUSION BLD/BLD COMPNT: CPT

## 2018-07-02 PROCEDURE — 92526 ORAL FUNCTION THERAPY: CPT

## 2018-07-02 PROCEDURE — 80053 COMPREHEN METABOLIC PANEL: CPT

## 2018-07-02 PROCEDURE — 97535 SELF CARE MNGMENT TRAINING: CPT

## 2018-07-02 PROCEDURE — 85027 COMPLETE CBC AUTOMATED: CPT

## 2018-07-02 RX ORDER — HYDROCODONE BITARTRATE AND ACETAMINOPHEN 500; 5 MG/1; MG/1
TABLET ORAL
Status: DISCONTINUED | OUTPATIENT
Start: 2018-07-02 | End: 2018-07-03 | Stop reason: HOSPADM

## 2018-07-02 RX ORDER — HYDROXYUREA 500 MG/1
2000 CAPSULE ORAL 2 TIMES DAILY
Qty: 240 CAPSULE | Refills: 0
Start: 2018-07-02 | End: 2018-07-03

## 2018-07-02 RX ORDER — HYDROXYUREA 500 MG/1
2000 CAPSULE ORAL 2 TIMES DAILY
Status: CANCELLED
Start: 2018-07-02 | End: 2018-08-01

## 2018-07-02 RX ORDER — HYDROXYUREA 500 MG/1
2000 CAPSULE ORAL 2 TIMES DAILY
Status: DISCONTINUED | OUTPATIENT
Start: 2018-07-02 | End: 2018-07-03 | Stop reason: HOSPADM

## 2018-07-02 RX ORDER — SODIUM BICARBONATE 650 MG/1
1300 TABLET ORAL 3 TIMES DAILY
Qty: 180 TABLET | Refills: 11
Start: 2018-07-02 | End: 2019-07-02

## 2018-07-02 RX ADMIN — HYDROXYUREA 2000 MG: 500 CAPSULE ORAL at 08:07

## 2018-07-02 RX ADMIN — ALLOPURINOL 200 MG: 100 TABLET ORAL at 09:07

## 2018-07-02 RX ADMIN — CLONAZEPAM 0.5 MG: 0.25 TABLET, ORALLY DISINTEGRATING ORAL at 08:07

## 2018-07-02 RX ADMIN — DIPHENHYDRAMINE HYDROCHLORIDE 25 MG: 25 CAPSULE ORAL at 11:07

## 2018-07-02 RX ADMIN — ACETAMINOPHEN 650 MG: 325 TABLET ORAL at 02:07

## 2018-07-02 RX ADMIN — AMIODARONE HYDROCHLORIDE 200 MG: 200 TABLET ORAL at 08:07

## 2018-07-02 RX ADMIN — SODIUM BICARBONATE 650 MG TABLET 1300 MG: at 09:07

## 2018-07-02 RX ADMIN — CETIRIZINE HYDROCHLORIDE 5 MG: 5 TABLET, FILM COATED ORAL at 08:07

## 2018-07-02 RX ADMIN — SODIUM BICARBONATE 650 MG TABLET 1300 MG: at 08:07

## 2018-07-02 RX ADMIN — ACETAMINOPHEN 650 MG: 325 TABLET ORAL at 11:07

## 2018-07-02 RX ADMIN — SODIUM BICARBONATE 650 MG TABLET 1300 MG: at 04:07

## 2018-07-02 RX ADMIN — METOPROLOL SUCCINATE 100 MG: 50 TABLET, EXTENDED RELEASE ORAL at 09:07

## 2018-07-02 NOTE — ASSESSMENT & PLAN NOTE
-BERNICE superimposed on CKD stage III which could be multifactorial from pre-renal due to poor oral intake, TLS and TMA  -Blood pressures have been stable   - Bone aceves biopsy pending     Plan:  - Serum creatinine currently worsening to 7.4  - May continue with lasix for volume   - Acid/base: Continue Sodium bicarbonate 1,300 mg q TID   - Renal ultrasound bilateral nephromegaly compared to the prior CT of 08/29/2016 which this could be seen in MDS, no hydronephrosis or masses   - Proteinuria: UPCR=21  - Monitor intake and output   - Avoid nephrotoxic medication   - Patient decision to not get dialysis since will not change probable outcome and he is decided that he doesn't want to get dialysis. Will be hospice now.   - Will sign of any questions please let us know

## 2018-07-02 NOTE — ASSESSMENT & PLAN NOTE
He underwent recent balloon valvuplasty via Cardiology. Systolic murmur.  He has previously had severe AS and resultant pulmonary hypertension.

## 2018-07-02 NOTE — PLAN OF CARE
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     2 - Concentric hypertrophy.     3 - No wall motion abnormalities.     4 - Indeterminate LV diastolic function.     5 - Biatrial enlargement.     6 - Right ventricle is upper limit of normal in size with normal systolic function.     7 - Severe aortic valve stenosis (JEFF 0.60 cm2, AVAi 0.31 cm2/m2, peak aortic jet velocity 4.5 m/s,MG 66 mmHg, ).     8 - Moderate to moderate-severe (3+) mitral regurgitation.     9 - Trivial to mild tricuspid regurgitation.     10 - Increased central venous pressure.     11 - Pulmonary hypertension. The estimated PA systolic pressure is 79 mmHg.       Persistent atrial fibrillation [I48.1]  Yes    Acute kidney injury [N17.9]  Yes    Major depressive disorder with single episode, in partial remission [F32.4]  Yes    Thrombocytopenia [D69.6]  Yes      Resolved Hospital Problems    Diagnosis Date Resolved POA   No resolved problems to display.       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    Vital Signs: Routine per Hospice Protocol.    Allergies:  Review of patient's allergies indicates:   Allergen Reactions    Lisinopril Edema     Cheek swelling    Augmentin [amoxicillin-pot clavulanate] Rash    Lipitor [atorvastatin] Other (See Comments)     Severe Muscle pain  that caused pt not to sleep for 72 hours.       Diet: pureed diet, nectar thick or patient's preference    Activities: As tolerated    Nursing: Per Hospice Routine    Future Orders:  Hospice Medical Director may dictate new orders for comfortable care measures & sign death certificate.    Medications:    Wil Kwon Jr.   Home Medication Instructions MELVIN:[id number]    Printed on:07/03/18 7154   Medication Information                      amiodarone (PACERONE) 200 MG Tab  Take 1 tablet (200 mg total) by mouth once daily.             clonazePAM (KLONOPIN) 2 MG disintegrating tablet  Take 1 tablet (2 mg total) by mouth nightly as needed.             fluticasone (FLONASE) 50 mcg/actuation nasal spray  2 sprays (100 mcg total) by Each Nare route once daily.             food supplemt, lactose-reduced (BOOST) Liqd  Take by mouth once daily.             hydroxyurea (HYDREA) 500 mg Cap  Take 2 capsules (1,000 mg total) by mouth 2 (two) times daily.             levocetirizine (XYZAL) 5 MG tablet  Take 1 tablet (5 mg total) by mouth every evening.             metoprolol succinate (TOPROL-XL) 100 MG 24 hr tablet  Take 1 tablet (100 mg total) by mouth once daily.             nystatin (DUKE'S SOLUTION)  Take 10 mLs by mouth daily as needed (sore throat).             sodium bicarbonate 650 MG tablet  Take 2 tablets (1,300 mg total) by mouth 3 (three) times daily.             venlafaxine (EFFEXOR-XR) 75 MG 24 hr capsule  Take 1 capsule (75 mg total) by mouth once daily.                   _________________________________  Andree Berman MD  07/03/2018

## 2018-07-02 NOTE — SUBJECTIVE & OBJECTIVE
Subjective:     Interval History: had a discussion with the patient again today regarding what he thinks of hospice. Only put out about 800 cc with yesterday's second lasix challenge.  He conveys he doesn't want to be artificially kept alive.  He expressed he does not want to start HD which I felt was reasonable when the underlying cause for the renal failure cannot not be treated.  Transfused another unit PRBC for severe anemia symptoms.           Objective:     Vital Signs (Most Recent):  Temp: 97.6 °F (36.4 °C) (07/02/18 0756)  Pulse: 94 (07/02/18 0756)  Resp: 18 (07/02/18 0756)  BP: 127/60 (07/02/18 0756)  SpO2: 96 % (07/02/18 0756) Vital Signs (24h Range):  Temp:  [97.6 °F (36.4 °C)-98.4 °F (36.9 °C)] 97.6 °F (36.4 °C)  Pulse:  [70-94] 94  Resp:  [17-18] 18  SpO2:  [95 %-99 %] 96 %  BP: (115-141)/(56-65) 127/60     Weight: 73 kg (161 lb) (weighed in clinic)  Body mass index is 22.45 kg/m².  Body surface area is 1.91 meters squared.    ECOG SCORE         [unfilled]    Intake/Output - Last 3 Shifts       06/30 0700 - 07/01 0659 07/01 0700 - 07/02 0659 07/02 0700 - 07/03 0659    P.O. 200 300     Blood 35639.1 1495.8     IV Piggyback 200 200     Total Intake(mL/kg) 79587.1 (144.7) 1995.8 (27.3)     Urine (mL/kg/hr) 120 (0.1) 1000 (0.6)     Total Output 120 1000      Net +81165.1 +995.8             Urine Occurrence 1 x            Physical Exam    Significant Labs:   CBC:     Recent Labs  Lab 07/01/18  0445 07/01/18  1758   WBC 76.67* 69.73*   HGB 6.1* 8.0*   HCT 19.7* 25.6*   PLT 6* 7*   , CMP:     Recent Labs  Lab 06/30/18  1619 07/01/18  0445 07/01/18  1758   * 129* 129*  129*   K 4.2 4.3 4.5  4.5   CL 97 97 97  97   CO2 20* 17* 17*  17*   * 86 103  104   BUN 67* 74* 85*  87*   CREATININE 5.5* 6.4* 7.1*  7.0*   CALCIUM 7.7* 7.7* 8.0*  8.0*   PROT  --  7.0  --    ALBUMIN 2.1* 2.0* 2.1*   BILITOT  --  0.8  --    ALKPHOS  --  89  --    AST  --  46*  --    ALT  --  15  --    ANIONGAP 12 15 15  15    EGFRNONAA 9.5* 7.9* 7.0*  7.1*   , Urine Studies:   No results for input(s): COLORU, APPEARANCEUA, PHUR, SPECGRAV, PROTEINUA, GLUCUA, KETONESU, BILIRUBINUA, OCCULTUA, NITRITE, UROBILINOGEN, LEUKOCYTESUR, RBCUA, WBCUA, BACTERIA, SQUAMEPITHEL, HYALINECASTS in the last 48 hours.    Invalid input(s): WRIGHTSUR and All pertinent labs from the last 24 hours have been reviewed.    Diagnostic Results:  I have reviewed all pertinent imaging results/findings within the past 24 hours.  CXr shows bilateral edema    Objective:     Vital Signs (Most Recent):  Temp: 97.6 °F (36.4 °C) (07/02/18 0756)  Pulse: 94 (07/02/18 0756)  Resp: 18 (07/02/18 0756)  BP: 127/60 (07/02/18 0756)  SpO2: 96 % (07/02/18 0756) Vital Signs (24h Range):  Temp:  [97.6 °F (36.4 °C)-98.4 °F (36.9 °C)] 97.6 °F (36.4 °C)  Pulse:  [70-94] 94  Resp:  [17-18] 18  SpO2:  [95 %-99 %] 96 %  BP: (115-141)/(56-65) 127/60     Weight: 73 kg (161 lb) (weighed in clinic)  Body mass index is 22.45 kg/m².  Body surface area is 1.91 meters squared.    ECOG SCORE         [unfilled]    Intake/Output - Last 3 Shifts       06/30 0700 - 07/01 0659 07/01 0700 - 07/02 0659 07/02 0700 - 07/03 0659    P.O. 200 300     Blood 38489.1 1495.8     IV Piggyback 200 200     Total Intake(mL/kg) 11661.1 (144.7) 1995.8 (27.3)     Urine (mL/kg/hr) 120 (0.1) 1000 (0.6)     Total Output 120 1000      Net +19626.1 +995.8             Urine Occurrence 1 x            Physical Exam    Significant Labs:   CBC:     Recent Labs  Lab 07/01/18  0445 07/01/18  1758   WBC 76.67* 69.73*   HGB 6.1* 8.0*   HCT 19.7* 25.6*   PLT 6* 7*   , CMP:     Recent Labs  Lab 06/30/18  1619 07/01/18  0445 07/01/18  1758   * 129* 129*  129*   K 4.2 4.3 4.5  4.5   CL 97 97 97  97   CO2 20* 17* 17*  17*   * 86 103  104   BUN 67* 74* 85*  87*   CREATININE 5.5* 6.4* 7.1*  7.0*   CALCIUM 7.7* 7.7* 8.0*  8.0*   PROT  --  7.0  --    ALBUMIN 2.1* 2.0* 2.1*   BILITOT  --  0.8  --    ALKPHOS  --  89  --    AST   --  46*  --    ALT  --  15  --    ANIONGAP 12 15 15  15   EGFRNONAA 9.5* 7.9* 7.0*  7.1*   , Urine Studies:   No results for input(s): COLORU, APPEARANCEUA, PHUR, SPECGRAV, PROTEINUA, GLUCUA, KETONESU, BILIRUBINUA, OCCULTUA, NITRITE, UROBILINOGEN, LEUKOCYTESUR, RBCUA, WBCUA, BACTERIA, SQUAMEPITHEL, HYALINECASTS in the last 48 hours.    Invalid input(s): WRIGHTSUR and All pertinent labs from the last 24 hours have been reviewed.    Diagnostic Results:  I have reviewed all pertinent imaging results/findings within the past 24 hours.  CXr shows bilateral edema    Objective:     Vital Signs (Most Recent):  Temp: 97.6 °F (36.4 °C) (07/02/18 0756)  Pulse: 94 (07/02/18 0756)  Resp: 18 (07/02/18 0756)  BP: 127/60 (07/02/18 0756)  SpO2: 96 % (07/02/18 0756) Vital Signs (24h Range):  Temp:  [97.6 °F (36.4 °C)-98.4 °F (36.9 °C)] 97.6 °F (36.4 °C)  Pulse:  [70-94] 94  Resp:  [17-18] 18  SpO2:  [95 %-99 %] 96 %  BP: (115-141)/(56-65) 127/60     Weight: 73 kg (161 lb) (weighed in clinic)  Body mass index is 22.45 kg/m².  Body surface area is 1.91 meters squared.    ECOG SCORE             Intake/Output - Last 3 Shifts       06/30 0700 - 07/01 0659 07/01 0700 - 07/02 0659 07/02 0700 - 07/03 0659    P.O. 200 300     Blood 40691.1 1495.8     IV Piggyback 200 200     Total Intake(mL/kg) 76963.1 (144.7) 1995.8 (27.3)     Urine (mL/kg/hr) 120 (0.1) 1000 (0.6)     Total Output 120 1000      Net +86905.1 +995.8             Urine Occurrence 1 x            Physical Exam   Constitutional: He appears well-developed.   HENT:   Head: Normocephalic and atraumatic.   Oral mucosa crusted with blood   Eyes: Conjunctivae and EOM are normal. Right eye exhibits no discharge. Left eye exhibits no discharge.   Neck: Normal range of motion.   Cardiovascular: Normal rate.    Murmur heard.  Pulmonary/Chest: Effort normal. No respiratory distress.   3 L NC   Abdominal: Soft. Bowel sounds are normal.   Musculoskeletal: Normal range of motion.   1+ edema    Neurological: He is alert.   Skin: Skin is warm and dry.       Significant Labs:   CBC:     Recent Labs  Lab 07/01/18  0445 07/01/18  1758   WBC 76.67* 69.73*   HGB 6.1* 8.0*   HCT 19.7* 25.6*   PLT 6* 7*   , CMP:     Recent Labs  Lab 06/30/18  1619 07/01/18  0445 07/01/18  1758   * 129* 129*  129*   K 4.2 4.3 4.5  4.5   CL 97 97 97  97   CO2 20* 17* 17*  17*   * 86 103  104   BUN 67* 74* 85*  87*   CREATININE 5.5* 6.4* 7.1*  7.0*   CALCIUM 7.7* 7.7* 8.0*  8.0*   PROT  --  7.0  --    ALBUMIN 2.1* 2.0* 2.1*   BILITOT  --  0.8  --    ALKPHOS  --  89  --    AST  --  46*  --    ALT  --  15  --    ANIONGAP 12 15 15  15   EGFRNONAA 9.5* 7.9* 7.0*  7.1*   , Urine Studies:   No results for input(s): COLORU, APPEARANCEUA, PHUR, SPECGRAV, PROTEINUA, GLUCUA, KETONESU, BILIRUBINUA, OCCULTUA, NITRITE, UROBILINOGEN, LEUKOCYTESUR, RBCUA, WBCUA, BACTERIA, SQUAMEPITHEL, HYALINECASTS in the last 48 hours.    Invalid input(s): WRIGHTSUR and All pertinent labs from the last 24 hours have been reviewed.    Diagnostic Results:  I have reviewed all pertinent imaging results/findings within the past 24 hours.  CXr shows bilateral edema

## 2018-07-02 NOTE — PROGRESS NOTES
[facility]  Nephrology  Progress Note    Patient Name: Wil Kwon Jr.  MRN: [id number]  Admission Date: 6/29/2018  Hospital Length of Stay: 3 days  Attending Provider: Milo Slater MD   Primary Care Physician: LAILA Serra MD  Principal Problem:MDS (myelodysplastic syndrome)    Subjective:     HPI: Wil Kwon is a 73 year old male with past medical history of CKD stage III since 2018 February with a sCr baseline 1.1-1.4, Proteinuria, MDS which was diagnosed when he underwent a marrow biopsy in Aug 2016 (treated with chemotherapy 5 cycles of Azacitidine which was not tolerated and recently he has been being treated with Darbopoetin), aortic stenosis, paroxysmal A-fib on warfarin.  Went yesterday 6/29/18/ to hem/onc clinic and presented with new and rapidly progressive leukocytosis, acute kidney, and weakness. Oncology was concerned that he either has a focal infection  in the right neck (received Doxycycline 100 mg BID per Internist) or which could be a new malignancy or has progressed to acute myeloid leukemia. Has been complaint of several weeks for progressive fatigue and 1 week of neck swelling. He reports significant dyspnea on minimal exertion. On arrival on 6/29/18 has leukocytosis 74, thrombocytopenia 8, sCr 3.8, Uric acid 14.2, bicarbonate 22, sodium 131. Was started on Rasburicase and Allopurinol for suspected TLS, Abx Ampicillin- sulbactam, , IVFs with NS, chest x ray with cardiomegaly with bilateral edema. Nephrology was consulted for BERNICE, decreased in UOP and probable need for dialysis.      Interval History:   Patient evaluated at bedside. Has been feeling weaker compared with yesterday. Respirations has been about the same. Blood pressures have been stable. Had questions answered today about dialysis treatment and he prefers to not due dialysis.     Review of patient's allergies indicates:   Allergen Reactions    Lisinopril Edema     Cheek swelling    Augmentin  [amoxicillin-pot clavulanate] Rash    Lipitor [atorvastatin] Other (See Comments)     Severe Muscle pain that caused pt not to sleep for 72 hours.     Current Facility-Administered Medications   Medication Frequency    0.9%  NaCl infusion (for blood administration) Q24H PRN    acetaminophen tablet 650 mg Q6H PRN    allopurinol tablet 200 mg Daily    amiodarone tablet 200 mg Daily    cetirizine tablet 5 mg QHS    clonazePAM disintegrating tablet 2 mg Nightly PRN    diphenhydrAMINE capsule 25 mg Q6H PRN    duke's soln (benadryl 30 mL, mylanta 30 mL, lidocane 30 mL, nystatin 30 mL) 120 mL Daily PRN    hydroxyurea capsule 2,000 mg BID    metoprolol succinate (TOPROL-XL) 24 hr tablet 100 mg Daily    sodium bicarbonate tablet 1,300 mg TID    sodium chloride 0.9% flush 5 mL PRN       Objective:     Vital Signs (Most Recent):  Temp: 98 °F (36.7 °C) (07/02/18 1352)  Pulse: 93 (07/02/18 1352)  Resp: 18 (07/02/18 1352)  BP: (!) 131/59 (07/02/18 1352)  SpO2: 98 % (07/02/18 1352)  O2 Device (Oxygen Therapy): room air (07/02/18 1352) Vital Signs (24h Range):  Temp:  [97.4 °F (36.3 °C)-98.4 °F (36.9 °C)] 98 °F (36.7 °C)  Pulse:  [70-96] 93  Resp:  [18] 18  SpO2:  [95 %-99 %] 98 %  BP: (107-131)/(53-66) 131/59     Weight: 73 kg (161 lb) (weighed in clinic) (06/29/18 1957)  Body mass index is 22.45 kg/m².  Body surface area is 1.91 meters squared.    I/O last 3 completed shifts:  In: 2395.8 [P.O.:500; Blood:1495.8; IV Piggyback:400]  Out: 1120 [Urine:1120]    Physical Exam   Constitutional: He is oriented to person, place, and time. He has a sickly appearance. Nasal cannula in place.   HENT:   Head: Normocephalic and atraumatic.   Eyes: Right eye exhibits no discharge. Left eye exhibits no discharge.   Cardiovascular: Normal rate and regular rhythm.    Pulmonary/Chest: He has rales in the right lower field and the left lower field.   Some difficulty breathing    Abdominal: Soft.   Musculoskeletal: He exhibits edema.    General weakness    Neurological: He is alert and oriented to person, place, and time.       Significant Labs:  ABGs: No results for input(s): PH, PCO2, HCO3, POCSATURATED, BE in the last 168 hours.  BMP:   Recent Labs  Lab 07/02/18  0531   GLU 72   CL 97   CO2 19*   BUN 97*   CREATININE 7.4*   CALCIUM 7.8*   MG 2.1     CBC:   Recent Labs  Lab 07/02/18  0531   WBC 60.85*   RBC 2.29*   HGB 7.5*   HCT 23.2*   PLT 5*   *   MCH 32.8*   MCHC 32.3     CMP:   Recent Labs  Lab 07/02/18  0531   GLU 72   CALCIUM 7.8*   ALBUMIN 2.1*   PROT 7.4   *   K 4.2   CO2 19*   CL 97   BUN 97*   CREATININE 7.4*   ALKPHOS 96   ALT 16   AST 50*   BILITOT 1.0     All labs within the past 24 hours have been reviewed.         Assessment/Plan:     * MDS (myelodysplastic syndrome)    -Pending bone aceves biopsy  -Will follow bone aceves recommendations         Tumor lysis syndrome    -S/P Rasburicase 6/29/18  - On allopurinol  100 mg q           Acute kidney injury    -BERNICE superimposed on CKD stage III which could be multifactorial from pre-renal due to poor oral intake, TLS and TMA  -Blood pressures have been stable   - Bone aceves biopsy pending     Plan:  - Serum creatinine currently worsening to 7.4  - May continue with lasix for volume   - Acid/base: Continue Sodium bicarbonate 1,300 mg q TID   - Renal ultrasound bilateral nephromegaly compared to the prior CT of 08/29/2016 which this could be seen in MDS, no hydronephrosis or masses   - Proteinuria: UPCR=21  - Monitor intake and output   - Avoid nephrotoxic medication   - Patient decision to not get dialysis since will not change probable outcome and he is decided that he doesn't want to get dialysis. Will be hospice now.   - Will sign of any questions please let us know               Thank you for your consult. I will sign off. Please contact us if you have any additional questions.    Jigar Levi MD  Nephrology  [facility]

## 2018-07-02 NOTE — PLAN OF CARE
Problem: Patient Care Overview  Goal: Plan of Care Review  Outcome: Ongoing (interventions implemented as appropriate)  Pt is aa&ox4. Up with assist. Patient made DNR yesterday and possible move to hospice today. Patient received 1 u platelets my shift, remains on ampcillin. Patient had swallow eval and now getting nectar thick liquids and pureed diet. Patient reported throat pain and received tylenol and dukes solution with some resolution.  Patient is [place]. Patient remains on oxygen,. No reports of nausea. No falls or injuries thus far my shift. Patient did have more urine output this shift he received lasix on dayshift.

## 2018-07-02 NOTE — PLAN OF CARE
Problem: Patient Care Overview  Goal: Plan of Care Review  Outcome: Ongoing (interventions implemented as appropriate)  Side rails up x2; call bell in place; bed in lowest, locked position; skid proof socks on; no evidence of skin breakdown; care plan explained to patient; pt remains free of injury. Pt tolerated a small amount of po, voids, transferred to C with 1 person assist. One unit of plts and one unit of PRBCs transfused without incident. Hospice representative visited with pt and wife. Pt with d/c plans for hospice tomorrow. VSS and afebrile.

## 2018-07-02 NOTE — HOSPITAL COURSE
Mr. 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  Bone Marrow Biopsy is pending.  He received 5 units of platelets for thrombocytopenia and 1 u PRBC.  For TLS he received rasburicase and allopurinol.  IVF were given then held for volume overload caused by BERNICE secondary to TLS.  His renal failure got worse to the point where he almost required dialysis however on 7/1 hospice discussions were initiated.  Mr Kwon opted out of dialysis and wanted to enter hospice care.  Unasyn was discontinued on 7/2.  He was discharged to hospice.

## 2018-07-02 NOTE — ASSESSMENT & PLAN NOTE
The etiology of his neck mass is unclear at this time. He did report having an antecedent febrile illness in clinic. This was followed by a sore throat and then by neck swelling. He currently reports being afebrile and does not appear toxic. Given the nature and location of the swelling, there is a concern for possible anaerobic infection. Also of concern is possible for malignancy (H&N cancer, ?lymphoma)   - US neck reveals LAD  - ENT: no inpatient procedure while thrombocytopenic  - Unasyn discontinued 7/2

## 2018-07-02 NOTE — DISCHARGE SUMMARY
[facility]  Hematology  Bone Marrow Transplant  Discharge Summary      Patient Name: Wil Kwon Jr.  MRN: [id number]  Admission Date: 6/29/2018  Hospital Length of Stay: 4 days  Discharge Date and Time: 07/03/2018   Attending Physician: Milo Slater MD   Discharging Provider: Andree Berman MD  Primary Care Provider: LAILA Serra MD    HPI: Mr. Kwon is a 73 y.o. male who presented initially to clinic with a chief complaint of several weeks for progressive fatigue and 1 week of neck swelling. He reports first noticing fatigue about 1 month ago. He reports significant dyspnea on minimal exertion. He reports that his fatigue has gradually been increasing to be the point he is largely homebound.     About 1 week ago he reports developing a sore throat which progressed to include a left sided neck swelling. His left neck swelling has been gradually increasing in size. He does not report that it is painful or associated with any particular difficulty swallowing. He was seen by his PCP for this recently and prescribed a course of Doxycycline, but he has not seen any improvement.    * No surgery found *     Hospital Course: Mr. 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  Bone Marrow Biopsy is pending.  He received 5 units of platelets for thrombocytopenia and 1 u PRBC.  For TLS he received rasburicase and allopurinol.  IVF were given then held for volume overload caused by BERNICE secondary to TLS.  His renal failure got worse to the point where he almost required dialysis however on 7/1 hospice discussions were initiated.  Mr Kwon opted out of dialysis and wanted to enter hospice care.  Unasyn was discontinued on 7/2.  He was discharged to hospice.      Consults         Status Ordering Provider     Inpatient consult to ENT  Once     Provider:  (Not yet assigned)    Completed VINAY MANRIQUE     Inpatient consult to Nephrology  Once     Provider:  (Not yet  assigned)    Completed CONNIE CANALES     Inpatient consult to Spiritual Care  Once     Provider:  (Not yet assigned)    Completed JANNY WILKERSON          Significant Diagnostic Studies: Labs:   CMP     Recent Labs  Lab 07/01/18 1758 07/02/18  0531 07/02/18  1525   *  129* 132* 130*   K 4.5  4.5 4.2 4.6   CL 97  97 97 95   CO2 17*  17* 19* 17*     104 72 112*   BUN 85*  87* 97* 105*   CREATININE 7.1*  7.0* 7.4* 7.4*   CALCIUM 8.0*  8.0* 7.8* 8.0*   PROT  --  7.4  --    ALBUMIN 2.1* 2.1* 2.3*   BILITOT  --  1.0  --    ALKPHOS  --  96  --    AST  --  50*  --    ALT  --  16  --    ANIONGAP 15  15 16 18*   ESTGFRAFRICA 8.0*  8.2* 7.6* 7.6*   EGFRNONAA 7.0*  7.1* 6.6* 6.6*    and CBC     Recent Labs  Lab 07/01/18 1758 07/02/18  0531 07/03/18  0512   WBC 69.73* 60.85* 38.96*   HGB 8.0* 7.5* 8.6*   HCT 25.6* 23.2* 26.3*   PLT 7* 5* 4*       Pending Diagnostic Studies:     Procedure Component Value Units Date/Time    Heme Disorders DNA/RNA Hold, Bone Marrow [[id number]] Collected:  06/29/18 1514    Order Status:  Sent Lab Status:  In process Updated:  06/29/18 1632    Specimen:  Bone Marrow from Bone Marrow     Tissue Specimen to Pathology, Bone Marrow Aspiration/Biopsy Procedure [[id number]] Collected:  06/29/18 1525    Order Status:  Sent Lab Status:  In process Updated:  06/29/18 1718    Specimen:  Bone Marrow from Bone Marrow Aspirate, Left Iliac Crest         Final Active Diagnoses:    Diagnosis Date Noted POA    PRINCIPAL PROBLEM:  MDS (myelodysplastic syndrome) [D46.9] 09/07/2016 Yes    Tumor lysis syndrome [E88.3] 07/02/2018 Yes    Hyponatremia [E87.1] 07/02/2018 Yes    Neck mass [R22.1] 06/29/2018 Yes    Aortic valve stenosis, severe [I35.0] 08/03/2017 Yes    Persistent atrial fibrillation [I48.1] 03/11/2017 Yes    Acute kidney injury [N17.9] 03/06/2017 Yes    Major depressive disorder with single episode, in partial remission [F32.4] 08/17/2016 Yes    Thrombocytopenia  [D69.6] 04/01/2016 Yes      Problems Resolved During this Admission:    Diagnosis Date Noted Date Resolved POA      Discharged Condition: fair    Disposition: Hospice/Home    Follow Up:    Patient Instructions:   No discharge procedures on file.  Medications:  Reconciled Home Medications:      Medication List      START taking these medications    DUKE'S SOLUTION  Take 10 mLs by mouth daily as needed (sore throat).     hydroxyurea 500 mg Cap  Commonly known as:  HYDREA  Take 2 capsules (1,000 mg total) by mouth 2 (two) times daily.     sodium bicarbonate 650 MG tablet  Take 2 tablets (1,300 mg total) by mouth 3 (three) times daily.        CONTINUE taking these medications    amiodarone 200 MG Tab  Commonly known as:  PACERONE  Take 1 tablet (200 mg total) by mouth once daily.     BOOST Liqd  Generic drug:  food supplemt, lactose-reduced  Take by mouth once daily.     clonazePAM 2 MG disintegrating tablet  Commonly known as:  KLONOPIN  Take 1 tablet (2 mg total) by mouth nightly as needed.     fluticasone 50 mcg/actuation nasal spray  Commonly known as:  FLONASE  2 sprays (100 mcg total) by Each Nare route once daily.     levocetirizine 5 MG tablet  Commonly known as:  XYZAL  Take 1 tablet (5 mg total) by mouth every evening.     metoprolol succinate 100 MG 24 hr tablet  Commonly known as:  TOPROL-XL  Take 1 tablet (100 mg total) by mouth once daily.     venlafaxine 75 MG 24 hr capsule  Commonly known as:  EFFEXOR-XR  Take 1 capsule (75 mg total) by mouth once daily.        STOP taking these medications    doxycycline 100 MG capsule  Commonly known as:  MONODOX     furosemide 20 MG tablet  Commonly known as:  LASIX     warfarin 3 MG tablet  Commonly known as:  COUMADIN            Andree Berman MD  Bone Marrow Transplant  [facility]

## 2018-07-02 NOTE — SUBJECTIVE & OBJECTIVE
Interval History:   Patient evaluated at bedside. Has been feeling weaker compared with yesterday. Respirations has been about the same. Blood pressures have been stable. Had questions answered today about dialysis treatment and he prefers to not due dialysis.     Review of patient's allergies indicates:   Allergen Reactions    Lisinopril Edema     Cheek swelling    Augmentin [amoxicillin-pot clavulanate] Rash    Lipitor [atorvastatin] Other (See Comments)     Severe Muscle pain that caused pt not to sleep for 72 hours.     Current Facility-Administered Medications   Medication Frequency    0.9%  NaCl infusion (for blood administration) Q24H PRN    acetaminophen tablet 650 mg Q6H PRN    allopurinol tablet 200 mg Daily    amiodarone tablet 200 mg Daily    cetirizine tablet 5 mg QHS    clonazePAM disintegrating tablet 2 mg Nightly PRN    diphenhydrAMINE capsule 25 mg Q6H PRN    duke's soln (benadryl 30 mL, mylanta 30 mL, lidocane 30 mL, nystatin 30 mL) 120 mL Daily PRN    hydroxyurea capsule 2,000 mg BID    metoprolol succinate (TOPROL-XL) 24 hr tablet 100 mg Daily    sodium bicarbonate tablet 1,300 mg TID    sodium chloride 0.9% flush 5 mL PRN       Objective:     Vital Signs (Most Recent):  Temp: 98 °F (36.7 °C) (07/02/18 1352)  Pulse: 93 (07/02/18 1352)  Resp: 18 (07/02/18 1352)  BP: (!) 131/59 (07/02/18 1352)  SpO2: 98 % (07/02/18 1352)  O2 Device (Oxygen Therapy): room air (07/02/18 1352) Vital Signs (24h Range):  Temp:  [97.4 °F (36.3 °C)-98.4 °F (36.9 °C)] 98 °F (36.7 °C)  Pulse:  [70-96] 93  Resp:  [18] 18  SpO2:  [95 %-99 %] 98 %  BP: (107-131)/(53-66) 131/59     Weight: 73 kg (161 lb) (weighed in clinic) (06/29/18 1957)  Body mass index is 22.45 kg/m².  Body surface area is 1.91 meters squared.    I/O last 3 completed shifts:  In: 2395.8 [P.O.:500; Blood:1495.8; IV Piggyback:400]  Out: 1120 [Urine:1120]    Physical Exam   Constitutional: He is oriented to person, place, and time. He has a  sickly appearance. Nasal cannula in place.   HENT:   Head: Normocephalic and atraumatic.   Eyes: Right eye exhibits no discharge. Left eye exhibits no discharge.   Cardiovascular: Normal rate and regular rhythm.    Pulmonary/Chest: He has rales in the right lower field and the left lower field.   Some difficulty breathing    Abdominal: Soft.   Musculoskeletal: He exhibits edema.   General weakness    Neurological: He is alert and oriented to person, place, and time.       Significant Labs:  ABGs: No results for input(s): PH, PCO2, HCO3, POCSATURATED, BE in the last 168 hours.  BMP:   Recent Labs  Lab 07/02/18  0531   GLU 72   CL 97   CO2 19*   BUN 97*   CREATININE 7.4*   CALCIUM 7.8*   MG 2.1     CBC:   Recent Labs  Lab 07/02/18  0531   WBC 60.85*   RBC 2.29*   HGB 7.5*   HCT 23.2*   PLT 5*   *   MCH 32.8*   MCHC 32.3     CMP:   Recent Labs  Lab 07/02/18  0531   GLU 72   CALCIUM 7.8*   ALBUMIN 2.1*   PROT 7.4   *   K 4.2   CO2 19*   CL 97   BUN 97*   CREATININE 7.4*   ALKPHOS 96   ALT 16   AST 50*   BILITOT 1.0     All labs within the past 24 hours have been reviewed.

## 2018-07-02 NOTE — PLAN OF CARE
Problem: SLP Goal  Goal: SLP Goal  Speech Language Pathology  Goals expected to be met by 7/8  1. Pt will tolerate pureed diet and nectar thickened liquids with no overt clinical signs of aspiration.   2. Pt will tolerate PO trials of dental soft solids with timely mastication and A-P transit and no overt clinical signs of aspiration. MET 7/2  3. Pt will tolerate PO trials of thin liquids with no overt clinical signs of aspiration. MET 7/2  4. Pt will complete dysphagia exercises x10 each with good ability.    Outcome: Outcome(s) achieved Date Met: 07/02/18  Pt tolerating least restrictive diet of dental soft with thin liquids.    [facility]

## 2018-07-02 NOTE — ASSESSMENT & PLAN NOTE
Baseline Cr is 1.3. On admission 2.4. Secondary to TLS.  Creatine continues to rise and UOP has decreased   - renally adjusted medications  - failed lasix trial on 6/30 and 7/1.  Given underlying cause for BERNICE is not going to improve, patient opted out of HD and decided on inpatient hospice care.

## 2018-07-02 NOTE — ASSESSMENT & PLAN NOTE
Continue to trend TLS labs  - s/p rasburicase and continue allopurinol, will likely discontinue when hospice

## 2018-07-02 NOTE — ASSESSMENT & PLAN NOTE
Platelets 6k today on admission.  -- s/p multiple units of platelets, no major bleeds but has lip bleeding

## 2018-07-02 NOTE — PROGRESS NOTES
[facility]  Hematology  Bone Marrow Transplant  Progress Note    Patient Name: Wil Kwon Jr.  Admission Date: 6/29/2018  Hospital Length of Stay: 3 days  Code Status: DNR    Subjective:     Interval History: had a discussion with the patient again today regarding what he thinks of hospice. Only put out about 800 cc with yesterday's second lasix challenge.  He conveys he doesn't want to be artificially kept alive.  He expressed he does not want to start HD which I felt was reasonable when the underlying cause for the renal failure cannot not be treated.  Transfused another unit PRBC for severe anemia symptoms and platelets for thrombocytopenia.         Objective:     Vital Signs (Most Recent):  Temp: 97.6 °F (36.4 °C) (07/02/18 0756)  Pulse: 94 (07/02/18 0756)  Resp: 18 (07/02/18 0756)  BP: 127/60 (07/02/18 0756)  SpO2: 96 % (07/02/18 0756) Vital Signs (24h Range):  Temp:  [97.6 °F (36.4 °C)-98.4 °F (36.9 °C)] 97.6 °F (36.4 °C)  Pulse:  [70-94] 94  Resp:  [17-18] 18  SpO2:  [95 %-99 %] 96 %  BP: (115-141)/(56-65) 127/60     Weight: 73 kg (161 lb) (weighed in clinic)  Body mass index is 22.45 kg/m².  Body surface area is 1.91 meters squared.    ECOG SCORE         [unfilled]    Intake/Output - Last 3 Shifts       06/30 0700 - 07/01 0659 07/01 0700 - 07/02 0659 07/02 0700 - 07/03 0659    P.O. 200 300     Blood 41141.1 1495.8     IV Piggyback 200 200     Total Intake(mL/kg) 19205.1 (144.7) 1995.8 (27.3)     Urine (mL/kg/hr) 120 (0.1) 1000 (0.6)     Total Output 120 1000      Net +67642.1 +995.8             Urine Occurrence 1 x            Physical Exam    Significant Labs:   CBC:     Recent Labs  Lab 07/01/18  0445 07/01/18  1758   WBC 76.67* 69.73*   HGB 6.1* 8.0*   HCT 19.7* 25.6*   PLT 6* 7*   , CMP:     Recent Labs  Lab 06/30/18  1619 07/01/18  0445 07/01/18  1758   * 129* 129*  129*   K 4.2 4.3 4.5  4.5   CL 97 97 97  97   CO2 20* 17* 17*  17*   * 86 103  104   BUN 67* 74*  85*  87*   CREATININE 5.5* 6.4* 7.1*  7.0*   CALCIUM 7.7* 7.7* 8.0*  8.0*   PROT  --  7.0  --    ALBUMIN 2.1* 2.0* 2.1*   BILITOT  --  0.8  --    ALKPHOS  --  89  --    AST  --  46*  --    ALT  --  15  --    ANIONGAP 12 15 15  15   EGFRNONAA 9.5* 7.9* 7.0*  7.1*   , Urine Studies:   No results for input(s): COLORU, APPEARANCEUA, PHUR, SPECGRAV, PROTEINUA, GLUCUA, KETONESU, BILIRUBINUA, OCCULTUA, NITRITE, UROBILINOGEN, LEUKOCYTESUR, RBCUA, WBCUA, BACTERIA, SQUAMEPITHEL, HYALINECASTS in the last 48 hours.    Invalid input(s): WRIGHTSUR and All pertinent labs from the last 24 hours have been reviewed.    Diagnostic Results:  I have reviewed all pertinent imaging results/findings within the past 24 hours.  CXr shows bilateral edema    Objective:     Vital Signs (Most Recent):  Temp: 97.6 °F (36.4 °C) (07/02/18 0756)  Pulse: 94 (07/02/18 0756)  Resp: 18 (07/02/18 0756)  BP: 127/60 (07/02/18 0756)  SpO2: 96 % (07/02/18 0756) Vital Signs (24h Range):  Temp:  [97.6 °F (36.4 °C)-98.4 °F (36.9 °C)] 97.6 °F (36.4 °C)  Pulse:  [70-94] 94  Resp:  [17-18] 18  SpO2:  [95 %-99 %] 96 %  BP: (115-141)/(56-65) 127/60     Weight: 73 kg (161 lb) (weighed in clinic)  Body mass index is 22.45 kg/m².  Body surface area is 1.91 meters squared.    ECOG SCORE         [unfilled]    Intake/Output - Last 3 Shifts       06/30 0700 - 07/01 0659 07/01 0700 - 07/02 0659 07/02 0700 - 07/03 0659    P.O. 200 300     Blood 49034.1 1495.8     IV Piggyback 200 200     Total Intake(mL/kg) 80046.1 (144.7) 1995.8 (27.3)     Urine (mL/kg/hr) 120 (0.1) 1000 (0.6)     Total Output 120 1000      Net +51556.1 +995.8             Urine Occurrence 1 x            Physical Exam    Significant Labs:   CBC:     Recent Labs  Lab 07/01/18  0445 07/01/18  1758   WBC 76.67* 69.73*   HGB 6.1* 8.0*   HCT 19.7* 25.6*   PLT 6* 7*   , CMP:     Recent Labs  Lab 06/30/18  1619 07/01/18  0445 07/01/18  1758   * 129* 129*  129*   K 4.2 4.3 4.5  4.5   CL 97 97 97  97    CO2 20* 17* 17*  17*   * 86 103  104   BUN 67* 74* 85*  87*   CREATININE 5.5* 6.4* 7.1*  7.0*   CALCIUM 7.7* 7.7* 8.0*  8.0*   PROT  --  7.0  --    ALBUMIN 2.1* 2.0* 2.1*   BILITOT  --  0.8  --    ALKPHOS  --  89  --    AST  --  46*  --    ALT  --  15  --    ANIONGAP 12 15 15  15   EGFRNONAA 9.5* 7.9* 7.0*  7.1*   , Urine Studies:   No results for input(s): COLORU, APPEARANCEUA, PHUR, SPECGRAV, PROTEINUA, GLUCUA, KETONESU, BILIRUBINUA, OCCULTUA, NITRITE, UROBILINOGEN, LEUKOCYTESUR, RBCUA, WBCUA, BACTERIA, SQUAMEPITHEL, HYALINECASTS in the last 48 hours.    Invalid input(s): WRIGHTSUR and All pertinent labs from the last 24 hours have been reviewed.    Diagnostic Results:  I have reviewed all pertinent imaging results/findings within the past 24 hours.  CXr shows bilateral edema    Objective:     Vital Signs (Most Recent):  Temp: 97.6 °F (36.4 °C) (07/02/18 0756)  Pulse: 94 (07/02/18 0756)  Resp: 18 (07/02/18 0756)  BP: 127/60 (07/02/18 0756)  SpO2: 96 % (07/02/18 0756) Vital Signs (24h Range):  Temp:  [97.6 °F (36.4 °C)-98.4 °F (36.9 °C)] 97.6 °F (36.4 °C)  Pulse:  [70-94] 94  Resp:  [17-18] 18  SpO2:  [95 %-99 %] 96 %  BP: (115-141)/(56-65) 127/60     Weight: 73 kg (161 lb) (weighed in clinic)  Body mass index is 22.45 kg/m².  Body surface area is 1.91 meters squared.    ECOG SCORE             Intake/Output - Last 3 Shifts       06/30 0700 - 07/01 0659 07/01 0700 - 07/02 0659 07/02 0700 - 07/03 0659    P.O. 200 300     Blood 00389.1 1495.8     IV Piggyback 200 200     Total Intake(mL/kg) 83690.1 (144.7) 1995.8 (27.3)     Urine (mL/kg/hr) 120 (0.1) 1000 (0.6)     Total Output 120 1000      Net +65455.1 +995.8             Urine Occurrence 1 x            Physical Exam   Constitutional: He appears well-developed.   HENT:   Head: Normocephalic and atraumatic.   Oral mucosa crusted with blood   Eyes: Conjunctivae and EOM are normal. Right eye exhibits no discharge. Left eye exhibits no discharge.   Neck:  Normal range of motion.   Cardiovascular: Normal rate.    Murmur heard.  Pulmonary/Chest: Effort normal. No respiratory distress.   3 L NC   Abdominal: Soft. Bowel sounds are normal.   Musculoskeletal: Normal range of motion.   1+ edema   Neurological: He is alert.   Skin: Skin is warm and dry.       Significant Labs:   CBC:     Recent Labs  Lab 07/01/18  0445 07/01/18  1758   WBC 76.67* 69.73*   HGB 6.1* 8.0*   HCT 19.7* 25.6*   PLT 6* 7*   , CMP:     Recent Labs  Lab 06/30/18  1619 07/01/18  0445 07/01/18  1758   * 129* 129*  129*   K 4.2 4.3 4.5  4.5   CL 97 97 97  97   CO2 20* 17* 17*  17*   * 86 103  104   BUN 67* 74* 85*  87*   CREATININE 5.5* 6.4* 7.1*  7.0*   CALCIUM 7.7* 7.7* 8.0*  8.0*   PROT  --  7.0  --    ALBUMIN 2.1* 2.0* 2.1*   BILITOT  --  0.8  --    ALKPHOS  --  89  --    AST  --  46*  --    ALT  --  15  --    ANIONGAP 12 15 15  15   EGFRNONAA 9.5* 7.9* 7.0*  7.1*   , Urine Studies:   No results for input(s): COLORU, APPEARANCEUA, PHUR, SPECGRAV, PROTEINUA, GLUCUA, KETONESU, BILIRUBINUA, OCCULTUA, NITRITE, UROBILINOGEN, LEUKOCYTESUR, RBCUA, WBCUA, BACTERIA, SQUAMEPITHEL, HYALINECASTS in the last 48 hours.    Invalid input(s): WRIGHTSUR and All pertinent labs from the last 24 hours have been reviewed.    Diagnostic Results:  I have reviewed all pertinent imaging results/findings within the past 24 hours.  CXr shows bilateral edema    Assessment/Plan:     Tumor lysis syndrome    Continue to trend TLS labs  - s/p rasburicase and continue allopurinol, will likely discontinue when hospice        Neck mass    The etiology of his neck mass is unclear at this time. He did report having an antecedent febrile illness in clinic. This was followed by a sore throat and then by neck swelling. He currently reports being afebrile and does not appear toxic. Given the nature and location of the swelling, there is a concern for possible anaerobic infection. Also of concern is possible for  malignancy (H&N cancer, ?lymphoma)   - US neck reveals LAD  - ENT: no inpatient procedure while thrombocytopenic  - Unasyn discontinued 7/2        Aortic valve stenosis, severe    He underwent recent balloon valvuplasty via Cardiology. Systolic murmur.  He has previously had severe AS and resultant pulmonary hypertension.          Persistent atrial fibrillation    Rate controlled with Metoprolol Succinate and on anticoagulation with Coumadin at home  - Given low platelets and concern for acute leukemia INR was reversed w/ Vitamin K 5 mg PO x 1  --coumadin held while inpatient, will likely discontinue when hospice  --metoprolol for rate control, might discontinue when hospice  --amio for rhythm control, might discontinue when hospice        Acute kidney injury    Baseline Cr is 1.3. On admission 2.4. Secondary to TLS.  Creatine continues to rise and UOP has decreased   - renally adjusted medications  - failed lasix trial on 6/30 and 7/1.  Given underlying cause for BERNICE is not going to improve, patient opted out of HD and decided on inpatient hospice care.          MDS (myelodysplastic syndrome)    He was originally diagnosed with MDS in August of 2016. He has been treated with 5 cycles of Azacitidine without improvement in his MDS. More recently he has been being treated with Darbopoetin and has obtained some degree of transfusion independence. 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  - Bone marrow biopsy pending  - follow TLS and DIC labs  - s/p rasburicase, continue allopurinol, might discontinue when hospice  - required PRBC and platelet transfusion(s).        Major depressive disorder with single episode, in partial remission    --venlafaxine held on 7/1 due to concern it could be worsening hyponatremia per nephrology recs        Thrombocytopenia    Platelets 6k today on admission.  -- s/p multiple units of platelets, no  major bleeds but has lip bleeding            VTE Risk Mitigation     None          Disposition: inpatient hospice vs home hospice    Andree Berman MD  Bone Marrow Transplant  [facility]

## 2018-07-02 NOTE — ASSESSMENT & PLAN NOTE
He was originally diagnosed with MDS in August of 2016. He has been treated with 5 cycles of Azacitidine without improvement in his MDS. More recently he has been being treated with Darbopoetin and has obtained some degree of transfusion independence. 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  - Bone marrow biopsy pending  - follow TLS and DIC labs  - s/p rasburicase, continue allopurinol, might discontinue when hospice  - required PRBC and platelet transfusion(s).

## 2018-07-02 NOTE — PLAN OF CARE
The patient and his family have decided to transition to IP hospice.  SW arranging hospice needs.  Patient will transfer tomorrow.  Will continue to follow.

## 2018-07-02 NOTE — PT/OT/SLP PROGRESS
"Speech Language Pathology Treatment    Patient Name:  Wil Kwon Jr.   MRN:  [id number]  Admitting Diagnosis: <principal problem not specified>    Recommendations:                 General Recommendations:  None   Diet recommendations:  Mechanical soft, Liquid Diet Level: Thin   Aspiration Precautions: Standard; small bites/sips; no straws    General Precautions: Standard, fall, aspiration, respiratory  Communication strategies:  None     Subjective     Pt alert and conversational   "I have to watch it. I need to take small sips no matter what"  "It's gotten to where I can't swallow dry things"  Wife present at bedside   Patient goals: none stated      Pain/Comfort:  · Pain Rating 1: 0/10    Objective:     Has the patient been evaluated by SLP for swallowing?   Yes  Keep patient NPO? No   Current Respiratory Status: nasal cannula       Upon arrival, Pt upright in bed with wife at bedside. Thin liquid (diet coke) noted at bedside with wife stating, "he's been sipping on that and is doing fine". Presented w/ 3 oz of thin liquid (water), Pt consumed full amount via small, single sips without overt s/s of aspiration or changes in vocal quality. Subtle dry, throat clear observed x2 however, unable to determine if related to PO intake given significance of delay following swallow. Given encouragement as Pt was initially denying trials of dental soft solid 2/2 decreased appetite, he demonstrated adequate oral containment and coordination w/ timely mastication and no appreciable oral residue. No overt s/s of airway compromise or c/o globus sensation. Educated Pt & spouse re: recommendations to upgrade diet to thin liquids with mechanical soft solids as well as general principles [place] Free Water Protocol including strict oral hygiene with subsequent consumption of water to decrease risk of aspiration related PNA. Further reference to [place] Free Water Protocol provided with citation; NSG present at bedside for " recommendations.     Assessment:     Wil Kwon Jr. is a 73 y.o. male that presents with no s/s of pharyngeal dysphagia t/o small single sips of thin liquid (3oz) and trial of dental soft solid. Recommend Pt upgrade to mechanical soft solids with thin liquids given standard aspiration precautions. Pt independent in initiating and following recommendations to reduce aspiration risk.     Goals:    SLP Goals        Problem: SLP Goal    Goal Priority Disciplines Outcome   SLP Goal     SLP Ongoing (interventions implemented as appropriate)   Description:  Speech Language Pathology  Goals expected to be met by 7/8  1. Pt will tolerate pureed diet and nectar thickened liquids with no overt clinical signs of aspiration.   2. Pt will tolerate PO trials of dental soft solids with timely mastication and A-P transit and no overt clinical signs of aspiration.   3. Pt will tolerate PO trials of thin liquids with no overt clinical signs of aspiration.   4. Pt will complete dysphagia exercises x10 each with good ability.                     Plan:     · Patient to be seen:  4 x/week   · Plan of Care expires:  07/30/18  · Plan of Care reviewed with:  patient, spouse   · SLP Follow-Up:  No       Discharge recommendations:  hospice facility     Time Tracking:     SLP Treatment Date:   07/02/18  Speech Start Time:  1135  Speech Stop Time:  1150     Speech Total Time (min):  15 min    Billable Minutes: Treatment Swallowing Dysfunction 8, Seld Care/Home Management Training 7 and Total Time 15    [facility]  07/02/2018

## 2018-07-02 NOTE — ASSESSMENT & PLAN NOTE
Rate controlled with Metoprolol Succinate and on anticoagulation with Coumadin at home  - Given low platelets and concern for acute leukemia INR was reversed w/ Vitamin K 5 mg PO x 1  --coumadin held while inpatient, will likely discontinue when hospice  --metoprolol for rate control, might discontinue when hospice  --amio for rhythm control, might discontinue when hospice

## 2018-07-03 VITALS
OXYGEN SATURATION: 100 % | SYSTOLIC BLOOD PRESSURE: 132 MMHG | DIASTOLIC BLOOD PRESSURE: 69 MMHG | RESPIRATION RATE: 18 BRPM | WEIGHT: 161 LBS | BODY MASS INDEX: 22.54 KG/M2 | TEMPERATURE: 97 F | HEART RATE: 102 BPM | HEIGHT: 71 IN

## 2018-07-03 LAB
ANISOCYTOSIS BLD QL SMEAR: SLIGHT
BASOPHILS # BLD AUTO: ABNORMAL K/UL
BASOPHILS NFR BLD: 0 %
BLASTS NFR BLD MANUAL: 2 %
DIFFERENTIAL METHOD: ABNORMAL
EOSINOPHIL # BLD AUTO: ABNORMAL K/UL
EOSINOPHIL NFR BLD: 0.5 %
ERYTHROCYTE [DISTWIDTH] IN BLOOD BY AUTOMATED COUNT: 23.4 %
HCT VFR BLD AUTO: 26.3 %
HGB BLD-MCNC: 8.6 G/DL
IMM GRANULOCYTES # BLD AUTO: ABNORMAL K/UL
IMM GRANULOCYTES NFR BLD AUTO: ABNORMAL %
LYMPHOCYTES # BLD AUTO: ABNORMAL K/UL
LYMPHOCYTES NFR BLD: 9 %
MCH RBC QN AUTO: 31.2 PG
MCHC RBC AUTO-ENTMCNC: 32.7 G/DL
MCV RBC AUTO: 95 FL
MONOCYTES # BLD AUTO: ABNORMAL K/UL
MONOCYTES NFR BLD: 9 %
NEUTROPHILS NFR BLD: 77.5 %
NEUTS BAND NFR BLD MANUAL: 2 %
NRBC BLD-RTO: 2 /100 WBC
PLATELET # BLD AUTO: 4 K/UL
PLATELET BLD QL SMEAR: ABNORMAL
PMV BLD AUTO: ABNORMAL FL
POLYCHROMASIA BLD QL SMEAR: ABNORMAL
RBC # BLD AUTO: 2.76 M/UL
URATE SERPL-MCNC: 4.6 MG/DL
WBC # BLD AUTO: 38.96 K/UL

## 2018-07-03 PROCEDURE — 99233 SBSQ HOSP IP/OBS HIGH 50: CPT | Mod: ,,, | Performed by: INTERNAL MEDICINE

## 2018-07-03 PROCEDURE — 85007 BL SMEAR W/DIFF WBC COUNT: CPT

## 2018-07-03 PROCEDURE — 84550 ASSAY OF BLOOD/URIC ACID: CPT

## 2018-07-03 PROCEDURE — 25000003 PHARM REV CODE 250: Performed by: INTERNAL MEDICINE

## 2018-07-03 PROCEDURE — 36415 COLL VENOUS BLD VENIPUNCTURE: CPT

## 2018-07-03 PROCEDURE — 25000003 PHARM REV CODE 250: Performed by: HOSPITALIST

## 2018-07-03 PROCEDURE — 85027 COMPLETE CBC AUTOMATED: CPT

## 2018-07-03 RX ORDER — HYDROXYUREA 500 MG/1
1000 CAPSULE ORAL 2 TIMES DAILY
Start: 2018-07-03 | End: 2018-08-02

## 2018-07-03 RX ADMIN — METOPROLOL SUCCINATE 100 MG: 50 TABLET, EXTENDED RELEASE ORAL at 10:07

## 2018-07-03 RX ADMIN — AMIODARONE HYDROCHLORIDE 200 MG: 200 TABLET ORAL at 09:07

## 2018-07-03 RX ADMIN — SODIUM BICARBONATE 650 MG TABLET 1300 MG: at 09:07

## 2018-07-03 RX ADMIN — ALLOPURINOL 200 MG: 100 TABLET ORAL at 09:07

## 2018-07-03 RX ADMIN — HYDROXYUREA 2000 MG: 500 CAPSULE ORAL at 09:07

## 2018-07-03 NOTE — ASSESSMENT & PLAN NOTE
Baseline Cr is 1.3. On admission 2.4. Secondary to TLS.  Creatine continued to rise and UOP had decreased   - failed lasix trial on 6/30 and 7/1.  Given underlying cause for BERNICE is not going to improve, patient opted out of HD and decided on inpatient hospice care.

## 2018-07-03 NOTE — ASSESSMENT & PLAN NOTE
Rate controlled with Metoprolol Succinate and on anticoagulation with Coumadin at home  - Given low platelets and concern for acute leukemia INR was reversed w/ Vitamin K 5 mg PO x 1  --coumadin held while inpatient, discontinue when hospice  --metoprolol for rate control, amio for rhythm control. Continue both for comfort.

## 2018-07-03 NOTE — PROGRESS NOTES
[facility]  Hematology  Bone Marrow Transplant  Progress Note    Patient Name: Wil Kwon Jr.  Admission Date: 6/29/2018  Hospital Length of Stay: 4 days  Code Status: DNR    Subjective:     Interval History: had a discussion with the patient again today regarding what he thinks of hospice. Only put out about 800 cc with yesterday's second lasix challenge.  He conveys he doesn't want to be artificially kept alive.  He expressed he does not want to start HD which I felt was reasonable when the underlying cause for the renal failure cannot not be treated.  Transfused another unit PRBC for severe anemia symptoms.           Objective:     Vital Signs (Most Recent):  Temp: 97.5 °F (36.4 °C) (07/03/18 0356)  Pulse: 102 (07/03/18 0356)  Resp: 18 (07/03/18 0356)  BP: 136/63 (07/03/18 0356)  SpO2: 97 % (07/03/18 0356) Vital Signs (24h Range):  Temp:  [97.3 °F (36.3 °C)-98.4 °F (36.9 °C)] 97.5 °F (36.4 °C)  Pulse:  [] 102  Resp:  [18] 18  SpO2:  [93 %-99 %] 97 %  BP: (107-152)/(53-74) 136/63     Weight: 73 kg (161 lb) (weighed in clinic)  Body mass index is 22.45 kg/m².  Body surface area is 1.91 meters squared.    ECOG SCORE         [unfilled]    Intake/Output - Last 3 Shifts       07/01 0700 - 07/02 0659 07/02 0700 - 07/03 0659 07/03 0700 - 07/04 0659    P.O. 300 1030     Blood 1495.8 610     IV Piggyback 200      Total Intake(mL/kg) 1995.8 (27.3) 1640 (22.5)     Urine (mL/kg/hr) 1000 (0.6) 850 (0.5)     Total Output 1000 850      Net +995.8 +790             Urine Occurrence  1 x     Stool Occurrence  2 x           Physical Exam    Significant Labs:   CBC:     Recent Labs  Lab 07/01/18  1758 07/02/18  0531 07/03/18  0512   WBC 69.73* 60.85* 38.96*   HGB 8.0* 7.5* 8.6*   HCT 25.6* 23.2* 26.3*   PLT 7* 5* 4*   , CMP:     Recent Labs  Lab 07/01/18  1758 07/02/18  0531 07/02/18  1525   *  129* 132* 130*   K 4.5  4.5 4.2 4.6   CL 97  97 97 95   CO2 17*  17* 19* 17*     104 72  112*   BUN 85*  87* 97* 105*   CREATININE 7.1*  7.0* 7.4* 7.4*   CALCIUM 8.0*  8.0* 7.8* 8.0*   PROT  --  7.4  --    ALBUMIN 2.1* 2.1* 2.3*   BILITOT  --  1.0  --    ALKPHOS  --  96  --    AST  --  50*  --    ALT  --  16  --    ANIONGAP 15  15 16 18*   EGFRNONAA 7.0*  7.1* 6.6* 6.6*   , Urine Studies:   No results for input(s): COLORU, APPEARANCEUA, PHUR, SPECGRAV, PROTEINUA, GLUCUA, KETONESU, BILIRUBINUA, OCCULTUA, NITRITE, UROBILINOGEN, LEUKOCYTESUR, RBCUA, WBCUA, BACTERIA, SQUAMEPITHEL, HYALINECASTS in the last 48 hours.    Invalid input(s): WRIGHTSUR and All pertinent labs from the last 24 hours have been reviewed.    Diagnostic Results:  I have reviewed all pertinent imaging results/findings within the past 24 hours.  CXr shows bilateral edema    Objective:     Vital Signs (Most Recent):  Temp: 97.5 °F (36.4 °C) (07/03/18 0356)  Pulse: 102 (07/03/18 0356)  Resp: 18 (07/03/18 0356)  BP: 136/63 (07/03/18 0356)  SpO2: 97 % (07/03/18 0356) Vital Signs (24h Range):  Temp:  [97.3 °F (36.3 °C)-98.4 °F (36.9 °C)] 97.5 °F (36.4 °C)  Pulse:  [] 102  Resp:  [18] 18  SpO2:  [93 %-99 %] 97 %  BP: (107-152)/(53-74) 136/63     Weight: 73 kg (161 lb) (weighed in clinic)  Body mass index is 22.45 kg/m².  Body surface area is 1.91 meters squared.    ECOG SCORE         [unfilled]    Intake/Output - Last 3 Shifts       07/01 0700 - 07/02 0659 07/02 0700 - 07/03 0659 07/03 0700 - 07/04 0659    P.O. 300 1030     Blood 1495.8 610     IV Piggyback 200      Total Intake(mL/kg) 1995.8 (27.3) 1640 (22.5)     Urine (mL/kg/hr) 1000 (0.6) 850 (0.5)     Total Output 1000 850      Net +995.8 +790             Urine Occurrence  1 x     Stool Occurrence  2 x           Physical Exam    Significant Labs:   CBC:     Recent Labs  Lab 07/01/18 1758 07/02/18  0531 07/03/18  0512   WBC 69.73* 60.85* 38.96*   HGB 8.0* 7.5* 8.6*   HCT 25.6* 23.2* 26.3*   PLT 7* 5* 4*   , CMP:     Recent Labs  Lab 07/01/18 1758 07/02/18  0531 07/02/18  1525    *  129* 132* 130*   K 4.5  4.5 4.2 4.6   CL 97  97 97 95   CO2 17*  17* 19* 17*     104 72 112*   BUN 85*  87* 97* 105*   CREATININE 7.1*  7.0* 7.4* 7.4*   CALCIUM 8.0*  8.0* 7.8* 8.0*   PROT  --  7.4  --    ALBUMIN 2.1* 2.1* 2.3*   BILITOT  --  1.0  --    ALKPHOS  --  96  --    AST  --  50*  --    ALT  --  16  --    ANIONGAP 15  15 16 18*   EGFRNONAA 7.0*  7.1* 6.6* 6.6*   , Urine Studies:   No results for input(s): COLORU, APPEARANCEUA, PHUR, SPECGRAV, PROTEINUA, GLUCUA, KETONESU, BILIRUBINUA, OCCULTUA, NITRITE, UROBILINOGEN, LEUKOCYTESUR, RBCUA, WBCUA, BACTERIA, SQUAMEPITHEL, HYALINECASTS in the last 48 hours.    Invalid input(s): WRIGHTSUR and All pertinent labs from the last 24 hours have been reviewed.    Diagnostic Results:  I have reviewed all pertinent imaging results/findings within the past 24 hours.  CXr shows bilateral edema    Objective:     Vital Signs (Most Recent):  Temp: 97.5 °F (36.4 °C) (07/03/18 0356)  Pulse: 102 (07/03/18 0356)  Resp: 18 (07/03/18 0356)  BP: 136/63 (07/03/18 0356)  SpO2: 97 % (07/03/18 0356) Vital Signs (24h Range):  Temp:  [97.3 °F (36.3 °C)-98.4 °F (36.9 °C)] 97.5 °F (36.4 °C)  Pulse:  [] 102  Resp:  [18] 18  SpO2:  [93 %-99 %] 97 %  BP: (107-152)/(53-74) 136/63     Weight: 73 kg (161 lb) (weighed in clinic)  Body mass index is 22.45 kg/m².  Body surface area is 1.91 meters squared.    ECOG SCORE             Intake/Output - Last 3 Shifts       07/01 0700 - 07/02 0659 07/02 0700 - 07/03 0659 07/03 0700 - 07/04 0659    P.O. 300 1030     Blood 1495.8 610     IV Piggyback 200      Total Intake(mL/kg) 1995.8 (27.3) 1640 (22.5)     Urine (mL/kg/hr) 1000 (0.6) 850 (0.5)     Total Output 1000 850      Net +995.8 +790             Urine Occurrence  1 x     Stool Occurrence  2 x           Physical Exam   Constitutional: He appears well-developed.   HENT:   Head: Normocephalic and atraumatic.   Eyes: Conjunctivae and EOM are normal. Right eye exhibits no  discharge. Left eye exhibits no discharge.   Neck: Normal range of motion.   Cardiovascular: Normal rate.    Murmur heard.  Pulmonary/Chest: Effort normal. No respiratory distress.   Off nasal cannula   Abdominal: Soft. Bowel sounds are normal.   Musculoskeletal: Normal range of motion.   1+ edema   Neurological: He is alert.   Skin: Skin is warm and dry.       Significant Labs:   CBC:     Recent Labs  Lab 07/01/18 1758 07/02/18  0531 07/03/18  0512   WBC 69.73* 60.85* 38.96*   HGB 8.0* 7.5* 8.6*   HCT 25.6* 23.2* 26.3*   PLT 7* 5* 4*   , CMP:     Recent Labs  Lab 07/01/18 1758 07/02/18 0531 07/02/18  1525   *  129* 132* 130*   K 4.5  4.5 4.2 4.6   CL 97  97 97 95   CO2 17*  17* 19* 17*     104 72 112*   BUN 85*  87* 97* 105*   CREATININE 7.1*  7.0* 7.4* 7.4*   CALCIUM 8.0*  8.0* 7.8* 8.0*   PROT  --  7.4  --    ALBUMIN 2.1* 2.1* 2.3*   BILITOT  --  1.0  --    ALKPHOS  --  96  --    AST  --  50*  --    ALT  --  16  --    ANIONGAP 15  15 16 18*   EGFRNONAA 7.0*  7.1* 6.6* 6.6*   , Urine Studies:   No results for input(s): COLORU, APPEARANCEUA, PHUR, SPECGRAV, PROTEINUA, GLUCUA, KETONESU, BILIRUBINUA, OCCULTUA, NITRITE, UROBILINOGEN, LEUKOCYTESUR, RBCUA, WBCUA, BACTERIA, SQUAMEPITHEL, HYALINECASTS in the last 48 hours.    Invalid input(s): WRIGHTSUR and All pertinent labs from the last 24 hours have been reviewed.    Diagnostic Results:  I have reviewed all pertinent imaging results/findings within the past 24 hours.  CXr shows bilateral edema    Assessment/Plan:     * MDS (myelodysplastic syndrome)    He was originally diagnosed with MDS in August of 2016. He has been treated with 5 cycles of Azacitidine without improvement in his MDS. More recently he has been being treated with Darbopoetin and has obtained some degree of transfusion independence. 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  - Bone  marrow biopsy pending  - s/p rasburicase, continue allopurinol, discontinue on transfer to hospice  - required PRBC and platelet transfusion(s).        Tumor lysis syndrome    Continue to trend TLS labs  - s/p rasburicase and continue allopurinol, discontinue when hospice        Neck mass    The etiology of his neck mass is unclear at this time. He did report having an antecedent febrile illness in clinic. This was followed by a sore throat and then by neck swelling. He currently reports being afebrile and does not appear toxic. Given the nature and location of the swelling, there is a concern for possible anaerobic infection. Also of concern is possible for malignancy (H&N cancer, ?lymphoma)   - US neck reveals LAD  - ENT: no inpatient procedure while thrombocytopenic  - Unasyn discontinued 7/2        Aortic valve stenosis, severe    He underwent recent balloon valvuplasty via Cardiology. Systolic murmur.  He has previously had severe AS and resultant pulmonary hypertension.          Persistent atrial fibrillation    Rate controlled with Metoprolol Succinate and on anticoagulation with Coumadin at home  - Given low platelets and concern for acute leukemia INR was reversed w/ Vitamin K 5 mg PO x 1  --coumadin held while inpatient, discontinue when hospice  --metoprolol for rate control, amio for rhythm control. Continue both for comfort.        Acute kidney injury    Baseline Cr is 1.3. On admission 2.4. Secondary to TLS.  Creatine continued to rise and UOP had decreased   - failed lasix trial on 6/30 and 7/1.  Given underlying cause for BERNICE is not going to improve, patient opted out of HD and decided on inpatient hospice care.          Major depressive disorder with single episode, in partial remission    --venlafaxine held on 7/1 due to concern it could be worsening hyponatremia per nephrology recs  --resume for hospice        Thrombocytopenia    Platelets 6k today on admission.  -- s/p multiple units of  platelets, no major bleeds but has lip bleeding            VTE Risk Mitigation     None          Disposition: inpatient hospice    Andree Berman MD  Bone Marrow Transplant  [facility]

## 2018-07-03 NOTE — PLAN OF CARE
07/03/18 1108   Final Note   Assessment Type Final Discharge Note   Discharge Disposition [facility]   What phone number can be called within the next 1-3 days to see how you are doing after discharge? ([phone])   Hospital Follow Up  Appt(s) scheduled? (na)

## 2018-07-03 NOTE — PROGRESS NOTES
Call received from Walt @ [facility] stating that they are prepared to take the pt. To inpatient today. Notified the team and will await dc orders to arrange for transport. Will follow.

## 2018-07-03 NOTE — SUBJECTIVE & OBJECTIVE
Subjective:     Interval History: had a discussion with the patient again today regarding what he thinks of hospice. Only put out about 800 cc with yesterday's second lasix challenge.  He conveys he doesn't want to be artificially kept alive.  He expressed he does not want to start HD which I felt was reasonable when the underlying cause for the renal failure cannot not be treated.  Transfused another unit PRBC for severe anemia symptoms.           Objective:     Vital Signs (Most Recent):  Temp: 97.5 °F (36.4 °C) (07/03/18 0356)  Pulse: 102 (07/03/18 0356)  Resp: 18 (07/03/18 0356)  BP: 136/63 (07/03/18 0356)  SpO2: 97 % (07/03/18 0356) Vital Signs (24h Range):  Temp:  [97.3 °F (36.3 °C)-98.4 °F (36.9 °C)] 97.5 °F (36.4 °C)  Pulse:  [] 102  Resp:  [18] 18  SpO2:  [93 %-99 %] 97 %  BP: (107-152)/(53-74) 136/63     Weight: 73 kg (161 lb) (weighed in clinic)  Body mass index is 22.45 kg/m².  Body surface area is 1.91 meters squared.    ECOG SCORE         [unfilled]    Intake/Output - Last 3 Shifts       07/01 0700 - 07/02 0659 07/02 0700 - 07/03 0659 07/03 0700 - 07/04 0659    P.O. 300 1030     Blood 1495.8 610     IV Piggyback 200      Total Intake(mL/kg) 1995.8 (27.3) 1640 (22.5)     Urine (mL/kg/hr) 1000 (0.6) 850 (0.5)     Total Output 1000 850      Net +995.8 +790             Urine Occurrence  1 x     Stool Occurrence  2 x           Physical Exam    Significant Labs:   CBC:     Recent Labs  Lab 07/01/18  1758 07/02/18  0531 07/03/18  0512   WBC 69.73* 60.85* 38.96*   HGB 8.0* 7.5* 8.6*   HCT 25.6* 23.2* 26.3*   PLT 7* 5* 4*   , CMP:     Recent Labs  Lab 07/01/18  1758 07/02/18  0531 07/02/18  1525   *  129* 132* 130*   K 4.5  4.5 4.2 4.6   CL 97  97 97 95   CO2 17*  17* 19* 17*     104 72 112*   BUN 85*  87* 97* 105*   CREATININE 7.1*  7.0* 7.4* 7.4*   CALCIUM 8.0*  8.0* 7.8* 8.0*   PROT  --  7.4  --    ALBUMIN 2.1* 2.1* 2.3*   BILITOT  --  1.0  --    ALKPHOS  --  96  --    AST  --  50*   --    ALT  --  16  --    ANIONGAP 15  15 16 18*   EGFRNONAA 7.0*  7.1* 6.6* 6.6*   , Urine Studies:   No results for input(s): COLORU, APPEARANCEUA, PHUR, SPECGRAV, PROTEINUA, GLUCUA, KETONESU, BILIRUBINUA, OCCULTUA, NITRITE, UROBILINOGEN, LEUKOCYTESUR, RBCUA, WBCUA, BACTERIA, SQUAMEPITHEL, HYALINECASTS in the last 48 hours.    Invalid input(s): WRIGHTSUR and All pertinent labs from the last 24 hours have been reviewed.    Diagnostic Results:  I have reviewed all pertinent imaging results/findings within the past 24 hours.  CXr shows bilateral edema    Objective:     Vital Signs (Most Recent):  Temp: 97.5 °F (36.4 °C) (07/03/18 0356)  Pulse: 102 (07/03/18 0356)  Resp: 18 (07/03/18 0356)  BP: 136/63 (07/03/18 0356)  SpO2: 97 % (07/03/18 0356) Vital Signs (24h Range):  Temp:  [97.3 °F (36.3 °C)-98.4 °F (36.9 °C)] 97.5 °F (36.4 °C)  Pulse:  [] 102  Resp:  [18] 18  SpO2:  [93 %-99 %] 97 %  BP: (107-152)/(53-74) 136/63     Weight: 73 kg (161 lb) (weighed in clinic)  Body mass index is 22.45 kg/m².  Body surface area is 1.91 meters squared.    ECOG SCORE         [unfilled]    Intake/Output - Last 3 Shifts       07/01 0700 - 07/02 0659 07/02 0700 - 07/03 0659 07/03 0700 - 07/04 0659    P.O. 300 1030     Blood 1495.8 610     IV Piggyback 200      Total Intake(mL/kg) 1995.8 (27.3) 1640 (22.5)     Urine (mL/kg/hr) 1000 (0.6) 850 (0.5)     Total Output 1000 850      Net +995.8 +790             Urine Occurrence  1 x     Stool Occurrence  2 x           Physical Exam    Significant Labs:   CBC:     Recent Labs  Lab 07/01/18  1758 07/02/18  0531 07/03/18  0512   WBC 69.73* 60.85* 38.96*   HGB 8.0* 7.5* 8.6*   HCT 25.6* 23.2* 26.3*   PLT 7* 5* 4*   , CMP:     Recent Labs  Lab 07/01/18  1758 07/02/18  0531 07/02/18  1525   *  129* 132* 130*   K 4.5  4.5 4.2 4.6   CL 97  97 97 95   CO2 17*  17* 19* 17*     104 72 112*   BUN 85*  87* 97* 105*   CREATININE 7.1*  7.0* 7.4* 7.4*   CALCIUM 8.0*  8.0* 7.8* 8.0*    PROT  --  7.4  --    ALBUMIN 2.1* 2.1* 2.3*   BILITOT  --  1.0  --    ALKPHOS  --  96  --    AST  --  50*  --    ALT  --  16  --    ANIONGAP 15  15 16 18*   EGFRNONAA 7.0*  7.1* 6.6* 6.6*   , Urine Studies:   No results for input(s): COLORU, APPEARANCEUA, PHUR, SPECGRAV, PROTEINUA, GLUCUA, KETONESU, BILIRUBINUA, OCCULTUA, NITRITE, UROBILINOGEN, LEUKOCYTESUR, RBCUA, WBCUA, BACTERIA, SQUAMEPITHEL, HYALINECASTS in the last 48 hours.    Invalid input(s): WRIGHTSUR and All pertinent labs from the last 24 hours have been reviewed.    Diagnostic Results:  I have reviewed all pertinent imaging results/findings within the past 24 hours.  CXr shows bilateral edema    Objective:     Vital Signs (Most Recent):  Temp: 97.5 °F (36.4 °C) (07/03/18 0356)  Pulse: 102 (07/03/18 0356)  Resp: 18 (07/03/18 0356)  BP: 136/63 (07/03/18 0356)  SpO2: 97 % (07/03/18 0356) Vital Signs (24h Range):  Temp:  [97.3 °F (36.3 °C)-98.4 °F (36.9 °C)] 97.5 °F (36.4 °C)  Pulse:  [] 102  Resp:  [18] 18  SpO2:  [93 %-99 %] 97 %  BP: (107-152)/(53-74) 136/63     Weight: 73 kg (161 lb) (weighed in clinic)  Body mass index is 22.45 kg/m².  Body surface area is 1.91 meters squared.    ECOG SCORE             Intake/Output - Last 3 Shifts       07/01 0700 - 07/02 0659 07/02 0700 - 07/03 0659 07/03 0700 - 07/04 0659    P.O. 300 1030     Blood 1495.8 610     IV Piggyback 200      Total Intake(mL/kg) 1995.8 (27.3) 1640 (22.5)     Urine (mL/kg/hr) 1000 (0.6) 850 (0.5)     Total Output 1000 850      Net +995.8 +790             Urine Occurrence  1 x     Stool Occurrence  2 x           Physical Exam   Constitutional: He appears well-developed.   HENT:   Head: Normocephalic and atraumatic.   Eyes: Conjunctivae and EOM are normal. Right eye exhibits no discharge. Left eye exhibits no discharge.   Neck: Normal range of motion.   Cardiovascular: Normal rate.    Murmur heard.  Pulmonary/Chest: Effort normal. No respiratory distress.   Off nasal cannula    Abdominal: Soft. Bowel sounds are normal.   Musculoskeletal: Normal range of motion.   1+ edema   Neurological: He is alert.   Skin: Skin is warm and dry.       Significant Labs:   CBC:     Recent Labs  Lab 07/01/18 1758 07/02/18  0531 07/03/18  0512   WBC 69.73* 60.85* 38.96*   HGB 8.0* 7.5* 8.6*   HCT 25.6* 23.2* 26.3*   PLT 7* 5* 4*   , CMP:     Recent Labs  Lab 07/01/18 1758 07/02/18 0531 07/02/18  1525   *  129* 132* 130*   K 4.5  4.5 4.2 4.6   CL 97  97 97 95   CO2 17*  17* 19* 17*     104 72 112*   BUN 85*  87* 97* 105*   CREATININE 7.1*  7.0* 7.4* 7.4*   CALCIUM 8.0*  8.0* 7.8* 8.0*   PROT  --  7.4  --    ALBUMIN 2.1* 2.1* 2.3*   BILITOT  --  1.0  --    ALKPHOS  --  96  --    AST  --  50*  --    ALT  --  16  --    ANIONGAP 15  15 16 18*   EGFRNONAA 7.0*  7.1* 6.6* 6.6*   , Urine Studies:   No results for input(s): COLORU, APPEARANCEUA, PHUR, SPECGRAV, PROTEINUA, GLUCUA, KETONESU, BILIRUBINUA, OCCULTUA, NITRITE, UROBILINOGEN, LEUKOCYTESUR, RBCUA, WBCUA, BACTERIA, SQUAMEPITHEL, HYALINECASTS in the last 48 hours.    Invalid input(s): WRIGHTSUR and All pertinent labs from the last 24 hours have been reviewed.    Diagnostic Results:  I have reviewed all pertinent imaging results/findings within the past 24 hours.  CXr shows bilateral edema

## 2018-07-03 NOTE — PROGRESS NOTES
Consult received to arrange for inpatient hospice. Spoke with pt. And his wife (Agatha) and discussed hospice with them. Explained service to them and answered all questions. They would like a referral to [facility] ([phone]). Spoke with Sierra @ [facility], all necessary information given and orders faxed to the office ([phone]). Hospice to meet with pt. And wife this afternoon to sign consents and prepare for transfer.     Call received from Walt @ [facility] stating that they have signed consents and that the pt. Is OK for transfer. Pt. However would prefer to transfer tomorrow as he was told by MD that he will be ready for transfer in the am. Will follow.

## 2018-07-03 NOTE — PLAN OF CARE
Problem: Patient Care Overview  Goal: Plan of Care Review  Outcome: Ongoing (interventions implemented as appropriate)  Pt is aa&ox4. Up with assist. Patient remains DNR and will be d/c'd to inpatient hospice today.  Patient refusing to use thickner in water states it tastes bad. Patient using urinal and voiding adequate amounts, had BM this shift.  Patient is [place]. Patient remains on oxygen,. No reports of nausea. No falls or injuries thus far my shift. ampcillin d/c'd

## 2018-07-03 NOTE — ASSESSMENT & PLAN NOTE
--venlafaxine held on 7/1 due to concern it could be worsening hyponatremia per nephrology recs  --resume for hospice

## 2018-07-03 NOTE — PROGRESS NOTES
Transportation arranged thru [facility], pt. Will be picked up within the hour. Nurse has called report and pt. Ready for transport. Pt. Aware of the plan. Will follow.

## 2018-07-03 NOTE — NURSING
Patient prepared for discharge. Report called to [facility] to Iftikhar Jacobo . Care explained to patient and wife for  transition service from Hospital to hospice.  Transportation set up per SW. PIV  To remain in place per request of hospice RN . VSS stable. Patient picked up by [facility] transport service at 1302. Patient transported by [facility] service.

## 2018-07-03 NOTE — ASSESSMENT & PLAN NOTE
He was originally diagnosed with MDS in August of 2016. He has been treated with 5 cycles of Azacitidine without improvement in his MDS. More recently he has been being treated with Darbopoetin and has obtained some degree of transfusion independence. 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  - Bone marrow biopsy pending  - s/p rasburicase, continue allopurinol, discontinue on transfer to hospice  - required PRBC and platelet transfusion(s).

## 2018-07-04 LAB — BACTERIA BLD CULT: NORMAL

## 2018-07-05 LAB
FLT3 RESULT: NORMAL
PATH REPORT.FINAL DX SPEC: NORMAL
SPECIMEN TYPE: NORMAL

## 2018-07-09 LAB
DNA/RNA EXTRACT AND HOLD RESULT: NORMAL
DNA/RNA EXTRACTION: NORMAL
EXHR SPECIMEN TYPE: NORMAL

## 2018-07-30 ENCOUNTER — TELEPHONE (OUTPATIENT)
Dept: ADMINISTRATIVE | Facility: CLINIC | Age: 73
End: 2018-07-30

## 2018-07-30 NOTE — TELEPHONE ENCOUNTER
Home Health SOC 07/18/2018 to 09/15/2018 with [facility] , Dr Roddy Driscoll. SN service

## 2018-12-12 NOTE — PROGRESS NOTES
Advance Care Planning (ACP) Provider Conversation Snapshot    Date of ACP Conversation: 12/12/18  Persons included in Conversation:  patient  Length of ACP Conversation in minutes:  <16 minutes (Non-Billable)    Authorized Decision Maker (if patient is incapable of making informed decisions): This person is:   Healthcare Agent/Medical Power of  under Advance Directive        Conversation Outcomes / Follow-Up Plan:   pt has an advanced directive and will get a copy to us. Progress Note  BMT                                                              Team: [facility] BMT    Patient Name: Wil Kwon Jr.  YOB: 1945    Admit Date: 3/6/2017                   LOS: 9    SUBJECTIVE:     Reason for Admission: HCAP (healthcare-associated pneumonia)    Subjective and Interval History:   No acute events over night.  Says he would like some cough drops/cough syrup to help his cold.  Felt that his coughing was exaggerated after breathing treatments.    Review of Systems:  General:  Denies weight loss, fever, chills, weakness,(+) fatigue  HEENT:  Denies vision changes, sore throat, congestion  CVS:  Denies chest pain, pressure, palpitations  Resp: (+) shortness of breath, cough, sputum  GI:  Denies diarrhea, constipation, abdominal pain, nausea, vomiting  :  Denies dysuria, hematuria, nocturia  MSK:  Denies myalgias, arthralgias  Skin:  Denies rashes, bleeding  Neuro:  Denies headache, dizziness, syncope, numbness, paresthesias  All other systems otherwise negative    Scheduled Medications   cefTRIAXone (ROCEPHIN) IVPB  2 g Intravenous Q24H    citalopram  40 mg Oral Daily    diltiaZEM  360 mg Oral Daily    famotidine  20 mg Oral Daily    metoprolol tartrate  50 mg Oral BID    phytonadione  5 mg Oral Daily    senna-docusate 8.6-50 mg  1 tablet Oral BID    sodium chloride 0.9%  3 mL Intravenous Q8H       Continuous Infusions       PRN Medications  acetaminophen, albuterol-ipratropium 2.5mg-0.5mg/3mL, hydrocodone-acetaminophen 5-325mg, morphine, trazodone    OBJECTIVE:     Temp:  [97.9 °F (36.6 °C)-98.8 °F (37.1 °C)]   Pulse:  []   Resp:  [18-22]   BP: (140-165)/(69-79)   SpO2:  [96 %-99 %]   Body mass index is 26.5 kg/(m^2).    Intake/Output Summary    Intake/Output Summary (Last 24 hours) at 03/15/17 0704  Last data filed at 03/15/17 0500   Gross per 24 hour   Intake              960 ml   Output             1400 ml   Net             -440 ml       Physical  Exam:  General:  Well developed, well nourished, no acute distress  HEENT:  Normocephalic, atraumatic, EOMI, clear sclera, throat clear without erythema or exudates  CVS:  RRR, S1 and S2 normal, no murmurs, rubs, gallops  Resp:  Lungs clear to auscultation, no wheezes, rales, rhonchi, cough.  Right chest tube  GI:  Abdomen soft, non-tender, non-distended, normoactive bowel sounds, no organomegaly  :  Deferred  MSK:  No muscle atrophy, cyanosis, peripheral edema  Skin:  No rashes, ulcers, erythema  Neuro:  CNII-XII grossly intact  Psych:  Alert and oriented to person, place, and time    Diagnostic Result    Lab Results   Component Value Date    WBC 18.52 (H) 03/15/2017    HGB 7.8 (L) 03/15/2017    HCT 24.9 (L) 03/15/2017    PLT 97 (L) 03/15/2017         Recent Labs  Lab 03/15/17  0427   *   K 4.3   CL 98   CO2 22*   BUN 17   CREATININE 0.8   MG 1.5*   PHOS 3.8       Lab Results   Component Value Date    INR 1.2 03/15/2017    INR 1.8 (H) 03/14/2017    INR 1.5 (H) 03/13/2017       No results for input(s): POCTGLUCOSE in the last 72 hours.    ASSESSMENT/PLAN:   Mr. Wil Kwon Jr. is a 72 y.o. male     Current Problems List:  Active Hospital Problems    Diagnosis  POA    *HCAP (healthcare-associated pneumonia) [J18.9]  Yes    Cough [R05]  Yes    Hypomagnesemia [E83.42]  No    Atrial fibrillation with RVR [I48.91]  No    Empyema of right pleural space [J86.9]  Yes    Insomnia [G47.00]  Yes    Septic shock [A41.9, R65.21]  Yes    Acute hypoxemic respiratory failure [J96.01]  Yes    BERNICE (acute kidney injury) [N17.9]  Yes    MDS (myelodysplastic syndrome) [D46.9]  Yes    Depression [F32.9]  Yes    Hyponatremia [E87.1]  Yes    Anemia, chronic disease [D63.8]  Yes    Thrombocytopenia [D69.6]  Yes    Essential hypertension [I10]  Yes      Resolved Hospital Problems    Diagnosis Date Resolved POA   No resolved problems to display.       Active Problems, Status & Treatment Plan Addressed  Today:    HCAP (healthcare-associated pneumonia)  Acute hypoxemic respiratory failure   Empyema of right pleural space  - Likely 2/2 to suspected HCAP given CXR concerning for right lower lob PNA with respiratory sputum cx +Klebsiella and pleural fluid cx +strep viridans , both sensitive to ceftriaxone . S/p Pigtail placement by IR 3/7 and s/p tPA for right loculated empyema. Repeat CT chest 3/13 with dramatic improvement.   - Chest tube put out 175cc in the last 24h; IR states significant improvement per CT on 3/13 - declined a second chest tube  - Continue ceftriaxone day 6.  Pulm suggests changing to IV Unasyn for anaerobic coverage but could switch to PO once effusion has a good plan.  Will consult ID for additional abx recs  - Repeat sputum culture per pulmonology recommendation   - Tessalon Perles and Robitussin prn      Essential hypertension  Paroxysmal atrial fibrillation with Rapid Ventricular Rate  - Cardiology recommended to increase lopressor.  On metoprolol and diltiazem (titrate up as HR/BP tolerates)  - Warfarin held given tPA administration and recent procedures      Anemia, chronic disease  MDS (myelodysplastic syndrome)  Anemia and thrombocytopenia due to chemotherapy  - Hemoglobin 7.8  - Platelets 97  - INR elevated Vitamin K x 3 days   - Transfuse for Hb below 7     Depression  - continue home dose of citalopram, will consider stopping given hyponatremia     Hyponatremia  - Likely 2/2 SSRI  - Fluid restict    Diet:  Cardiac  DVT PPx:  Mechanical  Code Status:  Full      Dispo:  Pending improvement in respiratory symptoms.  PT/OT recommending SNF        Signing Physician:  Josie Grover MD  Med PGY3  [phone]

## 2019-02-18 NOTE — PROGRESS NOTES
Unable to reach patient. Two numbers are no good and the third number, the voice mailbox was not set up.

## 2019-02-21 NOTE — PROGRESS NOTES
Called emergency contact number, Agatha Kwon to get in touch with the patient. That number is not a working number. Called the [phone], a gentleman answered the phone and stated that the patient was not available and I asked if I had to wrong number, he said yes. The individual was sleeping.

## 2019-03-22 NOTE — PROGRESS NOTES
Multiple attempts have been made to contact patient. He has not returned our phone calls or contacted the clinic. Letter sent to patient and enrolling physician.

## 2019-03-25 ENCOUNTER — ANTI-COAG VISIT (OUTPATIENT)
Dept: CARDIOLOGY | Facility: CLINIC | Age: 74
End: 2019-03-25

## 2019-03-25 DIAGNOSIS — Z79.01 LONG TERM CURRENT USE OF ANTICOAGULANT THERAPY: ICD-10-CM

## 2019-03-25 NOTE — PROGRESS NOTES
I contacted Dr. Mixon regarding this patient's missed PT/INR appointments. He informed me that this patient is now . We will close this episode in his chart.

## 2019-05-21 DIAGNOSIS — Z12.11 COLON CANCER SCREENING: ICD-10-CM

## 2020-10-05 ENCOUNTER — PATIENT MESSAGE (OUTPATIENT)
Dept: ADMINISTRATIVE | Facility: HOSPITAL | Age: 75
End: 2020-10-05

## 2021-01-04 ENCOUNTER — PATIENT MESSAGE (OUTPATIENT)
Dept: ADMINISTRATIVE | Facility: HOSPITAL | Age: 76
End: 2021-01-04

## 2021-04-05 ENCOUNTER — PATIENT MESSAGE (OUTPATIENT)
Dept: ADMINISTRATIVE | Facility: HOSPITAL | Age: 76
End: 2021-04-05

## 2021-04-15 ENCOUNTER — PATIENT MESSAGE (OUTPATIENT)
Dept: RESEARCH | Facility: HOSPITAL | Age: 76
End: 2021-04-15

## 2022-03-08 NOTE — ASSESSMENT & PLAN NOTE
- Likely 2/2 to suspected HCAP given CXR findings.    - tachypneic on exam  - Arterial Blood Gas result:  pO2 67; pCO2 25.6; pH 7.415;  HCO3 16.4, %O2 Sat 94 on 6 L Nc  - Will start CPAP and continue monitor, repeat ABG in 4 hours .     Discharged

## 2022-04-17 NOTE — Clinical Note
"Pt startles himself and calls out \"Help, I have to pee\". Pt calls this out frequently, voiding per urinal often.  " Cbc weekly  Cbc , cmp, ldh, uric acid  in 6 weeks  F/u in 6 weeks ( along with his wife's visit)

## 2022-06-10 NOTE — ASSESSMENT & PLAN NOTE
- appears stable, followed by Dr. Mixon with oncology  - given darvopoeitin in past  - anemic on admission to 6.5, given 2U pRBCs       
- appears stable, followed by Dr. Mixon with oncology  - given darvopoeitin in past  - anemic on admission to 6.5, given 2U pRBCs  - responded appropriately        
- chronic, related to MDS  - hemoglobin 6.5 on admission  - transfused 2U pRBCs in ED  - no evidence of acute bleed on exam and history  - continue to monitor with daily CBC    
- citalopram 40mg daily     
- citalopram 40mg daily     
- followed out patient by cardiology, Dr. Barrera  - repeat echo;    1 - Normal left ventricular systolic function (EF 60-65%).     2 - Concentric hypertrophy.     3 - No wall motion abnormalities.     4 - Indeterminate LV diastolic function.     5 - Biatrial enlargement.     6 - Right ventricle is upper limit of normal in size with normal systolic function.     7 - Severe aortic valve stenosis (JEFF 0.60 cm2, AVAi 0.31 cm2/m2, peak aortic jet velocity 4.5 m/s,MG 66 mmHg, ).     8 - Moderate to moderate-severe (3+) mitral regurgitation.     9 - Trivial to mild tricuspid regurgitation.     10 - Increased central venous pressure.     11 - Pulmonary hypertension. The estimated PA systolic pressure is 79 mmHg.     
- followed out patient by cardiology, Dr. Barrera  - will order repeat echo to assess for acute change      
- initial troponin in ED normal   - subsequent troponin showed steady increase   - cardiology consulted, appreciate recommendations  - initially treated with heparin bolus and gtt, statin, ASA   - EKG concerning for ST segment depression in multiple leads  - continue to trend troponin   
- initial troponin in ED normal   - subsequent troponin showed steady increase   - cardiology consulted, appreciate recommendations  - initially treated with heparin bolus and gtt, statin, ASA   - EKG concerning for ST segment depression in multiple leads  - troponin increased overnight to 10 from 5, now back down to 5    
- on long term warfarin  - followed by [facility] cardiology  - cardiology consulted, appreciate assistance     
- on long term warfarin  - followed by [facility] cardiology  - cardiology consulted, appreciate assistance     
- patient currently taking warfarin   - previous INR two days prior to admission 2.6    
- patient currently taking warfarin   - previous INR two days prior to admission 2.6    
- patient initially presented with hypotension and elevated lactate  - holding antihypertensive medications, will restart as appropriate     
- patient initially presented with hypotension and elevated lactate  - holding antihypertensive medications, will restart as appropriate     
- patient presents post pre-syncopal episode   - became light headed after standing from using commode  - differential includes syncope related to Afib with RVR vs ACS vs decompensated heart failure vs less likely sepsis   - given one time dose of cefepime and cipro in the ED, will continue to follow clinically and continue if needed  - troponin elevated on admission to 0.516, will order repeat and consider cardiology consult  - EKG on admission concerning for ST depression in contiguous leads  - given one time dose of statin and ASA    
- patient presents post pre-syncopal episode   - became light headed after standing from using commode  - differential includes syncope related to Afib with RVR vs ACS vs decompensated heart failure vs less likely sepsis   - given one time dose of cefepime and cipro in the ED, will continue to follow clinically and continue if needed  - troponin elevated on admission to 0.516, will order repeat and consider cardiology consult  - will repeat BNP levels  - EKG on admission concerning for ST depression in contiguous leads  - given one time dose of statin and ASA    
- patient with noted severe valvular regurgitation   - previous BNP elevated  - will repeat   - possibly will need diuresis given fluid and blood products received in ED     
- patient with noted severe valvular regurgitation   - previous BNP elevated  - will repeat   - possibly will need diuresis given fluid and blood products received in ED     
- possible related to decompensated heart failure given valvular disease vs cardiogenic shock vs decreased volume status   - improved with fluid boluses in ED and 2U pRBCs  - lactate elevated to 4.9 on admission, improved to 1.0 following fluid and blood products     
- possible related to decompensated heart failure given valvular disease vs cardiogenic shock vs decreased volume status   - improved with fluid boluses in ED and 2U pRBCs  - lactate elevated to 4.9 on admission, improved to 1.0 following fluid and blood products     
- takes diltiazem 500 and metoprolol 1500   - holding in acute setting      
- troponin initially not elevated  - subsequent tests showed increase to 0.516 and the to 5.4  - cardiology consulted, appreciate help  - possible NSTEMI  - initially given heparin bolus, ASA, statin    
- troponin initially not elevated  - subsequent tests showed increase to 0.516 and the to 5.4  - cardiology consulted, appreciate help  - possible NSTEMI  - initially given heparin bolus, ASA, statin  - troponin elevated overnight to 10 now trending back down to 5    
- will start patient on statin therapy  - given one time dose in the ED    
- will start patient on statin therapy  - given one time dose in the ED    
Patient presenting with a 1 day history of dizziness associated with a syncopal episode  - Volume resuscitated with 2L bolus with improved BP   - No Acute EKG changes in the ED  - Troponin of 5.466 with a hemoglobin of 6.5.   - Elevated troponin likely 2/2 to demand ischemia from anemia vs hypovolemia.   - Ordered PT/INR  - Can d/c heparin drip if PT/INR 2-3.   - No PCI at this time.  - Can restart diet. Recommend PO fluid intake.   
Patient presenting with a 1 day history of dizziness associated with a syncopal episode  - Volume resuscitated with 2L bolus with improved BP   - No Acute EKG changes in the ED  - Troponin of 5.466 with a hemoglobin of 6.5. Reived 2 units pRBCs.   - Elevated troponin likely 2/2 to demand ischemia from anemia vs hypovolemia.   - No PCI at this time.  - Recommend PO fluid intake in the setting of diarrhea.   - Can restart home warfarin dose with goal INR 2-3.   - D/c ASA and Plavix.     
qSOFA 2/3  SOFA 2 points    Differential for lactic acidosis led by volume loss from GI losses in the setting of severe anemia. Low suspicion for sepsis at this time.    - Repeat LA  - Obtain blood cultures x2  - Obtain CXR  - Confirm adequate volume repletion with full lying-to-standing orthostatic BP  - Patient is stable for admission to [facility]  - Consider f/u of hyperglycemia as outpatient with HgA1C  - Thank you for this interesting consult  
chest pain

## 2022-08-17 NOTE — HPI
Mr. Kwon is a 73 year old male with a past medical history of myelodysplastic syndrome, moderate aortic stenosis, pAfib on warfarin presents to the ED following a presyncopal episode. The patient states that last night he was getting up from using the commode and he experienced significant lightheadedness. The patient and his wife became concerned following this episode and present to the ED. The patient denies actually falling during this episode or any trauma to his head. The patient was found to be severely hypotensive in the ED and was given volume resuscitation and treated with antibiotics. Of note the patient recently was seen in the ED two nights prior to this presentation for evaluation of fever. He reports that he had a fever at home of ~101F and came to the ED who worked it up and ultimately recommended follow up with his oncologist. The patient does endorse some chest tightness prior to the episode of syncope but denies any significant chest pain. The patient denies any infectious symptoms such as cough, abdominal pain, N/V, diarrhea or any sick contacts. Of not too the patient was found to be anemic on initial labs with a hemoglobin of 6.5. Lactate was initially elevated to 4.9 but later returned to normal following the administration of fluids.      [Appropriately responsive] : appropriately responsive [Alert] : alert [No Acute Distress] : no acute distress [No Lymphadenopathy] : no lymphadenopathy [Regular Rate Rhythm] : regular rate rhythm [No Murmurs] : no murmurs [Clear to Auscultation B/L] : clear to auscultation bilaterally [Soft] : soft [Non-tender] : non-tender [Non-distended] : non-distended [No HSM] : No HSM [No Lesions] : no lesions [No Mass] : no mass [Oriented x3] : oriented x3

## 2022-09-28 NOTE — Clinical Note
Care Due:                  Date            Visit Type   Department     Provider  --------------------------------------------------------------------------------                                [facility] FAM[facility]                              PRIMARY      MEDICINE/INTERN  Last Visit: 08-      CARE ([facility])   [facility]         Pankaj Dewitt  Next Visit: None Scheduled  None         None Found                                                            Last  Test          Frequency    Reason                     Performed    Due Date  --------------------------------------------------------------------------------    CMP.........  12 months..  amlodipine-benazepril....  12-   12-    [facility] Embedded Care Gaps. Reference number: [id number]. 9/28/2022   9:10:59 AM CDT   Labs (iron, ferritin, retic, tibc, type and screen), transfusion of 1 unit prbc on Wed

## 2023-11-08 NOTE — NURSING
GENERIC PROCEDURE TEMPLATE    Procedure       Ultrasound-Guided left greater trochanteric      -Indication:   Visit Diagnoses     ICD-10-CM   1. Greater trochanteric bursitis of left hip  M70.62   2. It band syndrome, left  M76.32   3. Chronic left-sided low back pain with left-sided sciatica  M54.42    G89.29   4. Spondylosis  M47.9   5. Dietary counseling  Z71.3   6. Exercise counseling  Z71.82   7. Prediabetes  R73.03       -Attending Physician performing the procedure: Dr Abilio Slater  -Assistant: none   -Informed written consent specific to procedure obtained, signed and dated.       Time Out:    Informed Consent obtained? Yes    Correct patient? Yes    Side and Location Confirmed? Yes    Allergies Confirmed and addressed appropriately? Yes    Medication to be injected - dosage, concentration and/or dilution confirmed - Yes    On Anticoagulants that would be a contraindication for the injection? No         - Positioning of the patient on exam table: Left lateral decubitus position   - Prior to the procedure, the  was scanned using a Zazum ultrasound machine using a linear transducer to find the optimal pathway for entering the injection site of interest. The skin was then marked to note proper probe placement.    - Skin prepped and draped in sterile fashion with chloraprep x3.    - Sterile gel was used throughout the procedure    - Under ultrasound-guidance, a 27 gauge 2.5-inch spinal needle was directed toward the injection site which is left greater trochanter inferior side  - After touching down on injection site, a syringe was retracted to confirm no heme was present    - Injectate: a mixture of lidocaine and bupivacaine  - After the injection, the injection site was cleaned and a sterile bandage was applied    - Following the procedure, the patient reported 0% improvement in their pre-procedural pain level when performing a provocative movement.    - The patient was stable and able to walk without any  Pt here for darbepoetin injection. NO complaints voiced. No redness around injection site. AVS given to patient.    worsening of their pre-procedural status.    -Instructed patient to avoid submerging in water for at least 24 to 48 hours.  Patient may bathe.  Instructed patient to monitor injection site for signs of infection.      .

## 2024-01-26 NOTE — SUBJECTIVE & OBJECTIVE
Past Medical History:   Diagnosis Date    Aortic valve stenosis, mild     secondary to bicuspid aortic alve    Atrial fibrillation     Carotid artery disease     Left CEA 4/9/13    Depression     DJD (degenerative joint disease)     H/O echocardiogram 04/13/2016    EF 55-60%. Diastolic dysfunction. PASP 39. mod AS with JEFF 1.18    History of psychiatric hospitalization     [facility] 2014, [facility]    Hyperlipidemia     Hypertension     MDS (myelodysplastic syndrome) 2013    s/p Chemo     Therapy     [facility]        Past Surgical History:   Procedure Laterality Date    BONE MARROW BIOPSY  08/29/2016    CAROTID ENDARTERECTOMY Left     Inguinal hernia      Left    KNEE SURGERY      Right x2    neck fusion      TONSILLECTOMY         Review of patient's allergies indicates:   Allergen Reactions    Lipitor [atorvastatin]      Muscle pain    Lisinopril Edema     Cheek swelling       Medications:  Prescriptions Prior to Admission   Medication Sig    acetaminophen (TYLENOL) 325 MG tablet Take 325 mg by mouth as needed for Pain.    citalopram (CELEXA) 40 MG tablet TAKE 1 TABLET BY MOUTH DAILY (Patient taking differently: TAKE 1 TABLET BY MOUTH DAILY (am))    diltiaZEM (CARDIZEM CD) 300 MG 24 hr capsule TAKE ONE CAPSULE BY MOUTH EVERY DAY    losartan (COZAAR) 50 MG tablet Take 50 mg by mouth once daily.    metoprolol succinate (TOPROL-XL) 25 MG 24 hr tablet Take 1 tablet (25 mg total) by mouth once daily.    multivitamin capsule Take 1 capsule by mouth every morning.     nystatin (MYCOSTATIN) ointment Apply topically 3 (three) times daily.    omega-3 fatty acids-vitamin E (FISH OIL) 1,000 mg Cap Take 3 capsules by mouth once daily.     ondansetron (ZOFRAN) 8 MG tablet Take 1 tablet (8 mg total) by mouth every 8 (eight) hours as needed for Nausea.    pravastatin (PRAVACHOL) 20 MG tablet Take 1 tablet (20 mg total) by mouth every other day.    warfarin (COUMADIN) 3  MG tablet Take 1 tablet (3 mg total) by mouth Daily.     Antibiotics     Start     Stop Route Frequency Ordered    03/15/17 1348  ampicillin-sulbactam 3 g in sodium chloride 0.9 % 100 mL IVPB (ready to mix system)      -- IV Every 6 hours (non-standard times) 03/15/17 1349        Antifungals     None        Antivirals     None           Immunization History   Administered Date(s) Administered    Influenza Whole 10/06/2014    Pneumococcal Conjugate - 13 Valent 12/21/2015    Pneumococcal Polysaccharide - 23 Valent 02/16/2015    Zoster 04/16/2012       Family History     Problem Relation (Age of Onset)    Hyperlipidemia Brother        Social History     Social History    Marital status:      Spouse name: Agatha    Number of children: N/A    Years of education: N/A     Social History Main Topics    Smoking status: Never Smoker    Smokeless tobacco: Never Used    Alcohol use No    Drug use: No    Sexual activity: Yes     Partners: Female     Other Topics Concern    Patient Feels They Ought To Cut Down On Drinking/Drug Use No    Patient Annoyed By Others Criticizing Their Drinking/Drug Use No    Patient Has Felt Bad Or Guilty About Drinking/Drug Use No    Patient Has Had A Drink/Used Drugs As An Eye Opener In The Am No     Social History Narrative    40+ years , no children, 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 Negative for appetite change, diaphoresis and fever.   HENT: Negative for ear pain, mouth sores, sinus pressure and sore throat.    Eyes: Negative for photophobia, pain and redness.   Respiratory: Positive for cough and shortness of breath. Negative for wheezing.    Cardiovascular: Negative for chest pain and leg swelling.   Gastrointestinal: Negative for abdominal distention, abdominal pain, diarrhea and nausea.   Genitourinary: Negative for dysuria, flank pain, frequency and  urgency.   Musculoskeletal: Negative for arthralgias, back pain, gait problem and myalgias.   Skin: Negative for pallor and rash.   Neurological: Negative for dizziness, tremors, seizures and headaches.   Psychiatric/Behavioral: Negative for confusion.     Objective:     Vital Signs (Most Recent):  Temp: 97.6 °F (36.4 °C) (03/15/17 1503)  Pulse: 97 (03/15/17 1503)  Resp: 18 (03/15/17 1503)  BP: (!) 144/65 (03/15/17 1503)  SpO2: 97 % (03/15/17 1503) Vital Signs (24h Range):  Temp:  [97.6 °F (36.4 °C)-98.8 °F (37.1 °C)] 97.6 °F (36.4 °C)  Pulse:  [] 97  Resp:  [18-22] 18  SpO2:  [96 %-99 %] 97 %  BP: (140-165)/(65-79) 144/65     Weight: 86.2 kg (190 lb)  Body mass index is 26.5 kg/(m^2).    Estimated Creatinine Clearance: 88.9 mL/min (based on Cr of 0.8).    Physical Exam   Constitutional: He is oriented to person, place, and time. He appears well-developed. No distress.   Chronically ill appearing.   HENT:   Head: Atraumatic.   Mouth/Throat: Oropharynx is clear and moist. No oropharyngeal exudate.   Eyes: Conjunctivae and EOM are normal. Pupils are equal, round, and reactive to light. No scleral icterus.   Neck: Neck supple.   Cardiovascular: Normal rate and regular rhythm.  Exam reveals no friction rub.    Murmur heard.  Pulmonary/Chest: Effort normal. No respiratory distress. He has no wheezes. He has rales. He exhibits no tenderness.   Crackles in right lung diffusely, diminished in base. CT with sanguinopurulent drainage.   Abdominal: Soft. Bowel sounds are normal. He exhibits no distension. There is no tenderness. There is no rebound and no guarding.   Musculoskeletal: Normal range of motion. He exhibits no edema.   Lymphadenopathy:     He has no cervical adenopathy.   Neurological: He is alert and oriented to person, place, and time. No cranial nerve deficit. He exhibits normal muscle tone. Coordination normal.   Skin: No rash noted. No erythema.       Significant Labs:   CBC:   Recent Labs  Lab  03/14/17  0405 03/15/17  0427   WBC 17.24* 18.52*   HGB 8.5* 7.8*   HCT 25.9* 24.9*   PLT 69* 97*     CMP:   Recent Labs  Lab 03/14/17  0405 03/15/17  0427   * 128*   K 4.6 4.3   CL 98 98   CO2 21* 22*   GLU 97 99   BUN 20 17   CREATININE 0.8 0.8   CALCIUM 7.6* 7.6*   PROT 5.8* 6.0   ALBUMIN 1.5* 1.5*   BILITOT 0.8 0.8   ALKPHOS 84 88   AST 35 34   ALT 17 18   ANIONGAP 10 8   EGFRNONAA >60.0 >60.0       Significant Imaging: I have reviewed all pertinent imaging results/findings within the past 24 hours.     3/12 CT chest:  1.  Interval placement of a right-sided pigtail pleural drainage catheter which appears appropriately positioned. There is a persistent loculated right-sided pleural effusion which appears mildly increased in overall volume compared to the prior examination. Correlation with drain output is advised.    2. Persistent patchy consolidation throughout the right middle and lower lobes, mildly improved from prior examination, noting more dense areas of consolidation with associated lack of air bronchograms in the dependent aspect of the right lower lobe.    3. Atherosclerosis.    3/13 CT chest limited (IR):  Significant interval decrease in loculated pleural effusion, with insufficient amount of fluid for drainage.    Microbiology:  3/6 blood cx: negative x2  3/6 urine cx; MSSE (contaminant)  3/6 sputum cx: Kleb oxytoca (R amp, S rest)  3/7 blood cx: negative x2  3/7 pleural fluid cx: S.intermedius (GPC on stain)  3/15 sputum cx: rare WBC/GPC on stain       PRINCIPAL DISCHARGE DIAGNOSIS  Diagnosis: Fall  Assessment and Plan of Treatment:       SECONDARY DISCHARGE DIAGNOSES  Diagnosis: Sepsis  Assessment and Plan of Treatment:     Diagnosis: Atrial fibrillation  Assessment and Plan of Treatment:     Diagnosis: Dysphagia  Assessment and Plan of Treatment:      PRINCIPAL DISCHARGE DIAGNOSIS  Diagnosis: Fall  Assessment and Plan of Treatment: You were admitted after a fall. CT head was negative for acute injury. You were found to have new afib which is an irregular heart rhythm and sepsis with an unknown source of infection. We treated you for the afib and for your infection with antibiotics. You were recommended to be discharged to [facility] for further rehabilitation.      SECONDARY DISCHARGE DIAGNOSES  Diagnosis: Sepsis  Assessment and Plan of Treatment: Sepsis is a serious condition in which the body responds improperly to an infection. The infection-fighting processes turn on the body, causing the organs to work poorly.  Sepsis may progress to septic shock. This is a dramatic drop in blood pressure that can damage the lungs, kidneys, liver and other organs. When the damage is severe, it can lead to death.  Due to the severe consequences, we treated you with antibiotics to control the infection. Your symptoms have improved after treatment and we will be discharging you with antibiotics also to further control the infectious source.   It is very important to continue to take the medication as prescribed. We also recommend you to see your primary care physician within 1 weeks of discharge.    Diagnosis: Atrial fibrillation  Assessment and Plan of Treatment: You have a history of atrial fibrillation (afib). This is an irregular heart beat that puts you at risk for forming clots in the heart that can travel to the brain and cause strokes.   During this hospital admission you were found to be in Atrial Fibrillation. This is an irregular, often rapid heart rate that commonly causes poor blood flow. The heart's upper chambers (atria) beat out of coordination with the lower chambers (ventricles). This condition may have no symptoms, but when symptoms do appear they include palpitations, shortness of breath, and fatigue. Please continue to take your rate controlling medications as directed to prevent these symptoms.  If these symptoms do develop, you should consider calling your PCP or going to the emergency room.   Additionally, this irregular heart beat puts you at risk for forming clots in the heart that can travel to the brain and cause strokes. Please continue to take your blood thinner eliquis as prescribed to prevent strokes.    Diagnosis: Wound of skin  Assessment and Plan of Treatment: You    Diagnosis: Dysphagia  Assessment and Plan of Treatment:      PRINCIPAL DISCHARGE DIAGNOSIS  Diagnosis: Fall  Assessment and Plan of Treatment: You were admitted after a fall. CT head was negative for acute injury. You were found to have new afib which is an irregular heart rhythm and sepsis with an unknown source of infection. We treated you for the afib and for your infection with antibiotics. You were recommended to be discharged to [facility] for further rehabilitation.      SECONDARY DISCHARGE DIAGNOSES  Diagnosis: Sepsis  Assessment and Plan of Treatment: Sepsis is a serious condition in which the body responds improperly to an infection. The infection-fighting processes turn on the body, causing the organs to work poorly.  Due to the severe consequences, we treated you with antibiotics to control the infection. Your symptoms have improved after treatment and we will be discharging you with antibiotics also to further control the infectious source.   It is very important to continue to take the medication as prescribed. We also recommend you to see your primary care physician upon discharge.    Diagnosis: Atrial fibrillation  Assessment and Plan of Treatment: You were found to have atrial fibrillation (afib). This is an irregular heart beat that puts you at risk for forming clots in the heart that can travel to the brain and cause strokes.   During this hospital admission you were found to be in Atrial Fibrillation. This is an irregular, often rapid heart rate that commonly causes poor blood flow. The heart's upper chambers (atria) beat out of coordination with the lower chambers (ventricles). This condition may have no symptoms, but when symptoms do appear they include palpitations, shortness of breath, and fatigue. Please continue to take your rate controlling medications as directed to prevent these symptoms.  If these symptoms do develop, you should consider calling your PCP or going to the emergency room.   We did not put you on a blood thinner due to blood found in your stool during your stay.    Diagnosis: Melena  Assessment and Plan of Treatment: You had multiple episodes of melena during your stay. GI was consulted and recommended EGD. You had an EGD inpatient and results are still pending. Your hemoglobin was stable and there were no active signs of bleeding during your stay. You were hemodynamically stable. We recommend outpatient GI follow up.    Diagnosis: Wound of skin  Assessment and Plan of Treatment: You were found to have a wound on your skin. Orthopedic surgery was consulted and drained the wound. Cultures were sent and grew MSSA. We started you on antibiotics for treatment. Please continue to take your antibiotics.     PRINCIPAL DISCHARGE DIAGNOSIS  Diagnosis: Fall  Assessment and Plan of Treatment: You were admitted after a fall. CT head was negative for acute injury. You were found to have new afib which is an irregular heart rhythm and sepsis with an unknown source of infection. We treated you for the afib and for your infection with antibiotics. You were recommended to be discharged to [facility] for further rehabilitation.      SECONDARY DISCHARGE DIAGNOSES  Diagnosis: Sepsis  Assessment and Plan of Treatment: Sepsis is a serious condition in which the body responds improperly to an infection. The infection-fighting processes turn on the body, causing the organs to work poorly.  Due to the severe consequences, we treated you with antibiotics to control the infection. Your symptoms have improved after treatment and we will be discharging you with IV antibiotics also to further control the infectious source.   It is very important to continue to take the medication as prescribed. We also recommend you to see your primary care physician upon discharge.    Diagnosis: Atrial fibrillation  Assessment and Plan of Treatment: You were found to have atrial fibrillation (afib). This is an irregular heart beat that puts you at risk for forming clots in the heart that can travel to the brain and cause strokes.   During this hospital admission you were found to be in Atrial Fibrillation. This is an irregular, often rapid heart rate that commonly causes poor blood flow. The heart's upper chambers (atria) beat out of coordination with the lower chambers (ventricles). This condition may have no symptoms, but when symptoms do appear they include palpitations, shortness of breath, and fatigue. Please continue to take your rate controlling medications as directed to prevent these symptoms.  If these symptoms do develop, you should consider calling your PCP or going to the emergency room.   We did not put you on a blood thinner due to blood found in your stool during your stay. Please discuss with your physician about anticoagulation.    Diagnosis: Melena  Assessment and Plan of Treatment: You had multiple episodes of melena during your stay. GI was consulted and recommended EGD. You had an EGD inpatient and results are still pending. Your hemoglobin was stable and there were no active signs of bleeding during your stay. You were hemodynamically stable. We recommend outpatient GI follow up.    Diagnosis: Wound of skin  Assessment and Plan of Treatment: You were found to have a wound on your skin. Orthopedic surgery was consulted and drained the wound. Cultures were sent and grew MSSA. We started you on antibiotics for treatment. Please continue to take your antibiotics.    Diagnosis: Candida esophagitis  Assessment and Plan of Treatment: You were found to have candida esophagitis on endoscopy. Candida is a fungus that can be treated with fluconazole. Please continue to take fluconazole for 14 days after discharge (1/29  - 2/12).     PRINCIPAL DISCHARGE DIAGNOSIS  Diagnosis: Fall  Assessment and Plan of Treatment: You were admitted after a fall. CT head was negative for acute injury. You were found to have new afib which is an irregular heart rhythm and sepsis with an unknown source of infection. We treated you for the afib and for your infection with antibiotics. You were recommended to be discharged to [facility] for further rehabilitation.      SECONDARY DISCHARGE DIAGNOSES  Diagnosis: Sepsis  Assessment and Plan of Treatment: Sepsis is a serious condition in which the body responds improperly to an infection. The infection-fighting processes turn on the body, causing the organs to work poorly.  Due to the severe consequences, we treated you with antibiotics to control the infection. Your symptoms have improved after treatment and we will be discharging you with IV antibiotics also to further control the infectious source.   It is very important to continue to take the medication as prescribed. We also recommend you to see your primary care physician upon discharge.    Diagnosis: Atrial fibrillation  Assessment and Plan of Treatment: You were found to have atrial fibrillation (afib). This is an irregular heart beat that puts you at risk for forming clots in the heart that can travel to the brain and cause strokes.   During this hospital admission you were found to be in Atrial Fibrillation. This is an irregular, often rapid heart rate that commonly causes poor blood flow. The heart's upper chambers (atria) beat out of coordination with the lower chambers (ventricles). This condition may have no symptoms, but when symptoms do appear they include palpitations, shortness of breath, and fatigue. Please continue to take your rate controlling medications as directed to prevent these symptoms.  If these symptoms do develop, you should consider calling your PCP or going to the emergency room.   We did not put you on a blood thinner due to blood found in your stool during your stay. We started you on carvedilol 6.25 mg to take twice a day. Please continue to discuss with your physician about anticoagulation.    Diagnosis: Wound of skin  Assessment and Plan of Treatment: You were found to have a wound on your skin. Orthopedic surgery was consulted and drained the wound. Cultures were sent and grew MSSA. We started you on antibiotics for treatment. Please continue to take your antibiotics.    Diagnosis: Melena  Assessment and Plan of Treatment: You had multiple episodes of melena during your stay. GI was consulted and recommended EGD. You had an EGD inpatient and results are still pending. Your hemoglobin was stable and there were no active signs of bleeding during your stay. You were hemodynamically stable. We recommend outpatient GI follow up.    Diagnosis: Candida esophagitis  Assessment and Plan of Treatment: You were found to have candida esophagitis on endoscopy. Candida is a fungus that can be treated with fluconazole. Please continue to take fluconazole for 14 days after discharge (1/29  - 2/12).     PRINCIPAL DISCHARGE DIAGNOSIS  Diagnosis: Fall  Assessment and Plan of Treatment: You were admitted after a fall. CT head was negative for acute injury. You were found to have new afib which is an irregular heart rhythm and sepsis with an unknown source of infection. We treated you for the afib and for your infection with antibiotics. You were recommended to be discharged to [facility] for further rehabilitation.      SECONDARY DISCHARGE DIAGNOSES  Diagnosis: Sepsis  Assessment and Plan of Treatment: Sepsis is a serious condition in which the body responds improperly to an infection. The infection-fighting processes turn on the body, causing the organs to work poorly.  Due to the severe consequences, we treated you with antibiotics to control the infection. Your symptoms have improved after treatment and we will be discharging you with IV antibiotics also to further control the infectious source.   It is very important to continue to take the medication as prescribed. We also recommend you to see your primary care physician upon discharge.    Diagnosis: Atrial fibrillation  Assessment and Plan of Treatment: You were found to have atrial fibrillation (afib). This is an irregular heart beat that puts you at risk for forming clots in the heart that can travel to the brain and cause strokes.   During this hospital admission you were found to be in Atrial Fibrillation. This is an irregular, often rapid heart rate that commonly causes poor blood flow. The heart's upper chambers (atria) beat out of coordination with the lower chambers (ventricles). This condition may have no symptoms, but when symptoms do appear they include palpitations, shortness of breath, and fatigue. Please continue to take your rate controlling medications as directed to prevent these symptoms.  If these symptoms do develop, you should consider calling your PCP or going to the emergency room.   We did not put you on a blood thinner due to blood found in your stool during your stay. We will continue you on carvedilol 12.5 mg to take twice a day. Please continue to discuss with your physician about anticoagulation.    Diagnosis: Wound of skin  Assessment and Plan of Treatment: You were found to have a wound on your skin. Orthopedic surgery was consulted and drained the wound. Cultures were sent and grew MSSA. We started you on antibiotics for treatment. Please continue to take your antibiotics.    Diagnosis: Melena  Assessment and Plan of Treatment: You had multiple episodes of melena during your stay. GI was consulted and recommended EGD. You had an EGD inpatient and results are still pending. Your hemoglobin was stable and there were no active signs of bleeding during your stay. You were hemodynamically stable. We recommend outpatient GI follow up.    Diagnosis: Candida esophagitis  Assessment and Plan of Treatment: You were found to have candida esophagitis on endoscopy. Candida is a fungus that can be treated with fluconazole. Please continue to take fluconazole for 14 days after discharge (1/29  - 2/12).     PRINCIPAL DISCHARGE DIAGNOSIS  Diagnosis: Fall  Assessment and Plan of Treatment: You were admitted after a fall. CT head was negative for acute injury. You were found to have new afib which is an irregular heart rhythm and sepsis with an unknown source of infection. We treated you for the afib and for your infection with antibiotics. You were recommended to be discharged to [facility] for further rehabilitation.      SECONDARY DISCHARGE DIAGNOSES  Diagnosis: Sepsis  Assessment and Plan of Treatment: Sepsis is a serious condition in which the body responds improperly to an infection. The infection-fighting processes turn on the body, causing the organs to work poorly.  Due to the severe consequences, we treated you with antibiotics to control the infection. Your symptoms have improved after treatment and we will be discharging you with IV antibiotics also to further control the infectious source.   It is very important to continue to take the medication as prescribed. We also recommend you to see your primary care physician upon discharge.    Diagnosis: Atrial fibrillation  Assessment and Plan of Treatment: You were found to have atrial fibrillation (afib). This is an irregular heart beat that puts you at risk for forming clots in the heart that can travel to the brain and cause strokes.   During this hospital admission you were found to be in Atrial Fibrillation. This is an irregular, often rapid heart rate that commonly causes poor blood flow. The heart's upper chambers (atria) beat out of coordination with the lower chambers (ventricles). This condition may have no symptoms, but when symptoms do appear they include palpitations, shortness of breath, and fatigue. Please continue to take your rate controlling medications as directed to prevent these symptoms.  If these symptoms do develop, you should consider calling your PCP or going to the emergency room.   We had a disucssion regarding putting you on a blood thinnger, but it was decided to hold off due to your episdoes of melena. Please discuss the risk/benefits of anticoagulation at your follow up cardiology appointment. We will continue you on carvedilol 12.5 mg to take twice a day.    Diagnosis: Wound of skin  Assessment and Plan of Treatment: You were found to have a wound on your skin. Orthopedic surgery was consulted and drained the wound. Cultures were sent and grew MSSA. We started you on antibiotics for treatment. Please continue to take your antibiotics.    Diagnosis: Melena  Assessment and Plan of Treatment: You had multiple episodes of melena during your stay. GI was consulted and recommended EGD. You had an EGD inpatient and results are still pending. Your hemoglobin was stable and there were no active signs of bleeding during your stay. You were hemodynamically stable. We recommend outpatient GI follow up.    Diagnosis: Candida esophagitis  Assessment and Plan of Treatment: You were found to have candida esophagitis on endoscopy. Candida is a fungus that can be treated with fluconazole. Please continue to take fluconazole for 14 days after discharge (1/29  - 2/12).

## 2024-05-03 NOTE — CONSULTS
[facility]  Cardiology  Consult Note    Patient Name: Wil Kwon Jr.  MRN: [id number]  Admission Date: 3/6/2017  Hospital Length of Stay: 1 days  Code Status: Full Code   Attending Provider: Kishor Medrano*   Consulting Provider: DUC Samaniego  Primary Care Physician: LAILA Serra MD  Principal Problem:Acute hypoxemic respiratory failure    Patient information was obtained from patient, caregiver / friend and past medical records.     Inpatient consult to Cardiology  Consult performed by: SUNIL KILLIAN  Consult ordered by: CONNIE CANALES        Subjective:     Chief Complaint:    72 years old male with Hx of myelodysplastic syndrome ( blast 7%), s/p Vidaza last week, HTN, chronic Afib on warfarin, HLP, HFpEF with asymptomatic moderate AS ( JEFF 1.18, peak velocity 3.64 ) on ECHO 4/2016.  Admitted to the hospital 3/6 for progressive SOB, productive cough, right side chest pain. Found to have RLL/RML consolidation with right pleural effusion. Elevated RR, HR with BERNICE and worsening BP requiring levo. Started on broad spectrum ABX, CT chest show loculated effusion. pleural tap 3/6 show  with Glu <5.unable to place pigtail, Pending Cxs. CTS invloved for complicated PNA for possible VATS. Consulted for priop evaluation. Patient denied any Hx of CAD, chest pain before. Also Denied Hx of CVA, DM. Known asymptomatic moderate AS, chronic Afib on warfarin. Since admission patient was Afib RVR. Currently patient describe right chest pain correlate with pleuritic. Quit active before admission, up and down stairs with no SOB or chest pain, active physically, attending G[employer] for aerobic. Never Hx of HF or syncope.            Past Medical History:   Diagnosis Date    Aortic valve stenosis, mild     secondary to bicuspid aortic alve    Atrial fibrillation     Carotid artery disease     Left CEA 4/9/13    Depression     DJD (degenerative joint disease)     H/O  Patient ambulatory to bathroom with steady gait, denies dizziness/lightheadedness at this time. Patient tolerating PO without difficulty, given turkey sandwich.   echocardiogram 04/13/2016    EF 55-60%. Diastolic dysfunction. PASP 39. mod AS with JEFF 1.18    History of psychiatric hospitalization     [facility] 2014, [facility]    Hx of psychiatric care     Hyperlipidemia     Hypertension     MDS (myelodysplastic syndrome)     Pancytopenia     Psychiatric problem     Therapy     [facility]        Past Surgical History:   Procedure Laterality Date    BONE MARROW BIOPSY  08/29/2016    CAROTID ENDARTERECTOMY Left     Inguinal hernia      Left    KNEE SURGERY      Right x2    neck fusion      TONSILLECTOMY         Review of patient's allergies indicates:   Allergen Reactions    Lipitor [atorvastatin]      Muscle pain    Lisinopril Edema     Cheek swelling       No current facility-administered medications on file prior to encounter.      Current Outpatient Prescriptions on File Prior to Encounter   Medication Sig    acetaminophen (TYLENOL) 325 MG tablet Take 325 mg by mouth as needed for Pain.    citalopram (CELEXA) 40 MG tablet TAKE 1 TABLET BY MOUTH DAILY (Patient taking differently: TAKE 1 TABLET BY MOUTH DAILY (am))    diltiaZEM (CARDIZEM CD) 300 MG 24 hr capsule TAKE ONE CAPSULE BY MOUTH EVERY DAY    losartan (COZAAR) 50 MG tablet Take 50 mg by mouth once daily.    metoprolol succinate (TOPROL-XL) 25 MG 24 hr tablet Take 1 tablet (25 mg total) by mouth once daily.    multivitamin capsule Take 1 capsule by mouth every morning.     nystatin (MYCOSTATIN) ointment Apply topically 3 (three) times daily.    omega-3 fatty acids-vitamin E (FISH OIL) 1,000 mg Cap Take 3 capsules by mouth once daily.     ondansetron (ZOFRAN) 8 MG tablet Take 1 tablet (8 mg total) by mouth every 8 (eight) hours as needed for Nausea.    pravastatin (PRAVACHOL) 20 MG tablet Take 1 tablet (20 mg total) by mouth every other day.    warfarin (COUMADIN) 3 MG tablet Take 1 tablet (3 mg total) by mouth Daily.     Family History     Problem Relation (Age  of Onset)    Hyperlipidemia Brother        Social History Main Topics    Smoking status: Never Smoker    Smokeless tobacco: Never Used    Alcohol use No    Drug use: No    Sexual activity: Yes     Partners: Female     Review of Systems   Constitution: Positive for weakness. Negative for fever and weight gain.   HENT: Negative for headaches.    Cardiovascular: Negative for chest pain, claudication, cyanosis, dyspnea on exertion, irregular heartbeat, leg swelling, near-syncope, orthopnea, palpitations, paroxysmal nocturnal dyspnea and syncope.   Respiratory: Negative for hemoptysis and wheezing.    Gastrointestinal: Negative for jaundice and melena.   Genitourinary: Negative for hematuria.   Neurological: Negative for seizures.   Psychiatric/Behavioral: Negative for hallucinations and substance abuse.     Objective:     Vital Signs (Most Recent):  Temp: 98 °F (36.7 °C) (03/07/17 1500)  Pulse: (!) 125 (03/07/17 1555)  Resp: (!) 22 (03/07/17 1555)  BP: 118/72 (03/07/17 1500)  SpO2: 99 % (03/07/17 1555) Vital Signs (24h Range):  Temp:  [96.8 °F (36 °C)-98.2 °F (36.8 °C)] 98 °F (36.7 °C)  Pulse:  [102-147] 125  Resp:  [22-42] 22  SpO2:  [97 %-100 %] 99 %  BP: ()/(41-84) 118/72     Weight: 86.2 kg (190 lb)  Body mass index is 26.5 kg/(m^2).    SpO2: 99 %  O2 Device (Oxygen Therapy): BiPAP      Intake/Output Summary (Last 24 hours) at 03/07/17 1603  Last data filed at 03/07/17 1345   Gross per 24 hour   Intake          1972.75 ml   Output              425 ml   Net          1547.75 ml       Lines/Drains/Airways     Peripheral Intravenous Line                 Peripheral IV - Single Lumen 03/06/17 1116 Right Antecubital 1 day         Peripheral IV - Single Lumen 03/06/17 1230 Right Wrist 1 day                Physical Exam   Constitutional: He is oriented to person, place, and time. He appears well-developed. He is active.  Non-toxic appearance. He does not have a sickly appearance. He appears ill. Face mask in  place.   Eyes: Pupils are equal, round, and reactive to light. No scleral icterus.   Neck: Neck supple.   Cardiovascular: Intact distal pulses.  An irregularly irregular rhythm present. Tachycardia present.    No murmur heard.  Pulmonary/Chest: No accessory muscle usage or stridor. Tachypnea noted. He has rales in the right lower field.   Abdominal: He exhibits no distension.   Musculoskeletal: He exhibits no edema.   Neurological: He is alert and oriented to person, place, and time.   Skin: Skin is warm. No rash noted.   Psychiatric: He has a normal mood and affect.       Significant Labs:   ABG:   Recent Labs  Lab 03/06/17  1310 03/06/17  2028 03/07/17  0901   PH 7.415 7.346* 7.416   PCO2 25.6* 32.7* 29.8*   HCO3 16.4* 17.9* 19.1*   POCSATURATED 94* 97 99   BE -8 -8 -5   , Blood Culture:   Recent Labs  Lab 03/06/17  1116 03/06/17  1145   LABBLOO No Growth to date  No Growth to date No Growth to date  No Growth to date   , BMP:   Recent Labs  Lab 03/06/17  1117  03/06/17 2150 03/07/17  0045 03/07/17  0415   *  < > 114* 120* 118*   *  < > 125* 125* 125*   K 4.7  < > 4.8 4.8 4.9   CL 89*  < > 97 97 96   CO2 20*  < > 18* 18* 19*   BUN 40*  < > 37* 37* 38*   CREATININE 2.2*  < > 1.8* 1.8* 1.9*   CALCIUM 9.0  < > 8.0* 8.1* 7.9*   MG 2.0  --   --   --  1.8   < > = values in this interval not displayed., CMP   Recent Labs  Lab 03/06/17  1117  03/06/17 2150 03/07/17  0045 03/07/17  0415   *  < > 125* 125* 125*   K 4.7  < > 4.8 4.8 4.9   CL 89*  < > 97 97 96   CO2 20*  < > 18* 18* 19*   *  < > 114* 120* 118*   BUN 40*  < > 37* 37* 38*   CREATININE 2.2*  < > 1.8* 1.8* 1.9*   CALCIUM 9.0  < > 8.0* 8.1* 7.9*   PROT 7.8  --   --   --  6.1   ALBUMIN 2.8*  --   --   --  2.1*   BILITOT 1.3*  --   --   --  1.2*   ALKPHOS 79  --   --   --  58   AST 45*  --   --   --  40   ALT 24  --   --   --  21   ANIONGAP 13  < > 10 10 10   ESTGFRAFRICA 33.4*  < > 42.5* 42.5* 39.8*   EGFRNONAA 28.9*  < > 36.8* 36.8*  34.5*   < > = values in this interval not displayed., CBC   Recent Labs  Lab 03/06/17  1117 03/06/17  2150 03/07/17  0415   WBC 41.89* 44.54* 40.38*   HGB 10.2* 10.2* 9.7*   HCT 32.8* 33.4* 31.4*    372* 335    and INR   Recent Labs  Lab 03/07/17  0758 03/07/17  1113 03/07/17  1454   INR 2.6* 2.3* 2.2*       Significant Imaging: Echocardiogram:   2D echo with color flow doppler:   Results for orders placed or performed in visit on 03/01/17   2D Echo w/ Color Flow Doppler   Result Value Ref Range    EF 55 55 - 65    Mitral Valve Regurgitation MODERATE (A)     Diastolic Dysfunction Yes (A)     Aortic Valve Stenosis MODERATE (A)     Est. PA Systolic Pressure 46.44 (A)     Mitral Valve Mobility NORMAL       and EKG: last one show Afib with RVR from SVT on admission     Assessment and Plan:     Active Diagnoses:    Diagnosis Date Noted POA    PRINCIPAL PROBLEM:  Acute hypoxemic respiratory failure [J96.01] 03/06/2017 Unknown    Sepsis [A41.9] 03/06/2017 Yes    HCAP (healthcare-associated pneumonia) [J18.9] 03/06/2017 Unknown    Leukocytosis [D72.829] 03/06/2017 Unknown    BERNICE (acute kidney injury) [N17.9] 03/06/2017 Unknown    MDS (myelodysplastic syndrome) [D46.9] 09/07/2016 Yes    Depression [F32.9] 08/17/2016 Yes    Hyponatremia [E87.1] 04/22/2016 Yes    Anemia, chronic disease [D63.8] 04/01/2016 Yes    Paroxysmal atrial fibrillation [I48.0] 01/13/2016 Yes    HTN (hypertension), benign [I10] 10/17/2012 Yes    Dyslipidemia [E78.5] 10/17/2012 Yes      Problems Resolved During this Admission:    Diagnosis Date Noted Date Resolved POA     72 years old male with Hx of asymptomatic moderate aortic stenosis JEFF 1.2 and peak velocity 3 with gradient 24, stage B with MET 4-10 at least. Revised Cardiac Risk Index for Pre-Operative Risk score is 0.4% in urgent intermediate risk surgery.       We recommend:       Afib with RVR with sever left atrial enlargement:   CHADS2 score 1, CHADS VASC score 3 ( HTN,PVD  and age )  -- likely 2/2 sepsis. No signs of ACS.   -- low BP, now off pressors. Need Source control and for rate control, restart home BB then CC when BP allow.   -- keep off warfarin, restart when risk of bleed is low.        Asymptomatic moderate Aortic stenosis:  -- low risk with 0.4% risk of major cardiac events.    -- No need for  Invasive cardiac investigation.   -- Cautious with volume overload or hypovolemia.       Saff note to follow    Thank you for your consult. I will follow-up with patient. Please contact us if you have any additional questions.    DUC Samaniego  Cardiology   [facility]

## 2025-04-05 NOTE — ASSESSMENT & PLAN NOTE
- home diltiazem, toprol xl and coumadin   - Will continue diltiazem, coumadin and hold toprol xl   - Started on digitoxin per cards recs, will follow up rate continues to be elevated in the 130s       oral

## (undated) DEVICE — NDL BONE MAR JAMSHIDI 11G 4CM

## (undated) DEVICE — NDL SPINAL 20GX3.5 HUB

## (undated) DEVICE — GLOVE BIOGEL SENSOR SZ 6.5

## (undated) DEVICE — SYR SLIP TIP 10ML SHIELD

## (undated) DEVICE — BANDAID STRIP PLASTIC 3/4X3

## (undated) DEVICE — NDL MONOJECT ASPIRATION 16G

## (undated) DEVICE — TRAY JAMSHIDI 11 X 4 BONE MAR